# Patient Record
Sex: FEMALE | Race: BLACK OR AFRICAN AMERICAN | Employment: FULL TIME | URBAN - METROPOLITAN AREA
[De-identification: names, ages, dates, MRNs, and addresses within clinical notes are randomized per-mention and may not be internally consistent; named-entity substitution may affect disease eponyms.]

---

## 2017-04-19 ENCOUNTER — OFFICE VISIT (OUTPATIENT)
Dept: INTERNAL MEDICINE CLINIC | Age: 57
End: 2017-04-19

## 2017-04-19 VITALS
SYSTOLIC BLOOD PRESSURE: 133 MMHG | TEMPERATURE: 96.6 F | DIASTOLIC BLOOD PRESSURE: 82 MMHG | WEIGHT: 246.6 LBS | OXYGEN SATURATION: 97 % | HEART RATE: 78 BPM | HEIGHT: 66 IN | RESPIRATION RATE: 18 BRPM | BODY MASS INDEX: 39.63 KG/M2

## 2017-04-19 DIAGNOSIS — E78.5 DYSLIPIDEMIA: ICD-10-CM

## 2017-04-19 DIAGNOSIS — I10 ESSENTIAL HYPERTENSION: ICD-10-CM

## 2017-04-19 DIAGNOSIS — N18.30 CHRONIC RENAL FAILURE SYNDROME, STAGE 3 (MODERATE) (HCC): Primary | ICD-10-CM

## 2017-04-19 DIAGNOSIS — E66.9 OBESITY, UNSPECIFIED OBESITY SEVERITY, UNSPECIFIED OBESITY TYPE: ICD-10-CM

## 2017-04-19 NOTE — PROGRESS NOTES
1. Have you been to the ER, urgent care clinic since your last visit? Hospitalized since your last visit? Yes Where: [facility]    2. Have you seen or consulted any other health care providers outside of the [facility] since your last visit? Include any pap smears or colon screening.  Yes Reason for visit: chest pain

## 2017-04-19 NOTE — MR AVS SNAPSHOT
Visit Information Date & Time Provider Department Dept. Phone Encounter #  
 4/19/2017  1:45 PM Tri Mistry MD [facility] - Abdullahi Pendleton [phone] [id number] Upcoming Health Maintenance Date Due FOBT Q 1 YEAR AGE 50-75 6/15/2016 BREAST CANCER SCRN MAMMOGRAM 6/15/2017 Pneumococcal 19-64 Highest Risk (2 of 3 - PCV13) 4/19/2018 PAP AKA CERVICAL CYTOLOGY 6/15/2018 DTaP/Tdap/Td series (2 - Td) 4/19/2027 Allergies as of 4/19/2017  Review Complete On: 4/19/2017 By: Abhi Joy No Known Allergies Current Immunizations  Never Reviewed No immunizations on file. Not reviewed this visit You Were Diagnosed With   
  
 Codes Comments Chronic renal failure syndrome, stage 3 (moderate)    -  Primary ICD-10-CM: N18.3 ICD-9-CM: 033. 3 Essential hypertension     ICD-10-CM: I10 
ICD-9-CM: 401.9 Obesity, unspecified obesity severity, unspecified obesity type     ICD-10-CM: E66.9 ICD-9-CM: 278.00 Dyslipidemia     ICD-10-CM: E78.5 ICD-9-CM: 272.4 Vitals BP Pulse Temp Resp Height(growth percentile) Weight(growth percentile) 133/82 (BP 1 Location: Right arm, BP Patient Position: Sitting) 78 96.6 °F (35.9 °C) (Oral) 18 5' 6\" (1.676 m) 246 lb 9.6 oz (111.9 kg) SpO2 BMI OB Status Smoking Status 97% 39.8 kg/m2 Postmenopausal Never Smoker BMI and BSA Data Body Mass Index Body Surface Area  
 39.8 kg/m 2 2.28 m 2 Preferred Pharmacy Pharmacy Name Phone [facility] [address] [phone] Your Updated Medication List  
  
   
This list is accurate as of: 4/19/17  4:37 PM.  Always use your most recent med list. amLODIPine 10 mg tablet Commonly known as:  Tamy Million TAKE 1 TABLET BY MOUTH DAILY  
  
 atenolol 50 mg tablet Commonly known as:  TENORMIN Take  by mouth daily. calcitRIOL 0.5 mcg capsule Commonly known as:  ROCALTROL Take 2 Caps by mouth daily. lisinopril 40 mg tablet Commonly known as:  PRINIVIL, ZESTRIL  
TAKE 1 TABLET BY MOUTH DAILY  
  
 metOLazone 2.5 mg tablet Commonly known as:  ZAROXOLYN  
TAKE 1 TABLET BY MOUTH DAILY potassium chloride 20 mEq tablet Commonly known as:  K-DUR, KLOR-CON  
TAKE 1 TABLET BY MOUTH DAILY pravastatin 40 mg tablet Commonly known as:  PRAVACHOL  
TAKE 1 TABLET BY MOUTH EVERY DAY AT BEDTIME  
  
 predniSONE 20 mg tablet Commonly known as:  DELTASONE  
1 tablet T. I.D x 5 days ULORIC 40 mg Tab tablet Generic drug:  febuxostat Take 40 mg by mouth daily. We Performed the Following CYSTATIN C [ODU10974 Custom] METABOLIC PANEL, BASIC [06105 CPT(R)] Introducing [facility]! [facility] introduces [employer] patient portal. Now you can access parts of your medical record, email your doctor's office, and request medication refills online. 1. In your internet browser, go to [email or web address] 2. Click on the First Time User? Click Here link in the Sign In box. You will see the New Member Sign Up page. 3. Enter your Dokk[employer] Access Code exactly as it appears below. You will not need to use this code after youve completed the sign-up process. If you do not sign up before the expiration date, you must request a new code. · Dokk[employer] Access Code: [email or web address] Expires: 7/18/2017  4:37 PM 
 
4. Enter the last four digits of your Social Security Number (xxxx) and Date of Birth (mm/dd/yyyy) as indicated and click Submit. You will be taken to the next sign-up page. 5. Create a [employer] Intelligencet ID. This will be your Dokkankom login ID and cannot be changed, so think of one that is secure and easy to remember. 6. Create a [employer]ellig[employer]et password. You can change your password at any time. 7. Enter your Password Reset Question and Answer. This can be used at a later time if you forget your password. 8. Enter your e-mail address. You will receive e-mail notification when new information is available in [address] 9. Click Sign Up. You can now view and download portions of your medical record. 10. Click the Download Summary menu link to download a portable copy of your medical information. If you have questions, please visit the Frequently Asked Questions section of the Intransa website. Remember, In[employer]a is NOT to be used for urgent needs. For medical emergencies, dial 911. Now available from your iPhone and Android! Please provide this summary of care documentation to your next provider. Your primary care clinician is listed as Stephanie Pendleton. If you have any questions after today's visit, please call [phone].

## 2017-04-20 NOTE — PROGRESS NOTES
[address] [facility]  [place] [address]  [address] [place] [address]  Phone:  [phone]  Fax: [phone]       Chief Complaint   Patient presents with   [facility] Follow Up   . SUBJECTIVE:    Lois Holley is a 59 y.o. female comes in for return visit stating that she has done fairly well. Unfortunately she developed chest pain and went to the emergency room at [facility].  She was admitted. With hydration her creatinine decreased to a reasonable level and she was given Mucomyst.  She was cathed and found to have trivial disease with worst stenotic area being 40% off of the first study, first obtuse margin upon the LAD of 40%. She had no kidney damage from the dye with creatinine that was actually better than the one prior to admission. This is purely representing a reflection of the actual serum creatinine with hydration. Since being home fortunately she did exactly as I would have instructed her to do and that is to resume all the medications she was taking before admission, not making any adjustments are suggested by the hospitalist.  She feels well at this point. She is taking her statin as prescribed. She has not had any gouty episodes either. Current Outpatient Prescriptions   Medication Sig Dispense Refill    lisinopril (PRINIVIL, ZESTRIL) 40 mg tablet TAKE 1 TABLET BY MOUTH DAILY 30 Tab 11    pravastatin (PRAVACHOL) 40 mg tablet TAKE 1 TABLET BY MOUTH EVERY DAY AT BEDTIME 30 Tab 11    amLODIPine (NORVASC) 10 mg tablet TAKE 1 TABLET BY MOUTH DAILY 30 Tab 11    metolazone (ZAROXOLYN) 2.5 mg tablet TAKE 1 TABLET BY MOUTH DAILY 30 Tab 11    potassium chloride (K-DUR, KLOR-CON) 20 mEq tablet TAKE 1 TABLET BY MOUTH DAILY 30 Tab 11    calcitRIOL (ROCALTROL) 0.5 mcg capsule Take 2 Caps by mouth daily. 90 Cap 3    predniSONE (DELTASONE) 20 mg tablet 1 tablet T. I.D x 5 days 15 Tab 0    atenolol (TENORMIN) 50 mg tablet Take  by mouth daily.      febuxostat (ULORIC) 40 mg tab tablet Take 40 mg by mouth daily.        Past Medical History:   Diagnosis Date    Arthritis     L knee    Chronic kidney disease     secondary to hypertensive nephrosclerosis    Gout     Hypertension      Past Surgical History:   Procedure Laterality Date    ABDOMEN SURGERY PROC UNLISTED      HX COLONOSCOPY      HX GYN       No Known Allergies      REVIEW OF SYSTEMS:  General: negative for - chills or fever  ENT: negative for - headaches, nasal congestion or tinnitus  Respiratory: negative for - cough, hemoptysis, shortness of breath or wheezing  Cardiovascular : negative for - chest pain, edema, palpitations or shortness of breath  Gastrointestinal: negative for - abdominal pain, blood in stools, heartburn or nausea/vomiting  Genito-Urinary: no dysuria, trouble voiding, or hematuria  Musculoskeletal: negative for - gait disturbance, joint pain, joint stiffness or joint swelling  Neurological: no TIA or stroke symptoms  Hematologic: no bruises, no bleeding, no swollen glands  Integument: no lumps, mole changes, nail changes or rash  Endocrine: no malaise/lethargy or unexpected weight changes      Social History     Social History    Marital status:      Spouse name: N/A    Number of children: 2    Years of education: 16+     Occupational History    teacher      Social History Main Topics    Smoking status: Never Smoker    Smokeless tobacco: Never Used    Alcohol use No    Drug use: No    Sexual activity: Not Asked     Other Topics Concern    None     Social History Narrative     Family History   Problem Relation Age of Onset    Diabetes Mother     Hypertension Mother        OBJECTIVE:    Visit Vitals    /82 (BP 1 Location: Right arm, BP Patient Position: Sitting)    Pulse 78    Temp 96.6 °F (35.9 °C) (Oral)    Resp 18    Ht 5' 6\" (1.676 m)    Wt 246 lb 9.6 oz (111.9 kg)    SpO2 97%    BMI 39.8 kg/m2     CONSTITUTIONAL: well , well nourished, appears age appropriate  EYES: perrla, eom intact  ENMT:moist mucous membranes, pharynx clear  NECK: supple. Thyroid normal,no JVD  RESPIRATORY: Chest: clear to ascultation and percussion   CARDIOVASCULAR: Heart: regular rate and rhythm  GASTROINTESTINAL: Abdomen: soft, bowel sounds active  HEMATOLOGIC: no pathological lymph nodes palpated  MUSCULOSKELETAL: Extremities: no edema, pulse 1+   INTEGUMENT: No unusual rashes or suspicious skin lesions noted. Nails appear normal.  NEUROLOGIC: non-focal exam   MENTAL STATUS: alert and oriented, appropriate affect      ASSESSMENT:  1. Chronic renal failure syndrome, stage 3 (moderate)    2. Essential hypertension    3. Obesity, unspecified obesity severity, unspecified obesity type    4. Dyslipidemia        PLAN:    1. I will assess her renal function today and make a decision if anything else need be done which I doubt. 2. Blood pressure is excellent. No adjustments are made. Specifically her blood pressure is 130/80. 3. I again encourage her to lose weight by reducing carbohydrate intake. This means reducing consumption of sweets, white bread, and white potatoes. 4. She will continue her statin in view of her cardiovascular risk. .  Orders Placed This Encounter    METABOLIC PANEL, BASIC    CYSTATIN C         Follow-up Disposition:  Return in about 3 months (around 7/19/2017).       Anselmo Velasco MD

## 2017-04-21 LAB
BUN SERPL-MCNC: 38 MG/DL (ref 6–24)
BUN/CREAT SERPL: 14 (ref 9–23)
CALCIUM SERPL-MCNC: 10.4 MG/DL (ref 8.7–10.2)
CHLORIDE SERPL-SCNC: 103 MMOL/L (ref 96–106)
CO2 SERPL-SCNC: 22 MMOL/L (ref 18–29)
CREAT SERPL-MCNC: 2.66 MG/DL (ref 0.57–1)
CYSTATIN C SERPL-MCNC: 2.76 MG/L (ref 0.53–0.95)
GLUCOSE SERPL-MCNC: 88 MG/DL (ref 65–99)
POTASSIUM SERPL-SCNC: 3.7 MMOL/L (ref 3.5–5.2)
SODIUM SERPL-SCNC: 143 MMOL/L (ref 134–144)
SPECIMEN STATUS REPORT, ROLRST: NORMAL

## 2017-04-26 ENCOUNTER — PATIENT OUTREACH (OUTPATIENT)
Dept: INTERNAL MEDICINE CLINIC | Age: 57
End: 2017-04-26

## 2017-04-27 ENCOUNTER — PATIENT OUTREACH (OUTPATIENT)
Dept: INTERNAL MEDICINE CLINIC | Age: 57
End: 2017-04-27

## 2017-04-27 NOTE — LETTER
4/27/2017 11:36 AM 
 
Ms. Matt Ramos [address] [address] Dear Ms. Rizwan Hudson am a Nurse Navigator working with Dr. Vianca Coker  Part of my job is to follow up with patients who have been in the emergency department to see how they are feeling, answer any questions they may have about their visit and also make sure they have a follow-up appointment to see their primary care doctor. I have been unable to reach you by telephone and wanted to make sure that you scheduled a follow-up appointment to come in and talk to Dr. Vianca Coker about your recent visit to the hospital.  Please call our office to schedule your appointment. In the meantime, if you have any questions or concerns, please feel free to call me on my direct line at [phone] Thank you for allowing us to participate in your care! Sincerely, Jeronimo Carbone RN

## 2017-04-27 NOTE — PROGRESS NOTES
NNTOCED  2nd /fu contact      Patient on discharge report dated 4/26/2017. NN unable to leavet message on voicemail-stated box is full. Will attempt to contact again. Need to complete post-discharge assessment. Sent Get In Touch Letter.

## 2017-08-16 ENCOUNTER — OFFICE VISIT (OUTPATIENT)
Dept: INTERNAL MEDICINE CLINIC | Age: 57
End: 2017-08-16

## 2017-08-16 VITALS
SYSTOLIC BLOOD PRESSURE: 129 MMHG | HEART RATE: 99 BPM | HEIGHT: 66 IN | DIASTOLIC BLOOD PRESSURE: 71 MMHG | WEIGHT: 248 LBS | TEMPERATURE: 97.4 F | RESPIRATION RATE: 22 BRPM | OXYGEN SATURATION: 97 % | BODY MASS INDEX: 39.86 KG/M2

## 2017-08-16 DIAGNOSIS — E11.9 CONTROLLED TYPE 2 DIABETES MELLITUS WITHOUT COMPLICATION, WITH LONG-TERM CURRENT USE OF INSULIN (HCC): ICD-10-CM

## 2017-08-16 DIAGNOSIS — M10.9 GOUT, UNSPECIFIED CAUSE, UNSPECIFIED CHRONICITY, UNSPECIFIED SITE: ICD-10-CM

## 2017-08-16 DIAGNOSIS — N18.30 CHRONIC RENAL FAILURE SYNDROME, STAGE 3 (MODERATE) (HCC): Primary | ICD-10-CM

## 2017-08-16 DIAGNOSIS — I10 ESSENTIAL HYPERTENSION: ICD-10-CM

## 2017-08-16 DIAGNOSIS — E66.9 OBESITY, UNSPECIFIED OBESITY SEVERITY, UNSPECIFIED OBESITY TYPE: ICD-10-CM

## 2017-08-16 DIAGNOSIS — E78.5 DYSLIPIDEMIA: ICD-10-CM

## 2017-08-16 DIAGNOSIS — Z79.4 CONTROLLED TYPE 2 DIABETES MELLITUS WITHOUT COMPLICATION, WITH LONG-TERM CURRENT USE OF INSULIN (HCC): ICD-10-CM

## 2017-08-16 RX ORDER — FEBUXOSTAT 40 MG/1
40 TABLET, FILM COATED ORAL DAILY
Qty: 30 TAB | Refills: 11 | Status: SHIPPED | OUTPATIENT
Start: 2017-08-16 | End: 2017-11-13 | Stop reason: CLARIF

## 2017-08-16 NOTE — PROGRESS NOTES
.1. Have you been to the ER, urgent care clinic since your last visit? Hospitalized since your last visit? No    2. Have you seen or consulted any other health care providers outside of the [address] [place] [address] since your last visit? Include any pap smears or colon screening.  No

## 2017-08-16 NOTE — PROGRESS NOTES
[address] [facility]  [place] [address]  [address] [place] [address]  Phone:  [phone]  Fax: [phone]       Chief Complaint   Patient presents with    Hypertension   . SUBJECTIVE:    Eli Strickland is a 59 y.o. female Comes in for return visit stating that she has done fairly well other than having recurrent episodes of gout. She stopped taking her Uloric several years ago. She now takes Prednisone p.r.n. for the gouty attacks often times occurring in her left ankle and MCP joints bilaterally, mostly in the left foot, however. She denies any shortness of breath or chest pain. She actually had a cardiac cath, which was entirely normal.      She has a past history of primary hypertension and dyslipidemia. There has been no meaningful weight loss noted. Current Outpatient Prescriptions   Medication Sig Dispense Refill    febuxostat (ULORIC) 40 mg tab tablet Take 1 Tab by mouth daily. 30 Tab 11    lisinopril (PRINIVIL, ZESTRIL) 40 mg tablet TAKE 1 TABLET BY MOUTH DAILY 30 Tab 11    pravastatin (PRAVACHOL) 40 mg tablet TAKE 1 TABLET BY MOUTH EVERY DAY AT BEDTIME 30 Tab 11    metolazone (ZAROXOLYN) 2.5 mg tablet TAKE 1 TABLET BY MOUTH DAILY 30 Tab 11    potassium chloride (K-DUR, KLOR-CON) 20 mEq tablet TAKE 1 TABLET BY MOUTH DAILY 30 Tab 11    amLODIPine (NORVASC) 10 mg tablet TAKE 1 TABLET BY MOUTH DAILY 30 Tab 11    calcitRIOL (ROCALTROL) 0.5 mcg capsule Take 2 Caps by mouth daily. 90 Cap 3    predniSONE (DELTASONE) 20 mg tablet 1 tablet T. I.D x 5 days 15 Tab 0    atenolol (TENORMIN) 50 mg tablet Take  by mouth daily.        Past Medical History:   Diagnosis Date    Arthritis     L knee    Chronic kidney disease     secondary to hypertensive nephrosclerosis    Gout     Hypertension      Past Surgical History:   Procedure Laterality Date    ABDOMEN SURGERY PROC UNLISTED      HX COLONOSCOPY      HX GYN       No Known Allergies      REVIEW OF SYSTEMS:  General: negative for - chills or fever  ENT: negative for - headaches, nasal congestion or tinnitus  Respiratory: negative for - cough, hemoptysis, shortness of breath or wheezing  Cardiovascular : negative for - chest pain, edema, palpitations or shortness of breath  Gastrointestinal: negative for - abdominal pain, blood in stools, heartburn or nausea/vomiting  Genito-Urinary: no dysuria, trouble voiding, or hematuria  Musculoskeletal: negative for - gait disturbance, joint pain, joint stiffness or joint swelling  Neurological: no TIA or stroke symptoms  Hematologic: no bruises, no bleeding, no swollen glands  Integument: no lumps, mole changes, nail changes or rash  Endocrine: no malaise/lethargy or unexpected weight changes      Social History     Social History    Marital status:      Spouse name: N/A    Number of children: 2    Years of education: 16+     Occupational History    teacher      Social History Main Topics    Smoking status: Never Smoker    Smokeless tobacco: Never Used    Alcohol use No    Drug use: No    Sexual activity: Not Asked     Other Topics Concern    None     Social History Narrative     Family History   Problem Relation Age of Onset    Diabetes Mother     Hypertension Mother        OBJECTIVE:    Visit Vitals    /71 (BP 1 Location: Right arm, BP Patient Position: Sitting)    Pulse 99    Temp 97.4 °F (36.3 °C) (Oral)    Resp 22    Ht 5' 6\" (1.676 m)    Wt 248 lb (112.5 kg)    SpO2 97%    BMI 40.03 kg/m2     CONSTITUTIONAL: well , well nourished, appears age appropriate  EYES: perrla, eom intact  ENMT:moist mucous membranes, pharynx clear  NECK: supple.  Thyroid normal,no JVD  RESPIRATORY: Chest: clear to ascultation and percussion   CARDIOVASCULAR: Heart: regular rate and rhythm  GASTROINTESTINAL: Abdomen: soft, bowel sounds active  HEMATOLOGIC: no pathological lymph nodes palpated  MUSCULOSKELETAL: Extremities: no edema, pulse 1+, pain elicited range of motion left ankle, positive compression test for MTP joints bilaterally  INTEGUMENT: No unusual rashes or suspicious skin lesions noted. Nails appear normal.  NEUROLOGIC: non-focal exam   MENTAL STATUS: alert and oriented, appropriate affect      ASSESSMENT:  1. Chronic renal failure syndrome, stage 3 (moderate)    2. Gout, unspecified cause, unspecified chronicity, unspecified site    3. Essential hypertension    4. Obesity, unspecified obesity severity, unspecified obesity type    5. Dyslipidemia    6. Controlled type 2 diabetes mellitus without complication, with long-term current use of insulin ([address]        PLAN:    1. The patient has chronic renal failure. Hopefully the values are reasonably stable at this point. 2. She also has gout, and has decided to take the Uloric again. To prevent an acute gouty episode, she will take Prednisone 10 mg q d for the next three weeks. Hopefully this will be long enough such that an episode will not occur. 3. Her blood pressure is excellent today. No adjustments are made. 4. I encourage her to lose weight by reducing carbohydrate intake. This means avoiding sweets, white bread, white potatoes. 5. She will continue statin as prescribed in view of her increased cardiovascular risk. 6. Her diabetes has been reasonably quiescent quite surprisingly. Hemoglobin A1c will be checked. .  Orders Placed This Encounter    APOLIPOPROTEIN B    CBC WITH AUTOMATED DIFF    LIPID PANEL    URINALYSIS W/ RFLX MICROSCOPIC    TSH 3RD GENERATION    METABOLIC PANEL, COMPREHENSIVE    HEMOGLOBIN A1C WITH EAG    CYSTATIN C    CREATININE, UR, RANDOM    PROTEIN,TOTAL,URINE    URIC ACID    febuxostat (ULORIC) 40 mg tab tablet         Follow-up Disposition:  Return in about 3 months (around 11/16/2017).       Lonnie Lr MD

## 2017-08-18 LAB
ALBUMIN SERPL-MCNC: 4.3 G/DL (ref 3.5–5.5)
ALBUMIN/GLOB SERPL: 1.4 {RATIO} (ref 1.2–2.2)
ALP SERPL-CCNC: 81 IU/L (ref 39–117)
ALT SERPL-CCNC: 9 IU/L (ref 0–32)
APO B SERPL-MCNC: 84 MG/DL (ref 54–133)
APPEARANCE UR: CLEAR
AST SERPL-CCNC: 15 IU/L (ref 0–40)
BACTERIA #/AREA URNS HPF: NORMAL /[HPF]
BASOPHILS # BLD AUTO: 0 X10E3/UL (ref 0–0.2)
BASOPHILS NFR BLD AUTO: 0 %
BILIRUB SERPL-MCNC: 0.3 MG/DL (ref 0–1.2)
BILIRUB UR QL STRIP: NEGATIVE
BUN SERPL-MCNC: 46 MG/DL (ref 6–24)
BUN/CREAT SERPL: 15 (ref 9–23)
CALCIUM SERPL-MCNC: 10.4 MG/DL (ref 8.7–10.2)
CASTS URNS QL MICRO: NORMAL /LPF
CHLORIDE SERPL-SCNC: 99 MMOL/L (ref 96–106)
CHOLEST SERPL-MCNC: 163 MG/DL (ref 100–199)
CO2 SERPL-SCNC: 22 MMOL/L (ref 18–29)
COLOR UR: YELLOW
CREAT SERPL-MCNC: 2.99 MG/DL (ref 0.57–1)
CREAT UR-MCNC: 86.5 MG/DL
CYSTATIN C SERPL-MCNC: 3.13 MG/L (ref 0.53–0.95)
EOSINOPHIL # BLD AUTO: 0 X10E3/UL (ref 0–0.4)
EOSINOPHIL NFR BLD AUTO: 0 %
EPI CELLS #/AREA URNS HPF: NORMAL /HPF
ERYTHROCYTE [DISTWIDTH] IN BLOOD BY AUTOMATED COUNT: 14.9 % (ref 12.3–15.4)
EST. AVERAGE GLUCOSE BLD GHB EST-MCNC: 123 MG/DL
GLOBULIN SER CALC-MCNC: 3 G/DL (ref 1.5–4.5)
GLUCOSE SERPL-MCNC: 133 MG/DL (ref 65–99)
GLUCOSE UR QL: NEGATIVE
HBA1C MFR BLD: 5.9 % (ref 4.8–5.6)
HCT VFR BLD AUTO: 35.9 % (ref 34–46.6)
HDLC SERPL-MCNC: 47 MG/DL
HGB BLD-MCNC: 11.1 G/DL (ref 11.1–15.9)
HGB UR QL STRIP: NEGATIVE
IMM GRANULOCYTES # BLD: 0 X10E3/UL (ref 0–0.1)
IMM GRANULOCYTES NFR BLD: 0 %
KETONES UR QL STRIP: NEGATIVE
LDLC SERPL CALC-MCNC: 89 MG/DL (ref 0–99)
LEUKOCYTE ESTERASE UR QL STRIP: NEGATIVE
LYMPHOCYTES # BLD AUTO: 1.8 X10E3/UL (ref 0.7–3.1)
LYMPHOCYTES NFR BLD AUTO: 14 %
MCH RBC QN AUTO: 27.1 PG (ref 26.6–33)
MCHC RBC AUTO-ENTMCNC: 30.9 G/DL (ref 31.5–35.7)
MCV RBC AUTO: 88 FL (ref 79–97)
MICRO URNS: ABNORMAL
MONOCYTES # BLD AUTO: 0.6 X10E3/UL (ref 0.1–0.9)
MONOCYTES NFR BLD AUTO: 5 %
MUCOUS THREADS URNS QL MICRO: PRESENT
NEUTROPHILS # BLD AUTO: 10.1 X10E3/UL (ref 1.4–7)
NEUTROPHILS NFR BLD AUTO: 81 %
NITRITE UR QL STRIP: NEGATIVE
PH UR STRIP: 6 [PH] (ref 5–7.5)
PLATELET # BLD AUTO: 258 X10E3/UL (ref 150–379)
POTASSIUM SERPL-SCNC: 3.6 MMOL/L (ref 3.5–5.2)
PROT SERPL-MCNC: 7.3 G/DL (ref 6–8.5)
PROT UR QL STRIP: ABNORMAL
PROT UR-MCNC: 51.6 MG/DL
RBC # BLD AUTO: 4.1 X10E6/UL (ref 3.77–5.28)
RBC #/AREA URNS HPF: NORMAL /HPF
SODIUM SERPL-SCNC: 144 MMOL/L (ref 134–144)
SP GR UR: 1.01 (ref 1–1.03)
TRIGL SERPL-MCNC: 135 MG/DL (ref 0–149)
TSH SERPL DL<=0.005 MIU/L-ACNC: 1.07 UIU/ML (ref 0.45–4.5)
URATE SERPL-MCNC: 9.1 MG/DL (ref 2.5–7.1)
UROBILINOGEN UR STRIP-MCNC: 0.2 MG/DL (ref 0.2–1)
VLDLC SERPL CALC-MCNC: 27 MG/DL (ref 5–40)
WBC # BLD AUTO: 12.6 X10E3/UL (ref 3.4–10.8)
WBC #/AREA URNS HPF: NORMAL /HPF

## 2017-11-13 ENCOUNTER — OFFICE VISIT (OUTPATIENT)
Dept: INTERNAL MEDICINE CLINIC | Age: 57
End: 2017-11-13

## 2017-11-13 VITALS
RESPIRATION RATE: 18 BRPM | SYSTOLIC BLOOD PRESSURE: 143 MMHG | WEIGHT: 251.8 LBS | OXYGEN SATURATION: 99 % | TEMPERATURE: 96.7 F | HEART RATE: 88 BPM | DIASTOLIC BLOOD PRESSURE: 97 MMHG | BODY MASS INDEX: 40.47 KG/M2 | HEIGHT: 66 IN

## 2017-11-13 DIAGNOSIS — I10 ESSENTIAL HYPERTENSION: ICD-10-CM

## 2017-11-13 DIAGNOSIS — E78.5 DYSLIPIDEMIA: ICD-10-CM

## 2017-11-13 DIAGNOSIS — M10.9 GOUT, UNSPECIFIED CAUSE, UNSPECIFIED CHRONICITY, UNSPECIFIED SITE: Primary | ICD-10-CM

## 2017-11-13 DIAGNOSIS — N18.4 CHRONIC RENAL FAILURE SYNDROME, STAGE 4 (SEVERE) (HCC): ICD-10-CM

## 2017-11-13 RX ORDER — PREDNISONE 20 MG/1
20 TABLET ORAL
Qty: 30 TAB | Refills: 3 | Status: SHIPPED | OUTPATIENT
Start: 2017-11-13 | End: 2018-06-10 | Stop reason: CLARIF

## 2017-11-13 RX ORDER — ALLOPURINOL 100 MG/1
100 TABLET ORAL DAILY
Qty: 30 TAB | Refills: 11 | Status: SHIPPED | OUTPATIENT
Start: 2017-11-13 | End: 2018-03-05 | Stop reason: CLARIF

## 2017-11-13 NOTE — MR AVS SNAPSHOT
Visit Information Date & Time Provider Department Dept. Phone Encounter #  
 11/13/2017  4:15 PM Deanna Desouza MD [facility] - Adalid Bajwa [phone] [id number] Upcoming Health Maintenance Date Due FOBT Q 1 YEAR AGE 50-75 6/15/2016 Pneumococcal 19-64 Highest Risk (2 of 3 - PCV13) 4/19/2018 PAP AKA CERVICAL CYTOLOGY 6/15/2018 BREAST CANCER SCRN MAMMOGRAM 8/16/2019 DTaP/Tdap/Td series (2 - Td) 4/19/2027 Allergies as of 11/13/2017  Review Complete On: 11/13/2017 By: Levi Chambers No Known Allergies Current Immunizations  Never Reviewed No immunizations on file. Not reviewed this visit You Were Diagnosed With   
  
 Codes Comments Chronic renal failure, stage 3 (moderate)    -  Primary ICD-10-CM: N18.3 ICD-9-CM: 088. 3 Essential hypertension     ICD-10-CM: I10 
ICD-9-CM: 401.9 Class 3 obesity with serious comorbidity and body mass index (BMI) of 40.0 to 44.9 in adult, unspecified obesity type ([facility] [address]     ICD-10-CM: E66.9, Z68.41 
ICD-9-CM: 278.00, V85.41 Dyslipidemia     ICD-10-CM: E78.5 ICD-9-CM: 272.4 Gout, unspecified cause, unspecified chronicity, unspecified site     ICD-10-CM: M10.9 ICD-9-CM: 274.9 Vitals BP Pulse Temp Resp Height(growth percentile) Weight(growth percentile) (!) 143/97 (BP 1 Location: Left arm, BP Patient Position: Sitting) 88 96.7 °F (35.9 °C) (Oral) 18 5' 6\" (1.676 m) 251 lb 12.8 oz (114.2 kg) SpO2 BMI OB Status Smoking Status 99% 40.64 kg/m2 Postmenopausal Never Smoker BMI and BSA Data Body Mass Index Body Surface Area  
 40.64 kg/m 2 2.31 m 2 Preferred Pharmacy Pharmacy Name Phone [facility] [address] [phone] Your Updated Medication List  
  
   
This list is accurate as of: 11/13/17  5:21 PM.  Always use your most recent med list. amLODIPine 10 mg tablet Commonly known as:  Love Breach TAKE 1 TABLET BY MOUTH DAILY  
  
 atenolol 50 mg tablet Commonly known as:  TENORMIN Take  by mouth daily. calcitRIOL 0.5 mcg capsule Commonly known as:  ROCALTROL Take 2 Caps by mouth daily. febuxostat 40 mg Tab tablet Commonly known as:  Teresa Genta Take 1 Tab by mouth daily. lisinopril 40 mg tablet Commonly known as:  PRINIVIL, ZESTRIL  
TAKE 1 TABLET BY MOUTH DAILY  
  
 metOLazone 2.5 mg tablet Commonly known as:  ZAROXOLYN  
TAKE 1 TABLET BY MOUTH DAILY potassium chloride 20 mEq tablet Commonly known as:  K-DUR, KLOR-CON  
TAKE 1 TABLET BY MOUTH DAILY pravastatin 40 mg tablet Commonly known as:  PRAVACHOL  
TAKE 1 TABLET BY MOUTH EVERY DAY AT BEDTIME  
  
 predniSONE 20 mg tablet Commonly known as:  Colten Dally Take 1 Tab by mouth three (3) times daily (after meals). Prescriptions Sent to Pharmacy Refills  
 predniSONE (DELTASONE) 20 mg tablet 3 Sig: Take 1 Tab by mouth three (3) times daily (after meals). Class: Normal  
 Pharmacy: [facility]  #: [phone] Route: Oral  
  
We Performed the Following CYSTATIN C [[id number] Custom] METABOLIC PANEL, BASIC [36122 CPT(R)] PTH INTACT [15557 CPT(R)] URIC ACID [id number] CPT(R)] Introducing [facility]! [facility] introduces [employer] patient portal. Now you can access parts of your medical record, email your doctor's office, and request medication refills online. 1. In your internet browser, go to [email or web address] 2. Click on the First Time User? Click Here link in the Sign In box. You will see the New Member Sign Up page. 3. Enter your [employer] Access Code exactly as it appears below. You will not need to use this code after youve completed the sign-up process. If you do not sign up before the expiration date, you must request a new code. · Crowdx Access Code: [email or web address] Expires: 11/14/2017  3:25 PM 
 
4. Enter the last four digits of your Social Security Number (xxxx) and Date of Birth (mm/dd/yyyy) as indicated and click Submit. You will be taken to the next sign-up page. 5. Create a Crowdx ID. This will be your [employer]dx login ID and cannot be changed, so think of one that is secure and easy to remember. 6. Create a [employer]dx password. You can change your password at any time. 7. Enter your Password Reset Question and Answer. This can be used at a later time if you forget your password. 8. Enter your e-mail address. You will receive e-mail notification when new information is available in [address] 9. Click Sign Up. You can now view and download portions of your medical record. 10. Click the Download Summary menu link to download a portable copy of your medical information. If you have questions, please visit the Frequently Asked Questions section of the Crowdx website. Remember, [employer] is NOT to be used for urgent needs. For medical emergencies, dial 911. Now available from your iPhone and Android! Please provide this summary of care documentation to your next provider. Your primary care clinician is listed as Stephanie Pendleton. If you have any questions after today's visit, please call [phone].

## 2017-11-13 NOTE — PROGRESS NOTES
1. Have you been to the ER, urgent care clinic since your last visit? Hospitalized since your last visit? No    2. Have you seen or consulted any other health care providers outside of the [address] [place] [address] since your last visit? Include any pap smears or colon screening.  No

## 2017-11-14 LAB — PTH-INTACT SERPL-MCNC: 503 PG/ML (ref 15–65)

## 2017-11-14 NOTE — PROGRESS NOTES
[address] [place] [address] and Primary Care  [place] [address]  [address] [place] [id number]  Phone:  [phone]  Fax: [phone]       Chief Complaint   Patient presents with    Hypertension    Elbow Pain   . SUBJECTIVE:    Divina Katz is a 59 y.o. female Comes in for return visit complaining of a 24 to 36 hour history of severe pain in her right elbow. She also has pain in her right wrist.  She does have a history of gout and did not take the Uloric for cost reasons. She does have chronic renal failure, which has been reasonably stable. There has been no meaningful weight loss. He has a past history of primary hypertension and dyslipidemia. Current Outpatient Prescriptions   Medication Sig Dispense Refill    predniSONE (DELTASONE) 20 mg tablet Take 1 Tab by mouth three (3) times daily (after meals). 30 Tab 3    allopurinol (ZYLOPRIM) 100 mg tablet Take 1 Tab by mouth daily. 30 Tab 11    lisinopril (PRINIVIL, ZESTRIL) 40 mg tablet TAKE 1 TABLET BY MOUTH DAILY 30 Tab 11    pravastatin (PRAVACHOL) 40 mg tablet TAKE 1 TABLET BY MOUTH EVERY DAY AT BEDTIME 30 Tab 11    metolazone (ZAROXOLYN) 2.5 mg tablet TAKE 1 TABLET BY MOUTH DAILY 30 Tab 11    potassium chloride (K-DUR, KLOR-CON) 20 mEq tablet TAKE 1 TABLET BY MOUTH DAILY 30 Tab 11    amLODIPine (NORVASC) 10 mg tablet TAKE 1 TABLET BY MOUTH DAILY 30 Tab 11    calcitRIOL (ROCALTROL) 0.5 mcg capsule Take 2 Caps by mouth daily. 90 Cap 3    atenolol (TENORMIN) 50 mg tablet Take  by mouth daily.        Past Medical History:   Diagnosis Date    Arthritis     L knee    Chronic kidney disease     secondary to hypertensive nephrosclerosis    Gout     Hypertension      Past Surgical History:   Procedure Laterality Date    ABDOMEN SURGERY PROC UNLISTED      HX COLONOSCOPY      HX GYN       No Known Allergies      REVIEW OF SYSTEMS:  General: negative for - chills or fever  ENT: negative for - headaches, nasal congestion or tinnitus  Respiratory: negative for - cough, hemoptysis, shortness of breath or wheezing  Cardiovascular : negative for - chest pain, edema, palpitations or shortness of breath  Gastrointestinal: negative for - abdominal pain, blood in stools, heartburn or nausea/vomiting  Genito-Urinary: no dysuria, trouble voiding, or hematuria  Musculoskeletal: negative for - gait disturbance, joint pain, joint stiffness or joint swelling  Neurological: no TIA or stroke symptoms  Hematologic: no bruises, no bleeding, no swollen glands  Integument: no lumps, mole changes, nail changes or rash  Endocrine: no malaise/lethargy or unexpected weight changes      Social History     Social History    Marital status:      Spouse name: N/A    Number of children: 2    Years of education: 16+     Occupational History    teacher      Social History Main Topics    Smoking status: Never Smoker    Smokeless tobacco: Never Used    Alcohol use No    Drug use: No    Sexual activity: Not Asked     Other Topics Concern    None     Social History Narrative     Family History   Problem Relation Age of Onset    Diabetes Mother     Hypertension Mother        OBJECTIVE:    Visit Vitals    BP (!) 143/97 (BP 1 Location: Left arm, BP Patient Position: Sitting)    Pulse 88    Temp 96.7 °F (35.9 °C) (Oral)    Resp 18    Ht 5' 6\" (1.676 m)    Wt 251 lb 12.8 oz (114.2 kg)    SpO2 99%    BMI 40.64 kg/m2     CONSTITUTIONAL: well , well nourished, appears age appropriate  EYES: perrla, eom intact  ENMT:moist mucous membranes, pharynx clear  NECK: supple.  Thyroid normal  RESPIRATORY: Chest: clear to ascultation and percussion   CARDIOVASCULAR: Heart: regular rate and rhythm  GASTROINTESTINAL: Abdomen: soft, bowel sounds active  HEMATOLOGIC: no pathological lymph nodes palpated  MUSCULOSKELETAL: Extremities: no edema, pulse 1+, pain elicited to hyperextension and flexion right elbow as well as right wrist  INTEGUMENT: No unusual rashes or suspicious skin lesions noted. Nails appear normal.  NEUROLOGIC: non-focal exam   MENTAL STATUS: alert and oriented, appropriate affect      ASSESSMENT:  1. Gout, unspecified cause, unspecified chronicity, unspecified site    2. Chronic renal failure syndrome, stage 4 (severe) (HCC)    3. Essential hypertension    4. Class 3 obesity with serious comorbidity and body mass index (BMI) of 40.0 to 44.9 in adult, unspecified obesity type ([address]    5. Dyslipidemia        PLAN:    1. The patient's clinical picture is consistent with gout. She has involvement of her right elbow and right wrist.  She did not get the Uloric for cost reasons, but never called me back, so hopefully adjustment could be made. She will therefore restart on Allopurinol 100 mg daily. The dosage will be in view of her reduction of GFR. For the acute episode, however, she will be placed on Prednisone 20 mg t.i.d. p.c for the next ten days. 2. She has chronic renal failure, which is slowly progressive. The presumed etiology is hypertensive nephrosclerosis. 3. Blood pressure is excellent today. No adjustments are made. Specifically her blood pressure is 138/80. It is slightly up today because of the pain that she is currently experiencing. 4. She needs to lose weight, which we discuss repeatedly. She is morbidly obese, and this is contributing to several of her existing co-morbidities. 5. She will continue statin as prescribed in view of her increased cardiovascular risk created by her multiple co-morbidities including her reduction in GFR. Orders Placed This Encounter    METABOLIC PANEL, BASIC    CYSTATIN C    PTH INTACT    URIC ACID    predniSONE (DELTASONE) 20 mg tablet    allopurinol (ZYLOPRIM) 100 mg tablet         Follow-up Disposition:  Return in about 4 months (around 3/13/2018).       Kaylee Kessler MD

## 2017-11-15 LAB
BUN SERPL-MCNC: 42 MG/DL (ref 6–24)
BUN/CREAT SERPL: 15 (ref 9–23)
CALCIUM SERPL-MCNC: 10.8 MG/DL (ref 8.7–10.2)
CHLORIDE SERPL-SCNC: 102 MMOL/L (ref 96–106)
CO2 SERPL-SCNC: 21 MMOL/L (ref 18–29)
CREAT SERPL-MCNC: 2.88 MG/DL (ref 0.57–1)
CYSTATIN C SERPL-MCNC: 3.02 MG/L (ref 0.53–0.95)
GFR SERPLBLD CREATININE-BSD FMLA CKD-EPI: 17 ML/MIN/1.73
GFR SERPLBLD CREATININE-BSD FMLA CKD-EPI: 20 ML/MIN/1.73
GLUCOSE SERPL-MCNC: 109 MG/DL (ref 65–99)
POTASSIUM SERPL-SCNC: 3.7 MMOL/L (ref 3.5–5.2)
SODIUM SERPL-SCNC: 147 MMOL/L (ref 134–144)
URATE SERPL-MCNC: 9.2 MG/DL (ref 2.5–7.1)

## 2018-03-05 ENCOUNTER — OFFICE VISIT (OUTPATIENT)
Dept: INTERNAL MEDICINE CLINIC | Age: 58
End: 2018-03-05

## 2018-03-05 VITALS
RESPIRATION RATE: 18 BRPM | DIASTOLIC BLOOD PRESSURE: 81 MMHG | SYSTOLIC BLOOD PRESSURE: 130 MMHG | TEMPERATURE: 97.9 F | OXYGEN SATURATION: 96 % | BODY MASS INDEX: 40.18 KG/M2 | HEART RATE: 88 BPM | HEIGHT: 66 IN | WEIGHT: 250 LBS

## 2018-03-05 DIAGNOSIS — K21.00 REFLUX ESOPHAGITIS: ICD-10-CM

## 2018-03-05 DIAGNOSIS — Z83.3 FAMILY HISTORY OF DIABETES MELLITUS: ICD-10-CM

## 2018-03-05 DIAGNOSIS — N18.4 CHRONIC RENAL FAILURE SYNDROME, STAGE 4 (SEVERE) (HCC): ICD-10-CM

## 2018-03-05 DIAGNOSIS — D64.9 ANEMIA, UNSPECIFIED TYPE: ICD-10-CM

## 2018-03-05 DIAGNOSIS — E78.5 DYSLIPIDEMIA: ICD-10-CM

## 2018-03-05 DIAGNOSIS — I10 ESSENTIAL HYPERTENSION: ICD-10-CM

## 2018-03-05 DIAGNOSIS — M54.16 LUMBAR RADICULAR PAIN: Primary | ICD-10-CM

## 2018-03-05 DIAGNOSIS — M10.9 GOUT, UNSPECIFIED CAUSE, UNSPECIFIED CHRONICITY, UNSPECIFIED SITE: ICD-10-CM

## 2018-03-05 RX ORDER — ALLOPURINOL 100 MG/1
200 TABLET ORAL DAILY
Qty: 60 TAB | Refills: 11 | Status: SHIPPED | OUTPATIENT
Start: 2018-03-05 | End: 2018-06-10 | Stop reason: SDUPTHER

## 2018-03-05 NOTE — PROGRESS NOTES
1. Have you been to the ER, urgent care clinic since your last visit? Hospitalized since your last visit? No    2. Have you seen or consulted any other health care providers outside of the [address] [place] [address] since your last visit? Include any pap smears or colon screening.  No

## 2018-03-07 LAB
APO B SERPL-MCNC: 79 MG/DL (ref 54–133)
BASOPHILS # BLD AUTO: 0 X10E3/UL (ref 0–0.2)
BASOPHILS NFR BLD AUTO: 0 %
BUN SERPL-MCNC: 70 MG/DL (ref 8–27)
BUN/CREAT SERPL: 28 (ref 12–28)
CALCIUM SERPL-MCNC: 10.5 MG/DL (ref 8.7–10.3)
CHLORIDE SERPL-SCNC: 103 MMOL/L (ref 96–106)
CO2 SERPL-SCNC: 22 MMOL/L (ref 18–29)
CREAT SERPL-MCNC: 2.54 MG/DL (ref 0.57–1)
CYSTATIN C SERPL-MCNC: 3.72 MG/L (ref 0.53–0.95)
EOSINOPHIL # BLD AUTO: 0 X10E3/UL (ref 0–0.4)
EOSINOPHIL NFR BLD AUTO: 0 %
ERYTHROCYTE [DISTWIDTH] IN BLOOD BY AUTOMATED COUNT: 15.3 % (ref 12.3–15.4)
EST. AVERAGE GLUCOSE BLD GHB EST-MCNC: 117 MG/DL
GFR SERPLBLD CREATININE-BSD FMLA CKD-EPI: 20 ML/MIN/1.73
GFR SERPLBLD CREATININE-BSD FMLA CKD-EPI: 23 ML/MIN/1.73
GLUCOSE SERPL-MCNC: 115 MG/DL (ref 65–99)
HBA1C MFR BLD: 5.7 % (ref 4.8–5.6)
HCT VFR BLD AUTO: 34.9 % (ref 34–46.6)
HGB BLD-MCNC: 11 G/DL (ref 11.1–15.9)
IMM GRANULOCYTES # BLD: 0.1 X10E3/UL (ref 0–0.1)
IMM GRANULOCYTES NFR BLD: 1 %
LYMPHOCYTES # BLD AUTO: 1.1 X10E3/UL (ref 0.7–3.1)
LYMPHOCYTES NFR BLD AUTO: 7 %
MCH RBC QN AUTO: 27.6 PG (ref 26.6–33)
MCHC RBC AUTO-ENTMCNC: 31.5 G/DL (ref 31.5–35.7)
MCV RBC AUTO: 88 FL (ref 79–97)
MONOCYTES # BLD AUTO: 0.5 X10E3/UL (ref 0.1–0.9)
MONOCYTES NFR BLD AUTO: 3 %
NEUTROPHILS # BLD AUTO: 14.5 X10E3/UL (ref 1.4–7)
NEUTROPHILS NFR BLD AUTO: 89 %
PLATELET # BLD AUTO: 256 X10E3/UL (ref 150–379)
POTASSIUM SERPL-SCNC: 4 MMOL/L (ref 3.5–5.2)
RBC # BLD AUTO: 3.99 X10E6/UL (ref 3.77–5.28)
SODIUM SERPL-SCNC: 144 MMOL/L (ref 134–144)
WBC # BLD AUTO: 16.2 X10E3/UL (ref 3.4–10.8)

## 2018-03-10 PROBLEM — K21.00 REFLUX ESOPHAGITIS: Status: ACTIVE | Noted: 2018-03-10

## 2018-03-10 RX ORDER — PANTOPRAZOLE SODIUM 40 MG/1
40 TABLET, DELAYED RELEASE ORAL DAILY
Qty: 30 TAB | Refills: 11 | Status: SHIPPED | OUTPATIENT
Start: 2018-03-10 | End: 2020-08-12

## 2018-03-11 NOTE — ASSESSMENT & PLAN NOTE
Uncontrolled, based on history, physical exam and review of pertinent labs, studies and medications; meds reconciled; lifestyle modifications recommended.   Key Obesity Meds             metolazone (ZAROXOLYN) 2.5 mg tablet  (Taking) TAKE 1 TABLET BY MOUTH DAILY        Lab Results   Component Value Date/Time    Hemoglobin A1c 5.7 03/05/2018 05:28 PM    Glucose 115 03/05/2018 05:28 PM    Cholesterol, total 163 08/16/2017 04:24 PM    HDL Cholesterol 47 08/16/2017 04:24 PM    LDL, calculated 89 08/16/2017 04:24 PM    Triglyceride 135 08/16/2017 04:24 PM    TSH 1.070 08/16/2017 04:24 PM    Sodium 144 03/05/2018 05:28 PM    Potassium 4.0 03/05/2018 05:28 PM    ALT (SGPT) 9 08/16/2017 04:24 PM    AST (SGOT) 15 08/16/2017 04:24 PM

## 2018-03-11 NOTE — ASSESSMENT & PLAN NOTE
Stable, based on history, physical exam and review of pertinent labs, studies and medications; meds reconciled; continue current treatment plan. Key CKD Meds             predniSONE (DELTASONE) 20 mg tablet  (Taking) Take 1 Tab by mouth three (3) times daily (after meals). lisinopril (PRINIVIL, ZESTRIL) 40 mg tablet  (Taking) TAKE 1 TABLET BY MOUTH DAILY    metolazone (ZAROXOLYN) 2.5 mg tablet  (Taking) TAKE 1 TABLET BY MOUTH DAILY    potassium chloride (K-DUR, KLOR-CON) 20 mEq tablet  (Taking) TAKE 1 TABLET BY MOUTH DAILY    calcitRIOL (ROCALTROL) 0.5 mcg capsule  (Taking) Take 2 Caps by mouth daily. Lab Results   Component Value Date/Time    GFR est non-AA 20 03/05/2018 05:28 PM    GFR est AA 23 03/05/2018 05:28 PM    Creatinine 2.54 03/05/2018 05:28 PM    BUN 70 03/05/2018 05:28 PM    Sodium 144 03/05/2018 05:28 PM    Potassium 4.0 03/05/2018 05:28 PM    Chloride 103 03/05/2018 05:28 PM    CO2 22 03/05/2018 05:28 PM    Calcium 10.5 03/05/2018 05:28 PM    PTH, Intact 503 11/13/2017 05:17 PM     Estimated Creatinine Clearance: 30.1 mL/min (based on Cr of 2.54).

## 2018-03-11 NOTE — PROGRESS NOTES
[address] [place] [address] and Primary Care  [place] [address]  [address] [facility] [address]  Phone:  [phone]  Fax: [phone]       Chief Complaint   Patient presents with    Hypertension   . SUBJECTIVE:    Harshil Martin is a 59 y.o. female She comes in for a return visit complaining of pain in her left hip. She has been walking favoring of what appears to be her left hip when she is walking, which is getting progressively worse. She also has vague epigastric/chest pain episodically. On occasion, it has awakened her at night. Eating actually improves the sensation. She is taking her Allopurinol now. She formerly took Pervis Boni, but the cost was too great prompting the change. There has been no meaningful weight loss. She remains morbidly obese. She has chronic renal failure, which has been reasonably stable over the years, presumably related to hypertensive nephrosclerosis. She remains on a statin for her primary cardiovascular risk prevention. She had a cardiac catheterization during her hospital stay at [facility] with nonocclusive vascular disease. In reality, her vessels were clean with no plaquing. Current Outpatient Prescriptions   Medication Sig Dispense Refill    pantoprazole (PROTONIX) 40 mg tablet Take 1 Tab by mouth daily. 30 Tab 11    allopurinol (ZYLOPRIM) 100 mg tablet Take 2 Tabs by mouth daily. 60 Tab 11    predniSONE (DELTASONE) 20 mg tablet Take 1 Tab by mouth three (3) times daily (after meals).  30 Tab 3    lisinopril (PRINIVIL, ZESTRIL) 40 mg tablet TAKE 1 TABLET BY MOUTH DAILY 30 Tab 11    pravastatin (PRAVACHOL) 40 mg tablet TAKE 1 TABLET BY MOUTH EVERY DAY AT BEDTIME 30 Tab 11    metolazone (ZAROXOLYN) 2.5 mg tablet TAKE 1 TABLET BY MOUTH DAILY 30 Tab 11    potassium chloride (K-DUR, KLOR-CON) 20 mEq tablet TAKE 1 TABLET BY MOUTH DAILY 30 Tab 11    amLODIPine (NORVASC) 10 mg tablet TAKE 1 TABLET BY MOUTH DAILY 30 Tab 11    calcitRIOL (ROCALTROL) 0.5 mcg capsule Take 2 Caps by mouth daily. 90 Cap 3    atenolol (TENORMIN) 50 mg tablet Take  by mouth daily.        Past Medical History:   Diagnosis Date    Arthritis     L knee    Chronic kidney disease     secondary to hypertensive nephrosclerosis    Gout     Hypertension      Past Surgical History:   Procedure Laterality Date    ABDOMEN SURGERY PROC UNLISTED      HX COLONOSCOPY      HX GYN       No Known Allergies      REVIEW OF SYSTEMS:  General: negative for - chills or fever  ENT: negative for - headaches, nasal congestion or tinnitus  Respiratory: negative for - cough, hemoptysis, shortness of breath or wheezing  Cardiovascular : negative for - chest pain, edema, palpitations or shortness of breath  Gastrointestinal: negative for - abdominal pain, blood in stools, heartburn or nausea/vomiting  Genito-Urinary: no dysuria, trouble voiding, or hematuria  Musculoskeletal: negative for - gait disturbance, joint pain, joint stiffness or joint swelling  Neurological: no TIA or stroke symptoms  Hematologic: no bruises, no bleeding, no swollen glands  Integument: no lumps, mole changes, nail changes or rash  Endocrine: no malaise/lethargy or unexpected weight changes      Social History     Social History    Marital status:      Spouse name: N/A    Number of children: 2    Years of education: 16+     Occupational History    teacher      Social History Main Topics    Smoking status: Never Smoker    Smokeless tobacco: Never Used    Alcohol use No    Drug use: No    Sexual activity: Not Asked     Other Topics Concern    None     Social History Narrative     Family History   Problem Relation Age of Onset    Diabetes Mother     Hypertension Mother        OBJECTIVE:    Visit Vitals    /81 (BP 1 Location: Right arm, BP Patient Position: Sitting)    Pulse 88    Temp 97.9 °F (36.6 °C) (Oral)    Resp 18    Ht 5' 6\" (1.676 m)    Wt 250 lb (113.4 kg)    SpO2 96%    BMI 40.35 kg/m2     CONSTITUTIONAL: well , well nourished, appears age appropriate  EYES: perrla, eom intact  ENMT:moist mucous membranes, pharynx clear  NECK: supple. Thyroid normal  RESPIRATORY: Chest: clear to ascultation and percussion   CARDIOVASCULAR: Heart: regular rate and rhythm  GASTROINTESTINAL: Abdomen: soft, bowel sounds active  HEMATOLOGIC: no pathological lymph nodes palpated  MUSCULOSKELETAL: Extremities: no edema, pulse 1+, negative Armen's test bilaterally  INTEGUMENT: No unusual rashes or suspicious skin lesions noted. Nails appear normal.  NEUROLOGIC: non-focal exam, positive straight leg raising left leg  MENTAL STATUS: alert and oriented, appropriate affect      ASSESSMENT:  1. Lumbar radicular pain    2. Chronic renal failure syndrome, stage 4 (severe) ([facility])    3. Essential hypertension    4. Class 3 obesity with serious comorbidity and body mass index (BMI) of 40.0 to 44.9 in adult, unspecified obesity type ([address]    5. Dyslipidemia    6. Gout, unspecified cause, unspecified chronicity, unspecified site    7. Family history of diabetes mellitus    8. Anemia, unspecified type    9. Reflux esophagitis      Diagnoses and all orders for this visit:    1. Lumbar radicular pain  -     MRI LUMB SPINE WO CONT; Future    2. Chronic renal failure syndrome, stage 4 (severe) ([facility])  Assessment & Plan:  Stable, based on history, physical exam and review of pertinent labs, studies and medications; meds reconciled; continue current treatment plan. Key CKD Meds             predniSONE (DELTASONE) 20 mg tablet  (Taking) Take 1 Tab by mouth three (3) times daily (after meals).     lisinopril (PRINIVIL, ZESTRIL) 40 mg tablet  (Taking) TAKE 1 TABLET BY MOUTH DAILY    metolazone (ZAROXOLYN) 2.5 mg tablet  (Taking) TAKE 1 TABLET BY MOUTH DAILY    potassium chloride (K-DUR, KLOR-CON) 20 mEq tablet  (Taking) TAKE 1 TABLET BY MOUTH DAILY    calcitRIOL (ROCALTROL) 0.5 mcg capsule  (Taking) Take 2 Caps by mouth daily. Lab Results   Component Value Date/Time    GFR est non-AA 20 03/05/2018 05:28 PM    GFR est AA 23 03/05/2018 05:28 PM    Creatinine 2.54 03/05/2018 05:28 PM    BUN 70 03/05/2018 05:28 PM    Sodium 144 03/05/2018 05:28 PM    Potassium 4.0 03/05/2018 05:28 PM    Chloride 103 03/05/2018 05:28 PM    CO2 22 03/05/2018 05:28 PM    Calcium 10.5 03/05/2018 05:28 PM    PTH, Intact 503 11/13/2017 05:17 PM     Estimated Creatinine Clearance: 30.1 mL/min (based on Cr of 2.54). Orders:  -     METABOLIC PANEL, BASIC  -     CYSTATIN C    3. Essential hypertension  -     CYSTATIN C    4. Class 3 obesity with serious comorbidity and body mass index (BMI) of 40.0 to 44.9 in adult, unspecified obesity type [facility])  Assessment & Plan:  Uncontrolled, based on history, physical exam and review of pertinent labs, studies and medications; meds reconciled; lifestyle modifications recommended. Key Obesity Meds             metolazone (ZAROXOLYN) 2.5 mg tablet  (Taking) TAKE 1 TABLET BY MOUTH DAILY        Lab Results   Component Value Date/Time    Hemoglobin A1c 5.7 03/05/2018 05:28 PM    Glucose 115 03/05/2018 05:28 PM    Cholesterol, total 163 08/16/2017 04:24 PM    HDL Cholesterol 47 08/16/2017 04:24 PM    LDL, calculated 89 08/16/2017 04:24 PM    Triglyceride 135 08/16/2017 04:24 PM    TSH 1.070 08/16/2017 04:24 PM    Sodium 144 03/05/2018 05:28 PM    Potassium 4.0 03/05/2018 05:28 PM    ALT (SGPT) 9 08/16/2017 04:24 PM    AST (SGOT) 15 08/16/2017 04:24 PM             5. Dyslipidemia  -     APOLIPOPROTEIN B    6. Gout, unspecified cause, unspecified chronicity, unspecified site  -     allopurinol (ZYLOPRIM) 100 mg tablet; Take 2 Tabs by mouth daily. 7. Family history of diabetes mellitus  -     HEMOGLOBIN A1C WITH EAG    8. Anemia, unspecified type  -     CBC WITH AUTOMATED DIFF    9. Reflux esophagitis  -     pantoprazole (PROTONIX) 40 mg tablet; Take 1 Tab by mouth daily. PLAN:  1.  The patient has what appears to be a lumbar radiculopathy. Given the duration and the degree of discomfort currently present, MRI of the lumbar spine will be obtained. I elected not to give her Prednisone because her pain is not that bad at this point. 2. She does have chronic renal failure and I will continue to monitor her levels. 3. Her blood pressure is excellent at 130/70 and no adjustments were made. 4. She really needs to lose weight. We talked about this repetitively. She needs to eat meals, minimize snacking, and avoid the consumption of processed carbohydrates. 5. The sensation that she is having in her epigastric/chest area is most likely reflux. She will be treated with a PPI. 6. She will continue her statin as prescribed. 7. She will also continue Allopurinol 200 mg q. day. 8. I will continue to monitor her anemia. .  Orders Placed This Encounter    MRI LUMB SPINE WO CONT    CBC WITH AUTOMATED DIFF    METABOLIC PANEL, BASIC    CYSTATIN C    APOLIPOPROTEIN B    HEMOGLOBIN A1C WITH EAG    allopurinol (ZYLOPRIM) 100 mg tablet    pantoprazole (PROTONIX) 40 mg tablet         Follow-up Disposition:  Return in about 3 months (around 6/5/2018).       Della Sow MD

## 2018-04-10 ENCOUNTER — HOSPITAL ENCOUNTER (OUTPATIENT)
Dept: MRI IMAGING | Age: 58
Discharge: HOME | End: 2018-04-10
Attending: INTERNAL MEDICINE
Payer: COMMERCIAL

## 2018-04-10 DIAGNOSIS — M54.16 LUMBAR RADICULAR PAIN: ICD-10-CM

## 2018-04-10 PROCEDURE — 72148 MRI LUMBAR SPINE W/O DYE: CPT

## 2018-06-04 ENCOUNTER — OFFICE VISIT (OUTPATIENT)
Dept: INTERNAL MEDICINE CLINIC | Age: 58
End: 2018-06-04

## 2018-06-04 VITALS
RESPIRATION RATE: 16 BRPM | SYSTOLIC BLOOD PRESSURE: 141 MMHG | HEART RATE: 82 BPM | WEIGHT: 247.6 LBS | DIASTOLIC BLOOD PRESSURE: 88 MMHG | OXYGEN SATURATION: 95 % | HEIGHT: 66 IN | BODY MASS INDEX: 39.79 KG/M2 | TEMPERATURE: 96.3 F

## 2018-06-04 DIAGNOSIS — I10 ESSENTIAL HYPERTENSION: ICD-10-CM

## 2018-06-04 DIAGNOSIS — D64.9 ANEMIA, UNSPECIFIED TYPE: ICD-10-CM

## 2018-06-04 DIAGNOSIS — M35.3 POLYMYALGIA RHEUMATICA (HCC): ICD-10-CM

## 2018-06-04 DIAGNOSIS — M10.9 GOUT, UNSPECIFIED CAUSE, UNSPECIFIED CHRONICITY, UNSPECIFIED SITE: ICD-10-CM

## 2018-06-04 DIAGNOSIS — N18.4 CHRONIC RENAL FAILURE SYNDROME, STAGE 4 (SEVERE) (HCC): Primary | ICD-10-CM

## 2018-06-04 NOTE — MR AVS SNAPSHOT
[address] [place] [address] 
[phone] Patient: Jese Mccabe MRN: [id number] VCP:[id number]/84/[id number] Visit Information Date & Time Provider Department Dept. Phone Encounter #  
 6/4/2018  4:00 PM Paul Boles MD [facility] - [facility] [phone] [id number] Upcoming Health Maintenance Date Due FOBT Q 1 YEAR AGE 50-75 6/15/2016 Pneumococcal 19-64 Highest Risk (2 of 3 - PCV13) 4/19/2018 PAP AKA CERVICAL CYTOLOGY 6/15/2018 Influenza Age 5 to Adult 8/1/2018 BREAST CANCER SCRN MAMMOGRAM 3/13/2020 DTaP/Tdap/Td series (2 - Td) 4/19/2027 Allergies as of 6/4/2018  Review Complete On: 6/4/2018 By: Nicola Goins No Known Allergies Current Immunizations  Never Reviewed No immunizations on file. Not reviewed this visit Vitals BP Pulse Temp Resp Height(growth percentile) Weight(growth percentile) 141/88 82 96.3 °F (35.7 °C) (Oral) 16 5' 6\" (1.676 m) 247 lb 9.6 oz (112.3 kg) SpO2 BMI OB Status Smoking Status 95% 39.96 kg/m2 Postmenopausal Never Smoker BMI and BSA Data Body Mass Index Body Surface Area  
 39.96 kg/m 2 2.29 m 2 [facility]erred [phone] Your Updated Medication List  
  
   
This list is accurate as of 6/4/18  5:17 PM.  Always use your most recent med list.  
  
  
  
  
 allopurinol 100 mg tablet Commonly known as:  Bobbarbara Mole Take 2 Tabs by mouth daily. amLODIPine 10 mg tablet Commonly known as:  Qiu Barges TAKE 1 TABLET BY MOUTH DAILY  
  
 atenolol 50 mg tablet Commonly known as:  TENORMIN Take  by mouth daily. calcitRIOL 0.5 mcg capsule Commonly known as:  ROCALTROL Take 2 Caps by mouth daily. lisinopril 40 mg tablet Commonly known as:  PRINIVIL, ZESTRIL  
TAKE 1 TABLET BY MOUTH DAILY metOLazone 2.5 mg tablet Commonly known as:  ZAROXOLYN  
TAKE 1 TABLET BY MOUTH DAILY pantoprazole 40 mg tablet Commonly known as:  PROTONIX Take 1 Tab by mouth daily. potassium chloride 20 mEq tablet Commonly known as:  K-DUR, KLOR-CON  
TAKE 1 TABLET BY MOUTH DAILY pravastatin 40 mg tablet Commonly known as:  PRAVACHOL  
TAKE 1 TABLET BY MOUTH EVERY DAY AT BEDTIME  
  
 predniSONE 20 mg tablet Commonly known as:  Dalbert Moscoso Take 1 Tab by mouth three (3) times daily (after meals). Introducing [facility]! [facility] introduces [employer] patient portal. Now you can access parts of your medical record, email your doctor's office, and request medication refills online. 1. In your internet browser, go to [email or web address] 2. Click on the First Time User? Click Here link in the Sign In box. You will see the New Member Sign Up page. 3. Enter your [employer] Access Code exactly as it appears below. You will not need to use this code after youve completed the sign-up process. If you do not sign up before the expiration date, you must request a new code. · [employer] Access Code: [email or web address] Expires: 9/2/2018  5:17 PM 
 
4. Enter the last four digits of your Social Security Number (xxxx) and Date of Birth (mm/dd/yyyy) as indicated and click Submit. You will be taken to the next sign-up page. 5. Create a [employer] ID. This will be your [employer] login ID and cannot be changed, so think of one that is secure and easy to remember. 6. Create a [employer] password. You can change your password at any time. 7. Enter your Password Reset Question and Answer. This can be used at a later time if you forget your password. 8. Enter your e-mail address. You will receive e-mail notification when new information is available in [address] 9. Click Sign Up. You can now view and download portions of your medical record. 10. Click the Download Summary menu link to download a portable copy of your medical information. If you have questions, please visit the Frequently Asked Questions section of the [employer] website. Remember, [employer] is NOT to be used for urgent needs. For medical emergencies, dial 911. Now available from your iPhone and Android! Please provide this summary of care documentation to your next provider. Your primary care clinician is listed as Stephanie Pendleton. If you have any questions after today's visit, please call [phone].

## 2018-06-04 NOTE — PROGRESS NOTES
1. Have you been to the ER, urgent care clinic since your last visit? Hospitalized since your last visit? No    2. Have you seen or consulted any other health care providers outside of the [address] [place] [address] since your last visit? Include any pap smears or colon screening.  No

## 2018-06-06 LAB
BASOPHILS # BLD AUTO: 0 X10E3/UL (ref 0–0.2)
BASOPHILS NFR BLD AUTO: 0 %
BUN SERPL-MCNC: 48 MG/DL (ref 8–27)
BUN/CREAT SERPL: 18 (ref 12–28)
CALCIUM SERPL-MCNC: 10.9 MG/DL (ref 8.7–10.3)
CHLORIDE SERPL-SCNC: 101 MMOL/L (ref 96–106)
CO2 SERPL-SCNC: 26 MMOL/L (ref 18–29)
CREAT SERPL-MCNC: 2.74 MG/DL (ref 0.57–1)
EOSINOPHIL # BLD AUTO: 0.1 X10E3/UL (ref 0–0.4)
EOSINOPHIL NFR BLD AUTO: 1 %
ERYTHROCYTE [DISTWIDTH] IN BLOOD BY AUTOMATED COUNT: 14.7 % (ref 12.3–15.4)
GFR SERPLBLD CREATININE-BSD FMLA CKD-EPI: 18 ML/MIN/1.73
GFR SERPLBLD CREATININE-BSD FMLA CKD-EPI: 21 ML/MIN/1.73
GLUCOSE SERPL-MCNC: 80 MG/DL (ref 65–99)
HCT VFR BLD AUTO: 35.4 % (ref 34–46.6)
HGB BLD-MCNC: 11.2 G/DL (ref 11.1–15.9)
IMM GRANULOCYTES # BLD: 0 X10E3/UL (ref 0–0.1)
IMM GRANULOCYTES NFR BLD: 0 %
LYMPHOCYTES # BLD AUTO: 2.6 X10E3/UL (ref 0.7–3.1)
LYMPHOCYTES NFR BLD AUTO: 29 %
MCH RBC QN AUTO: 27.3 PG (ref 26.6–33)
MCHC RBC AUTO-ENTMCNC: 31.6 G/DL (ref 31.5–35.7)
MCV RBC AUTO: 86 FL (ref 79–97)
MONOCYTES # BLD AUTO: 0.5 X10E3/UL (ref 0.1–0.9)
MONOCYTES NFR BLD AUTO: 6 %
NEUTROPHILS # BLD AUTO: 5.8 X10E3/UL (ref 1.4–7)
NEUTROPHILS NFR BLD AUTO: 64 %
PHOSPHATE SERPL-MCNC: 3.5 MG/DL (ref 2.5–4.5)
PLATELET # BLD AUTO: 235 X10E3/UL (ref 150–379)
POTASSIUM SERPL-SCNC: 3.9 MMOL/L (ref 3.5–5.2)
PTH-INTACT SERPL-MCNC: 154 PG/ML (ref 15–65)
RBC # BLD AUTO: 4.11 X10E6/UL (ref 3.77–5.28)
SODIUM SERPL-SCNC: 144 MMOL/L (ref 134–144)
URATE SERPL-MCNC: 9 MG/DL (ref 2.5–7.1)
WBC # BLD AUTO: 9 X10E3/UL (ref 3.4–10.8)

## 2018-06-10 RX ORDER — PREDNISONE 5 MG/1
5 TABLET ORAL DAILY
Qty: 30 TAB | Refills: 11 | Status: SHIPPED | OUTPATIENT
Start: 2018-06-10 | End: 2019-06-17 | Stop reason: SDUPTHER

## 2018-06-10 RX ORDER — ALLOPURINOL 100 MG/1
150 TABLET ORAL DAILY
Qty: 60 TAB | Refills: 11 | Status: SHIPPED | OUTPATIENT
Start: 2018-06-10 | End: 2019-03-14 | Stop reason: SDUPTHER

## 2018-06-10 NOTE — PROGRESS NOTES
[facility]  [place] [address]  [address] [facility] [address]  Phone:  [phone]  Fax: [phone]       Chief Complaint   Patient presents with    Hypertension   . SUBJECTIVE:    Madhav Lobo is a 59 y.o. female comes in for return visit stating that she has done fairly well. Unfortunately, she has been taking high-dose prednisone continuously which was a mistake. The prednisone was only short term to alleviate the joint pain from her gout. She is consistently taking allopurinol now. She did however notice that when she was taking the prednisone, the generalized joint pain and aching that she previously had abated completely. There has been no meaningful weight loss since I last saw her. She has a past history of chronic renal failure on the basis of hypertensive nephrosclerosis, hypertension, dyslipidemia and obesity. Current Outpatient Prescriptions   Medication Sig Dispense Refill    predniSONE (DELTASONE) 5 mg tablet Take 1 Tab by mouth daily. 30 Tab 11    allopurinol (ZYLOPRIM) 100 mg tablet Take 1.5 Tabs by mouth daily. 60 Tab 11    pantoprazole (PROTONIX) 40 mg tablet Take 1 Tab by mouth daily. 30 Tab 11    lisinopril (PRINIVIL, ZESTRIL) 40 mg tablet TAKE 1 TABLET BY MOUTH DAILY 30 Tab 11    pravastatin (PRAVACHOL) 40 mg tablet TAKE 1 TABLET BY MOUTH EVERY DAY AT BEDTIME 30 Tab 11    metolazone (ZAROXOLYN) 2.5 mg tablet TAKE 1 TABLET BY MOUTH DAILY 30 Tab 11    potassium chloride (K-DUR, KLOR-CON) 20 mEq tablet TAKE 1 TABLET BY MOUTH DAILY 30 Tab 11    amLODIPine (NORVASC) 10 mg tablet TAKE 1 TABLET BY MOUTH DAILY 30 Tab 11    calcitRIOL (ROCALTROL) 0.5 mcg capsule Take 2 Caps by mouth daily. 90 Cap 3    atenolol (TENORMIN) 50 mg tablet Take  by mouth daily.        Past Medical History:   Diagnosis Date    Arthritis     L knee    Chronic kidney disease     secondary to hypertensive nephrosclerosis    Gout     Hypertension Past Surgical History:   Procedure Laterality Date    ABDOMEN SURGERY PROC UNLISTED      HX COLONOSCOPY      HX GYN       No Known Allergies      REVIEW OF SYSTEMS:  General: negative for - chills or fever  ENT: negative for - headaches, nasal congestion or tinnitus  Respiratory: negative for - cough, hemoptysis, shortness of breath or wheezing  Cardiovascular : negative for - chest pain, edema, palpitations or shortness of breath  Gastrointestinal: negative for - abdominal pain, blood in stools, heartburn or nausea/vomiting  Genito-Urinary: no dysuria, trouble voiding, or hematuria  Musculoskeletal: negative for - gait disturbance, joint pain, joint stiffness or joint swelling  Neurological: no TIA or stroke symptoms  Hematologic: no bruises, no bleeding, no swollen glands  Integument: no lumps, mole changes, nail changes or rash  Endocrine: no malaise/lethargy or unexpected weight changes      Social History     Social History    Marital status:      Spouse name: N/A    Number of children: 2    Years of education: 16+     Occupational History    teacher      Social History Main Topics    Smoking status: Never Smoker    Smokeless tobacco: Never Used    Alcohol use No    Drug use: No    Sexual activity: Not Asked     Other Topics Concern    None     Social History Narrative     Family History   Problem Relation Age of Onset    Diabetes Mother     Hypertension Mother        OBJECTIVE:    Visit Vitals    /88    Pulse 82    Temp 96.3 °F (35.7 °C) (Oral)    Resp 16    Ht 5' 6\" (1.676 m)    Wt 247 lb 9.6 oz (112.3 kg)    SpO2 95%    BMI 39.96 kg/m2     CONSTITUTIONAL: well , well nourished, appears age appropriate  EYES: perrla, eom intact  ENMT:moist mucous membranes, pharynx clear  NECK: supple.  Thyroid normal  RESPIRATORY: Chest: clear to ascultation and percussion   CARDIOVASCULAR: Heart: regular rate and rhythm  GASTROINTESTINAL: Abdomen: soft, bowel sounds active  HEMATOLOGIC: no pathological lymph nodes palpated  MUSCULOSKELETAL: Extremities: no edema, pulse 1+   INTEGUMENT: No unusual rashes or suspicious skin lesions noted. Nails appear normal.  NEUROLOGIC: non-focal exam   MENTAL STATUS: alert and oriented, appropriate affect      ASSESSMENT:  1. Chronic renal failure syndrome, stage 4 (severe) (HCC)    2. Essential hypertension    3. Class 3 obesity with serious comorbidity and body mass index (BMI) of 40.0 to 44.9 in adult, unspecified obesity type ([facility]    4. Gout, unspecified cause, unspecified chronicity, unspecified site    5. Polymyalgia rheumatica ([facility]    6. Anemia, unspecified type        PLAN:    1. Renal failure will be assessed. Hopefully, there has been no progression. 2. Her blood pressure is excellent. No adjustments are made. Specifically, blood pressure is 130/75.    3. Obesity continues to be a major problem. I remind her the importance of eating meals and eliminating snacking as well as avoiding consumption of processed carbohydrates. 4. Her gout is quite stable. I remind her of the importance of continuing her allopurinol. 5. It appears that she has polymyalgia rheumatica. I will continue the prednisone but at 5 mg day rather than the current high dose of 20 mg which is taken inappropriately. 6. She does have a history of anemia which is most likely related to erythropoietin deficiency. I will continue to monitor this as this has not been progressive. 7. She has a family history of diabetes mellitus. I will continue to monitor the glucose status which remains surprisingly stable. .  Orders Placed This Encounter    METABOLIC PANEL, BASIC    URIC ACID    CBC WITH AUTOMATED DIFF    PHOSPHORUS    PTH INTACT    predniSONE (DELTASONE) 5 mg tablet    allopurinol (ZYLOPRIM) 100 mg tablet         Follow-up Disposition:  Return in about 4 weeks (around 7/2/2018).       Massimo Huitron MD

## 2018-07-10 RX ORDER — METOLAZONE 2.5 MG/1
TABLET ORAL
Qty: 30 TAB | Refills: 11 | Status: SHIPPED | OUTPATIENT
Start: 2018-07-10 | End: 2019-09-05 | Stop reason: SDUPTHER

## 2018-07-10 RX ORDER — AMLODIPINE BESYLATE 10 MG/1
TABLET ORAL
Qty: 30 TAB | Refills: 11 | Status: SHIPPED | OUTPATIENT
Start: 2018-07-10 | End: 2019-09-05 | Stop reason: SDUPTHER

## 2018-07-10 RX ORDER — PRAVASTATIN SODIUM 40 MG/1
TABLET ORAL
Qty: 30 TAB | Refills: 11 | Status: SHIPPED | OUTPATIENT
Start: 2018-07-10 | End: 2019-09-05 | Stop reason: SDUPTHER

## 2018-07-10 RX ORDER — POTASSIUM CHLORIDE 20 MEQ/1
TABLET, EXTENDED RELEASE ORAL
Qty: 30 TAB | Refills: 11 | Status: SHIPPED | OUTPATIENT
Start: 2018-07-10 | End: 2019-09-05 | Stop reason: SDUPTHER

## 2018-07-10 RX ORDER — LISINOPRIL 40 MG/1
TABLET ORAL
Qty: 30 TAB | Refills: 11 | Status: SHIPPED | OUTPATIENT
Start: 2018-07-10 | End: 2019-09-05 | Stop reason: SDUPTHER

## 2018-08-01 ENCOUNTER — OFFICE VISIT (OUTPATIENT)
Dept: INTERNAL MEDICINE CLINIC | Age: 58
End: 2018-08-01

## 2018-08-01 VITALS
OXYGEN SATURATION: 95 % | WEIGHT: 242.4 LBS | SYSTOLIC BLOOD PRESSURE: 109 MMHG | RESPIRATION RATE: 18 BRPM | BODY MASS INDEX: 38.96 KG/M2 | HEART RATE: 92 BPM | HEIGHT: 66 IN | TEMPERATURE: 96.5 F | DIASTOLIC BLOOD PRESSURE: 83 MMHG

## 2018-08-01 DIAGNOSIS — E78.5 DYSLIPIDEMIA: ICD-10-CM

## 2018-08-01 DIAGNOSIS — I10 ESSENTIAL HYPERTENSION: ICD-10-CM

## 2018-08-01 DIAGNOSIS — M48.061 SPINAL STENOSIS OF LUMBAR REGION WITHOUT NEUROGENIC CLAUDICATION: Primary | ICD-10-CM

## 2018-08-01 DIAGNOSIS — M10.9 GOUT, UNSPECIFIED CAUSE, UNSPECIFIED CHRONICITY, UNSPECIFIED SITE: ICD-10-CM

## 2018-08-01 DIAGNOSIS — N18.4 CHRONIC RENAL FAILURE SYNDROME, STAGE 4 (SEVERE) (HCC): ICD-10-CM

## 2018-08-01 NOTE — MR AVS SNAPSHOT
[address] 
[phone] Patient: Shell Peterson MRN: [id number]:[id number] Visit Information Date & Time Provider Department Dept. Phone Encounter #  
 8/1/2018  1:45 PM Mickey Smith MD [facility] [place] [phone] [id number] Upcoming Health Maintenance Date Due FOBT Q 1 YEAR AGE 50-75 6/15/2016 PAP AKA CERVICAL CYTOLOGY 6/15/2018 Influenza Age 5 to Adult 8/1/2018 Pneumococcal 19-64 Highest Risk (2 of 3 - PCV13) 2/1/2019* BREAST CANCER SCRN MAMMOGRAM 3/13/2020 DTaP/Tdap/Td series (2 - Td) 4/19/2027 *Topic was postponed. The date shown is not the original due date. Allergies as of 8/1/2018  Review Complete On: 8/1/2018 By: Armando Desai No Known Allergies Current Immunizations  Never Reviewed No immunizations on file. Not reviewed this visit You Were Diagnosed With   
  
 Codes Comments Spinal stenosis of lumbar region without neurogenic claudication    -  Primary ICD-10-CM: M48.061 
ICD-9-CM: 724.02 Class 3 obesity with serious comorbidity and body mass index (BMI) of 40.0 to 44.9 in adult, unspecified obesity type ([facility]     ICD-10-CM: E66.9, Z68.41 
ICD-9-CM: 278.00, V85.41 Chronic renal failure syndrome, stage 4 (severe) (HCC)     ICD-10-CM: N18.4 ICD-9-CM: 585.4 Essential hypertension     ICD-10-CM: I10 
ICD-9-CM: 401.9 Gout, unspecified cause, unspecified chronicity, unspecified site     ICD-10-CM: M10.9 ICD-9-CM: 274.9 Dyslipidemia     ICD-10-CM: E78.5 ICD-9-CM: 272.4 Vitals BP Pulse Temp Resp Height(growth percentile) Weight(growth percentile) 109/83 92 96.5 °F (35.8 °C) (Oral) 18 5' 6\" (1.676 m) 242 lb 6.4 oz (110 kg) SpO2 BMI OB Status Smoking Status 95% 39.12 kg/m2 Postmenopausal Never Smoker Vitals History BMI and BSA Data Body Mass Index Body Surface Area  
 39.12 kg/m 2 2.26 m 2 Preferred Pharmacy Pharmacy Name Phone [facility], [place] [phone] Your Updated Medication List  
  
   
This list is accurate as of 8/1/18  3:06 PM.  Always use your most recent med list.  
  
  
  
  
 allopurinol 100 mg tablet Commonly known as:  Genora Hack Take 1.5 Tabs by mouth daily. amLODIPine 10 mg tablet Commonly known as:  Voncile [place] TAKE 1 TABLET BY MOUTH DAILY  
  
 atenolol 50 mg tablet Commonly known as:  TENORMIN Take  by mouth daily. calcitRIOL 0.5 mcg capsule Commonly known as:  ROCALTROL Take 2 Caps by mouth daily. lisinopril 40 mg tablet Commonly known as:  PRINIVIL, ZESTRIL  
TAKE 1 TABLET BY MOUTH DAILY  
  
 metOLazone 2.5 mg tablet Commonly known as:  ZAROXOLYN  
TAKE 1 TABLET BY MOUTH DAILY pantoprazole 40 mg tablet Commonly known as:  PROTONIX Take 1 Tab by mouth daily. potassium chloride 20 mEq tablet Commonly known as:  K-DUR, KLOR-CON  
TAKE 1 TABLET BY MOUTH DAILY pravastatin 40 mg tablet Commonly known as:  PRAVACHOL  
TAKE 1 TABLET BY MOUTH EVERY DAY AT BEDTIME  
  
 predniSONE 5 mg tablet Commonly known as:  Alinda Mallory Take 1 Tab by mouth daily. We Performed the Following CYSTATIN C [[id number] Custom] METABOLIC PANEL, BASIC [14305 CPT(R)] Introducing [facility] SERVICES! Christine Ayala introduces [facility] patient portal. Now you can access parts of your medical record, email your doctor's office, and request medication refills online. 1. In your internet browser, go to [email or web address] 2. Click on the First Time User? Click Here link in the Sign In box. You will see the New Member Sign Up page. 3. Enter your [facility] Access Code exactly as it appears below. You will not need to use this code after youve completed the sign-up process.  If you do not sign up before the expiration date, you must request a new code. · [facility] Access Code: [email or web address] Expires: 9/2/2018  5:17 PM 
 
4. Enter the last four digits of your Social Security Number (xxxx) and Date of Birth (mm/dd/yyyy) as indicated and click Submit. You will be taken to the next sign-up page. 5. Create a [facility] ID. This will be your [facility] login ID and cannot be changed, so think of one that is secure and easy to remember. 6. Create a [facility] password. You can change your password at any time. 7. Enter your Password Reset Question and Answer. This can be used at a later time if you forget your password. 8. Enter your e-mail address. You will receive e-mail notification when new information is available in [address] 9. Click Sign Up. You can now view and download portions of your medical record. 10. Click the Download Summary menu link to download a portable copy of your medical information. If you have questions, please visit the Frequently Asked Questions section of the [facility] website. Remember, [facility] is NOT to be used for urgent needs. For medical emergencies, dial 911. Now available from your iPhone and Android! Please provide this summary of care documentation to your next provider. Your primary care clinician is listed as Stephanie Pendleton. If you have any questions after today's visit, please call [phone].

## 2018-08-01 NOTE — PROGRESS NOTES
Chief Complaint Patient presents with  Hypertension 1 month follow up 1. Have you been to the ER, urgent care clinic since your last visit? Hospitalized since your last visit? No 
 
2. Have you seen or consulted any other health care providers outside of the [facility] since your last visit? Include any pap smears or colon screening.  No

## 2018-08-02 NOTE — PROGRESS NOTES
[address] [facility] 
[place] [address] 
[address] [facility] [address] Phone:  [phone]  Fax: [phone] Chief Complaint Patient presents with  Hypertension 1 month follow up Mark Mcpherson SUBJECTIVE: 
  Bill Grant is a 59 y.o. female  Dictation on: 08/01/2018 10:39 PM by: Sera Good Current Outpatient Prescriptions Medication Sig Dispense Refill  metOLazone (ZAROXOLYN) 2.5 mg tablet TAKE 1 TABLET BY MOUTH DAILY 30 Tab 11  
 lisinopril (PRINIVIL, ZESTRIL) 40 mg tablet TAKE 1 TABLET BY MOUTH DAILY 30 Tab 11  
 potassium chloride (K-DUR, KLOR-CON) 20 mEq tablet TAKE 1 TABLET BY MOUTH DAILY 30 Tab 11  
 amLODIPine (NORVASC) 10 mg tablet TAKE 1 TABLET BY MOUTH DAILY 30 Tab 11  
 pravastatin (PRAVACHOL) 40 mg tablet TAKE 1 TABLET BY MOUTH EVERY DAY AT BEDTIME 30 Tab 11  
 predniSONE (DELTASONE) 5 mg tablet Take 1 Tab by mouth daily. 30 Tab 11  
 allopurinol (ZYLOPRIM) 100 mg tablet Take 1.5 Tabs by mouth daily. 60 Tab 11  
 pantoprazole (PROTONIX) 40 mg tablet Take 1 Tab by mouth daily. 30 Tab 11  
 calcitRIOL (ROCALTROL) 0.5 mcg capsule Take 2 Caps by mouth daily. 90 Cap 3  
 atenolol (TENORMIN) 50 mg tablet Take  by mouth daily. Past Medical History:  
Diagnosis Date  Arthritis L knee  Chronic kidney disease   
 secondary to hypertensive nephrosclerosis  Gout  Hypertension Past Surgical History:  
Procedure Laterality Date [address] [place] UNLISTED  HX COLONOSCOPY    
 HX GYN No Known Allergies REVIEW OF SYSTEMS: 
General: negative for - chills or fever ENT: negative for - headaches, nasal congestion or tinnitus Respiratory: negative for - cough, hemoptysis, shortness of breath or wheezing Cardiovascular : negative for - chest pain, edema, palpitations or shortness of breath Gastrointestinal: negative for - abdominal pain, blood in stools, heartburn or nausea/vomiting Genito-Urinary: no dysuria, trouble voiding, or hematuria Musculoskeletal: negative for - gait disturbance, joint pain, joint stiffness or joint swelling Neurological: no TIA or stroke symptoms Hematologic: no bruises, no bleeding, no swollen glands Integument: no lumps, mole changes, nail changes or rash Endocrine: no malaise/lethargy or unexpected weight changes Social History Social History  Marital status:  Spouse name: N/A  
 Number of children: 2  
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  
 Drug use: No  
 Sexual activity: Not Currently Other Topics Concern  None Social History Narrative Family History Problem Relation Age of Onset  Diabetes Mother  Hypertension Mother OBJECTIVE: 
 
Visit Vitals  /83  Pulse 92  Temp 96.5 °F (35.8 °C) (Oral)  Resp 18  Ht 5' 6\" (1.676 m)  Wt 242 lb 6.4 oz (110 kg)  SpO2 95%  BMI 39.12 kg/m2 CONSTITUTIONAL: well , well nourished, appears age appropriate EYES: perrla, eom intact ENMT:moist mucous membranes, pharynx clear NECK: supple. Thyroid normal 
RESPIRATORY: Chest: clear to ascultation and percussion CARDIOVASCULAR: Heart: regular rate and rhythm GASTROINTESTINAL: Abdomen: soft, bowel sounds activeHEMATOLOGIC: no pathological lymph nodes palpated MUSCULOSKELETAL: Extremities: no edema, pulse 1+,normal ROM of all joints of lower exts INTEGUMENT: No unusual rashes or suspicious skin lesions noted. Nails appear normal. 
NEUROLOGIC: non-focal exam  
MENTAL STATUS: alert and oriented, appropriate affect ASSESSMENT: 
1. Spinal stenosis of lumbar region without neurogenic claudication 2. Class 3 obesity with serious comorbidity and body mass index (BMI) of 40.0 to 44.9 in adult, unspecified obesity type ([address] 3. Chronic renal failure syndrome, stage 4 (severe) (HCC) 4. Essential hypertension 5. Gout, unspecified cause, unspecified chronicity, unspecified site 6. Dyslipidemia PLAN: 
 Dictation on: 08/01/2018 10:41 PM by: Candace Dunaway [[id number]] Zayda Guillory Orders Placed This Encounter  METABOLIC PANEL, BASIC  
 CYSTATIN C Follow-up Disposition: 
Return in about 3 months (around 11/1/2018).  
 
 
Estrellita Westfall MD

## 2018-08-02 NOTE — PROGRESS NOTES
1.  The patient has lumbar spinal stenosis. There is not much I 
can offer her at this point. Epidural injection might be a 
consideration depending on her progress or lack of. 
2.  Her obesity continues to be a major problem, but she is 
losing. I congratulate her on this and emphasize the importance 
of eating meals, eliminating snacks, and avoiding the consumption 
of processed carbohydrates. 3.  Chronic renal failure, stable. In reality, she has stage III, but it is in stage IV primarily because of diuretic usage. 4.  Her blood pressure is excellent today at 110/70. 
5. She will continue her statin as prescribed. She is in 
primary cardiovascular risk prevention mode. 6.  She remains free of gout. The previous pain she was having 
in her ankles has gone as a direct result of the allopurinol 
usage.

## 2018-08-02 NOTE — PROGRESS NOTES
Comes in for return visit saying that she is doing fairly well. She is taking the allopurinol and did not have a flare. I did 
not give her prednisone, although I was going to. She continues to have a gait disturbance. In reviewing her x- 
rays, it is most likely related to her back. She does have 
lumbar spinal stenosis with significant foraminal stenosis at 
multiple levels. When she stands for prolonged periods of time, 
pain radiates to her right buttock. She actually has to sit 
down. Her chronic renal failure has been reasonably stable. She has past history of primary hypertension and dyslipidemia.

## 2018-08-03 LAB
BUN SERPL-MCNC: 56 MG/DL (ref 8–27)
BUN/CREAT SERPL: 17 (ref 12–28)
CALCIUM SERPL-MCNC: 10.5 MG/DL (ref 8.7–10.3)
CHLORIDE SERPL-SCNC: 103 MMOL/L (ref 96–106)
CO2 SERPL-SCNC: 21 MMOL/L (ref 20–29)
CREAT SERPL-MCNC: 3.35 MG/DL (ref 0.57–1)
CYSTATIN C SERPL-MCNC: 3.52 MG/L (ref 0.53–0.95)
GLUCOSE SERPL-MCNC: 87 MG/DL (ref 65–99)
POTASSIUM SERPL-SCNC: 3.9 MMOL/L (ref 3.5–5.2)
SODIUM SERPL-SCNC: 142 MMOL/L (ref 134–144)

## 2018-11-05 ENCOUNTER — OFFICE VISIT (OUTPATIENT)
Dept: INTERNAL MEDICINE CLINIC | Age: 58
End: 2018-11-05

## 2018-11-05 VITALS
WEIGHT: 237.6 LBS | HEIGHT: 66 IN | TEMPERATURE: 97.2 F | HEART RATE: 94 BPM | DIASTOLIC BLOOD PRESSURE: 87 MMHG | OXYGEN SATURATION: 97 % | RESPIRATION RATE: 20 BRPM | BODY MASS INDEX: 38.18 KG/M2 | SYSTOLIC BLOOD PRESSURE: 133 MMHG

## 2018-11-05 DIAGNOSIS — Z83.3 FAMILY HISTORY OF DIABETES MELLITUS: ICD-10-CM

## 2018-11-05 DIAGNOSIS — E66.01 CLASS 3 SEVERE OBESITY WITH SERIOUS COMORBIDITY AND BODY MASS INDEX (BMI) OF 40.0 TO 44.9 IN ADULT, UNSPECIFIED OBESITY TYPE (HCC): ICD-10-CM

## 2018-11-05 DIAGNOSIS — N18.4 CHRONIC RENAL FAILURE SYNDROME, STAGE 4 (SEVERE) (HCC): Primary | ICD-10-CM

## 2018-11-05 DIAGNOSIS — M10.9 GOUT, UNSPECIFIED CAUSE, UNSPECIFIED CHRONICITY, UNSPECIFIED SITE: ICD-10-CM

## 2018-11-05 DIAGNOSIS — E78.5 DYSLIPIDEMIA: ICD-10-CM

## 2018-11-05 DIAGNOSIS — I10 ESSENTIAL HYPERTENSION: ICD-10-CM

## 2018-11-05 NOTE — PROGRESS NOTES
[address] [facility] 
[place] [address] 
[address] [address] Phone:  [phone]  Fax: [phone] Chief Complaint Patient presents with  Hypertension Magdiel Pal SUBJECTIVE: 
  Rigo De is a 59 y.o. female Comes in for return visit stating that she is doing well. There has been no meaningful weight loss since I last saw her. She has a history of chronic renal failure related to hypertensive nephrosclerosis that has been slowly progressive. She has a past history of primary hypertension, dyslipidemia and gout. Current Outpatient Medications Medication Sig Dispense Refill  lisinopril (PRINIVIL, ZESTRIL) 40 mg tablet TAKE 1 TABLET BY MOUTH DAILY 30 Tab 11  
 potassium chloride (K-DUR, KLOR-CON) 20 mEq tablet TAKE 1 TABLET BY MOUTH DAILY 30 Tab 11  
 amLODIPine (NORVASC) 10 mg tablet TAKE 1 TABLET BY MOUTH DAILY 30 Tab 11  
 pravastatin (PRAVACHOL) 40 mg tablet TAKE 1 TABLET BY MOUTH EVERY DAY AT BEDTIME 30 Tab 11  
 allopurinol (ZYLOPRIM) 100 mg tablet Take 1.5 Tabs by mouth daily. 60 Tab 11  
 metOLazone (ZAROXOLYN) 2.5 mg tablet TAKE 1 TABLET BY MOUTH DAILY 30 Tab 11  
 predniSONE (DELTASONE) 5 mg tablet Take 1 Tab by mouth daily. 30 Tab 11  
 pantoprazole (PROTONIX) 40 mg tablet Take 1 Tab by mouth daily. 30 Tab 11  
 calcitRIOL (ROCALTROL) 0.5 mcg capsule Take 2 Caps by mouth daily. 90 Cap 3  
 atenolol (TENORMIN) 50 mg tablet Take  by mouth daily. Past Medical History:  
Diagnosis Date  Arthritis L knee  Chronic kidney disease   
 secondary to hypertensive nephrosclerosis  Gout  Hypertension Past Surgical History:  
Procedure Laterality Date [address] UNLISTED  HX COLONOSCOPY    
 HX GYN No Known Allergies REVIEW OF SYSTEMS: 
General: negative for - chills or fever ENT: negative for - headaches, nasal congestion or tinnitus Respiratory: negative for - cough, hemoptysis, shortness of breath or wheezing Cardiovascular : negative for - chest pain, edema, palpitations or shortness of breath Gastrointestinal: negative for - abdominal pain, blood in stools, heartburn or nausea/vomiting Genito-Urinary: no dysuria, trouble voiding, or hematuria Musculoskeletal: negative for - gait disturbance, joint pain, joint stiffness or joint swelling Neurological: no TIA or stroke symptoms Hematologic: no bruises, no bleeding, no swollen glands Integument: no lumps, mole changes, nail changes or rash Endocrine: no malaise/lethargy or unexpected weight changes Social History Socioeconomic History  Marital status:  Spouse name: Not on file  Number of children: 2  
 Years of education: 16+  Highest education level: Not on file Social Needs  Financial resource strain: Not on file  Food insecurity - worry: Not on file  Food insecurity - inability: Not on file  Transportation needs - medical: Not on file  Transportation needs - non-medical: Not on file Occupational History  Occupation: teacher Tobacco Use  Smoking status: Never Smoker  Smokeless tobacco: Never Used Substance and Sexual Activity  Alcohol use: No  
 Drug use: No  
 Sexual activity: Not Currently Other Topics Concern  Not on file Social History Narrative  Not on file Family History Problem Relation Age of Onset  Diabetes Mother  Hypertension Mother OBJECTIVE: 
 
Visit Vitals /87 Pulse 94 Temp 97.2 °F (36.2 °C) (Oral) Resp 20 Ht 5' 6\" (1.676 m) Wt 237 lb 9.6 oz (107.8 kg) SpO2 97% BMI 38.35 kg/m² CONSTITUTIONAL: well , well nourished, appears age appropriate EYES: perrla, eom intact ENMT:moist mucous membranes, pharynx clear NECK: supple. Thyroid normal 
RESPIRATORY: Chest: clear to ascultation and percussion CARDIOVASCULAR: Heart: regular rate and rhythm GASTROINTESTINAL: Abdomen: soft, bowel sounds active HEMATOLOGIC: no pathological lymph nodes palpated MUSCULOSKELETAL: Extremities: no edema, pulse 1+ INTEGUMENT: No unusual rashes or suspicious skin lesions noted. Nails appear normal. 
NEUROLOGIC: non-focal exam  
MENTAL STATUS: alert and oriented, appropriate affect ASSESSMENT: 
1. Chronic renal failure syndrome, stage 4 (severe) (HCC) 2. Gout, unspecified cause, unspecified chronicity, unspecified site 3. Class 3 severe obesity with serious comorbidity and body mass index (BMI) of 40.0 to 44.9 in adult, unspecified obesity type ([address] 4. Dyslipidemia 5. Family history of diabetes mellitus 6. Essential hypertension PLAN: 
 
1. Her blood pressure is excellent today, no adjustments are made. 2. As far as her chronic renal failure is concerned, I will await the results of her labs from today. I encouraged her to drink liquids liberally. 3. She has had no gouty attacks and remains on Allopurinol. 4. I encouraged her to lose weight, which can be accomplished by eating meals, eliminating snacks and avoiding the consumption of processed carbohydrates. 5. She will continue statin as prescribed. 6. She does have a family history of diabetes mellitus and I will continue to monitor this. She is getting older and this contributes to the potential development of diabetes above and beyond her genetic predisposition. . 
Orders Placed This Encounter  PTH INTACT  PHOSPHORUS  
 METABOLIC PANEL, BASIC  
 CYSTATIN C Follow-up Disposition: 
Return in about 3 months (around 2/5/2019).  
 
 
Yessy Engel MD

## 2018-11-05 NOTE — PROGRESS NOTES
1. Have you been to the ER, urgent care clinic since your last visit? Hospitalized since your last visit? No 
 
2. Have you seen or consulted any other health care providers outside of the [address] [place] [address] since your last visit? Include any pap smears or colon screening.  No

## 2018-11-05 NOTE — LETTER
11/7/2018 10:25 PM 
 
Ms. Linden Valdovinos [place]  [id number] Dear Linden Allison: Please find your most recent results below. Resulted Orders PTH INTACT Result Value Ref Range PTH, Intact 513 (H) 15 - 65 pg/mL Narrative Performed at:  [address]  [id number] : Daniel Shipman MD, Phone:  [phone] PHOSPHORUS Result Value Ref Range Phosphorus 3.1 2.5 - 4.5 mg/dL Narrative Performed at:  [address]  [id number] : Daniel Shipman MD, Phone:  [phone] METABOLIC PANEL, BASIC Result Value Ref Range Glucose 109 (H) 65 - 99 mg/dL BUN 44 (H) 8 - 27 mg/dL Creatinine 3.24 (H) 0.57 - 1.00 mg/dL GFR est non-AA 15 (L) >59 mL/min/1.73 GFR est AA 17 (L) >59 mL/min/1.73  
 BUN/Creatinine ratio 14 12 - 28 Sodium 145 (H) 134 - 144 mmol/L Potassium 3.5 3.5 - 5.2 mmol/L Chloride 103 96 - 106 mmol/L  
 CO2 25 20 - 29 mmol/L Calcium 10.7 (H) 8.7 - 10.3 mg/dL Narrative Performed at:  [address]  [id number] : Daniel Shipman MD, Phone:  [phone] CYSTATIN C Result Value Ref Range Cystatin C 3.00 (H) 0.53 - 0.95 mg/L Narrative Performed at:  [address]  [id number] : Daniel Shipman MD, Phone:  [phone] CBC WITH AUTOMATED DIFF Result Value Ref Range WBC CANCELED x10E3/uL Comment:  
   No lavender top tube submitted. Result canceled by the ancillary. RBC CANCELED Comment:  
   Test not performed Result canceled by the ancillary. HGB CANCELED Comment:  
   Test not performed Result canceled by the ancillary. HCT CANCELED Comment:  
   Test not performed Result canceled by the ancillary. PLATELET CANCELED Comment: Test not performed Result canceled by the ancillary. NEUTROPHILS CANCELED Comment:  
   Test not performed Result canceled by the ancillary. Lymphocytes CANCELED Comment:  
   Test not performed Result canceled by the ancillary. MONOCYTES CANCELED Comment:  
   Test not performed Result canceled by the ancillary. EOSINOPHILS CANCELED Comment:  
   Test not performed Result canceled by the ancillary. Abs Lymphocytes CANCELED Comment:  
   Test not performed Result canceled by the ancillary. ABS. EOSINOPHILS CANCELED Comment:  
   Test not performed Result canceled by the ancillary. ABS. BASOPHILS CANCELED Comment:  
   Test not performed Result canceled by the ancillary. Narrative Performed at:  [address]  [id number] : Molly Mancera MD, Phone:  [phone] RECOMMENDATIONS: 
Kidney function is unchanged. Please call me if you have any questions: [phone] Sincerely, Carly Guadarrama MD

## 2018-11-07 LAB
BASOPHILS # BLD AUTO: NORMAL 10*3/UL
BUN SERPL-MCNC: 44 MG/DL (ref 8–27)
BUN/CREAT SERPL: 14 (ref 12–28)
CALCIUM SERPL-MCNC: 10.7 MG/DL (ref 8.7–10.3)
CHLORIDE SERPL-SCNC: 103 MMOL/L (ref 96–106)
CO2 SERPL-SCNC: 25 MMOL/L (ref 20–29)
CREAT SERPL-MCNC: 3.24 MG/DL (ref 0.57–1)
CYSTATIN C SERPL-MCNC: 3 MG/L (ref 0.53–0.95)
EOSINOPHIL # BLD AUTO: NORMAL 10*3/UL
EOSINOPHIL NFR BLD AUTO: NORMAL %
GLUCOSE SERPL-MCNC: 109 MG/DL (ref 65–99)
HCT VFR BLD AUTO: NORMAL %
HGB BLD-MCNC: NORMAL G/DL
LYMPHOCYTES # BLD AUTO: NORMAL 10*3/UL
LYMPHOCYTES NFR BLD AUTO: NORMAL %
MONOCYTES NFR BLD AUTO: NORMAL %
NEUTROPHILS NFR BLD AUTO: NORMAL %
PHOSPHATE SERPL-MCNC: 3.1 MG/DL (ref 2.5–4.5)
PLATELET # BLD AUTO: NORMAL 10*3/UL
POTASSIUM SERPL-SCNC: 3.5 MMOL/L (ref 3.5–5.2)
PTH-INTACT SERPL-MCNC: 513 PG/ML (ref 15–65)
RBC # BLD AUTO: NORMAL 10*6/UL
SODIUM SERPL-SCNC: 145 MMOL/L (ref 134–144)
WBC # BLD AUTO: NORMAL X10E3/UL

## 2019-02-04 ENCOUNTER — OFFICE VISIT (OUTPATIENT)
Dept: INTERNAL MEDICINE CLINIC | Age: 59
End: 2019-02-04

## 2019-02-04 VITALS
WEIGHT: 238.2 LBS | SYSTOLIC BLOOD PRESSURE: 129 MMHG | HEIGHT: 66 IN | DIASTOLIC BLOOD PRESSURE: 97 MMHG | TEMPERATURE: 96.6 F | OXYGEN SATURATION: 97 % | BODY MASS INDEX: 38.28 KG/M2 | HEART RATE: 94 BPM | RESPIRATION RATE: 18 BRPM

## 2019-02-04 DIAGNOSIS — N18.4 CHRONIC RENAL FAILURE SYNDROME, STAGE 4 (SEVERE) (HCC): ICD-10-CM

## 2019-02-04 DIAGNOSIS — M48.061 SPINAL STENOSIS OF LUMBAR REGION WITHOUT NEUROGENIC CLAUDICATION: Primary | ICD-10-CM

## 2019-02-04 DIAGNOSIS — I10 ESSENTIAL HYPERTENSION: ICD-10-CM

## 2019-02-04 DIAGNOSIS — E78.5 DYSLIPIDEMIA: ICD-10-CM

## 2019-02-04 DIAGNOSIS — M10.9 GOUT, UNSPECIFIED CAUSE, UNSPECIFIED CHRONICITY, UNSPECIFIED SITE: ICD-10-CM

## 2019-02-04 DIAGNOSIS — E66.01 CLASS 3 SEVERE OBESITY WITH SERIOUS COMORBIDITY AND BODY MASS INDEX (BMI) OF 40.0 TO 44.9 IN ADULT, UNSPECIFIED OBESITY TYPE (HCC): ICD-10-CM

## 2019-02-04 DIAGNOSIS — Z83.3 FAMILY HISTORY OF DIABETES MELLITUS: ICD-10-CM

## 2019-02-05 NOTE — PROGRESS NOTES
[address] [place] [address] and Primary Care 
[place] [address] 
[address] [address] Phone:  [phone]  Fax: [phone] Chief Complaint Patient presents with  Hypertension Marissa Mejiajacob SUBJECTIVE: 
  Padmini Mendoza is a 62 y.o. female Comes in for return visit stating that she has done reasonably well. She continues to have pain in her low back area, which leads to somewhat of an awkward walk. MRI scan of her back revealed a moderate to severe lumbar spinal stenosis at one level and multiple areas of neural foraminal encroachment. She has a history of chronic renal failure secondary to hypertensive nephrosclerosis. Unfortunately the renal disease has been slowly progressive. She has not had any uremic symptoms. She does have a history of gout, but is taking allopurinol as prescribed. She has had no flares since taking allopurinol on a consistent basis. She has a past history of primary hypertension and dyslipidemia. Finally, there has been no meaningful weight loss since I last saw her. Current Outpatient Medications Medication Sig Dispense Refill  metOLazone (ZAROXOLYN) 2.5 mg tablet TAKE 1 TABLET BY MOUTH DAILY 30 Tab 11  
 lisinopril (PRINIVIL, ZESTRIL) 40 mg tablet TAKE 1 TABLET BY MOUTH DAILY 30 Tab 11  
 potassium chloride (K-DUR, KLOR-CON) 20 mEq tablet TAKE 1 TABLET BY MOUTH DAILY 30 Tab 11  
 amLODIPine (NORVASC) 10 mg tablet TAKE 1 TABLET BY MOUTH DAILY 30 Tab 11  
 pravastatin (PRAVACHOL) 40 mg tablet TAKE 1 TABLET BY MOUTH EVERY DAY AT BEDTIME 30 Tab 11  
 predniSONE (DELTASONE) 5 mg tablet Take 1 Tab by mouth daily. 30 Tab 11  
 allopurinol (ZYLOPRIM) 100 mg tablet Take 1.5 Tabs by mouth daily. 60 Tab 11  
 pantoprazole (PROTONIX) 40 mg tablet Take 1 Tab by mouth daily. 30 Tab 11  
 calcitRIOL (ROCALTROL) 0.5 mcg capsule Take 2 Caps by mouth daily. 90 Cap 3  
 atenolol (TENORMIN) 50 mg tablet Take  by mouth daily. Past Medical History:  
Diagnosis Date  Arthritis L knee  Chronic kidney disease   
 secondary to hypertensive nephrosclerosis  Gout  Hypertension Past Surgical History:  
Procedure Laterality Date [address] UNLISTED  HX COLONOSCOPY    
 HX GYN No Known Allergies REVIEW OF SYSTEMS: 
General: negative for - chills or fever ENT: negative for - headaches, nasal congestion or tinnitus Respiratory: negative for - cough, hemoptysis, shortness of breath or wheezing Cardiovascular : negative for - chest pain, edema, palpitations or shortness of breath Gastrointestinal: negative for - abdominal pain, blood in stools, heartburn or nausea/vomiting Genito-Urinary: no dysuria, trouble voiding, or hematuria Musculoskeletal: negative for - gait disturbance, joint pain, joint stiffness or joint swelling Neurological: no TIA or stroke symptoms Hematologic: no bruises, no bleeding, no swollen glands Integument: no lumps, mole changes, nail changes or rash Endocrine: no malaise/lethargy or unexpected weight changes Social History Socioeconomic History  Marital status:  Spouse name: Not on file  Number of children: 2  
 Years of education: 16+  Highest education level: Not on file Occupational History  Occupation: teacher Tobacco Use  Smoking status: Never Smoker  Smokeless tobacco: Never Used Substance and Sexual Activity  Alcohol use: No  
 Drug use: No  
 Sexual activity: Not Currently Family History Problem Relation Age of Onset  Diabetes Mother  Hypertension Mother OBJECTIVE: 
 
Visit Vitals BP (!) 129/97 (BP 1 Location: Right arm, BP Patient Position: Sitting) Pulse 94 Temp 96.6 °F (35.9 °C) (Oral) Resp 18 Ht 5' 6\" (1.676 m) Wt 238 lb 3.2 oz (108 kg) SpO2 97% BMI 38.45 kg/m² CONSTITUTIONAL: well , well nourished, appears age appropriate EYES: perrla, eom intact ENMT:moist mucous membranes, pharynx clear NECK: supple. Thyroid normal 
RESPIRATORY: Chest: clear to ascultation and percussion CARDIOVASCULAR: Heart: regular rate and rhythm GASTROINTESTINAL: Abdomen: soft, bowel sounds active HEMATOLOGIC: no pathological lymph nodes palpated MUSCULOSKELETAL: Extremities: no edema, pulse 1+ INTEGUMENT: No unusual rashes or suspicious skin lesions noted. Nails appear normal. 
NEUROLOGIC: non-focal exam  
MENTAL STATUS: alert and oriented, appropriate affect ASSESSMENT: 
1. Spinal stenosis of lumbar region without neurogenic claudication 2. Chronic renal failure syndrome, stage 4 (severe) (HCC) 3. Essential hypertension 4. Gout, unspecified cause, unspecified chronicity, unspecified site 5. Class 3 severe obesity with serious comorbidity and body mass index (BMI) of 40.0 to 44.9 in adult, unspecified obesity type ([address] 6. Dyslipidemia 7. Family history of diabetes mellitus PLAN: 
 
1. The patient's lumbar spinal stenosis is the most likely etiology of her chronic back pain. There is not much that can be offered to her at this point. 2. Her blood pressure is 140/90. In reality it is actually better than this once she settles down. 3. As far as her renal failure is concerned, labs will be assessed and hopefully there is no further progression. 4. Her gout is quite stable and I remind her of the importance of taking Allopurinol chronically. 5. She needs to lose weight as we have discussed repetitively. This can be accomplished by eating meals, eliminating snacks and avoiding the consumption of processed carbohydrates. 6. She will continue statin as prescribed in view of her increased cardiovascular risk. 7. Finally, she has a family history of diabetes mellitus. Thus far she has been euglycemic. . 
Orders Placed This Encounter  HEMOGLOBIN A1C WITH EAG  
 PROTEIN,TOTAL,URINE  CREATININE, UR, RANDOM  METABOLIC PANEL, BASIC  
  CYSTATIN C  
 APOLIPOPROTEIN B Follow-up Disposition: 
Return in about 3 months (around 5/4/2019).  
 
 
Shoshana Bartholomew MD

## 2019-02-06 LAB
APO B SERPL-MCNC: 87 MG/DL
BUN SERPL-MCNC: 57 MG/DL (ref 8–27)
BUN/CREAT SERPL: 18 (ref 12–28)
CALCIUM SERPL-MCNC: 10.8 MG/DL (ref 8.7–10.3)
CHLORIDE SERPL-SCNC: 104 MMOL/L (ref 96–106)
CO2 SERPL-SCNC: 24 MMOL/L (ref 20–29)
CREAT SERPL-MCNC: 3.23 MG/DL (ref 0.57–1)
CREAT UR-MCNC: 65.5 MG/DL
CYSTATIN C SERPL-MCNC: 3.11 MG/L (ref 0.53–0.95)
EST. AVERAGE GLUCOSE BLD GHB EST-MCNC: 117 MG/DL
GLUCOSE SERPL-MCNC: 92 MG/DL (ref 65–99)
HBA1C MFR BLD: 5.7 % (ref 4.8–5.6)
POTASSIUM SERPL-SCNC: 4 MMOL/L (ref 3.5–5.2)
PROT UR-MCNC: 83.7 MG/DL
SODIUM SERPL-SCNC: 145 MMOL/L (ref 134–144)

## 2019-03-14 DIAGNOSIS — M10.9 GOUT, UNSPECIFIED CAUSE, UNSPECIFIED CHRONICITY, UNSPECIFIED SITE: ICD-10-CM

## 2019-03-14 RX ORDER — ALLOPURINOL 100 MG/1
150 TABLET ORAL DAILY
Qty: 60 TAB | Refills: 11 | Status: SHIPPED | OUTPATIENT
Start: 2019-03-14 | End: 2019-03-20 | Stop reason: SDUPTHER

## 2019-03-20 DIAGNOSIS — M10.9 GOUT, UNSPECIFIED CAUSE, UNSPECIFIED CHRONICITY, UNSPECIFIED SITE: ICD-10-CM

## 2019-03-20 RX ORDER — ALLOPURINOL 100 MG/1
TABLET ORAL
Qty: 60 TAB | Refills: 11 | Status: SHIPPED | OUTPATIENT
Start: 2019-03-20 | End: 2020-04-08 | Stop reason: SDUPTHER

## 2019-06-17 ENCOUNTER — OFFICE VISIT (OUTPATIENT)
Dept: INTERNAL MEDICINE CLINIC | Age: 59
End: 2019-06-17

## 2019-06-17 VITALS
RESPIRATION RATE: 18 BRPM | TEMPERATURE: 97.4 F | BODY MASS INDEX: 39.66 KG/M2 | HEIGHT: 66 IN | OXYGEN SATURATION: 95 % | DIASTOLIC BLOOD PRESSURE: 85 MMHG | HEART RATE: 94 BPM | WEIGHT: 246.8 LBS | SYSTOLIC BLOOD PRESSURE: 120 MMHG

## 2019-06-17 DIAGNOSIS — Z83.3 FAMILY HISTORY OF DIABETES MELLITUS: ICD-10-CM

## 2019-06-17 DIAGNOSIS — M48.061 SPINAL STENOSIS OF LUMBAR REGION WITHOUT NEUROGENIC CLAUDICATION: Primary | ICD-10-CM

## 2019-06-17 DIAGNOSIS — E78.5 DYSLIPIDEMIA: ICD-10-CM

## 2019-06-17 DIAGNOSIS — N18.4 CHRONIC RENAL FAILURE SYNDROME, STAGE 4 (SEVERE) (HCC): ICD-10-CM

## 2019-06-17 DIAGNOSIS — I10 ESSENTIAL HYPERTENSION: ICD-10-CM

## 2019-06-17 DIAGNOSIS — E66.01 SEVERE OBESITY (BMI 35.0-39.9) WITH COMORBIDITY (HCC): ICD-10-CM

## 2019-06-17 DIAGNOSIS — M54.16 LUMBAR RADICULAR PAIN: ICD-10-CM

## 2019-06-17 DIAGNOSIS — G47.33 OBSTRUCTIVE SLEEP APNEA: ICD-10-CM

## 2019-06-17 RX ORDER — PREDNISONE 5 MG/1
5 TABLET ORAL DAILY
Qty: 30 TAB | Refills: 1 | Status: SHIPPED | OUTPATIENT
Start: 2019-06-17 | End: 2019-10-31 | Stop reason: SDUPTHER

## 2019-06-17 NOTE — PROGRESS NOTES
Chief Complaint   Patient presents with    Hypertension     follow up      1. Have you been to the ER, urgent care clinic since your last visit? Hospitalized since your last visit? No    2. Have you seen or consulted any other health care providers outside of the [address] [place] [address] since your last visit? Include any pap smears or colon screening.  No

## 2019-06-18 NOTE — PROGRESS NOTES
[facility] and Primary Care  [facility]  [address] [place] [address]  Phone:  [phone]  Fax: [phone]       Chief Complaint   Patient presents with    Hypertension     follow up    . SUBJECTIVE:    Anastacia Preez is a 62 y.o. female Comes in for return visit complaining of low back pain starting several months ago. There is slight radiation to the left buttock and thigh. This is getting progressively worse. Previous MRI suggested the possibility of severe lumbar spinal stenosis at L4-L5 and an accompanying neuropathy at the neuroforamen at that level. There has been no meaningful weight loss since I last saw her. She has a past history of primary hypertension, dyslipidemia, gout and chronic renal failure. Her chronic renal failure is secondary to hypertensive nephrosclerosis. She has had no further gouty attacks since consistently taking her Allopurinol. Current Outpatient Medications   Medication Sig Dispense Refill    predniSONE (DELTASONE) 5 mg tablet Take 1 Tab by mouth daily. 30 Tab 1    allopurinol (ZYLOPRIM) 100 mg tablet 2 tablets QD 60 Tab 11    metOLazone (ZAROXOLYN) 2.5 mg tablet TAKE 1 TABLET BY MOUTH DAILY 30 Tab 11    lisinopril (PRINIVIL, ZESTRIL) 40 mg tablet TAKE 1 TABLET BY MOUTH DAILY 30 Tab 11    potassium chloride (K-DUR, KLOR-CON) 20 mEq tablet TAKE 1 TABLET BY MOUTH DAILY 30 Tab 11    amLODIPine (NORVASC) 10 mg tablet TAKE 1 TABLET BY MOUTH DAILY 30 Tab 11    pravastatin (PRAVACHOL) 40 mg tablet TAKE 1 TABLET BY MOUTH EVERY DAY AT BEDTIME 30 Tab 11    pantoprazole (PROTONIX) 40 mg tablet Take 1 Tab by mouth daily. 30 Tab 11    calcitRIOL (ROCALTROL) 0.5 mcg capsule Take 2 Caps by mouth daily. 90 Cap 3    atenolol (TENORMIN) 50 mg tablet Take  by mouth daily.        Past Medical History:   Diagnosis Date    Arthritis     L knee    Chronic kidney disease     secondary to hypertensive nephrosclerosis    Gout     Hypertension      Past Surgical History:   Procedure Laterality Date    ABDOMEN SURGERY PROC UNLISTED      HX COLONOSCOPY      HX GYN       Allergies   Allergen Reactions    [place] Swelling         REVIEW OF SYSTEMS:  General: negative for - chills or fever  ENT: negative for - headaches, nasal congestion or tinnitus  Respiratory: negative for - cough, hemoptysis, shortness of breath or wheezing  Cardiovascular : negative for - chest pain, edema, palpitations or shortness of breath  Gastrointestinal: negative for - abdominal pain, blood in stools, heartburn or nausea/vomiting  Genito-Urinary: no dysuria, trouble voiding, or hematuria  Musculoskeletal: negative for - gait disturbance, joint pain, joint stiffness or joint swelling  Neurological: no TIA or stroke symptoms  Hematologic: no bruises, no bleeding, no swollen glands  Integument: no lumps, mole changes, nail changes or rash  Endocrine: no malaise/lethargy or unexpected weight changes      Social History     Socioeconomic History    Marital status:      Spouse name: Not on file    Number of children: 2    Years of education: 16+    Highest education level: Master's degree (e.g., MA, MS, Schuyler, MEd, MSW, RENATA)   Occupational History    Occupation: teacher   Tobacco Use    Smoking status: Never Smoker    Smokeless tobacco: Never Used   Substance and Sexual Activity    Alcohol use: No    Drug use: No    Sexual activity: Not Currently     Family History   Problem Relation Age of Onset    Diabetes Mother     Hypertension Mother        OBJECTIVE:    Visit Vitals  /85   Pulse 94   Temp 97.4 °F (36.3 °C) (Oral)   Resp 18   Ht 5' 6\" (1.676 m)   Wt 246 lb 12.8 oz (111.9 kg)   SpO2 95%   BMI 39.83 kg/m²     CONSTITUTIONAL: well , well nourished, appears age appropriate  EYES: perrla, eom intact  ENMT:moist mucous membranes, pharynx clear  NECK: supple.  Thyroid normal  RESPIRATORY: Chest: clear to ascultation and percussion   CARDIOVASCULAR: Heart: regular rate and rhythm  GASTROINTESTINAL: Abdomen: soft, bowel sounds active  HEMATOLOGIC: no pathological lymph nodes palpated  MUSCULOSKELETAL: Extremities: no edema, pulse 1+   INTEGUMENT: No unusual rashes or suspicious skin lesions noted. Nails appear normal.  NEUROLOGIC: non-focal exam, equivocal positive straight leg raising left leg  MENTAL STATUS: alert and oriented, appropriate affect      ASSESSMENT:  1. Spinal stenosis of lumbar region without neurogenic claudication    2. Lumbar radicular pain    3. Obstructive sleep apnea    4. Chronic renal failure syndrome, stage 4 (severe) ([facility])    5. Essential hypertension    6. Dyslipidemia    7. Severe obesity (BMI 35.0-39. 9) with comorbidity ([address]    8. Family history of diabetes mellitus        PLAN:    1. The patient's back pain is most likely on the basis of lumbar spinal stenosis. She has neuroforaminal stenosis also at the same level, specifically L4-L5 on the left. I suspect this represents a lumbar radiculopathy. I gave her options of an epidural injection, which will probably help the radicular component. She is not interested in that at this point. 2. She also suspects she might well have ARLENE, but she is not interested in pursuing this, primarily because of her lack of interest in use of a CPAP machine. 3. She has chronic renal failure stage 4, which has historically been reasonably stable. This is on the basis of hypotensive nephrosclerosis. The prerenal component is secondary to diuretic usage. 4. BP is excellent. 5. She will continue statin in view of her increased cardiovascular risk. 6. She really needs to lose weight. She is severely obese. This can be accomplished by eating meals, eliminating snacks and avoiding the consumption of processed carbs. Because of her age and weight, as well as her family history of diabetes, she represents an individual who has a significant increased risk of developing this.   To date, hemoglobin A1c's have been reasonable. I will repeat this today. .  Orders Placed This Encounter    HEMOGLOBIN A1C WITH EAG    PROTEIN,TOTAL,URINE    CREATININE, UR, RANDOM    CYSTATIN C    APOLIPOPROTEIN B    CBC WITH AUTOMATED DIFF    LIPID PANEL    METABOLIC PANEL, COMPREHENSIVE    TSH 3RD GENERATION    URINALYSIS W/ RFLX MICROSCOPIC    PHOSPHORUS    PTH INTACT    predniSONE (DELTASONE) 5 mg tablet         Follow-up and Dispositions    · Return in about 3 months (around 9/17/2019).            Ugo Palencia MD

## 2019-06-20 LAB
ALBUMIN SERPL-MCNC: 4.1 G/DL (ref 3.6–4.8)
ALBUMIN/GLOB SERPL: 1.5 {RATIO} (ref 1.2–2.2)
ALP SERPL-CCNC: 80 IU/L (ref 39–117)
ALT SERPL-CCNC: 11 IU/L (ref 0–32)
APO B SERPL-MCNC: 86 MG/DL
APPEARANCE UR: CLEAR
AST SERPL-CCNC: 11 IU/L (ref 0–40)
BACTERIA #/AREA URNS HPF: ABNORMAL /[HPF]
BASOPHILS # BLD AUTO: 0 X10E3/UL (ref 0–0.2)
BASOPHILS NFR BLD AUTO: 0 %
BILIRUB SERPL-MCNC: <0.2 MG/DL (ref 0–1.2)
BILIRUB UR QL STRIP: NEGATIVE
BUN SERPL-MCNC: 51 MG/DL (ref 8–27)
BUN/CREAT SERPL: 16 (ref 12–28)
CALCIUM SERPL-MCNC: 10.8 MG/DL (ref 8.7–10.3)
CASTS URNS QL MICRO: ABNORMAL /LPF
CHLORIDE SERPL-SCNC: 104 MMOL/L (ref 96–106)
CHOLEST SERPL-MCNC: 162 MG/DL (ref 100–199)
CO2 SERPL-SCNC: 25 MMOL/L (ref 20–29)
COLOR UR: YELLOW
CREAT SERPL-MCNC: 3.29 MG/DL (ref 0.57–1)
CREAT UR-MCNC: 60.6 MG/DL
CYSTATIN C SERPL-MCNC: 4.03 MG/L (ref 0.62–1.16)
EOSINOPHIL # BLD AUTO: 0.2 X10E3/UL (ref 0–0.4)
EOSINOPHIL NFR BLD AUTO: 2 %
EPI CELLS #/AREA URNS HPF: ABNORMAL /HPF (ref 0–10)
ERYTHROCYTE [DISTWIDTH] IN BLOOD BY AUTOMATED COUNT: 13.8 % (ref 12.3–15.4)
EST. AVERAGE GLUCOSE BLD GHB EST-MCNC: 117 MG/DL
GLOBULIN SER CALC-MCNC: 2.8 G/DL (ref 1.5–4.5)
GLUCOSE SERPL-MCNC: 102 MG/DL (ref 65–99)
GLUCOSE UR QL: NEGATIVE
HBA1C MFR BLD: 5.7 % (ref 4.8–5.6)
HCT VFR BLD AUTO: 35.8 % (ref 34–46.6)
HDLC SERPL-MCNC: 49 MG/DL
HGB BLD-MCNC: 10.9 G/DL (ref 11.1–15.9)
HGB UR QL STRIP: ABNORMAL
IMM GRANULOCYTES # BLD AUTO: 0.1 X10E3/UL (ref 0–0.1)
IMM GRANULOCYTES NFR BLD AUTO: 1 %
KETONES UR QL STRIP: NEGATIVE
LDLC SERPL CALC-MCNC: 90 MG/DL (ref 0–99)
LEUKOCYTE ESTERASE UR QL STRIP: ABNORMAL
LYMPHOCYTES # BLD AUTO: 2.6 X10E3/UL (ref 0.7–3.1)
LYMPHOCYTES NFR BLD AUTO: 26 %
MCH RBC QN AUTO: 27.5 PG (ref 26.6–33)
MCHC RBC AUTO-ENTMCNC: 30.4 G/DL (ref 31.5–35.7)
MCV RBC AUTO: 90 FL (ref 79–97)
MICRO URNS: ABNORMAL
MONOCYTES # BLD AUTO: 0.5 X10E3/UL (ref 0.1–0.9)
MONOCYTES NFR BLD AUTO: 5 %
NEUTROPHILS # BLD AUTO: 6.5 X10E3/UL (ref 1.4–7)
NEUTROPHILS NFR BLD AUTO: 66 %
NITRITE UR QL STRIP: NEGATIVE
PH UR STRIP: 6 [PH] (ref 5–7.5)
PHOSPHATE SERPL-MCNC: 3.8 MG/DL (ref 2.5–4.5)
PLATELET # BLD AUTO: 253 X10E3/UL (ref 150–450)
POTASSIUM SERPL-SCNC: 3.6 MMOL/L (ref 3.5–5.2)
PROT SERPL-MCNC: 6.9 G/DL (ref 6–8.5)
PROT UR QL STRIP: ABNORMAL
PROT UR-MCNC: 68.5 MG/DL
PTH-INTACT SERPL-MCNC: NORMAL PG/ML
RBC # BLD AUTO: 3.96 X10E6/UL (ref 3.77–5.28)
RBC #/AREA URNS HPF: ABNORMAL /HPF (ref 0–2)
SODIUM SERPL-SCNC: 144 MMOL/L (ref 134–144)
SP GR UR: 1.01 (ref 1–1.03)
TRIGL SERPL-MCNC: 116 MG/DL (ref 0–149)
TSH SERPL DL<=0.005 MIU/L-ACNC: 2.33 UIU/ML (ref 0.45–4.5)
UROBILINOGEN UR STRIP-MCNC: 0.2 MG/DL (ref 0.2–1)
VLDLC SERPL CALC-MCNC: 23 MG/DL (ref 5–40)
WBC # BLD AUTO: 9.7 X10E3/UL (ref 3.4–10.8)
WBC #/AREA URNS HPF: >30 /HPF (ref 0–5)

## 2019-06-30 DIAGNOSIS — N39.0 URINARY TRACT INFECTION WITHOUT HEMATURIA, SITE UNSPECIFIED: Primary | ICD-10-CM

## 2019-06-30 RX ORDER — CIPROFLOXACIN 500 MG/1
500 TABLET ORAL 2 TIMES DAILY
Qty: 14 TAB | Refills: 0 | Status: SHIPPED | OUTPATIENT
Start: 2019-06-30 | End: 2019-07-07

## 2019-09-06 RX ORDER — METOLAZONE 2.5 MG/1
TABLET ORAL
Qty: 30 TAB | Refills: 11 | Status: SHIPPED | OUTPATIENT
Start: 2019-09-06 | End: 2020-09-15 | Stop reason: SDUPTHER

## 2019-09-06 RX ORDER — POTASSIUM CHLORIDE 20 MEQ/1
TABLET, EXTENDED RELEASE ORAL
Qty: 30 TAB | Refills: 11 | Status: SHIPPED | OUTPATIENT
Start: 2019-09-06 | End: 2020-09-15 | Stop reason: SDUPTHER

## 2019-09-06 RX ORDER — LISINOPRIL 40 MG/1
TABLET ORAL
Qty: 30 TAB | Refills: 11 | Status: SHIPPED | OUTPATIENT
Start: 2019-09-06 | End: 2020-09-15 | Stop reason: SDUPTHER

## 2019-09-06 RX ORDER — PRAVASTATIN SODIUM 40 MG/1
TABLET ORAL
Qty: 30 TAB | Refills: 11 | Status: SHIPPED | OUTPATIENT
Start: 2019-09-06 | End: 2020-09-15 | Stop reason: SDUPTHER

## 2019-09-06 RX ORDER — AMLODIPINE BESYLATE 10 MG/1
TABLET ORAL
Qty: 30 TAB | Refills: 11 | Status: SHIPPED | OUTPATIENT
Start: 2019-09-06 | End: 2020-09-15 | Stop reason: SDUPTHER

## 2019-09-23 ENCOUNTER — OFFICE VISIT (OUTPATIENT)
Dept: INTERNAL MEDICINE CLINIC | Age: 59
End: 2019-09-23

## 2019-09-23 VITALS
TEMPERATURE: 96.6 F | OXYGEN SATURATION: 96 % | BODY MASS INDEX: 39.53 KG/M2 | WEIGHT: 246 LBS | RESPIRATION RATE: 18 BRPM | HEIGHT: 66 IN | HEART RATE: 92 BPM | DIASTOLIC BLOOD PRESSURE: 89 MMHG | SYSTOLIC BLOOD PRESSURE: 144 MMHG

## 2019-09-23 DIAGNOSIS — N18.4 CHRONIC RENAL FAILURE SYNDROME, STAGE 4 (SEVERE) (HCC): ICD-10-CM

## 2019-09-23 DIAGNOSIS — D64.9 ANEMIA, UNSPECIFIED TYPE: ICD-10-CM

## 2019-09-23 DIAGNOSIS — M48.061 SPINAL STENOSIS OF LUMBAR REGION WITHOUT NEUROGENIC CLAUDICATION: Primary | ICD-10-CM

## 2019-09-23 DIAGNOSIS — E66.01 CLASS 3 SEVERE OBESITY WITH SERIOUS COMORBIDITY AND BODY MASS INDEX (BMI) OF 40.0 TO 44.9 IN ADULT, UNSPECIFIED OBESITY TYPE (HCC): ICD-10-CM

## 2019-09-23 DIAGNOSIS — I10 ESSENTIAL HYPERTENSION: ICD-10-CM

## 2019-09-23 DIAGNOSIS — E78.5 DYSLIPIDEMIA: ICD-10-CM

## 2019-09-23 NOTE — PROGRESS NOTES
1. Have you been to the ER, urgent care clinic since your last visit? Hospitalized since your last visit? No    2. Have you seen or consulted any other health care providers outside of the [address] [place] [address] since your last visit? Include any pap smears or colon screening.  No

## 2019-09-24 NOTE — PROGRESS NOTES
[address] [place] [address] and Primary Care  [place] [address]  [address] [facility] [address]  Phone:  [phone]  Fax: [phone]       Chief Complaint   Patient presents with    Hypertension   . SUBJECTIVE:    Usama Kuhn is a 62 y.o. female Comes in for return visit continuing to complain of back pain. She has severe lumbar spinal stenosis, which is the most likely etiology of her back pain, which is getting worse. She went to aquatic aerobics this summer and felt quite well and would like to continue this. Ultimately she will probably have to  have surgical intervention for decompression to alleviate the pain. She is not interested in that at this point. There has been no meaningful weight loss since I last saw her. She has a past history of chronic renal failure secondary to hypertensive nephrosclerosis. This has been gradually getting worse, but she is stable enough not to require hemodialysis currently. She has a past history of primary hypertension, dyslipidemia, as well as gout. She continues to work every day as a  at one of the [employer]. Current Outpatient Medications   Medication Sig Dispense Refill    pravastatin (PRAVACHOL) 40 mg tablet TAKE 1 TABLET BY MOUTH EVERY DAY AT BEDTIME 30 Tab 11    metOLazone (ZAROXOLYN) 2.5 mg tablet TAKE 1 TABLET BY MOUTH DAILY 30 Tab 11    lisinopril (PRINIVIL, ZESTRIL) 40 mg tablet TAKE 1 TABLET BY MOUTH DAILY 30 Tab 11    potassium chloride (K-DUR, KLOR-CON) 20 mEq tablet TAKE 1 TABLET BY MOUTH DAILY 30 Tab 11    amLODIPine (NORVASC) 10 mg tablet TAKE 1 TABLET BY MOUTH DAILY 30 Tab 11    predniSONE (DELTASONE) 5 mg tablet Take 1 Tab by mouth daily. 30 Tab 1    allopurinol (ZYLOPRIM) 100 mg tablet 2 tablets QD 60 Tab 11    pantoprazole (PROTONIX) 40 mg tablet Take 1 Tab by mouth daily. 30 Tab 11    calcitRIOL (ROCALTROL) 0.5 mcg capsule Take 2 Caps by mouth daily.  90 Cap 3    atenolol (TENORMIN) 50 mg tablet Take  by mouth daily.        Past Medical History:   Diagnosis Date    Arthritis     L knee    Chronic kidney disease     secondary to hypertensive nephrosclerosis    Gout     Hypertension      Past Surgical History:   Procedure Laterality Date    ABDOMEN SURGERY PROC UNLISTED      HX COLONOSCOPY      HX GYN       Allergies   Allergen Reactions    [place] Swelling         REVIEW OF SYSTEMS:  General: negative for - chills or fever  ENT: negative for - headaches, nasal congestion or tinnitus  Respiratory: negative for - cough, hemoptysis, shortness of breath or wheezing  Cardiovascular : negative for - chest pain, edema, palpitations or shortness of breath  Gastrointestinal: negative for - abdominal pain, blood in stools, heartburn or nausea/vomiting  Genito-Urinary: no dysuria, trouble voiding, or hematuria  Musculoskeletal: negative for - gait disturbance, joint pain, joint stiffness or joint swelling  Neurological: no TIA or stroke symptoms  Hematologic: no bruises, no bleeding, no swollen glands  Integument: no lumps, mole changes, nail changes or rash  Endocrine: no malaise/lethargy or unexpected weight changes      Social History     Socioeconomic History    Marital status:      Spouse name: Not on file    Number of children: 2    Years of education: 16+    Highest education level: Master's degree (e.g., MA, MS, Schuyler, MEd, MSW, RENATA)   Occupational History    Occupation: teacher   Tobacco Use    Smoking status: Never Smoker    Smokeless tobacco: Never Used   Substance and Sexual Activity    Alcohol use: No    Drug use: No    Sexual activity: Not Currently     Family History   Problem Relation Age of Onset    Diabetes Mother     Hypertension Mother        OBJECTIVE:    Visit Vitals  /89 (BP 1 Location: Left arm, BP Patient Position: Sitting)   Pulse 92   Temp 96.6 °F (35.9 °C) (Oral)   Resp 18   Ht 5' 6\" (1.676 m)   Wt 246 lb (111.6 kg)   SpO2 96%   BMI 39.71 kg/m² CONSTITUTIONAL: well , well nourished, appears age appropriate  EYES: perrla, eom intact  ENMT:moist mucous membranes, pharynx clear  NECK: supple. Thyroid normal  RESPIRATORY: Chest: clear to ascultation and percussion   CARDIOVASCULAR: Heart: regular rate and rhythm  GASTROINTESTINAL: Abdomen: soft, bowel sounds active  HEMATOLOGIC: no pathological lymph nodes palpated  MUSCULOSKELETAL: Extremities: no edema, pulse 1+   INTEGUMENT: No unusual rashes or suspicious skin lesions noted. Nails appear normal.  NEUROLOGIC: non-focal exam   MENTAL STATUS: alert and oriented, appropriate affect      ASSESSMENT:  1. Spinal stenosis of lumbar region without neurogenic claudication    2. Essential hypertension    3. Chronic renal failure syndrome, stage 4 (severe) ([facility])    4. Class 3 severe obesity with serious comorbidity and body mass index (BMI) of 40.0 to 44.9 in adult, unspecified obesity type ([address]    5. Dyslipidemia    6. Anemia, unspecified type        PLAN:    1. She has impressive lumbar spinal stenosis. There is not much to offer her other than symptomatic treatment with analgesics. She cannot take NSAIDs because she is intolerant of Tramadol, as well as narcotics. I suggest acetaminophen, which does not really help that much, therefore she will continue the aquatic aerobics. I give a statement stating she should continue this even during her school time. 2. BP is excellent, no adjustments are made. 3. She does have chronic renal failure, stage 4, and I will continue to monitor these levels. 4. She needs to lose weight. This can be accomplished by eating meals, eliminating snacks and avoiding the consumption of processed carbohydrates. 5. She will continue statin as prescribed in view of her increased cardiovascular risk. 6. She does have an anemia and CBC will be checked. I suspect this is on the basis of erythropoietin deficiency.     .  Orders Placed This Encounter    CBC WITH AUTOMATED DIFF    METABOLIC PANEL, BASIC    CYSTATIN C         Follow-up and Dispositions    · Return in about 3 months (around 12/23/2019).            Destiny Brennan MD

## 2019-09-25 LAB
BASOPHILS # BLD AUTO: 0 X10E3/UL (ref 0–0.2)
BASOPHILS NFR BLD AUTO: 1 %
BUN SERPL-MCNC: 47 MG/DL (ref 8–27)
BUN/CREAT SERPL: 15 (ref 12–28)
CALCIUM SERPL-MCNC: 10.8 MG/DL (ref 8.7–10.3)
CHLORIDE SERPL-SCNC: 107 MMOL/L (ref 96–106)
CO2 SERPL-SCNC: 21 MMOL/L (ref 20–29)
CREAT SERPL-MCNC: 3.2 MG/DL (ref 0.57–1)
CYSTATIN C SERPL-MCNC: 4.03 MG/L (ref 0.62–1.16)
EOSINOPHIL # BLD AUTO: 0.2 X10E3/UL (ref 0–0.4)
EOSINOPHIL NFR BLD AUTO: 2 %
ERYTHROCYTE [DISTWIDTH] IN BLOOD BY AUTOMATED COUNT: 14.7 % (ref 12.3–15.4)
GLUCOSE SERPL-MCNC: 79 MG/DL (ref 65–99)
HCT VFR BLD AUTO: 37.1 % (ref 34–46.6)
HGB BLD-MCNC: 11.4 G/DL (ref 11.1–15.9)
IMM GRANULOCYTES # BLD AUTO: 0 X10E3/UL (ref 0–0.1)
IMM GRANULOCYTES NFR BLD AUTO: 0 %
LYMPHOCYTES # BLD AUTO: 2.2 X10E3/UL (ref 0.7–3.1)
LYMPHOCYTES NFR BLD AUTO: 29 %
MCH RBC QN AUTO: 28 PG (ref 26.6–33)
MCHC RBC AUTO-ENTMCNC: 30.7 G/DL (ref 31.5–35.7)
MCV RBC AUTO: 91 FL (ref 79–97)
MONOCYTES # BLD AUTO: 0.4 X10E3/UL (ref 0.1–0.9)
MONOCYTES NFR BLD AUTO: 5 %
NEUTROPHILS # BLD AUTO: 4.9 X10E3/UL (ref 1.4–7)
NEUTROPHILS NFR BLD AUTO: 63 %
PLATELET # BLD AUTO: 224 X10E3/UL (ref 150–450)
POTASSIUM SERPL-SCNC: 3.9 MMOL/L (ref 3.5–5.2)
RBC # BLD AUTO: 4.07 X10E6/UL (ref 3.77–5.28)
SODIUM SERPL-SCNC: 146 MMOL/L (ref 134–144)
WBC # BLD AUTO: 7.7 X10E3/UL (ref 3.4–10.8)

## 2019-12-23 ENCOUNTER — OFFICE VISIT (OUTPATIENT)
Dept: INTERNAL MEDICINE CLINIC | Age: 59
End: 2019-12-23

## 2019-12-23 VITALS
HEIGHT: 66 IN | TEMPERATURE: 97 F | WEIGHT: 245.4 LBS | BODY MASS INDEX: 39.44 KG/M2 | HEART RATE: 93 BPM | SYSTOLIC BLOOD PRESSURE: 140 MMHG | DIASTOLIC BLOOD PRESSURE: 87 MMHG | OXYGEN SATURATION: 92 % | RESPIRATION RATE: 16 BRPM

## 2019-12-23 DIAGNOSIS — Z83.3 FAMILY HISTORY OF DIABETES MELLITUS: ICD-10-CM

## 2019-12-23 DIAGNOSIS — D64.9 ANEMIA, UNSPECIFIED TYPE: ICD-10-CM

## 2019-12-23 DIAGNOSIS — Z12.31 SCREENING MAMMOGRAM, ENCOUNTER FOR: ICD-10-CM

## 2019-12-23 DIAGNOSIS — M54.16 LUMBAR RADICULAR PAIN: ICD-10-CM

## 2019-12-23 DIAGNOSIS — M10.9 GOUT, UNSPECIFIED CAUSE, UNSPECIFIED CHRONICITY, UNSPECIFIED SITE: ICD-10-CM

## 2019-12-23 DIAGNOSIS — N18.4 CHRONIC RENAL FAILURE SYNDROME, STAGE 4 (SEVERE) (HCC): Primary | ICD-10-CM

## 2019-12-23 DIAGNOSIS — E66.01 CLASS 3 SEVERE OBESITY WITH SERIOUS COMORBIDITY AND BODY MASS INDEX (BMI) OF 40.0 TO 44.9 IN ADULT, UNSPECIFIED OBESITY TYPE (HCC): ICD-10-CM

## 2019-12-23 DIAGNOSIS — I10 ESSENTIAL HYPERTENSION: ICD-10-CM

## 2019-12-23 NOTE — PROGRESS NOTES
Chief Complaint   Patient presents with    Back Pain     3 month follow up      1. Have you been to the ER, urgent care clinic since your last visit? Hospitalized since your last visit? No    2. Have you seen or consulted any other health care providers outside of the [address] [place] [address] since your last visit? Include any pap smears or colon screening.  No

## 2019-12-25 LAB
BASOPHILS # BLD AUTO: 0 X10E3/UL (ref 0–0.2)
BASOPHILS NFR BLD AUTO: 0 %
BUN SERPL-MCNC: 56 MG/DL (ref 8–27)
BUN/CREAT SERPL: 19 (ref 12–28)
CALCIUM SERPL-MCNC: 10.9 MG/DL (ref 8.7–10.3)
CHLORIDE SERPL-SCNC: 105 MMOL/L (ref 96–106)
CO2 SERPL-SCNC: 22 MMOL/L (ref 20–29)
CREAT SERPL-MCNC: 2.89 MG/DL (ref 0.57–1)
CYSTATIN C SERPL-MCNC: 3.55 MG/L (ref 0.62–1.16)
EOSINOPHIL # BLD AUTO: 0.1 X10E3/UL (ref 0–0.4)
EOSINOPHIL NFR BLD AUTO: 1 %
ERYTHROCYTE [DISTWIDTH] IN BLOOD BY AUTOMATED COUNT: 12.9 % (ref 12.3–15.4)
EST. AVERAGE GLUCOSE BLD GHB EST-MCNC: 120 MG/DL
GLUCOSE SERPL-MCNC: 112 MG/DL (ref 65–99)
HBA1C MFR BLD: 5.8 % (ref 4.8–5.6)
HCT VFR BLD AUTO: 35.8 % (ref 34–46.6)
HGB BLD-MCNC: 11.3 G/DL (ref 11.1–15.9)
IMM GRANULOCYTES # BLD AUTO: 0.1 X10E3/UL (ref 0–0.1)
IMM GRANULOCYTES NFR BLD AUTO: 1 %
LYMPHOCYTES # BLD AUTO: 1 X10E3/UL (ref 0.7–3.1)
LYMPHOCYTES NFR BLD AUTO: 9 %
MCH RBC QN AUTO: 28.9 PG (ref 26.6–33)
MCHC RBC AUTO-ENTMCNC: 31.6 G/DL (ref 31.5–35.7)
MCV RBC AUTO: 92 FL (ref 79–97)
MONOCYTES # BLD AUTO: 0.3 X10E3/UL (ref 0.1–0.9)
MONOCYTES NFR BLD AUTO: 3 %
NEUTROPHILS # BLD AUTO: 9.4 X10E3/UL (ref 1.4–7)
NEUTROPHILS NFR BLD AUTO: 86 %
PLATELET # BLD AUTO: 221 X10E3/UL (ref 150–450)
POTASSIUM SERPL-SCNC: 4.2 MMOL/L (ref 3.5–5.2)
RBC # BLD AUTO: 3.91 X10E6/UL (ref 3.77–5.28)
SODIUM SERPL-SCNC: 146 MMOL/L (ref 134–144)
WBC # BLD AUTO: 10.9 X10E3/UL (ref 3.4–10.8)

## 2019-12-29 NOTE — PROGRESS NOTES
[address] [place] [address] and Primary Care  [place] [address]  [address] [facility] [address]  Phone:  [phone]  Fax: [phone]       Chief Complaint   Patient presents with    Back Pain     3 month follow up    . SUBJECTIVE:    Sunil Mendoza is a 62 y.o. female Comes in for return visit stating that generally she feels well. Her biggest complaint is that of pain in her back that is aggravated with walking. She has rather impressive lumbar spondylosis. She is not really anxious to have anything done. Unfortunately, there has been no meaningful weight loss since I last saw her either. This is indirectly contributing to the pain she is having in the low back area. She has a history of chronic renal failure secondary to hypertensive nephrosclerosis. Fortunately, this has been reasonably stable. She demonstrates no uremic symptoms. She has a past history of primary hypertension and dyslipidemia, as well as gout. She is not physically active. Current Outpatient Medications   Medication Sig Dispense Refill    predniSONE (DELTASONE) 5 mg tablet Take 1 Tab by mouth daily. 30 Tab 11    pravastatin (PRAVACHOL) 40 mg tablet TAKE 1 TABLET BY MOUTH EVERY DAY AT BEDTIME 30 Tab 11    metOLazone (ZAROXOLYN) 2.5 mg tablet TAKE 1 TABLET BY MOUTH DAILY 30 Tab 11    lisinopril (PRINIVIL, ZESTRIL) 40 mg tablet TAKE 1 TABLET BY MOUTH DAILY 30 Tab 11    potassium chloride (K-DUR, KLOR-CON) 20 mEq tablet TAKE 1 TABLET BY MOUTH DAILY 30 Tab 11    amLODIPine (NORVASC) 10 mg tablet TAKE 1 TABLET BY MOUTH DAILY 30 Tab 11    allopurinol (ZYLOPRIM) 100 mg tablet 2 tablets QD 60 Tab 11    pantoprazole (PROTONIX) 40 mg tablet Take 1 Tab by mouth daily. 30 Tab 11    calcitRIOL (ROCALTROL) 0.5 mcg capsule Take 2 Caps by mouth daily. 90 Cap 3    atenolol (TENORMIN) 50 mg tablet Take  by mouth daily.        Past Medical History:   Diagnosis Date    Arthritis     L knee    Chronic kidney disease secondary to hypertensive nephrosclerosis    Gout     Hypertension      Past Surgical History:   Procedure Laterality Date    ABDOMEN SURGERY PROC UNLISTED      HX COLONOSCOPY      HX GYN       Allergies   Allergen Reactions    [place] Swelling         REVIEW OF SYSTEMS:  General: negative for - chills or fever  ENT: negative for - headaches, nasal congestion or tinnitus  Respiratory: negative for - cough, hemoptysis, shortness of breath or wheezing  Cardiovascular : negative for - chest pain, edema, palpitations or shortness of breath  Gastrointestinal: negative for - abdominal pain, blood in stools, heartburn or nausea/vomiting  Genito-Urinary: no dysuria, trouble voiding, or hematuria  Musculoskeletal: negative for - gait disturbance, joint pain, joint stiffness or joint swelling  Neurological: no TIA or stroke symptoms  Hematologic: no bruises, no bleeding, no swollen glands  Integument: no lumps, mole changes, nail changes or rash  Endocrine: no malaise/lethargy or unexpected weight changes      Social History     Socioeconomic History    Marital status:      Spouse name: Not on file    Number of children: 2    Years of education: 16+    Highest education level: Master's degree (e.g., MA, MS, Schuyler, MEd, MSW, RENATA)   Occupational History    Occupation: teacher   Tobacco Use    Smoking status: Never Smoker    Smokeless tobacco: Never Used   Substance and Sexual Activity    Alcohol use: No    Drug use: No    Sexual activity: Not Currently     Family History   Problem Relation Age of Onset    Diabetes Mother     Hypertension Mother        OBJECTIVE:    Visit Vitals  /87   Pulse 93   Temp 97 °F (36.1 °C) (Oral)   Resp 16   Ht 5' 6\" (1.676 m)   Wt 245 lb 6.4 oz (111.3 kg)   SpO2 92%   BMI 39.61 kg/m²     CONSTITUTIONAL: well , well nourished, appears age appropriate  EYES: perrla, eom intact  ENMT:moist mucous membranes, pharynx clear  NECK: supple.  Thyroid normal  RESPIRATORY: Chest: clear to ascultation and percussion   CARDIOVASCULAR: Heart: regular rate and rhythm  GASTROINTESTINAL: Abdomen: soft, bowel sounds active  HEMATOLOGIC: no pathological lymph nodes palpated  MUSCULOSKELETAL: Extremities: no edema, pulse 1+   INTEGUMENT: No unusual rashes or suspicious skin lesions noted. Nails appear normal.  NEUROLOGIC: non-focal exam   MENTAL STATUS: alert and oriented, appropriate affect      ASSESSMENT:  1. Chronic renal failure syndrome, stage 4 (severe) (HCC)    2. Essential hypertension    3. Class 3 severe obesity with serious comorbidity and body mass index (BMI) of 40.0 to 44.9 in adult, unspecified obesity type ([facility] [address]    4. Lumbar radicular pain    5. Gout, unspecified cause, unspecified chronicity, unspecified site    6. Anemia, unspecified type    7. Family history of diabetes mellitus    8. Screening mammogram, encounter for        PLAN:    1. Hopefully her renal failure is stable, I will await results of lab work. 2. BP is excellent today, no adjustments are made. 3. She needs to lose weight. I have told her this each time she comes in. This can be accomplished by eating meals, eliminating snacks and avoiding the consumption of processed carbs. 4. She has rather impressive lumbar radicular pain. Continue symptomatic treatment for now. She really does not want to have anything done other than symptomatic treatment with analgesics. 5. She does have a history of gout and takes her Allopurinol on a regular basis. By doing this, she has no gouty attacks. 6. She does have a history of anemia, but this is most likely related to reduction in erythropoietin level. This, however, has been stable. 7. She does have a family history of diabetes in that her mother had longstanding diabetes. Given her age and weight, the probability of her getting diabetes is moderate. I will await the results of her hemoglobin A1c.     .  Orders Placed This Encounter    MAMMOGRAM SCREENING - BILATERAL  METABOLIC PANEL, BASIC    CYSTATIN C    HEMOGLOBIN A1C WITH EAG    CBC WITH AUTOMATED DIFF         Follow-up and Dispositions    · Return in about 3 months (around 3/23/2020).            Cain Serrano MD

## 2020-01-27 ENCOUNTER — HOSPITAL ENCOUNTER (OUTPATIENT)
Dept: MAMMOGRAPHY | Age: 60
Discharge: HOME | End: 2020-01-27
Attending: INTERNAL MEDICINE
Payer: COMMERCIAL

## 2020-01-27 DIAGNOSIS — Z12.31 SCREENING MAMMOGRAM, ENCOUNTER FOR: ICD-10-CM

## 2020-01-27 PROCEDURE — 77067 SCR MAMMO BI INCL CAD: CPT

## 2020-02-19 ENCOUNTER — OFFICE VISIT (OUTPATIENT)
Dept: INTERNAL MEDICINE CLINIC | Age: 60
End: 2020-02-19

## 2020-02-19 VITALS
BODY MASS INDEX: 39.86 KG/M2 | WEIGHT: 248 LBS | HEIGHT: 66 IN | DIASTOLIC BLOOD PRESSURE: 80 MMHG | SYSTOLIC BLOOD PRESSURE: 130 MMHG | TEMPERATURE: 97.6 F | HEART RATE: 97 BPM | OXYGEN SATURATION: 95 % | RESPIRATION RATE: 16 BRPM

## 2020-02-19 DIAGNOSIS — M54.16 LUMBAR RADICULOPATHY: ICD-10-CM

## 2020-02-19 DIAGNOSIS — N18.4 CHRONIC RENAL FAILURE SYNDROME, STAGE 4 (SEVERE) (HCC): ICD-10-CM

## 2020-02-19 DIAGNOSIS — I10 ESSENTIAL HYPERTENSION: ICD-10-CM

## 2020-02-19 DIAGNOSIS — E78.5 DYSLIPIDEMIA: ICD-10-CM

## 2020-02-19 DIAGNOSIS — R10.32 INGUINAL PAIN, LEFT: Primary | ICD-10-CM

## 2020-02-19 DIAGNOSIS — M48.061 SPINAL STENOSIS OF LUMBAR REGION WITHOUT NEUROGENIC CLAUDICATION: ICD-10-CM

## 2020-02-19 NOTE — PROGRESS NOTES
Chief Complaint   Patient presents with    Groin Pain     x 1 week      1. Have you been to the ER, urgent care clinic since your last visit? Hospitalized since your last visit? No    2. Have you seen or consulted any other health care providers outside of the [address] [place] [address] since your last visit? Include any pap smears or colon screening.  No

## 2020-02-22 NOTE — PROGRESS NOTES
[address] [facility] and Primary Care  [place] [address]  [address] [place] [address]  Phone:  [phone]  Fax: [phone]       Chief Complaint   Patient presents with    Groin Pain     x 1 week    . SUBJECTIVE:    Bill Grant is a 56 y.o. female Comes in complaining of pain in her left hip and inguinal area. It is severe and flared up within the last one to two weeks. It interferes with her ability to walk. She is rather uncomfortable from this. This is the first time she has had this degree of pain. She does have a history of lumbar spinal stenosis, which has not really caused any major problems to date other than walking irregularity. She has a past history of primary hypertension and chronic renal failure secondary to hypertensive nephrosclerosis. Current Outpatient Medications   Medication Sig Dispense Refill    HYDROcodone-acetaminophen (NORCO) 5-325 mg per tablet Take 1 Tab by mouth every six (6) hours as needed for Pain for up to 30 days. Max Daily Amount: 4 Tabs. 40 Tab 0    predniSONE (DELTASONE) 5 mg tablet Take 1 Tab by mouth daily. 30 Tab 11    pravastatin (PRAVACHOL) 40 mg tablet TAKE 1 TABLET BY MOUTH EVERY DAY AT BEDTIME 30 Tab 11    metOLazone (ZAROXOLYN) 2.5 mg tablet TAKE 1 TABLET BY MOUTH DAILY 30 Tab 11    lisinopril (PRINIVIL, ZESTRIL) 40 mg tablet TAKE 1 TABLET BY MOUTH DAILY 30 Tab 11    potassium chloride (K-DUR, KLOR-CON) 20 mEq tablet TAKE 1 TABLET BY MOUTH DAILY 30 Tab 11    amLODIPine (NORVASC) 10 mg tablet TAKE 1 TABLET BY MOUTH DAILY 30 Tab 11    allopurinol (ZYLOPRIM) 100 mg tablet 2 tablets QD 60 Tab 11    pantoprazole (PROTONIX) 40 mg tablet Take 1 Tab by mouth daily. 30 Tab 11    calcitRIOL (ROCALTROL) 0.5 mcg capsule Take 2 Caps by mouth daily. 90 Cap 3    atenolol (TENORMIN) 50 mg tablet Take  by mouth daily.        Past Medical History:   Diagnosis Date    Arthritis     L knee    Chronic kidney disease     secondary to hypertensive nephrosclerosis    Gout     Hypertension      Past Surgical History:   Procedure Laterality Date    ABDOMEN SURGERY PROC UNLISTED      HX COLONOSCOPY      HX GYN       Allergies   Allergen Reactions    [place] Swelling         REVIEW OF SYSTEMS:  General: negative for - chills or fever  ENT: negative for - headaches, nasal congestion or tinnitus  Respiratory: negative for - cough, hemoptysis, shortness of breath or wheezing  Cardiovascular : negative for - chest pain, edema, palpitations or shortness of breath  Gastrointestinal: negative for - abdominal pain, blood in stools, heartburn or nausea/vomiting  Genito-Urinary: no dysuria, trouble voiding, or hematuria  Musculoskeletal: negative for - gait disturbance, joint pain, joint stiffness or joint swelling  Neurological: no TIA or stroke symptoms  Hematologic: no bruises, no bleeding, no swollen glands  Integument: no lumps, mole changes, nail changes or rash  Endocrine: no malaise/lethargy or unexpected weight changes      Social History     Socioeconomic History    Marital status:      Spouse name: Not on file    Number of children: 2    Years of education: 16+    Highest education level: Master's degree (e.g., MA, MS, Schuyler, MEd, MSW, RENATA)   Occupational History    Occupation: teacher   Tobacco Use    Smoking status: Never Smoker    Smokeless tobacco: Never Used   Substance and Sexual Activity    Alcohol use: No    Drug use: No    Sexual activity: Not Currently     Family History   Problem Relation Age of Onset    Diabetes Mother     Hypertension Mother     Breast Cancer Paternal Grandmother        OBJECTIVE:    Visit Vitals  /80   Pulse 97   Temp 97.6 °F (36.4 °C) (Oral)   Resp 16   Ht 5' 6\" (1.676 m)   Wt 248 lb (112.5 kg)   SpO2 95%   BMI 40.03 kg/m²     CONSTITUTIONAL: well , well nourished, appears age appropriate  EYES: perrla, eom intact  ENMT:moist mucous membranes, pharynx clear  NECK: supple.  Thyroid normal  RESPIRATORY: Chest: clear to ascultation and percussion   CARDIOVASCULAR: Heart: regular rate and rhythm  GASTROINTESTINAL: Abdomen: soft, bowel sounds active  HEMATOLOGIC: no pathological lymph nodes palpated  MUSCULOSKELETAL: Extremities: no edema, pulse 1+   INTEGUMENT: No unusual rashes or suspicious skin lesions noted. Nails appear normal.  NEUROLOGIC: non-focal exam   MENTAL STATUS: alert and oriented, appropriate affect      ASSESSMENT:  1. Inguinal pain, left    2. Spinal stenosis of lumbar region without neurogenic claudication    3. Lumbar radiculopathy    4. Chronic renal failure syndrome, stage 4 (severe) (HCC)    5. Dyslipidemia    6. Essential hypertension        PLAN:  (poor audio quality - skipping)    1. The patient has left inguinal pain of undetermined etiology. A Armen's test was negative, indicating that it is probably not related to the hip. This would therefore suggest the possibility of a radicular component. There is no major pathology in the L5 region on the MRI scan, although ______________ is higher up. The L5 is going through the inguinal area. I am not entirely sure of the origin of the discomfort. She will be referred to a neurosurgeon and subsequently orthopedic physician. 2. She does have what appears to be lumbar spinal stenosis, but I do not think this is causing the major problem. 3. She has _______________ and this has been stable. 4. She will continue statin as prescribed. 5. Blood pressure is excellent today, no adjustments are made. Follow-up and Dispositions    · Return in about 4 weeks (around 3/18/2020).            Rebecca Gonzalez MD

## 2020-02-23 RX ORDER — HYDROCODONE BITARTRATE AND ACETAMINOPHEN 5; 325 MG/1; MG/1
1 TABLET ORAL
Qty: 40 TAB | Refills: 0 | Status: SHIPPED | OUTPATIENT
Start: 2020-02-23 | End: 2020-03-24

## 2020-04-08 DIAGNOSIS — M10.9 GOUT, UNSPECIFIED CAUSE, UNSPECIFIED CHRONICITY, UNSPECIFIED SITE: ICD-10-CM

## 2020-04-10 RX ORDER — ALLOPURINOL 100 MG/1
TABLET ORAL
Qty: 60 TAB | Refills: 11 | Status: SHIPPED | OUTPATIENT
Start: 2020-04-10 | End: 2021-06-01 | Stop reason: SDUPTHER

## 2020-05-04 ENCOUNTER — VIRTUAL VISIT (OUTPATIENT)
Dept: INTERNAL MEDICINE CLINIC | Age: 60
End: 2020-05-04

## 2020-05-04 DIAGNOSIS — M54.16 LUMBAR RADICULOPATHY: ICD-10-CM

## 2020-05-04 DIAGNOSIS — M10.9 GOUT, UNSPECIFIED CAUSE, UNSPECIFIED CHRONICITY, UNSPECIFIED SITE: ICD-10-CM

## 2020-05-04 DIAGNOSIS — E78.5 DYSLIPIDEMIA: ICD-10-CM

## 2020-05-04 DIAGNOSIS — M48.062 SPINAL STENOSIS OF LUMBAR REGION WITH NEUROGENIC CLAUDICATION: Primary | ICD-10-CM

## 2020-05-04 DIAGNOSIS — I10 ESSENTIAL HYPERTENSION: ICD-10-CM

## 2020-05-04 DIAGNOSIS — E66.01 CLASS 3 SEVERE OBESITY WITH SERIOUS COMORBIDITY AND BODY MASS INDEX (BMI) OF 40.0 TO 44.9 IN ADULT, UNSPECIFIED OBESITY TYPE (HCC): ICD-10-CM

## 2020-05-04 DIAGNOSIS — N18.4 CHRONIC RENAL FAILURE SYNDROME, STAGE 4 (SEVERE) (HCC): ICD-10-CM

## 2020-05-04 NOTE — PROGRESS NOTES
Lovely Blanco is a 56 y.o. female who was seen by synchronous (real-time) audio-video technology on 5/4/2020. Consent: Lovely Blanco, who was seen by synchronous (real-time) audio-video technology, and/or her healthcare decision maker, is aware that this patient-initiated, Telehealth encounter on 5/4/2020 is a billable service, with coverage as determined by her insurance carrier. She is aware that she may receive a bill and has provided verbal consent to proceed: Yes. Assessment & Plan:   Diagnoses and all orders for this visit:    1. Spinal stenosis of lumbar region with neurogenic claudication    2. Lumbar radiculopathy    3. Chronic renal failure syndrome, stage 4 (severe) (HCC)    4. Essential hypertension    5. Dyslipidemia    6. Gout, unspecified cause, unspecified chronicity, unspecified site    7. Class 3 severe obesity with serious comorbidity and body mass index (BMI) of 40.0 to 44.9 in adult, unspecified obesity type ([facility] [address]      1. The patient has symptomatic lumbar spinal stenosis at L4-L5. This will need to be decompressed. She additionally has a lumbar radiculopathy, which is rather severe, on the left at L5-S1. This will also have to be decompressed. She saw the neurosurgeon,  [facility], who agreed. She did go to physical therapy with no improvement. She now wants to have surgery. The primary reason for surgery is because she is having increasing pain in her left leg. 2. She has chronic renal failure secondary to hypertensive nephrosclerosis. This has been reasonably stable. 3. Blood pressure historically has been excellent. 4. She does have a significant increased cardiovascular risk and remains on a statin. 5. She does have a history of gout and as long as she takes her suppressive therapy her episodes have abated. She is consistent taking her Allopurinol. 6. She needs to lose weight as we discussed.   This means avoiding all sweets, white breads, white potatoes. Subjective:   Donne Claude is a 56 y.o. female who was seen for Hypertension (3 month follow up )  Comes in stating that she is now ready to have surgery. She has a lumbar radiculopathy complicated by lumbar spinal stenosis. She did indeed see her neurosurgeon, Dr. Mari Tucker, who told her to contact him when she is ready to have surgery. She did have physical therapy with predictable no meaningful improvement. There has been no meaningful weight loss since I last saw her. She does have a history of chronic renal failure secondary to hypertensive nephrosclerosis. This has been gradually progressive over the last decade. She has no uremic symptoms. She has a past history of primary hypertension, dyslipidemia and obesity. Prior to Admission medications    Medication Sig Start Date End Date Taking? Authorizing Provider   allopurinoL (ZYLOPRIM) 100 mg tablet 2 tablets QD 4/10/20  Yes Yeni Scott MD   predniSONE (DELTASONE) 5 mg tablet Take 1 Tab by mouth daily. 10/31/19  Yes Yeni Scott MD   pravastatin (PRAVACHOL) 40 mg tablet TAKE 1 TABLET BY MOUTH EVERY DAY AT BEDTIME 9/6/19  Yes Yeni Scott MD   metOLazone (ZAROXOLYN) 2.5 mg tablet TAKE 1 TABLET BY MOUTH DAILY 9/6/19  Yes Yeni Scott MD   lisinopril (PRINIVIL, ZESTRIL) 40 mg tablet TAKE 1 TABLET BY MOUTH DAILY 9/6/19  Yes Yeni Scott MD   potassium chloride (K-DUR, KLOR-CON) 20 mEq tablet TAKE 1 TABLET BY MOUTH DAILY 9/6/19  Yes Yeni Scott MD   amLODIPine (NORVASC) 10 mg tablet TAKE 1 TABLET BY MOUTH DAILY 9/6/19  Yes Yeni Scott MD   pantoprazole (PROTONIX) 40 mg tablet Take 1 Tab by mouth daily. 3/10/18  Yes Yeni Scott MD   calcitRIOL (ROCALTROL) 0.5 mcg capsule Take 2 Caps by mouth daily. 10/19/15  Yes Yeni Scott MD   atenolol (TENORMIN) 50 mg tablet Take  by mouth daily.    Yes Provider, Historical     Allergies   Allergen Reactions    [place] Swelling       Current Outpatient Medications   Medication Sig Dispense Refill    allopurinoL (ZYLOPRIM) 100 mg tablet 2 tablets QD 60 Tab 11    predniSONE (DELTASONE) 5 mg tablet Take 1 Tab by mouth daily. 30 Tab 11    pravastatin (PRAVACHOL) 40 mg tablet TAKE 1 TABLET BY MOUTH EVERY DAY AT BEDTIME 30 Tab 11    metOLazone (ZAROXOLYN) 2.5 mg tablet TAKE 1 TABLET BY MOUTH DAILY 30 Tab 11    lisinopril (PRINIVIL, ZESTRIL) 40 mg tablet TAKE 1 TABLET BY MOUTH DAILY 30 Tab 11    potassium chloride (K-DUR, KLOR-CON) 20 mEq tablet TAKE 1 TABLET BY MOUTH DAILY 30 Tab 11    amLODIPine (NORVASC) 10 mg tablet TAKE 1 TABLET BY MOUTH DAILY 30 Tab 11    pantoprazole (PROTONIX) 40 mg tablet Take 1 Tab by mouth daily. 30 Tab 11    calcitRIOL (ROCALTROL) 0.5 mcg capsule Take 2 Caps by mouth daily. 90 Cap 3    atenolol (TENORMIN) 50 mg tablet Take  by mouth daily. Allergies   Allergen Reactions    [place] Swelling     Past Medical History:   Diagnosis Date    Arthritis     L knee    Chronic kidney disease     secondary to hypertensive nephrosclerosis    Gout     Hypertension      Past Surgical History:   Procedure Laterality Date    ABDOMEN SURGERY PROC UNLISTED      HX COLONOSCOPY      HX GYN       Family History   Problem Relation Age of Onset    Diabetes Mother     Hypertension Mother     Breast Cancer Paternal Grandmother        ROS;14 point ROS is neg    Objective:   Vital Signs: (As obtained by patient/caregiver at home)  There were no vitals taken for this visit.      [INSTRUCTIONS:  \"[x]\" Indicates a positive item  \"[]\" Indicates a negative item  -- DELETE ALL ITEMS NOT EXAMINED]    Constitutional: [x] Appears well-developed and well-nourished [x] No apparent distress      [] Abnormal -     Mental status: [x] Alert and awake  [x] Oriented to person/place/time [x] Able to follow commands    [] Abnormal -     Eyes:   EOM    [x]  Normal    [] Abnormal -   Sclera  [x]  Normal    [] Abnormal -          Discharge [x]  None visible   [] Abnormal -     HENT: [x] Normocephalic, atraumatic  [] Abnormal -   [x] Mouth/Throat: Mucous membranes are moist    External Ears [x] Normal  [] Abnormal -    Neck: [x] No visualized mass [] Abnormal -     Pulmonary/Chest: [x] Respiratory effort normal   [x] No visualized signs of difficulty breathing or respiratory distress        [] Abnormal -      Musculoskeletal:   [] Normal gait with no signs of ataxia         [x] Normal range of motion of neck        [] Abnormal -     Neurological:        [x] No Facial Asymmetry (Cranial nerve 7 motor function) (limited exam due to video visit)          [x] No gaze palsy        [] Abnormal -          Skin:        [x] No significant exanthematous lesions or discoloration noted on facial skin         [] Abnormal -            Psychiatric:       [x] Normal Affect [] Abnormal -        [x] No Hallucinations    Other pertinent observable physical exam findings:-        We discussed the expected course, resolution and complications of the diagnosis(es) in detail. Medication risks, benefits, costs, interactions, and alternatives were discussed as indicated. I advised her to contact the office if her condition worsens, changes or fails to improve as anticipated. She expressed understanding with the diagnosis(es) and plan. Leon Vivas is a 56 y.o. female who was evaluated by a video visit encounter for concerns as above. Patient identification was verified prior to start of the visit. A caregiver was present when appropriate. Due to this being a TeleHealth encounter (During [facility]-06 public health emergency), evaluation of the following organ systems was limited: Vitals/Constitutional/EENT/Resp/CV/GI//MS/Neuro/Skin/Heme-Lymph-Imm.   Pursuant to the emergency declaration under the [facility] waiver authority and the [employer] and [employer] Act, this Virtual  Visit was conducted, with patient's (and/or legal guardian's) consent, to reduce the patient's risk of exposure to COVID-19 and provide necessary medical care. Services were provided through a video synchronous discussion virtually to substitute for in-person clinic visit. Patient and provider were located at their individual homes. Please note that this dictation was completed with [employer], the [employer] voice recognition software. Quite often unanticipated grammatical, syntax, homophones, and other interpretive errors are inadvertently transcribed by the computer software. Please disregard these errors. Please excuse any errors that have escaped final proofreading. Thank you.     Jeancarlos Giron MD

## 2020-05-07 LAB
BASOPHILS # BLD AUTO: 0 X10E3/UL (ref 0–0.2)
BASOPHILS NFR BLD AUTO: 0 %
BUN SERPL-MCNC: 48 MG/DL (ref 8–27)
BUN/CREAT SERPL: 14 (ref 12–28)
CALCIUM SERPL-MCNC: 11.7 MG/DL (ref 8.7–10.3)
CHLORIDE SERPL-SCNC: 110 MMOL/L (ref 96–106)
CO2 SERPL-SCNC: 20 MMOL/L (ref 20–29)
CREAT SERPL-MCNC: 3.41 MG/DL (ref 0.57–1)
CYSTATIN C SERPL-MCNC: 4.19 MG/L (ref 0.62–1.16)
EOSINOPHIL # BLD AUTO: 0.2 X10E3/UL (ref 0–0.4)
EOSINOPHIL NFR BLD AUTO: 2 %
ERYTHROCYTE [DISTWIDTH] IN BLOOD BY AUTOMATED COUNT: 13.6 % (ref 11.7–15.4)
GLUCOSE SERPL-MCNC: 79 MG/DL (ref 65–99)
HCT VFR BLD AUTO: 35.6 % (ref 34–46.6)
HGB BLD-MCNC: 11.2 G/DL (ref 11.1–15.9)
IMM GRANULOCYTES # BLD AUTO: 0 X10E3/UL (ref 0–0.1)
IMM GRANULOCYTES NFR BLD AUTO: 0 %
LYMPHOCYTES # BLD AUTO: 2 X10E3/UL (ref 0.7–3.1)
LYMPHOCYTES NFR BLD AUTO: 22 %
MCH RBC QN AUTO: 28.7 PG (ref 26.6–33)
MCHC RBC AUTO-ENTMCNC: 31.5 G/DL (ref 31.5–35.7)
MCV RBC AUTO: 91 FL (ref 79–97)
MONOCYTES # BLD AUTO: 0.6 X10E3/UL (ref 0.1–0.9)
MONOCYTES NFR BLD AUTO: 7 %
NEUTROPHILS # BLD AUTO: 6.2 X10E3/UL (ref 1.4–7)
NEUTROPHILS NFR BLD AUTO: 69 %
PLATELET # BLD AUTO: 233 X10E3/UL (ref 150–450)
POTASSIUM SERPL-SCNC: 4 MMOL/L (ref 3.5–5.2)
RBC # BLD AUTO: 3.9 X10E6/UL (ref 3.77–5.28)
SODIUM SERPL-SCNC: 149 MMOL/L (ref 134–144)
WBC # BLD AUTO: 9 X10E3/UL (ref 3.4–10.8)

## 2020-05-08 NOTE — PROGRESS NOTES
Tell patient to return to the office calcium, phosphorus, PTH------- tell phlebotomist to minimize tourniquet usage

## 2020-05-13 ENCOUNTER — CLINICAL SUPPORT (OUTPATIENT)
Dept: INTERNAL MEDICINE CLINIC | Age: 60
End: 2020-05-13

## 2020-05-13 DIAGNOSIS — E83.52 HYPERCALCEMIA: Primary | ICD-10-CM

## 2020-05-14 LAB
CALCIUM SERPL-MCNC: 11.8 MG/DL (ref 8.7–10.3)
PHOSPHATE SERPL-MCNC: 3.6 MG/DL (ref 3–4.3)
PTH-INTACT SERPL-MCNC: 906 PG/ML (ref 15–65)

## 2020-06-09 ENCOUNTER — HOSPITAL ENCOUNTER (OUTPATIENT)
Dept: MRI IMAGING | Age: 60
Discharge: HOME | End: 2020-06-09
Attending: NEUROLOGICAL SURGERY
Payer: COMMERCIAL

## 2020-06-09 DIAGNOSIS — M48.061 LUMBAR STENOSIS: ICD-10-CM

## 2020-06-09 PROCEDURE — 72148 MRI LUMBAR SPINE W/O DYE: CPT

## 2020-06-10 ENCOUNTER — LAB ONLY (OUTPATIENT)
Dept: INTERNAL MEDICINE CLINIC | Age: 60
End: 2020-06-10

## 2020-06-10 DIAGNOSIS — E83.52 HYPERCALCEMIA: Primary | ICD-10-CM

## 2020-06-11 LAB
CALCIUM SERPL-MCNC: 11.2 MG/DL (ref 8.7–10.3)
PHOSPHATE SERPL-MCNC: 3.8 MG/DL (ref 3–4.3)

## 2020-06-14 DIAGNOSIS — E21.3 HYPERPARATHYROIDISM (HCC): Primary | ICD-10-CM

## 2020-06-16 NOTE — PROGRESS NOTES
Patient contacted via phone, advised of new order for Parathyroid scan. Pt verbalized understanding.

## 2020-06-22 DIAGNOSIS — F41.9 ANXIETY: Primary | ICD-10-CM

## 2020-06-22 RX ORDER — LORAZEPAM 0.5 MG/1
TABLET ORAL
Qty: 2 TAB | Refills: 0 | Status: CANCELLED | OUTPATIENT
Start: 2020-06-22

## 2020-06-22 RX ORDER — DIAZEPAM 5 MG/1
TABLET ORAL
Qty: 2 TAB | Refills: 0 | Status: ON HOLD | OUTPATIENT
Start: 2020-06-22 | End: 2020-08-20

## 2020-06-25 ENCOUNTER — HOSPITAL ENCOUNTER (OUTPATIENT)
Dept: NUCLEAR MEDICINE | Age: 60
Discharge: HOME | End: 2020-06-25
Attending: INTERNAL MEDICINE
Payer: COMMERCIAL

## 2020-06-25 ENCOUNTER — HOSPITAL ENCOUNTER (OUTPATIENT)
Dept: ULTRASOUND IMAGING | Age: 60
Discharge: HOME | End: 2020-06-25
Attending: INTERNAL MEDICINE
Payer: COMMERCIAL

## 2020-06-25 DIAGNOSIS — E21.3 HYPERPARATHYROIDISM (HCC): ICD-10-CM

## 2020-06-25 PROCEDURE — 76536 US EXAM OF HEAD AND NECK: CPT

## 2020-06-25 PROCEDURE — 78070 PARATHYROID PLANAR IMAGING: CPT

## 2020-07-08 ENCOUNTER — OFFICE VISIT (OUTPATIENT)
Dept: INTERNAL MEDICINE CLINIC | Age: 60
End: 2020-07-08

## 2020-07-08 VITALS
HEIGHT: 66 IN | OXYGEN SATURATION: 94 % | SYSTOLIC BLOOD PRESSURE: 129 MMHG | WEIGHT: 242.2 LBS | RESPIRATION RATE: 16 BRPM | DIASTOLIC BLOOD PRESSURE: 83 MMHG | HEART RATE: 90 BPM | TEMPERATURE: 97.9 F | BODY MASS INDEX: 38.92 KG/M2

## 2020-07-08 DIAGNOSIS — E21.3 HYPERPARATHYROIDISM (HCC): Primary | ICD-10-CM

## 2020-07-08 DIAGNOSIS — E66.01 CLASS 3 SEVERE OBESITY WITH SERIOUS COMORBIDITY AND BODY MASS INDEX (BMI) OF 40.0 TO 44.9 IN ADULT, UNSPECIFIED OBESITY TYPE (HCC): ICD-10-CM

## 2020-07-08 DIAGNOSIS — N18.4 CHRONIC RENAL FAILURE SYNDROME, STAGE 4 (SEVERE) (HCC): ICD-10-CM

## 2020-07-08 DIAGNOSIS — M48.062 SPINAL STENOSIS OF LUMBAR REGION WITH NEUROGENIC CLAUDICATION: ICD-10-CM

## 2020-07-08 NOTE — PROGRESS NOTES
Chief Complaint   Patient presents with    Results     discuss results of thyroid scan per Dr. Melanie Richards     1. Have you been to the ER, urgent care clinic since your last visit? Hospitalized since your last visit? No    2. Have you seen or consulted any other health care providers outside of the [address] [place] [address] since your last visit? Include any pap smears or colon screening.  No

## 2020-07-12 PROBLEM — M48.062 SPINAL STENOSIS OF LUMBAR REGION WITH NEUROGENIC CLAUDICATION: Status: ACTIVE | Noted: 2020-07-12

## 2020-07-12 NOTE — PROGRESS NOTES
[address] [place] [address] and Primary Care  [place] [address]  [address] [facility] [address]  Phone:  [phone]  Fax: [phone]       Chief Complaint   Patient presents with    Results     discuss results of thyroid scan per Dr. Ceci Finnegan   . SUBJECTIVE:    Nikki Ch is a 56 y.o. female comes in and I talked to her at length about what appears to be hyperparathyroidism. She has 2 parathyroid adenomas. The adenomas are hyperplastic areas that need to be surgically removed. Chemically, she fits hyperparathyroidism. This is a bit complicated since this is superimposed on chronic renal failure but it meets all of the criteria. The question is whether or not this can be postponed until her back surgery is done, which is actually prioritized purely for symptom reasons versus postponing it until after the hypercalcemia resolves. I will speak to Dr. Ramona Woodward, as well as Dr. Yanni Rios, the general surgeon, that would indeed perform the partial parathyroidectomy of them adenomas. She continues to have pain in her back which is quite disabling. She has a past history of primary hypertension, dyslipidemia, hypertensive nephrosclerosis leading to chronic renal failure which has been slowly progressive over the last 1-1/2 decades, and gout. Current Outpatient Medications   Medication Sig Dispense Refill    diazePAM (Valium) 5 mg tablet Take 2 tabs 1 hour before procedure. 2 Tab 0    allopurinoL (ZYLOPRIM) 100 mg tablet 2 tablets QD 60 Tab 11    predniSONE (DELTASONE) 5 mg tablet Take 1 Tab by mouth daily.  30 Tab 11    pravastatin (PRAVACHOL) 40 mg tablet TAKE 1 TABLET BY MOUTH EVERY DAY AT BEDTIME 30 Tab 11    metOLazone (ZAROXOLYN) 2.5 mg tablet TAKE 1 TABLET BY MOUTH DAILY 30 Tab 11    lisinopril (PRINIVIL, ZESTRIL) 40 mg tablet TAKE 1 TABLET BY MOUTH DAILY 30 Tab 11    potassium chloride (K-DUR, KLOR-CON) 20 mEq tablet TAKE 1 TABLET BY MOUTH DAILY 30 Tab 11    amLODIPine (NORVASC) 10 mg tablet TAKE 1 TABLET BY MOUTH DAILY 30 Tab 11    pantoprazole (PROTONIX) 40 mg tablet Take 1 Tab by mouth daily. 30 Tab 11    calcitRIOL (ROCALTROL) 0.5 mcg capsule Take 2 Caps by mouth daily. 90 Cap 3    atenolol (TENORMIN) 50 mg tablet Take  by mouth daily.        Past Medical History:   Diagnosis Date    Arthritis     L knee    Chronic kidney disease     secondary to hypertensive nephrosclerosis    Gout     Hypertension      Past Surgical History:   Procedure Laterality Date    ABDOMEN SURGERY PROC UNLISTED      HX COLONOSCOPY      HX GYN       Allergies   Allergen Reactions    [place] Swelling         REVIEW OF SYSTEMS:  General: negative for - chills or fever  ENT: negative for - headaches, nasal congestion or tinnitus  Respiratory: negative for - cough, hemoptysis, shortness of breath or wheezing  Cardiovascular : negative for - chest pain, edema, palpitations or shortness of breath  Gastrointestinal: negative for - abdominal pain, blood in stools, heartburn or nausea/vomiting  Genito-Urinary: no dysuria, trouble voiding, or hematuria  Musculoskeletal: negative for - gait disturbance, joint pain, joint stiffness or joint swelling  Neurological: no TIA or stroke symptoms  Hematologic: no bruises, no bleeding, no swollen glands  Integument: no lumps, mole changes, nail changes or rash  Endocrine: no malaise/lethargy or unexpected weight changes      Social History     Socioeconomic History    Marital status:      Spouse name: Not on file    Number of children: 2    Years of education: 16+    Highest education level: Master's degree (e.g., MA, MS, [place], MEd, MSW, RENATA)   Occupational History    Occupation: teacher   Tobacco Use    Smoking status: Never Smoker    Smokeless tobacco: Never Used   Substance and Sexual Activity    Alcohol use: No    Drug use: No    Sexual activity: Not Currently     Family History   Problem Relation Age of Onset    Diabetes Mother     Hypertension Mother     Breast Cancer Paternal Grandmother        OBJECTIVE:    Visit Vitals  /83   Pulse 90   Temp 97.9 °F (36.6 °C) (Oral)   Resp 16   Ht 5' 6\" (1.676 m)   Wt 242 lb 3.2 oz (109.9 kg)   SpO2 94%   BMI 39.09 kg/m²     CONSTITUTIONAL: well , well nourished, appears age appropriate  EYES: perrla, eom intact  ENMT:moist mucous membranes, pharynx clear  NECK: supple. Thyroid normal  RESPIRATORY: Chest: clear to ascultation and percussion   CARDIOVASCULAR: Heart: regular rate and rhythm  GASTROINTESTINAL: Abdomen: soft, bowel sounds active  HEMATOLOGIC: no pathological lymph nodes palpated  MUSCULOSKELETAL: Extremities: no edema, pulse 1+   INTEGUMENT: No unusual rashes or suspicious skin lesions noted. Nails appear normal.  NEUROLOGIC: non-focal exam   MENTAL STATUS: alert and oriented, appropriate affect      ASSESSMENT:  1. Hyperparathyroidism ([address]    2. Chronic renal failure syndrome, stage 4 (severe) ([facility])    3. Spinal stenosis of lumbar region with neurogenic claudication    4. Class 3 severe obesity with serious comorbidity and body mass index (BMI) of 40.0 to 44.9 in adult, unspecified obesity type ([address]        PLAN:    1. I will speak with both physicians regarding her hyperparathyroidism. Specifically, Dr. Chelita Ramirez, General Surgery, and Dr. Quentin Arteaga, Neurosurgery. 2. Her chronic renal failure has been quite stable. She has no sequelae from that at this point. Specifically, there is nothing suggestive of uremia. 3. Her lumbar spinal stenosis remains quite symptomatic. This will have to be decompressed by Dr. Quentin Arteaga. 4. She obviously needs to lose weight as we have discussed repetitively. This can be accomplished by eating meals, eliminating snacks, and avoiding the consumption of processed carbohydrates. Follow-up and Dispositions    · Return in about 4 weeks (around 8/5/2020).            Horacio Hardin MD

## 2020-07-27 ENCOUNTER — OFFICE VISIT (OUTPATIENT)
Dept: SURGERY | Age: 60
End: 2020-07-27

## 2020-07-27 VITALS
RESPIRATION RATE: 18 BRPM | SYSTOLIC BLOOD PRESSURE: 157 MMHG | HEIGHT: 66 IN | HEART RATE: 98 BPM | BODY MASS INDEX: 38.86 KG/M2 | TEMPERATURE: 97.3 F | WEIGHT: 241.8 LBS | OXYGEN SATURATION: 97 % | DIASTOLIC BLOOD PRESSURE: 91 MMHG

## 2020-07-27 DIAGNOSIS — E66.01 SEVERE OBESITY (HCC): ICD-10-CM

## 2020-07-27 DIAGNOSIS — E21.3 HYPERPARATHYROIDISM (HCC): Primary | ICD-10-CM

## 2020-07-27 NOTE — PATIENT INSTRUCTIONS
Parathyroidectomy: What to Expect at [facility]     Parathyroidectomy is the removal of one or more of the four parathyroid glands in the neck. These small glands, located on the thyroid gland, help control the amount of calcium in the body. When they are too active, these glands cause high levels of calcium. This is called hyperparathyroidism (say \"hy-per-pair-os-FAH-orns-iz-um\"). You may leave the hospital with stitches in the cut the doctor made (incision). Your doctor will tell you if you need to come back to have these removed. You may still have a tube called a drain in your neck. Your doctor will take this out a few days after your surgery. You may have some trouble chewing and swallowing after you go home. Your voice probably will be hoarse, and you may have trouble talking. For most people, these problems get better within a few weeks, but it can take longer. In some cases, this surgery causes permanent problems with chewing, speaking, or swallowing. This care sheet gives you a general idea about how long it will take for you to recover. But each person recovers at a different pace. Follow the steps below to get better as quickly as possible. How can you care for yourself at home? Activity  · Rest when you feel tired. Getting enough sleep will help you recover. When you lie down, put two or three pillows under your head to keep it raised. · Try to walk each day. Start by walking a little more than you did the day before. Bit by bit, increase the amount you walk. Walking boosts blood flow and helps prevent pneumonia and constipation. · Avoid strenuous physical activity and lifting heavy objects for 3 weeks after surgery or until your doctor says it is okay. · Do not over-extend your neck backwards for 2 weeks after surgery. · Ask your doctor when you can drive again. · You may take a shower, unless you still have a drain. Pat the incision dry.  If you have a drain coming out of your incision, follow your doctor's instructions to care for it. Diet  · If swallowing is painful, start out with cold drinks, flavored ice pops, and ice cream. Next, try soft foods like pudding, yogurt, canned or cooked fruit, scrambled eggs, and mashed potatoes. Avoid eating hard or scratchy foods like chips or raw vegetables. Avoid orange or tomato juice and other acidic foods that can sting the throat. · If you cough right after drinking, try drinking thicker liquids, such as a smoothie. · You may notice that your bowel movements are not regular right after your surgery. This is common. Try to avoid constipation and straining with bowel movements. You may want to take a fiber supplement every day. If you have not had a bowel movement after a couple of days, ask your doctor about taking a mild laxative. Medicines  · Your doctor will tell you if and when you can restart your medicines. He or she will also give you instructions about taking any new medicines. · If you take aspirin or some other blood thinner, ask your doctor if and when to start taking it again. Make sure that you understand exactly what your doctor wants you to do. · Be safe with medicines. Take pain medicines exactly as directed. ? If the doctor gave you a prescription medicine for pain, take it as prescribed. ? If you are not taking a prescription pain medicine, ask your doctor if you can take an over-the-counter medicine. · If you think your pain medicine is making you sick to your stomach:  ? Take your medicine after meals (unless your doctor has told you not to). ? Ask your doctor for a different pain medicine. · Your doctor may prescribe calcium to prevent problems after surgery from low calcium. Not having enough calcium can cause symptoms such as tingling around your mouth or in your hands and feet. · Your doctor may have prescribed antibiotics. Take them as directed. Do not stop taking them just because you feel better.  You need to take the full course of antibiotics. Incision care  · If your doctor told you how to care for your incision, follow your doctor's instructions. If you did not get instructions, follow this general advice:  ? After the first 24 to 48 hours, wash around the incision with clean water 2 times a day. Don't use hydrogen peroxide or alcohol, which can slow healing. · You may have a drain near your incision. Your doctor will tell you how to take care of it. Follow-up care is a key part of your treatment and safety. Be sure to make and go to all appointments, and call your doctor if you are having problems. It's also a good idea to know your test results and keep a list of the medicines you take. When should you call for help? [id number] anytime you think you may need emergency care. For example, call if:  · You passed out (lost consciousness). · You have sudden chest pain and shortness of breath, or you cough up blood. · You have severe trouble breathing. Call your doctor now or seek immediate medical care if:  · You have loose stitches, or your incision comes open. · You have blood leaking from your incision. · You have signs of infection, such as:  ? Increased pain, swelling, warmth, or redness. ? Red streaks leading from the incision. ? Pus draining from the incision. ? A fever. · You have a tingling feeling around your mouth. · You have cramping or tingling in your hands and feet. Watch closely for changes in your health, and be sure to contact your doctor if:  · You have trouble talking. · You are sick to your stomach or cannot keep fluids down. · You do not have a bowel movement after taking a laxative. Where can you learn more? Go to http://adis-benjy.info/  Enter M010 in the search box to learn more about \"Parathyroidectomy: What to Expect at Home. \"  Current as of: July 29, 2019               Content Version: 12.5  © 7120-2480 [employer]wise, [employer].    Care instructions adapted under license by [employer] (which disclaims liability or warranty for this information). If you have questions about a medical condition or this instruction, always ask your healthcare professional. [address] any warranty or liability for your use of this information.

## 2020-07-27 NOTE — PROGRESS NOTES
Surgery Instruction Sheet     You have been scheduled for surgery on Thursday, 8/13/2020 at 7:30am at [address] Please report to the Surgery Center at 5:30am, this is approximately 2 hours prior to your surgery time. The Surgery Center is located on the [address] [place] [address] Street side of the [facility], just next to the Emergency Room. Reserved parking is available and  parking if lot is full. You will need to have a Pre-op Visit prior to your surgery. Report to the Surgery Center on Wednesday, 8/5/2020 at 11:00am. Bring a list of medications and your insurance cards with you. You may eat/drink prior to this visit. Call your physician immediately if you notice a change in your health between the time you saw your physician and the day of surgery. If you take a blood thinner, please let us know. Call your ordering Doctor to make sure you can stop taking it prior to your surgery. STOP YOUR ASPIRIN 5 DAYS PRIOR TO SURGERY. DO NOT TAKE IBUPROFEN, ADVIL, MOTRIN, ALEVE, EXCEDRIN, BC POWDER, GOODIES, FISH OIL OR ANY MEDICATION CONTAINING ASPIRIN 5 DAYS PRIOR TO YOUR SURGERY. MAY TAKE TYLENOL. Eat a light dinner the evening before your surgery. DO NOT EAT OR DRINK ANYTHING AFTER MIDNIGHT THE NIGHT BEFORE YOUR SURGERY. This includes water, chewing gum, lifesavers, etc. The Pre op nurse will check with you about any medication that you may need to take the morning of surgery. Shower with a new bar of anti-bacterial soap (Dial, Safeguard) or solution given to you by Pre-op, the night before surgery. Do not use lotion, powder, deodorant on the skin after showering. Wear loose, comfortable clothing the day of surgery and bring a container to store your contacts, eyeglasses, dentures, hearing aid, etc. Do not bring money, valuables, jewelry, etc. to the hospital.   If you are having outpatient surgery, someone must come with you the morning of surgery to drive you home.  You can not drive for 24 hours after any anesthesia. Sometimes it is necessary to stay overnight and leave the next morning. This is still considered outpatient for most insurance deductibles. Someone will still need to drive you home. If you have questions or concerns, please feel free to call Dr Anthony Clark at [phone]. If you need to cancel your surgery, please call as soon as possible.

## 2020-07-27 NOTE — PROGRESS NOTES
HISTORY OF PRESENT ILLNESS  Ernie Vences is a 56 y.o. female who comes in for consultation by Ki Robertson MD for hyperparathyroidism  HPI  She was noted in the spring 2020 to have hypercalcemia. She has chronic kidney disease stage 4 but relatively stable  GFR < 20.  Calcium was 11.7 and normal phosphorus 3.8. PTH was over 900. She had a neck US and parathyroid scan suggesting two right lower neck nodules. She denies kidney stones, bone pain, abdominal pain. She has spinal stenosis with nerve claudication and is seeing Dr Jose Enrique Escamilla. Past Medical History:   Diagnosis Date    Arthritis     L knee    Chronic kidney disease     secondary to hypertensive nephrosclerosis    Gout     Hypertension      Past Surgical History:   Procedure Laterality Date    ABDOMEN SURGERY PROC UNLISTED      HX COLONOSCOPY      HX GYN       Family History   Problem Relation Age of Onset    Diabetes Mother     Hypertension Mother     Breast Cancer Paternal Grandmother      Social History     Tobacco Use    Smoking status: Never Smoker    Smokeless tobacco: Never Used   Substance Use Topics    Alcohol use: No    Drug use: No     Current Outpatient Medications   Medication Sig    allopurinoL (ZYLOPRIM) 100 mg tablet 2 tablets QD    predniSONE (DELTASONE) 5 mg tablet Take 1 Tab by mouth daily.  pravastatin (PRAVACHOL) 40 mg tablet TAKE 1 TABLET BY MOUTH EVERY DAY AT BEDTIME    metOLazone (ZAROXOLYN) 2.5 mg tablet TAKE 1 TABLET BY MOUTH DAILY    lisinopril (PRINIVIL, ZESTRIL) 40 mg tablet TAKE 1 TABLET BY MOUTH DAILY    potassium chloride (K-DUR, KLOR-CON) 20 mEq tablet TAKE 1 TABLET BY MOUTH DAILY    amLODIPine (NORVASC) 10 mg tablet TAKE 1 TABLET BY MOUTH DAILY    diazePAM (Valium) 5 mg tablet Take 2 tabs 1 hour before procedure.  pantoprazole (PROTONIX) 40 mg tablet Take 1 Tab by mouth daily.  calcitRIOL (ROCALTROL) 0.5 mcg capsule Take 2 Caps by mouth daily.     atenolol (TENORMIN) 50 mg tablet Take  by mouth daily. No current facility-administered medications for this visit. Allergies   Allergen Reactions    [place] Swelling       Review of Systems   Constitutional: Negative for chills, diaphoresis, fever and weight loss. HENT: Negative for sore throat. Eyes: Negative for blurred vision and discharge. Respiratory: Negative for cough, shortness of breath and wheezing. Cardiovascular: Negative for chest pain, palpitations, orthopnea, claudication and leg swelling. Gastrointestinal: Positive for constipation. Negative for abdominal pain, diarrhea, heartburn, melena, nausea and vomiting. Genitourinary: Positive for frequency. Negative for dysuria, flank pain and hematuria. Musculoskeletal: Positive for back pain and joint pain. Negative for myalgias and neck pain. Skin: Negative for rash. Neurological: Negative for dizziness, speech change, focal weakness, seizures, loss of consciousness, weakness and headaches. Endo/Heme/Allergies: Does not bruise/bleed easily. Psychiatric/Behavioral: Negative for depression and memory loss. Visit Vitals  BP (!) 157/91   Pulse 98   Temp 97.3 °F (36.3 °C) (Oral)   Resp 18   Ht 5' 6\" (1.676 m)   Wt 109.7 kg (241 lb 12.8 oz)   SpO2 97%   BMI 39.03 kg/m²       Physical Exam  Constitutional:       General: She is not in acute distress. Appearance: She is well-developed. She is not diaphoretic. HENT:      Head: Normocephalic and atraumatic. Mouth/Throat:      Pharynx: No oropharyngeal exudate. Eyes:      General: No scleral icterus. Conjunctiva/sclera: Conjunctivae normal.      Pupils: Pupils are equal, round, and reactive to light. Neck:      Musculoskeletal: Normal range of motion and neck supple. Thyroid: No thyroid mass, thyromegaly or thyroid tenderness. Vascular: No JVD. Trachea: Trachea and phonation normal. No tracheal deviation.    Cardiovascular:      Rate and Rhythm: Normal rate and regular rhythm. Heart sounds: No murmur. No friction rub. No gallop. Pulmonary:      Effort: Pulmonary effort is normal. No respiratory distress. Breath sounds: Normal breath sounds. No wheezing or rales. Abdominal:      General: Bowel sounds are normal. There is no distension. Palpations: Abdomen is soft. There is no mass. Tenderness: There is no abdominal tenderness. There is no guarding or rebound. Musculoskeletal: Normal range of motion. Lymphadenopathy:      Cervical: No cervical adenopathy. Skin:     General: Skin is warm and dry. Coloration: Skin is not pale. Findings: No erythema or rash. Neurological:      Mental Status: She is alert and oriented to person, place, and time. Cranial Nerves: No cranial nerve deficit. Psychiatric:         Behavior: Behavior normal.         Thought Content: Thought content normal.         Judgment: Judgment normal.         ASSESSMENT and PLAN  1. Hyperparathyroidism. Unusual situation. It is still not entirely clear if this is primary from multiple adenomas/hyperplasia or secondary due to her renal dysfunction. She was on calcitriol without benefit. I explained to her about the anatomy and pathophysiology of the process and options for observation and neck exploration. Risks of exploration with parathyroidectomy include, but are not limited to, bleeding, infection, recurrent laryngeal nerve injury, need for partial/total thyroidectomy, need for four gland removal with reimplantation of a portion of a gland into muscle as well as usual cardiac/CNS/pulmonary/DVT/PE risks. 2.   Spinal stenosis with claudication/sciatica. She is seeing Dr Rebeca Oakes and considering surgery. I would be nervous that depending upon the operation she may not heal the bone without removal of the parathyroid glands    3. Chronic kidney disease stage 4.    4.  Hx gout  5. Essential hypertension. Stable on rx  6. Severe obesity.  Body mass index is 39.03 kg/m². recommended diet modification and increased activity      She desires a neck exploration with parathyroidectomy with possible reimplantation under general anesthesia as an outpatient with an overnight stay    The patient was counseled at length about the risks of jason Covid-19 during their perioperative period and any recovery window from their procedure. The patient was made aware that jason Covid-19  may worsen their prognosis for recovering from their procedure and lend to a higher morbidity and/or mortality risk. All material risks, benefits, and reasonable alternatives including postponing the procedure were discussed. The patient does  wish to proceed with the procedure at this time.           Jarett Herrera MD FACS

## 2020-07-28 ENCOUNTER — TELEPHONE (OUTPATIENT)
Dept: SURGERY | Age: 60
End: 2020-07-28

## 2020-07-28 NOTE — TELEPHONE ENCOUNTER
Called pt verified name and . Informed her that her surgery has been cancelled per Dr Abril Correa.  Will need to follow up with a nephrologist

## 2020-07-28 NOTE — TELEPHONE ENCOUNTER
Spoke with patient    I discussed my concerns with Dr Kenya Cardoso yesterday and this morning that this may be all secondary hyperparathyroidism. I have recommended and Dr Kenya Cardoso agrees to get a nephrology consultation prior surgical intervention    She may also proceed with work and surgery by Dr Iven Goldmann as he is not planning a fusion, only decompression at this time.     Will cancel her parathyroidectomy surgery at this time and have her come back after her nephrology work up    Bishop Marcial MD FACS

## 2020-07-30 ENCOUNTER — OFFICE VISIT (OUTPATIENT)
Dept: INTERNAL MEDICINE CLINIC | Age: 60
End: 2020-07-30

## 2020-07-30 VITALS
HEART RATE: 99 BPM | DIASTOLIC BLOOD PRESSURE: 80 MMHG | HEIGHT: 66 IN | RESPIRATION RATE: 16 BRPM | BODY MASS INDEX: 38.57 KG/M2 | WEIGHT: 240 LBS | OXYGEN SATURATION: 93 % | SYSTOLIC BLOOD PRESSURE: 120 MMHG | TEMPERATURE: 98 F

## 2020-07-30 DIAGNOSIS — E66.01 SEVERE OBESITY (HCC): ICD-10-CM

## 2020-07-30 DIAGNOSIS — E78.5 DYSLIPIDEMIA: ICD-10-CM

## 2020-07-30 DIAGNOSIS — N18.4 CHRONIC RENAL FAILURE SYNDROME, STAGE 4 (SEVERE) (HCC): ICD-10-CM

## 2020-07-30 DIAGNOSIS — M48.062 SPINAL STENOSIS OF LUMBAR REGION WITH NEUROGENIC CLAUDICATION: Primary | ICD-10-CM

## 2020-07-30 DIAGNOSIS — E66.01 CLASS 3 SEVERE OBESITY WITH SERIOUS COMORBIDITY AND BODY MASS INDEX (BMI) OF 40.0 TO 44.9 IN ADULT, UNSPECIFIED OBESITY TYPE (HCC): ICD-10-CM

## 2020-07-30 DIAGNOSIS — E21.3 HYPERPARATHYROIDISM (HCC): ICD-10-CM

## 2020-07-30 NOTE — PROGRESS NOTES
[facility]  [place] [address]  [address] [place] [id number]  Phone:  [phone]  Fax: [phone]       Chief Complaint   Patient presents with    Other     discuss cancelled surgery   . SUBJECTIVE:    Joao Guzman is a 56 y.o. female Comes in for return visit and we talked at length about the anticipated surgery. I sent her to Dr. Heidi Garnica, general surgeon, regarding the hyperparathyroidism. He thinks it is a bit more complicated than primary and may be a secondary issue. After discussion, I will work this out by having her see a nephrologist as it relates exclusively to the potential for having secondary hyperparathyroidism. The most interesting thing regarding her bone and mineral metabolism, is the fact that her phosphorous had been essentially normal the entire time and I have not had to use phosphate binders. I have since taken her off of her Rocaltrol because of the hypercalcemia. She spoke with Dr. Jenny Gallardo, who agrees that he can proceed with surgery. She most recently had repeat MRI of her back. She has a past history of primary hypertension, dyslipidemia, diabetes mellitus and obesity. Current Outpatient Medications   Medication Sig Dispense Refill    diazePAM (Valium) 5 mg tablet Take 2 tabs 1 hour before procedure. 2 Tab 0    allopurinoL (ZYLOPRIM) 100 mg tablet 2 tablets QD 60 Tab 11    predniSONE (DELTASONE) 5 mg tablet Take 1 Tab by mouth daily.  30 Tab 11    pravastatin (PRAVACHOL) 40 mg tablet TAKE 1 TABLET BY MOUTH EVERY DAY AT BEDTIME 30 Tab 11    metOLazone (ZAROXOLYN) 2.5 mg tablet TAKE 1 TABLET BY MOUTH DAILY 30 Tab 11    lisinopril (PRINIVIL, ZESTRIL) 40 mg tablet TAKE 1 TABLET BY MOUTH DAILY 30 Tab 11    potassium chloride (K-DUR, KLOR-CON) 20 mEq tablet TAKE 1 TABLET BY MOUTH DAILY 30 Tab 11    amLODIPine (NORVASC) 10 mg tablet TAKE 1 TABLET BY MOUTH DAILY 30 Tab 11    pantoprazole (PROTONIX) 40 mg tablet Take 1 Tab by mouth daily. 30 Tab 11    calcitRIOL (ROCALTROL) 0.5 mcg capsule Take 2 Caps by mouth daily. 90 Cap 3    atenolol (TENORMIN) 50 mg tablet Take  by mouth daily.        Past Medical History:   Diagnosis Date    Arthritis     L knee    Chronic kidney disease     secondary to hypertensive nephrosclerosis    Gout     Hypertension      Past Surgical History:   Procedure Laterality Date    ABDOMEN SURGERY PROC UNLISTED      HX COLONOSCOPY      HX GYN       Allergies   Allergen Reactions    Rutherford Swelling         REVIEW OF SYSTEMS:  General: negative for - chills or fever  ENT: negative for - headaches, nasal congestion or tinnitus  Respiratory: negative for - cough, hemoptysis, shortness of breath or wheezing  Cardiovascular : negative for - chest pain, edema, palpitations or shortness of breath  Gastrointestinal: negative for - abdominal pain, blood in stools, heartburn or nausea/vomiting  Genito-Urinary: no dysuria, trouble voiding, or hematuria  Musculoskeletal: negative for - gait disturbance, joint pain, joint stiffness or joint swelling  Neurological: no TIA or stroke symptoms  Hematologic: no bruises, no bleeding, no swollen glands  Integument: no lumps, mole changes, nail changes or rash  Endocrine: no malaise/lethargy or unexpected weight changes      Social History     Socioeconomic History    Marital status:      Spouse name: Not on file    Number of children: 2    Years of education: 16+    Highest education level: Master's degree (e.g., MA, MS, Schuyler, MEd, MSW, RENATA)   Occupational History    Occupation: teacher   Tobacco Use    Smoking status: Never Smoker    Smokeless tobacco: Never Used   Substance and Sexual Activity    Alcohol use: No    Drug use: No    Sexual activity: Not Currently     Family History   Problem Relation Age of Onset    Diabetes Mother     Hypertension Mother     Breast Cancer Paternal Grandmother        OBJECTIVE:    Visit Vitals  /80   Pulse 99 Temp 98 °F (36.7 °C) (Oral)   Resp 16   Ht 5' 6\" (1.676 m)   Wt 240 lb (108.9 kg)   SpO2 93%   BMI 38.74 kg/m²     CONSTITUTIONAL: well , well nourished, appears age appropriate  EYES: perrla, eom intact  ENMT:moist mucous membranes, pharynx clear  NECK: supple. Thyroid normal  RESPIRATORY: Chest: clear to ascultation and percussion   CARDIOVASCULAR: Heart: regular rate and rhythm  GASTROINTESTINAL: Abdomen: soft, bowel sounds active  HEMATOLOGIC: no pathological lymph nodes palpated  MUSCULOSKELETAL: Extremities: no edema, pulse 1+   INTEGUMENT: No unusual rashes or suspicious skin lesions noted. Nails appear normal.  NEUROLOGIC: non-focal exam   MENTAL STATUS: alert and oriented, appropriate affect      ASSESSMENT:  1. Spinal stenosis of lumbar region with neurogenic claudication    2. Hyperparathyroidism ([address]    3. Chronic renal failure syndrome, stage 4 (severe) ([facility])    4. Class 3 severe obesity with serious comorbidity and body mass index (BMI) of 40.0 to 44.9 in adult, unspecified obesity type ([address]    5. Dyslipidemia    6. Severe obesity ([address]        PLAN:    1. The patient will proceed with surgical decompression by Dr. Trae Mora. 2. After this has resolved, I will then have her see a nephrologist regarding the potential for secondary hyperparathyroidism. 3. Blood pressure is excellent today, no adjustments are made. 4. Her overall renal function is quite stable. As I stated on many occasions earlier, the most likely etiology of her kidney failure is nephrosclerosis. 5. There has been no meaningful weight loss since I last saw her. Fortunately, however, she has not gained. 6. She remains on a statin in view of her increased cardiovascular risk. Follow-up and Dispositions    · Return in about 2 months (around 9/30/2020).            Shanta Nielsen MD

## 2020-07-30 NOTE — PROGRESS NOTES
Chief Complaint   Patient presents with    Other     discuss cancelled surgery     1. Have you been to the ER, urgent care clinic since your last visit? Hospitalized since your last visit? No    2. Have you seen or consulted any other health care providers outside of the [address] [place] [address] since your last visit? Include any pap smears or colon screening.  No

## 2020-08-12 ENCOUNTER — HOSPITAL ENCOUNTER (OUTPATIENT)
Dept: PREADMISSION TESTING | Age: 60
Discharge: HOME | End: 2020-08-12
Payer: COMMERCIAL

## 2020-08-12 VITALS
DIASTOLIC BLOOD PRESSURE: 91 MMHG | WEIGHT: 236.55 LBS | BODY MASS INDEX: 38.02 KG/M2 | TEMPERATURE: 98.2 F | HEART RATE: 105 BPM | HEIGHT: 66 IN | SYSTOLIC BLOOD PRESSURE: 129 MMHG

## 2020-08-12 LAB
ANION GAP SERPL CALC-SCNC: 8 MMOL/L (ref 5–15)
ATRIAL RATE: 102 BPM
BASOPHILS # BLD: 0 K/UL (ref 0–0.1)
BASOPHILS NFR BLD: 0 % (ref 0–1)
BUN SERPL-MCNC: 55 MG/DL (ref 6–20)
BUN/CREAT SERPL: 15 (ref 12–20)
CALCIUM SERPL-MCNC: 11.6 MG/DL (ref 8.5–10.1)
CALCULATED P AXIS, ECG09: 62 DEGREES
CALCULATED R AXIS, ECG10: -15 DEGREES
CALCULATED T AXIS, ECG11: 80 DEGREES
CHLORIDE SERPL-SCNC: 110 MMOL/L (ref 97–108)
CO2 SERPL-SCNC: 25 MMOL/L (ref 21–32)
CREAT SERPL-MCNC: 3.64 MG/DL (ref 0.55–1.02)
DIAGNOSIS, 93000: NORMAL
DIFFERENTIAL METHOD BLD: ABNORMAL
EOSINOPHIL # BLD: 0.2 K/UL (ref 0–0.4)
EOSINOPHIL NFR BLD: 2 % (ref 0–7)
ERYTHROCYTE [DISTWIDTH] IN BLOOD BY AUTOMATED COUNT: 13.3 % (ref 11.5–14.5)
GLUCOSE SERPL-MCNC: 100 MG/DL (ref 65–100)
HCT VFR BLD AUTO: 37.9 % (ref 35–47)
HGB BLD-MCNC: 10.9 G/DL (ref 11.5–16)
IMM GRANULOCYTES # BLD AUTO: 0 K/UL (ref 0–0.04)
IMM GRANULOCYTES NFR BLD AUTO: 0 % (ref 0–0.5)
LYMPHOCYTES # BLD: 1.5 K/UL (ref 0.8–3.5)
LYMPHOCYTES NFR BLD: 18 % (ref 12–49)
MCH RBC QN AUTO: 28.6 PG (ref 26–34)
MCHC RBC AUTO-ENTMCNC: 28.8 G/DL (ref 30–36.5)
MCV RBC AUTO: 99.5 FL (ref 80–99)
MONOCYTES # BLD: 0.5 K/UL (ref 0–1)
MONOCYTES NFR BLD: 6 % (ref 5–13)
NEUTS SEG # BLD: 6.3 K/UL (ref 1.8–8)
NEUTS SEG NFR BLD: 74 % (ref 32–75)
NRBC # BLD: 0 K/UL (ref 0–0.01)
NRBC BLD-RTO: 0 PER 100 WBC
P-R INTERVAL, ECG05: 168 MS
PLATELET # BLD AUTO: 205 K/UL (ref 150–400)
PLATELET COMMENTS,PCOM: ABNORMAL
PMV BLD AUTO: 11 FL (ref 8.9–12.9)
POTASSIUM SERPL-SCNC: 3.3 MMOL/L (ref 3.5–5.1)
Q-T INTERVAL, ECG07: 330 MS
QRS DURATION, ECG06: 68 MS
QTC CALCULATION (BEZET), ECG08: 430 MS
RBC # BLD AUTO: 3.81 M/UL (ref 3.8–5.2)
RBC MORPH BLD: ABNORMAL
RBC MORPH BLD: ABNORMAL
SODIUM SERPL-SCNC: 143 MMOL/L (ref 136–145)
VENTRICULAR RATE, ECG03: 102 BPM
WBC # BLD AUTO: 8.5 K/UL (ref 3.6–11)

## 2020-08-12 PROCEDURE — 80048 BASIC METABOLIC PNL TOTAL CA: CPT

## 2020-08-12 PROCEDURE — 36415 COLL VENOUS BLD VENIPUNCTURE: CPT

## 2020-08-12 PROCEDURE — 85025 COMPLETE CBC W/AUTO DIFF WBC: CPT

## 2020-08-12 PROCEDURE — 93005 ELECTROCARDIOGRAM TRACING: CPT

## 2020-08-12 RX ORDER — ACETAMINOPHEN 500 MG
500 TABLET ORAL
COMMUNITY

## 2020-08-13 LAB
BACTERIA SPEC CULT: NORMAL
BACTERIA SPEC CULT: NORMAL
SERVICE CMNT-IMP: NORMAL

## 2020-08-13 NOTE — PERIOP NOTES
Faxed preadmission testing reports (and fax confirmation received) to Dr. Iven Goldmann. Called on 8-13-20 at 1215pm ( left message on Nov's voicemail) RE: abnormal BMP.
PATIENT INSTRUCTED TO TAKE THIS MEDICATION PRIOR TO ARRIVAL DAY OF SURGERY WITH SIP OF WATER PER ANESTHESIA GUIDELINES. GAVE PATIENT GIVEN 2 BOTTLES OF CHG CLEANSER AND INSTRUCTIONS. PATIENT VERBALIZED UNDERSTANDING. PATIENT CALLED AND MADE AWARE OF COVID-19 TESTING NEEDED TO BE DONE WITHIN 96 HOURS OF SURGERY. COVID-19 TESTING APPOINTMENT MADE FOR PATIENT. PATIENT INSTRUCTED ON SELF QUARANTINE BETWEEN TESTING AND ARRIVAL TIME DAY OF SURGERY.
No

## 2020-08-16 ENCOUNTER — HOSPITAL ENCOUNTER (OUTPATIENT)
Dept: PREADMISSION TESTING | Age: 60
Discharge: HOME | End: 2020-08-16
Payer: COMMERCIAL

## 2020-08-16 DIAGNOSIS — Z01.812 PRE-PROCEDURE LAB EXAM: ICD-10-CM

## 2020-08-16 PROCEDURE — 87635 SARS-COV-2 COVID-19 AMP PRB: CPT

## 2020-08-17 LAB — SARS-COV-2, COV2NT: NOT DETECTED

## 2020-08-19 ENCOUNTER — ANESTHESIA EVENT (OUTPATIENT)
Dept: SURGERY | Age: 60
End: 2020-08-19
Payer: COMMERCIAL

## 2020-08-20 ENCOUNTER — APPOINTMENT (OUTPATIENT)
Dept: GENERAL RADIOLOGY | Age: 60
End: 2020-08-20
Attending: NEUROLOGICAL SURGERY
Payer: COMMERCIAL

## 2020-08-20 ENCOUNTER — HOSPITAL ENCOUNTER (OUTPATIENT)
Age: 60
Setting detail: OBSERVATION
Discharge: HOME | End: 2020-08-22
Attending: NEUROLOGICAL SURGERY | Admitting: NEUROLOGICAL SURGERY
Payer: COMMERCIAL

## 2020-08-20 ENCOUNTER — ANESTHESIA (OUTPATIENT)
Dept: SURGERY | Age: 60
End: 2020-08-20
Payer: COMMERCIAL

## 2020-08-20 DIAGNOSIS — Z98.890 S/P LAMINECTOMY: Primary | ICD-10-CM

## 2020-08-20 PROBLEM — M48.062 LUMBAR STENOSIS WITH NEUROGENIC CLAUDICATION: Status: ACTIVE | Noted: 2020-08-20

## 2020-08-20 PROCEDURE — 76210000000 HC OR PH I REC 2 TO 2.5 HR: Performed by: NEUROLOGICAL SURGERY

## 2020-08-20 PROCEDURE — 74011000250 HC RX REV CODE- 250: Performed by: NEUROLOGICAL SURGERY

## 2020-08-20 PROCEDURE — 74011000250 HC RX REV CODE- 250: Performed by: NURSE ANESTHETIST, CERTIFIED REGISTERED

## 2020-08-20 PROCEDURE — 77030002933 HC SUT MCRYL J&J -A: Performed by: NEUROLOGICAL SURGERY

## 2020-08-20 PROCEDURE — 77030040830 HC CATH URETH FOL MDII -A: Performed by: NEUROLOGICAL SURGERY

## 2020-08-20 PROCEDURE — 74011250636 HC RX REV CODE- 250/636: Performed by: NURSE ANESTHETIST, CERTIFIED REGISTERED

## 2020-08-20 PROCEDURE — 99218 HC RM OBSERVATION: CPT

## 2020-08-20 PROCEDURE — 77030013079 HC BLNKT BAIR HGGR 3M -A: Performed by: ANESTHESIOLOGY

## 2020-08-20 PROCEDURE — 74011250637 HC RX REV CODE- 250/637: Performed by: ANESTHESIOLOGY

## 2020-08-20 PROCEDURE — 76060000036 HC ANESTHESIA 2.5 TO 3 HR: Performed by: NEUROLOGICAL SURGERY

## 2020-08-20 PROCEDURE — 77030012890

## 2020-08-20 PROCEDURE — 77030008684 HC TU ET CUF COVD -B: Performed by: ANESTHESIOLOGY

## 2020-08-20 PROCEDURE — 77030040361 HC SLV COMPR DVT MDII -B: Performed by: NEUROLOGICAL SURGERY

## 2020-08-20 PROCEDURE — 77030003029 HC SUT VCRL J&J -B: Performed by: NEUROLOGICAL SURGERY

## 2020-08-20 PROCEDURE — 77030014650 HC SEAL MTRX FLOSEL BAXT -C: Performed by: NEUROLOGICAL SURGERY

## 2020-08-20 PROCEDURE — 74011250636 HC RX REV CODE- 250/636: Performed by: NEUROLOGICAL SURGERY

## 2020-08-20 PROCEDURE — 74011250636 HC RX REV CODE- 250/636: Performed by: ANESTHESIOLOGY

## 2020-08-20 PROCEDURE — P9045 ALBUMIN (HUMAN), 5%, 250 ML: HCPCS | Performed by: NURSE ANESTHETIST, CERTIFIED REGISTERED

## 2020-08-20 PROCEDURE — 77030026438 HC STYL ET INTUB CARD -A: Performed by: ANESTHESIOLOGY

## 2020-08-20 PROCEDURE — 77030003028 HC SUT VCRL J&J -A: Performed by: NEUROLOGICAL SURGERY

## 2020-08-20 PROCEDURE — 77030029099 HC BN WAX SSPC -A: Performed by: NEUROLOGICAL SURGERY

## 2020-08-20 PROCEDURE — 77030040922 HC BLNKT HYPOTHRM STRY -A

## 2020-08-20 PROCEDURE — 74011000250 HC RX REV CODE- 250: Performed by: ANESTHESIOLOGY

## 2020-08-20 PROCEDURE — 76010000172 HC OR TIME 2.5 TO 3 HR INTENSV-TIER 1: Performed by: NEUROLOGICAL SURGERY

## 2020-08-20 PROCEDURE — 74011250637 HC RX REV CODE- 250/637: Performed by: NEUROLOGICAL SURGERY

## 2020-08-20 PROCEDURE — 77030004391 HC BUR FLUT MEDT -C: Performed by: NEUROLOGICAL SURGERY

## 2020-08-20 PROCEDURE — 74011000258 HC RX REV CODE- 258: Performed by: NURSE ANESTHETIST, CERTIFIED REGISTERED

## 2020-08-20 PROCEDURE — 77030040356 HC CORD BPLR FRCP COVD -A: Performed by: NEUROLOGICAL SURGERY

## 2020-08-20 RX ORDER — SODIUM CHLORIDE 0.9 % (FLUSH) 0.9 %
5-40 SYRINGE (ML) INJECTION AS NEEDED
Status: DISCONTINUED | OUTPATIENT
Start: 2020-08-20 | End: 2020-08-20 | Stop reason: HOSPADM

## 2020-08-20 RX ORDER — CEFAZOLIN SODIUM/WATER 2 G/20 ML
2 SYRINGE (ML) INTRAVENOUS
Status: COMPLETED | OUTPATIENT
Start: 2020-08-20 | End: 2020-08-20

## 2020-08-20 RX ORDER — NEOSTIGMINE METHYLSULFATE 1 MG/ML
INJECTION INTRAVENOUS AS NEEDED
Status: DISCONTINUED | OUTPATIENT
Start: 2020-08-20 | End: 2020-08-20 | Stop reason: HOSPADM

## 2020-08-20 RX ORDER — AMLODIPINE BESYLATE 5 MG/1
10 TABLET ORAL DAILY
Status: DISCONTINUED | OUTPATIENT
Start: 2020-08-21 | End: 2020-08-22 | Stop reason: HOSPADM

## 2020-08-20 RX ORDER — MORPHINE SULFATE 2 MG/ML
2 INJECTION, SOLUTION INTRAMUSCULAR; INTRAVENOUS
Status: DISCONTINUED | OUTPATIENT
Start: 2020-08-20 | End: 2020-08-21

## 2020-08-20 RX ORDER — SODIUM CHLORIDE 0.9 % (FLUSH) 0.9 %
5-40 SYRINGE (ML) INJECTION EVERY 8 HOURS
Status: DISCONTINUED | OUTPATIENT
Start: 2020-08-20 | End: 2020-08-22 | Stop reason: HOSPADM

## 2020-08-20 RX ORDER — POLYETHYLENE GLYCOL 3350 17 G/17G
17 POWDER, FOR SOLUTION ORAL DAILY
Status: DISCONTINUED | OUTPATIENT
Start: 2020-08-21 | End: 2020-08-22 | Stop reason: HOSPADM

## 2020-08-20 RX ORDER — DEXAMETHASONE SODIUM PHOSPHATE 4 MG/ML
INJECTION, SOLUTION INTRA-ARTICULAR; INTRALESIONAL; INTRAMUSCULAR; INTRAVENOUS; SOFT TISSUE AS NEEDED
Status: DISCONTINUED | OUTPATIENT
Start: 2020-08-20 | End: 2020-08-20 | Stop reason: HOSPADM

## 2020-08-20 RX ORDER — MIDAZOLAM HYDROCHLORIDE 1 MG/ML
1 INJECTION, SOLUTION INTRAMUSCULAR; INTRAVENOUS AS NEEDED
Status: DISCONTINUED | OUTPATIENT
Start: 2020-08-20 | End: 2020-08-20 | Stop reason: HOSPADM

## 2020-08-20 RX ORDER — OXYCODONE HYDROCHLORIDE 5 MG/1
5 TABLET ORAL
Status: DISCONTINUED | OUTPATIENT
Start: 2020-08-20 | End: 2020-08-22 | Stop reason: HOSPADM

## 2020-08-20 RX ORDER — ROCURONIUM BROMIDE 10 MG/ML
INJECTION, SOLUTION INTRAVENOUS AS NEEDED
Status: DISCONTINUED | OUTPATIENT
Start: 2020-08-20 | End: 2020-08-20 | Stop reason: HOSPADM

## 2020-08-20 RX ORDER — SODIUM CHLORIDE, SODIUM LACTATE, POTASSIUM CHLORIDE, CALCIUM CHLORIDE 600; 310; 30; 20 MG/100ML; MG/100ML; MG/100ML; MG/100ML
125 INJECTION, SOLUTION INTRAVENOUS CONTINUOUS
Status: DISCONTINUED | OUTPATIENT
Start: 2020-08-20 | End: 2020-08-20 | Stop reason: HOSPADM

## 2020-08-20 RX ORDER — SUCCINYLCHOLINE CHLORIDE 20 MG/ML
INJECTION INTRAMUSCULAR; INTRAVENOUS AS NEEDED
Status: DISCONTINUED | OUTPATIENT
Start: 2020-08-20 | End: 2020-08-20 | Stop reason: HOSPADM

## 2020-08-20 RX ORDER — FENTANYL CITRATE 50 UG/ML
25 INJECTION, SOLUTION INTRAMUSCULAR; INTRAVENOUS
Status: DISCONTINUED | OUTPATIENT
Start: 2020-08-20 | End: 2020-08-20 | Stop reason: HOSPADM

## 2020-08-20 RX ORDER — ACETAMINOPHEN 325 MG/1
650 TABLET ORAL EVERY 6 HOURS
Status: DISCONTINUED | OUTPATIENT
Start: 2020-08-20 | End: 2020-08-22 | Stop reason: HOSPADM

## 2020-08-20 RX ORDER — MIDAZOLAM HYDROCHLORIDE 1 MG/ML
INJECTION, SOLUTION INTRAMUSCULAR; INTRAVENOUS AS NEEDED
Status: DISCONTINUED | OUTPATIENT
Start: 2020-08-20 | End: 2020-08-20 | Stop reason: HOSPADM

## 2020-08-20 RX ORDER — SODIUM CHLORIDE 9 MG/ML
25 INJECTION, SOLUTION INTRAVENOUS CONTINUOUS
Status: DISCONTINUED | OUTPATIENT
Start: 2020-08-20 | End: 2020-08-20 | Stop reason: HOSPADM

## 2020-08-20 RX ORDER — DIPHENHYDRAMINE HYDROCHLORIDE 50 MG/ML
12.5 INJECTION, SOLUTION INTRAMUSCULAR; INTRAVENOUS AS NEEDED
Status: DISCONTINUED | OUTPATIENT
Start: 2020-08-20 | End: 2020-08-20 | Stop reason: HOSPADM

## 2020-08-20 RX ORDER — SODIUM CHLORIDE 9 MG/ML
75 INJECTION, SOLUTION INTRAVENOUS CONTINUOUS
Status: DISCONTINUED | OUTPATIENT
Start: 2020-08-20 | End: 2020-08-21

## 2020-08-20 RX ORDER — LISINOPRIL 20 MG/1
40 TABLET ORAL DAILY
Status: DISCONTINUED | OUTPATIENT
Start: 2020-08-21 | End: 2020-08-22 | Stop reason: HOSPADM

## 2020-08-20 RX ORDER — HYDROMORPHONE HYDROCHLORIDE 2 MG/ML
INJECTION, SOLUTION INTRAMUSCULAR; INTRAVENOUS; SUBCUTANEOUS AS NEEDED
Status: DISCONTINUED | OUTPATIENT
Start: 2020-08-20 | End: 2020-08-20 | Stop reason: HOSPADM

## 2020-08-20 RX ORDER — FENTANYL CITRATE 50 UG/ML
50 INJECTION, SOLUTION INTRAMUSCULAR; INTRAVENOUS AS NEEDED
Status: DISCONTINUED | OUTPATIENT
Start: 2020-08-20 | End: 2020-08-20 | Stop reason: HOSPADM

## 2020-08-20 RX ORDER — POTASSIUM CHLORIDE 750 MG/1
20 TABLET, FILM COATED, EXTENDED RELEASE ORAL DAILY
Status: DISCONTINUED | OUTPATIENT
Start: 2020-08-21 | End: 2020-08-22 | Stop reason: HOSPADM

## 2020-08-20 RX ORDER — OXYCODONE AND ACETAMINOPHEN 5; 325 MG/1; MG/1
1 TABLET ORAL AS NEEDED
Status: DISCONTINUED | OUTPATIENT
Start: 2020-08-20 | End: 2020-08-20 | Stop reason: HOSPADM

## 2020-08-20 RX ORDER — SODIUM CHLORIDE 0.9 % (FLUSH) 0.9 %
5-40 SYRINGE (ML) INJECTION AS NEEDED
Status: DISCONTINUED | OUTPATIENT
Start: 2020-08-20 | End: 2020-08-22 | Stop reason: HOSPADM

## 2020-08-20 RX ORDER — SODIUM CHLORIDE 0.9 % (FLUSH) 0.9 %
5-40 SYRINGE (ML) INJECTION EVERY 8 HOURS
Status: DISCONTINUED | OUTPATIENT
Start: 2020-08-20 | End: 2020-08-20 | Stop reason: HOSPADM

## 2020-08-20 RX ORDER — PROPOFOL 10 MG/ML
0-50 VIAL (ML) INTRAVENOUS
Status: DISCONTINUED | OUTPATIENT
Start: 2020-08-20 | End: 2020-08-20 | Stop reason: HOSPADM

## 2020-08-20 RX ORDER — ALLOPURINOL 100 MG/1
100 TABLET ORAL DAILY
Status: DISCONTINUED | OUTPATIENT
Start: 2020-08-21 | End: 2020-08-22 | Stop reason: HOSPADM

## 2020-08-20 RX ORDER — LIDOCAINE HYDROCHLORIDE 20 MG/ML
INJECTION, SOLUTION EPIDURAL; INFILTRATION; INTRACAUDAL; PERINEURAL AS NEEDED
Status: DISCONTINUED | OUTPATIENT
Start: 2020-08-20 | End: 2020-08-20 | Stop reason: HOSPADM

## 2020-08-20 RX ORDER — ACETAMINOPHEN 325 MG/1
650 TABLET ORAL ONCE
Status: COMPLETED | OUTPATIENT
Start: 2020-08-20 | End: 2020-08-20

## 2020-08-20 RX ORDER — FACIAL-BODY WIPES
10 EACH TOPICAL DAILY PRN
Status: DISCONTINUED | OUTPATIENT
Start: 2020-08-20 | End: 2020-08-22 | Stop reason: HOSPADM

## 2020-08-20 RX ORDER — HYDROMORPHONE HYDROCHLORIDE 1 MG/ML
0.2 INJECTION, SOLUTION INTRAMUSCULAR; INTRAVENOUS; SUBCUTANEOUS
Status: DISCONTINUED | OUTPATIENT
Start: 2020-08-20 | End: 2020-08-20 | Stop reason: HOSPADM

## 2020-08-20 RX ORDER — LABETALOL HYDROCHLORIDE 5 MG/ML
5 INJECTION, SOLUTION INTRAVENOUS ONCE
Status: COMPLETED | OUTPATIENT
Start: 2020-08-20 | End: 2020-08-20

## 2020-08-20 RX ORDER — PRAVASTATIN SODIUM 40 MG/1
40 TABLET ORAL
Status: DISCONTINUED | OUTPATIENT
Start: 2020-08-20 | End: 2020-08-22 | Stop reason: HOSPADM

## 2020-08-20 RX ORDER — MIDAZOLAM HYDROCHLORIDE 1 MG/ML
0.5 INJECTION, SOLUTION INTRAMUSCULAR; INTRAVENOUS
Status: COMPLETED | OUTPATIENT
Start: 2020-08-20 | End: 2020-08-20

## 2020-08-20 RX ORDER — PROPOFOL 10 MG/ML
INJECTION, EMULSION INTRAVENOUS AS NEEDED
Status: DISCONTINUED | OUTPATIENT
Start: 2020-08-20 | End: 2020-08-20 | Stop reason: HOSPADM

## 2020-08-20 RX ORDER — FENTANYL CITRATE 50 UG/ML
INJECTION, SOLUTION INTRAMUSCULAR; INTRAVENOUS AS NEEDED
Status: DISCONTINUED | OUTPATIENT
Start: 2020-08-20 | End: 2020-08-20 | Stop reason: HOSPADM

## 2020-08-20 RX ORDER — METOLAZONE 2.5 MG/1
2.5 TABLET ORAL DAILY
Status: DISCONTINUED | OUTPATIENT
Start: 2020-08-21 | End: 2020-08-22 | Stop reason: HOSPADM

## 2020-08-20 RX ORDER — LIDOCAINE HYDROCHLORIDE 10 MG/ML
0.1 INJECTION, SOLUTION EPIDURAL; INFILTRATION; INTRACAUDAL; PERINEURAL AS NEEDED
Status: DISCONTINUED | OUTPATIENT
Start: 2020-08-20 | End: 2020-08-20 | Stop reason: HOSPADM

## 2020-08-20 RX ORDER — NALOXONE HYDROCHLORIDE 0.4 MG/ML
0.4 INJECTION, SOLUTION INTRAMUSCULAR; INTRAVENOUS; SUBCUTANEOUS AS NEEDED
Status: DISCONTINUED | OUTPATIENT
Start: 2020-08-20 | End: 2020-08-22 | Stop reason: HOSPADM

## 2020-08-20 RX ORDER — ALBUMIN HUMAN 50 G/1000ML
SOLUTION INTRAVENOUS AS NEEDED
Status: DISCONTINUED | OUTPATIENT
Start: 2020-08-20 | End: 2020-08-20 | Stop reason: HOSPADM

## 2020-08-20 RX ORDER — GLYCOPYRROLATE 0.2 MG/ML
INJECTION INTRAMUSCULAR; INTRAVENOUS AS NEEDED
Status: DISCONTINUED | OUTPATIENT
Start: 2020-08-20 | End: 2020-08-20 | Stop reason: HOSPADM

## 2020-08-20 RX ORDER — EPHEDRINE SULFATE/0.9% NACL/PF 50 MG/5 ML
SYRINGE (ML) INTRAVENOUS AS NEEDED
Status: DISCONTINUED | OUTPATIENT
Start: 2020-08-20 | End: 2020-08-20 | Stop reason: HOSPADM

## 2020-08-20 RX ORDER — AMOXICILLIN 250 MG
1 CAPSULE ORAL DAILY
Status: DISCONTINUED | OUTPATIENT
Start: 2020-08-21 | End: 2020-08-22 | Stop reason: HOSPADM

## 2020-08-20 RX ORDER — MORPHINE SULFATE 10 MG/ML
2 INJECTION, SOLUTION INTRAMUSCULAR; INTRAVENOUS
Status: DISCONTINUED | OUTPATIENT
Start: 2020-08-20 | End: 2020-08-20 | Stop reason: HOSPADM

## 2020-08-20 RX ORDER — PHENYLEPHRINE HCL IN 0.9% NACL 0.4MG/10ML
SYRINGE (ML) INTRAVENOUS AS NEEDED
Status: DISCONTINUED | OUTPATIENT
Start: 2020-08-20 | End: 2020-08-20 | Stop reason: HOSPADM

## 2020-08-20 RX ORDER — OXYCODONE HYDROCHLORIDE 5 MG/1
10 TABLET ORAL
Status: DISCONTINUED | OUTPATIENT
Start: 2020-08-20 | End: 2020-08-22 | Stop reason: HOSPADM

## 2020-08-20 RX ORDER — ONDANSETRON 2 MG/ML
4 INJECTION INTRAMUSCULAR; INTRAVENOUS AS NEEDED
Status: DISCONTINUED | OUTPATIENT
Start: 2020-08-20 | End: 2020-08-20 | Stop reason: HOSPADM

## 2020-08-20 RX ORDER — ONDANSETRON 4 MG/1
4 TABLET, ORALLY DISINTEGRATING ORAL
Status: DISCONTINUED | OUTPATIENT
Start: 2020-08-20 | End: 2020-08-22 | Stop reason: HOSPADM

## 2020-08-20 RX ORDER — BUPIVACAINE HYDROCHLORIDE AND EPINEPHRINE 5; 5 MG/ML; UG/ML
INJECTION, SOLUTION EPIDURAL; INTRACAUDAL; PERINEURAL AS NEEDED
Status: DISCONTINUED | OUTPATIENT
Start: 2020-08-20 | End: 2020-08-20 | Stop reason: HOSPADM

## 2020-08-20 RX ADMIN — PROPOFOL 150 MG: 10 INJECTION, EMULSION INTRAVENOUS at 13:25

## 2020-08-20 RX ADMIN — Medication 80 MCG: at 13:37

## 2020-08-20 RX ADMIN — ACETAMINOPHEN 650 MG: 325 TABLET ORAL at 12:12

## 2020-08-20 RX ADMIN — PROPOFOL INJECTABLE EMULSION 50 MCG/KG/MIN: 10 INJECTION, EMULSION INTRAVENOUS at 16:10

## 2020-08-20 RX ADMIN — ROCURONIUM BROMIDE 5 MG: 10 SOLUTION INTRAVENOUS at 13:25

## 2020-08-20 RX ADMIN — HYDROMORPHONE HYDROCHLORIDE 0.25 MG: 2 INJECTION, SOLUTION INTRAMUSCULAR; INTRAVENOUS; SUBCUTANEOUS at 15:06

## 2020-08-20 RX ADMIN — SODIUM CHLORIDE 125 ML/HR: 9 INJECTION, SOLUTION INTRAVENOUS at 16:45

## 2020-08-20 RX ADMIN — FENTANYL CITRATE 50 MCG: 50 INJECTION, SOLUTION INTRAMUSCULAR; INTRAVENOUS at 14:05

## 2020-08-20 RX ADMIN — Medication 80 MCG: at 13:38

## 2020-08-20 RX ADMIN — LIDOCAINE HYDROCHLORIDE 60 MG: 20 INJECTION, SOLUTION EPIDURAL; INFILTRATION; INTRACAUDAL; PERINEURAL at 13:25

## 2020-08-20 RX ADMIN — Medication 80 MCG: at 13:35

## 2020-08-20 RX ADMIN — PHENYLEPHRINE HYDROCHLORIDE 40 MCG/MIN: 10 INJECTION INTRAVENOUS at 13:37

## 2020-08-20 RX ADMIN — ROCURONIUM BROMIDE 45 MG: 10 SOLUTION INTRAVENOUS at 13:29

## 2020-08-20 RX ADMIN — Medication 80 MCG: at 13:34

## 2020-08-20 RX ADMIN — PROPOFOL 20 MG: 10 INJECTION, EMULSION INTRAVENOUS at 16:13

## 2020-08-20 RX ADMIN — Medication 2 G: at 13:52

## 2020-08-20 RX ADMIN — Medication 10 MG: at 14:43

## 2020-08-20 RX ADMIN — MIDAZOLAM 0.5 MG: 1 INJECTION INTRAMUSCULAR; INTRAVENOUS at 16:35

## 2020-08-20 RX ADMIN — MIDAZOLAM 0.5 MG: 1 INJECTION INTRAMUSCULAR; INTRAVENOUS at 16:30

## 2020-08-20 RX ADMIN — MIDAZOLAM 0.5 MG: 1 INJECTION INTRAMUSCULAR; INTRAVENOUS at 16:25

## 2020-08-20 RX ADMIN — MORPHINE SULFATE 2 MG: 2 INJECTION, SOLUTION INTRAMUSCULAR; INTRAVENOUS at 20:59

## 2020-08-20 RX ADMIN — ACETAMINOPHEN 650 MG: 325 TABLET ORAL at 21:03

## 2020-08-20 RX ADMIN — GLYCOPYRROLATE 0.4 MG: 0.2 INJECTION, SOLUTION INTRAMUSCULAR; INTRAVENOUS at 15:41

## 2020-08-20 RX ADMIN — SUCCINYLCHOLINE CHLORIDE 200 MG: 20 INJECTION, SOLUTION INTRAMUSCULAR; INTRAVENOUS at 13:25

## 2020-08-20 RX ADMIN — ROCURONIUM BROMIDE 20 MG: 10 SOLUTION INTRAVENOUS at 13:54

## 2020-08-20 RX ADMIN — PROPOFOL 20 MG: 10 INJECTION, EMULSION INTRAVENOUS at 16:04

## 2020-08-20 RX ADMIN — DEXAMETHASONE SODIUM PHOSPHATE 4 MG: 4 INJECTION, SOLUTION INTRAMUSCULAR; INTRAVENOUS at 14:03

## 2020-08-20 RX ADMIN — MIDAZOLAM 0.5 MG: 1 INJECTION INTRAMUSCULAR; INTRAVENOUS at 16:20

## 2020-08-20 RX ADMIN — SODIUM CHLORIDE, SODIUM LACTATE, POTASSIUM CHLORIDE, AND CALCIUM CHLORIDE: 600; 310; 30; 20 INJECTION, SOLUTION INTRAVENOUS at 15:20

## 2020-08-20 RX ADMIN — SODIUM CHLORIDE, SODIUM LACTATE, POTASSIUM CHLORIDE, AND CALCIUM CHLORIDE 125 ML/HR: 600; 310; 30; 20 INJECTION, SOLUTION INTRAVENOUS at 12:08

## 2020-08-20 RX ADMIN — FENTANYL CITRATE 50 MCG: 50 INJECTION, SOLUTION INTRAMUSCULAR; INTRAVENOUS at 13:25

## 2020-08-20 RX ADMIN — MIDAZOLAM 2 MG: 1 INJECTION INTRAMUSCULAR; INTRAVENOUS at 13:19

## 2020-08-20 RX ADMIN — PROPOFOL 20 MG: 10 INJECTION, EMULSION INTRAVENOUS at 16:15

## 2020-08-20 RX ADMIN — NEOSTIGMINE METHYLSULFATE 3 MG: 1 INJECTION, SOLUTION INTRAVENOUS at 15:41

## 2020-08-20 RX ADMIN — Medication 10 ML: at 21:03

## 2020-08-20 RX ADMIN — HYDROMORPHONE HYDROCHLORIDE 0.25 MG: 2 INJECTION, SOLUTION INTRAMUSCULAR; INTRAVENOUS; SUBCUTANEOUS at 15:55

## 2020-08-20 RX ADMIN — ALBUMIN (HUMAN) 250 ML: 12.5 INJECTION, SOLUTION INTRAVENOUS at 14:40

## 2020-08-20 RX ADMIN — HYDROMORPHONE HYDROCHLORIDE 0.25 MG: 2 INJECTION, SOLUTION INTRAMUSCULAR; INTRAVENOUS; SUBCUTANEOUS at 15:50

## 2020-08-20 RX ADMIN — HYDROMORPHONE HYDROCHLORIDE 0.25 MG: 2 INJECTION, SOLUTION INTRAMUSCULAR; INTRAVENOUS; SUBCUTANEOUS at 16:02

## 2020-08-20 RX ADMIN — HYDROMORPHONE HYDROCHLORIDE 0.5 MG: 2 INJECTION, SOLUTION INTRAMUSCULAR; INTRAVENOUS; SUBCUTANEOUS at 14:23

## 2020-08-20 RX ADMIN — Medication 80 MCG: at 13:36

## 2020-08-20 RX ADMIN — LABETALOL HYDROCHLORIDE 5 MG: 5 INJECTION INTRAVENOUS at 18:02

## 2020-08-20 NOTE — PERIOP NOTES
1700 Propofol stopped. 1705 VS stable. Patient drowsy but appears comfortable on the vent. . Spoke to Dr. Vincent Dumont and ok to extubate given. 1710 Patient extubated and placed on NC 4 LPM.    1800 Spoke with Dr. Vincent Dumont regarding elevated HR. Order for labetalol received. 1815 HR improved with labetalol. VS stable. Awake and alert. Resting comfortably. Patient denies nausea and pain. No signs of excessive bleeding. Tolerating ice chips without difficulty. Ready to transfer from PACU.

## 2020-08-20 NOTE — ANESTHESIA PREPROCEDURE EVALUATION
Relevant Problems   No relevant active problems       Anesthetic History   No history of anesthetic complications            Review of Systems / Medical History  Patient summary reviewed, nursing notes reviewed and pertinent labs reviewed    Pulmonary  Within defined limits                 Neuro/Psych   Within defined limits           Cardiovascular  Within defined limits  Hypertension              Exercise tolerance: >4 METS     GI/Hepatic/Renal  Within defined limits   GERD           Endo/Other  Within defined limits      Morbid obesity and arthritis     Other Findings              Physical Exam    Airway  Mallampati: II  TM Distance: > 6 cm  Neck ROM: normal range of motion   Mouth opening: Normal     Cardiovascular  Regular rate and rhythm,  S1 and S2 normal,  no murmur, click, rub, or gallop             Dental  No notable dental hx       Pulmonary  Breath sounds clear to auscultation               Abdominal  GI exam deferred       Other Findings            Anesthetic Plan    ASA: 2  Anesthesia type: general          Induction: Intravenous  Anesthetic plan and risks discussed with: Patient

## 2020-08-20 NOTE — ANESTHESIA POSTPROCEDURE EVALUATION
Post-Anesthesia Evaluation and Assessment    Patient: Bronwyn Choi MRN: [id number]  SSN: [id number]    YOB: 1960  Age: 56 y.o. Sex: female      I have evaluated the patient and they are stable and ready for discharge from the PACU. Cardiovascular Function/Vital Signs  Visit Vitals  /74   Pulse (!) 115   Temp 36.3 °C (97.3 °F)   Resp 24   Wt 107 kg (235 lb 14.3 oz)   SpO2 94%   BMI 38.07 kg/m²       Patient is status post General anesthesia for Procedure(s):  LEFT L3, 4, 5 LAMINECTOMY AND FORAMINTOMIES. Nausea/Vomiting: None    Postoperative hydration reviewed and adequate. Pain:  Pain Scale 1: Adult Nonverbal Pain Scale (08/20/20 1630)  Pain Intensity 1: 0 (08/20/20 1630)   Managed    Neurological Status:   Neuro (WDL): Exceptions to WDL (08/20/20 1630)  Neuro  Neurologic State: Drowsy; Eyes open spontaneously; Pharmacologically induced (comment)(on propofol) (08/20/20 1630)   At baseline    Mental Status, Level of Consciousness: Alert and  oriented to person, place, and time    Pulmonary Status:   O2 Device: Ventilator;Endotracheal tube (08/20/20 1630)   Adequate oxygenation and airway patent    Complications related to anesthesia: None    Post-anesthesia assessment completed. No concerns    Signed By: Richelle Lindsey MD     August 20, 2020              Procedure(s):  LEFT L3, 4, 5 LAMINECTOMY AND FORAMINTOMIES. general    <BSHSIANPOST>    INITIAL Post-op Vital signs:   Vitals Value Taken Time   /74 8/20/2020  5:10 PM   Temp 36.3 °C (97.3 °F) 8/20/2020  4:30 PM   Pulse 108 8/20/2020  5:14 PM   Resp 20 8/20/2020  5:14 PM   SpO2 96 % 8/20/2020  5:14 PM   Vitals shown include unvalidated device data.

## 2020-08-20 NOTE — PERIOP NOTES
Supplies used during the procedure:  Surgifoam Absorbable Gelatin Sponge Size 100 Ref [id number], Lot # [id number], Exp. Date 4/1/2024  Floseal Hemostatic Matrix, 5ml Ref #[id number], Lot # [id number], Exp.  Date 12/3/2020

## 2020-08-20 NOTE — PERIOP NOTES
TRANSFER - OUT REPORT:    Verbal report given to Ambreen(name) on Kaylen Fernando  being transferred to 52(unit) for routine post - op       Report consisted of patients Situation, Background, Assessment and   Recommendations(SBAR). Time Pre op antibiotic given:1352  Anesthesia Stop time: 0857    Information from the following report(s) SBAR, OR Summary, Intake/Output, MAR and Cardiac Rhythm NSR. was reviewed with the receiving nurse. Opportunity for questions and clarification was provided. Is the patient on 02? YES       L/Min 2    Is the patient on a monitor? NO    Is the nurse transporting with the patient? NO    Surgical Waiting Area notified of patient's transfer from PACU? YES      The following personal items collected during your admission accompanied patient upon transfer:   Dental Appliance:    Vision: Visual Aid: None  Hearing Aid:    Jewelry:    Clothing: Clothing: With patient  Other Valuables:  Other Valuables: Eyeglasses, With patient  Valuables sent to safe:

## 2020-08-20 NOTE — OP NOTES
[address] [place]   OPERATIVE REPORT    Name:  Ursula Ramirez  MR#:  [id number]  :  1960  ACCOUNT #:  [de-identified]  DATE OF SERVICE:  2020    PREOPERATIVE DIAGNOSIS:  Lumbar canal stenosis, L3-4, L4-5, and L5-S1. POSTOPERATIVE DIAGNOSIS:  Lumbar canal stenosis, L3-4, L4-5, and L5-S1. PROCEDURE PERFORMED:  Three-level lumbar decompressive laminectomy and foraminotomies on the left at L3, L4 and L5.    SURGEON:  Malcolm Heart MD    ASSISTANT:  [facility].    ANESTHESIA:  General.    COMPLICATIONS:  None. SPECIMENS REMOVED:  None. IMPLANTS:  None. ESTIMATED BLOOD LOSS:  Minimal.    OPERATIVE PROCEDURE:  Review of imaging studies revealed lateral recess and lumbar stenosis at the L3-4, L4-5, and L5-S1 interspace levels in this patient with mild to moderate scoliosis and lumbar radiculopathy. After discussing risks, benefits and alternatives of surgery with her, she was taken to the operating room where she was induced under general endotracheal anesthesia, placed on the operating room table in the prone position on chest rolls. The lumbar region was scrubbed, prepped and draped in the usual sterile fashion. A time-out was performed to identify the correct patient and procedure. Appropriate antibiotics had been administered prior to making the incision and sequential stockings applied for DVT prophylaxis. A midline vertical incision was made in the lumbosacral region. The muscle was taken down in subperiosteal fashion on the left side since that was the side of the patient's radicular symptoms. A towel clamp applied to spinous process, it was noted to be at L3, so attention was directed to that spinous process and lamina and the 2 immediately below it.   Working first at the L5 lamina, a hemilaminectomy was performed using combination of Kerrison and Leksell rongeurs, but the medial aspect of the facet was so hypertrophied that had to be drilled down with a high-speed drill in order to improve the exposure. The ligamentum flavum was quite thickened, it was removed and a foraminotomy over the S1 root was performed and the dorsal and lateral aspect of the canal was decompressed via the laminectomy and removal of the ligamentum flavum. Attention was then directed to L4 and then again at L3 where in similar fashions laminectomies were performed, the thickened ligamentum flavum was removed, foraminotomies over the L5 and then the L4 root respectively were performed until the nerve roots and thecal sac were seen to occupy more normal anatomical shape and position. The facet joint at L4-5 was significantly hypertrophied and it had to be first shaved down with a Leksell rongeur and then a high-speed drill in order to improve the exposure and gain access to the epidural space. The nerve roots and thecal sac at all levels were seen to occupy more normal anatomical shape and position at the end of the procedure. A TouchOfModern dental instrument was used to probe the nerve root exit zones at each level to ensure that the nerve roots were indeed free. The area was irrigated copiously with antibiotic irrigation. The fascia was closed with 0 Vicryl, subcutaneous tissue with 3-0 interrupted inverted Vicryl sutures and the subcuticular layer closed with a running 4-0 Monocryl. Dermabond was applied to the skin edges, and the needle, sponge, instrument count were correct at the end of the procedure.       Bao Diana MD      [id number]  D:  08/20/2020 15:56  T:  08/20/2020 16:53  JOB #:  [id number]  CC:  Tosin Lowe MD

## 2020-08-20 NOTE — PERIOP NOTES
PATIENT INTERVIEWED IN PREOP. NAME BAND VISIBLE AND CORRECT PER PATIENT. PATIENT HAS UNDERSTANDING OF PROCEDURE AND SURGICAL SITE. EDUCATIONAL NEEDS MET. PATIENT STATES NO PAIN AT THIS TIME. PAIN AT 8-9 WITH AMBULATION. MORE PAIN ON LEFT SIDE.

## 2020-08-20 NOTE — PERIOP NOTES
Patient: Lalo Arredondo MRN: [id number]  SSN: [id number]   YOB: 1960  Age: 56 y.o. Sex: female     Patient is status post Procedure(s):  LEFT L3, 4, 5 LAMINECTOMY AND FORAMINTOMIES. Surgeon(s) and Role:     * Raina Chairez MD - Primary    Local/Dose/Irrigation:  10 ml 0.5% Bupivacaine with epi injected to back prior to incision. Peripheral IV 08/20/20 Anterior;Left;Proximal Forearm (Active)       Peripheral IV 08/20/20 Right Hand (Active)            Airway - Endotracheal Tube 08/20/20 Oral (Active)               Dressing/Packing:  Wound Back-Dressing Type: 4 x 4;Adhesive wound closure strips (Steri-Strips); Adhesive wound dressing (Mastisol); Special tape (comment)(Hypafix tape) (08/20/20 1500)

## 2020-08-21 ENCOUNTER — HOME HEALTH ADMISSION (OUTPATIENT)
Dept: HOME HEALTH SERVICES | Facility: HOME HEALTH | Age: 60
End: 2020-08-21
Payer: COMMERCIAL

## 2020-08-21 LAB
ANION GAP SERPL CALC-SCNC: 7 MMOL/L (ref 5–15)
BASOPHILS # BLD: 0 K/UL (ref 0–0.1)
BASOPHILS NFR BLD: 0 % (ref 0–1)
BUN SERPL-MCNC: 45 MG/DL (ref 6–20)
BUN/CREAT SERPL: 12 (ref 12–20)
CALCIUM SERPL-MCNC: 11.8 MG/DL (ref 8.5–10.1)
CHLORIDE SERPL-SCNC: 106 MMOL/L (ref 97–108)
CO2 SERPL-SCNC: 26 MMOL/L (ref 21–32)
CREAT SERPL-MCNC: 3.77 MG/DL (ref 0.55–1.02)
DIFFERENTIAL METHOD BLD: ABNORMAL
EOSINOPHIL # BLD: 0.1 K/UL (ref 0–0.4)
EOSINOPHIL NFR BLD: 0 % (ref 0–7)
ERYTHROCYTE [DISTWIDTH] IN BLOOD BY AUTOMATED COUNT: 13.2 % (ref 11.5–14.5)
GLUCOSE SERPL-MCNC: 84 MG/DL (ref 65–100)
HCT VFR BLD AUTO: 34.7 % (ref 35–47)
HGB BLD-MCNC: 10.2 G/DL (ref 11.5–16)
IMM GRANULOCYTES # BLD AUTO: 0.1 K/UL (ref 0–0.04)
IMM GRANULOCYTES NFR BLD AUTO: 1 % (ref 0–0.5)
LYMPHOCYTES # BLD: 1.2 K/UL (ref 0.8–3.5)
LYMPHOCYTES NFR BLD: 9 % (ref 12–49)
MCH RBC QN AUTO: 28.5 PG (ref 26–34)
MCHC RBC AUTO-ENTMCNC: 29.4 G/DL (ref 30–36.5)
MCV RBC AUTO: 96.9 FL (ref 80–99)
MONOCYTES # BLD: 1 K/UL (ref 0–1)
MONOCYTES NFR BLD: 8 % (ref 5–13)
NEUTS SEG # BLD: 10.4 K/UL (ref 1.8–8)
NEUTS SEG NFR BLD: 82 % (ref 32–75)
NRBC # BLD: 0 K/UL (ref 0–0.01)
NRBC BLD-RTO: 0 PER 100 WBC
PLATELET # BLD AUTO: 220 K/UL (ref 150–400)
PMV BLD AUTO: 9.9 FL (ref 8.9–12.9)
POTASSIUM SERPL-SCNC: 4 MMOL/L (ref 3.5–5.1)
RBC # BLD AUTO: 3.58 M/UL (ref 3.8–5.2)
SODIUM SERPL-SCNC: 139 MMOL/L (ref 136–145)
WBC # BLD AUTO: 12.8 K/UL (ref 3.6–11)

## 2020-08-21 PROCEDURE — 85025 COMPLETE CBC W/AUTO DIFF WBC: CPT

## 2020-08-21 PROCEDURE — 99218 HC RM OBSERVATION: CPT

## 2020-08-21 PROCEDURE — 74011250636 HC RX REV CODE- 250/636: Performed by: NEUROLOGICAL SURGERY

## 2020-08-21 PROCEDURE — 94760 N-INVAS EAR/PLS OXIMETRY 1: CPT

## 2020-08-21 PROCEDURE — 74011250637 HC RX REV CODE- 250/637: Performed by: NEUROLOGICAL SURGERY

## 2020-08-21 PROCEDURE — 97530 THERAPEUTIC ACTIVITIES: CPT

## 2020-08-21 PROCEDURE — 97165 OT EVAL LOW COMPLEX 30 MIN: CPT

## 2020-08-21 PROCEDURE — 80048 BASIC METABOLIC PNL TOTAL CA: CPT

## 2020-08-21 PROCEDURE — 97535 SELF CARE MNGMENT TRAINING: CPT

## 2020-08-21 PROCEDURE — 97161 PT EVAL LOW COMPLEX 20 MIN: CPT

## 2020-08-21 PROCEDURE — 97116 GAIT TRAINING THERAPY: CPT

## 2020-08-21 PROCEDURE — 74011000258 HC RX REV CODE- 258: Performed by: NEUROLOGICAL SURGERY

## 2020-08-21 PROCEDURE — 36415 COLL VENOUS BLD VENIPUNCTURE: CPT

## 2020-08-21 RX ORDER — CYCLOBENZAPRINE HCL 10 MG
5 TABLET ORAL
Qty: 10 TAB | Refills: 0 | Status: SHIPPED | OUTPATIENT
Start: 2020-08-21 | End: 2020-11-02

## 2020-08-21 RX ORDER — AMOXICILLIN 250 MG
1 CAPSULE ORAL DAILY
Qty: 7 TAB | Refills: 0 | Status: SHIPPED | OUTPATIENT
Start: 2020-08-22 | End: 2020-11-02

## 2020-08-21 RX ORDER — OXYCODONE HYDROCHLORIDE 5 MG/1
5 TABLET ORAL
Qty: 12 TAB | Refills: 0 | Status: SHIPPED | OUTPATIENT
Start: 2020-08-21 | End: 2020-08-24

## 2020-08-21 RX ORDER — CYCLOBENZAPRINE HCL 10 MG
5 TABLET ORAL
Status: DISCONTINUED | OUTPATIENT
Start: 2020-08-21 | End: 2020-08-22 | Stop reason: HOSPADM

## 2020-08-21 RX ADMIN — ACETAMINOPHEN 650 MG: 325 TABLET ORAL at 14:19

## 2020-08-21 RX ADMIN — DOCUSATE SODIUM 50MG AND SENNOSIDES 8.6MG 1 TABLET: 8.6; 5 TABLET, FILM COATED ORAL at 09:22

## 2020-08-21 RX ADMIN — Medication 10 ML: at 21:07

## 2020-08-21 RX ADMIN — CEFAZOLIN SODIUM 1 G: 1 INJECTION, POWDER, FOR SOLUTION INTRAMUSCULAR; INTRAVENOUS at 13:14

## 2020-08-21 RX ADMIN — ACETAMINOPHEN 650 MG: 325 TABLET ORAL at 21:06

## 2020-08-21 RX ADMIN — MORPHINE SULFATE 2 MG: 2 INJECTION, SOLUTION INTRAMUSCULAR; INTRAVENOUS at 02:30

## 2020-08-21 RX ADMIN — ACETAMINOPHEN 650 MG: 325 TABLET ORAL at 09:18

## 2020-08-21 RX ADMIN — ALLOPURINOL 100 MG: 100 TABLET ORAL at 09:18

## 2020-08-21 RX ADMIN — POLYETHYLENE GLYCOL 3350 17 G: 17 POWDER, FOR SOLUTION ORAL at 09:23

## 2020-08-21 RX ADMIN — LISINOPRIL 40 MG: 20 TABLET ORAL at 09:22

## 2020-08-21 RX ADMIN — POTASSIUM CHLORIDE 20 MEQ: 750 TABLET, FILM COATED, EXTENDED RELEASE ORAL at 09:22

## 2020-08-21 RX ADMIN — AMLODIPINE BESYLATE 10 MG: 5 TABLET ORAL at 09:18

## 2020-08-21 RX ADMIN — CEFAZOLIN SODIUM 1 G: 1 INJECTION, POWDER, FOR SOLUTION INTRAMUSCULAR; INTRAVENOUS at 02:13

## 2020-08-21 RX ADMIN — ACETAMINOPHEN 650 MG: 325 TABLET ORAL at 02:15

## 2020-08-21 RX ADMIN — METOLAZONE 2.5 MG: 2.5 TABLET ORAL at 09:22

## 2020-08-21 RX ADMIN — PRAVASTATIN SODIUM 40 MG: 40 TABLET ORAL at 21:06

## 2020-08-21 NOTE — PROGRESS NOTES
Transition of Care Plan:                    -RUR NA  -OBS  -PT/OT recommend [facility]  -Referral sent to [facility], Pending  -Spouse will transport     Observation notice provided in writing to patient and/or caregiver as well as verbal explanation of the policy. Patients who are in outpatient status also receive the Observation notice. Reason for Admission:   Back surgery. From home. Lives alone. Independent and on room air. Drives when stable. Daughter will transport at discharge. PCP is [facility]w [facility] and uses [facility] [address]. CM offered freedom of choice for home health options. Referral sent to [facility], pending                    RUR Score:       OBS/NA            Plan for utilizing home health:      PT/OT recommend , need orders    PCP: First and Last name:  Dr. Stephanie Machado: Not on file. Full Code. Provided education, patient is not interested at this time. Care Management Interventions  PCP Verified by CM: Yes  Mode of Transport at Discharge:  Other (see comment)(Spouse )  Transition of Care Consult (CM Consult): Home Health  Discharge Durable Medical Equipment: No  Physical Therapy Consult: Yes  Occupational Therapy Consult: Yes  Speech Therapy Consult: No  Current Support Network: Lives with Spouse  Confirm Follow Up Transport: Family  The Patient and/or Patient Representative was Provided with a Choice of Provider and Agrees with the Discharge Plan?: Yes  Freedom of Choice List was Provided with Basic Dialogue that Supports the Patient's Individualized Plan of Care/Goals, Treatment Preferences and Shares the Quality Data Associated with the Providers?: Yes  Discharge Location  Discharge Placement: Home with home health    CM will follow     DANIELA Chavira/JIMY

## 2020-08-21 NOTE — PROGRESS NOTES
Problem: Mobility Impaired (Adult and Pediatric)  Goal: *Acute Goals and Plan of Care (Insert Text)  Description: FUNCTIONAL STATUS PRIOR TO ADMISSION: Patient was independent and active without use of DME.    HOME SUPPORT PRIOR TO ADMISSION: The patient lived with  and childern but did not require assist.    Physical Therapy Goals  Initiated 8/21/2020    1. Patient will move from supine to sit and sit to supine , scoot up and down, and roll side to side in bed with modified independence within 4 days. 2. Patient will perform sit to stand with modified independence within 4 days. 3. Patient will ambulate with modified independence for 300 feet with the least restrictive device within 4 days. 4. Patient will ascend/descend 13 stairs with single handrail(s) with modified independence within 4 days. 5. Patient will verbalize and demonstrate understanding of spinal precautions (No bending, lifting greater than 5 lbs, or twisting; log-roll technique; frequent repositioning as instructed) within 4 days. 8/21/2020 1442 by Fernanda Canela  Outcome: Progressing Towards Goal  8/21/2020 1105 by Fernanda Canela  Outcome: Progressing Towards Goal     PHYSICAL THERAPY TREATMENT  Patient: Clau Elizondo (37 y.o. female)  Date: 8/21/2020  Diagnosis: Lumbar stenosis with neurogenic claudication [M48.062]   <principal problem not specified>  Procedure(s) (LRB):  LEFT L3, 4, 5 LAMINECTOMY AND FORAMINTOMIES (Left) 1 Day Post-Op  Precautions:   No bending, no lifting greater than 5 lbs, no twisting, log-roll technique, repositioning every 20-30 min except when sleeping, brace when OOB (if ordered)  Chart, physical therapy assessment, plan of care and goals were reviewed. ASSESSMENT  Patient continues with skilled PT services and is progressing towards goals. Pt presented supine in bed and agreeable to therapy.  Pt educated about log roll and was able to demonstrate a log roll while maintaining back precautions CGA with verbal cues. Once EOB pt rested as estrada was removed. Pt gait trained 300 feet, during which she required cues to stand up fully erect and engage gluteals. Pt successfully ambulated 12 steps CGA x 1 for ascending stairs and CGA x 2 for descending due to patient nervousness. Once back in her room pt was transferred to her chair which she did CGA and was left sitting upright to eat lunch. Current Level of Function Impacting Discharge (mobility/balance): good endurance, stair ambulation, well controlled pain     Other factors to consider for discharge: supportive family, lots of stairs in the home          PLAN :  Patient continues to benefit from skilled intervention to address the above impairments. Continue treatment per established plan of care. to address goals. Recommendation for discharge: (in order for the patient to meet his/her long term goals)  Physical therapy at least 2 days/week in the home     This discharge recommendation:  Has been made in collaboration with the attending provider and/or case management    IF patient discharges home will need the following DME: patient owns DME required for discharge       SUBJECTIVE:   Patient stated my left leg use to hurt so bad and now it doesn't.     OBJECTIVE DATA SUMMARY:   Critical Behavior:  Neurologic State: Appropriate for age  Orientation Level: Oriented X4  Cognition: Appropriate for age attention/concentration       Spinal diagnosis intervention:  The patient required verbal cues to maintain back precautions during functional activity.      Functional Mobility Training:    Bed Mobility:  Log    Supine to Sit: Minimum assistance              Transfers:  Sit to Stand: Contact guard assistance  Stand to Sit: Contact guard assistance        Bed to Chair: Minimum assistance                    Balance:  Sitting: Intact  Standing: Impaired  Standing - Static: Good;Constant support  Standing - Dynamic : Good;Constant support  Ambulation/Gait Training:  Distance (ft): 300 Feet (ft)  Assistive Device: Walker, rolling;Gait belt  Ambulation - Level of Assistance: Contact guard assistance        Gait Abnormalities: Decreased step clearance        Base of Support: Widened     Speed/Adriana: Slow  Step Length: Right shortened;Left shortened                 Stairs:  Number of Stairs Trained: 12  Stairs - Level of Assistance: Contact guard assistance;Assist X2(x 2 for descending stairs, pt nervous)   Rail Use: Left     Pain Ratin/10    Activity Tolerance:   Good  Please refer to the flowsheet for vital signs taken during this treatment. After treatment patient left in no apparent distress:   Sitting in chair, Call bell within reach, and Caregiver / family present    COMMUNICATION/COLLABORATION:   The patients plan of care was discussed with: Case management. Kaci Soria   Time Calculation: 25 mins    Regarding student involvement in patient care:  A student participated in this treatment session. Per CMS Medicare statements and APTA guidelines I certify that the following was true:  1. I was present and directly observed the entire session. 2. I made all skilled judgments and clinical decisions regarding care. 3. I am the practitioner responsible for assessment, treatment, and documentation.       Sam Burns, DPT, PT

## 2020-08-21 NOTE — PROGRESS NOTES
Problem: Self Care Deficits Care Plan (Adult)  Goal: *Acute Goals and Plan of Care (Insert Text)  Description: FUNCTIONAL STATUS PRIOR TO ADMISSION: Patient was independent and active without use of DME.    HOME SUPPORT: The patient lived with family but did not require assist.    Occupational Therapy Goals  Initiated 8/21/2020    1. Patient will perform lower body dressing with modified independence using AE PRN within 4 days. 2.  Patient will perform toileting with modified independence using most appropriate DME within 4 days. 3.  Patient will bathing at modified independence within 4 days. 4.  Patient will tolerate standing up to 10 minutes for ADLS     Outcome: Not Met     OCCUPATIONAL THERAPY EVALUATION  Patient: Max Kaufman (8 y.o. female)  Date: 8/21/2020  Primary Diagnosis: Lumbar stenosis with neurogenic claudication [M48.062]  Procedure(s) (LRB):  LEFT L3, 4, 5 LAMINECTOMY AND FORAMINTOMIES (Left) 1 Day Post-Op   Precautions: fall       ASSESSMENT  Based on the objective data described below, the patient presents with pain and decreased ADL and mobility. Does not have traditional back precautions or brace, but is aware of BLT as a way to minimize pain. Educated on [address] [place] [address] and assisted to chair. Later walked in hallway with PT using RW. Has support of spouse and sons at home. May practice with LB AE to decrease pain/effort during ADL if desired. Anticipae home with HH. Current Level of Function Impacting Discharge (ADLs/self-care): Mod A    Functional Outcome Measure: The patient scored 45/100 on the Barthel Index outcome measure which is indicative of moderate deficits. Other factors to consider for discharge: none     Patient will benefit from skilled therapy intervention to address the above noted impairments.        PLAN :  Recommendations and Planned Interventions: self care training, functional mobility training, therapeutic exercise, balance training, therapeutic activities, endurance activities, patient education, home safety training, and family training/education    Frequency/Duration: Patient will be followed by occupational therapy 5 times a week to address goals. Recommendation for discharge: (in order for the patient to meet his/her long term goals)  Occupational therapy at least 2 days/week in the home     This discharge recommendation:  A follow-up discussion with the attending provider and/or case management is planned    IF patient discharges home will need the following DME: AE: long handled bathing and AE: long handled dressing       SUBJECTIVE:   Patient stated i'm going home tomorrow.     OBJECTIVE DATA SUMMARY:   HISTORY:   Past Medical History:   Diagnosis Date    Arthritis     L knee    Chronic kidney disease     secondary to hypertensive nephrosclerosis    Gout     History of claustrophobia     Hypertension      Past Surgical History:   Procedure Laterality Date    HX  SECTION      HX COLONOSCOPY      HX GYN      ECTOPIC PREGNANCY SURGERY     HX LAP CHOLECYSTECTOMY         Expanded or extensive additional review of patient history:     Home Situation  Home Environment: Private residence  # Steps to Enter: 15  Rails to Enter: Yes  Hand Rails : Left  One/Two Story Residence: [facility] level  # of Interior Steps: 6  Interior Rails: Left  Lift Chair Available: No  Living Alone: No  Support Systems: Family member(s), Spouse/Significant Other/Partner  Patient Expects to be Discharged to[de-identified] Private residence  Current DME Used/Available at Home: Commode, bedside  Tub or Shower Type: Tub/Shower combination    Hand dominance: Right    EXAMINATION OF PERFORMANCE DEFICITS:  Cognitive/Behavioral Status:  Neurologic State: Appropriate for age  Orientation Level: Oriented X4  Cognition: Appropriate for age attention/concentration             Skin: surgical incision    Edema: none    Hearing:   Auditory  Auditory Impairment: None    Vision/Perceptual: Range of Motion:    AROM: Generally decreased, functional  PROM: Generally decreased, functional                      Strength:    Strength: Generally decreased, functional                Coordination:  Coordination: Generally decreased, functional            Tone & Sensation:  Normal and intact                            Balance:  Sitting: Intact  Standing: Impaired  Standing - Static: Fair;Constant support  Standing - Dynamic : Fair;Constant support    Functional Mobility and Transfers for ADLs:  Bed Mobility:  Supine to Sit: Minimum assistance(log roll)    Transfers:  Sit to Stand: Contact guard assistance  Stand to Sit: Contact guard assistance  Bed to Chair: Minimum assistance    ADL Assessment:  Feeding: Independent    Oral Facial Hygiene/Grooming: Independent    Bathing: Moderate assistance    Upper Body Dressing: Setup    Lower Body Dressing: Moderate assistance    Toileting: Moderate assistance                ADL Intervention and task modifications:  Patient aware of BLT precautions, but does not have to follow them strictly post procedure. Lower Body Dressing Assistance  Socks: Total assistance (dependent)(unable at this time, struggles with L baseline)    Patient instructed and indicated understanding the benefits of maintaining activity tolerance, functional mobility, and independence with self care tasks during acute stay  to ensure safe return home and to baseline. Encouraged patient to increase frequency and duration OOB, not sitting longer than 30 mins without marching/walking with staff, be out of bed for all meals, perform daily ADLs (as approved by RN/MD regarding bathing etc), and performing functional mobility to/from bathroom.  Patient instruction and indicated understanding on body mechanics, ergonomics and gravitational force on the spine during different body positions to plan activities in prep for return home to complete basic ADLs, instrumental ADLs and back to work safely. Patient made aware of LB AE to assist with decreased pain and effort during LB ADL. Toileting: Patient instructed on the benefits of using flushable wet wipes and toilet tongs if decreased reach or pain for asaf care. Also, the benefits of a reacher to aid in clothing management. Patient instruction and indicated understanding to not strain i.e. holding breath to bear down during a bowel movement, lifting/activity, and sexual activity. Home safety: Patient instructed and indicated understanding on home modifications and safety [raise height of ADL objects (i.e. clothing, sink items, fridge items, items to mouth when grooming), appropriate height of chair surfaces, recliner safety, change of floor surfaces, clear pathways] to increase independence and fall prevention. Standing: Patient instructed and indicated understanding to walk up to sink/counter top/surfaces, step into walker, square off while using objects, slide objects along surfaces, to increase adherence to back precautions and fall prevention. Patient instructed to increase amount of time standing in order to increase independence and tolerance with ADLs. During prolonged standing, can open cabinet door or place foot on stool to decrease spinal pressure/increase pain. Tub transfer: Patient instructed and indicated understanding regarding when it is safe to begin transfer into tub (complete stairs with PT, advance exercises with PT high enough to clear tub height, and while clothes donned practice with another person present). Functional Measure:  Barthel Index:    Bathin  Bladder: 0  Bowels: 10  Groomin  Dressin  Feeding: 10  Mobility: 5  Stairs: 0  Toilet Use: 5  Transfer (Bed to Chair and Back): 10  Total: 45/100        The Barthel ADL Index: Guidelines  1. The index should be used as a record of what a patient does, not as a record of what a patient could do.   2. The main aim is to establish degree of independence from any help, physical or verbal, however minor and for whatever reason. 3. The need for supervision renders the patient not independent. 4. A patient's performance should be established using the best available evidence. Asking the patient, friends/relatives and nurses are the usual sources, but direct observation and common sense are also important. However direct testing is not needed. 5. Usually the patient's performance over the preceding 24-48 hours is important, but occasionally longer periods will be relevant. 6. Middle categories imply that the patient supplies over 50 per cent of the effort. 7. Use of aids to be independent is allowed. Carlos Warren., Barthel, D.W. ([id number]). Functional evaluation: the Barthel Index. [address] [facility] (14)2. Scottie armani LEVON Gomez, Elige Heimlich., Estee Lehman., [place], [address] [place] [address] (). Measuring the change indisability after inpatient rehabilitation; comparison of the responsiveness of the Barthel Index and Functional [place] Measure. Journal of Neurology, Neurosurgery, and Psychiatry, 66(4), 458-997. Dalila Mao, N.J.A, ISAURA Howell, & Antonia Marvin, MValeriA. (2004.) Assessment of post-stroke quality of life in cost-effectiveness studies: The usefulness of the Barthel Index and the EuroQoL-5D.  Quality of Life Research, 15, 660-06        Occupational Therapy Evaluation Charge Determination   History Examination Decision-Making   LOW Complexity : Brief history review  LOW Complexity : 1-3 performance deficits relating to physical, cognitive , or psychosocial skils that result in activity limitations and / or participation restrictions  LOW Complexity : No comorbidities that affect functional and no verbal or physical assistance needed to complete eval tasks       Based on the above components, the patient evaluation is determined to be of the following complexity level: LOW   Pain Ratin/10    Activity Tolerance:   Good  Please refer to the flowsheet for vital signs taken during this treatment. After treatment patient left in no apparent distress:    Sitting in chair and Call bell within reach    COMMUNICATION/EDUCATION:   The patients plan of care was discussed with: Physical therapist and Registered nurse. Home safety education was provided and the patient/caregiver indicated understanding., Patient/family have participated as able in goal setting and plan of care. , and Patient/family agree to work toward stated goals and plan of care. This patients plan of care is appropriate for delegation to [facility].     Thank you for this referral.  Leonard Seo  Time Calculation: 22 mins

## 2020-08-21 NOTE — PROGRESS NOTES
Problem: Mobility Impaired (Adult and Pediatric)  Goal: *Acute Goals and Plan of Care (Insert Text)  Description: FUNCTIONAL STATUS PRIOR TO ADMISSION: Patient was independent and active without use of DME.    HOME SUPPORT PRIOR TO ADMISSION: The patient lived with  and childern but did not require assist.    Physical Therapy Goals  Initiated 8/21/2020    1. Patient will move from supine to sit and sit to supine , scoot up and down, and roll side to side in bed with modified independence within 4 days. 2. Patient will perform sit to stand with modified independence within 4 days. 3. Patient will ambulate with modified independence for 300 feet with the least restrictive device within 4 days. 4. Patient will ascend/descend 13 stairs with single handrail(s) with modified independence within 4 days. 5. Patient will verbalize and demonstrate understanding of spinal precautions (No bending, lifting greater than 5 lbs, or twisting; log-roll technique; frequent repositioning as instructed) within 4 days. Outcome: Progressing Towards Goal     PHYSICAL THERAPY EVALUATION  Patient: Clau Elizondo (89 y.o. female)  Date: 8/21/2020  Primary Diagnosis: Lumbar stenosis with neurogenic claudication [M48.062]  Procedure(s) (LRB):  LEFT L3, 4, 5 LAMINECTOMY AND FORAMINTOMIES (Left) 1 Day Post-Op   Precautions: spinal         ASSESSMENT  Based on the objective data described below, the patient presents POD 1 from left L3-L5 Laminectomy and foraminotomies. Pt presented to therapy sitting up in her chair. Home set up questions were taken and LE strength was assessed and was [facility]. Pt transferred sit to stand CGA to . Pt gait trained 150 CGA feet demonstrating an antalgic gait pattern. Pt tolerated gait well and rated her pain as 4/10. Once back in her room patient transferred back to her chair GCA.  Once seated therapist educated pt on sitting limits, spinal precautions, sitting hygiene, and how to manage her pet once home. Tolerated session well. Current Level of Function Impacting Discharge (mobility/balance): gait tolerance, increasing transfer independence     Functional Outcome Measure: The patient scored Total: 45/100 on the Barthel Index which is indicative of moderate impaired ability to care for basic self needs/dependency on others. Other factors to consider for discharge: good pain control      Patient will benefit from skilled therapy intervention to address the above noted impairments. PLAN :  Recommendations and Planned Interventions: bed mobility training, transfer training, gait training, therapeutic exercises, and neuromuscular re-education      Frequency/Duration: Patient will be followed by physical therapy:  twice daily to address goals. Recommendation for discharge: (in order for the patient to meet his/her long term goals)  Physical therapy at least 2 days/week in the home     This discharge recommendation:  Has been made in collaboration with the attending provider and/or case management    IF patient discharges home will need the following DME: patient owns DME required for discharge         SUBJECTIVE:   Patient stated I am going home tomorrow.     OBJECTIVE DATA SUMMARY:   HISTORY:    Past Medical History:   Diagnosis Date    Arthritis     L knee    Chronic kidney disease     secondary to hypertensive nephrosclerosis    Gout     History of claustrophobia     Hypertension      Past Surgical History:   Procedure Laterality Date    HX  SECTION      HX COLONOSCOPY      HX GYN      ECTOPIC PREGNANCY SURGERY     HX LAP CHOLECYSTECTOMY         Personal factors and/or comorbidities impacting plan of care:     Home Situation  Home Environment: Private residence  # Steps to Enter: 15  Rails to Enter: Yes  Hand Rails : Left  One/Two Story Residence: [facility] level  # of Interior Steps: 6  Interior Rails: Left  Lift Chair Available: No  Living Alone: No  Support Systems: Family member(s), Spouse/Significant Other/Partner  Patient Expects to be Discharged to[de-identified] Private residence  Current DME Used/Available at Home: Commode, bedside  Tub or Shower Type: Tub/Shower combination    EXAMINATION/PRESENTATION/DECISION MAKING:   Critical Behavior:  Neurologic State: Appropriate for age  Orientation Level: Oriented X4  Cognition: Appropriate for age attention/concentration     Hearing: Auditory  Auditory Impairment: None  Skin:  intact except incision  Edema: none  Range Of Motion:  AROM: Generally decreased, functional           PROM: Generally decreased, functional           Strength:    Strength: Generally decreased, functional                    Tone & Sensation:                                  Coordination:  Coordination: Generally decreased, functional  Vision:      Functional Mobility:  Bed Mobility:              Transfers:  Sit to Stand: Contact guard assistance  Stand to Sit: Contact guard assistance                       Balance:   Sitting: Intact  Standing: Impaired  Standing - Static: Fair;Constant support  Standing - Dynamic : Fair;Constant support  Ambulation/Gait Training:  Distance (ft): 150 Feet (ft)  Assistive Device: Walker, rolling;Gait belt  Ambulation - Level of Assistance: Contact guard assistance        Gait Abnormalities: Decreased step clearance;Shuffling gait        Base of Support: Widened     Speed/Adriana: Slow  Step Length: Right shortened;Left shortened              Therapeutic Exercises: Ankle pumps  Seated marches  Kick outs    Functional Measure:  Barthel Index:    Bathin  Bladder: 0  Bowels: 10  Groomin  Dressin  Feeding: 10  Mobility: 5  Stairs: 0  Toilet Use: 5  Transfer (Bed to Chair and Back): 10  Total: 45/100       The Barthel ADL Index: Guidelines  1. The index should be used as a record of what a patient does, not as a record of what a patient could do.   2. The main aim is to establish degree of independence from any help, physical or verbal, however minor and for whatever reason. 3. The need for supervision renders the patient not independent. 4. A patient's performance should be established using the best available evidence. Asking the patient, friends/relatives and nurses are the usual sources, but direct observation and common sense are also important. However direct testing is not needed. 5. Usually the patient's performance over the preceding 24-48 hours is important, but occasionally longer periods will be relevant. 6. Middle categories imply that the patient supplies over 50 per cent of the effort. 7. Use of aids to be independent is allowed. Manuel Carr., Barthel, D.W. ([id number]). Functional evaluation: the Barthel Index. [address] [facility] (14)2. Laurent Hodgkins armani LEVON Gomez, Bruna Bravo., Anat Aden., [place], [address] [place] [address] Ave (). Measuring the change indisability after inpatient rehabilitation; comparison of the responsiveness of the Barthel Index and Functional [place] Measure. Journal of Neurology, Neurosurgery, and Psychiatry, 66(4), 995-378. Bea Russo, NValeriJ.SALLY, ISAURA Howell, & Alireza Lewis MValeriA. (2004.) Assessment of post-stroke quality of life in cost-effectiveness studies: The usefulness of the Barthel Index and the EuroQoL-5D.  Quality of Life Research, 15, 103-70        Physical Therapy Evaluation Charge Determination   History Examination Presentation Decision-Making   HIGH Complexity :3+ comorbidities / personal factors will impact the outcome/ POC  MEDIUM Complexity : 3 Standardized tests and measures addressing body structure, function, activity limitation and / or participation in recreation  LOW Complexity : Stable, uncomplicated  Other outcome measures Barthel  MEDIUM      Based on the above components, the patient evaluation is determined to be of the following complexity level: LOW     Pain Ratin/10    Activity Tolerance:   Fair and requires rest breaks  Please refer to the flowsheet for vital signs taken during this treatment. After treatment patient left in no apparent distress:   Sitting in chair and Call bell within reach    COMMUNICATION/EDUCATION:   The patients plan of care was discussed with: Registered nurse and Case management. Fall prevention education was provided and the patient/caregiver indicated understanding., Patient/family have participated as able in goal setting and plan of care. , and Patient/family agree to work toward stated goals and plan of care. Thank you for this referral.  King Cheadle   Time Calculation: 29 mins     Regarding student involvement in patient care:  A student participated in this treatment session. Per CMS Medicare statements and APTA guidelines I certify that the following was true:  1. I was present and directly observed the entire session. 2. I made all skilled judgments and clinical decisions regarding care. 3. I am the practitioner responsible for assessment, treatment, and documentation.      Nilda Lehman, THIERRYT, PT

## 2020-08-21 NOTE — PROGRESS NOTES
TRANSFER - IN REPORT:    Verbal report received from Ara Rajan (name) on Belkys Scanlon  being received from PACU (unit) for routine post - op      Report consisted of patients Situation, Background, Assessment and   Recommendations(SBAR). Information from the following report(s) SBAR, Kardex, OR Summary, MAR and Recent Results was reviewed with the receiving nurse. Opportunity for questions and clarification was provided. Assessment completed upon patients arrival to unit and care assumed.

## 2020-08-21 NOTE — PROGRESS NOTES
Primary Nurse Karie Armenta RN and Daria Mancera RN performed a dual skin assessment on this patient No impairment noted  Jose score is 20

## 2020-08-21 NOTE — PROGRESS NOTES
Neurosurgery Progress Note  [facility]audhary[facility]  [phone]        Admit Date: 2020   LOS: 0 days        Daily Progress Note: 2020    POD:1 Day Post-Op    S/P: Procedure(s):  LEFT L3, 4, 5 LAMINECTOMY AND FORAMINTOMIES    Subjective: The patient was having lower back pain that radiated down her left leg. She was not able to walk long distances. She failed conservative treatment. Her lumbar spine MRI revealed lumbar stenosis. After discussing risks, benefits, and alternatives of surgery, the patient decided to proceed. She underwent left L3-5 laminectomies with foraminotomies on 2020 with Dr. Vasyl Ling. This morning, she states she has some pain from her incision but her leg pain from pre-op has resolved. She states she is also able to walk much better today than pre-op. Denies chest pain, leg pain, nausea, vomiting, difficulty swallowing, headache, and dyspnea. Objective:     Vital signs  Temp (24hrs), Av.7 °F (36.5 °C), Min:97.3 °F (36.3 °C), Max:98.1 °F (36.7 °C)    07 -  1900  In: -   Out: 1700 [Urine:1700]  1901 -  0700  In: 1700 [I.V.:1700]  Out: 1750 [Urine:1675]    Visit Vitals  BP (!) 154/92   Pulse 100   Temp 97.8 °F (36.6 °C)   Resp 16   Wt 107 kg (235 lb 14.3 oz)   SpO2 96%   BMI 38.07 kg/m²    O2 Flow Rate (L/min): 1 l/min O2 Device: Room air     Pain control  Pain Assessment  Pain Scale 1: Numeric (0 - 10)  Pain Intensity 1: 2  Pain Onset 1: post op  Pain Location 1: Back  Pain Orientation 1: Lower  Pain Description 1: Constant, Dull  Pain Intervention(s) 1: Declines    PT/OT  Gait     Gait  Base of Support: Widened  Speed/Adriana: Slow  Step Length: Right shortened, Left shortened  Gait Abnormalities: Decreased step clearance, Shuffling gait  Ambulation - Level of Assistance: Contact guard assistance  Distance (ft): 150 Feet (ft)  Assistive Device: Walker, rolling, Gait belt           Physical Exam:  Gen:NAD. Neuro: A&Ox3.  Follows commands. Speech clear. Affect normal.  LEVY spontaneously. Strength 5/5 in UE and LE BL. Sensation intact to light touch. Gait based and steady with rolling walker. Skin: Lumbar dressing C/D/I  Heart RRR  Lungs CTA BL  Abd soft, NT  Ext no edema    24 hour results:    No results found for this or any previous visit (from the past 24 hour(s)). Assessment:     Active Problems:    Lumbar stenosis with neurogenic claudication (8/20/2020)        Plan:   1. Lumbar stenosis with neurogenic claudication   - s/p left L3-5 laminectomies and foraminotomies 08/20   - Pain control - roxicodone PRN, scheduled tylenol, flexeril PRN   - Bowel regimen - Pericolace, miralax, bisacodyl PRN   - normalize and mobilize   - PT/OT   - check post-op labs  2. Chronic essential hypertension   - cont norvasc, lisinopril, metolazone from home   - pain control   - stop IVF  3. Dyslipidemia   - cont pravastatin from home  4. Gout   - cont allopurinol from home  5. Hypokalemia   - cont oral KCl supplement   - check labs today  6. Uvulitis   - complication from intubation   - pt denies difficulty swallowing; just complaining of feeling something weird in her throat. - anesthesiologist evaluated patient and recommended cold fluids and ice chips    Activity: up with assist  DVT ppx: SCDs  Dispo: home likely tomorrow    Will discuss plan with Dr. Jimi Huff. Will send prescriptions to patient's pharmacy for anticipated discharge tomorrow.       Esperanza Waddell NP

## 2020-08-21 NOTE — PROGRESS NOTES
Asked to eval pt for \"something in her throat. \"      Pt is 1 D s/p GA for back surg. Overall, recovering well. I have reviewed med/anesth record, interviewed and examined pt. Pt has feeling of something in her throat. On exam, pt's uvula is mod edematous and erythematous. Little or no pain on soft palate. She is able to swallow w/o difficulty. All other tissue is WNL. Likely oral endotracheal tube pushing on soft palate/uvula during procedure. Reassured pt that this is temporary and with cold fluids and ice chips, swelling will resolve. All questions answered.

## 2020-08-21 NOTE — DISCHARGE INSTRUCTIONS
Caroline Stevens M.D. What can I do?  The only exercise you should do is walking. Start by walking twice a day for 5 minutes, then increase by  2-3 minutes every day until you reach 25 minutes twice a day. DO NOT sit for long periods of time (20 minutes at a time for the first couple of weeks, then gradually increase.  No heavy lifting (5 lbs max); no straining, twisting, or bending.  No driving until your physician tells you it is ok. It is, however, ok to ride short distances in a car.  If you are required to wear a back brace, you may remove it when you are sleeping unless your doctor has advised against it.  If you are required to wear a cervical collar, you must sleep in it. You can remove it only for showers. What can I eat?  You may resume your regular home diet as tolerated. If your throat is sore, you may want to eat soft food for the first few days. When can I take a shower?  You may shower 48 hours after surgery, leaving on your dressing. Change the dressing to a large band-aid after showering. Change the bandage daily until your follow-up appt. Leave steri-strips on. These will fall off on their own.  Do not take baths or soak in pools.  You may remove your brace for showering, if you have been advised to wear a brace. Medications:   Check with your physician before taking any anti-inflammatory medicines (Advil, Aleve, ibuprofen, aspirin).  Take prescription medicine as directed. DO NOT take more than prescribed. Call your physician if the prescribed dose is not sufficiently controlling your pain.  It is important to have regular bowel movements. Pain medications may cause constipation. Drink plenty of fluids, get enough fiber in your diet, and use stool softeners and drink prune juice to help prevent constipation. Do not take laxatives if at all possible except in severe situations.   It can result in a vicious cycle of constipation and diarrhea.  Do not put any ointments or creams on your incision unless directed to do so by your physician.  Tobacco products should be avoided during the postoperative phase. When do I see the physician again?  Please call your physicians office as soon as you get home to schedule your 1st post operative appointment. You should be seen approximately two weeks after your surgery.  If you had a fusion, you will need to get an order for xrays to be taken prior to the six week appointment. These should be done on the day of the appointment or 1-2 days before. NOTIFY YOUR PHYSICIAN OF ANY OF THE FOLLOWING:     Fever above 101º for 24 hrs.  Nausea or vomiting.  Inability to urinate   Loss of bowel or bladder function (sudden onset of incontinence)   Changes in sensation (numbness, tingling, color change) in your extremities   Pain not relieved by your medicine.    Redness, swelling or drainage from your incision   Persistent pain in the calf of either leg   Development of severe pain      FOR APPOINTMENTS OR QUESTIONS CALL:    [facility] [address] [place] [address]  [phone]

## 2020-08-21 NOTE — PROGRESS NOTES
Called anesthesia to come and see PT's  throat as she has been complaining since she vomited last night that she feels like she has something in her throat and and it feels like she needs to cough it up.

## 2020-08-21 NOTE — PROGRESS NOTES
Bedside and Verbal shift change report given to Melania Cuevas (oncoming nurse) by Stefano Botello (offgoing nurse). Report included the following information SBAR, Kardex, MAR and Recent Results.

## 2020-08-21 NOTE — PROGRESS NOTES
Pt vomited large amounts last night at least 1000 cc. Anesthesia has seen her and has determined that her uvula is swollen. Pt has been eating pop larisa and ice all night. Anesthesia talked to her and reassured her.

## 2020-08-21 NOTE — DISCHARGE SUMMARY
Discharge Summary     Patient ID:  Kaylen Fernando  [id number]   42 y.o.  1960    Admit date: 8/20/2020    Discharge Date: 08/22/2020    Admitting Physician: Glenard Bloch, MD     Discharge Physician: Geoffrey Barnhart NP    Admission Diagnoses: Lumbar stenosis with neurogenic claudication [M48.062]    Last Procedure: Procedure(s):  LEFT L3, 4, 5 LAMINECTOMY AND FORAMINTOMIES    Discharge Diagnoses: Active Problems:    Lumbar stenosis with neurogenic claudication (8/20/2020)         Consults: None    Significant Diagnostic Studies: None    Patient condition upon discharge: Stable    Hospital Course: The patient was having lower back pain that radiated down her left leg. She was not able to walk long distances. She failed conservative treatment. Her lumbar spine MRI revealed lumbar stenosis. After discussing risks, benefits, and alternatives of surgery, the patient decided to proceed. She underwent left L3-5 laminectomies with foraminotomies on 08/20/2020 with Dr. Wallace Myers. Intraoperative findings can be found in his operative report. Post-operatively, after a short stay in the recovery room the patient transferred to the spine floor. She was afebrile. Her BP was a little elevated but improved with pain control. All other vital signs were stable. She did note she felt like something was stuck  in her throat. Anesthesia was called to evaluate the patient and it was noted she had an enlarged uvula. She had no trouble swallowing. Anesthesia recommend ice chips and cold popsicles and reassured her it was a temporary problem that would resolve. She was taking PO well, voiding on her own, and ambulating with the assistance of a rolling walker. She worked with PT/OT and home health was recommended at discharge. Her pain was well-controlled. Prescriptions were sent in to her pharmacy on file. She will follow-up with Dr. Wallace Myers in 2 weeks.     Disposition: Home    Patient Instructions:   Current Discharge Medication List      START taking these medications    Details   cyclobenzaprine (FLEXERIL) 10 mg tablet Take 0.5 Tabs by mouth three (3) times daily as needed for Muscle Spasm(s). Qty: 10 Tab, Refills: 0      oxyCODONE IR (ROXICODONE) 5 mg immediate release tablet Take 1 Tab by mouth every six (6) hours as needed for Pain for up to 3 days. Max Daily Amount: 20 mg.  Qty: 12 Tab, Refills: 0    Associated Diagnoses: S/P laminectomy      senna-docusate (PERICOLACE) 8.6-50 mg per tablet Take 1 Tab by mouth daily. Qty: 7 Tab, Refills: 0         CONTINUE these medications which have NOT CHANGED    Details   acetaminophen (Tylenol Extra Strength) 500 mg tablet Take 500 mg by mouth every six (6) hours as needed for Pain. allopurinoL (ZYLOPRIM) 100 mg tablet 2 tablets QD  Qty: 60 Tab, Refills: 11    Associated Diagnoses: Gout, unspecified cause, unspecified chronicity, unspecified site      pravastatin (PRAVACHOL) 40 mg tablet TAKE 1 TABLET BY MOUTH EVERY DAY AT BEDTIME  Qty: 30 Tab, Refills: 11      lisinopril (PRINIVIL, ZESTRIL) 40 mg tablet TAKE 1 TABLET BY MOUTH DAILY  Qty: 30 Tab, Refills: 11      potassium chloride (K-DUR, KLOR-CON) 20 mEq tablet TAKE 1 TABLET BY MOUTH DAILY  Qty: 30 Tab, Refills: 11      amLODIPine (NORVASC) 10 mg tablet TAKE 1 TABLET BY MOUTH DAILY  Qty: 30 Tab, Refills: 11      predniSONE (DELTASONE) 5 mg tablet Take 1 Tab by mouth daily. Qty: 30 Tab, Refills: 11      metOLazone (ZAROXOLYN) 2.5 mg tablet TAKE 1 TABLET BY MOUTH DAILY  Qty: 30 Tab, Refills: 11             Diet: Reference my discharge instructions. Activity: Reference my discharge instructions. EXAM:   Gen:NAD. Neuro: A&Ox3. Follows commands. Speech clear. Affect normal.  LEVY spontaneously. Strength 5/5 in UE and LE BL. Sensation intact to light touch. Gait based and steady with rolling walker.   Skin: Lumbar dressing C/D/I  Heart RRR  Lungs CTA BL  Abd soft, NT  Ext no edema    Total time discharging patient took greater than 30 minutes.     Signed:  Tresa James NP  August 24, 2020  [id number]

## 2020-08-21 NOTE — PROGRESS NOTES
Problem: Falls - Risk of  Goal: *Absence of Falls  Description: Document Vito Elaina Fall Risk and appropriate interventions in the flowsheet.   Outcome: Progressing Towards Goal  Note: Fall Risk Interventions:            Medication Interventions: Evaluate medications/consider consulting pharmacy    Elimination Interventions: Call light in reach

## 2020-08-21 NOTE — PROGRESS NOTES
Orders received, chart reviewed and patient evaluated by occupational therapy. Pending progression with skilled acute occupational therapy, recommend:  Occupational therapy at least 2 days/week in the home      Recommend with nursing patient to complete as able in order to maintain strength, endurance and independence: OOB to chair 3x/day with Min A and functional mobility to the bathroom once progressed by PT with mobility. Thank you for your assistance. Full evaluation to follow.

## 2020-08-22 VITALS
SYSTOLIC BLOOD PRESSURE: 102 MMHG | HEART RATE: 104 BPM | WEIGHT: 235.89 LBS | TEMPERATURE: 98.3 F | BODY MASS INDEX: 38.07 KG/M2 | DIASTOLIC BLOOD PRESSURE: 69 MMHG | RESPIRATION RATE: 16 BRPM | OXYGEN SATURATION: 94 %

## 2020-08-22 PROCEDURE — 74011250637 HC RX REV CODE- 250/637: Performed by: NEUROLOGICAL SURGERY

## 2020-08-22 PROCEDURE — 97535 SELF CARE MNGMENT TRAINING: CPT

## 2020-08-22 PROCEDURE — 99218 HC RM OBSERVATION: CPT

## 2020-08-22 RX ADMIN — METOLAZONE 2.5 MG: 2.5 TABLET ORAL at 08:55

## 2020-08-22 RX ADMIN — POLYETHYLENE GLYCOL 3350 17 G: 17 POWDER, FOR SOLUTION ORAL at 08:54

## 2020-08-22 RX ADMIN — DOCUSATE SODIUM 50MG AND SENNOSIDES 8.6MG 1 TABLET: 8.6; 5 TABLET, FILM COATED ORAL at 08:56

## 2020-08-22 RX ADMIN — ALLOPURINOL 100 MG: 100 TABLET ORAL at 08:55

## 2020-08-22 RX ADMIN — ACETAMINOPHEN 650 MG: 325 TABLET ORAL at 08:55

## 2020-08-22 RX ADMIN — ACETAMINOPHEN 650 MG: 325 TABLET ORAL at 02:37

## 2020-08-22 RX ADMIN — Medication 10 ML: at 07:22

## 2020-08-22 RX ADMIN — POTASSIUM CHLORIDE 20 MEQ: 750 TABLET, FILM COATED, EXTENDED RELEASE ORAL at 08:54

## 2020-08-22 NOTE — PROGRESS NOTES
Excellent post op recovery  Leg pain she had pre op is resolved  Ambulatory and voiding on own  Incision healing well  Ok to discharge home  F/u in office next week

## 2020-08-22 NOTE — PROGRESS NOTES
OLEG    RUR: n/a  -PT/OT recommended HH  -Referral sent to [facility] , pending  -Spouse to transport    CM placed call to on call, [facility]  and left a message asking for a return phone call. 10:40 AM  CM placed contact call to [facility]  [phone] and left a voice message with on call requesting a return phone call. Spoke with Yola Whitley who confirmed patient has been accepted with [facility]  for PT/OT services.        Jolly Hou, CRM

## 2020-08-22 NOTE — PROGRESS NOTES
Physical Therapy  8/22/2020    Chart reviewed. Followed up w/ pt this afternoon. Pt currently seated up in chair,eating lunch. Reports she did steps yesterday and had no problem w/ negotiating and feels she will be able to do steps once home. She has a RW for home use. Has no further questions/concerns regarding PT. Pt ready for d/c home w/ HHPT. RN aware. Will follow if there are any changes in discharge plan.      Jana Guzman, PTA

## 2020-08-22 NOTE — DISCHARGE SUMMARY
[facility] [address] SUMMARY    Name:  Benton Barrios  MR#:  [id number]  :  1960  ACCOUNT #:  [de-identified]  ADMIT DATE:  2020  DISCHARGE DATE:      HISTORY:  A 40-year-old woman who presented with signs and symptoms of lumbar canal stenosis. She was taken to the operating room where she underwent a 3-level lumbar laminectomy, foraminotomies on the left at L3-4, L4-5, L5-S1 interspace levels. She had an excellent postoperative recovery. Her leg pain resolved immediately. She was ambulating at the time of discharge and voiding on her own. The incision is healing well. Her incisional pain is well controlled with oral pain relievers. She is discharged with instructions to follow up with me in my office and given a prescription for incisional pain.         Juan Soler MD      [id number]  D:  2020 10:28  T:  2020 13:07  JOB #:  [id number]  CC:  MD Ale De Los Santos MD

## 2020-08-22 NOTE — PROGRESS NOTES
Problem: Self Care Deficits Care Plan (Adult)  Goal: *Acute Goals and Plan of Care (Insert Text)  Description: FUNCTIONAL STATUS PRIOR TO ADMISSION: Patient was independent and active without use of DME.    HOME SUPPORT: The patient lived with family but did not require assist.    Occupational Therapy Goals  Initiated 8/21/2020    1. Patient will perform lower body dressing with modified independence using AE PRN within 4 days. 2.  Patient will perform toileting with modified independence using most appropriate DME within 4 days. 3.  Patient will bathing at modified independence within 4 days. 4.  Patient will tolerate standing up to 10 minutes for ADLS     Outcome: Progressing Towards Goal       OCCUPATIONAL THERAPY TREATMENT  Patient: Dee Daniel (41 y.o. female)  Date: 8/22/2020  Diagnosis: Lumbar stenosis with neurogenic claudication [M48.062]   <principal problem not specified>  Procedure(s) (LRB):  LEFT L3, 4, 5 LAMINECTOMY AND FORAMINTOMIES (Left) 2 Days Post-Op  Precautions:    Chart, occupational therapy assessment, plan of care, and goals were reviewed. ASSESSMENT  Patient continues with skilled OT services and is progressing towards goals. Pt educated on home safety, LB dressing, use of AE (reacher) for dropped items and LB ADLs as well as LB dressing techniques (donrita L LE first). Supervision for supine to sit with HOB lowered and heavy use of bed rail, min A for management of B LEs for return to supine. Overall SBA with RW with verbal cues needed for RW safety and management as pt discarded RW prior to entering bathroom. Continue to recommend HHOT and PT at discharge. 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   Continue treatment per established plan of care. to address goals. Recommend with staff: OOB to chair for meals, mobilize to bathroom with RW    Recommend next OT session: use of reacher for LB ADLs    Recommendation for discharge: (in order for the patient to meet his/her long term goals)  Occupational therapy at least 2 days/week in the home AND ensure assist and/or supervision for safety with bed mobility    This discharge recommendation:  Has been made in collaboration with the attending provider and/or case management    IF patient discharges home will need the following DME: none- has BSC and RW. Reviewed proper way to use BSC over commode in bathroom       SUBJECTIVE:   Patient stated I just have a harder time getting back into bed.     OBJECTIVE DATA SUMMARY:   Cognitive/Behavioral Status:           Perception: Appears intact  Perseveration: No perseveration noted  Safety/Judgement: Awareness of environment    Functional Mobility and Transfers for ADLs:  Bed Mobility:  Supine to Sit: Supervision  Sit to Supine: Minimum assistance(management of LEs)    Transfers:  Sit to Stand: Stand-by assistance  Functional Transfers  Toilet Transfer : Modified independent  Bed to Chair: Stand-by assistance; Additional time; Adaptive equipment    Balance:  Sitting: Intact  Standing: Impaired; Without support  Standing - Static: Good  Standing - Dynamic : Good    ADL Intervention:       Grooming  Washing Hands: Modified independent- verbal cues and reminders to square RW off to sink          Patient recalled don/doff Left LE 1st/last without cues. Patient instructed and indicated understanding technique of don all clothing sitting prior to standing, doff all clothing to knees standing, then sit to doff off knees.               Toileting  Bladder Hygiene: Modified independent  Clothing Management: Modified independent  Adaptive Equipment: Reacher;Walker    Cognitive Retraining  Safety/Judgement: Awareness of environment        Pain:  minimal    Activity Tolerance:   Good  Please refer to the flowsheet for vital signs taken during this treatment. After treatment patient left in no apparent distress:   Supine in bed    COMMUNICATION/COLLABORATION:   The patients plan of care was discussed with: Registered nurse.      Donis Angelucci, OT  Time Calculation: 17 mins

## 2020-08-23 ENCOUNTER — HOME CARE VISIT (OUTPATIENT)
Dept: SCHEDULING | Facility: HOME HEALTH | Age: 60
End: 2020-08-23
Payer: COMMERCIAL

## 2020-08-23 ENCOUNTER — HOME CARE VISIT (OUTPATIENT)
Dept: HOME HEALTH SERVICES | Facility: HOME HEALTH | Age: 60
End: 2020-08-23
Payer: COMMERCIAL

## 2020-08-23 VITALS
OXYGEN SATURATION: 97 % | RESPIRATION RATE: 16 BRPM | SYSTOLIC BLOOD PRESSURE: 100 MMHG | DIASTOLIC BLOOD PRESSURE: 70 MMHG | TEMPERATURE: 97.1 F | HEART RATE: 105 BPM

## 2020-08-23 PROCEDURE — 400013 HH SOC

## 2020-08-23 PROCEDURE — G0151 HHCP-SERV OF PT,EA 15 MIN: HCPCS

## 2020-08-24 ENCOUNTER — HOME CARE VISIT (OUTPATIENT)
Dept: HOME HEALTH SERVICES | Facility: HOME HEALTH | Age: 60
End: 2020-08-24
Payer: COMMERCIAL

## 2020-08-24 ENCOUNTER — PATIENT OUTREACH (OUTPATIENT)
Dept: CASE MANAGEMENT | Age: 60
End: 2020-08-24

## 2020-08-24 NOTE — PROGRESS NOTES
- Care transitions     Brett Mayberry RN, [facility], [facility]  Care transitions nurse [phone]  [facility] Discharge Follow-Up    Date/Time:  2020 9:47 AM    Patient was admitted to [facility] on  and discharged on  for elective surgery - lumbar stenosis/ neurogenic claudication. . The physician discharge summary was available at the time of outreach. Patient was contacted within 1 business days of discharge. Call to and reached pt who is doing well - has some resolution of s/s immediately post op - she was seen by PT on  - they are checking vital signs and temperature. Top Challenges reviewed with the provider   Lumbar stenosis - post Left laminectomy and foramintomoeis - L3, 4, 5  Chronic renal failure stage 4  Obesity     Method of communication with provider :staff message    Inpatient RRAT score: 3  Was this a readmission? no   Patient stated reason for the readmission: back surgery    Care Transitions Nurse (CTN) contacted the patient by telephone to perform post hospital discharge assessment. Verified name and  with patient as identifiers. Provided introduction to self, and explanation of the CTN role. Reviewed discharge instructions and red flags with patient who verbalized understanding. Patient given an opportunity to ask questions and does not have any further questions or concerns at this time. The patient agrees to contact the PCP office for questions related to their healthcare. CTN provided contact information for future reference. Disease Specific:   Orthopedic surgery     Summary of patient's top problems:  1. Lumbar stenosis - and neurogenic claudication - elective laminectomy  2. CKD - chronic - stage 4  3. Obesity    Patient contacted regarding recent discharge and COVID-19 risk. Discussed COVID-19 related testing which was available at this time. Test results were negative.  Patient informed of results, if available? yes      8/17/2020 12:41 PM - Bo, Lab In [facility]     Component  Value  Flag  Ref Range  Units  Status    SARS-CoV-2  Not Detected   Not Detected     Final      Patient has following risk factors of: chronic kidney disease. CTN/ACM/LPN reviewed discharge instructions, medical action plan and red flags related to discharge diagnosis. Reviewed and educated them on any new and changed medications related to discharge diagnosis. Advised obtaining a 90-day supply of all daily and as-needed medications. Education provided regarding infection prevention, and signs and symptoms of COVID-19 and when to seek medical attention with patient who verbalized understanding. Discussed exposure protocols and quarantine from [address] you at higher risk for severe illness 2019 and given an opportunity for questions and concerns. The patient agrees to contact the COVID-19 hotline [phone] or PCP office for questions related to their healthcare. CTN/ACM/LPN provided contact information for future reference. From CDC: Are you at higher risk for severe illness?  Wash your hands often.  Avoid close contact (6 feet, which is about two arm lengths) with people who are sick.  Put distance between yourself and other people if COVID-19 is spreading in your community.  Clean and disinfect frequently touched surfaces.  Avoid all cruise travel and non-essential air travel.  Call your healthcare professional if you have concerns about COVID-19 and your underlying condition or if you are sick. For more information on steps you can take to protect yourself, see CDC's How to Protect Yourself      Patient/family/caregiver given information for Wally Martin and agrees to enroll yes  Patient's preferred e-mail:  [email or web address]  Patient's preferred phone number: [phone]   Based on Loop alert triggers, patient will be contacted by nurse care manager for worsening symptoms.     Plan for follow-up call in 7-14 days based on severity of symptoms and risk factors. Home Health orders at discharge: PT, [address]: [facility]   Date of initial visit: /20    Durable Medical Equipment ordered/company: none  Durable Medical Equipment received: none    Barriers to care? None/pt is compliant and education re: condition and tx plan    Advance Care Planning:   Does patient have an Advance Directive:  not on file     Medication(s):   New Medications at Discharge: Oxycodone, Flexaril, PeriColace - pt not taking Oxycodone - using Tylenol -   Changed Medications at Discharge: none  Discontinued Medications at Discharge: no NSAIDS    Medication reconciliation was performed with patient, who verbalizes understanding of administration of home medications. There were no barriers to obtaining medications identified at this time.     Referral to Pharm D needed: no     [facility] follow up appointment(s):   Future Appointments   Date Time Provider Margarito Arora   2020  1:30 PM [facility] [address] [facility]   2020  3:00 PM [facility] [address] [facility]   2020 10:00 AM Malcolm Ceron MD [facility] BS AMB   2020 To Be Determined [facility]   9/3/2020 To Be Determined [facility]   9/10/2020 12:30 PM Norm Arevalo MD [facility] MAIN BS AMB      Non-[facility] follow up appointment(s): Follow up Dr. Ava Seay - 2 weeks - pt to call to schedule   Pt will cancel follow up with Dr. Domenico Barrios - (previously scheduled) - pt to call today   [facility]:  information provided as a resource   ______________________________________________    Name:  Dalia Olguin  MR#:  [id number]  :  1960  DATE OF SERVICE:  2020        POSTOPERATIVE DIAGNOSIS:  Lumbar canal stenosis, L3-4, L4-5, and L5-S1.     PROCEDURE PERFORMED:  Three-level lumbar decompressive laminectomy and foraminotomies on the left at L3, L4 and L5.   SURGEON:  Brooke Uribe MD      ANESTHESIA:  General.    Brennon Alexander

## 2020-08-25 ENCOUNTER — HOME CARE VISIT (OUTPATIENT)
Dept: SCHEDULING | Facility: HOME HEALTH | Age: 60
End: 2020-08-25
Payer: COMMERCIAL

## 2020-08-25 ENCOUNTER — HOME CARE VISIT (OUTPATIENT)
Dept: HOME HEALTH SERVICES | Facility: HOME HEALTH | Age: 60
End: 2020-08-25
Payer: COMMERCIAL

## 2020-08-25 VITALS
TEMPERATURE: 98.4 F | SYSTOLIC BLOOD PRESSURE: 129 MMHG | OXYGEN SATURATION: 95 % | DIASTOLIC BLOOD PRESSURE: 76 MMHG | HEART RATE: 105 BPM

## 2020-08-25 PROCEDURE — G0157 HHC PT ASSISTANT EA 15: HCPCS

## 2020-08-25 PROCEDURE — G0152 HHCP-SERV OF OT,EA 15 MIN: HCPCS

## 2020-08-26 VITALS
RESPIRATION RATE: 16 BRPM | OXYGEN SATURATION: 95 % | DIASTOLIC BLOOD PRESSURE: 80 MMHG | TEMPERATURE: 98.5 F | SYSTOLIC BLOOD PRESSURE: 130 MMHG | HEART RATE: 105 BPM

## 2020-08-27 ENCOUNTER — HOME CARE VISIT (OUTPATIENT)
Dept: SCHEDULING | Facility: HOME HEALTH | Age: 60
End: 2020-08-27
Payer: COMMERCIAL

## 2020-08-27 ENCOUNTER — HOME CARE VISIT (OUTPATIENT)
Dept: HOME HEALTH SERVICES | Facility: HOME HEALTH | Age: 60
End: 2020-08-27
Payer: COMMERCIAL

## 2020-08-27 PROCEDURE — G0152 HHCP-SERV OF OT,EA 15 MIN: HCPCS

## 2020-08-28 ENCOUNTER — HOME CARE VISIT (OUTPATIENT)
Dept: SCHEDULING | Facility: HOME HEALTH | Age: 60
End: 2020-08-28
Payer: COMMERCIAL

## 2020-08-28 VITALS — DIASTOLIC BLOOD PRESSURE: 90 MMHG | SYSTOLIC BLOOD PRESSURE: 132 MMHG | TEMPERATURE: 97.7 F | HEART RATE: 96 BPM

## 2020-08-28 VITALS
TEMPERATURE: 98.4 F | HEART RATE: 103 BPM | RESPIRATION RATE: 16 BRPM | OXYGEN SATURATION: 96 % | DIASTOLIC BLOOD PRESSURE: 80 MMHG | SYSTOLIC BLOOD PRESSURE: 128 MMHG

## 2020-08-28 PROCEDURE — G0157 HHC PT ASSISTANT EA 15: HCPCS

## 2020-08-31 ENCOUNTER — HOME CARE VISIT (OUTPATIENT)
Dept: HOME HEALTH SERVICES | Facility: HOME HEALTH | Age: 60
End: 2020-08-31
Payer: COMMERCIAL

## 2020-09-01 ENCOUNTER — HOME CARE VISIT (OUTPATIENT)
Dept: SCHEDULING | Facility: HOME HEALTH | Age: 60
End: 2020-09-01
Payer: COMMERCIAL

## 2020-09-01 ENCOUNTER — HOME CARE VISIT (OUTPATIENT)
Dept: HOME HEALTH SERVICES | Facility: HOME HEALTH | Age: 60
End: 2020-09-01
Payer: COMMERCIAL

## 2020-09-01 VITALS
RESPIRATION RATE: 16 BRPM | HEART RATE: 102 BPM | SYSTOLIC BLOOD PRESSURE: 138 MMHG | OXYGEN SATURATION: 94 % | DIASTOLIC BLOOD PRESSURE: 88 MMHG | TEMPERATURE: 98 F

## 2020-09-01 PROCEDURE — G0152 HHCP-SERV OF OT,EA 15 MIN: HCPCS

## 2020-09-01 PROCEDURE — G0157 HHC PT ASSISTANT EA 15: HCPCS

## 2020-09-02 VITALS
SYSTOLIC BLOOD PRESSURE: 152 MMHG | HEART RATE: 105 BPM | OXYGEN SATURATION: 98 % | TEMPERATURE: 98.2 F | DIASTOLIC BLOOD PRESSURE: 106 MMHG

## 2020-09-03 ENCOUNTER — HOME CARE VISIT (OUTPATIENT)
Dept: SCHEDULING | Facility: HOME HEALTH | Age: 60
End: 2020-09-03
Payer: COMMERCIAL

## 2020-09-03 ENCOUNTER — HOME CARE VISIT (OUTPATIENT)
Dept: HOME HEALTH SERVICES | Facility: HOME HEALTH | Age: 60
End: 2020-09-03
Payer: COMMERCIAL

## 2020-09-03 VITALS
OXYGEN SATURATION: 96 % | SYSTOLIC BLOOD PRESSURE: 138 MMHG | TEMPERATURE: 98.1 F | DIASTOLIC BLOOD PRESSURE: 90 MMHG | HEART RATE: 99 BPM

## 2020-09-03 PROCEDURE — G0152 HHCP-SERV OF OT,EA 15 MIN: HCPCS

## 2020-09-04 ENCOUNTER — HOME CARE VISIT (OUTPATIENT)
Dept: SCHEDULING | Facility: HOME HEALTH | Age: 60
End: 2020-09-04
Payer: COMMERCIAL

## 2020-09-04 ENCOUNTER — HOME CARE VISIT (OUTPATIENT)
Dept: HOME HEALTH SERVICES | Facility: HOME HEALTH | Age: 60
End: 2020-09-04
Payer: COMMERCIAL

## 2020-09-04 PROCEDURE — G0151 HHCP-SERV OF PT,EA 15 MIN: HCPCS

## 2020-09-06 VITALS
RESPIRATION RATE: 12 BRPM | DIASTOLIC BLOOD PRESSURE: 90 MMHG | OXYGEN SATURATION: 97 % | TEMPERATURE: 98.2 F | SYSTOLIC BLOOD PRESSURE: 128 MMHG | HEART RATE: 102 BPM

## 2020-09-08 ENCOUNTER — HOME CARE VISIT (OUTPATIENT)
Dept: SCHEDULING | Facility: HOME HEALTH | Age: 60
End: 2020-09-08
Payer: COMMERCIAL

## 2020-09-08 VITALS
SYSTOLIC BLOOD PRESSURE: 125 MMHG | HEART RATE: 99 BPM | OXYGEN SATURATION: 96 % | DIASTOLIC BLOOD PRESSURE: 80 MMHG | TEMPERATURE: 97.9 F

## 2020-09-08 PROCEDURE — G0152 HHCP-SERV OF OT,EA 15 MIN: HCPCS

## 2020-09-15 RX ORDER — AMLODIPINE BESYLATE 10 MG/1
TABLET ORAL
Qty: 90 TAB | Refills: 3 | Status: SHIPPED | OUTPATIENT
Start: 2020-09-15 | End: 2021-10-13 | Stop reason: SDUPTHER

## 2020-09-15 RX ORDER — LISINOPRIL 40 MG/1
TABLET ORAL
Qty: 90 TAB | Refills: 3 | Status: SHIPPED | OUTPATIENT
Start: 2020-09-15 | End: 2022-02-13

## 2020-09-15 RX ORDER — PRAVASTATIN SODIUM 40 MG/1
TABLET ORAL
Qty: 90 TAB | Refills: 3 | Status: SHIPPED | OUTPATIENT
Start: 2020-09-15 | End: 2021-10-13 | Stop reason: SDUPTHER

## 2020-09-15 RX ORDER — POTASSIUM CHLORIDE 20 MEQ/1
TABLET, EXTENDED RELEASE ORAL
Qty: 90 TAB | Refills: 3 | Status: SHIPPED | OUTPATIENT
Start: 2020-09-15 | End: 2021-10-13 | Stop reason: SDUPTHER

## 2020-09-15 RX ORDER — METOLAZONE 2.5 MG/1
TABLET ORAL
Qty: 90 TAB | Refills: 3 | Status: SHIPPED | OUTPATIENT
Start: 2020-09-15 | End: 2022-07-19

## 2020-09-21 ENCOUNTER — OFFICE VISIT (OUTPATIENT)
Dept: INTERNAL MEDICINE CLINIC | Age: 60
End: 2020-09-21
Payer: COMMERCIAL

## 2020-09-21 VITALS
HEART RATE: 100 BPM | DIASTOLIC BLOOD PRESSURE: 83 MMHG | WEIGHT: 244 LBS | HEIGHT: 66 IN | BODY MASS INDEX: 39.21 KG/M2 | SYSTOLIC BLOOD PRESSURE: 125 MMHG | RESPIRATION RATE: 18 BRPM | OXYGEN SATURATION: 98 % | TEMPERATURE: 97.8 F

## 2020-09-21 DIAGNOSIS — M16.12 PRIMARY OSTEOARTHRITIS OF LEFT HIP: ICD-10-CM

## 2020-09-21 DIAGNOSIS — N39.0 URINARY TRACT INFECTION WITHOUT HEMATURIA, SITE UNSPECIFIED: ICD-10-CM

## 2020-09-21 DIAGNOSIS — N18.4 CHRONIC RENAL FAILURE SYNDROME, STAGE 4 (SEVERE) (HCC): Primary | ICD-10-CM

## 2020-09-21 DIAGNOSIS — E66.01 SEVERE OBESITY (HCC): ICD-10-CM

## 2020-09-21 DIAGNOSIS — N31.9 NEUROGENIC BLADDER: ICD-10-CM

## 2020-09-21 DIAGNOSIS — E83.52 HYPERCALCEMIA: ICD-10-CM

## 2020-09-21 DIAGNOSIS — G47.00 INSOMNIA, UNSPECIFIED TYPE: ICD-10-CM

## 2020-09-21 PROCEDURE — 99215 OFFICE O/P EST HI 40 MIN: CPT | Performed by: INTERNAL MEDICINE

## 2020-09-21 NOTE — PROGRESS NOTES
[address] [place] [address] and Primary Care  [facility]  [address] [place] [address]  Phone:  [phone]  Fax: [phone]       Chief Complaint   Patient presents with    Hypertension   . SUBJECTIVE:    Ladtere Barnett is a 56 y.o. female Comes in for return visit, stating that she has not done well. She had a surgical procedure done, which was decompression of her lumbar spine. For the first two weeks she noticed significant improvement, but has now noted increasing pain in her left buttock, radiating to her left inguinal area. This is aggravated with walking. It impedes her walking. She has had no meaningful weight loss since I last saw her. She also has a history of chronic renal failure secondary to hypertension and nephrosclerosis. Complicating this could be secondary hyperparathyroidism leading to the hypercalcemia. She has a past history of primary hypertension, dyslipidemia, as well as gout. Current Outpatient Medications   Medication Sig Dispense Refill    mirabegron ER (Myrbetriq) 50 mg ER tablet Take 1 Tab by mouth daily. 30 Tab 11    pravastatin (PRAVACHOL) 40 mg tablet TAKE 1 TABLET BY MOUTH EVERY DAY AT BEDTIME 90 Tab 3    potassium chloride (K-DUR, KLOR-CON) 20 mEq tablet TAKE 1 TABLET BY MOUTH DAILY 90 Tab 3    lisinopriL (PRINIVIL, ZESTRIL) 40 mg tablet TAKE 1 TABLET BY MOUTH DAILY 90 Tab 3    metOLazone (ZAROXOLYN) 2.5 mg tablet TAKE 1 TABLET BY MOUTH DAILY 90 Tab 3    amLODIPine (NORVASC) 10 mg tablet TAKE 1 TABLET BY MOUTH DAILY 90 Tab 3    cyclobenzaprine (FLEXERIL) 10 mg tablet Take 0.5 Tabs by mouth three (3) times daily as needed for Muscle Spasm(s). 10 Tab 0    senna-docusate (PERICOLACE) 8.6-50 mg per tablet Take 1 Tab by mouth daily. 7 Tab 0    acetaminophen (Tylenol Extra Strength) 500 mg tablet Take 500 mg by mouth every six (6) hours as needed for Pain.       allopurinoL (ZYLOPRIM) 100 mg tablet 2 tablets QD (Patient taking differently: Take  by mouth. 2 tablets QD) 60 Tab 11    predniSONE (DELTASONE) 5 mg tablet Take 1 Tab by mouth daily. 27 Tab 11     Past Medical History:   Diagnosis Date    Arthritis     L knee    Chronic kidney disease     secondary to hypertensive nephrosclerosis    Gout     History of claustrophobia     Hypertension      Past Surgical History:   Procedure Laterality Date    HX  SECTION      HX COLONOSCOPY      HX GYN      ECTOPIC PREGNANCY SURGERY     HX LAP CHOLECYSTECTOMY       Allergies   Allergen Reactions    Morphine Nausea and Vomiting and Cough     Blood pressure and 158-96 heart rate up to 156.     [place] Swelling         REVIEW OF SYSTEMS:  General: negative for - chills or fever  ENT: negative for - headaches, nasal congestion or tinnitus  Respiratory: negative for - cough, hemoptysis, shortness of breath or wheezing  Cardiovascular : negative for - chest pain, edema, palpitations or shortness of breath  Gastrointestinal: negative for - abdominal pain, blood in stools, heartburn or nausea/vomiting  Genito-Urinary: no dysuria, trouble voiding, or hematuria  Musculoskeletal: negative for - gait disturbance, joint pain, joint stiffness or joint swelling  Neurological: no TIA or stroke symptoms  Hematologic: no bruises, no bleeding, no swollen glands  Integument: no lumps, mole changes, nail changes or rash  Endocrine: no malaise/lethargy or unexpected weight changes      Social History     Socioeconomic History    Marital status:      Spouse name: Not on file    Number of children: 2    Years of education: 16+    Highest education level: Master's degree (e.g., MA, MS, Schuyler, MEd, MSW, RENATA)   Occupational History    Occupation: teacher   Tobacco Use    Smoking status: Former Smoker     Packs/day: 0.25     Years: 25.00     Pack years: 6.25     Last attempt to quit: 2000     Years since quittin.2    Smokeless tobacco: Never Used   Substance and Sexual Activity    Alcohol use: No    Drug use: No    Sexual activity: Not Currently     Family History   Problem Relation Age of Onset    Hypertension Mother     Liver Disease Father     Diabetes Sister     Breast Cancer Paternal Grandmother     Anesth Problems Neg Hx        OBJECTIVE:    Visit Vitals  /83   Pulse 100   Temp 97.8 °F (36.6 °C) (Oral)   Resp 18   Ht 5' 6\" (1.676 m)   Wt 244 lb (110.7 kg)   SpO2 98%   BMI 39.38 kg/m²     CONSTITUTIONAL: well , well nourished, appears age appropriate  EYES: perrla, eom intact  ENMT:moist mucous membranes, pharynx clear  NECK: supple. Thyroid normal  RESPIRATORY: Chest: clear to ascultation and percussion   CARDIOVASCULAR: Heart: regular rate and rhythm  GASTROINTESTINAL: Abdomen: soft, bowel sounds active  HEMATOLOGIC: no pathological lymph nodes palpated  MUSCULOSKELETAL: Extremities: no edema, pulse 1, markedly positive Armen's test to external and internal rotation left hip  INTEGUMENT: No unusual rashes or suspicious skin lesions noted. Nails appear normal.  NEUROLOGIC: non-focal exam, negative straight leg raising  MENTAL STATUS: alert and oriented, appropriate affect      ASSESSMENT:  1. Chronic renal failure syndrome, stage 4 (severe) (HCC)    2. Hypercalcemia    3. Urinary tract infection without hematuria, site unspecified    4. Primary osteoarthritis of left hip    5. Insomnia, unspecified type    6. Neurogenic bladder    7. Severe obesity ([address]        PLAN:    1. The patient's pain in her left leg is most likely emanating from the hip. I referred her to an orthopedic physician for an opinion. If this is an acute inflammation, she needs to have an injection. 2. Her decompressive procedure in her lumbar spine appears to be doing quite well. 3. She has increased frequency of urination, which is not accompanied by dysuria. I will check a UA and empirically treat her for neurogenic bladder with Myrbetriq. 4. She needs to lose weight.   We have discussed this repetitively. This can be accomplished by eating meals, eliminating snacks and avoiding the consumption of processed carbohydrates. 5. She has chronic renal failure and the possibility of secondary hyperparathyroidism in view of her persistent hypercalcemia. Appropriate labs will be obtained today pertinent to this. .  Orders Placed This Encounter    CULTURE, URINE    METABOLIC PANEL, BASIC    CYSTATIN C    PHOSPHORUS    PTH INTACT    URINALYSIS W/ RFLX MICROSCOPIC    REFERRAL TO ORTHOPEDIC SURGERY    mirabegron ER (Myrbetriq) 50 mg ER tablet         Follow-up and Dispositions    · Return in about 4 weeks (around 10/19/2020).            Elle Marcano MD

## 2020-09-21 NOTE — PROGRESS NOTES
Chief Complaint   Patient presents with    Hypertension     1. Have you been to the ER, urgent care clinic since your last visit? Hospitalized since your last visit? No    2. Have you seen or consulted any other health care providers outside of the [address] [place] [address] since your last visit? Include any pap smears or colon screening.  No

## 2020-09-23 LAB
APPEARANCE UR: CLEAR
BACTERIA #/AREA URNS HPF: NORMAL /[HPF]
BACTERIA UR CULT: NORMAL
BILIRUB UR QL STRIP: NEGATIVE
BUN SERPL-MCNC: 52 MG/DL (ref 8–27)
BUN/CREAT SERPL: 15 (ref 12–28)
CALCIUM SERPL-MCNC: 12.1 MG/DL (ref 8.7–10.3)
CASTS URNS QL MICRO: NORMAL /LPF
CHLORIDE SERPL-SCNC: 109 MMOL/L (ref 96–106)
CO2 SERPL-SCNC: 20 MMOL/L (ref 20–29)
COLOR UR: YELLOW
CREAT SERPL-MCNC: 3.53 MG/DL (ref 0.57–1)
CYSTATIN C SERPL-MCNC: 4.06 MG/L (ref 0.62–1.16)
EPI CELLS #/AREA URNS HPF: NORMAL /HPF (ref 0–10)
GLUCOSE SERPL-MCNC: 102 MG/DL (ref 65–99)
GLUCOSE UR QL: NEGATIVE
HGB UR QL STRIP: ABNORMAL
KETONES UR QL STRIP: NEGATIVE
LEUKOCYTE ESTERASE UR QL STRIP: NEGATIVE
MICRO URNS: ABNORMAL
MUCOUS THREADS URNS QL MICRO: PRESENT
NITRITE UR QL STRIP: NEGATIVE
PH UR STRIP: 5.5 [PH] (ref 5–7.5)
PHOSPHATE SERPL-MCNC: 3.7 MG/DL (ref 3–4.3)
POTASSIUM SERPL-SCNC: 3.8 MMOL/L (ref 3.5–5.2)
PROT UR QL STRIP: ABNORMAL
PTH-INTACT SERPL-MCNC: NORMAL PG/ML
RBC #/AREA URNS HPF: NORMAL /HPF (ref 0–2)
SODIUM SERPL-SCNC: 146 MMOL/L (ref 134–144)
SP GR UR: 1.02 (ref 1–1.03)
UROBILINOGEN UR STRIP-MCNC: 0.2 MG/DL (ref 0.2–1)
WBC #/AREA URNS HPF: NORMAL /HPF (ref 0–5)

## 2020-09-30 RX ORDER — TOLTERODINE 4 MG/1
4 CAPSULE, EXTENDED RELEASE ORAL DAILY
Qty: 30 CAP | Refills: 11 | Status: SHIPPED | OUTPATIENT
Start: 2020-09-30 | End: 2021-10-13 | Stop reason: SDUPTHER

## 2020-10-04 PROBLEM — M87.052 AVASCULAR NECROSIS OF LEFT FEMUR (HCC): Status: ACTIVE | Noted: 2020-10-04

## 2020-10-21 ENCOUNTER — OFFICE VISIT (OUTPATIENT)
Dept: INTERNAL MEDICINE CLINIC | Age: 60
End: 2020-10-21
Payer: COMMERCIAL

## 2020-10-21 VITALS
WEIGHT: 235.3 LBS | TEMPERATURE: 98 F | DIASTOLIC BLOOD PRESSURE: 91 MMHG | SYSTOLIC BLOOD PRESSURE: 148 MMHG | HEART RATE: 94 BPM | HEIGHT: 66 IN | BODY MASS INDEX: 37.82 KG/M2 | RESPIRATION RATE: 14 BRPM | OXYGEN SATURATION: 97 %

## 2020-10-21 DIAGNOSIS — N18.4 CHRONIC RENAL FAILURE SYNDROME, STAGE 4 (SEVERE) (HCC): ICD-10-CM

## 2020-10-21 DIAGNOSIS — E66.01 SEVERE OBESITY (HCC): ICD-10-CM

## 2020-10-21 DIAGNOSIS — N25.81 SECONDARY HYPERPARATHYROIDISM (HCC): ICD-10-CM

## 2020-10-21 DIAGNOSIS — I10 ESSENTIAL HYPERTENSION: ICD-10-CM

## 2020-10-21 DIAGNOSIS — F32.A DEPRESSION, UNSPECIFIED DEPRESSION TYPE: Primary | ICD-10-CM

## 2020-10-21 DIAGNOSIS — M87.052 AVASCULAR NECROSIS OF LEFT FEMUR (HCC): ICD-10-CM

## 2020-10-21 DIAGNOSIS — E83.52 HYPERCALCEMIA: ICD-10-CM

## 2020-10-21 DIAGNOSIS — E78.5 DYSLIPIDEMIA: ICD-10-CM

## 2020-10-21 PROCEDURE — 99215 OFFICE O/P EST HI 40 MIN: CPT | Performed by: INTERNAL MEDICINE

## 2020-10-21 RX ORDER — ESCITALOPRAM OXALATE 10 MG/1
10 TABLET ORAL
Qty: 30 TAB | Refills: 2 | Status: SHIPPED | OUTPATIENT
Start: 2020-10-21 | End: 2022-02-13

## 2020-10-21 NOTE — PROGRESS NOTES
1. Have you been to the ER, urgent care clinic since your last visit? Hospitalized since your last visit? No    2. Have you seen or consulted any other health care providers outside of the [address] [place] [address] since your last visit? Include any pap smears or colon screening.  No     Sleep troubles, going on for about a month

## 2020-10-22 LAB
BUN SERPL-MCNC: 44 MG/DL (ref 8–27)
BUN/CREAT SERPL: 14 (ref 12–28)
CALCIUM SERPL-MCNC: 12.2 MG/DL (ref 8.7–10.3)
CHLORIDE SERPL-SCNC: 104 MMOL/L (ref 96–106)
CO2 SERPL-SCNC: 22 MMOL/L (ref 20–29)
CREAT SERPL-MCNC: 3.21 MG/DL (ref 0.57–1)
GLUCOSE SERPL-MCNC: 78 MG/DL (ref 65–99)
POTASSIUM SERPL-SCNC: 3.8 MMOL/L (ref 3.5–5.2)
SODIUM SERPL-SCNC: 143 MMOL/L (ref 134–144)

## 2020-10-25 PROBLEM — E83.52 HYPERCALCEMIA: Status: ACTIVE | Noted: 2020-10-25

## 2020-10-25 PROBLEM — N25.81 SECONDARY HYPERPARATHYROIDISM (HCC): Status: ACTIVE | Noted: 2020-06-14

## 2020-10-25 NOTE — PROGRESS NOTES
[address] [place] [address] and Primary Care  [place] [address]  [address] [place] [address]  Phone:  [phone]  Fax: [phone]       Chief Complaint   Patient presents with    Sleep Problem   . SUBJECTIVE:    Day Nunez is a 56 y.o. female Comes in for return visit, quite upset. She thinks that her emotional state has declined to the point where she is probably depressed. Her depression is emanating from the fact that she has now potential surgery of her left hip, for which she has AVN and this is causing a considerable amount of pain. She most recently had a decompressive procedure done of her back. Emotionally it is getting the best of her. She appears to have occasional panic attacks and is having difficulty sleeping and concentrating. She did see her orthopedic physician, who did an MRI of her hip, revealing severe osteoarthritis. Apparently, as I stated earlier, it is related to AVN. She definitely needs a replacement. She additionally complains of dyspnea on exertion. She also has hypercalcemia, presumably secondary to hyperparathyroidism. She needs to see a nephrologist regarding this. Current Outpatient Medications   Medication Sig Dispense Refill    escitalopram oxalate (LEXAPRO) 10 mg tablet Take 1 Tab by mouth nightly. 30 Tab 2    tolterodine ER (DETROL LA) 4 mg ER capsule Take 1 Cap by mouth daily. 30 Cap 11    mirabegron ER (Myrbetriq) 50 mg ER tablet Take 1 Tab by mouth daily.  30 Tab 11    pravastatin (PRAVACHOL) 40 mg tablet TAKE 1 TABLET BY MOUTH EVERY DAY AT BEDTIME 90 Tab 3    potassium chloride (K-DUR, KLOR-CON) 20 mEq tablet TAKE 1 TABLET BY MOUTH DAILY 90 Tab 3    lisinopriL (PRINIVIL, ZESTRIL) 40 mg tablet TAKE 1 TABLET BY MOUTH DAILY 90 Tab 3    metOLazone (ZAROXOLYN) 2.5 mg tablet TAKE 1 TABLET BY MOUTH DAILY 90 Tab 3    amLODIPine (NORVASC) 10 mg tablet TAKE 1 TABLET BY MOUTH DAILY 90 Tab 3    cyclobenzaprine (FLEXERIL) 10 mg tablet Take 0.5 Tabs by mouth three (3) times daily as needed for Muscle Spasm(s). 10 Tab 0    senna-docusate (PERICOLACE) 8.6-50 mg per tablet Take 1 Tab by mouth daily. 7 Tab 0    acetaminophen (Tylenol Extra Strength) 500 mg tablet Take 500 mg by mouth every six (6) hours as needed for Pain.  allopurinoL (ZYLOPRIM) 100 mg tablet 2 tablets QD (Patient taking differently: Take  by mouth. 2 tablets QD) 60 Tab 11    predniSONE (DELTASONE) 5 mg tablet Take 1 Tab by mouth daily. 30 Tab 11    esomeprazole (NexIUM) 20 mg capsule Take 1 Cap by mouth daily for 20 days. 20 Cap 0     Past Medical History:   Diagnosis Date    Arthritis     L knee    Chronic kidney disease     secondary to hypertensive nephrosclerosis    Gout     History of claustrophobia     Hypertension      Past Surgical History:   Procedure Laterality Date    HX  SECTION      HX COLONOSCOPY      HX GYN      ECTOPIC PREGNANCY SURGERY     HX LAP CHOLECYSTECTOMY      HX LUMBAR LAMINECTOMY  2020     Allergies   Allergen Reactions    Morphine Nausea and Vomiting and Cough     Blood pressure and 158-96 heart rate up to 156.     [place] Swelling         REVIEW OF SYSTEMS:  General: negative for - chills or fever  ENT: negative for - headaches, nasal congestion or tinnitus  Respiratory: negative for - cough, hemoptysis, shortness of breath or wheezing  Cardiovascular : negative for - chest pain, edema, palpitations or shortness of breath  Gastrointestinal: negative for - abdominal pain, blood in stools, heartburn or nausea/vomiting  Genito-Urinary: no dysuria, trouble voiding, or hematuria  Musculoskeletal: negative for - gait disturbance, joint pain, joint stiffness or joint swelling  Neurological: no TIA or stroke symptoms  Hematologic: no bruises, no bleeding, no swollen glands  Integument: no lumps, mole changes, nail changes or rash  Endocrine: no malaise/lethargy or unexpected weight changes      Social History     Socioeconomic History    Marital status:      Spouse name: Not on file    Number of children: 2    Years of education: 16+    Highest education level: Master's degree (e.g., MA, MS, Schuyler, MEd, MSW, RENATA)   Occupational History    Occupation: teacher   Tobacco Use    Smoking status: Former Smoker     Packs/day: 0.25     Years: 25.00     Pack years: 6.25     Last attempt to quit: 2000     Years since quittin.3    Smokeless tobacco: Never Used   Substance and Sexual Activity    Alcohol use: No    Drug use: No    Sexual activity: Not Currently     Family History   Problem Relation Age of Onset    Hypertension Mother     Liver Disease Father     Diabetes Sister     Breast Cancer Paternal Grandmother     Anesth Problems Neg Hx        OBJECTIVE:    Visit Vitals  BP (!) 148/91   Pulse 94   Temp 98 °F (36.7 °C) (Oral)   Resp 14   Ht 5' 6\" (1.676 m)   Wt 235 lb 4.8 oz (106.7 kg)   SpO2 97%   BMI 37.98 kg/m²     CONSTITUTIONAL: well , well nourished, appears age appropriate  EYES: perrla, eom intact  ENMT:moist mucous membranes, pharynx clear  NECK: supple. Thyroid normal  RESPIRATORY: Chest: clear to ascultation and percussion   CARDIOVASCULAR: Heart: regular rate and rhythm  GASTROINTESTINAL: Abdomen: soft, bowel sounds active  HEMATOLOGIC: no pathological lymph nodes palpated  MUSCULOSKELETAL: Extremities: no edema, pulse 1+   INTEGUMENT: No unusual rashes or suspicious skin lesions noted. Nails appear normal.  NEUROLOGIC: non-focal exam   MENTAL STATUS: alert and oriented, appropriate affect      ASSESSMENT:  1. Depression, unspecified depression type    2. Avascular necrosis of left femur ([address]    3. Chronic renal failure syndrome, stage 4 (severe) (HCC)    4. Secondary hyperparathyroidism ([address]    5. Hypercalcemia    6. Severe obesity ([address]    7. Essential hypertension    8. Dyslipidemia        PLAN:  1. The patient is depressed.   She is having multiple medical problems that are flaring up most recently. The principal thing now is the need to have a left total hip replacement because of AVN. She most recently had a decompressive procedure on her LS spine approximately 3 months ago. Currently she needs an antidepressant and she agrees. 2.  Her chronic renal failure is stable. The only issue as relates to this is the possibility of secondary hyperparathyroidism. Unfortunately it is not much that I can do and will therefore need assistance from a nephrologist.  3.  Unfortunately she does need to lose weight as we have discussed. Would be difficult now because of the psychological dysfunction that currently exist.  4.  Her blood pressure is quite reasonable and no adjustments are made. 5.  She will continue her statin in view of her increased cardiovascular risk. .  Orders Placed This Encounter    METABOLIC PANEL, BASIC    escitalopram oxalate (LEXAPRO) 10 mg tablet         Follow-up and Dispositions    · Return in about 2 months (around 12/21/2020).            Dc Warner MD

## 2020-10-31 ENCOUNTER — APPOINTMENT (OUTPATIENT)
Dept: CT IMAGING | Age: 60
End: 2020-10-31
Attending: EMERGENCY MEDICINE
Payer: COMMERCIAL

## 2020-10-31 ENCOUNTER — HOSPITAL ENCOUNTER (EMERGENCY)
Age: 60
Discharge: HOME | End: 2020-10-31
Attending: EMERGENCY MEDICINE
Payer: COMMERCIAL

## 2020-10-31 VITALS
HEART RATE: 89 BPM | RESPIRATION RATE: 16 BRPM | SYSTOLIC BLOOD PRESSURE: 142 MMHG | OXYGEN SATURATION: 98 % | DIASTOLIC BLOOD PRESSURE: 88 MMHG | TEMPERATURE: 98.2 F

## 2020-10-31 DIAGNOSIS — R10.13 ABDOMINAL PAIN, EPIGASTRIC: Primary | ICD-10-CM

## 2020-10-31 DIAGNOSIS — K29.00 ACUTE GASTRITIS WITHOUT HEMORRHAGE, UNSPECIFIED GASTRITIS TYPE: ICD-10-CM

## 2020-10-31 DIAGNOSIS — N18.9 CHRONIC KIDNEY DISEASE, UNSPECIFIED CKD STAGE: ICD-10-CM

## 2020-10-31 DIAGNOSIS — N28.1 RENAL CYST: ICD-10-CM

## 2020-10-31 DIAGNOSIS — I25.10 CORONARY ARTERY DISEASE INVOLVING NATIVE HEART WITHOUT ANGINA PECTORIS, UNSPECIFIED VESSEL OR LESION TYPE: ICD-10-CM

## 2020-10-31 LAB
ALBUMIN SERPL-MCNC: 3.6 G/DL (ref 3.5–5)
ALBUMIN/GLOB SERPL: 0.8 {RATIO} (ref 1.1–2.2)
ALP SERPL-CCNC: 94 U/L (ref 45–117)
ALT SERPL-CCNC: 12 U/L (ref 12–78)
ANION GAP SERPL CALC-SCNC: 7 MMOL/L (ref 5–15)
APPEARANCE UR: CLEAR
AST SERPL-CCNC: 20 U/L (ref 15–37)
BACTERIA URNS QL MICRO: NEGATIVE /HPF
BASOPHILS # BLD: 0 K/UL (ref 0–0.1)
BASOPHILS NFR BLD: 0 % (ref 0–1)
BILIRUB SERPL-MCNC: 0.4 MG/DL (ref 0.2–1)
BILIRUB UR QL: NEGATIVE
BUN SERPL-MCNC: 56 MG/DL (ref 6–20)
BUN/CREAT SERPL: 15 (ref 12–20)
CALCIUM SERPL-MCNC: 11.9 MG/DL (ref 8.5–10.1)
CHLORIDE SERPL-SCNC: 107 MMOL/L (ref 97–108)
CO2 SERPL-SCNC: 23 MMOL/L (ref 21–32)
COLOR UR: ABNORMAL
COMMENT, HOLDF: NORMAL
CREAT SERPL-MCNC: 3.76 MG/DL (ref 0.55–1.02)
DIFFERENTIAL METHOD BLD: ABNORMAL
EOSINOPHIL # BLD: 0.2 K/UL (ref 0–0.4)
EOSINOPHIL NFR BLD: 2 % (ref 0–7)
EPITH CASTS URNS QL MICRO: ABNORMAL /LPF
ERYTHROCYTE [DISTWIDTH] IN BLOOD BY AUTOMATED COUNT: 12.9 % (ref 11.5–14.5)
GLOBULIN SER CALC-MCNC: 4.3 G/DL (ref 2–4)
GLUCOSE SERPL-MCNC: 89 MG/DL (ref 65–100)
GLUCOSE UR STRIP.AUTO-MCNC: NEGATIVE MG/DL
HCT VFR BLD AUTO: 37.1 % (ref 35–47)
HGB BLD-MCNC: 11 G/DL (ref 11.5–16)
HGB UR QL STRIP: ABNORMAL
HYALINE CASTS URNS QL MICRO: ABNORMAL /LPF (ref 0–5)
IMM GRANULOCYTES # BLD AUTO: 0 K/UL (ref 0–0.04)
IMM GRANULOCYTES NFR BLD AUTO: 0 % (ref 0–0.5)
KETONES UR QL STRIP.AUTO: NEGATIVE MG/DL
LEUKOCYTE ESTERASE UR QL STRIP.AUTO: NEGATIVE
LIPASE SERPL-CCNC: 170 U/L (ref 73–393)
LYMPHOCYTES # BLD: 1.7 K/UL (ref 0.8–3.5)
LYMPHOCYTES NFR BLD: 17 % (ref 12–49)
MAGNESIUM SERPL-MCNC: 1.5 MG/DL (ref 1.6–2.4)
MCH RBC QN AUTO: 28.6 PG (ref 26–34)
MCHC RBC AUTO-ENTMCNC: 29.6 G/DL (ref 30–36.5)
MCV RBC AUTO: 96.6 FL (ref 80–99)
MONOCYTES # BLD: 0.6 K/UL (ref 0–1)
MONOCYTES NFR BLD: 6 % (ref 5–13)
NEUTS SEG # BLD: 7.7 K/UL (ref 1.8–8)
NEUTS SEG NFR BLD: 75 % (ref 32–75)
NITRITE UR QL STRIP.AUTO: NEGATIVE
NRBC # BLD: 0 K/UL (ref 0–0.01)
NRBC BLD-RTO: 0 PER 100 WBC
PH UR STRIP: 5 [PH] (ref 5–8)
PLATELET # BLD AUTO: 246 K/UL (ref 150–400)
PMV BLD AUTO: 10.9 FL (ref 8.9–12.9)
POTASSIUM SERPL-SCNC: 4.2 MMOL/L (ref 3.5–5.1)
PROT SERPL-MCNC: 7.9 G/DL (ref 6.4–8.2)
PROT UR STRIP-MCNC: 100 MG/DL
RBC # BLD AUTO: 3.84 M/UL (ref 3.8–5.2)
RBC #/AREA URNS HPF: ABNORMAL /HPF (ref 0–5)
SAMPLES BEING HELD,HOLD: NORMAL
SODIUM SERPL-SCNC: 137 MMOL/L (ref 136–145)
SP GR UR REFRACTOMETRY: 1.01 (ref 1–1.03)
TROPONIN I SERPL-MCNC: <0.05 NG/ML
UR CULT HOLD, URHOLD: NORMAL
UROBILINOGEN UR QL STRIP.AUTO: 0.2 EU/DL (ref 0.2–1)
WBC # BLD AUTO: 10.3 K/UL (ref 3.6–11)
WBC URNS QL MICRO: ABNORMAL /HPF (ref 0–4)

## 2020-10-31 PROCEDURE — 83690 ASSAY OF LIPASE: CPT

## 2020-10-31 PROCEDURE — 36415 COLL VENOUS BLD VENIPUNCTURE: CPT

## 2020-10-31 PROCEDURE — 85025 COMPLETE CBC W/AUTO DIFF WBC: CPT

## 2020-10-31 PROCEDURE — 80053 COMPREHEN METABOLIC PANEL: CPT

## 2020-10-31 PROCEDURE — 74176 CT ABD & PELVIS W/O CONTRAST: CPT

## 2020-10-31 PROCEDURE — 99284 EMERGENCY DEPT VISIT MOD MDM: CPT

## 2020-10-31 PROCEDURE — 93005 ELECTROCARDIOGRAM TRACING: CPT

## 2020-10-31 PROCEDURE — 81001 URINALYSIS AUTO W/SCOPE: CPT

## 2020-10-31 PROCEDURE — 84484 ASSAY OF TROPONIN QUANT: CPT

## 2020-10-31 PROCEDURE — 83735 ASSAY OF MAGNESIUM: CPT

## 2020-10-31 PROCEDURE — 74011250637 HC RX REV CODE- 250/637: Performed by: EMERGENCY MEDICINE

## 2020-10-31 RX ORDER — FAMOTIDINE 20 MG/1
20 TABLET, FILM COATED ORAL
Status: COMPLETED | OUTPATIENT
Start: 2020-10-31 | End: 2020-10-31

## 2020-10-31 RX ORDER — HYDROGEN PEROXIDE 3 %
20 SOLUTION, NON-ORAL MISCELLANEOUS DAILY
Qty: 20 CAP | Refills: 0 | Status: SHIPPED | OUTPATIENT
Start: 2020-10-31 | End: 2020-11-20

## 2020-10-31 RX ADMIN — FAMOTIDINE 20 MG: 20 TABLET ORAL at 05:34

## 2020-10-31 NOTE — ED NOTES
MD  reviewed discharge instructions and options with patient. Patient verbalized understanding. RN reviewed discharge instructions using teach back method. Patient ambulatory to exit without difficulty and no acute signs of distress. No complaints or needs expressed at this time. Patient counseled on medications prescribed at discharge. Vital signs stable. Patient to follow up with GI in the morning for appointment.

## 2020-10-31 NOTE — ED TRIAGE NOTES
Left lower abdominal pain with radiation into her back since late Wednesday. She says the pain has gotten worse tonight. She has nausea without vomiting.

## 2020-10-31 NOTE — ED PROVIDER NOTES
HPI   57 yo F presents with left flank pain onset Wednesday. Pain worse with movement and deep breathing. +nausea, no vomiting. C/o constipation, last BM last night, hard, no bloody or black stools. Denies fever, chills, dysuria, hematuria, cp, sob. No hx of similar pain. Pain 8/10, nonradiating. Scheduled for left hip replacement next week. Took tylenol for pain with some relief.   Past Medical History:   Diagnosis Date    Arthritis     L knee    Chronic kidney disease     secondary to hypertensive nephrosclerosis    Gout     History of claustrophobia     Hypertension        Past Surgical History:   Procedure Laterality Date    HX  SECTION      HX COLONOSCOPY      HX GYN      ECTOPIC PREGNANCY SURGERY     HX LAP CHOLECYSTECTOMY      HX LUMBAR LAMINECTOMY  2020         Family History:   Problem Relation Age of Onset    Hypertension Mother     Liver Disease Father     Diabetes Sister     Breast Cancer Paternal Grandmother     Anesth Problems Neg Hx        Social History     Socioeconomic History    Marital status:      Spouse name: Not on file    Number of children: 2    Years of education: 16+    Highest education level: Master's degree (e.g., MA, MS, Schuyler, MEd, MSW, RENATA)   Occupational History    Occupation: teacher   Social Needs    Financial resource strain: Not on file    Food insecurity     Worry: Not on file     Inability: Not on file   [employer] needs     Medical: Not on file     Non-medical: Not on file   Tobacco Use    Smoking status: Former Smoker     Packs/day: 0.25     Years: 25.00     Pack years: 6.25     Last attempt to quit: 2000     Years since quittin.3    Smokeless tobacco: Never Used   Substance and Sexual Activity    Alcohol use: No    Drug use: No    Sexual activity: Not Currently   Lifestyle    Physical activity     Days per week: Not on file     Minutes per session: Not on file    Stress: Not on file   Relationships    Social connections     Talks on phone: Not on file     Gets together: Not on file     Attends Church service: Not on file     Active member of club or organization: Not on file     Attends meetings of clubs or organizations: Not on file     Relationship status: Not on file    Intimate partner violence     Fear of current or ex partner: Not on file     Emotionally abused: Not on file     Physically abused: Not on file     Forced sexual activity: Not on file   Other Topics Concern    Not on file   Social History Narrative    Not on file         ALLERGIES: Morphine and [address]    Review of Systems   Constitutional: Negative for chills and fever. Respiratory: Negative for cough and shortness of breath. Cardiovascular: Negative for chest pain and leg swelling. Gastrointestinal: Positive for constipation and nausea. Negative for abdominal pain, diarrhea and vomiting. Genitourinary: Positive for flank pain. Negative for dysuria and hematuria. Skin: Negative for rash. All other systems reviewed and are negative.       Vitals:    10/31/20 0159   BP: (!) 154/91   Pulse: (!) 101   Resp: 16   Temp: 97.8 °F (36.6 °C)   SpO2: 96%            Physical Exam   Physical Examination: General appearance - alert, well appearing, and in no distress, oriented to person, place, and time and normal appearing weight  Eyes - pupils equal and reactive, extraocular eye movements intact  Neck - supple, no significant adenopathy  Chest - clear to auscultation, no wheezes, rales or rhonchi, symmetric air entry  Heart - normal rate, regular rhythm, normal S1, S2, no murmurs, rubs, clicks or gallops  Abdomen - soft, mild mid abdominal tenderness, no rebound/guarding/peritoneal signs, nondistended, no masses or organomegaly  Back exam - full range of motion, no tenderness, palpable spasm or pain on motion  Neurological - alert, oriented, normal speech, no focal findings or movement disorder noted  Musculoskeletal - no joint tenderness, deformity or swelling  Extremities - peripheral pulses normal, no pedal edema, no clubbing or cyanosis  Skin - normal coloration and turgor, no rashes, no suspicious skin lesions noted  MDM  Number of Diagnoses or Management Options     Amount and/or Complexity of Data Reviewed  Clinical lab tests: ordered and reviewed  Tests in the radiology section of CPT®: ordered and reviewed  Decide to obtain previous medical records or to obtain history from someone other than the patient: yes  Review and summarize past medical records: yes  Independent visualization of images, tracings, or specimens: yes    Patient Progress  Patient progress: improved         Procedures  ED EKG interpretation:  Rhythm: normal sinus rhythm; and regular . Rate (approx.): 84; Axis: left axis deviation; P wave: normal; QRS interval: normal ; ST/T wave: non-specific changes;   EKG documented by Zahra Millan MD    Patient is feeling much better without intervention. Patient offered and refused pain medication in the emergency room. Discussed CT findings with patient including incidental coronary artery disease and multiple cysts on bilateral kidneys. Patient agrees to follow-up with PCP and GI for further evaluation and treatment. Will start PPI for possible gastritis. Patient agrees to return to ED for worsening symptoms.

## 2020-10-31 NOTE — DISCHARGE INSTRUCTIONS
Patient Education        Abdominal Pain: Care Instructions  Your Care Instructions     Abdominal pain has many possible causes. Some aren't serious and get better on their own in a few days. Others need more testing and treatment. If your pain continues or gets worse, you need to be rechecked and may need more tests to find out what is wrong. You may need surgery to correct the problem. Don't ignore new symptoms, such as fever, nausea and vomiting, urination problems, pain that gets worse, and dizziness. These may be signs of a more serious problem. Your doctor may have recommended a follow-up visit in the next 8 to 12 hours. If you are not getting better, you may need more tests or treatment. The doctor has checked you carefully, but problems can develop later. If you notice any problems or new symptoms, get medical treatment right away. Follow-up care is a key part of your treatment and safety. Be sure to make and go to all appointments, and call your doctor if you are having problems. It's also a good idea to know your test results and keep a list of the medicines you take. How can you care for yourself at home? · Rest until you feel better. · To prevent dehydration, drink plenty of fluids, enough so that your urine is light yellow or clear like water. Choose water and other caffeine-free clear liquids until you feel better. If you have kidney, heart, or liver disease and have to limit fluids, talk with your doctor before you increase the amount of fluids you drink. · If your stomach is upset, eat mild foods, such as rice, dry toast or crackers, bananas, and applesauce. Try eating several small meals instead of two or three large ones. · Wait until 48 hours after all symptoms have gone away before you have spicy foods, alcohol, and drinks that contain caffeine. · Do not eat foods that are high in fat. · Avoid anti-inflammatory medicines such as aspirin, ibuprofen (Advil, Motrin), and naproxen (Aleve). These can cause stomach upset. Talk to your doctor if you take daily aspirin for another health problem. When should you call for help? Call 911 anytime you think you may need emergency care. For example, call if:    · You passed out (lost consciousness).     · You pass maroon or very bloody stools.     · You vomit blood or what looks like coffee grounds.     · You have new, severe belly pain. Call your doctor now or seek immediate medical care if:    · Your pain gets worse, especially if it becomes focused in one area of your belly.     · You have a new or higher fever.     · Your stools are black and look like tar, or they have streaks of blood.     · You have unexpected vaginal bleeding.     · You have symptoms of a urinary tract infection. These may include:  ? Pain when you urinate. ? Urinating more often than usual.  ? Blood in your urine.     · You are dizzy or lightheaded, or you feel like you may faint. Watch closely for changes in your health, and be sure to contact your doctor if:    · You are not getting better after 1 day (24 hours). Where can you learn more? Go to http://www.gray.com/  Enter I199 in the search box to learn more about \"Abdominal Pain: Care Instructions. \"  Current as of: June 26, 2019               Content Version: 12.6  © 6967-2869 [employer]. Care instructions adapted under license by [employer] (which disclaims liability or warranty for this information). If you have questions about a medical condition or this instruction, always ask your healthcare professional. [place] [address] any warranty or liability for your use of this information. Patient Education        Gastritis: Care Instructions  Your Care Instructions     Gastritis is a sore and upset stomach. It happens when something irritates the stomach lining. Many things can cause it.  These include an infection such as the flu or something you ate or drank. Medicines or a sore on the lining of the stomach (ulcer) also can cause it. Your belly may bloat and ache. You may belch, vomit, and feel sick to your stomach. You should be able to relieve the problem by taking medicine. And it may help to change your diet. If gastritis lasts, your doctor may prescribe medicine. Follow-up care is a key part of your treatment and safety. Be sure to make and go to all appointments, and call your doctor if you are having problems. It's also a good idea to know your test results and keep a list of the medicines you take. How can you care for yourself at home? · If your doctor prescribed antibiotics, take them as directed. Do not stop taking them just because you feel better. You need to take the full course of antibiotics. · Be safe with medicines. If your doctor prescribed medicine to decrease stomach acid, take it as directed. Call your doctor if you think you are having a problem with your medicine. · Do not take any other medicine, including over-the-counter pain relievers, without talking to your doctor first.  · If your doctor recommends over-the-counter medicine to reduce stomach acid, such as Pepcid AC (famotidine), Prilosec (omeprazole), or Tagamet HB (cimetidine) follow the directions on the label. · Drink plenty of fluids (enough so that your urine is light yellow or clear like water) to prevent dehydration. Choose water and other caffeine-free clear liquids. If you have kidney, heart, or liver disease and have to limit fluids, talk with your doctor before you increase the amount of fluids you drink. · Limit how much alcohol you drink. · Avoid coffee, tea, cola drinks, chocolate, and other foods with caffeine. They increase stomach acid. When should you call for help? Call 911 anytime you think you may need emergency care. For example, call if:    · You vomit blood or what looks like coffee grounds.     · You pass maroon or very bloody stools. Call your doctor now or seek immediate medical care if:    · You start breathing fast and have not produced urine in the last 8 hours.     · You cannot keep fluids down. Watch closely for changes in your health, and be sure to contact your doctor if:    · You do not get better as expected. Where can you learn more? Go to http://www.piña.com/  Enter Z536 in the search box to learn more about \"Gastritis: Care Instructions. \"  Current as of: April 15, 2020               Content Version: 12.6  © 5879-0000 [employer]. Care instructions adapted under license by [employer] (which disclaims liability or warranty for this information). If you have questions about a medical condition or this instruction, always ask your healthcare professional. [address] any warranty or liability for your use of this information. We hope that we have addressed all of your medical concerns. The examination and treatment you received in the Emergency Department were for an emergent problem and were not intended as complete care. It is important that you follow up with your healthcare provider(s) for ongoing care. If your symptoms worsen or do not improve as expected, and you are unable to reach your usual health care provider(s), you should return to the Emergency Department. Today's healthcare is undergoing tremendous change, and patient satisfaction surveys are one of the many tools to assess the quality of medical care. You may receive a survey from the [employer] regarding your experience in the Emergency Department. I hope that your experience has been completely positive, particularly the medical care that I provided. As such, please participate in the survey; anything less than excellent does not meet my expectations or intentions.         [address] [facility] and [address] [place] [address] participate in nationally recognized quality of care measures. If your blood pressure is greater than 120/80, as reported below, we urge that you seek medical care to address the potential of high blood pressure, commonly known as hypertension. Hypertension can be hereditary or can be caused by certain medical conditions, pain, stress, or \"white coat syndrome. \"       Please make an appointment with your health care provider(s) for follow up of your Emergency Department visit. VITALS:   Patient Vitals for the past 8 hrs:   Temp Pulse Resp BP SpO2   10/31/20 0159 97.8 °F (36.6 °C) (!) 101 16 (!) 154/91 96 %          Thank you for allowing us to provide you with medical care today. We realize that you have many choices for your emergency care needs. Please choose us in the future for any continued health care needs. Lucas Varela, [address] [place] [address].   Office: [phone]            Recent Results (from the past 24 hour(s))   CBC WITH AUTOMATED DIFF    Collection Time: 10/31/20  2:38 AM   Result Value Ref Range    WBC 10.3 3.6 - 11.0 K/uL    RBC 3.84 3.80 - 5.20 M/uL    HGB 11.0 (L) 11.5 - 16.0 g/dL    HCT 37.1 35.0 - 47.0 %    MCV 96.6 80.0 - 99.0 FL    MCH 28.6 26.0 - 34.0 PG    MCHC 29.6 (L) 30.0 - 36.5 g/dL    RDW 12.9 11.5 - 14.5 %    PLATELET 669 414 - 980 K/uL    MPV 10.9 8.9 - 12.9 FL    NRBC 0.0 0  WBC    ABSOLUTE NRBC 0.00 0.00 - 0.01 K/uL    NEUTROPHILS 75 32 - 75 %    LYMPHOCYTES 17 12 - 49 %    MONOCYTES 6 5 - 13 %    EOSINOPHILS 2 0 - 7 %    BASOPHILS 0 0 - 1 %    IMMATURE GRANULOCYTES 0 0.0 - 0.5 %    ABS. NEUTROPHILS 7.7 1.8 - 8.0 K/UL    ABS. LYMPHOCYTES 1.7 0.8 - 3.5 K/UL    ABS. MONOCYTES 0.6 0.0 - 1.0 K/UL    ABS. EOSINOPHILS 0.2 0.0 - 0.4 K/UL    ABS. BASOPHILS 0.0 0.0 - 0.1 K/UL    ABS. IMM.  GRANS. 0.0 0.00 - 0.04 K/UL    DF AUTOMATED     METABOLIC PANEL, COMPREHENSIVE    Collection Time: 10/31/20  2:38 AM   Result Value Ref Range    Sodium 137 136 - 145 mmol/L    Potassium 4.2 3.5 - 5.1 mmol/L    Chloride 107 97 - 108 mmol/L    CO2 23 21 - 32 mmol/L    Anion gap 7 5 - 15 mmol/L    Glucose 89 65 - 100 mg/dL    BUN 56 (H) 6 - 20 MG/DL    Creatinine 3.76 (H) 0.55 - 1.02 MG/DL    BUN/Creatinine ratio 15 12 - 20      GFR est AA 15 (L) >60 ml/min/1.73m2    GFR est non-AA 12 (L) >60 ml/min/1.73m2    Calcium 11.9 (H) 8.5 - 10.1 MG/DL    Bilirubin, total 0.4 0.2 - 1.0 MG/DL    ALT (SGPT) 12 12 - 78 U/L    AST (SGOT) 20 15 - 37 U/L    Alk. phosphatase 94 45 - 117 U/L    Protein, total 7.9 6.4 - 8.2 g/dL    Albumin 3.6 3.5 - 5.0 g/dL    Globulin 4.3 (H) 2.0 - 4.0 g/dL    A-G Ratio 0.8 (L) 1.1 - 2.2     SAMPLES BEING HELD    Collection Time: 10/31/20  2:38 AM   Result Value Ref Range    SAMPLES BEING HELD  1 red,1 blue,1 uc     COMMENT        Add-on orders for these samples will be processed based on acceptable specimen integrity and analyte stability, which may vary by analyte. URINALYSIS W/MICROSCOPIC    Collection Time: 10/31/20  2:38 AM   Result Value Ref Range    Color YELLOW/STRAW      Appearance CLEAR CLEAR      Specific gravity 1.010 1.003 - 1.030      pH (UA) 5.0 5.0 - 8.0      Protein 100 (A) NEG mg/dL    Glucose Negative NEG mg/dL    Ketone Negative NEG mg/dL    Bilirubin Negative NEG      Blood TRACE (A) NEG      Urobilinogen 0.2 0.2 - 1.0 EU/dL    Nitrites Negative NEG      Leukocyte Esterase Negative NEG      WBC 0-4 0 - 4 /hpf    RBC 0-5 0 - 5 /hpf    Epithelial cells FEW FEW /lpf    Bacteria Negative NEG /hpf    Hyaline cast 0-2 0 - 5 /lpf   URINE CULTURE HOLD SAMPLE    Collection Time: 10/31/20  2:38 AM    Specimen: Serum; Urine   Result Value Ref Range    Urine culture hold        Urine on hold in Microbiology dept for 2 days. If unpreserved urine is submitted, it cannot be used for addtional testing after 24 hours, recollection will be required.    LIPASE    Collection Time: 10/31/20  2:38 AM   Result Value Ref Range    Lipase 170 73 - 393 U/L MAGNESIUM    Collection Time: 10/31/20  2:38 AM   Result Value Ref Range    Magnesium 1.5 (L) 1.6 - 2.4 mg/dL   TROPONIN I    Collection Time: 10/31/20  2:38 AM   Result Value Ref Range    Troponin-I, Qt. <0.05 <0.05 ng/mL   EKG, 12 LEAD, INITIAL    Collection Time: 10/31/20  2:43 AM   Result Value Ref Range    Ventricular Rate 84 BPM    Atrial Rate 84 BPM    P-R Interval 168 ms    QRS Duration 80 ms    Q-T Interval 350 ms    QTC Calculation (Bezet) 413 ms    Calculated P Axis 42 degrees    Calculated R Axis -17 degrees    Calculated T Axis 56 degrees    Diagnosis       Normal sinus rhythm  Voltage criteria for left ventricular hypertrophy  When compared with ECG of 12-AUG-2020 11:40,  No significant change was found         Ct Abd Pelv Wo Cont    Result Date: 10/31/2020  CLINICAL HISTORY: left flank pain INDICATION: left flank pain COMPARISON: None CONTRAST:  None. TECHNIQUE: Thin axial images were obtained through the abdomen and pelvis. Coronal and sagittal reformats were generated. Oral contrast was not administered. CT dose reduction was achieved through use of a standardized protocol tailored for this examination and automatic exposure control for dose modulation. The absence of intravenous contrast material reduces the sensitivity for evaluation of visceral organs and vasculature including presence of small mass lesions, hemodynamically significant stenoses, dissections, mucosal abnormalities etc. FINDINGS: LOWER THORAX: Minimal basilar atelectasis. Coronary artery disease. LIVER/GALLBLADDER: Hepatic steatosis cholecystectomy CBD is not dilated. SPLEEN/PANCREAS:  within normal limits. ADRENALS/KIDNEYS: Bilateral renal cysts. Renal cortical thinning bilaterally as well. No calculus or hydronephrosis. STOMACH: There is inflammatory stranding and wall thickening adjacent to the distal body and antrum of the stomach. SMALL BOWEL/COLON: No dilatation or wall thickening. Colonic diverticula. No diverticulitis. APPENDIX: Normal PERITONEUM: No ascites or pneumoperitoneum. RETROPERITONEUM: No lymphadenopathy or aortic aneurysm. REPRODUCTIVE ORGANS: Unremarkable URINARY BLADDER: No mass or calculus. BONES: Anterolisthesis of L4 on L5. Extensive multilevel degenerative change. Severe canal stenoses at L4-5 and L2-3. Severe multilevel foraminal stenosis. ADDITIONAL COMMENTS: N/A     IMPRESSION: Inflammatory stranding and gastric wall thickening at the level of the gastric antrum/distal body of the stomach most likely related to a moderate to severe gastritis. Consider infectious, inflammatory and ulcerative etiologies. Severe degenerative changes of the lumbar spine with severe canal and foraminal stenoses. Status post laminectomy. Additional incidental/nonemergent findings as described above.

## 2020-11-01 LAB
ATRIAL RATE: 84 BPM
CALCULATED P AXIS, ECG09: 42 DEGREES
CALCULATED R AXIS, ECG10: -17 DEGREES
CALCULATED T AXIS, ECG11: 56 DEGREES
DIAGNOSIS, 93000: NORMAL
P-R INTERVAL, ECG05: 168 MS
Q-T INTERVAL, ECG07: 350 MS
QRS DURATION, ECG06: 80 MS
QTC CALCULATION (BEZET), ECG08: 413 MS
VENTRICULAR RATE, ECG03: 84 BPM

## 2020-11-02 ENCOUNTER — HOSPITAL ENCOUNTER (OUTPATIENT)
Dept: PREADMISSION TESTING | Age: 60
Discharge: HOME | End: 2020-11-02
Payer: COMMERCIAL

## 2020-11-02 VITALS
SYSTOLIC BLOOD PRESSURE: 150 MMHG | WEIGHT: 225.53 LBS | BODY MASS INDEX: 36.25 KG/M2 | TEMPERATURE: 98.4 F | HEIGHT: 66 IN | DIASTOLIC BLOOD PRESSURE: 81 MMHG | HEART RATE: 84 BPM

## 2020-11-02 LAB
ABO + RH BLD: NORMAL
ANION GAP SERPL CALC-SCNC: 7 MMOL/L (ref 5–15)
APPEARANCE UR: ABNORMAL
BACTERIA URNS QL MICRO: ABNORMAL /HPF
BILIRUB UR QL: NEGATIVE
BLOOD GROUP ANTIBODIES SERPL: NORMAL
BUN SERPL-MCNC: 58 MG/DL (ref 6–20)
BUN/CREAT SERPL: 14 (ref 12–20)
CALCIUM SERPL-MCNC: 11.7 MG/DL (ref 8.5–10.1)
CHLORIDE SERPL-SCNC: 110 MMOL/L (ref 97–108)
CO2 SERPL-SCNC: 23 MMOL/L (ref 21–32)
COLOR UR: ABNORMAL
CREAT SERPL-MCNC: 4.23 MG/DL (ref 0.55–1.02)
EPITH CASTS URNS QL MICRO: ABNORMAL /LPF
ERYTHROCYTE [DISTWIDTH] IN BLOOD BY AUTOMATED COUNT: 13.2 % (ref 11.5–14.5)
EST. AVERAGE GLUCOSE BLD GHB EST-MCNC: 85 MG/DL
GLUCOSE SERPL-MCNC: 95 MG/DL (ref 65–100)
GLUCOSE UR STRIP.AUTO-MCNC: NEGATIVE MG/DL
HBA1C MFR BLD: 4.6 % (ref 4–5.6)
HCT VFR BLD AUTO: 34.3 % (ref 35–47)
HGB BLD-MCNC: 10 G/DL (ref 11.5–16)
HGB UR QL STRIP: ABNORMAL
INR PPP: 1 (ref 0.9–1.1)
KETONES UR QL STRIP.AUTO: NEGATIVE MG/DL
LEUKOCYTE ESTERASE UR QL STRIP.AUTO: NEGATIVE
MCH RBC QN AUTO: 28.6 PG (ref 26–34)
MCHC RBC AUTO-ENTMCNC: 29.2 G/DL (ref 30–36.5)
MCV RBC AUTO: 98 FL (ref 80–99)
NITRITE UR QL STRIP.AUTO: NEGATIVE
NRBC # BLD: 0 K/UL (ref 0–0.01)
NRBC BLD-RTO: 0 PER 100 WBC
PH UR STRIP: 5 [PH] (ref 5–8)
PLATELET # BLD AUTO: 240 K/UL (ref 150–400)
PMV BLD AUTO: 11.2 FL (ref 8.9–12.9)
POTASSIUM SERPL-SCNC: 3.5 MMOL/L (ref 3.5–5.1)
PROT UR STRIP-MCNC: 100 MG/DL
PROTHROMBIN TIME: 10.7 SEC (ref 9–11.1)
RBC # BLD AUTO: 3.5 M/UL (ref 3.8–5.2)
RBC #/AREA URNS HPF: ABNORMAL /HPF (ref 0–5)
SODIUM SERPL-SCNC: 140 MMOL/L (ref 136–145)
SP GR UR REFRACTOMETRY: 1.02 (ref 1–1.03)
SPECIMEN EXP DATE BLD: NORMAL
UA: UC IF INDICATED,UAUC: ABNORMAL
UROBILINOGEN UR QL STRIP.AUTO: 0.2 EU/DL (ref 0.2–1)
WBC # BLD AUTO: 7.5 K/UL (ref 3.6–11)
WBC URNS QL MICRO: ABNORMAL /HPF (ref 0–4)

## 2020-11-02 PROCEDURE — 36415 COLL VENOUS BLD VENIPUNCTURE: CPT

## 2020-11-02 PROCEDURE — 81001 URINALYSIS AUTO W/SCOPE: CPT

## 2020-11-02 PROCEDURE — 83036 HEMOGLOBIN GLYCOSYLATED A1C: CPT

## 2020-11-02 PROCEDURE — 80048 BASIC METABOLIC PNL TOTAL CA: CPT

## 2020-11-02 PROCEDURE — 85027 COMPLETE CBC AUTOMATED: CPT

## 2020-11-02 PROCEDURE — 86900 BLOOD TYPING SEROLOGIC ABO: CPT

## 2020-11-02 PROCEDURE — 85610 PROTHROMBIN TIME: CPT

## 2020-11-02 NOTE — PERIOP NOTES
PAT Nurse Practitioner   Pre-Operative Chart Review/Assessment:-ORTHOPEDIC                Patient Name:  Lee Gould                                                           Age:   56 y.o.    :  1960     Today's Date:  2020     Date of PAT:   2020      Date of Surgery:    2020      Procedure(s):  Left Total Hip Arthroplasty     Surgeon:   Dr. Candice Rudd                       PLAN:      1)  Medical Clearance:  Dr. Efrain Chen      2)  Cardiac Clearance:  Not requested. 3)  Diabetic Treatment Consult:  Not indicated. A1c-4.6      4)  Sleep Apnea evaluation:   ARLENE score of 5. Pt reports loud snoring that can be heard through a closed door and a diagnosis of HTN. Pt denies daytime fatigue and witnessed pauses in breathing. Referral sent to PCP for F/U and recommendations. 5) Treatment for MRSA/Staph Aureus:  +MRSA. Tx w/ mupirocin. 6) Additional Concerns:  Former smoker, HTN, CKD (Cr-4.23 in PAT), Claustrophobic                 Vital Signs:         Vitals:    20 1541   BP: (!) 150/81   Pulse: 84   Temp: 98.4 °F (36.9 °C)   Weight: 102.3 kg (225 lb 8.5 oz)   Height: 5' 6\" (1.676 m)            ____________________________________________  PAST MEDICAL HISTORY  Past Medical History:   Diagnosis Date    Arthritis     L knee    Chronic kidney disease     secondary to hypertensive nephrosclerosis    Gastritis     HISTORY OF 10/2020     Gout     History of claustrophobia     Hypertension       ____________________________________________  PAST SURGICAL HISTORY  Past Surgical History:   Procedure Laterality Date    HX  SECTION      HX COLONOSCOPY      HX GYN      ECTOPIC PREGNANCY SURGERY     HX LAP CHOLECYSTECTOMY      HX LUMBAR LAMINECTOMY  2020    [id number]      ____________________________________________  HOME MEDICATIONS  Current Outpatient Medications   Medication Sig    esomeprazole (NexIUM) 20 mg capsule Take 1 Cap by mouth daily for 20 days. (Patient taking differently: Take 20 mg by mouth nightly.)    escitalopram oxalate (LEXAPRO) 10 mg tablet Take 1 Tab by mouth nightly.  pravastatin (PRAVACHOL) 40 mg tablet TAKE 1 TABLET BY MOUTH EVERY DAY AT BEDTIME    potassium chloride (K-DUR, KLOR-CON) 20 mEq tablet TAKE 1 TABLET BY MOUTH DAILY    lisinopriL (PRINIVIL, ZESTRIL) 40 mg tablet TAKE 1 TABLET BY MOUTH DAILY    metOLazone (ZAROXOLYN) 2.5 mg tablet TAKE 1 TABLET BY MOUTH DAILY    amLODIPine (NORVASC) 10 mg tablet TAKE 1 TABLET BY MOUTH DAILY    acetaminophen (Tylenol Extra Strength) 500 mg tablet Take 500 mg by mouth every six (6) hours as needed for Pain.  allopurinoL (ZYLOPRIM) 100 mg tablet 2 tablets QD (Patient taking differently: Take  by mouth. 2 tablets QD)    tolterodine ER (DETROL LA) 4 mg ER capsule Take 1 Cap by mouth daily. No current facility-administered medications for this encounter.       ____________________________________________  ALLERGIES  Allergies   Allergen Reactions    Morphine Nausea and Vomiting and Cough     Blood pressure and 158-96 heart rate up to 156.     [place] Swelling      ____________________________________________  SOCIAL HISTORY  Social History     Tobacco Use    Smoking status: Former Smoker     Packs/day: 0.25     Years: 25.00     Pack years: 6.25     Last attempt to quit: 2000     Years since quittin.3    Smokeless tobacco: Never Used   Substance Use Topics    Alcohol use: No      ____________________________________________        Labs:     Hospital Outpatient Visit on 2020   Component Date Value Ref Range Status    Sodium 2020 140  136 - 145 mmol/L Final    Potassium 2020 3.5  3.5 - 5.1 mmol/L Final    Chloride 2020 110* 97 - 108 mmol/L Final    CO2 2020 23  21 - 32 mmol/L Final    Anion gap 2020 7  5 - 15 mmol/L Final    Glucose 2020 95  65 - 100 mg/dL Final    BUN 2020 58* 6 - 20 MG/DL Final    Creatinine 2020 4.23* 0.55 - 1.02 MG/DL Final    BUN/Creatinine ratio 11/02/2020 14  12 - 20   Final    GFR est AA 11/02/2020 13* >60 ml/min/1.73m2 Final    GFR est non-AA 11/02/2020 11* >60 ml/min/1.73m2 Final    Calcium 11/02/2020 11.7* 8.5 - 10.1 MG/DL Final    WBC 11/02/2020 7.5  3.6 - 11.0 K/uL Final    RBC 11/02/2020 3.50* 3.80 - 5.20 M/uL Final    HGB 11/02/2020 10.0* 11.5 - 16.0 g/dL Final    HCT 11/02/2020 34.3* 35.0 - 47.0 % Final    MCV 11/02/2020 98.0  80.0 - 99.0 FL Final    MCH 11/02/2020 28.6  26.0 - 34.0 PG Final    MCHC 11/02/2020 29.2* 30.0 - 36.5 g/dL Final    RDW 11/02/2020 13.2  11.5 - 14.5 % Final    PLATELET 75/93/9629 474  150 - 400 K/uL Final    MPV 11/02/2020 11.2  8.9 - 12.9 FL Final    NRBC 11/02/2020 0.0  0  WBC Final    ABSOLUTE NRBC 11/02/2020 0.00  0.00 - 0.01 K/uL Final    Crossmatch Expiration 11/02/2020 11/12/2020,2359   Final    ABO/Rh(D) 11/02/2020 A POSITIVE   Final    Antibody screen 11/02/2020 NEG   Final    INR 11/02/2020 1.0  0.9 - 1.1   Final    A single therapeutic range for Vit K antagonists may not be optimal for all indications - see June, 2008 issue of Chest, American College of Chest [employer]icians Evidence-Based Clinical Practice Guidelines, 8th Edition.     Prothrombin time 11/02/2020 10.7  9.0 - 11.1 sec Final    Color 11/02/2020 YELLOW/STRAW    Final    Color Reference Range: Straw, Yellow or Dark Yellow    Appearance 11/02/2020 CLOUDY* CLEAR   Final    Specific gravity 11/02/2020 1.016  1.003 - 1.030   Final    pH (UA) 11/02/2020 5.0  5.0 - 8.0   Final    Protein 11/02/2020 100* NEG mg/dL Final    Glucose 11/02/2020 Negative  NEG mg/dL Final    Ketone 11/02/2020 Negative  NEG mg/dL Final    Bilirubin 11/02/2020 Negative  NEG   Final    Blood 11/02/2020 TRACE* NEG   Final    Urobilinogen 11/02/2020 0.2  0.2 - 1.0 EU/dL Final    Nitrites 11/02/2020 Negative  NEG   Final    Leukocyte Esterase 11/02/2020 Negative  NEG   Final    WBC 11/02/2020 5-10  0 - 4 /hpf Final    RBC 11/02/2020 0-5  0 - 5 /hpf Final    Epithelial cells 11/02/2020 MANY* FEW /lpf Final    Epithelial cell category consists of squamous cells and /or transitional urothelial cells. Renal tubular cells, if present, are separately identified as such.  Bacteria 11/02/2020 3+* NEG /hpf Final    UA:UC IF INDICATED 11/02/2020 CULTURE NOT INDICATED BY UA RESULT  CNI   Final    Hemoglobin A1c 11/02/2020 4.6  4.0 - 5.6 % Final    Comment: NEW METHOD  PLEASE NOTE NEW REFERENCE RANGE  (NOTE)  HbA1C Interpretive Ranges  <5.7              Normal  5.7 - 6.4         Consider Prediabetes  >6.5              Consider Diabetes      Est. average glucose 11/02/2020 85  mg/dL Final    Special Requests: 11/02/2020 NO SPECIAL REQUESTS    Final    Culture result: 11/02/2020 MRSA PRESENT*   Final    Culture result: 11/02/2020     Screening of patient nares for MRSA is for surveillance purposes and, if positive, to facilitate isolation considerations in high risk settings. It is not intended for automatic decolonization interventions per se as regimens are not sufficiently effective to warrant routine use.     Final   Admission on 10/31/2020, Discharged on 10/31/2020   Component Date Value Ref Range Status    WBC 10/31/2020 10.3  3.6 - 11.0 K/uL Final    RBC 10/31/2020 3.84  3.80 - 5.20 M/uL Final    HGB 10/31/2020 11.0* 11.5 - 16.0 g/dL Final    HCT 10/31/2020 37.1  35.0 - 47.0 % Final    MCV 10/31/2020 96.6  80.0 - 99.0 FL Final    MCH 10/31/2020 28.6  26.0 - 34.0 PG Final    MCHC 10/31/2020 29.6* 30.0 - 36.5 g/dL Final    RDW 10/31/2020 12.9  11.5 - 14.5 % Final    PLATELET 36/36/6834 120  150 - 400 K/uL Final    MPV 10/31/2020 10.9  8.9 - 12.9 FL Final    NRBC 10/31/2020 0.0  0  WBC Final    ABSOLUTE NRBC 10/31/2020 0.00  0.00 - 0.01 K/uL Final    NEUTROPHILS 10/31/2020 75  32 - 75 % Final    LYMPHOCYTES 10/31/2020 17  12 - 49 % Final    MONOCYTES 10/31/2020 6  5 - 13 % Final    EOSINOPHILS 10/31/2020 2  0 - 7 % Final    BASOPHILS 10/31/2020 0  0 - 1 % Final    IMMATURE GRANULOCYTES 10/31/2020 0  0.0 - 0.5 % Final    ABS. NEUTROPHILS 10/31/2020 7.7  1.8 - 8.0 K/UL Final    ABS. LYMPHOCYTES 10/31/2020 1.7  0.8 - 3.5 K/UL Final    ABS. MONOCYTES 10/31/2020 0.6  0.0 - 1.0 K/UL Final    ABS. EOSINOPHILS 10/31/2020 0.2  0.0 - 0.4 K/UL Final    ABS. BASOPHILS 10/31/2020 0.0  0.0 - 0.1 K/UL Final    ABS. IMM. GRANS. 10/31/2020 0.0  0.00 - 0.04 K/UL Final    DF 10/31/2020 AUTOMATED    Final    Sodium 10/31/2020 137  136 - 145 mmol/L Final    Potassium 10/31/2020 4.2  3.5 - 5.1 mmol/L Final    SPECIMEN HEMOLYZED, RESULTS MAY BE AFFECTED    Chloride 10/31/2020 107  97 - 108 mmol/L Final    CO2 10/31/2020 23  21 - 32 mmol/L Final    Anion gap 10/31/2020 7  5 - 15 mmol/L Final    Glucose 10/31/2020 89  65 - 100 mg/dL Final    BUN 10/31/2020 56* 6 - 20 MG/DL Final    Creatinine 10/31/2020 3.76* 0.55 - 1.02 MG/DL Final    BUN/Creatinine ratio 10/31/2020 15  12 - 20   Final    GFR est AA 10/31/2020 15* >60 ml/min/1.73m2 Final    GFR est non-AA 10/31/2020 12* >60 ml/min/1.73m2 Final    Estimated GFR is calculated using the IDMS-traceable Modification of Diet in Renal Disease (MDRD) Study equation, reported for both  Americans (GFRAA) and non- Americans (GFRNA), and normalized to 1.73m2 body surface area. The physician must decide which value applies to the patient.  Calcium 10/31/2020 11.9* 8.5 - 10.1 MG/DL Final    Bilirubin, total 10/31/2020 0.4  0.2 - 1.0 MG/DL Final    ALT (SGPT) 10/31/2020 12  12 - 78 U/L Final    AST (SGOT) 10/31/2020 20  15 - 37 U/L Final    SPECIMEN HEMOLYZED, RESULTS MAY BE AFFECTED    Alk.  phosphatase 10/31/2020 94  45 - 117 U/L Final    Protein, total 10/31/2020 7.9  6.4 - 8.2 g/dL Final    Albumin 10/31/2020 3.6  3.5 - 5.0 g/dL Final    Globulin 10/31/2020 4.3* 2.0 - 4.0 g/dL Final    A-G Ratio 10/31/2020 0.8* 1.1 - 2.2   Final    SAMPLES BEING HELD 10/31/2020  1 red,1 blue,1 uc   Final    COMMENT 10/31/2020 Add-on orders for these samples will be processed based on acceptable specimen integrity and analyte stability, which may vary by analyte. Final    Color 10/31/2020 YELLOW/STRAW    Final    Color Reference Range: Straw, Yellow or Dark Yellow    Appearance 10/31/2020 CLEAR  CLEAR   Final    Specific gravity 10/31/2020 1.010  1.003 - 1.030   Final    pH (UA) 10/31/2020 5.0  5.0 - 8.0   Final    Protein 10/31/2020 100* NEG mg/dL Final    Glucose 10/31/2020 Negative  NEG mg/dL Final    Ketone 10/31/2020 Negative  NEG mg/dL Final    Bilirubin 10/31/2020 Negative  NEG   Final    Blood 10/31/2020 TRACE* NEG   Final    Urobilinogen 10/31/2020 0.2  0.2 - 1.0 EU/dL Final    Nitrites 10/31/2020 Negative  NEG   Final    Leukocyte Esterase 10/31/2020 Negative  NEG   Final    WBC 10/31/2020 0-4  0 - 4 /hpf Final    RBC 10/31/2020 0-5  0 - 5 /hpf Final    Epithelial cells 10/31/2020 FEW  FEW /lpf Final    Epithelial cell category consists of squamous cells and /or transitional urothelial cells. Renal tubular cells, if present, are separately identified as such.  Bacteria 10/31/2020 Negative  NEG /hpf Final    Hyaline cast 10/31/2020 0-2  0 - 5 /lpf Final    Urine culture hold 10/31/2020 Urine on hold in Microbiology dept for 2 days. If unpreserved urine is submitted, it cannot be used for addtional testing after 24 hours, recollection will be required.     Final    Ventricular Rate 10/31/2020 84  BPM Final    Atrial Rate 10/31/2020 84  BPM Final    P-R Interval 10/31/2020 168  ms Final    QRS Duration 10/31/2020 80  ms Final    Q-T Interval 10/31/2020 350  ms Final    QTC Calculation (Bezet) 10/31/2020 413  ms Final    Calculated P Axis 10/31/2020 42  degrees Final    Calculated R Axis 10/31/2020 -17  degrees Final    Calculated T Axis 10/31/2020 56  degrees Final    Diagnosis 10/31/2020    Final Value:Normal sinus rhythm  Voltage criteria for left ventricular hypertrophy  When compared with ECG of 12-AUG-2020 11:40,  No significant change was found  Confirmed by Mitul Saldaña M.D., Steve Matthews ([id number]) on 11/1/2020 2:48:52 PM      Lipase 10/31/2020 170  73 - 393 U/L Final    Magnesium 10/31/2020 1.5* 1.6 - 2.4 mg/dL Final    SPECIMEN HEMOLYZED, RESULTS MAY BE AFFECTED    Troponin-I, Qt. 10/31/2020 <0.05  <0.05 ng/mL Final    Comment: The presence of detectable troponin above the reference range indicates myocardial injury which may be due to ischemia, myocarditis, trauma, etc.  Clinical correlation is necessary to establish the significance of this finding. Sequential testing is recommended to determine if the typical rise and fall of cTnI is demonstrated. Note:  Cardiac troponin I has a relatively long half life and may be present well after the CK MB has returned to baseline. The reference range is based on the 99th percentile of the referent population.      Office Visit on 10/21/2020   Component Date Value Ref Range Status    Glucose 10/21/2020 78  65 - 99 mg/dL Final    BUN 10/21/2020 44* 8 - 27 mg/dL Final    Creatinine 10/21/2020 3.21* 0.57 - 1.00 mg/dL Final    GFR est non-AA 10/21/2020 15* >59 mL/min/1.73 Final    GFR est AA 10/21/2020 17* >59 mL/min/1.73 Final    BUN/Creatinine ratio 10/21/2020 14  12 - 28 Final    Sodium 10/21/2020 143  134 - 144 mmol/L Final    Potassium 10/21/2020 3.8  3.5 - 5.2 mmol/L Final    Chloride 10/21/2020 104  96 - 106 mmol/L Final    CO2 10/21/2020 22  20 - 29 mmol/L Final    Calcium 10/21/2020 12.2* 8.7 - 10.3 mg/dL Final   Office Visit on 09/21/2020   Component Date Value Ref Range Status    Glucose 09/21/2020 102* 65 - 99 mg/dL Final    BUN 09/21/2020 52* 8 - 27 mg/dL Final    Creatinine 09/21/2020 3.53* 0.57 - 1.00 mg/dL Final    GFR est non-AA 09/21/2020 13* >59 mL/min/1.73 Final    GFR est AA 09/21/2020 15* >59 mL/min/1.73 Final    BUN/Creatinine ratio 09/21/2020 15  12 - 28 Final    Sodium 09/21/2020 146* 134 - 144 mmol/L Final    Potassium 09/21/2020 3.8  3.5 - 5.2 mmol/L Final    Chloride 09/21/2020 109* 96 - 106 mmol/L Final    CO2 09/21/2020 20  20 - 29 mmol/L Final    Calcium 09/21/2020 12.1* 8.7 - 10.3 mg/dL Final    Cystatin C 09/21/2020 4.06* 0.62 - 1.16 mg/L Final    Phosphorus 09/21/2020 3.7  3.0 - 4.3 mg/dL Final    PTH, Intact 09/21/2020 CANCELED  pg/mL Final-Edited    Comment: No plasma specimen received. Result canceled by the ancillary.  Specific Gravity 09/21/2020 1.017  1.005 - 1.030 Final    pH (UA) 09/21/2020 5.5  5.0 - 7.5 Final    Color 09/21/2020 Yellow  Yellow Final    Appearance 09/21/2020 Clear  Clear Final    Leukocyte Esterase 09/21/2020 Negative  Negative Final    Protein 09/21/2020 2+* Negative/Trace Final    Glucose 09/21/2020 Negative  Negative Final    Ketone 09/21/2020 Negative  Negative Final    Blood 09/21/2020 Trace* Negative Final    Bilirubin 09/21/2020 Negative  Negative Final    Urobilinogen 09/21/2020 0.2  0.2 - 1.0 mg/dL Final    Nitrites 09/21/2020 Negative  Negative Final    Microscopic Examination 09/21/2020 See additional order   Final    Microscopic was indicated and was performed.  Urine Culture, Routine 09/21/2020    Final                    Value:Mixed urogenital iglesia  10,000-25,000 colony forming units per mL      WBC 09/21/2020 0-5  0 - 5 /hpf Final    RBC 09/21/2020 0-2  0 - 2 /hpf Final    Epithelial cells 09/21/2020 0-10  0 - 10 /hpf Final    Casts 09/21/2020 None seen  None seen /lpf Final    Mucus 09/21/2020 Present  Not Estab.  Final    Bacteria 09/21/2020 Few  None seen/Few Final   Admission on 08/20/2020, Discharged on 08/22/2020   Component Date Value Ref Range Status    WBC 08/21/2020 12.8* 3.6 - 11.0 K/uL Final    RBC 08/21/2020 3.58* 3.80 - 5.20 M/uL Final    HGB 08/21/2020 10.2* 11.5 - 16.0 g/dL Final    HCT 08/21/2020 34.7* 35.0 - 47.0 % Final    MCV 08/21/2020 96.9  80.0 - 99.0 FL Final    MCH 08/21/2020 28.5  26.0 - 34.0 PG Final    MCHC 08/21/2020 29.4* 30.0 - 36.5 g/dL Final    RDW 08/21/2020 13.2  11.5 - 14.5 % Final    PLATELET 94/07/9068 369  150 - 400 K/uL Final    MPV 08/21/2020 9.9  8.9 - 12.9 FL Final    NRBC 08/21/2020 0.0  0  WBC Final    ABSOLUTE NRBC 08/21/2020 0.00  0.00 - 0.01 K/uL Final    NEUTROPHILS 08/21/2020 82* 32 - 75 % Final    LYMPHOCYTES 08/21/2020 9* 12 - 49 % Final    MONOCYTES 08/21/2020 8  5 - 13 % Final    EOSINOPHILS 08/21/2020 0  0 - 7 % Final    BASOPHILS 08/21/2020 0  0 - 1 % Final    IMMATURE GRANULOCYTES 08/21/2020 1* 0.0 - 0.5 % Final    ABS. NEUTROPHILS 08/21/2020 10.4* 1.8 - 8.0 K/UL Final    ABS. LYMPHOCYTES 08/21/2020 1.2  0.8 - 3.5 K/UL Final    ABS. MONOCYTES 08/21/2020 1.0  0.0 - 1.0 K/UL Final    ABS. EOSINOPHILS 08/21/2020 0.1  0.0 - 0.4 K/UL Final    ABS. BASOPHILS 08/21/2020 0.0  0.0 - 0.1 K/UL Final    ABS. IMM. GRANS. 08/21/2020 0.1* 0.00 - 0.04 K/UL Final    DF 08/21/2020 AUTOMATED    Final    Sodium 08/21/2020 139  136 - 145 mmol/L Final    Potassium 08/21/2020 4.0  3.5 - 5.1 mmol/L Final    Chloride 08/21/2020 106  97 - 108 mmol/L Final    CO2 08/21/2020 26  21 - 32 mmol/L Final    Anion gap 08/21/2020 7  5 - 15 mmol/L Final    Glucose 08/21/2020 84  65 - 100 mg/dL Final    BUN 08/21/2020 45* 6 - 20 MG/DL Final    Creatinine 08/21/2020 3.77* 0.55 - 1.02 MG/DL Final    BUN/Creatinine ratio 08/21/2020 12  12 - 20   Final    GFR est AA 08/21/2020 15* >60 ml/min/1.73m2 Final    GFR est non-AA 08/21/2020 12* >60 ml/min/1.73m2 Final    Comment: Estimated GFR is calculated using the IDMS-traceable Modification of Diet in Renal Disease (MDRD) Study equation, reported for both  Americans (GFRAA) and non- Americans (GFRNA), and normalized to 1.73m2 body surface area. The physician must decide which value applies to the patient.   The MDRD study equation should only be used in individuals age 25 or older. It has not been validated for the following: pregnant women, patients with serious comorbid conditions, or on certain medications, or persons with extremes of body size, muscle mass, or nutritional status.  Calcium 08/21/2020 11.8* 8.5 - 10.1 MG/DL Final   Hospital Outpatient Visit on 08/16/2020   Component Date Value Ref Range Status    SARS-CoV-2 08/16/2020 Not Detected  Not Detected   Final    Comment: (NOTE)  This test was developed and its performance characteristics  determined by [facility]. This test has not been FDA  cleared or approved. This test has been authorized by FDA under an  Emergency Use Authorization (EUA). This test is only authorized for  the duration of time the declaration that circumstances exist  justifying the authorization of the emergency use of in vitro  diagnostic tests for detection of SARS-CoV-2 virus and/or diagnosis  of COVID-19 infection under section 564(b)(1) of the Act, 21 U. S.C.  138ILT-3(L)(7), unless the authorization is terminated or revoked  sooner. When diagnostic testing is negative, the possibility of a  false negative result should be considered in the context of a  patient's recent exposures and the presence of clinical signs and  symptoms consistent with COVID-19. An individual without symptoms of  COVID-19 and who is not shedding SARS-CoV-2 virus would expect to  have a negative (not detected) result in this assay.   Performed                            At: [address] [id number]   [place] [address] [id number]     Hospital Outpatient Visit on 08/12/2020   Component Date Value Ref Range Status    Sodium 08/12/2020 143  136 - 145 mmol/L Final    Potassium 08/12/2020 3.3* 3.5 - 5.1 mmol/L Final    Chloride 08/12/2020 110* 97 - 108 mmol/L Final    CO2 08/12/2020 25  21 - 32 mmol/L Final    Anion gap 08/12/2020 8  5 - 15 mmol/L Final    Glucose 08/12/2020 100  65 - 100 mg/dL Final    BUN 08/12/2020 55* 6 - 20 MG/DL Final    Creatinine 08/12/2020 3.64* 0.55 - 1.02 MG/DL Final    BUN/Creatinine ratio 08/12/2020 15  12 - 20   Final    GFR est AA 08/12/2020 15* >60 ml/min/1.73m2 Final    GFR est non-AA 08/12/2020 13* >60 ml/min/1.73m2 Final    Comment: Estimated GFR is calculated using the IDMS-traceable Modification of Diet in Renal Disease (MDRD) Study equation, reported for both  Americans (GFRAA) and non- Americans (GFRNA), and normalized to 1.73m2 body surface area. The physician must decide which value applies to the patient. The MDRD study equation should only be used in individuals age 25 or older. It has not been validated for the following: pregnant women, patients with serious comorbid conditions, or on certain medications, or persons with extremes of body size, muscle mass, or nutritional status.  Calcium 08/12/2020 11.6* 8.5 - 10.1 MG/DL Final    WBC 08/12/2020 8.5  3.6 - 11.0 K/uL Final    RBC 08/12/2020 3.81  3.80 - 5.20 M/uL Final    HGB 08/12/2020 10.9* 11.5 - 16.0 g/dL Final    HCT 08/12/2020 37.9  35.0 - 47.0 % Final    MCV 08/12/2020 99.5* 80.0 - 99.0 FL Final    MCH 08/12/2020 28.6  26.0 - 34.0 PG Final    MCHC 08/12/2020 28.8* 30.0 - 36.5 g/dL Final    RDW 08/12/2020 13.3  11.5 - 14.5 % Final    PLATELET 08/72/6186 064  150 - 400 K/uL Final    MPV 08/12/2020 11.0  8.9 - 12.9 FL Final    NRBC 08/12/2020 0.0  0  WBC Final    ABSOLUTE NRBC 08/12/2020 0.00  0.00 - 0.01 K/uL Final    NEUTROPHILS 08/12/2020 74  32 - 75 % Final    LYMPHOCYTES 08/12/2020 18  12 - 49 % Final    MONOCYTES 08/12/2020 6  5 - 13 % Final    EOSINOPHILS 08/12/2020 2  0 - 7 % Final    BASOPHILS 08/12/2020 0  0 - 1 % Final    IMMATURE GRANULOCYTES 08/12/2020 0  0.0 - 0.5 % Final    ABS. NEUTROPHILS 08/12/2020 6.3  1.8 - 8.0 K/UL Final    ABS. LYMPHOCYTES 08/12/2020 1.5  0.8 - 3.5 K/UL Final    ABS.  MONOCYTES 08/12/2020 0.5  0.0 - 1.0 K/UL Final  ABS. EOSINOPHILS 08/12/2020 0.2  0.0 - 0.4 K/UL Final    ABS. BASOPHILS 08/12/2020 0.0  0.0 - 0.1 K/UL Final    ABS. IMM. GRANS. 08/12/2020 0.0  0.00 - 0.04 K/UL Final    DF 08/12/2020 SMEAR SCANNED    Final    PLATELET COMMENTS 79/77/2984 Large Platelets    Final    PRESENT    RBC COMMENTS 08/12/2020     Final                    Value:HYPOCHROMIA  1+      RBC COMMENTS 08/12/2020     Final                    Value:ANISOCYTOSIS  1+      Ventricular Rate 08/12/2020 102  BPM Final    Atrial Rate 08/12/2020 102  BPM Final    P-R Interval 08/12/2020 168  ms Final    QRS Duration 08/12/2020 68  ms Final    Q-T Interval 08/12/2020 330  ms Final    QTC Calculation (Bezet) 08/12/2020 430  ms Final    Calculated P Axis 08/12/2020 62  degrees Final    Calculated R Axis 08/12/2020 -15  degrees Final    Calculated T Axis 08/12/2020 80  degrees Final    Diagnosis 08/12/2020    Final                    Value:Sinus tachycardia  Minimal voltage criteria for LVH, may be normal variant  Borderline ECG  When compared with ECG of 28-FEB-2016 19:45,  No significant change was found  Confirmed by Marissa Sumner MD, [facility] ([id number]) on 8/12/2020 3:24:58 PM      Special Requests: 08/12/2020 NO SPECIAL REQUESTS    Final    Culture result: 08/12/2020 MRSA NOT PRESENT    Final       Skin:     Denies open wounds, cuts, sores, rashes or other areas of concern in PAT assessment.           Mary Marlow NP

## 2020-11-02 NOTE — PERIOP NOTES
PATIENT GIVEN SURGICAL SITE INFECTION FAQ HANDOUT AND HAND WASHING TIP SHEET. PREOP INSTRUCTIONS REVIEWED AND PATIENT VERBALIZES UNDERSTANDING OF INSTRUCTIONS. PATIENT HAS BEEN GIVEN THE OPPORTUNITY TO ASK ADDITIONAL QUESTIONS. GAVE PATIENT 2 BOTTLES OF CHG CLEANSER AND INSTRUCTIONS. PATIENT VERBALIZED UNDERSTANDING. PATIENT CALLED AND MADE AWARE OF COVID-19 TESTING NEEDED TO BE DONE WITHIN 96 HOURS OF SURGERY. COVID-19 TESTING APPOINTMENT MADE FOR PATIENT. PATIENT INSTRUCTED ON SELF QUARANTINE BETWEEN TESTING AND ARRIVAL TIME DAY OF SURGERY. PATIENT RECEIVED HIP REPLACEMENT BOOK AND INTERNET CONNECTION TO WATCH THE HIP CLASS ONLINE.

## 2020-11-03 NOTE — PERIOP NOTES
ALL ABNORMAL LABS CALLED TO DR. ODELL OFFICE. [facility] B[facility]==58, CREATININE=4.23, H&H=10 & 34.3 ALL CALLED.

## 2020-11-04 LAB
BACTERIA SPEC CULT: ABNORMAL
BACTERIA SPEC CULT: ABNORMAL
SERVICE CMNT-IMP: ABNORMAL

## 2020-11-04 RX ORDER — VANCOMYCIN/0.9 % SOD CHLORIDE 1.5G/250ML
1500 PLASTIC BAG, INJECTION (ML) INTRAVENOUS ONCE
Status: CANCELLED | OUTPATIENT
Start: 2020-11-04 | End: 2020-11-04

## 2020-11-04 RX ORDER — MUPIROCIN 20 MG/G
OINTMENT TOPICAL 2 TIMES DAILY
Qty: 22 G | Refills: 0 | Status: SHIPPED | OUTPATIENT
Start: 2020-11-04 | End: 2020-11-11

## 2020-11-04 RX ORDER — MUPIROCIN 20 MG/G
OINTMENT TOPICAL 2 TIMES DAILY
COMMUNITY
Start: 2020-11-04 | End: 2020-11-11

## 2020-11-04 NOTE — PERIOP NOTES
PC to pt regarding positive nasal cx (MRSA) and need to start Mupirocin ointment BID x 7 days to B nostrils starting today and bathe with CHG soap for 7 days prior to surgery. Pt verbalized understanding of instructions and will start today as recommended. Allergies and pharmacy of choice reviewed. Rx escribed to pt's pharmacy of choice. PTA medlist updated. Surgeon and PCP notified of positive culture and treatment. Vanc 1500mg IV x 1 ordered for pre-op DOS.  (Dose verified w/ pharmacist d/t CKD and Cr-4.23)    Reddy Messer NP

## 2020-11-04 NOTE — H&P
Progress Notes         []Gigi copied text    []Damiánver for details  PCP: Richelle Quinteros MD  There is no problem list on file for this patient.     Subjective:   Chief Complaint: Pain of the Left Hip     HPI: Kaylee Morejon is a 56 y.o. female who presents today for evaluation and treatment of her left hip. She was referred to me by Dr. Ronnie Cavazso. She notes she has surgery in 08/2020 for lumbar stenosis that helped the pain but has not helped with mobility. She states she went to her PCP after this who believes her instability is to her hip and was referred to an orthopedist. She notes Dr. Ronnie Cavazos told her she would need a hip replacement. She states she has pain in her left hip when she walks. She denies pain when sitting. She is ambulating with a wheeled walker for assistance today.     Of note, she reports she has hypertension with secondary kidney disease. She states she is a .     Objective:      There were no vitals filed for this visit. Height: 5' 6\"   Weight: 245 lb   Body mass index is 39.54 kg/m².     Constitutional:  No acute distress. Well nourished. Well developed. Eyes:  Sclera are nonicteric. Respiratory:  No labored breathing. Cardiovascular:  No marked edema. Skin:  No marked skin ulcers. Neurological:  No marked sensory loss noted. Psychiatric: Alert and oriented x3.     Musculoskeletal : Bilateral Hips:  She has normal range of motion of the right hip with no significant pain on motion. She has normal range of motion of the left hip with mild pain on motion. She has normal range of motion of the bilateral knees with no significant pain on motion. Negative SLR. Negative hip impingement sign. Ligaments intact. Leg lengths appear equal. Skin healthy and intact. Neurovascularly intact. No apparent atrophy. Strong pedal pulses.     Radiographs:             No imaging obtained    Assessment:      1. Avascular necrosis of bone of left hip    2.  Obesity, Class II, BMI 35-39.9    Plan:      I independently reviewed the x-ray of the left hip ordered on 09/28/20 with the patient. The x-ray shows an AVN of the left hip with a normal right hip. I discussed the diagnosis of an AVN of the left hip with the patient. I also discussed treatment options and recommend a left total hip replacement. We discussed total hip replacement surgery at length including expected outcomes and post-op recovery as well as risks and complications, specifically DVT, PE, infection, dislocation, leg length discrepancy, and nerve injury. After a lengthy discussion, she desires to proceed with surgery. We will schedule surgery at her convenience. Follow-up for scheduled surgery.          Orders Placed This Encounter    BMI >=25 PATIENT INSTRUCTIONS & EDUCATION      Return for Surgery.      Medical documentation was entered by me, Lalo White, Medical Scribe for Dr. Renetta Medellin. 10/28/2020  3:59 PM  I, Brad Parsons MD, personally, performed the services described in this documentation, as scribed in my presence, and is accurate and complete.       Electronically signed by Brad Parsons MD at 10/28/2020  5:09 PM         Office Visit on 10/28/2020        Revision History        Note viewed by patient

## 2020-11-05 ENCOUNTER — TRANSCRIBE ORDER (OUTPATIENT)
Dept: REGISTRATION | Age: 60
End: 2020-11-05

## 2020-11-05 ENCOUNTER — HOSPITAL ENCOUNTER (OUTPATIENT)
Dept: PREADMISSION TESTING | Age: 60
Discharge: HOME | End: 2020-11-05
Payer: COMMERCIAL

## 2020-11-05 DIAGNOSIS — Z01.812 PRE-PROCEDURE LAB EXAM: Primary | ICD-10-CM

## 2020-11-05 DIAGNOSIS — Z01.812 PRE-PROCEDURE LAB EXAM: ICD-10-CM

## 2020-11-05 PROCEDURE — 87635 SARS-COV-2 COVID-19 AMP PRB: CPT

## 2020-11-05 NOTE — PERIOP NOTES
FAXED PRE-ADMISSION TESTING REPORTS (AND FAX CONFIRMATION RECEIVED TO DR. MAHMOOD'S NURSE . CALLED ON 11/5/2020  AT 1212 AND LEFT MESSAGE ON DR. MAHMOOD'S NURSE  VOICEMAIL, RE: ABNORMAL NARES CULTURE FOR MRSA, SEE Carissa Maxwell NP NOTE IN CC.

## 2020-11-06 LAB — SARS-COV-2, COV2NT: NOT DETECTED

## 2020-11-08 ENCOUNTER — ANESTHESIA EVENT (OUTPATIENT)
Dept: SURGERY | Age: 60
End: 2020-11-08
Payer: COMMERCIAL

## 2020-11-09 ENCOUNTER — APPOINTMENT (OUTPATIENT)
Dept: GENERAL RADIOLOGY | Age: 60
End: 2020-11-09
Attending: ORTHOPAEDIC SURGERY
Payer: COMMERCIAL

## 2020-11-09 ENCOUNTER — HOSPITAL ENCOUNTER (OUTPATIENT)
Age: 60
Setting detail: OBSERVATION
Discharge: HOME | End: 2020-11-11
Attending: ORTHOPAEDIC SURGERY | Admitting: ORTHOPAEDIC SURGERY
Payer: COMMERCIAL

## 2020-11-09 ENCOUNTER — ANESTHESIA (OUTPATIENT)
Dept: SURGERY | Age: 60
End: 2020-11-09
Payer: COMMERCIAL

## 2020-11-09 DIAGNOSIS — Z96.642 STATUS POST TOTAL REPLACEMENT OF LEFT HIP: ICD-10-CM

## 2020-11-09 DIAGNOSIS — M16.12 ARTHRITIS OF LEFT HIP: Primary | ICD-10-CM

## 2020-11-09 LAB
GLUCOSE BLD STRIP.AUTO-MCNC: 90 MG/DL (ref 65–100)
SERVICE CMNT-IMP: NORMAL

## 2020-11-09 PROCEDURE — 97161 PT EVAL LOW COMPLEX 20 MIN: CPT

## 2020-11-09 PROCEDURE — 97116 GAIT TRAINING THERAPY: CPT

## 2020-11-09 PROCEDURE — 74011000250 HC RX REV CODE- 250: Performed by: NURSE ANESTHETIST, CERTIFIED REGISTERED

## 2020-11-09 PROCEDURE — 77030036660

## 2020-11-09 PROCEDURE — 74011000258 HC RX REV CODE- 258: Performed by: NURSE ANESTHETIST, CERTIFIED REGISTERED

## 2020-11-09 PROCEDURE — 99218 HC RM OBSERVATION: CPT

## 2020-11-09 PROCEDURE — 73501 X-RAY EXAM HIP UNI 1 VIEW: CPT

## 2020-11-09 PROCEDURE — 82962 GLUCOSE BLOOD TEST: CPT

## 2020-11-09 PROCEDURE — 74011250637 HC RX REV CODE- 250/637: Performed by: PHYSICIAN ASSISTANT

## 2020-11-09 PROCEDURE — 74011250637 HC RX REV CODE- 250/637: Performed by: ORTHOPAEDIC SURGERY

## 2020-11-09 PROCEDURE — 51798 US URINE CAPACITY MEASURE: CPT

## 2020-11-09 PROCEDURE — 74011000258 HC RX REV CODE- 258: Performed by: PHYSICIAN ASSISTANT

## 2020-11-09 PROCEDURE — 77030031139 HC SUT VCRL2 J&J -A: Performed by: ORTHOPAEDIC SURGERY

## 2020-11-09 PROCEDURE — 77030020788: Performed by: ORTHOPAEDIC SURGERY

## 2020-11-09 PROCEDURE — 74011250636 HC RX REV CODE- 250/636: Performed by: NURSE PRACTITIONER

## 2020-11-09 PROCEDURE — 77030012890

## 2020-11-09 PROCEDURE — 77030008477 HC STYL SATN SLP COVD -A: Performed by: ANESTHESIOLOGY

## 2020-11-09 PROCEDURE — C1776 JOINT DEVICE (IMPLANTABLE): HCPCS | Performed by: ORTHOPAEDIC SURGERY

## 2020-11-09 PROCEDURE — 74011250636 HC RX REV CODE- 250/636: Performed by: NURSE ANESTHETIST, CERTIFIED REGISTERED

## 2020-11-09 PROCEDURE — 77030008684 HC TU ET CUF COVD -B: Performed by: ANESTHESIOLOGY

## 2020-11-09 PROCEDURE — 77030035236 HC SUT PDS STRATFX BARB J&J -B: Performed by: ORTHOPAEDIC SURGERY

## 2020-11-09 PROCEDURE — 77030003666 HC NDL SPINAL BD -A: Performed by: ORTHOPAEDIC SURGERY

## 2020-11-09 PROCEDURE — 2709999900 HC NON-CHARGEABLE SUPPLY

## 2020-11-09 PROCEDURE — 77030041397 HC DRSG PRIMASEAL AG MDII -B: Performed by: ORTHOPAEDIC SURGERY

## 2020-11-09 PROCEDURE — 74011250636 HC RX REV CODE- 250/636: Performed by: PHYSICIAN ASSISTANT

## 2020-11-09 PROCEDURE — 77030018836 HC SOL IRR NACL ICUM -A: Performed by: ORTHOPAEDIC SURGERY

## 2020-11-09 PROCEDURE — 77030040922 HC BLNKT HYPOTHRM STRY -A

## 2020-11-09 PROCEDURE — 77030006835 HC BLD SAW SAG STRY -B: Performed by: ORTHOPAEDIC SURGERY

## 2020-11-09 PROCEDURE — 77030011264 HC ELECTRD BLD EXT COVD -A: Performed by: ORTHOPAEDIC SURGERY

## 2020-11-09 PROCEDURE — 74011000250 HC RX REV CODE- 250: Performed by: ORTHOPAEDIC SURGERY

## 2020-11-09 PROCEDURE — 2709999900 HC NON-CHARGEABLE SUPPLY: Performed by: ORTHOPAEDIC SURGERY

## 2020-11-09 PROCEDURE — 77030027138 HC INCENT SPIROMETER -A

## 2020-11-09 PROCEDURE — 77030040361 HC SLV COMPR DVT MDII -B: Performed by: ORTHOPAEDIC SURGERY

## 2020-11-09 PROCEDURE — 76060000035 HC ANESTHESIA 2 TO 2.5 HR: Performed by: ORTHOPAEDIC SURGERY

## 2020-11-09 PROCEDURE — 74011250636 HC RX REV CODE- 250/636: Performed by: ANESTHESIOLOGY

## 2020-11-09 PROCEDURE — 77030010507 HC ADH SKN DERMBND J&J -B: Performed by: ORTHOPAEDIC SURGERY

## 2020-11-09 PROCEDURE — 77030002933 HC SUT MCRYL J&J -A: Performed by: ORTHOPAEDIC SURGERY

## 2020-11-09 PROCEDURE — 76210000017 HC OR PH I REC 1.5 TO 2 HR: Performed by: ORTHOPAEDIC SURGERY

## 2020-11-09 PROCEDURE — 76010000162 HC OR TIME 1.5 TO 2 HR INTENSV-TIER 1: Performed by: ORTHOPAEDIC SURGERY

## 2020-11-09 DEVICE — PINNACLE CANCELLOUS BONE SCREW 6.5MM X 25MM
Type: IMPLANTABLE DEVICE | Site: HIP | Status: FUNCTIONAL
Brand: PINNACLE

## 2020-11-09 DEVICE — BIOLOX DELTA CERAMIC FEMORAL HEAD +1.5 36MM DIA 12/14 TAPER
Type: IMPLANTABLE DEVICE | Site: HIP | Status: FUNCTIONAL
Brand: BIOLOX DELTA

## 2020-11-09 DEVICE — HIP H1 TOT STD COCR HD IMPL CAPPED SYNTHES: Type: IMPLANTABLE DEVICE | Status: FUNCTIONAL

## 2020-11-09 DEVICE — SUMMIT FEMORAL STEM 12/14 TAPER TAPER ED W/POROCOAT SIZE 5 HI 145MM
Type: IMPLANTABLE DEVICE | Site: HIP | Status: FUNCTIONAL
Brand: SUMMIT POROCOAT

## 2020-11-09 DEVICE — PINNACLE GRIPTION ACETABULAR SHELL SECTOR 52MM OD
Type: IMPLANTABLE DEVICE | Site: HIP | Status: FUNCTIONAL
Brand: PINNACLE GRIPTION

## 2020-11-09 DEVICE — PINNACLE HIP SOLUTIONS ALTRX POLYETHYLENE ACETABULAR LINER NEUTRAL 36MM ID 52MM OD
Type: IMPLANTABLE DEVICE | Site: HIP | Status: FUNCTIONAL
Brand: PINNACLE ALTRX

## 2020-11-09 RX ORDER — KETAMINE HYDROCHLORIDE 10 MG/ML
INJECTION, SOLUTION INTRAMUSCULAR; INTRAVENOUS AS NEEDED
Status: DISCONTINUED | OUTPATIENT
Start: 2020-11-09 | End: 2020-11-09 | Stop reason: HOSPADM

## 2020-11-09 RX ORDER — ACETAMINOPHEN 325 MG/1
650 TABLET ORAL EVERY 6 HOURS
Status: DISCONTINUED | OUTPATIENT
Start: 2020-11-09 | End: 2020-11-11 | Stop reason: HOSPADM

## 2020-11-09 RX ORDER — SUCCINYLCHOLINE CHLORIDE 20 MG/ML
INJECTION INTRAMUSCULAR; INTRAVENOUS AS NEEDED
Status: DISCONTINUED | OUTPATIENT
Start: 2020-11-09 | End: 2020-11-09 | Stop reason: HOSPADM

## 2020-11-09 RX ORDER — OXYBUTYNIN CHLORIDE 5 MG/1
5 TABLET, EXTENDED RELEASE ORAL DAILY
Status: DISCONTINUED | OUTPATIENT
Start: 2020-11-10 | End: 2020-11-11 | Stop reason: HOSPADM

## 2020-11-09 RX ORDER — ROCURONIUM BROMIDE 10 MG/ML
INJECTION, SOLUTION INTRAVENOUS AS NEEDED
Status: DISCONTINUED | OUTPATIENT
Start: 2020-11-09 | End: 2020-11-09 | Stop reason: HOSPADM

## 2020-11-09 RX ORDER — HYDROMORPHONE HYDROCHLORIDE 1 MG/ML
0.5 INJECTION, SOLUTION INTRAMUSCULAR; INTRAVENOUS; SUBCUTANEOUS
Status: ACTIVE | OUTPATIENT
Start: 2020-11-09 | End: 2020-11-10

## 2020-11-09 RX ORDER — HYDROMORPHONE HYDROCHLORIDE 1 MG/ML
INJECTION, SOLUTION INTRAMUSCULAR; INTRAVENOUS; SUBCUTANEOUS
Status: DISPENSED
Start: 2020-11-09 | End: 2020-11-09

## 2020-11-09 RX ORDER — PHENYLEPHRINE HCL IN 0.9% NACL 0.4MG/10ML
SYRINGE (ML) INTRAVENOUS AS NEEDED
Status: DISCONTINUED | OUTPATIENT
Start: 2020-11-09 | End: 2020-11-09 | Stop reason: HOSPADM

## 2020-11-09 RX ORDER — POLYETHYLENE GLYCOL 3350 17 G/17G
17 POWDER, FOR SOLUTION ORAL DAILY
Status: DISCONTINUED | OUTPATIENT
Start: 2020-11-10 | End: 2020-11-11 | Stop reason: HOSPADM

## 2020-11-09 RX ORDER — SODIUM CHLORIDE, SODIUM LACTATE, POTASSIUM CHLORIDE, CALCIUM CHLORIDE 600; 310; 30; 20 MG/100ML; MG/100ML; MG/100ML; MG/100ML
50 INJECTION, SOLUTION INTRAVENOUS CONTINUOUS
Status: DISCONTINUED | OUTPATIENT
Start: 2020-11-09 | End: 2020-11-09 | Stop reason: HOSPADM

## 2020-11-09 RX ORDER — FACIAL-BODY WIPES
10 EACH TOPICAL DAILY PRN
Status: DISCONTINUED | OUTPATIENT
Start: 2020-11-11 | End: 2020-11-11 | Stop reason: HOSPADM

## 2020-11-09 RX ORDER — AMOXICILLIN 250 MG
1 CAPSULE ORAL 2 TIMES DAILY
Status: DISCONTINUED | OUTPATIENT
Start: 2020-11-09 | End: 2020-11-11 | Stop reason: HOSPADM

## 2020-11-09 RX ORDER — SODIUM CHLORIDE, SODIUM LACTATE, POTASSIUM CHLORIDE, CALCIUM CHLORIDE 600; 310; 30; 20 MG/100ML; MG/100ML; MG/100ML; MG/100ML
INJECTION, SOLUTION INTRAVENOUS
Status: DISCONTINUED | OUTPATIENT
Start: 2020-11-09 | End: 2020-11-09 | Stop reason: HOSPADM

## 2020-11-09 RX ORDER — SODIUM CHLORIDE 0.9 % (FLUSH) 0.9 %
5-40 SYRINGE (ML) INJECTION AS NEEDED
Status: DISCONTINUED | OUTPATIENT
Start: 2020-11-09 | End: 2020-11-09 | Stop reason: HOSPADM

## 2020-11-09 RX ORDER — ACETAMINOPHEN 325 MG/1
650 TABLET ORAL ONCE
Status: DISCONTINUED | OUTPATIENT
Start: 2020-11-09 | End: 2020-11-09 | Stop reason: HOSPADM

## 2020-11-09 RX ORDER — EPHEDRINE SULFATE/0.9% NACL/PF 50 MG/5 ML
SYRINGE (ML) INTRAVENOUS AS NEEDED
Status: DISCONTINUED | OUTPATIENT
Start: 2020-11-09 | End: 2020-11-09 | Stop reason: HOSPADM

## 2020-11-09 RX ORDER — MIDAZOLAM HYDROCHLORIDE 1 MG/ML
1 INJECTION, SOLUTION INTRAMUSCULAR; INTRAVENOUS AS NEEDED
Status: DISCONTINUED | OUTPATIENT
Start: 2020-11-09 | End: 2020-11-09 | Stop reason: HOSPADM

## 2020-11-09 RX ORDER — NEOSTIGMINE METHYLSULFATE 1 MG/ML
INJECTION, SOLUTION INTRAVENOUS AS NEEDED
Status: DISCONTINUED | OUTPATIENT
Start: 2020-11-09 | End: 2020-11-09 | Stop reason: HOSPADM

## 2020-11-09 RX ORDER — HYDROXYZINE HYDROCHLORIDE 10 MG/1
10 TABLET, FILM COATED ORAL
Status: DISCONTINUED | OUTPATIENT
Start: 2020-11-09 | End: 2020-11-11 | Stop reason: HOSPADM

## 2020-11-09 RX ORDER — ONDANSETRON 2 MG/ML
4 INJECTION INTRAMUSCULAR; INTRAVENOUS
Status: ACTIVE | OUTPATIENT
Start: 2020-11-09 | End: 2020-11-10

## 2020-11-09 RX ORDER — HYDROMORPHONE HYDROCHLORIDE 2 MG/ML
INJECTION, SOLUTION INTRAMUSCULAR; INTRAVENOUS; SUBCUTANEOUS AS NEEDED
Status: DISCONTINUED | OUTPATIENT
Start: 2020-11-09 | End: 2020-11-09 | Stop reason: HOSPADM

## 2020-11-09 RX ORDER — METOLAZONE 2.5 MG/1
2.5 TABLET ORAL DAILY
Status: DISCONTINUED | OUTPATIENT
Start: 2020-11-10 | End: 2020-11-11 | Stop reason: HOSPADM

## 2020-11-09 RX ORDER — WARFARIN SODIUM 5 MG/1
5 TABLET ORAL ONCE
Status: COMPLETED | OUTPATIENT
Start: 2020-11-09 | End: 2020-11-09

## 2020-11-09 RX ORDER — SODIUM CHLORIDE 0.9 % (FLUSH) 0.9 %
5-40 SYRINGE (ML) INJECTION EVERY 8 HOURS
Status: DISCONTINUED | OUTPATIENT
Start: 2020-11-09 | End: 2020-11-09 | Stop reason: HOSPADM

## 2020-11-09 RX ORDER — ONDANSETRON 2 MG/ML
INJECTION INTRAMUSCULAR; INTRAVENOUS AS NEEDED
Status: DISCONTINUED | OUTPATIENT
Start: 2020-11-09 | End: 2020-11-09 | Stop reason: HOSPADM

## 2020-11-09 RX ORDER — SODIUM CHLORIDE 0.9 % (FLUSH) 0.9 %
5-40 SYRINGE (ML) INJECTION AS NEEDED
Status: DISCONTINUED | OUTPATIENT
Start: 2020-11-09 | End: 2020-11-11 | Stop reason: HOSPADM

## 2020-11-09 RX ORDER — VANCOMYCIN/0.9 % SOD CHLORIDE 1.5G/250ML
1500 PLASTIC BAG, INJECTION (ML) INTRAVENOUS ONCE
Status: COMPLETED | OUTPATIENT
Start: 2020-11-09 | End: 2020-11-09

## 2020-11-09 RX ORDER — ACETAMINOPHEN 500 MG
1000 TABLET ORAL ONCE
Status: COMPLETED | OUTPATIENT
Start: 2020-11-09 | End: 2020-11-09

## 2020-11-09 RX ORDER — ESCITALOPRAM OXALATE 10 MG/1
10 TABLET ORAL
Status: DISCONTINUED | OUTPATIENT
Start: 2020-11-09 | End: 2020-11-11 | Stop reason: HOSPADM

## 2020-11-09 RX ORDER — PREGABALIN 75 MG/1
75 CAPSULE ORAL ONCE
Status: COMPLETED | OUTPATIENT
Start: 2020-11-09 | End: 2020-11-09

## 2020-11-09 RX ORDER — OXYCODONE HYDROCHLORIDE 5 MG/1
10 TABLET ORAL
Status: DISCONTINUED | OUTPATIENT
Start: 2020-11-09 | End: 2020-11-11 | Stop reason: HOSPADM

## 2020-11-09 RX ORDER — CEFAZOLIN SODIUM/WATER 2 G/20 ML
2 SYRINGE (ML) INTRAVENOUS EVERY 8 HOURS
Status: DISCONTINUED | OUTPATIENT
Start: 2020-11-09 | End: 2020-11-09

## 2020-11-09 RX ORDER — NALOXONE HYDROCHLORIDE 0.4 MG/ML
0.4 INJECTION, SOLUTION INTRAMUSCULAR; INTRAVENOUS; SUBCUTANEOUS AS NEEDED
Status: DISCONTINUED | OUTPATIENT
Start: 2020-11-09 | End: 2020-11-11 | Stop reason: HOSPADM

## 2020-11-09 RX ORDER — OXYCODONE HYDROCHLORIDE 5 MG/1
5 TABLET ORAL
Status: DISCONTINUED | OUTPATIENT
Start: 2020-11-09 | End: 2020-11-11 | Stop reason: HOSPADM

## 2020-11-09 RX ORDER — DEXAMETHASONE SODIUM PHOSPHATE 4 MG/ML
INJECTION, SOLUTION INTRA-ARTICULAR; INTRALESIONAL; INTRAMUSCULAR; INTRAVENOUS; SOFT TISSUE AS NEEDED
Status: DISCONTINUED | OUTPATIENT
Start: 2020-11-09 | End: 2020-11-09 | Stop reason: HOSPADM

## 2020-11-09 RX ORDER — ONDANSETRON 2 MG/ML
4 INJECTION INTRAMUSCULAR; INTRAVENOUS AS NEEDED
Status: DISCONTINUED | OUTPATIENT
Start: 2020-11-09 | End: 2020-11-09 | Stop reason: HOSPADM

## 2020-11-09 RX ORDER — MIDAZOLAM HYDROCHLORIDE 1 MG/ML
INJECTION, SOLUTION INTRAMUSCULAR; INTRAVENOUS AS NEEDED
Status: DISCONTINUED | OUTPATIENT
Start: 2020-11-09 | End: 2020-11-09 | Stop reason: HOSPADM

## 2020-11-09 RX ORDER — SODIUM CHLORIDE, SODIUM LACTATE, POTASSIUM CHLORIDE, CALCIUM CHLORIDE 600; 310; 30; 20 MG/100ML; MG/100ML; MG/100ML; MG/100ML
125 INJECTION, SOLUTION INTRAVENOUS CONTINUOUS
Status: DISCONTINUED | OUTPATIENT
Start: 2020-11-09 | End: 2020-11-10

## 2020-11-09 RX ORDER — BUPIVACAINE HYDROCHLORIDE AND EPINEPHRINE 5; 5 MG/ML; UG/ML
30 INJECTION, SOLUTION EPIDURAL; INTRACAUDAL; PERINEURAL ONCE
Status: COMPLETED | OUTPATIENT
Start: 2020-11-09 | End: 2020-11-09

## 2020-11-09 RX ORDER — LIDOCAINE HYDROCHLORIDE 10 MG/ML
0.1 INJECTION, SOLUTION EPIDURAL; INFILTRATION; INTRACAUDAL; PERINEURAL AS NEEDED
Status: DISCONTINUED | OUTPATIENT
Start: 2020-11-09 | End: 2020-11-09 | Stop reason: HOSPADM

## 2020-11-09 RX ORDER — FENTANYL CITRATE 50 UG/ML
25 INJECTION, SOLUTION INTRAMUSCULAR; INTRAVENOUS
Status: DISCONTINUED | OUTPATIENT
Start: 2020-11-09 | End: 2020-11-09 | Stop reason: HOSPADM

## 2020-11-09 RX ORDER — FENTANYL CITRATE 50 UG/ML
INJECTION, SOLUTION INTRAMUSCULAR; INTRAVENOUS AS NEEDED
Status: DISCONTINUED | OUTPATIENT
Start: 2020-11-09 | End: 2020-11-09 | Stop reason: HOSPADM

## 2020-11-09 RX ORDER — GLYCOPYRROLATE 0.2 MG/ML
INJECTION INTRAMUSCULAR; INTRAVENOUS AS NEEDED
Status: DISCONTINUED | OUTPATIENT
Start: 2020-11-09 | End: 2020-11-09 | Stop reason: HOSPADM

## 2020-11-09 RX ORDER — PROPOFOL 10 MG/ML
INJECTION, EMULSION INTRAVENOUS AS NEEDED
Status: DISCONTINUED | OUTPATIENT
Start: 2020-11-09 | End: 2020-11-09 | Stop reason: HOSPADM

## 2020-11-09 RX ORDER — AMLODIPINE BESYLATE 5 MG/1
10 TABLET ORAL DAILY
Status: DISCONTINUED | OUTPATIENT
Start: 2020-11-10 | End: 2020-11-11 | Stop reason: HOSPADM

## 2020-11-09 RX ORDER — HYDROMORPHONE HYDROCHLORIDE 1 MG/ML
0.2 INJECTION, SOLUTION INTRAMUSCULAR; INTRAVENOUS; SUBCUTANEOUS
Status: DISCONTINUED | OUTPATIENT
Start: 2020-11-09 | End: 2020-11-09 | Stop reason: HOSPADM

## 2020-11-09 RX ORDER — PRAVASTATIN SODIUM 40 MG/1
40 TABLET ORAL
Status: DISCONTINUED | OUTPATIENT
Start: 2020-11-09 | End: 2020-11-11 | Stop reason: HOSPADM

## 2020-11-09 RX ORDER — FENTANYL CITRATE 50 UG/ML
50 INJECTION, SOLUTION INTRAMUSCULAR; INTRAVENOUS AS NEEDED
Status: DISCONTINUED | OUTPATIENT
Start: 2020-11-09 | End: 2020-11-09 | Stop reason: HOSPADM

## 2020-11-09 RX ORDER — POTASSIUM CHLORIDE 750 MG/1
20 TABLET, FILM COATED, EXTENDED RELEASE ORAL DAILY
Status: DISCONTINUED | OUTPATIENT
Start: 2020-11-10 | End: 2020-11-11 | Stop reason: HOSPADM

## 2020-11-09 RX ORDER — SODIUM CHLORIDE 9 MG/ML
125 INJECTION, SOLUTION INTRAVENOUS CONTINUOUS
Status: DISCONTINUED | OUTPATIENT
Start: 2020-11-09 | End: 2020-11-09

## 2020-11-09 RX ORDER — LIDOCAINE HYDROCHLORIDE 20 MG/ML
INJECTION, SOLUTION EPIDURAL; INFILTRATION; INTRACAUDAL; PERINEURAL AS NEEDED
Status: DISCONTINUED | OUTPATIENT
Start: 2020-11-09 | End: 2020-11-09 | Stop reason: HOSPADM

## 2020-11-09 RX ORDER — SODIUM CHLORIDE 0.9 % (FLUSH) 0.9 %
5-40 SYRINGE (ML) INJECTION EVERY 8 HOURS
Status: DISCONTINUED | OUTPATIENT
Start: 2020-11-09 | End: 2020-11-11 | Stop reason: HOSPADM

## 2020-11-09 RX ADMIN — PREGABALIN 75 MG: 75 CAPSULE ORAL at 08:11

## 2020-11-09 RX ADMIN — DOCUSATE SODIUM 50 MG AND SENNOSIDES 8.6 MG 1 TABLET: 8.6; 5 TABLET, FILM COATED ORAL at 17:01

## 2020-11-09 RX ADMIN — ROCURONIUM BROMIDE 5 MG: 10 SOLUTION INTRAVENOUS at 09:12

## 2020-11-09 RX ADMIN — Medication 5 MG: at 09:31

## 2020-11-09 RX ADMIN — PHENYLEPHRINE HYDROCHLORIDE 20 MCG/MIN: 10 INJECTION INTRAVENOUS at 09:59

## 2020-11-09 RX ADMIN — FENTANYL CITRATE 25 MCG: 50 INJECTION, SOLUTION INTRAMUSCULAR; INTRAVENOUS at 12:20

## 2020-11-09 RX ADMIN — FENTANYL CITRATE 50 MCG: 50 INJECTION, SOLUTION INTRAMUSCULAR; INTRAVENOUS at 09:24

## 2020-11-09 RX ADMIN — SUCCINYLCHOLINE CHLORIDE 140 MG: 20 INJECTION, SOLUTION INTRAMUSCULAR; INTRAVENOUS at 09:13

## 2020-11-09 RX ADMIN — ROCURONIUM BROMIDE 25 MG: 10 SOLUTION INTRAVENOUS at 09:24

## 2020-11-09 RX ADMIN — KETAMINE HYDROCHLORIDE 20 MG: 10 INJECTION, SOLUTION INTRAMUSCULAR; INTRAVENOUS at 09:55

## 2020-11-09 RX ADMIN — Medication 120 MCG: at 09:17

## 2020-11-09 RX ADMIN — Medication 5 MG: at 09:17

## 2020-11-09 RX ADMIN — SODIUM CHLORIDE, SODIUM LACTATE, POTASSIUM CHLORIDE, AND CALCIUM CHLORIDE 125 ML/HR: 600; 310; 30; 20 INJECTION, SOLUTION INTRAVENOUS at 15:45

## 2020-11-09 RX ADMIN — ESCITALOPRAM OXALATE 10 MG: 10 TABLET ORAL at 20:16

## 2020-11-09 RX ADMIN — Medication 5 MG: at 10:26

## 2020-11-09 RX ADMIN — ACETAMINOPHEN 500 MG: 500 TABLET ORAL at 09:05

## 2020-11-09 RX ADMIN — PRAVASTATIN SODIUM 40 MG: 40 TABLET ORAL at 20:22

## 2020-11-09 RX ADMIN — Medication 120 MCG: at 09:32

## 2020-11-09 RX ADMIN — ONDANSETRON HYDROCHLORIDE 4 MG: 2 INJECTION, SOLUTION INTRAMUSCULAR; INTRAVENOUS at 10:41

## 2020-11-09 RX ADMIN — MIDAZOLAM 1 MG: 1 INJECTION INTRAMUSCULAR; INTRAVENOUS at 09:10

## 2020-11-09 RX ADMIN — PROPOFOL 150 MG: 10 INJECTION, EMULSION INTRAVENOUS at 09:12

## 2020-11-09 RX ADMIN — ACETAMINOPHEN 650 MG: 325 TABLET ORAL at 20:16

## 2020-11-09 RX ADMIN — LIDOCAINE HYDROCHLORIDE 100 MG: 20 INJECTION, SOLUTION EPIDURAL; INFILTRATION; INTRACAUDAL; PERINEURAL at 09:12

## 2020-11-09 RX ADMIN — HYDROMORPHONE HYDROCHLORIDE 0.5 MG: 2 INJECTION, SOLUTION INTRAMUSCULAR; INTRAVENOUS; SUBCUTANEOUS at 11:24

## 2020-11-09 RX ADMIN — HYDROMORPHONE HYDROCHLORIDE 0.5 MG: 2 INJECTION, SOLUTION INTRAMUSCULAR; INTRAVENOUS; SUBCUTANEOUS at 11:20

## 2020-11-09 RX ADMIN — Medication 2 G: at 09:36

## 2020-11-09 RX ADMIN — KETAMINE HYDROCHLORIDE 20 MG: 10 INJECTION, SOLUTION INTRAMUSCULAR; INTRAVENOUS at 10:18

## 2020-11-09 RX ADMIN — Medication 120 MCG: at 09:15

## 2020-11-09 RX ADMIN — Medication 4 MG: at 11:00

## 2020-11-09 RX ADMIN — OXYCODONE 5 MG: 5 TABLET ORAL at 19:16

## 2020-11-09 RX ADMIN — WARFARIN SODIUM 5 MG: 5 TABLET ORAL at 17:01

## 2020-11-09 RX ADMIN — VANCOMYCIN HYDROCHLORIDE 1500 MG: 10 INJECTION, POWDER, LYOPHILIZED, FOR SOLUTION INTRAVENOUS at 08:30

## 2020-11-09 RX ADMIN — SODIUM CHLORIDE, POTASSIUM CHLORIDE, SODIUM LACTATE AND CALCIUM CHLORIDE: 600; 310; 30; 20 INJECTION, SOLUTION INTRAVENOUS at 10:32

## 2020-11-09 RX ADMIN — ROCURONIUM BROMIDE 10 MG: 10 SOLUTION INTRAVENOUS at 09:55

## 2020-11-09 RX ADMIN — TRANEXAMIC ACID 1 G: 100 INJECTION, SOLUTION INTRAVENOUS at 09:43

## 2020-11-09 RX ADMIN — Medication 100 MCG: at 09:30

## 2020-11-09 RX ADMIN — HYDROMORPHONE HYDROCHLORIDE 0.5 MG: 2 INJECTION, SOLUTION INTRAMUSCULAR; INTRAVENOUS; SUBCUTANEOUS at 11:12

## 2020-11-09 RX ADMIN — MIDAZOLAM 1 MG: 1 INJECTION INTRAMUSCULAR; INTRAVENOUS at 09:08

## 2020-11-09 RX ADMIN — GLYCOPYRROLATE 0.4 MG: 0.2 INJECTION, SOLUTION INTRAMUSCULAR; INTRAVENOUS at 11:00

## 2020-11-09 RX ADMIN — FENTANYL CITRATE 50 MCG: 50 INJECTION, SOLUTION INTRAMUSCULAR; INTRAVENOUS at 09:12

## 2020-11-09 RX ADMIN — SODIUM CHLORIDE, POTASSIUM CHLORIDE, SODIUM LACTATE AND CALCIUM CHLORIDE: 600; 310; 30; 20 INJECTION, SOLUTION INTRAVENOUS at 09:09

## 2020-11-09 RX ADMIN — CEFAZOLIN SODIUM 1 G: 1 INJECTION, POWDER, FOR SOLUTION INTRAMUSCULAR; INTRAVENOUS at 20:16

## 2020-11-09 RX ADMIN — DEXAMETHASONE SODIUM PHOSPHATE 4 MG: 4 INJECTION, SOLUTION INTRAMUSCULAR; INTRAVENOUS at 09:37

## 2020-11-09 RX ADMIN — ACETAMINOPHEN 650 MG: 325 TABLET ORAL at 15:45

## 2020-11-09 NOTE — OP NOTES
OPERATIVE NOTE    11/9/2020  DJD LEFT HIP  DJD LEFT HIP    PREOPERATIVE DIAGNOSIS: Osteoarthritis, left hip. POSTOPERATIVE DIAGNOSIS: Osteoarthritis, left hip. OPERATIVE PROCEDURE: left total hip replacement. SURGEON: Lance Meyer MD    ASSISTANT SURGEON: Nika Martin, [facility]    ANESTHESIA: general anesthesia    ESTIMATED BLOOD LOSS: 150cc. Pre op Antibiotics: vancomycin    INDICATIONS: A 56 y.o.  female  with end stage osteoarthritis to the left hip, not responsive to conservative management. COMPLICATIONS: None    PROCEDURE: Patient was taken to the operating room and underwent general anesthesia. The patient was placed on the [facility] table with both legs in boots, and intermittent compression hose placed on bilateral calves. The left hip and leg were prepped and draped in the usual fashion. Standard anterior approach was made to the hip, down through the skin and subcutaneous tissue. Hemostasis was obtained using Bovie apparatus. Fascia overlying the tensor muscle was incised longitudinally and the muscles were dissected off the fascia, and retracted posterolaterally. Bovie was used to incise the fascia. Circumflex vessels identified, clamped and cauterized. Retractors were placed medially and laterally around the femoral neck. .The capsule was incised longitudinally in a T-shape fashion. The capsule was dissected subperiosteally. Sutures were place at 2 ends of the capsule medially and laterally and used for attraction. Retractors were placed intracapsular. .Femoral neck was cut at the appropriate level. The femoral head was removed using corkscrew. Acetabular retractors were placed. The acetabulum was reamed up to a size 51 reamer. A size 52 acetabular component was impacted into the acetabulum in the appropriate amount of anteversion and horizontal inclination. A single screw was placed superiorly in the ilium with good fixation.  Fluoroscopy was used to confirm placement of the cup and screw. This was irrigated, A 36 mm inner diameter polyethylene liner was impacted into the acetabular component. We then turned our attention to the femur. Capsular releases were performed. The leg was brought into hyperextension, and retractors placed, exposing the proximal femur. Canal finder was used to establish the canal. The femoral canal was reamed w/ the tapered reamers up to a size of 5. Canal was broached up to a size 5 broach. Trial reduction was performed, noting excellent range of motion and stability. Fluoroscopy was used to confirm size of the implant and leg lengths. Trials were removed. Canal was cleansed using pulsatile lavage system with antibiotic solution. A size 5 high offset stem was impacted down the intramedullary canal with good fixation. Again, trial reduction was performed, and the 1.5 mm head was chosen. Leg lengths were measured. The trial head was removed, and a 1.5 mm ceramic head was impacted onto the stem. The hip reduced. The hip was stable at 90 degrees of external rotation. Fluoroscopy was used to confirm placement of all hardware, and to measure leg length using a horizontal line parallel to bilateral ischium, looking at the level of the lesser trochanters. Joint was extensively irrigated with antibiotic solution. Capsular repair was performed using #2 Vicryl interrupted figure-of-eight fashion. Capsule was infiltrated with 30 mL of 0.5% Marcaine with epinephrine. Fascia over the tensor muscle was repaired using #2 Stratafix quilled suture in a running fashion. The subcutaneous tissue was approximated using 2-0 Vicryl in interrupted fashion. The skin edges were approximated using 3-0 Monocryl in running subcuticular fashion. Dermabond was applied. A bulky, sterile dressing was applied. The patient was transferred to the recovery room in stable condition. There were no complications.   The Physician assistant was involved in retraction and wound closure.     SPECIMEN:* No specimens in log *    Fany Quigley MD  11/9/2020  10:46 AM

## 2020-11-09 NOTE — PROGRESS NOTES
Pharmacist Note - Warfarin Dosing  Consult provided for this 56 y.o. female to manage warfarin for Orthopedic Surgery (VTE prophylaxis)    INR Goal: 1.7- 2.2    Therapy Day: One    Drugs that may increase INR:None  Drugs that may decrease INR: None  Other current anticoagulants/ drugs that may increase bleeding risk: None  Risk factors: Renal insufficiency  Daily INR ordered: YES    No results for input(s): HGB, INR, HGBEXT, INREXT in the last 72 hours. Date               INR                 Dose  11/2  1.0  5 mg             Assessment/ Plan: Will order warfarin 5 mg PO x 1 dose. Pharmacy will continue to monitor daily and adjust therapy as indicated. Please contact the pharmacist at  or  for discharge recommendations if needed.

## 2020-11-09 NOTE — PROGRESS NOTES
TRANSFER - IN REPORT:    Verbal report received from [place] (name) on Willa Escobar  being received from PACU (unit) for routine post - op      Report consisted of patients Situation, Background, Assessment and   Recommendations(SBAR). Information from the following report(s) SBAR, Kardex, OR Summary, Intake/Output, MAR and Recent Results was reviewed with the receiving nurse. Opportunity for questions and clarification was provided. Assessment completed upon patients arrival to unit and care assumed.

## 2020-11-09 NOTE — BRIEF OP NOTE
Brief Postoperative Note    Patient: Juany Mathew  YOB: 1960  MRN: [id number]    Date of Procedure: 11/9/2020     Pre-Op Diagnosis: DJD LEFT HIP    Post-Op Diagnosis: Same as preoperative diagnosis. Procedure(s):  LEFT TOTAL HIP REPLACEMENT ANTERIOR APPROACH    Surgeon(s):  Tessie Brittle, MD    Surgical Assistant: Physician Assistant: ENID Pelayo  Surg Asst-1: Wilder Meade    Anesthesia: General     Estimated Blood Loss (mL): 948     Complications: None    Specimens: * No specimens in log *     Implants:   Implant Name Type Inv. Item Serial No.  Lot No. LRB No. Used Action   SCREW BONE FEM CANC 6. [id number] - SNA  SCREW BONE FEM CANC 6. [id number] NA [facility] [employer] [facility] [id number] Left 1 Implanted   CUP ACET [id number] HIP GRIPTION MIREYA CEMENTLESS FIX SECT SER - SNA  CUP ACET [id number] HIP GRIPTION MIREYA CEMENTLESS FIX SECT SER NA [facility] [employer] [facility] [id number] Left 1 Implanted   LINER ACET OD52MM ID36MM HIP ALTRX PINN - SNA  LINER ACET OD52MM ID36MM HIP ALTRX PINN NA [facility] [employer] [facility] [id number] Left 1 Implanted   STEM FEM SZ 5 L145MM NK L39MM 130DEG HI OFFSET CALCAR HIP - SNA  STEM FEM SZ 5 L145MM NK L39MM 130DEG HI OFFSET CALCAR HIP NA [facility] [employer] [facility] [id number] Left 1 Implanted   HEAD FEM [id number] +1.5MM OFFSET 12/14 TAPR HIP CERAMIC - SNA  HEAD FEM [id number] +1.5MM OFFSET 12/14 TAPR HIP CERAMIC NA [facility] [employer] [facility] [id number] Left 1 Implanted       Drains: * No LDAs found *    Findings: DJD    Electronically Signed by Evon Correia MD on 11/9/2020 at 10:45 AM

## 2020-11-09 NOTE — PROGRESS NOTES
Problem: Falls - Risk of  Goal: *Absence of Falls  Description: Document Ivin Hidalgo Fall Risk and appropriate interventions in the flowsheet. Outcome: Progressing Towards Goal  Note: Fall Risk Interventions:  Mobility Interventions: Patient to call before getting OOB, PT Consult for mobility concerns, PT Consult for assist device competence, OT consult for ADLs    Medication Interventions: Teach patient to arise slowly, Patient to call before getting OOB, Evaluate medications/consider consulting pharmacy    Elimination Interventions: Call light in reach, Patient to call for help with toileting needs     Problem: Hip Replacement: Day of Surgery/Unit  Goal: Activity/Safety  Outcome: Progressing Towards Goal   Patient ambulated with physical therapy. Problem: Hypertension  Goal: *Blood pressure within specified parameters  Outcome: Progressing Towards Goal   BP within defined limits. Problem: Risk for Spread of Infection  Goal: Prevent transmission of infectious organism to others  Description: Prevent the transmission of infectious organisms to other patients, staff members, and visitors. Outcome: Progressing Towards Goal   Patient on modified isolation precautions.

## 2020-11-09 NOTE — ANESTHESIA PREPROCEDURE EVALUATION
Relevant Problems   No relevant active problems       Anesthetic History   No history of anesthetic complications            Review of Systems / Medical History  Patient summary reviewed, nursing notes reviewed and pertinent labs reviewed    Pulmonary  Within defined limits                 Neuro/Psych   Within defined limits           Cardiovascular  Within defined limits  Hypertension              Exercise tolerance: >4 METS     GI/Hepatic/Renal     GERD    Renal disease: CRI       Endo/Other        Obesity and arthritis     Other Findings              Physical Exam    Airway  Mallampati: II  TM Distance: 4 - 6 cm  Neck ROM: normal range of motion   Mouth opening: Normal     Cardiovascular  Regular rate and rhythm,  S1 and S2 normal,  no murmur, click, rub, or gallop             Dental  No notable dental hx       Pulmonary  Breath sounds clear to auscultation               Abdominal  GI exam deferred       Other Findings            Anesthetic Plan    ASA: 3  Anesthesia type: general          Induction: Intravenous  Anesthetic plan and risks discussed with: Patient

## 2020-11-09 NOTE — PROGRESS NOTES
Ortho Post-Op Note    11/9/2020  3:44 PM    POD:  Day of Surgery  S/P:  Procedure(s):  LEFT TOTAL HIP REPLACEMENT ANTERIOR APPROACH    Afebrile/VSS, NAD, somnolent  Doing well without complaints of nausea  Pain well controlled  Calves soft/NTTP Bilaterally  Thigh soft. Dressing clean and dry  Moving lower extremities well. Neurocirculatory exam intact and within normal range. H/O stage 4 CKD  Lab Results   Component Value Date/Time    HGB 10.0 (L) 11/02/2020 03:20 PM    INR 1.0 11/02/2020 03:20 PM     Recent Labs     11/02/20  1520 10/31/20  0238 10/21/20  1437 09/21/20  1421 08/21/20  1713 08/12/20  1237 05/05/20  1654   CREA 4.23* 3.76* 3.21* 3.53* 3.77* 3.64* 3.41*   BUN 58* 56* 44* 52* 45* 55* 48*     Estimated Creatinine Clearance: 16.7 mL/min (A) (by C-G formula based on SCr of 4.23 mg/dL (H)).     PLAN: LR IV fluids, renal diet, no NSAID's  AM labs  DVT prophylaxis: Warfarin  WBAT with PT-mobilization  Pain Control: Tylenol, oxycodone  Plan to D/C home in 1- 2 days      [facility]

## 2020-11-09 NOTE — ANESTHESIA POSTPROCEDURE EVALUATION
Post-Anesthesia Evaluation and Assessment    Patient: Mario Sims MRN: [id number]  SSN: [id number]    YOB: 1960  Age: 56 y.o. Sex: female      I have evaluated the patient and they are stable and ready for discharge from the PACU. Cardiovascular Function/Vital Signs  Visit Vitals  /65   Pulse 100   Temp 36.5 °C (97.7 °F)   Resp 20   Ht 5' 6\" (1.676 m)   Wt 102.3 kg (225 lb 9 oz)   SpO2 96%   BMI 36.41 kg/m²       Patient is status post General anesthesia for Procedure(s):  LEFT TOTAL HIP REPLACEMENT ANTERIOR APPROACH. Nausea/Vomiting: None    Postoperative hydration reviewed and adequate. Pain:  Pain Scale 1: Numeric (0 - 10) (11/09/20 1215)  Pain Intensity 1: 5 (11/09/20 1215)   Managed    Neurological Status:   Neuro (WDL): Exceptions to WDL (11/09/20 1130)  Neuro  Neurologic State: Drowsy (11/09/20 1130)  LUE Motor Response: Purposeful (11/09/20 1130)  LLE Motor Response: Purposeful (11/09/20 1130)  RUE Motor Response: Purposeful (11/09/20 1130)  RLE Motor Response: Purposeful (11/09/20 1130)   At baseline    Mental Status, Level of Consciousness: Alert and  oriented to person, place, and time    Pulmonary Status:   O2 Device: Nasal cannula (11/09/20 1130)   Adequate oxygenation and airway patent    Complications related to anesthesia: None    Post-anesthesia assessment completed. No concerns    Signed By: Lg Kwok MD     November 9, 2020              Procedure(s):  LEFT TOTAL HIP REPLACEMENT ANTERIOR APPROACH. general    <BSHSIANPOST>    INITIAL Post-op Vital signs:   Vitals Value Taken Time   /64 11/9/2020 12:45 PM   Temp 36.5 °C (97.7 °F) 11/9/2020 12:00 PM   Pulse 101 11/9/2020 12:55 PM   Resp 13 11/9/2020 12:55 PM   SpO2 95 % 11/9/2020 12:55 PM   Vitals shown include unvalidated device data.

## 2020-11-09 NOTE — PROGRESS NOTES
Renal Dosing/Monitoring  Medication: Ancef 2 gm IV q8h x 3 doses    No results for input(s): CREA, BUN in the last 72 hours.   Estimated CrCl:  17 ml/min - per most recent labs  Plan: Change to 1 gm IV q12h x 2 doses

## 2020-11-09 NOTE — PROGRESS NOTES
Problem: Mobility Impaired (Adult and Pediatric)  Goal: *Acute Goals and Plan of Care (Insert Text)  Description: FUNCTIONAL STATUS PRIOR TO ADMISSION: Patient was independent and active without use of DME.    HOME SUPPORT PRIOR TO ADMISSION: The patient lived with family and did not need assistance. Physical Therapy Goals  Initiated 11/9/2020    1. Patient will move from supine to sit and sit to supine  in bed with modified independence within 4 days. 2. Patient will perform sit to stand with modified independence within 4 days. 3. Patient will ambulate with modified independence for 150 feet with the least restrictive device within 4 days. 4. Patient will ascend/descend 4 stairs with 1 handrail(s) with modified independence within 4 days. 5. Patient will perform MEL home exercise program per protocol with modified independence within 4 days. Outcome: Progressing Towards Goal   PHYSICAL THERAPY EVALUATION  Patient: Kendy Augustin (41 y.o. female)  Date: 11/9/2020  Primary Diagnosis: Arthritis of left hip [M16.12]  Procedure(s) (LRB):  LEFT TOTAL HIP REPLACEMENT ANTERIOR APPROACH (Left) Day of Surgery   Precautions:   Fall, WBAT      ASSESSMENT  Based on the objective data described below, the patient presents with decreased mobility compared to baseline after L MEL, POD0. She was very groggy this afternoon, but was able to wake up enough to get OOB to the bedside commode. She was able to take steps with the walker to the [facility] but deferred walking further than that as she was so drowsy. She has had lumbar lami in August and was able to discharge home after that. Expect that she will progress well as she is more alert and that she will be able to return home with HHPT per pathway. Current Level of Function Impacting Discharge (mobility/balance): mod assist for transfers with RW    Functional Outcome Measure:   The patient scored Total: 55/100 on the Barthel Index which is indicative of moderately impaired ability to care for basic self needs/dependency on others. Other factors to consider for discharge: none     Patient will benefit from skilled therapy intervention to address the above noted impairments. PLAN :  Recommendations and Planned Interventions: bed mobility training, transfer training, gait training, therapeutic exercises, patient and family training/education, and therapeutic activities      Frequency/Duration: Patient will be followed by physical therapy:  twice daily to address goals. Recommendation for discharge: (in order for the patient to meet his/her long term goals)  Physical therapy at least 2 days/week in the home     This discharge recommendation:  Has been made in collaboration with the attending provider and/or case management    IF patient discharges home will need the following DME: patient owns DME required for discharge         SUBJECTIVE:   Patient stated I am so sleepy.     OBJECTIVE DATA SUMMARY:   HISTORY:    Past Medical History:   Diagnosis Date    Arthritis     L knee    Chronic kidney disease     secondary to hypertensive nephrosclerosis    Gastritis     HISTORY OF 10/2020     Gout     History of claustrophobia     Hypertension      Past Surgical History:   Procedure Laterality Date    HX  SECTION      HX COLONOSCOPY      HX GYN      ECTOPIC PREGNANCY SURGERY     HX LAP CHOLECYSTECTOMY      HX LUMBAR LAMINECTOMY  2020    L3L4L5       Personal factors and/or comorbidities impacting plan of care: as noted above, obesity    Home Situation  Home Environment: Private residence  # Steps to Enter: 6  Rails to Enter: Yes  Hand Rails : Left  Wheelchair Ramp: No  One/Two Story Residence: Two story  # of Interior Steps: 6  Interior Rails: Left  Lift Chair Available: No  Living Alone: No  Support Systems: Child(aravind), Spouse/Significant Other/Partner  Patient Expects to be Discharged to[de-identified] Private residence  Current DME Used/Available at Home: [place], straight, Commode, bedside, Walker, rolling  Tub or Shower Type: Tub/Shower combination    EXAMINATION/PRESENTATION/DECISION MAKING:   Critical Behavior:  Neurologic State: Alert, Appropriate for age  Orientation Level: Oriented X4, Appropriate for age  Cognition: Appropriate decision making, Appropriate for age attention/concentration, Appropriate safety awareness, Follows commands     Hearing: Auditory  Auditory Impairment: None  Skin:  post op bandage in place L anterior hip  Edema: none noted  Range Of Motion:  AROM: Generally decreased, functional(limited by pain post op L hip)                       Strength:    Strength: Within functional limits(L hip limited by pain post op 2/5)                    Tone & Sensation:   Tone: Normal              Sensation: Intact               Coordination:  Coordination: Within functional limits  Vision:      Functional Mobility:  Bed Mobility:     Supine to Sit: Moderate assistance; Additional time  Sit to Supine: Moderate assistance; Additional time  Scooting: Moderate assistance; Additional time  Transfers:  Sit to Stand: Minimum assistance; Adaptive equipment; Additional time  Stand to Sit: Minimum assistance; Adaptive equipment; Additional time        Bed to Chair: Minimum assistance; Adaptive equipment; Additional time              Balance:   Sitting: Intact; Without support  Standing: Impaired; With support(with hands on walker)  Standing - Static: Fair;Occasional  Standing - Dynamic : Constant support; Fair  Ambulation/Gait Training:  Distance (ft): 4 Feet (ft)(only able to tolerate bed to [facility] and back)  Assistive Device: Gait belt;Walker, rolling  Ambulation - Level of Assistance: Minimal assistance; Adaptive equipment; Additional time        Gait Abnormalities: Antalgic;Decreased step clearance; Step to gait; Shuffling gait  Right Side Weight Bearing: Full  Left Side Weight Bearing: As tolerated  Base of Support: Widened;Shift to right  Stance: Left decreased  Speed/Adriana: Slow  Step Length: Right shortened;Left shortened  Swing Pattern: Left asymmetrical     Interventions: Safety awareness training; Tactile cues; Verbal cues; Visual/Demos            Stairs: Therapeutic Exercises: Ankle pumps    Functional Measure:  Barthel Index:    Bathin  Bladder: 10  Bowels: 10  Groomin  Dressin  Feeding: 10  Mobility: 0  Stairs: 0  Toilet Use: 5  Transfer (Bed to Chair and Back): 10  Total: 55/100       The Barthel ADL Index: Guidelines  1. The index should be used as a record of what a patient does, not as a record of what a patient could do. 2. The main aim is to establish degree of independence from any help, physical or verbal, however minor and for whatever reason. 3. The need for supervision renders the patient not independent. 4. A patient's performance should be established using the best available evidence. Asking the patient, friends/relatives and nurses are the usual sources, but direct observation and common sense are also important. However direct testing is not needed. 5. Usually the patient's performance over the preceding 24-48 hours is important, but occasionally longer periods will be relevant. 6. Middle categories imply that the patient supplies over 50 per cent of the effort. 7. Use of aids to be independent is allowed. [facility] Barthel, D.W. ([id number]). Functional evaluation: the Barthel Index. [address] [facility] (14)2. LEVON Amato, Verónica Cisse., Haider Adkins., [place], [address] [place] [address] (). Measuring the change indisability after inpatient rehabilitation; comparison of the responsiveness of the Barthel Index and Functional [place] Measure. Journal of Neurology, Neurosurgery, and Psychiatry, 66(4), 746-412. Moira Sethi, [place] LUIS.A, ISAURA Howell, & Kris Bruce M.A. (2004.) Assessment of post-stroke quality of life in cost-effectiveness studies: The usefulness of the Barthel Index and the EuroQoL-5D.  Quality of Life Research, 13, 407-25        Physical Therapy Evaluation Charge Determination   History Examination Presentation Decision-Making   HIGH Complexity :3+ comorbidities / personal factors will impact the outcome/ POC  MEDIUM Complexity : 3 Standardized tests and measures addressing body structure, function, activity limitation and / or participation in recreation  MEDIUM Complexity : Evolving with changing characteristics  LOW Complexity : FOTO score of       Based on the above components, the patient evaluation is determined to be of the following complexity level: LOW     Pain Rating:  Minimal complaints of pain    Activity Tolerance:   Fair and requires rest breaks    After treatment patient left in no apparent distress:   Supine in bed, Call bell within reach, Side rails x 3, and ice in place L hip, pillow under L thigh    COMMUNICATION/EDUCATION:   The patients plan of care was discussed with: Registered nurse and Physician. Fall prevention education was provided and the patient/caregiver indicated understanding., Patient/family have participated as able in goal setting and plan of care. , and Patient/family agree to work toward stated goals and plan of care.     Thank you for this referral.  Day Etienne, PT   Time Calculation: 24 mins

## 2020-11-09 NOTE — PROGRESS NOTES
Primary Nurse Tomy Smith and Layne Vega., RN performed a dual skin assessment on this patient No impairment noted  Jose score is 20

## 2020-11-09 NOTE — PERIOP NOTES
TRANSFER - OUT REPORT:    Verbal report given to Thalia Augustine RN(name) [facility]  [facility] to (unit) for routine post - op       Report consisted of patients Situation, Background, Assessment and   Recommendations(SBAR). Time Pre op antibiotic [id number]  Anesthesia Stop time: 1110    Information from the following report(s) SBAR, OR Summary, Intake/Output and MAR was reviewed with the receiving nurse. Opportunity for questions and clarification was provided. Is the patient on 02? NO       L/Min        Other     Is the patient on a monitor? NO    Is the nurse transporting with the patient? NO    Surgical Waiting Area notified of patient's transfer from PACU? YES      The following personal items collected during your admission accompanied patient upon transfer:   Dental Appliance: Dental Appliances: (clothes, glasses and walker returned in PACU)  Vision: Visual Aid: Glasses  Hearing Aid:    Jewelry: Jewelry: None  Clothing: Clothing: (to pacu)  Other Valuables:  Other Valuables: Eyeglasses, Walker(to pacu)  Valuables sent to safe:

## 2020-11-10 LAB
ANION GAP SERPL CALC-SCNC: 7 MMOL/L (ref 5–15)
BUN SERPL-MCNC: 49 MG/DL (ref 6–20)
BUN/CREAT SERPL: 14 (ref 12–20)
CALCIUM SERPL-MCNC: 10.4 MG/DL (ref 8.5–10.1)
CHLORIDE SERPL-SCNC: 112 MMOL/L (ref 97–108)
CO2 SERPL-SCNC: 20 MMOL/L (ref 21–32)
CREAT SERPL-MCNC: 3.49 MG/DL (ref 0.55–1.02)
GLUCOSE SERPL-MCNC: 117 MG/DL (ref 65–100)
HGB BLD-MCNC: 8.5 G/DL (ref 11.5–16)
INR PPP: 1 (ref 0.9–1.1)
POTASSIUM SERPL-SCNC: 4.3 MMOL/L (ref 3.5–5.1)
PROTHROMBIN TIME: 10.9 SEC (ref 9–11.1)
SODIUM SERPL-SCNC: 139 MMOL/L (ref 136–145)

## 2020-11-10 PROCEDURE — 74011000258 HC RX REV CODE- 258: Performed by: PHYSICIAN ASSISTANT

## 2020-11-10 PROCEDURE — 97535 SELF CARE MNGMENT TRAINING: CPT

## 2020-11-10 PROCEDURE — 36415 COLL VENOUS BLD VENIPUNCTURE: CPT

## 2020-11-10 PROCEDURE — 97116 GAIT TRAINING THERAPY: CPT

## 2020-11-10 PROCEDURE — 80048 BASIC METABOLIC PNL TOTAL CA: CPT

## 2020-11-10 PROCEDURE — 85610 PROTHROMBIN TIME: CPT

## 2020-11-10 PROCEDURE — 97165 OT EVAL LOW COMPLEX 30 MIN: CPT

## 2020-11-10 PROCEDURE — 74011250636 HC RX REV CODE- 250/636: Performed by: PHYSICIAN ASSISTANT

## 2020-11-10 PROCEDURE — 99218 HC RM OBSERVATION: CPT

## 2020-11-10 PROCEDURE — 85018 HEMOGLOBIN: CPT

## 2020-11-10 PROCEDURE — 74011250637 HC RX REV CODE- 250/637: Performed by: PHYSICIAN ASSISTANT

## 2020-11-10 PROCEDURE — 97530 THERAPEUTIC ACTIVITIES: CPT

## 2020-11-10 PROCEDURE — 77030038269 HC DRN EXT URIN PURWCK BARD -A

## 2020-11-10 PROCEDURE — 51798 US URINE CAPACITY MEASURE: CPT

## 2020-11-10 RX ORDER — WARFARIN SODIUM 5 MG/1
5 TABLET ORAL ONCE
Status: COMPLETED | OUTPATIENT
Start: 2020-11-10 | End: 2020-11-10

## 2020-11-10 RX ORDER — SODIUM CHLORIDE, SODIUM LACTATE, POTASSIUM CHLORIDE, CALCIUM CHLORIDE 600; 310; 30; 20 MG/100ML; MG/100ML; MG/100ML; MG/100ML
125 INJECTION, SOLUTION INTRAVENOUS CONTINUOUS
Status: DISCONTINUED | OUTPATIENT
Start: 2020-11-10 | End: 2020-11-11 | Stop reason: HOSPADM

## 2020-11-10 RX ADMIN — AMLODIPINE BESYLATE 10 MG: 5 TABLET ORAL at 10:48

## 2020-11-10 RX ADMIN — Medication 10 ML: at 22:08

## 2020-11-10 RX ADMIN — ACETAMINOPHEN 650 MG: 325 TABLET ORAL at 10:49

## 2020-11-10 RX ADMIN — PRAVASTATIN SODIUM 40 MG: 40 TABLET ORAL at 22:04

## 2020-11-10 RX ADMIN — OXYCODONE 5 MG: 5 TABLET ORAL at 02:57

## 2020-11-10 RX ADMIN — CEFAZOLIN SODIUM 1 G: 1 INJECTION, POWDER, FOR SOLUTION INTRAMUSCULAR; INTRAVENOUS at 08:07

## 2020-11-10 RX ADMIN — METOLAZONE 2.5 MG: 2.5 TABLET ORAL at 10:48

## 2020-11-10 RX ADMIN — OXYCODONE 5 MG: 5 TABLET ORAL at 08:06

## 2020-11-10 RX ADMIN — DOCUSATE SODIUM 50 MG AND SENNOSIDES 8.6 MG 1 TABLET: 8.6; 5 TABLET, FILM COATED ORAL at 10:50

## 2020-11-10 RX ADMIN — ACETAMINOPHEN 650 MG: 325 TABLET ORAL at 22:04

## 2020-11-10 RX ADMIN — POTASSIUM CHLORIDE 20 MEQ: 750 TABLET, FILM COATED, EXTENDED RELEASE ORAL at 10:48

## 2020-11-10 RX ADMIN — WARFARIN SODIUM 5 MG: 5 TABLET ORAL at 14:00

## 2020-11-10 RX ADMIN — ACETAMINOPHEN 650 MG: 325 TABLET ORAL at 14:00

## 2020-11-10 RX ADMIN — ESCITALOPRAM OXALATE 10 MG: 10 TABLET ORAL at 22:04

## 2020-11-10 RX ADMIN — ACETAMINOPHEN 650 MG: 325 TABLET ORAL at 02:57

## 2020-11-10 RX ADMIN — OXYCODONE 5 MG: 5 TABLET ORAL at 19:30

## 2020-11-10 NOTE — PROGRESS NOTES
Ortho Daily Progress Note    11/10/2020  10:19 AM    POD:  1 Day Post-Op  S/P:  Procedure(s):  LEFT TOTAL HIP REPLACEMENT ANTERIOR APPROACH    Afebrile/VSS, NAD, A&O x 3  Doing well without complaints of nausea  Pain well controlled  Calves soft/NTTP Bilaterally  Thigh soft. Dressing clean and dry  Moving lower extremities well. Neurocirculatory exam intact and within normal range. Cr down  Lab Results   Component Value Date/Time    HGB 8.5 (L) 11/10/2020 03:05 AM    INR 1.0 11/10/2020 03:05 AM     Recent Labs     11/10/20  0305 11/02/20  1520 10/31/20  0238 10/21/20  1437 09/21/20  1421 08/21/20  1713 08/12/20  1237 05/05/20  1654   CREA 3.49* 4.23* 3.76* 3.21* 3.53* 3.77* 3.64* 3.41*   BUN 49* 58* 56* 44* 52* 45* 55* 48*     Estimated Creatinine Clearance: 20.2 mL/min (A) (based on SCr of 3.49 mg/dL (H)).     PLAN:  DVT prophylaxis: Warfarin  WBAT with PT-mobilization  Pain Control: tylenol, oxycodone  Plan to D/C home possibly later today      ENID Potter

## 2020-11-10 NOTE — PROGRESS NOTES
Pharmacist Note - Warfarin Dosing  Consult provided for this 56 y.o. female to manage warfarin for Orthopedic Surgery (VTE prophylaxis)    INR Goal: 1.7- 2.2    Therapy Day: 2    Drugs that may increase INR:None  Drugs that may decrease INR: None  Other current anticoagulants/ drugs that may increase bleeding risk: None  Risk factors: Renal insufficiency  Daily INR ordered: YES    Recent Labs     11/10/20  0305   HGB 8.5*   INR 1.0     Date               INR                 Dose  11/2  1.0   11/9                                        5 mg  11/10               1.0                  5 mg             Assessment/ Plan: Will order warfarin 5 mg PO x 1 dose. Pharmacy will continue to monitor daily and adjust therapy as indicated. Please contact the pharmacist at  or  for discharge recommendations if needed.

## 2020-11-10 NOTE — PROGRESS NOTES
Bedside and Verbal shift change report given to Mann Avila (oncoming nurse) by Marybel Desai (offgoing nurse). Report included the following information SBAR, Kardex, OR Summary, Intake/Output and MAR.

## 2020-11-10 NOTE — PROGRESS NOTES
Problem: Self Care Deficits Care Plan (Adult)  Goal: *Acute Goals and Plan of Care (Insert Text)  Description: Occupational Therapy Goals  Initiated: 11/10/2020   1. Patient will perform grooming with supervision/set-up standing at sink within 3 day(s). 2.  Patient will perform bathing with supervision/set-up from chair within 3 day(s). 3.  Patient will perform upper body dressing and lower body dressing with supervision/set-up within 3 day(s). 4.  Patient will perform toilet transfers with supervision/set-up within 3 day(s). 5.  Patient will perform all aspects of toileting with supervision/set-up within 3 day(s). 6.  Patient will be independent with hip precautions within 3 days. FUNCTIONAL STATUS PRIOR TO ADMISSION: She was independent with ADL activities. She has recently had spine fusion in Aug. And reports she is out of restrictions and precautions. HOME SUPPORT: She lives with her  and adult children. Outcome: Progressing Towards Goal    OCCUPATIONAL THERAPY EVALUATION  Patient: Gustavo Barrett (5 y.o. female)  Date: 11/10/2020  Primary Diagnosis: Arthritis of left hip [M16.12]  Procedure(s) (LRB):  LEFT TOTAL HIP REPLACEMENT ANTERIOR APPROACH (Left) 1 Day Post-Op   Precautions:   Fall, WBAT(avoid sudden and extreme movements of the hip)    ASSESSMENT  Based on the objective data described below, the patient presents with decreased independence with self care and functional mobility following admission for L THR. Pt was able to progress with dressing training from EOB but declined to complete full dressing activities. She did well with use of AE to complete activities. She will have support from her  and sons at home as needed. Recommend to continue to advance AE training to maximize independence but if she is cleared by PT, she can return home. Should she remain in acute setting, recommend follow up training.       Current Level of Function Impacting Discharge (ADLs/self-care): min A for LB dressing and min A for bed mobility    Functional Outcome Measure: The patient scored 55 on the Barthel Index outcome measure which is indicative of 45% impairment with ADl activities. Other factors to consider for discharge: none     Patient will benefit from skilled therapy intervention to address the above noted impairments. PLAN :  Recommendations and Planned Interventions: self care training, functional mobility training, therapeutic exercise, balance training, therapeutic activities, endurance activities, patient education, home safety training, and family training/education    Frequency/Duration: Patient will be followed by occupational therapy 5 times a week to address goals. Recommendation for discharge: (in order for the patient to meet his/her long term goals)  No skilled occupational therapy/ follow up rehabilitation needs identified at this time. This discharge recommendation:  Has been made in collaboration with the attending provider and/or case management    IF patient discharges home will need the following DME: encouraged to purchase a hip kit       SUBJECTIVE:   Patient stated I do not want to get dressed to then have to get undressed later.     OBJECTIVE DATA SUMMARY:   HISTORY:   Past Medical History:   Diagnosis Date    Arthritis     L knee    Chronic kidney disease     secondary to hypertensive nephrosclerosis    Gastritis     HISTORY OF 10/2020     Gout     History of claustrophobia     Hypertension      Past Surgical History:   Procedure Laterality Date    HX  SECTION      HX COLONOSCOPY      HX GYN      ECTOPIC PREGNANCY SURGERY     HX LAP CHOLECYSTECTOMY      HX LUMBAR LAMINECTOMY  2020    L3L4L5       Expanded or extensive additional review of patient history:     Home Situation  Home Environment: Private residence  # Steps to Enter: 6  Rails to Enter: Yes  Hand Rails : Left  Wheelchair Ramp: No  One/Two Story Residence: Two story  # of Interior Steps: 6  Interior Rails: Left  Lift Chair Available: No  Living Alone: No  Support Systems: Child(aravind), Spouse/Significant Other/Partner  Patient Expects to be Discharged to[de-identified] Private residence  Current DME Used/Available at Home: Lynnann Courser, straight, Walker, rolling  Tub or Shower Type: Tub/Shower combination    Hand dominance: Right    EXAMINATION OF PERFORMANCE DEFICITS:  Cognitive/Behavioral Status:  Neurologic State: Alert  Orientation Level: Oriented X4  Cognition: Appropriate for age attention/concentration  Perception: Appears intact  Perseveration: No perseveration noted  Safety/Judgement: Good awareness of safety precautions    Skin: see nursing notes    Edema: none noted    Hearing: Auditory  Auditory Impairment: None    Vision/Perceptual:                           Acuity: Within Defined Limits         Range of Motion:    AROM: Within functional limits  PROM: Within functional limits                      Strength:    Strength: Within functional limits                Coordination:  Coordination: Within functional limits  Fine Motor Skills-Upper: Right Intact; Left Intact    Gross Motor Skills-Upper: Right Intact; Left Intact    Tone & Sensation:  Tone: Normal  Sensation: Intact                      Balance:  Sitting: Intact  Standing: Impaired; With support  Standing - Static: Fair  Standing - Dynamic : Fair;Constant support    Functional Mobility and Transfers for ADLs:  Bed Mobility:  Supine to Sit: (recieved on BSC)  Sit to Supine: Additional time;Minimum assistance  Scooting: Supervision    Transfers:  Sit to Stand: Contact guard assistance  Stand to Sit: Contact guard assistance  Bed to Chair: Contact guard assistance  Bathroom Mobility: Contact guard assistance  Toilet Transfer : Contact guard assistance    ADL Assessment:  Feeding: Supervision    Oral Facial Hygiene/Grooming: Supervision    Bathing:  Moderate assistance    Upper Body Dressing: Supervision    Lower Body Dressing: Minimum assistance    Toileting: Contact guard assistance                ADL Intervention and task modifications:     Lower Body Access:  Pt with anterior hip replacement and instructed to avoid extreme movements and allow pain to be the guide to complete lower body access. Recommend use of hip kit to complete lower body dressing to maximize independence and assist with pain control. Pt provided with education for proper  to access lower body with trunk flexion. Pt instructed with trunk flexion, both elbows and hands should surrounding knees with hips neutral.  Pt reports independence with training and education. Pt completed lower body dressing with min A for underpants, socks with min A , and did not yet don shoes. She worked with limited LB dressing activities due to her declining to actually dress herself. Toileting completed with CG and cues for safety. Cognitive Retraining  Safety/Judgement: Good awareness of safety precautions    Functional Measure:  Barthel Index:    Bathin  Bladder: 10  Bowels: 10  Groomin  Dressin  Feeding: 10  Mobility: 0  Stairs: 0  Toilet Use: 5  Transfer (Bed to Chair and Back): 10  Total: 55/100        The Barthel ADL Index: Guidelines  1. The index should be used as a record of what a patient does, not as a record of what a patient could do. 2. The main aim is to establish degree of independence from any help, physical or verbal, however minor and for whatever reason. 3. The need for supervision renders the patient not independent. 4. A patient's performance should be established using the best available evidence. Asking the patient, friends/relatives and nurses are the usual sources, but direct observation and common sense are also important. However direct testing is not needed.   5. Usually the patient's performance over the preceding 24-48 hours is important, but occasionally longer periods will be relevant. 6. Middle categories imply that the patient supplies over 50 per cent of the effort. 7. Use of aids to be independent is allowed. Vianca Slater., Barthel, D.W. ([id number]). Functional evaluation: the Barthel Index. [address] [facility] (14)2. LEVON Siddiqui, Kimberli Lino., [facility] [place], [address] [facility] (1999). Measuring the change indisability after inpatient rehabilitation; comparison of the responsiveness of the Barthel Index and Functional [place] Measure. Journal of Neurology, Neurosurgery, and Psychiatry, 66(4), 151-676. Ema Zabala, [place].A, ISAURA Howell, & Esha Wilhelm M.A. (2004.) Assessment of post-stroke quality of life in cost-effectiveness studies: The usefulness of the Barthel Index and the EuroQoL-5D. Quality of Life Research, 15, 203-15         Occupational Therapy Evaluation Charge Determination   History Examination Decision-Making   LOW Complexity : Brief history review  MEDIUM Complexity : 3-5 performance deficits relating to physical, cognitive , or psychosocial skils that result in activity limitations and / or participation restrictions MEDIUM Complexity : Patient may present with comorbidities that affect occupational performnce. Miniml to moderate modification of tasks or assistance (eg, physical or verbal ) with assesment(s) is necessary to enable patient to complete evaluation       Based on the above components, the patient evaluation is determined to be of the following complexity level: LOW   Pain Rating:  No pain at rest    Activity Tolerance:   Fair    After treatment patient left in no apparent distress:    Supine in bed and Call bell within reach    COMMUNICATION/EDUCATION:   The patients plan of care was discussed with: Physical therapist and Registered nurse. Home safety education was provided and the patient/caregiver indicated understanding. and Patient/family have participated as able in goal setting and plan of care.     This patients plan of care is appropriate for delegation to SILAS.     Thank you for this referral.  Ford Laird, OT  Time Calculation: 29 mins

## 2020-11-10 NOTE — PROGRESS NOTES
Problem: Mobility Impaired (Adult and Pediatric)  Goal: *Acute Goals and Plan of Care (Insert Text)  Description: FUNCTIONAL STATUS PRIOR TO ADMISSION: Patient was independent and active without use of DME.    HOME SUPPORT PRIOR TO ADMISSION: The patient lived with family and did not need assistance. Physical Therapy Goals  Initiated 11/9/2020    1. Patient will move from supine to sit and sit to supine  in bed with modified independence within 4 days. 2. Patient will perform sit to stand with modified independence within 4 days. 3. Patient will ambulate with modified independence for 150 feet with the least restrictive device within 4 days. 4. Patient will ascend/descend 4 stairs with 1 handrail(s) with modified independence within 4 days. 5. Patient will perform MEL home exercise program per protocol with modified independence within 4 days. 11/10/2020 1500 by Ksenia Corbin  Outcome: Progressing Towards Goal   PHYSICAL THERAPY TREATMENT  Patient: Brea Ramirez (85 y.o. female)  Date: 11/10/2020  Diagnosis: Arthritis of left hip [M16.12]   Arthritis of left hip  Procedure(s) (LRB):  LEFT TOTAL HIP REPLACEMENT ANTERIOR APPROACH (Left) 1 Day Post-Op  Precautions: Fall, WBAT(avoid sudden and extreme movements of the hip)  Chart, physical therapy assessment, plan of care and goals were reviewed. ASSESSMENT  Patient continues with skilled PT services and is progressing towards goals. Patient still complaining of pain this afternoon. Performed bed mobility and required minimal assistance and multiple attempts for sit-stand this afternoon. Ambulated to steps with RW and gait belt, steady gait, then negotiated (very slowly) up 4 steps using left rail and cane, down 4 steps (slowly and wobbly, not confident) using right rail and cane. Required minimal assistance, constant technique cues and encouragement to negotiate steps. Not cleared for discharge yet.     Anticipate 1-2 more visits prior to discharge. Fatigued quickly this afternoon and mobility seemed to require more effort. More assistance was required for all mobility. Current Level of Function Impacting Discharge (mobility/balance): see flow sheets    Other factors to consider for discharge: Stairs to enter the home, 6 steps to get to bedroom and bathroom. Motivated/A & O x 4/Supportive Family/Independent PLOF          PLAN :  Patient continues to benefit from skilled intervention to address the above impairments. Continue treatment per established plan of care. to address goals. Recommendation for discharge: (in order for the patient to meet his/her long term goals)  Physical therapy at least 2 days/week in the home     This discharge recommendation:  Has been made in collaboration with the attending provider and/or case management    IF patient discharges home will need the following DME: patient owns DME required for discharge       SUBJECTIVE:   Patient stated I am not ready to go home yet.     OBJECTIVE DATA SUMMARY:   Critical Behavior:  Neurologic State: Alert  Orientation Level: Oriented X4  Cognition: Appropriate for age attention/concentration  Safety/Judgement: Good awareness of safety precautions  Functional Mobility Training:  Bed Mobility:     Supine to Sit: Minimum assistance  Sit to Supine: (left up in chair at bedside)  Scooting: Supervision        Transfers:  Sit to Stand: Minimum assistance(did not do as well today)  Stand to Sit: Contact guard assistance        Bed to Chair: Contact guard assistance                    Balance:  Sitting: Intact  Standing: Impaired; With support  Standing - Static: Good;Constant support  Standing - Dynamic : Good;Constant support  Ambulation/Gait Training:  Distance (ft): 65 Feet (ft)  Assistive Device: Walker, rolling;Gait belt  Ambulation - Level of Assistance: Contact guard assistance        Gait Abnormalities: Antalgic;Decreased step clearance     Left Side Weight Bearing: As tolerated  Base of Support: Widened;Shift to right  Stance: Left decreased  Speed/Adriana: Slow  Step Length: Right shortened  Swing Pattern: Left symmetrical                 Stairs:  Number of Stairs Trained: 4  Stairs - Level of Assistance: Minimum assistance(very fearful coming down)   Rail Use: Left (one rail and cane)    Pain Ratin/10    Activity Tolerance:   Fair    After treatment patient left in no apparent distress:   Sitting in chair, Call bell within reach, and nurse notified. COMMUNICATION/COLLABORATION:   The patients plan of care was discussed with: Occupational therapist and Registered nurse.      Alpha Ser   Time Calculation: 30 mins

## 2020-11-10 NOTE — PROGRESS NOTES
Bedside and Verbal shift change report given to HUGO Gonzales (oncoming nurse) by Barbie Guadalupe RN (offgoing nurse). Report included the following information SBAR, Kardex, OR Summary, Procedure Summary, Intake/Output, MAR, Accordion and Recent Results.

## 2020-11-10 NOTE — PROGRESS NOTES
Problem: Mobility Impaired (Adult and Pediatric)  Goal: *Acute Goals and Plan of Care (Insert Text)  Description: FUNCTIONAL STATUS PRIOR TO ADMISSION: Patient was independent and active without use of DME.    HOME SUPPORT PRIOR TO ADMISSION: The patient lived with family and did not need assistance. Physical Therapy Goals  Initiated 11/9/2020    1. Patient will move from supine to sit and sit to supine  in bed with modified independence within 4 days. 2. Patient will perform sit to stand with modified independence within 4 days. 3. Patient will ambulate with modified independence for 150 feet with the least restrictive device within 4 days. 4. Patient will ascend/descend 4 stairs with 1 handrail(s) with modified independence within 4 days. 5. Patient will perform MEL home exercise program per protocol with modified independence within 4 days. Outcome: Progressing Towards Goal   PHYSICAL THERAPY TREATMENT  Patient: Marty Hansen (81 y.o. female)  Date: 11/10/2020  Diagnosis: Arthritis of left hip [M16.12]   Arthritis of left hip  Procedure(s) (LRB):  LEFT TOTAL HIP REPLACEMENT ANTERIOR APPROACH (Left) 1 Day Post-Op  Precautions: Fall, WBAT  Chart, physical therapy assessment, plan of care and goals were reviewed. ASSESSMENT  Patient continues with skilled PT services and is progressing towards goals. Patient was ringing call bell as she needed to use the toilet. Able to ambulate 65 with RW and gait belt, antalgic and step-to, but steady. Demonstrates bilateral genu valgus, L > R. Will see again this pm and negotiate stairs. Anticipate 1-2 more PT sessions prior to discharge. Current Level of Function Impacting Discharge (mobility/balance): Minimal assistance for bed mobility  in order to manage LLE. Will provide with leg strap next visit. Contact guard assistance for transfers and gait. Began education regarding PT at home after discharge.     Other factors to consider for discharge: Steps to enter-exit the home/Steps to get to bedroom and bathroom. Motivated/A & O x 4/Supportive Family/Independent PLOF          PLAN :  Patient continues to benefit from skilled intervention to address the above impairments. Continue treatment per established plan of care. to address goals. Recommendation for discharge: (in order for the patient to meet his/her long term goals)  Physical therapy at least 2 days/week in the home     This discharge recommendation:  Has been made in collaboration with the attending provider and/or case management    IF patient discharges home will need the following DME: patient owns DME required for discharge       SUBJECTIVE:   Patient stated Faye Goldsmith am ready to get up.     OBJECTIVE DATA SUMMARY:   Critical Behavior:  Neurologic State: Alert  Orientation Level: Oriented X4  Cognition: Appropriate decision making, Appropriate for age attention/concentration, Appropriate safety awareness, Follows commands     Functional Mobility Training:  Bed Mobility:     Supine to Sit: Minimum assistance;Bed Modified; Adaptive equipment(bed rail, HOB elevated, assist to manage LLE)  Sit to Supine: Minimum assistance; Adaptive equipment  Scooting: Minimum assistance; Adaptive equipment        Transfers:  Sit to Stand: Contact guard assistance  Stand to Sit: Contact guard assistance                             Balance:  Sitting: Intact  Standing: Impaired; Without support  Standing - Static: Good;Constant support  Standing - Dynamic : Good;Constant support  Ambulation/Gait Training:  Distance (ft): 65 Feet (ft)  Assistive Device: Walker, rolling;Gait belt  Ambulation - Level of Assistance: Contact guard assistance        Gait Abnormalities: Antalgic;Decreased step clearance; Step to gait     Left Side Weight Bearing: As tolerated  Base of Support: Widened;Shift to right  Stance: Left decreased  Speed/Adriana: Slow  Step Length: Right shortened  Swing Pattern: Left asymmetrical           Therapeutic Exercises: Ankle Pumps  Quad sets (5 second hold)  Glute sets (5 second hold)  X 10 reps every hour   Heel slides x 10      Pain Ratin10    Activity Tolerance:   Good  Vitals:    20 1653 20 2042 11/10/20 0311 11/10/20 0916   BP: 119/75 (!) 153/80 (!) 152/80 (!) 156/89   BP 1 Location:   Right arm Right arm   BP Patient Position: At rest At rest At rest Sitting;During activity   Pulse: 85 86 85 95   Resp: 16 16 16 16   Temp: 98 °F (36.7 °C) 97.4 °F (36.3 °C) 97.8 °F (36.6 °C) 97.7 °F (36.5 °C)   SpO2: 97% 94% 95% 94%   Weight:       Height:            After treatment patient left in no apparent distress:   Supine in bed, Call bell within reach, Side rails x 3, and nurse notified. COMMUNICATION/COLLABORATION:   The patients plan of care was discussed with: Occupational therapist and Registered nurse.      Leo Toribio   Time Calculation: 35 mins

## 2020-11-10 NOTE — PROGRESS NOTES
OLEG:  RUR: n/a, rrat 10%  TRANSITIONS OF CARE PLAN:   1. DESTINATION: Home with home health  2. TRANSPORT: spouse, Corina Bullion to provide transport at d/c  3. ADDITIONAL SUPPORT: 2 sons who reside with pt  4. DME: walker, cane & bsc  5. HOME HEALTH: [facility]; provider information placed in AVS  6. CODE STATUS/AMD STATUS: Full code; no amd on file  7. FOLLOW UP APPOINTMENTS: tbd  8. STILL NEEDS: surgical clearance; therapy clearance & recommendations,  provider acceptance  Reason for Admission:   Left THR                   RUR Score:        N/a, rrat 10%             Plan for utilizing home health:      TBD; referrals pending    PCP: First and Last name:  Nancy Vick   Name of Practice:    Are you a current patient: Yes/No: YES   Approximate date of last visit: 2 weeks ago   Can you participate in a virtual visit with your PCP: YES                    Current Advanced Directive/Advance Care Plan: no amd on file                         Transition of Care Plan:                    1200- Cm reviewed pt chart & met with pt at bedside. Pt resides with spouse & 2 sons(ages 28 & 32)  in a 3 story home with 6 steps to entrance. Home has 6 steps to 2nd floor Bedroom & Bathroom. Pt reported she is independent with adls & has dme of: walker, cane & bsc. Pt drives & uses [facility] for meds with no copay concerns. Pt advised she has prior [place] with [facility] in August of 2022 but has no preference for [place] at d/c.    Pt's spouse to provide transport at d/c.    CM to follow for transitions of care. 1330-  received phone call from [address] [place] [address] & was advised they can accept pt for start of care Thursday, 11/10/20. CM informed nurse of this. Paula Rojas RN BSN [facility]  CRM  Observation notice provided in writing to patient and/or caregiver as well as verbal explanation of the policy. Patients who are in outpatient status also receive the Observation notice.   Transition of Care Plan:     The Plan for Transition of Care is related to the following treatment goals: home health    The Patient was provided with a choice of provider and agrees  with the discharge plan. Yes [x] No []    A Freedom of choice list was provided with basic dialogue that supports the patient's individualized plan of care/goals and shares the quality data associated with the providers. Yes [x] No []  Care Management Interventions  PCP Verified by CM: Yes(PCP is Brenton Vargas)  Palliative Care Criteria Met (RRAT>21 & CHF Dx)?: No  Mode of Transport at Discharge:  Other (see comment)(spouse, Hernan to provide transport at d/c )  Transition of Care Consult (CM Consult): [facility] Yes  MyChart Signup: Yes  Discharge Durable Medical Equipment: No  Health Maintenance Reviewed: Yes(CM reviewed pt chart , pt is AO x 4.)  Physical Therapy Consult: Yes  Occupational Therapy Consult: Yes  Speech Therapy Consult: No  Current Support Network: Lives with Spouse, Own Home  Confirm Follow Up Transport: Family(spouse, Hernan to provide transport at d/ c)  The Plan for Transition of Care is Related to the Following Treatment Goals : surgical clearance, therapy recommendations  The Patient and/or Patient Representative was Provided with a Choice of Provider and Agrees with the Discharge Plan?: Yes  Name of the Patient Representative Who was Provided with a Choice of Provider and Agrees with the Discharge Plan: patient  Freedom of Choice List was Provided with Basic Dialogue that Supports the Patient's Individualized Plan of Care/Goals, Treatment Preferences and Shares the Quality Data Associated with the Providers?: Yes  Discharge Location  Discharge Placement: Home with home health  Juana Sarabia RN BSN [facility]  CRM

## 2020-11-11 VITALS
HEART RATE: 94 BPM | BODY MASS INDEX: 36.25 KG/M2 | HEIGHT: 66 IN | RESPIRATION RATE: 16 BRPM | WEIGHT: 225.56 LBS | DIASTOLIC BLOOD PRESSURE: 75 MMHG | SYSTOLIC BLOOD PRESSURE: 119 MMHG | TEMPERATURE: 98.2 F | OXYGEN SATURATION: 93 %

## 2020-11-11 LAB
INR PPP: 1.3 (ref 0.9–1.1)
PROTHROMBIN TIME: 13 SEC (ref 9–11.1)

## 2020-11-11 PROCEDURE — 36415 COLL VENOUS BLD VENIPUNCTURE: CPT

## 2020-11-11 PROCEDURE — 97535 SELF CARE MNGMENT TRAINING: CPT

## 2020-11-11 PROCEDURE — 74011250637 HC RX REV CODE- 250/637: Performed by: ORTHOPAEDIC SURGERY

## 2020-11-11 PROCEDURE — 74011250636 HC RX REV CODE- 250/636: Performed by: PHYSICIAN ASSISTANT

## 2020-11-11 PROCEDURE — 99218 HC RM OBSERVATION: CPT

## 2020-11-11 PROCEDURE — 97530 THERAPEUTIC ACTIVITIES: CPT

## 2020-11-11 PROCEDURE — 85610 PROTHROMBIN TIME: CPT

## 2020-11-11 PROCEDURE — 97116 GAIT TRAINING THERAPY: CPT

## 2020-11-11 PROCEDURE — 74011250637 HC RX REV CODE- 250/637: Performed by: PHYSICIAN ASSISTANT

## 2020-11-11 RX ORDER — OXYCODONE HYDROCHLORIDE 5 MG/1
5 TABLET ORAL
Qty: 20 TAB | Refills: 0 | Status: SHIPPED | OUTPATIENT
Start: 2020-11-11 | End: 2020-11-16

## 2020-11-11 RX ORDER — WARFARIN 2.5 MG/1
TABLET ORAL
Qty: 50 TAB | Refills: 0 | Status: SHIPPED | OUTPATIENT
Start: 2020-11-11 | End: 2022-02-13

## 2020-11-11 RX ORDER — WARFARIN SODIUM 5 MG/1
5 TABLET ORAL ONCE
Status: COMPLETED | OUTPATIENT
Start: 2020-11-11 | End: 2020-11-11

## 2020-11-11 RX ORDER — WARFARIN SODIUM 5 MG/1
5 TABLET ORAL ONCE
Qty: 1 TAB | Refills: 0 | Status: SHIPPED | OUTPATIENT
Start: 2020-11-11 | End: 2020-11-11

## 2020-11-11 RX ORDER — AMOXICILLIN 250 MG
1 CAPSULE ORAL 2 TIMES DAILY
Qty: 30 TAB | Refills: 0 | Status: SHIPPED | OUTPATIENT
Start: 2020-11-11 | End: 2022-08-10

## 2020-11-11 RX ADMIN — ACETAMINOPHEN 650 MG: 325 TABLET ORAL at 08:59

## 2020-11-11 RX ADMIN — WARFARIN SODIUM 5 MG: 5 TABLET ORAL at 10:48

## 2020-11-11 RX ADMIN — POLYETHYLENE GLYCOL 3350 17 G: 17 POWDER, FOR SOLUTION ORAL at 08:53

## 2020-11-11 RX ADMIN — OXYBUTYNIN CHLORIDE 5 MG: 5 TABLET, EXTENDED RELEASE ORAL at 08:54

## 2020-11-11 RX ADMIN — OXYCODONE 5 MG: 5 TABLET ORAL at 10:48

## 2020-11-11 RX ADMIN — DOCUSATE SODIUM 50 MG AND SENNOSIDES 8.6 MG 1 TABLET: 8.6; 5 TABLET, FILM COATED ORAL at 08:55

## 2020-11-11 RX ADMIN — ACETAMINOPHEN 650 MG: 325 TABLET ORAL at 05:19

## 2020-11-11 RX ADMIN — POTASSIUM CHLORIDE 20 MEQ: 750 TABLET, FILM COATED, EXTENDED RELEASE ORAL at 08:53

## 2020-11-11 RX ADMIN — SODIUM CHLORIDE, SODIUM LACTATE, POTASSIUM CHLORIDE, AND CALCIUM CHLORIDE 125 ML/HR: 600; 310; 30; 20 INJECTION, SOLUTION INTRAVENOUS at 03:49

## 2020-11-11 NOTE — PROGRESS NOTES
Problem: Mobility Impaired (Adult and Pediatric)  Goal: *Acute Goals and Plan of Care (Insert Text)  Description: FUNCTIONAL STATUS PRIOR TO ADMISSION: Patient was independent and active without use of DME.    HOME SUPPORT PRIOR TO ADMISSION: The patient lived with family and did not need assistance. Physical Therapy Goals  Initiated 11/9/2020    1. Patient will move from supine to sit and sit to supine  in bed with modified independence within 4 days. 2. Patient will perform sit to stand with modified independence within 4 days. 3. Patient will ambulate with modified independence for 150 feet with the least restrictive device within 4 days. 4. Patient will ascend/descend 4 stairs with 1 handrail(s) with modified independence within 4 days. 5. Patient will perform MEL home exercise program per protocol with modified independence within 4 days. Outcome: Resolved/Met   PHYSICAL THERAPY TREATMENT/DISCHARGE  Patient: Lisa Recio (68 y.o. female)  Date: 11/11/2020  Diagnosis: Arthritis of left hip [M16.12]   Arthritis of left hip  Procedure(s) (LRB):  LEFT TOTAL HIP REPLACEMENT ANTERIOR APPROACH (Left) 2 Days Post-Op  Precautions: Fall, WBAT(avoid sudden and extreme movements of the hip)  Chart, physical therapy assessment, plan of care and goals were reviewed. ASSESSMENT  Patient continues with skilled PT services and has met acute care PT goals. Patient is cleared for discharge from PT standpoint. Patient  is independent with post-op MEL exercise protocol and has same in written, illlustrated form. Educated patient on Discharge Instructions relating to PT program progression post-discharge. She demonstrated/verbalized understanding. Barthel Functional Score has improved to 80/100, indicating minimal impaired ability to care for basic self-needs/dependency on others. Other factors to consider for discharge:  Motivated/A & O x 4/Supportive Family/Independent PLOF          PLAN :  Patient will be discharged from acute skilled physical therapy at this time. Rationale for discharge:  Goals achieved    Recommendation for discharge: (in order for the patient to meet his/her long term goals)  Physical therapy at least 2 days/week in the home     This discharge recommendation:  Has been made in collaboration with the attending provider and/or case management    IF patient discharges home will need the following DME: patient owns DME required for discharge       SUBJECTIVE:   Patient stated I am really stiff this morning.     OBJECTIVE DATA SUMMARY:   Critical Behavior:  Neurologic State: Alert, Appropriate for age  Orientation Level: Oriented X4  Cognition: Appropriate for age attention/concentration, Follows commands  Safety/Judgement: Good awareness of safety precautions  Functional Mobility Training:  Bed Mobility:     Supine to Sit: Stand-by assistance(use of strap)  Sit to Supine: Stand-by assistance(use of strap)  Scooting: Modified independent        Transfers:  Sit to Stand: Stand-by assistance  Stand to Sit: Supervision        Bed to Chair: Supervision                    Balance:  Sitting: Intact  Standing: Intact; With support  Standing - Static: Good;Constant support  Standing - Dynamic : Good;Constant support  Ambulation/Gait Training:  Distance (ft): 150 Feet (ft)  Assistive Device: Walker, rolling;Gait belt  Ambulation - Level of Assistance: Stand-by assistance        Gait Abnormalities: Antalgic;Decreased step clearance     Left Side Weight Bearing: As tolerated  Base of Support: Widened;Shift to right  Stance: Left decreased  Speed/Adriana: Slow  Step Length: Right shortened  Swing Pattern: Left asymmetrical       Stairs:  Number of Stairs Trained: 4  Stairs - Level of Assistance: Contact guard assistance   Rail Use: Left (UP: L rail & cane; DOWN: B hands R rail, lateral stepping)    Functional Measures:  Barthel Index:    Bathin  Bladder: 10  Bowels: 10  Grooming: 5  Dressin  Feeding: 10  Mobility: 10  Stairs: 5  Toilet Use: 10  Transfer (Bed to Chair and Back): 15  Total: 80/100       The Barthel ADL Index: Guidelines  1. The index should be used as a record of what a patient does, not as a record of what a patient could do. 2. The main aim is to establish degree of independence from any help, physical or verbal, however minor and for whatever reason. 3. The need for supervision renders the patient not independent. 4. A patient's performance should be established using the best available evidence. Asking the patient, friends/relatives and nurses are the usual sources, but direct observation and common sense are also important. However direct testing is not needed. 5. Usually the patient's performance over the preceding 24-48 hours is important, but occasionally longer periods will be relevant. 6. Middle categories imply that the patient supplies over 50 per cent of the effort. 7. Use of aids to be independent is allowed. Stacey Mackenzie., Barthel, D.W. (1039). Functional evaluation: the Barthel Index. [address] [facility] (14)2. Alexa Cao armani LEVON Gomez, Helene Cote., Balwinder Ryan., [place], [facility] (). Measuring the change indisability after inpatient rehabilitation; comparison of the responsiveness of the Barthel Index and Functional [place] Measure. Journal of Neurology, Neurosurgery, and Psychiatry, 66(4), 408-459. Mary Emery, N.J.A, ISAURA Howell, & Gopal Augustin, MValeriA. (2004.) Assessment of post-stroke quality of life in cost-effectiveness studies: The usefulness of the Barthel Index and the EuroQoL-5D. Quality of Life Research, 13, 443-13        Therapeutic Exercises:   Patient  is independent with post-op MEL exercise protocol and has same in written, illlustrated form.       Pain Ratin/10 \"stiff\"    Activity Tolerance:   Good    After treatment patient left in no apparent distress:   Sitting in chair, Call bell within reach, and nurse and Occupational Therapist notified. COMMUNICATION/COLLABORATION:   The patients plan of care was discussed with: Occupational therapist, Registered nurse, Physician, and Case management.      Carlos Manuel Lima   Time Calculation: 40 mins

## 2020-11-11 NOTE — DISCHARGE INSTRUCTIONS
Discharge Instructions Hip Replacement  Dr. Ifrah Davis    Patient Name  Gustavo Barrett  Date of procedure  11/9/2020    Procedure  Procedure(s):  LEFT TOTAL HIP REPLACEMENT ANTERIOR APPROACH  Surgeon  Surgeon(s) and Role:     Mark Becker MD - Primary  Date of discharge: 11/11/2020  PCP: Nirmala Benito MD    Follow up care   Follow up visit with Dr. Ifrah Davis in 2 weeks. Call [phone] to make an appointment    Activity at home   AVOID sudden and extreme movement of your hip (surgical leg)   Take a short walk every hour; except at night when sleeping   Do your Home Exercise Program 3 times every day    After exercising lie down and elevate your leg on pillows for 15-30 minutes to decrease swelling   Refer to your patient notebook for more information  Bathing and caring for your incision   You may take a shower with your Aquacel dressing in place.  The Aquacel (brown, waterproof) dressing is to stay on your hip for 7 days.  On the 7th day have someone gently peel the dressing off by lifting the edge and stretching it to break the seal.   You may then leave your incision open to air unless you see drainage from your hip. Preventing blood clots   Take Coumadin  as prescribed for 3 weeks   Call Dr. Ifrah Davis if you have side effects of blood thinning medication: bleeding, bruising, upset stomach, or diarrhea.  Call Dr. Ifrah Davis for signs of a blood clot in your leg: calf pain, tenderness, redness, swelling of lower leg   Preventing lung congestion   Use your incentive spirometer 4 times a day; do 10 repetitions each time   Remember to keep the small blue ball between the two arrows when taking a slow, deep breath   Pain Management   Get up and walk a short distance to relieve pain and stiffness.  Place ice wrap on your hip except when you are walking.  The gel ice packs should be changed about every 4 hours   Elevate your leg on pillows for 15-30 minutes    Take Tylenol 1000mg (take two 500mg tablets) every 6 hours for the next 2 weeks   Take anti-inflammatory medication (NSAID) as prescribed each day.  If needed, take a narcotic pain pill every 4-6 hours as prescribed. Take with a small amount of food.  As your pain decreases, take the narcotics less often or take ½ of a pill   Call Dr. Tc Huerta if you have side effects from your narcotic pain medication: itching, drowsiness, dizziness, upset stomach, dry mouth, constipation or if you medication is not relieving your pain. Diet after surgery   You may resume your normal diet. Include vegetables, fruit, whole grains, lean meats, and low-fat dairy products. Eat food high in fiber    Drink plenty of fluids, including 8 cups of water daily   Take over-the-counter stool softeners and laxatives to prevent constipation    Avoid after surgery   Do not take any over-the-counter medication for pain except Tylenol     Do not take more than 4000 (4 Grams) of Tylenol in 24 hours     Do not drink alcoholic beverages   Do not smoke   Do not drive until seen for follow up appointment   Do not place frozen gel pack directly on your skin. It can cause frostbite.  Do not take a tub bath, swim or get in a hot tub for 6 weeks  Prevention of falls and safety at home   Set up an area where you can rest comfortably leaving space around furniture to allow you to walk with your walker   Keep stairs, hallways and bathrooms well lit; especially at night   Arrange for care for your pets   Keep your home free of clutter   Call Dr. Tc Huerta at [phone] for:   Pain that is not relieved by pain medication, ice, activity   Side effects of medications   Increased/spread of bruising   Warning signs of infection:  ? persistent fever greater than 100 degrees  ?  shaking or chills  ? increased redness, tenderness, swelling or drainage from incision  ? increased pain during activity or rest   Warning signs of a blood clot in your leg:  ? increased pain in your calf  ? tenderness or redness  ? increased swelling or knee, calf, ankle or foot    If you call after 5pm or on a weekend, the on call physician will return your phone call  Call your Primary Care Doctor for:    Concerns about your medical conditions such as diabetes, high blood pressure, asthma, congestive heart failure   Blood sugars greater than 180   Persistent headache or dizziness   Coughing or congestion   Constipation or diarrhea   Burning when you go to the bathroom   Abnormal heart rate (fast or slow)        Call 911 and go to the nearest hospital for:    Sudden increased shortness of breath   Sudden onset of chest pain   Difficulty breathing   Localized chest pain with coughing or taking a deep breath

## 2020-11-11 NOTE — PROGRESS NOTES
Ortho Daily Progress Note    11/11/2020  8:53 AM    POD:  2 Days Post-Op  S/P:  Procedure(s):  LEFT TOTAL HIP REPLACEMENT ANTERIOR APPROACH    Afebrile/VSS, NAD, A&O x 3  Doing well without complaints of nausea  Pain well controlled  Calves soft/NTTP Bilaterally  Incision OK, no drainage or dehiscence. Thigh soft. Dressing clean and dry  Moving lower extremities well. Neurocirculatory exam intact and within normal range. Lab Results   Component Value Date/Time    HGB 8.5 (L) 11/10/2020 03:05 AM    INR 1.3 (H) 11/11/2020 03:59 AM     Recent Labs     11/10/20  0305 11/02/20  1520 10/31/20  0238 10/21/20  1437 09/21/20  1421 08/21/20  1713 08/12/20  1237 05/05/20  1654   CREA 3.49* 4.23* 3.76* 3.21* 3.53* 3.77* 3.64* 3.41*   BUN 49* 58* 56* 44* 52* 45* 55* 48*     Estimated Creatinine Clearance: 20.2 mL/min (A) (based on SCr of 3.49 mg/dL (H)).     PLAN:  DVT prophylaxis: Warfarin  WBAT with PT-mobilization  Pain Control: tylenol, oxycodone  Plan to D/C home today      Piero Navas

## 2020-11-11 NOTE — PROGRESS NOTES
Patient stable, cleared physical therapy, pain is controlled and patient ready for discharge. Patient left via wheelchair with volunteer assistance and driven home by .

## 2020-11-11 NOTE — PROGRESS NOTES
Problem: Hip Replacement: Post Op Day 1  Goal: Off Pathway (Use only if patient is Off Pathway)  Outcome: Progressing Towards Goal

## 2020-11-11 NOTE — PROGRESS NOTES
Problem: Self Care Deficits Care Plan (Adult)  Goal: *Acute Goals and Plan of Care (Insert Text)  Description: Occupational Therapy Goals  Initiated: 11/10/2020   1. Patient will perform grooming with supervision/set-up standing at sink within 3 day(s). 2.  Patient will perform bathing with supervision/set-up from chair within 3 day(s). 3.  Patient will perform upper body dressing and lower body dressing with supervision/set-up within 3 day(s). 4.  Patient will perform toilet transfers with supervision/set-up within 3 day(s). 5.  Patient will perform all aspects of toileting with supervision/set-up within 3 day(s). 6.  Patient will be independent with hip precautions within 3 days. FUNCTIONAL STATUS PRIOR TO ADMISSION: She was independent with ADL activities. She has recently had spine fusion in Aug. And reports she is out of restrictions and precautions. HOME SUPPORT: She lives with her  and adult children. Outcome: Progressing Towards Goal   OCCUPATIONAL THERAPY TREATMENT  Patient: Steve Zamorano (89 y.o. female)  Date: 11/11/2020  Diagnosis: Arthritis of left hip [M16.12]   Arthritis of left hip  Procedure(s) (LRB):  LEFT TOTAL HIP REPLACEMENT ANTERIOR APPROACH (Left) 2 Days Post-Op  Precautions: Fall, WBAT(avoid sudden and extreme movements of the hip)  Chart, occupational therapy assessment, plan of care, and goals were reviewed. ASSESSMENT  Patient continues with skilled OT services and is progressing towards goals. Patient received up in chair and motivated for ADL training for dc home. She plans to have support from family at home and denies need for additional education on dressing AD. Provided education on RW use, home safety, hip precautions to avoid extreme/sudden ROM of hip and ADL compensatory techniques with good understanding. Patient denies any concerns with dc home with support from family.          Current Level of Function Impacting Discharge (ADLs): Shirt set up, bra min A, shoes max A, socks total A, toileting transfer SBA    Other factors to consider for discharge: Patient with good understanding of ADL compensatory techniques, safety and has support at home. PLAN :  Patient continues to benefit from skilled intervention to address the above impairments. Continue treatment per established plan of care. to address goals. Recommend with staff: SBA for toileting at RW level, up in chair for ADL tasks    Recommend next OT session: standing grooming    Recommendation for discharge: (in order for the patient to meet his/her long term goals)  No skilled occupational therapy/ follow up rehabilitation needs identified at this time. --assist from family as needed    This discharge recommendation:  Has been made in collaboration with the attending provider and/or case management    IF patient discharges home will need the following DME: AE: long handled dressing, transfer bench and raised toilet seat PRN-  Discussed where to purchase       SUBJECTIVE:   Patient stated I will be able to walk across [employer].     OBJECTIVE DATA SUMMARY:   Cognitive/Behavioral Status:  Neurologic State: Alert; Appropriate for age  Orientation Level: Appropriate for age;Oriented X4  Cognition: Appropriate decision making; Appropriate for age attention/concentration; Appropriate safety awareness; Follows commands             Functional Mobility and Transfers for ADLs:  Bed Mobility:  Supine to Sit: Stand-by assistance(use of strap)  Sit to Supine: Stand-by assistance(use of strap)  Scooting: Modified independent    Transfers:  Sit to Stand: Stand-by assistance  Functional Transfers  Toilet Transfer : Stand-by assistance  Bed to Chair: Supervision    Balance:  Sitting: Intact  Standing: Intact; With support  Standing - Static: Good;Constant support  Standing - Dynamic : Good;Constant support    ADL Intervention:       Grooming  Position Performed: Seated in chair  Washing Face: Set-up    Upper Body Bathing  Bathing Assistance: Set-up  Position Performed: Seated in chair    Lower Body Bathing  Perineal  : Stand-by assistance  Position Performed: Standing  Lower Body : Set-up(ant thighs)  Position Performed: Seated in chair    Upper Body Dressing Assistance  Bra: Minimum assistance  Hospital Gown: Supervision;Set-up  Pullover Shirt: Set-up    Lower Body Dressing Assistance  Underpants: Minimum assistance  Protective Undergarmet: Set-up(patient donned pad in underwear)  Socks: Total assistance (dependent)(family to assist- denies need for cont AD training)  Slip on Shoes with Back: Maximum assistance(family to assist)    Toileting  Bladder Hygiene: Supervision        Patient recalled and demonstrated avoiding extreme planes of movement with Left LE during ADLs and functional mobility with min cues. Bathing: Patient instructed when bathing to not submerge wound in water, stand to shower or sponge bathe, cover wound with plastic and tape to ensure no water reaches bandage/wound without cues. Patient indicated understanding. Dressing joint: Patient instructed and demonstrated to don/doff Left LE first/last min cues. Patient instructed and demonstrated to don all clothing while sitting prior to standing, doff all clothing to knees while standing, then sit to doff clothing off from knees to feet in order to facilitate fall prevention, pain management, and energy conservation with Supervision. Home safety: Patient instructed on home modifications and safety (raise height of ADL objects, appropriate height of chair surfaces, recliner safety, change of floor surfaces, clear pathways) to increase independence and fall prevention. Patient indicated understanding. Standing: Patient instructed and demonstrated to walk up to sink/counter top/surfaces, step into walker to increase safety of joint and fall prevention with Supervision.  Patient educated about hip anatomy verbally and with pictures and educated to avoid internal rotation of Left LE. Instructed to apply concept to ADLs within the home (no reaching across body to Right side, square off while using objects, slide objects along surfaces). Patient instructed to increase amount of time standing, observe standing position during ADLs in order to increase even weight bearing through bilateral LEs in order to increase independence with ADLs. Goal to be reached 30 days post - op, per orthopedic surgeon or per PT. Patient indicated understanding. Tub transfer: Patient instructed regarding when it is safe to begin transfer into tub (complete stairs with PT, advance exercises with PT high enough to clear tub height). Patient instructed to use the same technique as used with stairs when entering and exiting tub (\"up with the non-surgical, down with the surgical leg\"). Patient indicated understanding. Ed on tub transfer bench and where to purchase. Pain:  Reports stiffness in L hip with initial sit <> Stand    Activity Tolerance:   Fair      After treatment patient left in no apparent distress:   Sitting in chair and Call bell within reach    COMMUNICATION/COLLABORATION:   The patients plan of care was discussed with: Physical therapist and Registered nurse.      Arnoldo Jeffries OT  Time Calculation: 30 mins

## 2020-11-11 NOTE — PROGRESS NOTES
Problem: Falls - Risk of  Goal: *Absence of Falls  Description: Document Lemon Manohar Fall Risk and appropriate interventions in the flowsheet.   Outcome: Progressing Towards Goal  Note: Fall Risk Interventions:  Mobility Interventions: Patient to call before getting OOB         Medication Interventions: Evaluate medications/consider consulting pharmacy, Patient to call before getting OOB    Elimination Interventions: Call light in reach

## 2020-11-11 NOTE — PROGRESS NOTES
Pharmacist Note - Warfarin Dosing  Consult provided for this 56 y.o. female to manage warfarin for Orthopedic Surgery (VTE prophylaxis)    INR Goal: 1.7- 2.2    Therapy day: 3  (per patient, not taken pre op)    Drugs that may increase INR:None  Drugs that may decrease INR: None  Other current anticoagulants/ drugs that may increase bleeding risk: None  Risk factors: Renal insufficiency  Daily INR ordered: YES    Recent Labs     11/11/20  0359 11/10/20  0305   HGB  --  8.5*   INR 1.3* 1.0     Date               INR                 Dose  11/2  1.0                  11/9                 -                      5 mg  11/10               1.0                  5 mg  11/11               1.3                  5 mg             Assessment/ Plan: Will order warfarin 5 mg PO x 1 dose. Pharmacy will continue to monitor daily and adjust therapy as indicated. Please contact the pharmacist at  or  for discharge recommendations if needed.

## 2020-11-11 NOTE — PROGRESS NOTES
OLEG:  RUR: n/a   -D/c home with [address] to provide transport at d/c   Ignacio Clay RN BSN CCM  CRM

## 2020-11-12 ENCOUNTER — PATIENT OUTREACH (OUTPATIENT)
Dept: CASE MANAGEMENT | Age: 60
End: 2020-11-12

## 2020-11-12 NOTE — ACP (ADVANCE CARE PLANNING)
Ms. Ivette Vick does not have AMD.  She states that she would like spouse, Shay Mcneil and older son Dennise Cervantes to be equal decision makers. CTN explained benefit of having document in place. She agreed to think about and CTN will follow up at next phone call, advised I could assist with completing.

## 2020-11-12 NOTE — PROGRESS NOTES
Patient was admitted to [facility] on 20 and discharged on 20 for Left THR-anterior approach. Outreach made within 2 business days of discharge: Yes    Top Discharge Challenges to be reviewed by the provider   Additional needs identified to be addressed with provider yes  Updated PCP on following:   Recent Left THR -anterior approach. Warfarin post surgery- HH will check INR bi-weekly and report to ortho  She has HTN- does not monitor BP values at home. -refer to goals. She does not have AMD in place- encouraged her to complete-CTN offered assistance. Discussed COVID-19 related testing which was available at this time. Test results were negative. Patient informed of results, if available? NA   Method of communication with provider : chart routing       Advance Care Planning:   Does patient have an Advance Directive:  health care decision makers updated and currently not on file; education provided    Ms. Yogesh Fang does not have AMD.  She states that she would like spouse, [place] and older son Anabella Read to be equal decision makers. CTN explained benefit of having document in place. She agreed to think about and CTN will follow up at next phone call, advised I could assist with completing. Inpatient Readmission Risk score: NA  Was this a readmission? no   Patient stated reason for the admission: NA  Patients top risk factors for readmission: level of motivation and medical condition  Interventions to address risk factors: education and support    Care Transition Nurse (CTN) contacted the patient by telephone to perform post hospital discharge assessment. Verified name and  with patient as identifiers. Provided introduction to self, and explanation of the CTN role. CTN reviewed discharge instructions, medical action plan and red flags with patient who verbalized understanding.  Patient given an opportunity to ask questions and does not have any further questions or concerns at this time. The patient agrees to contact the PCP office for questions related to their healthcare. Medication reconciliation was performed with patient, who verbalizes understanding of administration of home medications. Advised obtaining a 90-day supply of all daily and as-needed medications. Referral to Pharm D needed: no     Home Health/Outpatient orders at discharge: PT, OT and [address]: [facility]  Date of initial visit: PT coming today-11/12    Durable Medical Equipment ordered at discharge: None has walker and BSC at home. Covid Risk Education    Patient has following risk factors of: chronic kidney disease and HTN. Education provided regarding infection prevention, and signs and symptoms of COVID-19 and when to seek medical attention with patient who verbalized understanding. Discussed exposure protocols and quarantine From CDC: Are you at higher risk for severe illness?  and given an opportunity for questions and concerns. The patient agrees to contact the COVID-19 hotline [phone] or PCP office for questions related to COVID-19. For more information on steps you can take to protect yourself, see CDC's How to Protect Yourself     Discussed follow-up appointments. If no appointment was previously scheduled, appointment scheduling offered: not needed  [facility] follow up appointment(s):   Future Appointments   Date Time Provider Margarito Arora   12/18/2020  1:30 PM Chloé Barrett MD [facility]     Non-[facility] follow up appointment(s):   Orthopaedic surgeon- Dr. Khoa Del Rosario- 11/17  Plan for follow-up call in 10-14 days based on severity of symptoms and risk factors. CTN provided contact information for future needs. Goals addressed:   Goals        General     Reduce risk for hospitalization      11/12/20- spoke with Ms Moses Herrera- she had good night-    using scheduled acetaminophen to help with pain management- did take one Zandra at bedtime.  CTN encouraged her to stay ahead of pain-use ice continuous if needed. States that her hip/leg is swollen.  She did attend pre-surgery classes and has manual for reference. Understands dressing, bathing, restrictions.  Encouraged her to do exercises-reiterated that doing more is not helpful. PT is scheduled to come today around 12 noon.  CTN confirmed with [facility] office- they have orders for PT/INR monitoring twice a week-call results to ortho. Relayed to Ms. [id number] would be coming tomorrow-they will check with POC testing method. Office states range is 1.7-2.2.  She does not check BP at home- CTN encouraged her to do daily prior to taking BP medications. Reinforced that BP values often increase due to pain, surgery, etc. Reach out to PCP if BP values are elevated- [facility] staff should check as well. PCP updated on above.                        [employer]

## 2020-12-21 ENCOUNTER — OFFICE VISIT (OUTPATIENT)
Dept: INTERNAL MEDICINE CLINIC | Age: 60
End: 2020-12-21
Payer: COMMERCIAL

## 2020-12-21 VITALS
BODY MASS INDEX: 36.71 KG/M2 | DIASTOLIC BLOOD PRESSURE: 85 MMHG | HEIGHT: 66 IN | SYSTOLIC BLOOD PRESSURE: 124 MMHG | HEART RATE: 94 BPM | OXYGEN SATURATION: 94 % | RESPIRATION RATE: 16 BRPM | WEIGHT: 228.4 LBS | TEMPERATURE: 98.1 F

## 2020-12-21 DIAGNOSIS — E66.01 CLASS 3 SEVERE OBESITY WITH SERIOUS COMORBIDITY AND BODY MASS INDEX (BMI) OF 40.0 TO 44.9 IN ADULT, UNSPECIFIED OBESITY TYPE (HCC): ICD-10-CM

## 2020-12-21 DIAGNOSIS — N25.81 SECONDARY HYPERPARATHYROIDISM (HCC): ICD-10-CM

## 2020-12-21 DIAGNOSIS — I10 ESSENTIAL HYPERTENSION: ICD-10-CM

## 2020-12-21 DIAGNOSIS — E78.5 DYSLIPIDEMIA: ICD-10-CM

## 2020-12-21 DIAGNOSIS — M87.052 AVASCULAR NECROSIS OF LEFT FEMUR (HCC): ICD-10-CM

## 2020-12-21 DIAGNOSIS — N18.4 CHRONIC RENAL FAILURE SYNDROME, STAGE 4 (SEVERE) (HCC): Primary | ICD-10-CM

## 2020-12-21 PROCEDURE — 99215 OFFICE O/P EST HI 40 MIN: CPT | Performed by: INTERNAL MEDICINE

## 2020-12-21 NOTE — PROGRESS NOTES
Chief Complaint   Patient presents with    Depression     2 month follow up          1. Have you been to the ER, urgent care clinic since your last visit? Hospitalized since your last visit? Yes When: 11/9/2020 Where: [facility]  Reason for visit: hip replacement    2. Have you seen or consulted any other health care providers outside of the [address] [place] [address] since your last visit? Include any pap smears or colon screening.  No

## 2020-12-21 NOTE — PROGRESS NOTES
[address] [place] [address] and Primary Care  [place] [address]  [address] [place] [address]  Phone:  [phone]  Fax: [phone]       Chief Complaint   Patient presents with    Depression     2 month follow up    . SUBJECTIVE:    Kimberley Alan is a 56 y.o. female Comes in for return visit, stating that he feels much better. She is no longer depressed and actually stopped taking her Lexapro. She stopped this two weeks ago. She had a right total hip replacement most recently and is convalescing nicely. She no longer has pain in the hip or for that matter with walking. There is no meaningful weight loss since I last saw her. She continues to have secondary hyperparathyroidism and will have to eventually see a nephrologist.    She has a past history of primary hypertension and dyslipidemia. Current Outpatient Medications   Medication Sig Dispense Refill    senna-docusate (PERICOLACE) 8.6-50 mg per tablet Take 1 Tab by mouth two (2) times a day. Indications: constipation 30 Tab 0    warfarin (Coumadin) 2.5 mg tablet Take daily or as directed per twice weekly PT/INR test  Indications: deep vein thrombosis prevention 50 Tab 0    escitalopram oxalate (LEXAPRO) 10 mg tablet Take 1 Tab by mouth nightly. 30 Tab 2    tolterodine ER (DETROL LA) 4 mg ER capsule Take 1 Cap by mouth daily. 30 Cap 11    pravastatin (PRAVACHOL) 40 mg tablet TAKE 1 TABLET BY MOUTH EVERY DAY AT BEDTIME 90 Tab 3    potassium chloride (K-DUR, KLOR-CON) 20 mEq tablet TAKE 1 TABLET BY MOUTH DAILY 90 Tab 3    lisinopriL (PRINIVIL, ZESTRIL) 40 mg tablet TAKE 1 TABLET BY MOUTH DAILY 90 Tab 3    metOLazone (ZAROXOLYN) 2.5 mg tablet TAKE 1 TABLET BY MOUTH DAILY 90 Tab 3    amLODIPine (NORVASC) 10 mg tablet TAKE 1 TABLET BY MOUTH DAILY 90 Tab 3    acetaminophen (Tylenol Extra Strength) 500 mg tablet Take 500 mg by mouth every six (6) hours as needed for Pain.       allopurinoL (ZYLOPRIM) 100 mg tablet 2 tablets QD (Patient taking differently: Take  by mouth. 2 tablets QD) 60 Tab 11     Past Medical History:   Diagnosis Date    Arthritis     L knee    Chronic kidney disease     secondary to hypertensive nephrosclerosis    Gastritis     HISTORY OF 10/2020     Gout     History of claustrophobia     Hypertension      Past Surgical History:   Procedure Laterality Date    HX  SECTION      HX COLONOSCOPY      HX GYN      ECTOPIC PREGNANCY SURGERY     HX LAP CHOLECYSTECTOMY      HX LUMBAR LAMINECTOMY  2020    L3L4L5     Allergies   Allergen Reactions    Morphine Nausea and Vomiting and Cough     Blood pressure and 158-96 heart rate up to 156.     [place] Swelling         REVIEW OF SYSTEMS:  General: negative for - chills or fever  ENT: negative for - headaches, nasal congestion or tinnitus  Respiratory: negative for - cough, hemoptysis, shortness of breath or wheezing  Cardiovascular : negative for - chest pain, edema, palpitations or shortness of breath  Gastrointestinal: negative for - abdominal pain, blood in stools, heartburn or nausea/vomiting  Genito-Urinary: no dysuria, trouble voiding, or hematuria  Musculoskeletal: negative for - gait disturbance, joint pain, joint stiffness or joint swelling  Neurological: no TIA or stroke symptoms  Hematologic: no bruises, no bleeding, no swollen glands  Integument: no lumps, mole changes, nail changes or rash  Endocrine: no malaise/lethargy or unexpected weight changes      Social History     Socioeconomic History    Marital status:      Spouse name: Not on file    Number of children: 2    Years of education: 16+    Highest education level: Master's degree (e.g., MA, MS, [place], MEd, MSW, RENATA)   Occupational History    Occupation: teacher   Tobacco Use    Smoking status: Former Smoker     Packs/day: 0.25     Years: 25.00     Pack years: 6.25     Quit date: 2000     Years since quittin.4    Smokeless tobacco: Never Used   Substance and Sexual Activity    Alcohol use: No    Drug use: No    Sexual activity: Not Currently     Family History   Problem Relation Age of Onset    Hypertension Mother     Liver Disease Father     Diabetes Sister     Breast Cancer Paternal Grandmother     Anesth Problems Neg Hx        OBJECTIVE:    Visit Vitals  /85   Pulse 94   Temp 98.1 °F (36.7 °C) (Oral)   Resp 16   Ht 5' 6\" (1.676 m)   Wt 228 lb 6.4 oz (103.6 kg)   SpO2 94%   BMI 36.86 kg/m²     CONSTITUTIONAL: well , well nourished, appears age appropriate  EYES: perrla, eom intact  ENMT:moist mucous membranes, pharynx clear  NECK: supple. Thyroid normal  RESPIRATORY: Chest: clear to ascultation and percussion   CARDIOVASCULAR: Heart: regular rate and rhythm  GASTROINTESTINAL: Abdomen: soft, bowel sounds active  HEMATOLOGIC: no pathological lymph nodes palpated  MUSCULOSKELETAL: Extremities: no edema, pulse 1+   INTEGUMENT: No unusual rashes or suspicious skin lesions noted. Nails appear normal.  NEUROLOGIC: non-focal exam   MENTAL STATUS: alert and oriented, appropriate affect      ASSESSMENT:  1. Chronic renal failure syndrome, stage 4 (severe) (HCC)    2. Secondary hyperparathyroidism (HCC)    3. Class 3 severe obesity with serious comorbidity and body mass index (BMI) of 40.0 to 44.9 in adult, unspecified obesity type ([address]    4. Dyslipidemia    5. Avascular necrosis of left femur ([address]    6. Essential hypertension        PLAN:    1. The patient does have chronic renal failure. I will wait to see what her labs are. Depending on results will dictate the referral to nephrologist.    2. It appears she has secondary hyperparathyroidism, which is the reason she has significant hypercalcemia, although her phosphorus is entirely normal at this point. 3. She needs to lose weight as we have discussed repetitively. This can be accomplished by eating meals, eliminating snacks and avoiding the consumption of processed carbohydrates.   4. She has a significant increased cardiovascular risk and remains on a statin. 5. She is status post left total hip replacement for AVN of her hip. She is making excellent recovery from this and remains in physical therapy for rehabilitation. 6. Blood pressure is excellent today, no adjustments are made. Orders Placed This Encounter    METABOLIC PANEL, BASIC    PTH INTACT    PHOSPHORUS         Follow-up and Dispositions    · Return in about 3 months (around 3/21/2021).            Sharon Dodd MD

## 2020-12-22 LAB
BUN SERPL-MCNC: 50 MG/DL (ref 8–27)
BUN/CREAT SERPL: 14 (ref 12–28)
CALCIUM SERPL-MCNC: 11.3 MG/DL (ref 8.7–10.3)
CHLORIDE SERPL-SCNC: 108 MMOL/L (ref 96–106)
CO2 SERPL-SCNC: 19 MMOL/L (ref 20–29)
CREAT SERPL-MCNC: 3.57 MG/DL (ref 0.57–1)
GLUCOSE SERPL-MCNC: 85 MG/DL (ref 65–99)
PHOSPHATE SERPL-MCNC: 3.6 MG/DL (ref 3–4.3)
POTASSIUM SERPL-SCNC: 3.7 MMOL/L (ref 3.5–5.2)
PTH-INTACT SERPL-MCNC: NORMAL PG/ML
SODIUM SERPL-SCNC: 144 MMOL/L (ref 134–144)

## 2021-04-06 ENCOUNTER — OFFICE VISIT (OUTPATIENT)
Dept: INTERNAL MEDICINE CLINIC | Age: 61
End: 2021-04-06
Payer: COMMERCIAL

## 2021-04-06 VITALS
OXYGEN SATURATION: 94 % | HEIGHT: 66 IN | WEIGHT: 233.2 LBS | HEART RATE: 101 BPM | BODY MASS INDEX: 37.48 KG/M2 | DIASTOLIC BLOOD PRESSURE: 81 MMHG | TEMPERATURE: 98.1 F | RESPIRATION RATE: 16 BRPM | SYSTOLIC BLOOD PRESSURE: 123 MMHG

## 2021-04-06 DIAGNOSIS — I10 ESSENTIAL HYPERTENSION: ICD-10-CM

## 2021-04-06 DIAGNOSIS — G89.29 CHRONIC MIDLINE LOW BACK PAIN WITHOUT SCIATICA: ICD-10-CM

## 2021-04-06 DIAGNOSIS — M54.50 CHRONIC MIDLINE LOW BACK PAIN WITHOUT SCIATICA: ICD-10-CM

## 2021-04-06 DIAGNOSIS — N18.4 CHRONIC RENAL FAILURE SYNDROME, STAGE 4 (SEVERE) (HCC): Primary | ICD-10-CM

## 2021-04-06 DIAGNOSIS — E66.01 SEVERE OBESITY (BMI 35.0-39.9) WITH COMORBIDITY (HCC): ICD-10-CM

## 2021-04-06 DIAGNOSIS — M54.16 CHRONIC LEFT-SIDED LUMBAR RADICULOPATHY: ICD-10-CM

## 2021-04-06 DIAGNOSIS — N25.81 SECONDARY HYPERPARATHYROIDISM (HCC): ICD-10-CM

## 2021-04-06 PROCEDURE — 99215 OFFICE O/P EST HI 40 MIN: CPT | Performed by: INTERNAL MEDICINE

## 2021-04-06 NOTE — PROGRESS NOTES
Chief Complaint   Patient presents with    End Stage Renal Disease     3 month follow up        1. Have you been to the ER, urgent care clinic since your last visit? Hospitalized since your last visit? No    2. Have you seen or consulted any other health care providers outside of the [address] [place] [address] since your last visit? Include any pap smears or colon screening.  No

## 2021-04-07 NOTE — PROGRESS NOTES
[address] [place] [address] and Primary Care  [place] [address]  [address] [place] [address]  Phone:  [phone]  Fax: [phone]       Chief Complaint   Patient presents with    End Stage Renal Disease     3 month follow up    . SUBJECTIVE:    Katlin Qureshi is a 59 y.o. female comes in for return visit stating that she has done reasonably well. She continues to complain of lower back pain although not to the extent noted prior to her decompressive procedure on the lower back area as well as a left total hip replacement. Complicating this is persistent paresthesias of the left thigh. She does not have overt pain, however. Currently, her walking is good although she cannot walk extended distances. There has been no meaningful weight loss since I last saw her. She also has chronic renal failure, which has been reasonably stable over the last decade. Unfortunately, she has developed secondary hyperparathyroidism. She will eventually need to a see a nephrologist for this particular reason. She has a past history of primary hypertension and dyslipidemia as well as gout. Current Outpatient Medications   Medication Sig Dispense Refill    senna-docusate (PERICOLACE) 8.6-50 mg per tablet Take 1 Tab by mouth two (2) times a day. Indications: constipation 30 Tab 0    warfarin (Coumadin) 2.5 mg tablet Take daily or as directed per twice weekly PT/INR test  Indications: deep vein thrombosis prevention 50 Tab 0    escitalopram oxalate (LEXAPRO) 10 mg tablet Take 1 Tab by mouth nightly. 30 Tab 2    tolterodine ER (DETROL LA) 4 mg ER capsule Take 1 Cap by mouth daily.  30 Cap 11    pravastatin (PRAVACHOL) 40 mg tablet TAKE 1 TABLET BY MOUTH EVERY DAY AT BEDTIME 90 Tab 3    potassium chloride (K-DUR, KLOR-CON) 20 mEq tablet TAKE 1 TABLET BY MOUTH DAILY 90 Tab 3    lisinopriL (PRINIVIL, ZESTRIL) 40 mg tablet TAKE 1 TABLET BY MOUTH DAILY 90 Tab 3    metOLazone (ZAROXOLYN) 2.5 mg tablet TAKE 1 TABLET BY MOUTH DAILY 90 Tab 3    amLODIPine (NORVASC) 10 mg tablet TAKE 1 TABLET BY MOUTH DAILY 90 Tab 3    acetaminophen (Tylenol Extra Strength) 500 mg tablet Take 500 mg by mouth every six (6) hours as needed for Pain.  allopurinoL (ZYLOPRIM) 100 mg tablet 2 tablets QD (Patient taking differently: Take  by mouth. 2 tablets QD) 60 Tab 11     Past Medical History:   Diagnosis Date    Arthritis     L knee    Chronic kidney disease     secondary to hypertensive nephrosclerosis    Gastritis     HISTORY OF 10/2020     Gout     History of claustrophobia     Hypertension      Past Surgical History:   Procedure Laterality Date    HX  SECTION      HX COLONOSCOPY      HX GYN      ECTOPIC PREGNANCY SURGERY     HX LAP CHOLECYSTECTOMY      HX LUMBAR LAMINECTOMY  2020    L3L4L5     Allergies   Allergen Reactions    Morphine Nausea and Vomiting and Cough     Blood pressure and 158-96 heart rate up to 156.     [place] Swelling         REVIEW OF SYSTEMS:  General: negative for - chills or fever  ENT: negative for - headaches, nasal congestion or tinnitus  Respiratory: negative for - cough, hemoptysis, shortness of breath or wheezing  Cardiovascular : negative for - chest pain, edema, palpitations or shortness of breath  Gastrointestinal: negative for - abdominal pain, blood in stools, heartburn or nausea/vomiting  Genito-Urinary: no dysuria, trouble voiding, or hematuria  Musculoskeletal: negative for - gait disturbance, joint pain, joint stiffness or joint swelling  Neurological: no TIA or stroke symptoms  Hematologic: no bruises, no bleeding, no swollen glands  Integument: no lumps, mole changes, nail changes or rash  Endocrine: no malaise/lethargy or unexpected weight changes      Social History     Socioeconomic History    Marital status:      Spouse name: Not on file    Number of children: 2    Years of education: 16+    Highest education level: Master's degree (e.g., MA, MS, [place], MEd, MSW, RENATA)   Occupational History    Occupation: teacher   Tobacco Use    Smoking status: Former Smoker     Packs/day: 0.25     Years: 25.00     Pack years: 6.25     Quit date: 2000     Years since quittin.7    Smokeless tobacco: Never Used   Substance and Sexual Activity    Alcohol use: No    Drug use: No    Sexual activity: Not Currently     Family History   Problem Relation Age of Onset    Hypertension Mother     Liver Disease Father     Diabetes Sister     Breast Cancer Paternal Grandmother     Anesth Problems Neg Hx        OBJECTIVE:    Visit Vitals  /81   Pulse (!) 101   Temp 98.1 °F (36.7 °C) (Oral)   Resp 16   Ht 5' 6\" (1.676 m)   Wt 233 lb 3.2 oz (105.8 kg)   SpO2 94%   BMI 37.64 kg/m²     CONSTITUTIONAL: well , well nourished, appears age appropriate  EYES: perrla, eom intact  ENMT:moist mucous membranes, pharynx clear  NECK: supple. Thyroid normal  RESPIRATORY: Chest: clear to ascultation and percussion   CARDIOVASCULAR: Heart: regular rate and rhythm  GASTROINTESTINAL: Abdomen: soft, bowel sounds active  HEMATOLOGIC: no pathological lymph nodes palpated  MUSCULOSKELETAL: Extremities: no edema, pulse 1+   INTEGUMENT: No unusual rashes or suspicious skin lesions noted. Nails appear normal.  NEUROLOGIC: non-focal exam   MENTAL STATUS: alert and oriented, appropriate affect      ASSESSMENT:  1. Chronic renal failure syndrome, stage 4 (severe) ([facility])    2. Essential hypertension    3. Secondary hyperparathyroidism ([address]    4. Chronic midline low back pain without sciatica    5. Chronic left-sided lumbar radiculopathy    6. Severe obesity (BMI 35.0-39. 9) with comorbidity ([address]        PLAN:  1. The patient has chronic renal failure with probably secondary hyperparathyroidism. I will await the results of today's labs. 2. Blood pressures are excellent. No adjustments are made.   Presumably the etiology of her chronic renal failure is on the basis of hypertensive nephrosclerosis. 3. She has chronic low back pain, but this is not severe and does not require analgesic usage. I think this is on the basis of her lumbar spinal stenosis. This is partially decompressed after her surgical procedure last year and was quite effective in eliminating her severe pain. 4. She has a chronic left-sided lumbar radiculopathy manifesting itself as paresthesias only. She has no pain. She can live with this. She does not really want anything for it, although gabapentin would be of benefit. 5. She needs to lose weight. We have discussed this repetitively. This can be accomplished by eating meals, eliminating snacks, and avoiding the consumption of processed carbohydrates. This is leading to her current dysmetabolic status. .  Orders Placed This Encounter    METABOLIC PANEL, BASIC    PTH INTACT    PHOSPHORUS    CYSTATIN C         Follow-up and Dispositions    · Return in about 3 months (around 7/6/2021).            Mirian Kemp MD

## 2021-04-08 LAB
BUN SERPL-MCNC: 54 MG/DL (ref 8–27)
BUN/CREAT SERPL: 14 (ref 12–28)
CALCIUM SERPL-MCNC: 12.3 MG/DL (ref 8.7–10.3)
CHLORIDE SERPL-SCNC: 109 MMOL/L (ref 96–106)
CO2 SERPL-SCNC: 21 MMOL/L (ref 20–29)
CREAT SERPL-MCNC: 3.76 MG/DL (ref 0.57–1)
CYSTATIN C SERPL-MCNC: 4.14 MG/L (ref 0.62–1.16)
GLUCOSE SERPL-MCNC: 100 MG/DL (ref 65–99)
PHOSPHATE SERPL-MCNC: 3.8 MG/DL (ref 3–4.3)
POTASSIUM SERPL-SCNC: 4 MMOL/L (ref 3.5–5.2)
PTH-INTACT SERPL-MCNC: NORMAL PG/ML
SODIUM SERPL-SCNC: 144 MMOL/L (ref 134–144)
SPECIMEN STATUS REPORT, ROLRST: NORMAL

## 2021-06-01 DIAGNOSIS — M10.9 GOUT, UNSPECIFIED CAUSE, UNSPECIFIED CHRONICITY, UNSPECIFIED SITE: ICD-10-CM

## 2021-06-01 RX ORDER — ALLOPURINOL 100 MG/1
TABLET ORAL
Qty: 60 TABLET | Refills: 11 | Status: SHIPPED | OUTPATIENT
Start: 2021-06-01 | End: 2022-07-02

## 2021-07-06 ENCOUNTER — OFFICE VISIT (OUTPATIENT)
Dept: INTERNAL MEDICINE CLINIC | Age: 61
End: 2021-07-06
Payer: COMMERCIAL

## 2021-07-06 VITALS
BODY MASS INDEX: 37.52 KG/M2 | DIASTOLIC BLOOD PRESSURE: 84 MMHG | HEART RATE: 103 BPM | WEIGHT: 233.5 LBS | TEMPERATURE: 98.1 F | RESPIRATION RATE: 16 BRPM | SYSTOLIC BLOOD PRESSURE: 128 MMHG | HEIGHT: 66 IN | OXYGEN SATURATION: 97 %

## 2021-07-06 DIAGNOSIS — E78.5 DYSLIPIDEMIA: ICD-10-CM

## 2021-07-06 DIAGNOSIS — I10 ESSENTIAL HYPERTENSION: Primary | ICD-10-CM

## 2021-07-06 DIAGNOSIS — N18.4 CHRONIC RENAL FAILURE SYNDROME, STAGE 4 (SEVERE) (HCC): ICD-10-CM

## 2021-07-06 DIAGNOSIS — E83.52 HYPERCALCEMIA: ICD-10-CM

## 2021-07-06 DIAGNOSIS — M10.9 GOUT, UNSPECIFIED CAUSE, UNSPECIFIED CHRONICITY, UNSPECIFIED SITE: ICD-10-CM

## 2021-07-06 DIAGNOSIS — E66.01 CLASS 3 SEVERE OBESITY WITH SERIOUS COMORBIDITY AND BODY MASS INDEX (BMI) OF 40.0 TO 44.9 IN ADULT, UNSPECIFIED OBESITY TYPE (HCC): ICD-10-CM

## 2021-07-06 DIAGNOSIS — Z83.3 FAMILY HISTORY OF DIABETES MELLITUS: ICD-10-CM

## 2021-07-06 PROCEDURE — 99215 OFFICE O/P EST HI 40 MIN: CPT | Performed by: INTERNAL MEDICINE

## 2021-07-06 RX ORDER — BUMETANIDE 1 MG/1
1 TABLET ORAL DAILY
COMMUNITY
Start: 2021-06-25

## 2021-07-06 RX ORDER — CALCITRIOL 0.25 UG/1
1 CAPSULE ORAL DAILY
COMMUNITY
Start: 2021-06-25 | End: 2022-07-19

## 2021-07-06 RX ORDER — HYDRALAZINE HYDROCHLORIDE 10 MG/1
10 TABLET, FILM COATED ORAL 3 TIMES DAILY
COMMUNITY
Start: 2021-06-19

## 2021-07-06 RX ORDER — SODIUM BICARBONATE 325 MG/1
325 TABLET ORAL 2 TIMES DAILY
COMMUNITY
Start: 2021-06-14 | End: 2022-02-13

## 2021-07-06 NOTE — PROGRESS NOTES
Chief Complaint   Patient presents with    Hypertension     3 month follow up          1. Have you been to the ER, urgent care clinic since your last visit? Hospitalized since your last visit? No    2. Have you seen or consulted any other health care providers outside of the [address] [place] [address] since your last visit? Include any pap smears or colon screening.  No

## 2021-07-08 LAB
ALBUMIN SERPL-MCNC: 4 G/DL (ref 3.8–4.8)
ALBUMIN/GLOB SERPL: 1.5 {RATIO} (ref 1.2–2.2)
ALP SERPL-CCNC: 114 IU/L (ref 48–121)
ALT SERPL-CCNC: 8 IU/L (ref 0–32)
APO B SERPL-MCNC: 82 MG/DL
APPEARANCE UR: CLEAR
AST SERPL-CCNC: 9 IU/L (ref 0–40)
BACTERIA #/AREA URNS HPF: ABNORMAL /[HPF]
BASOPHILS # BLD AUTO: 0 X10E3/UL (ref 0–0.2)
BASOPHILS NFR BLD AUTO: 0 %
BILIRUB SERPL-MCNC: 0.2 MG/DL (ref 0–1.2)
BILIRUB UR QL STRIP: NEGATIVE
BUN SERPL-MCNC: 55 MG/DL (ref 8–27)
BUN/CREAT SERPL: 15 (ref 12–28)
CALCIUM SERPL-MCNC: 11.3 MG/DL (ref 8.7–10.3)
CASTS URNS QL MICRO: ABNORMAL /LPF
CHLORIDE SERPL-SCNC: 107 MMOL/L (ref 96–106)
CHOLEST SERPL-MCNC: 167 MG/DL (ref 100–199)
CO2 SERPL-SCNC: 23 MMOL/L (ref 20–29)
COLOR UR: YELLOW
CREAT SERPL-MCNC: 3.71 MG/DL (ref 0.57–1)
CYSTATIN C SERPL-MCNC: 3.9 MG/L (ref 0.62–1.16)
EOSINOPHIL # BLD AUTO: 0.3 X10E3/UL (ref 0–0.4)
EOSINOPHIL NFR BLD AUTO: 4 %
EPI CELLS #/AREA URNS HPF: ABNORMAL /HPF (ref 0–10)
ERYTHROCYTE [DISTWIDTH] IN BLOOD BY AUTOMATED COUNT: 13.3 % (ref 11.7–15.4)
EST. AVERAGE GLUCOSE BLD GHB EST-MCNC: 114 MG/DL
GLOBULIN SER CALC-MCNC: 2.7 G/DL (ref 1.5–4.5)
GLUCOSE SERPL-MCNC: 95 MG/DL (ref 65–99)
GLUCOSE UR QL: NEGATIVE
HBA1C MFR BLD: 5.6 % (ref 4.8–5.6)
HCT VFR BLD AUTO: 30.7 % (ref 34–46.6)
HDLC SERPL-MCNC: 42 MG/DL
HGB BLD-MCNC: 9.4 G/DL (ref 11.1–15.9)
HGB UR QL STRIP: ABNORMAL
IMM GRANULOCYTES # BLD AUTO: 0.1 X10E3/UL (ref 0–0.1)
IMM GRANULOCYTES NFR BLD AUTO: 1 %
KETONES UR QL STRIP: NEGATIVE
LDLC SERPL CALC-MCNC: 103 MG/DL (ref 0–99)
LEUKOCYTE ESTERASE UR QL STRIP: ABNORMAL
LYMPHOCYTES # BLD AUTO: 1.6 X10E3/UL (ref 0.7–3.1)
LYMPHOCYTES NFR BLD AUTO: 18 %
MCH RBC QN AUTO: 27.7 PG (ref 26.6–33)
MCHC RBC AUTO-ENTMCNC: 30.6 G/DL (ref 31.5–35.7)
MCV RBC AUTO: 91 FL (ref 79–97)
MICRO URNS: ABNORMAL
MONOCYTES # BLD AUTO: 0.5 X10E3/UL (ref 0.1–0.9)
MONOCYTES NFR BLD AUTO: 6 %
NEUTROPHILS # BLD AUTO: 6.4 X10E3/UL (ref 1.4–7)
NEUTROPHILS NFR BLD AUTO: 71 %
NITRITE UR QL STRIP: POSITIVE
PH UR STRIP: 5.5 [PH] (ref 5–7.5)
PLATELET # BLD AUTO: 238 X10E3/UL (ref 150–450)
POTASSIUM SERPL-SCNC: 3.4 MMOL/L (ref 3.5–5.2)
PROT SERPL-MCNC: 6.7 G/DL (ref 6–8.5)
PROT UR QL STRIP: ABNORMAL
RBC # BLD AUTO: 3.39 X10E6/UL (ref 3.77–5.28)
RBC #/AREA URNS HPF: ABNORMAL /HPF (ref 0–2)
SODIUM SERPL-SCNC: 146 MMOL/L (ref 134–144)
SP GR UR: 1.01 (ref 1–1.03)
TRIGL SERPL-MCNC: 122 MG/DL (ref 0–149)
TSH SERPL DL<=0.005 MIU/L-ACNC: 2.75 UIU/ML (ref 0.45–4.5)
UROBILINOGEN UR STRIP-MCNC: 0.2 MG/DL (ref 0.2–1)
VLDLC SERPL CALC-MCNC: 22 MG/DL (ref 5–40)
WBC # BLD AUTO: 9 X10E3/UL (ref 3.4–10.8)
WBC #/AREA URNS HPF: >30 /HPF (ref 0–5)

## 2021-07-25 NOTE — PROGRESS NOTES
[facility] and Primary Care  [place] [address]  [address] [address] [facility] [address] [address]  Phone:  [phone]  Fax: [phone]       Chief Complaint   Patient presents with    Hypertension     3 month follow up    . SUBJECTIVE:    Channing Ying is a 59 y.o. female comes in after I sent her to a nephrologist.  Medications were changed. They really did not need to be changed just at the discretion of the nephrologist.  The comment is made because, as an example, blood pressure was entirely normal, but she felt the need to discontinue the ACE inhibitor, which really was not causing any hyperkalemia and switched to the hydralazine. She also suggested to her that she may need to be on dialyzation, is not uremic and she has no volume problems and never did. The patient is somewhat upset because she really does not want to change what is going on at this point. The biggest reason I sent her to Nephrology was not because of her renal function, but because of her hyperparathyroidism, which is felt to be secondary to her renal status. This was the primary reason I sent her to the nephrologist.    She states that she has otherwise been doing reasonably well. Interestingly, in the context of the secondary hyperparathyroidism, she was restarted on a vitamin D preparation, which I took her off of earlier thinking that it might be exacerbating the current problem. Current Outpatient Medications   Medication Sig Dispense Refill    bumetanide (BUMEX) 1 mg tablet Take 1 mg by mouth daily.  hydrALAZINE (APRESOLINE) 10 mg tablet Take 10 mg by mouth three (3) times daily.  sodium bicarbonate 325 mg tablet Take 325 mg by mouth two (2) times a day.  calcitRIOL (ROCALTROL) 0.25 mcg capsule Take 1 Capsule by mouth daily.       allopurinoL (ZYLOPRIM) 100 mg tablet 2 tablets QD 60 Tablet 11    pravastatin (PRAVACHOL) 40 mg tablet TAKE 1 TABLET BY MOUTH EVERY DAY AT BEDTIME 90 Tab 3    potassium chloride (K-DUR, KLOR-CON) 20 mEq tablet TAKE 1 TABLET BY MOUTH DAILY 90 Tab 3    amLODIPine (NORVASC) 10 mg tablet TAKE 1 TABLET BY MOUTH DAILY 90 Tab 3    acetaminophen (Tylenol Extra Strength) 500 mg tablet Take 500 mg by mouth every six (6) hours as needed for Pain.  senna-docusate (PERICOLACE) 8.6-50 mg per tablet Take 1 Tab by mouth two (2) times a day. Indications: constipation (Patient not taking: Reported on 2021) 30 Tab 0    warfarin (Coumadin) 2.5 mg tablet Take daily or as directed per twice weekly PT/INR test  Indications: deep vein thrombosis prevention (Patient not taking: Reported on 2021) 50 Tab 0    escitalopram oxalate (LEXAPRO) 10 mg tablet Take 1 Tab by mouth nightly. (Patient not taking: Reported on 2021) 30 Tab 2    tolterodine ER (DETROL LA) 4 mg ER capsule Take 1 Cap by mouth daily. (Patient not taking: Reported on 2021) 30 Cap 11    lisinopriL (PRINIVIL, ZESTRIL) 40 mg tablet TAKE 1 TABLET BY MOUTH DAILY (Patient not taking: Reported on 2021) 90 Tab 3    metOLazone (ZAROXOLYN) 2.5 mg tablet TAKE 1 TABLET BY MOUTH DAILY (Patient not taking: Reported on 2021) 90 Tab 3     Past Medical History:   Diagnosis Date    Arthritis     L knee    Chronic kidney disease     secondary to hypertensive nephrosclerosis    Gastritis     HISTORY OF 10/2020     Gout     History of claustrophobia     Hypertension      Past Surgical History:   Procedure Laterality Date    HX  SECTION      HX COLONOSCOPY      HX GYN      ECTOPIC PREGNANCY SURGERY     HX LAP CHOLECYSTECTOMY      HX LUMBAR LAMINECTOMY  2020    L3L4L5     Allergies   Allergen Reactions    Morphine Nausea and Vomiting and Cough     Blood pressure and 158-96 heart rate up to 156.     [place] Swelling         REVIEW OF SYSTEMS:  General: negative for - chills or fever  ENT: negative for - headaches, nasal congestion or tinnitus  Respiratory: negative for - cough, hemoptysis, shortness of breath or wheezing  Cardiovascular : negative for - chest pain, edema, palpitations or shortness of breath  Gastrointestinal: negative for - abdominal pain, blood in stools, heartburn or nausea/vomiting  Genito-Urinary: no dysuria, trouble voiding, or hematuria  Musculoskeletal: negative for - gait disturbance, joint pain, joint stiffness or joint swelling  Neurological: no TIA or stroke symptoms  Hematologic: no bruises, no bleeding, no swollen glands  Integument: no lumps, mole changes, nail changes or rash  Endocrine: no malaise/lethargy or unexpected weight changes      Social History     Socioeconomic History    Marital status:      Spouse name: Not on file    Number of children: 2    Years of education: 16+    Highest education level: Master's degree (e.g., MA, MS, Schu[place], MEd, MSW, RENATA)   Occupational History    Occupation: teacher   Tobacco Use    Smoking status: Former Smoker     Packs/day: 0.25     Years: 25.00     Pack years: 6.25     Quit date: 2000     Years since quittin.0    Smokeless tobacco: Never Used   Vaping Use    Vaping Use: Never used   Substance and Sexual Activity    Alcohol use: No    Drug use: No    Sexual activity: Not Currently     Social Determinants of Health     Financial Resource Strain:     Difficulty of Paying Living Expenses:    Food Insecurity:     Worried About Running Out of Food in the Last Year:     Ran Out of Food in the Last Year:    Transportation Needs:     Lack of Transportation (Medical):      Lack of Transportation (Non-Medical):    Physical Activity:     Days of Exercise per Week:     Minutes of Exercise per Session:    Stress:     Feeling of Stress :    Social Connections:     Frequency of Communication with Friends and Family:     Frequency of Social Gatherings with Friends and Family:     Attends Church Services:     Active Member of Clubs or Organizations:     Attends Club or Organization Meetings:     Marital Status: Family History   Problem Relation Age of Onset    Hypertension Mother     Liver Disease Father     Diabetes Sister     Breast Cancer Paternal Grandmother     Anesth Problems Neg Hx        OBJECTIVE:    Visit Vitals  /84   Pulse (!) 103   Temp 98.1 °F (36.7 °C) (Oral)   Resp 16   Ht 5' 6\" (1.676 m)   Wt 233 lb 8 oz (105.9 kg)   SpO2 97%   BMI 37.69 kg/m²     CONSTITUTIONAL: well , well nourished, appears age appropriate  EYES: perrla, eom intact  ENMT:moist mucous membranes, pharynx clear  NECK: supple. Thyroid normal, no JVD  RESPIRATORY: Chest: clear to ascultation and percussion   CARDIOVASCULAR: Heart: regular rate and rhythm  GASTROINTESTINAL: Abdomen: soft, bowel sounds active  HEMATOLOGIC: no pathological lymph nodes palpated  MUSCULOSKELETAL: Extremities: no edema, pulse 1+   INTEGUMENT: No unusual rashes or suspicious skin lesions noted. Nails appear normal.  NEUROLOGIC: non-focal exam   MENTAL STATUS: alert and oriented, appropriate affect      ASSESSMENT:  1. Essential hypertension    2. Hypercalcemia    3. Dyslipidemia    4. Class 3 severe obesity with serious comorbidity and body mass index (BMI) of 40.0 to 44.9 in adult, unspecified obesity type ([address]    5. Gout, unspecified cause, unspecified chronicity, unspecified site    6. Chronic renal failure syndrome, stage 4 (severe) (HCC)    7. Family history of diabetes mellitus        PLAN:  1. Her blood pressure is reasonable today. 2. She continues to have problems with secondary hyperparathyroidism presumably. I am still awaiting a meaningful input as relates to this. 3. She remains on a statin in view of her increased cardiovascular risk. 4. There has been no meaningful change in her weight. 5. She does have gout and remains on allopurinol. This has been quite effective at suppressing any flareups. 6. She does have a family history of diabetes in that her mother had this disease.   I will await the results of her hemoglobin A1c.  7. As far as her kidney failure is concerned, there is not much I can do at this point as far as turning the clock back. She is now relegated to the nephrologist that she currently has, which is Dr. Aguirre Guardian. To be clear, records were indeed reviewed from the nephrologist.       .  Orders Placed This Encounter    HEMOGLOBIN A1C WITH EAG    APOLIPOPROTEIN B    CBC WITH AUTOMATED DIFF    LIPID PANEL    METABOLIC PANEL, COMPREHENSIVE    TSH 3RD GENERATION    URINALYSIS W/ RFLX MICROSCOPIC    CYSTATIN C    CBC WITH AUTOMATED DIFF    METABOLIC PANEL, COMPREHENSIVE    URINALYSIS W/ RFLX MICROSCOPIC    MICROSCOPIC EXAMINATION    LIPID PANEL    HEMOGLOBIN A1C WITH EAG    TSH 3RD GENERATION    CYSTATIN C    APOLIPOPROTEIN B    bumetanide (BUMEX) 1 mg tablet    hydrALAZINE (APRESOLINE) 10 mg tablet    sodium bicarbonate 325 mg tablet    calcitRIOL (ROCALTROL) 0.25 mcg capsule         Follow-up and Dispositions    · Return in about 4 months (around 11/6/2021).            Josh Valdivia MD

## 2021-07-26 ENCOUNTER — TELEPHONE (OUTPATIENT)
Dept: INTERNAL MEDICINE CLINIC | Age: 61
End: 2021-07-26

## 2021-07-27 ENCOUNTER — CLINICAL SUPPORT (OUTPATIENT)
Dept: INTERNAL MEDICINE CLINIC | Age: 61
End: 2021-07-27

## 2021-07-27 DIAGNOSIS — M16.12 PRIMARY OSTEOARTHRITIS OF LEFT HIP: Primary | ICD-10-CM

## 2021-07-30 LAB — BACTERIA UR CULT: NORMAL

## 2021-08-09 ENCOUNTER — HOSPITAL ENCOUNTER (EMERGENCY)
Age: 61
Discharge: HOME | End: 2021-08-09
Attending: EMERGENCY MEDICINE
Payer: COMMERCIAL

## 2021-08-09 VITALS
OXYGEN SATURATION: 98 % | SYSTOLIC BLOOD PRESSURE: 130 MMHG | HEART RATE: 114 BPM | RESPIRATION RATE: 16 BRPM | TEMPERATURE: 96.8 F | DIASTOLIC BLOOD PRESSURE: 98 MMHG

## 2021-08-09 DIAGNOSIS — N18.9 CHRONIC KIDNEY DISEASE, UNSPECIFIED CKD STAGE: ICD-10-CM

## 2021-08-09 DIAGNOSIS — E83.52 HYPERCALCEMIA: Primary | ICD-10-CM

## 2021-08-09 LAB
ALBUMIN SERPL-MCNC: 3.6 G/DL (ref 3.5–5)
ALBUMIN/GLOB SERPL: 0.9 {RATIO} (ref 1.1–2.2)
ALP SERPL-CCNC: 107 U/L (ref 45–117)
ALT SERPL-CCNC: 15 U/L (ref 12–78)
ANION GAP SERPL CALC-SCNC: 7 MMOL/L (ref 5–15)
AST SERPL-CCNC: 7 U/L (ref 15–37)
ATRIAL RATE: 106 BPM
BASOPHILS # BLD: 0 K/UL (ref 0–0.1)
BASOPHILS NFR BLD: 0 % (ref 0–1)
BILIRUB SERPL-MCNC: 0.3 MG/DL (ref 0.2–1)
BUN SERPL-MCNC: 51 MG/DL (ref 6–20)
BUN/CREAT SERPL: 13 (ref 12–20)
CALCIUM SERPL-MCNC: 12.3 MG/DL (ref 8.5–10.1)
CALCULATED P AXIS, ECG09: 60 DEGREES
CALCULATED R AXIS, ECG10: -16 DEGREES
CALCULATED T AXIS, ECG11: 91 DEGREES
CHLORIDE SERPL-SCNC: 111 MMOL/L (ref 97–108)
CO2 SERPL-SCNC: 25 MMOL/L (ref 21–32)
COMMENT, HOLDF: NORMAL
CREAT SERPL-MCNC: 3.97 MG/DL (ref 0.55–1.02)
DIAGNOSIS, 93000: NORMAL
DIFFERENTIAL METHOD BLD: ABNORMAL
EOSINOPHIL # BLD: 1.1 K/UL (ref 0–0.4)
EOSINOPHIL NFR BLD: 11 % (ref 0–7)
ERYTHROCYTE [DISTWIDTH] IN BLOOD BY AUTOMATED COUNT: 14 % (ref 11.5–14.5)
GLOBULIN SER CALC-MCNC: 3.9 G/DL (ref 2–4)
GLUCOSE SERPL-MCNC: 102 MG/DL (ref 65–100)
HCT VFR BLD AUTO: 33.5 % (ref 35–47)
HGB BLD-MCNC: 9.9 G/DL (ref 11.5–16)
IMM GRANULOCYTES # BLD AUTO: 0.1 K/UL (ref 0–0.04)
IMM GRANULOCYTES NFR BLD AUTO: 1 % (ref 0–0.5)
LYMPHOCYTES # BLD: 2.1 K/UL (ref 0.8–3.5)
LYMPHOCYTES NFR BLD: 21 % (ref 12–49)
MAGNESIUM SERPL-MCNC: 1.2 MG/DL (ref 1.6–2.4)
MCH RBC QN AUTO: 28.4 PG (ref 26–34)
MCHC RBC AUTO-ENTMCNC: 29.6 G/DL (ref 30–36.5)
MCV RBC AUTO: 96 FL (ref 80–99)
MONOCYTES # BLD: 0.4 K/UL (ref 0–1)
MONOCYTES NFR BLD: 4 % (ref 5–13)
NEUTS SEG # BLD: 6.5 K/UL (ref 1.8–8)
NEUTS SEG NFR BLD: 63 % (ref 32–75)
NRBC # BLD: 0 K/UL (ref 0–0.01)
NRBC BLD-RTO: 0 PER 100 WBC
P-R INTERVAL, ECG05: 158 MS
PHOSPHATE SERPL-MCNC: 2.8 MG/DL (ref 2.6–4.7)
PLATELET # BLD AUTO: 248 K/UL (ref 150–400)
PMV BLD AUTO: 10.7 FL (ref 8.9–12.9)
POTASSIUM SERPL-SCNC: 3.4 MMOL/L (ref 3.5–5.1)
PROT SERPL-MCNC: 7.5 G/DL (ref 6.4–8.2)
Q-T INTERVAL, ECG07: 312 MS
QRS DURATION, ECG06: 78 MS
QTC CALCULATION (BEZET), ECG08: 414 MS
RBC # BLD AUTO: 3.49 M/UL (ref 3.8–5.2)
RBC MORPH BLD: ABNORMAL
SAMPLES BEING HELD,HOLD: NORMAL
SODIUM SERPL-SCNC: 143 MMOL/L (ref 136–145)
VENTRICULAR RATE, ECG03: 106 BPM
WBC # BLD AUTO: 10.2 K/UL (ref 3.6–11)

## 2021-08-09 PROCEDURE — 84100 ASSAY OF PHOSPHORUS: CPT

## 2021-08-09 PROCEDURE — 36415 COLL VENOUS BLD VENIPUNCTURE: CPT

## 2021-08-09 PROCEDURE — 83735 ASSAY OF MAGNESIUM: CPT

## 2021-08-09 PROCEDURE — 93005 ELECTROCARDIOGRAM TRACING: CPT

## 2021-08-09 PROCEDURE — 99285 EMERGENCY DEPT VISIT HI MDM: CPT

## 2021-08-09 PROCEDURE — 85025 COMPLETE CBC W/AUTO DIFF WBC: CPT

## 2021-08-09 PROCEDURE — 96360 HYDRATION IV INFUSION INIT: CPT

## 2021-08-09 PROCEDURE — 74011250636 HC RX REV CODE- 250/636: Performed by: EMERGENCY MEDICINE

## 2021-08-09 PROCEDURE — 80053 COMPREHEN METABOLIC PANEL: CPT

## 2021-08-09 RX ADMIN — SODIUM CHLORIDE 1000 ML: 9 INJECTION, SOLUTION INTRAVENOUS at 14:44

## 2021-08-09 NOTE — ED TRIAGE NOTES
Pt stated her kidney DR sent her in for elevated calcium, unsure of lever, pt asymptomatic , labs done last week

## 2021-08-09 NOTE — ED PROVIDER NOTES
56-year-old female presents she was sent in kidney specialist due to high calcium levels. Patient states that she has a history of chronic kidney disease and has had elevated calcium levels for some time. She not sure what the value was. She states that blood work was drawn sometime last week in preparation for an appointment with her nephrologist which is scheduled in 2 days time. She denies any symptoms. No palpitations, numbness, tingling. No other complaints at this time.     Nephrology:  Dr. Aixa Downey           Past Medical History:   Diagnosis Date    Arthritis     L knee    Chronic kidney disease     secondary to hypertensive nephrosclerosis    Gastritis     HISTORY OF 10/2020     Gout     History of claustrophobia     Hypertension        Past Surgical History:   Procedure Laterality Date    HX  SECTION      HX COLONOSCOPY      HX GYN      ECTOPIC PREGNANCY SURGERY     HX LAP CHOLECYSTECTOMY      HX LUMBAR LAMINECTOMY  2020    [id number]         Family History:   Problem Relation Age of Onset    Hypertension Mother     Liver Disease Father     Diabetes Sister     Breast Cancer Paternal Grandmother     Anesth Problems Neg Hx        Social History     Socioeconomic History    Marital status:      Spouse name: Not on file    Number of children: 2    Years of education: 16+    Highest education level: Master's degree (e.g., MA, MS, Schuyler, MEd, MSW, RENATA)   Occupational History    Occupation: teacher   Tobacco Use    Smoking status: Former Smoker     Packs/day: 0.25     Years: 25.00     Pack years: 6.25     Quit date: 2000     Years since quittin.0    Smokeless tobacco: Never Used   Vaping Use    Vaping Use: Never used   Substance and Sexual Activity    Alcohol use: No    Drug use: No    Sexual activity: Not Currently   Other Topics Concern    Not on file   Social History Narrative    Not on file     Social Determinants of Health     Financial Resource Strain:  Difficulty of Paying Living Expenses:    Food Insecurity:     Worried About Running Out of Food in the Last Year:     Ran Out of Food in the Last Year:    Transportation Needs:     Lack of Transportation (Medical):  Lack of Transportation (Non-Medical):    Physical Activity:     Days of Exercise per Week:     Minutes of Exercise per Session:    Stress:     Feeling of Stress :    Social Connections:     Frequency of Communication with Friends and Family:     Frequency of Social Gatherings with Friends and Family:     Attends Congregation Services:     Active Member of Clubs or Organizations:     Attends Club or Organization Meetings:     Marital Status:    Intimate Partner Violence:     Fear of Current or Ex-Partner:     Emotionally Abused:     Physically Abused:     Sexually Abused: ALLERGIES: Morphine and [place]    Review of Systems   Constitutional: Negative for fever. HENT: Negative for facial swelling. Eyes: Negative for visual disturbance. Respiratory: Negative for chest tightness. Cardiovascular: Negative for chest pain. Gastrointestinal: Negative for abdominal pain. Genitourinary: Negative for difficulty urinating and dysuria. Musculoskeletal: Negative for arthralgias. Skin: Negative for rash. Neurological: Negative for headaches. Hematological: Negative for adenopathy. Psychiatric/Behavioral: Negative for suicidal ideas. Vitals:    08/09/21 1241   BP: (!) 171/113   Pulse: (!) 114   Resp: 16   Temp: 96.8 °F (36 °C)   SpO2: 97%            Physical Exam  Vitals and nursing note reviewed. Constitutional:       General: She is not in acute distress. Appearance: She is well-developed. HENT:      Head: Normocephalic and atraumatic. Eyes:      General: No scleral icterus. Conjunctiva/sclera: Conjunctivae normal.      Pupils: Pupils are equal, round, and reactive to light. Cardiovascular:      Rate and Rhythm: Normal rate.       Heart sounds: No murmur heard. Pulmonary:      Effort: Pulmonary effort is normal. No respiratory distress. Abdominal:      General: There is no distension. Musculoskeletal:         General: Normal range of motion. Cervical back: Normal range of motion and neck supple. Skin:     General: Skin is warm and dry. Findings: No rash. Neurological:      Mental Status: She is alert and oriented to person, place, and time. MDM  Number of Diagnoses or Management Options  Chronic kidney disease, unspecified CKD stage  Hypercalcemia  Diagnosis management comments: Assessment: Calcium level today was 12.3 which is only slightly elevated from the patient's historic baseline which usually runs in the 11th. She was given IV fluids in the emergency department. Initial heart rate was slightly elevated but improved without any intervention. I think she stable to follow-up with her nephrologist in 2 days as previously scheduled. She can return to the ED with any new or worsening symptoms. Amount and/or Complexity of Data Reviewed  Clinical lab tests: reviewed  Tests in the medicine section of CPT®: reviewed         3:06 PM  Discussed with Dr. Austen Stanley (nephrology) who agreed with plan to discharge home to f/u with Dr. Mustapha Calderon as scheduled in 2 days.   Procedures

## 2021-09-24 ENCOUNTER — TRANSCRIBE ORDER (OUTPATIENT)
Dept: SCHEDULING | Age: 61
End: 2021-09-24

## 2021-09-24 DIAGNOSIS — E21.1 SECONDARY HYPERPARATHYROIDISM, NON-RENAL (HCC): Primary | ICD-10-CM

## 2021-10-06 ENCOUNTER — HOSPITAL ENCOUNTER (OUTPATIENT)
Dept: MAMMOGRAPHY | Age: 61
Discharge: HOME | End: 2021-10-06
Attending: INTERNAL MEDICINE
Payer: COMMERCIAL

## 2021-10-06 DIAGNOSIS — E21.1 SECONDARY HYPERPARATHYROIDISM, NON-RENAL (HCC): ICD-10-CM

## 2021-10-06 PROCEDURE — 77080 DXA BONE DENSITY AXIAL: CPT

## 2021-10-17 RX ORDER — AMLODIPINE BESYLATE 10 MG/1
TABLET ORAL
Qty: 90 TABLET | Refills: 3 | Status: SHIPPED | OUTPATIENT
Start: 2021-10-17

## 2021-10-17 RX ORDER — TOLTERODINE 4 MG/1
4 CAPSULE, EXTENDED RELEASE ORAL DAILY
Qty: 30 CAPSULE | Refills: 11 | Status: SHIPPED | OUTPATIENT
Start: 2021-10-17

## 2021-10-17 RX ORDER — POTASSIUM CHLORIDE 20 MEQ/1
TABLET, EXTENDED RELEASE ORAL
Qty: 90 TABLET | Refills: 3 | Status: SHIPPED | OUTPATIENT
Start: 2021-10-17

## 2021-10-17 RX ORDER — PRAVASTATIN SODIUM 40 MG/1
TABLET ORAL
Qty: 90 TABLET | Refills: 3 | Status: SHIPPED | OUTPATIENT
Start: 2021-10-17

## 2021-10-19 ENCOUNTER — HOSPITAL ENCOUNTER (OUTPATIENT)
Dept: INFUSION THERAPY | Age: 61
Discharge: HOME | End: 2021-10-19

## 2021-11-09 ENCOUNTER — OFFICE VISIT (OUTPATIENT)
Dept: INTERNAL MEDICINE CLINIC | Age: 61
End: 2021-11-09
Payer: COMMERCIAL

## 2021-11-09 VITALS
RESPIRATION RATE: 16 BRPM | DIASTOLIC BLOOD PRESSURE: 106 MMHG | WEIGHT: 230.1 LBS | HEIGHT: 66 IN | OXYGEN SATURATION: 96 % | SYSTOLIC BLOOD PRESSURE: 148 MMHG | TEMPERATURE: 98.2 F | HEART RATE: 100 BPM | BODY MASS INDEX: 36.98 KG/M2

## 2021-11-09 DIAGNOSIS — E66.01 CLASS 3 SEVERE OBESITY WITH SERIOUS COMORBIDITY AND BODY MASS INDEX (BMI) OF 40.0 TO 44.9 IN ADULT, UNSPECIFIED OBESITY TYPE (HCC): ICD-10-CM

## 2021-11-09 DIAGNOSIS — I10 PRIMARY HYPERTENSION: Primary | ICD-10-CM

## 2021-11-09 DIAGNOSIS — M10.9 GOUT, UNSPECIFIED CAUSE, UNSPECIFIED CHRONICITY, UNSPECIFIED SITE: ICD-10-CM

## 2021-11-09 DIAGNOSIS — N25.81 SECONDARY HYPERPARATHYROIDISM (HCC): ICD-10-CM

## 2021-11-09 DIAGNOSIS — N18.4 CHRONIC RENAL FAILURE SYNDROME, STAGE 4 (SEVERE) (HCC): ICD-10-CM

## 2021-11-09 PROCEDURE — 99214 OFFICE O/P EST MOD 30 MIN: CPT | Performed by: INTERNAL MEDICINE

## 2021-11-09 RX ORDER — CINACALCET 60 MG/1
90 TABLET, FILM COATED ORAL EVERY EVENING
COMMUNITY
Start: 2021-09-29 | End: 2022-07-19

## 2021-11-09 NOTE — PROGRESS NOTES
Chief Complaint   Patient presents with    Hypertension     Patient is here for a follow up      1. Have you been to the ER, urgent care clinic since your last visit? Hospitalized since your last visit? No    2. Have you seen or consulted any other health care providers outside of the [address] [place] [address] since your last visit? Include any pap smears or colon screening.  No

## 2021-11-13 NOTE — PROGRESS NOTES
[address] [place] [address] and Primary Care  [place] [address]  [address] [facility] [address]  Phone:  [phone]  Fax: [phone]       Chief Complaint   Patient presents with    Hypertension     Patient is here for a follow up    . SUBJECTIVE:    Jovani Crook is a 59 y.o. female comes in for return visit stating that she has done reasonably well. She is following up with Nephrology and her blood pressure has been consistently elevated because of adjustments in medication that were done for no logical reason. I make that comment only because she has been normotensive for years. She was placed on hydralazine to take three times a day. She has never taken anything three times a day as her compliance rate is extremely low above and beyond discontinuing her Zaroxolyn in favor of a loop diuretic. She was never volume overloaded at any point in time. In any event, her pressures remained persistently elevated. I should also mention the reason as I sent her there was not because of the progressive renal failure. It is because of the issue of carbohydrate metabolism in that it appears that she has secondary hyperparathyroidism and I really sent her to get treated from that perspective predominantly. She has a history of dyslipidemia and gout above and beyond her obesity. Her musculoskeletal aspect has improved significantly. She had lumbar spinal stenosis which was decompressed. Current Outpatient Medications   Medication Sig Dispense Refill    pravastatin (PRAVACHOL) 40 mg tablet TAKE 1 TABLET BY MOUTH EVERY DAY AT BEDTIME 90 Tablet 3    amLODIPine (NORVASC) 10 mg tablet TAKE 1 TABLET BY MOUTH DAILY 90 Tablet 3    potassium chloride (K-DUR, KLOR-CON) 20 mEq tablet TAKE 1 TABLET BY MOUTH DAILY 90 Tablet 3    tolterodine ER (DETROL LA) 4 mg ER capsule Take 1 Capsule by mouth daily. 30 Capsule 11    bumetanide (BUMEX) 1 mg tablet Take 1 mg by mouth daily.       hydrALAZINE (APRESOLINE) 10 mg tablet Take 10 mg by mouth three (3) times daily.  sodium bicarbonate 325 mg tablet Take 325 mg by mouth two (2) times a day.  calcitRIOL (ROCALTROL) 0.25 mcg capsule Take 1 Capsule by mouth daily.  allopurinoL (ZYLOPRIM) 100 mg tablet 2 tablets QD 60 Tablet 11    senna-docusate (PERICOLACE) 8.6-50 mg per tablet Take 1 Tab by mouth two (2) times a day. Indications: constipation 30 Tab 0    warfarin (Coumadin) 2.5 mg tablet Take daily or as directed per twice weekly PT/INR test  Indications: deep vein thrombosis prevention 50 Tab 0    escitalopram oxalate (LEXAPRO) 10 mg tablet Take 1 Tab by mouth nightly. 30 Tab 2    lisinopriL (PRINIVIL, ZESTRIL) 40 mg tablet TAKE 1 TABLET BY MOUTH DAILY 90 Tab 3    metOLazone (ZAROXOLYN) 2.5 mg tablet TAKE 1 TABLET BY MOUTH DAILY 90 Tab 3    cinacalcet 60 mg tab Take 60 mg by mouth every evening.  acetaminophen (Tylenol Extra Strength) 500 mg tablet Take 500 mg by mouth every six (6) hours as needed for Pain. Facility-Administered Medications Ordered in Other Visits   Medication Dose Route Frequency Provider Last Rate Last Admin    [START ON 2021] epoetin sara-epbx (RETACRIT) injection 40,000 Units  40,000 Units SubCUTAneous ONCE Edgar Bird MD         Past Medical History:   Diagnosis Date    Arthritis     L knee    Chronic kidney disease     secondary to hypertensive nephrosclerosis    Gastritis     HISTORY OF 10/2020     Gout     History of claustrophobia     Hyperparathyroidism ([address]     Hypertension      Past Surgical History:   Procedure Laterality Date    HX  SECTION      HX COLONOSCOPY      HX GYN      ECTOPIC PREGNANCY SURGERY     HX LAP CHOLECYSTECTOMY      HX LUMBAR LAMINECTOMY  2020    L3L4L5     Allergies   Allergen Reactions    Morphine Nausea and Vomiting and Cough     Blood pressure and 158-96 heart rate up to 156.     [place] Swelling         REVIEW OF SYSTEMS:  General: negative for - chills or fever  ENT: negative for - headaches, nasal congestion or tinnitus  Respiratory: negative for - cough, hemoptysis, shortness of breath or wheezing  Cardiovascular : negative for - chest pain, edema, palpitations or shortness of breath  Gastrointestinal: negative for - abdominal pain, blood in stools, heartburn or nausea/vomiting  Genito-Urinary: no dysuria, trouble voiding, or hematuria  Musculoskeletal: negative for - gait disturbance, joint pain, joint stiffness or joint swelling  Neurological: no TIA or stroke symptoms  Hematologic: no bruises, no bleeding, no swollen glands  Integument: no lumps, mole changes, nail changes or rash  Endocrine: no malaise/lethargy or unexpected weight changes      Social History     Socioeconomic History    Marital status:     Number of children: 2    Years of education: 16+    Highest education level: Master's degree (e.g., MA, MS, Schuyler, MEd, MSW, RENATA)   Occupational History    Occupation: teacher   Tobacco Use    Smoking status: Former Smoker     Packs/day: 0.25     Years: 25.00     Pack years: 6.25     Quit date: 2000     Years since quittin.3    Smokeless tobacco: Never Used   Vaping Use    Vaping Use: Never used   Substance and Sexual Activity    Alcohol use: No    Drug use: No    Sexual activity: Not Currently     Family History   Problem Relation Age of Onset    Hypertension Mother     Liver Disease Father     Diabetes Sister     Breast Cancer Paternal Grandmother     Anesth Problems Neg Hx        OBJECTIVE:    Visit Vitals  BP (!) 148/106   Pulse 100   Temp 98.2 °F (36.8 °C)   Resp 16   Ht 5' 6\" (1.676 m)   Wt 230 lb 1.6 oz (104.4 kg)   SpO2 96%   BMI 37.14 kg/m²     CONSTITUTIONAL: well , well nourished, appears age appropriate  EYES: perrla, eom intact  ENMT:moist mucous membranes, pharynx clear  NECK: supple.  Thyroid normal  RESPIRATORY: Chest: clear to ascultation and percussion   CARDIOVASCULAR: Heart: regular rate and rhythm  GASTROINTESTINAL: Abdomen: soft, bowel sounds active  HEMATOLOGIC: no pathological lymph nodes palpated  MUSCULOSKELETAL: Extremities: no edema, pulse 1+   INTEGUMENT: No unusual rashes or suspicious skin lesions noted. Nails appear normal.  NEUROLOGIC: non-focal exam   MENTAL STATUS: alert and oriented, appropriate affect      ASSESSMENT:  1. Primary hypertension    2. Secondary hyperparathyroidism ([address]    3. Chronic renal failure syndrome, stage 4 (severe) ([facility])    4. Class 3 severe obesity with serious comorbidity and body mass index (BMI) of 40.0 to 44.9 in adult, unspecified obesity type ([address]    5. Gout, unspecified cause, unspecified chronicity, unspecified site        PLAN:  1. The patient's blood pressure is slightly elevated. I will discuss this with Nephrology. 2. She continues with her secondary hyperparathyroidism and this is the reason I originally sent her. 3. She has chronic renal failure. Adjusting the medication for her chronic renal failure serves no meaningful purpose because it is not going to slow her renal dysfunction anymore than what she was getting before. Again I re-emphasized the secondary hyperparathyroidism that is the principal reason of referral.  4. She needs to lose weight. This can be accomplished by eating meals, eliminating snacks, and avoiding the consumption of processed carbohydrates. 5. The patient's gout is stable as long as she takes the allopurinol. She had no flare ups since she has been on this medication. .  Orders Placed This Encounter    cinacalcet 60 mg tab         Follow-up and Dispositions    · Return in about 3 months (around 2/9/2022).            Arcadio Logan MD

## 2021-11-16 ENCOUNTER — HOSPITAL ENCOUNTER (OUTPATIENT)
Dept: INFUSION THERAPY | Age: 61
Discharge: HOME | End: 2021-11-16

## 2021-12-14 ENCOUNTER — APPOINTMENT (OUTPATIENT)
Dept: INFUSION THERAPY | Age: 61
End: 2021-12-14

## 2022-02-09 ENCOUNTER — OFFICE VISIT (OUTPATIENT)
Dept: INTERNAL MEDICINE CLINIC | Age: 62
End: 2022-02-09
Payer: COMMERCIAL

## 2022-02-09 VITALS
BODY MASS INDEX: 37.07 KG/M2 | HEART RATE: 102 BPM | OXYGEN SATURATION: 92 % | DIASTOLIC BLOOD PRESSURE: 87 MMHG | WEIGHT: 230.7 LBS | TEMPERATURE: 97.9 F | RESPIRATION RATE: 18 BRPM | SYSTOLIC BLOOD PRESSURE: 138 MMHG | HEIGHT: 66 IN

## 2022-02-09 DIAGNOSIS — M16.11 PRIMARY OSTEOARTHRITIS OF RIGHT HIP: ICD-10-CM

## 2022-02-09 DIAGNOSIS — N18.4 CHRONIC RENAL FAILURE SYNDROME, STAGE 4 (SEVERE) (HCC): ICD-10-CM

## 2022-02-09 DIAGNOSIS — E78.5 DYSLIPIDEMIA: ICD-10-CM

## 2022-02-09 DIAGNOSIS — I10 PRIMARY HYPERTENSION: Primary | ICD-10-CM

## 2022-02-09 DIAGNOSIS — N25.81 SECONDARY HYPERPARATHYROIDISM (HCC): ICD-10-CM

## 2022-02-09 DIAGNOSIS — E66.01 SEVERE OBESITY (BMI 35.0-39.9) WITH COMORBIDITY (HCC): ICD-10-CM

## 2022-02-09 DIAGNOSIS — M10.9 GOUT, UNSPECIFIED CAUSE, UNSPECIFIED CHRONICITY, UNSPECIFIED SITE: ICD-10-CM

## 2022-02-09 PROCEDURE — 99214 OFFICE O/P EST MOD 30 MIN: CPT | Performed by: INTERNAL MEDICINE

## 2022-02-09 NOTE — PROGRESS NOTES
Chief Complaint   Patient presents with    Hypertension     3 month follow up          1. Have you been to the ER, urgent care clinic since your last visit? Hospitalized since your last visit? No    2. Have you seen or consulted any other health care providers outside of the [address] [place] [address] since your last visit? Include any pap smears or colon screening.  No

## 2022-02-13 NOTE — PROGRESS NOTES
[address] [facility] and Primary Care  [place] [address]  [address] [facility] [address]  Phone:  [phone]  Fax: [phone]       Chief Complaint   Patient presents with    Hypertension     3 month follow up    . SUBJECTIVE:    Nancy Moore is a 57 y.o. female comes in for return visit stating that she continues to follow with Nephrology. The issue about her hypercalcemia has not been resolved. Changes were made with her antihypertensive medication and her diuretic which I don't really feel makes any difference one way or the other as compared to when she was on previously. Those previous agents did not, in any way, contribute to hypercalcemia. She denies any suggestion of volume overload at this point. She has a past history of primary hypertension, dyslipidemia, obesity, as well as gout. She is complaining now of increasing pain in her right hip. She had a left total hip replacement above and beyond decompressive procedure on her lumbar spine. The discomfort in her right hip is getting progressively worse and she was told by her orthopedic physician that it was just a matter of time before surgical correction would be needed. 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 Current Outpatient Medications   Medication Sig Dispense Refill    cinacalcet 60 mg tab Take 60 mg by mouth every evening.  pravastatin (PRAVACHOL) 40 mg tablet TAKE 1 TABLET BY MOUTH EVERY DAY AT BEDTIME 90 Tablet 3    amLODIPine (NORVASC) 10 mg tablet TAKE 1 TABLET BY MOUTH DAILY 90 Tablet 3    potassium chloride (K-DUR, KLOR-CON) 20 mEq tablet TAKE 1 TABLET BY MOUTH DAILY 90 Tablet 3    tolterodine ER (DETROL LA) 4 mg ER capsule Take 1 Capsule by mouth daily. 30 Capsule 11    bumetanide (BUMEX) 1 mg tablet Take 1 mg by mouth daily.  hydrALAZINE (APRESOLINE) 10 mg tablet Take 10 mg by mouth three (3) times daily.       allopurinoL (ZYLOPRIM) 100 mg tablet 2 tablets QD 60 Tablet 11    senna-docusate (PERICOLACE) 8.6-50 mg per tablet Take 1 Tab by mouth two (2) times a day. Indications: constipation 30 Tab 0    metOLazone (ZAROXOLYN) 2.5 mg tablet TAKE 1 TABLET BY MOUTH DAILY 90 Tab 3    acetaminophen (Tylenol Extra Strength) 500 mg tablet Take 500 mg by mouth every six (6) hours as needed for Pain.  calcitRIOL (ROCALTROL) 0.25 mcg capsule Take 1 Capsule by mouth daily. Past Medical History:   Diagnosis Date    Arthritis     L knee    Chronic kidney disease     secondary to hypertensive nephrosclerosis    Gastritis     HISTORY OF 10/2020     Gout     History of claustrophobia     [facility]     Hypertension      Past Surgical History:   Procedure Laterality Date    HX  SECTION      HX COLONOSCOPY      HX GYN      ECTOPIC PREGNANCY SURGERY     HX LAP CHOLECYSTECTOMY      HX LUMBAR LAMINECTOMY  2020    L3L4L5     Allergies   Allergen Reactions    Morphine Nausea and Vomiting and Cough     Blood pressure and 158-96 heart rate up to 156.     [place] Swelling         REVIEW OF SYSTEMS:  General: negative for - chills or fever  ENT: negative for - headaches, nasal congestion or tinnitus  Respiratory: negative for - cough, hemoptysis, shortness of breath or wheezing  Cardiovascular : negative for - chest pain, edema, palpitations or shortness of breath  Gastrointestinal: negative for - abdominal pain, blood in stools, heartburn or nausea/vomiting  Genito-Urinary: no dysuria, trouble voiding, or hematuria  Musculoskeletal: negative for - gait disturbance, joint pain, joint stiffness or joint swelling  Neurological: no TIA or stroke symptoms  Hematologic: no bruises, no bleeding, no swollen glands  Integument: no lumps, mole changes, nail changes or rash  Endocrine: no malaise/lethargy or unexpected weight changes      Social History     Socioeconomic History    Marital status:     Number of children: 2    Years of education: 16+  Highest education level: Master's degree (e.g., MA, MS, Schuyler, MEd, MSW, RENATA)   Occupational History    Occupation: teacher   Tobacco Use    Smoking status: Former Smoker     Packs/day: 0.25     Years: 25.00     Pack years: 6.25     Quit date: 2000     Years since quittin.6    Smokeless tobacco: Never Used   Vaping Use    Vaping Use: Never used   Substance and Sexual Activity    Alcohol use: No    Drug use: No    Sexual activity: Not Currently     Family History   Problem Relation Age of Onset    Hypertension Mother     Liver Disease Father     Diabetes Sister     Breast Cancer Paternal Grandmother     Anesth Problems Neg Hx        OBJECTIVE:    Visit Vitals  /87   Pulse (!) 102   Temp 97.9 °F (36.6 °C) (Oral)   Resp 18   Ht 5' 6\" (1.676 m)   Wt 230 lb 11.2 oz (104.6 kg)   SpO2 92%   BMI 37.24 kg/m²     CONSTITUTIONAL: well , well nourished, appears age appropriate  EYES: perrla, eom intact  ENMT:moist mucous membranes, pharynx clear  NECK: supple. Thyroid normal, no JVD  RESPIRATORY: Chest: clear to ascultation and percussion   CARDIOVASCULAR: Heart: regular rate and rhythm  GASTROINTESTINAL: Abdomen: soft, bowel sounds active  HEMATOLOGIC: no pathological lymph nodes palpated  MUSCULOSKELETAL: Extremities: no edema, pulse 1+, limited range of motion right hip to both external and internal rotation  INTEGUMENT: No unusual rashes or suspicious skin lesions noted. Nails appear normal.  NEUROLOGIC: non-focal exam   MENTAL STATUS: alert and oriented, appropriate affect      ASSESSMENT:  1. Primary hypertension    2. Severe obesity (BMI 35.0-39. 9) with comorbidity ([address]    3. Secondary hyperparathyroidism ([address]    4. Chronic renal failure syndrome, stage 4 (severe) ([facility])    5. Gout, unspecified cause, unspecified chronicity, unspecified site    6. Dyslipidemia    7. Primary osteoarthritis of right hip        PLAN:  1. The patient's blood pressure is excellent today.   No adjustments are made.  2. She really needs to lose weight. This can be accomplished by eating meals, eliminating snacks, and avoiding the consumption of processed carbohydrates. Unfortunately, her interest in this is quite low, which is the reason there has been no meaningful weight loss over the last several decades. 3. She has secondary hyperparathyroidism. I sent her to Nephrology primarily for this reason and not for her kidney dysfunction. What they basically did was resumed her vitamin D preparation. During this time, her phosphorus was low, but in spite of that she is on a phosphate-lowering agent also. Both of these might indeed be contributing to the possible improvement that is occurring, but again I have no data. 4. Her chronic renal failure is reasonably stable. 5. She has not had any gouty attacks because she remains on her allopurinol. 6. She also has increased cardiovascular risk and remains on a statin. 7. As far as her right hip is concerned, she probably needs to have this done sooner rather than later. I say this because she plans to retire in December and given the degree of discomfort that she is currently experiencing there is no reason to prolong this. Follow-up and Dispositions    · Return in about 3 months (around 5/9/2022).            Cara Clark MD

## 2022-03-18 PROBLEM — M16.12 ARTHRITIS OF LEFT HIP: Status: ACTIVE | Noted: 2020-11-09

## 2022-03-18 PROBLEM — E83.52 HYPERCALCEMIA: Status: ACTIVE | Noted: 2020-10-25

## 2022-03-19 PROBLEM — N18.4 CHRONIC RENAL FAILURE SYNDROME, STAGE 4 (SEVERE) (HCC): Status: ACTIVE | Noted: 2017-11-13

## 2022-03-19 PROBLEM — G89.29 CHRONIC MIDLINE LOW BACK PAIN WITHOUT SCIATICA: Status: ACTIVE | Noted: 2021-04-06

## 2022-03-19 PROBLEM — K21.00 REFLUX ESOPHAGITIS: Status: ACTIVE | Noted: 2018-03-10

## 2022-03-19 PROBLEM — M54.50 CHRONIC MIDLINE LOW BACK PAIN WITHOUT SCIATICA: Status: ACTIVE | Noted: 2021-04-06

## 2022-03-19 PROBLEM — N25.81 SECONDARY HYPERPARATHYROIDISM (HCC): Status: ACTIVE | Noted: 2020-06-14

## 2022-03-19 PROBLEM — E66.01 SEVERE OBESITY (BMI 35.0-39.9) WITH COMORBIDITY (HCC): Status: ACTIVE | Noted: 2020-07-27

## 2022-03-19 PROBLEM — M87.052 AVASCULAR NECROSIS OF LEFT FEMUR (HCC): Status: ACTIVE | Noted: 2020-10-04

## 2022-04-05 LAB — COLONOSCOPY, EXTERNAL: NORMAL

## 2022-04-08 ENCOUNTER — OFFICE VISIT (OUTPATIENT)
Dept: INTERNAL MEDICINE CLINIC | Age: 62
End: 2022-04-08
Payer: COMMERCIAL

## 2022-04-08 VITALS
SYSTOLIC BLOOD PRESSURE: 144 MMHG | HEART RATE: 101 BPM | HEIGHT: 66 IN | DIASTOLIC BLOOD PRESSURE: 87 MMHG | BODY MASS INDEX: 36.47 KG/M2 | TEMPERATURE: 97.5 F | WEIGHT: 226.9 LBS | OXYGEN SATURATION: 96 % | RESPIRATION RATE: 16 BRPM

## 2022-04-08 DIAGNOSIS — E66.01 CLASS 3 SEVERE OBESITY WITH SERIOUS COMORBIDITY AND BODY MASS INDEX (BMI) OF 40.0 TO 44.9 IN ADULT, UNSPECIFIED OBESITY TYPE (HCC): ICD-10-CM

## 2022-04-08 DIAGNOSIS — I10 PRIMARY HYPERTENSION: Primary | ICD-10-CM

## 2022-04-08 DIAGNOSIS — N18.4 CHRONIC RENAL FAILURE SYNDROME, STAGE 4 (SEVERE) (HCC): ICD-10-CM

## 2022-04-08 DIAGNOSIS — E78.5 DYSLIPIDEMIA: ICD-10-CM

## 2022-04-08 DIAGNOSIS — N25.81 SECONDARY HYPERPARATHYROIDISM (HCC): ICD-10-CM

## 2022-04-08 PROCEDURE — 99214 OFFICE O/P EST MOD 30 MIN: CPT | Performed by: INTERNAL MEDICINE

## 2022-04-08 NOTE — PROGRESS NOTES
Chief Complaint   Patient presents with    Elevated Blood Pressure     Patient states that was at another appointment 4/7/22 and was advised that her blood pressure was \"extremely\" high. 1. Have you been to the ER, urgent care clinic since your last visit? Hospitalized since your last visit? No    2. Have you seen or consulted any other health care providers outside of the [address] [place] [address] since your last visit? Include any pap smears or colon screening.  No

## 2022-04-10 RX ORDER — CARVEDILOL 6.25 MG/1
12.5 TABLET ORAL 2 TIMES DAILY WITH MEALS
Qty: 120 TABLET | Refills: 11 | Status: SHIPPED | OUTPATIENT
Start: 2022-04-10

## 2022-04-10 NOTE — PROGRESS NOTES
[facility]  [place] [address]  [address] [facility] [address]  Phone:  [phone]  Fax: [phone]       Chief Complaint   Patient presents with    Elevated Blood Pressure     Patient states that was at another appointment 4/7/22 and was advised that her blood pressure was \"extremely\" high. .      SUBJECTIVE:    Chapito Azul is a 57 y.o. female comes in for return visit stating that she has done reasonably well. She saw the orthopedic physician who said she had a bursitis and he injected the area. It was quite successful because she is not longer having any pain in the left hip for now. She is currently in a workup for a preemptive transplant. So far, everything has gone quite well for her. She follows up with Dr. Sedrick Buckley, her nephrologist.    Her antihypertensive medication was changed for unknown reasons to hydralazine for which she is taking 20 mg t.i.d. In spite of this, blood pressure is not as well controlled as it should be. The fact that she is taking two vasodilators also increases her resting heart rate significantly. She was formerly on a thiazide-type diuretic, which was quite effective, but she was changed to bumetanide. When she was on the original diuretic, she had no third-space fluid as is the case currently. The primary reason that I actually referred her at this point was because of her elevated calcium. It was suggested she might have tertiary hyperparathyroidism. The only thing done by the nephrologist to date includes the resumption of her Rocaltrol, which I had her on previously. There has been no meaningful weight loss. She states that she plans to retire in the very near future. Current Outpatient Medications   Medication Sig Dispense Refill    carvediloL (COREG) 6.25 mg tablet Take 2 Tablets by mouth two (2) times daily (with meals). 120 Tablet 11    cinacalcet 60 mg tab Take 60 mg by mouth every evening.       pravastatin (PRAVACHOL) 40 mg tablet TAKE 1 TABLET BY MOUTH EVERY DAY AT BEDTIME 90 Tablet 3    amLODIPine (NORVASC) 10 mg tablet TAKE 1 TABLET BY MOUTH DAILY 90 Tablet 3    potassium chloride (K-DUR, KLOR-CON) 20 mEq tablet TAKE 1 TABLET BY MOUTH DAILY 90 Tablet 3    tolterodine ER (DETROL LA) 4 mg ER capsule Take 1 Capsule by mouth daily. 30 Capsule 11    bumetanide (BUMEX) 1 mg tablet Take 1 mg by mouth daily.  hydrALAZINE (APRESOLINE) 10 mg tablet Take 10 mg by mouth three (3) times daily.  calcitRIOL (ROCALTROL) 0.25 mcg capsule Take 1 Capsule by mouth daily.  allopurinoL (ZYLOPRIM) 100 mg tablet 2 tablets QD 60 Tablet 11    senna-docusate (PERICOLACE) 8.6-50 mg per tablet Take 1 Tab by mouth two (2) times a day. Indications: constipation 30 Tab 0    metOLazone (ZAROXOLYN) 2.5 mg tablet TAKE 1 TABLET BY MOUTH DAILY 90 Tab 3    acetaminophen (Tylenol Extra Strength) 500 mg tablet Take 500 mg by mouth every six (6) hours as needed for Pain. Past Medical History:   Diagnosis Date    Arthritis     L knee    Chronic kidney disease     secondary to hypertensive nephrosclerosis    Gastritis     HISTORY OF 10/2020     Gout     History of claustrophobia     Hyperparathyroidism ([facility] [address]     Hypertension      Past Surgical History:   Procedure Laterality Date    HX  SECTION      HX COLONOSCOPY      HX GYN      ECTOPIC PREGNANCY SURGERY     HX LAP CHOLECYSTECTOMY      HX LUMBAR LAMINECTOMY  2020    L3L4L5     Allergies   Allergen Reactions    Morphine Nausea and Vomiting and Cough     Blood pressure and 158-96 heart rate up to 156.     [place] Swelling         REVIEW OF SYSTEMS:  General: negative for - chills or fever  ENT: negative for - headaches, nasal congestion or tinnitus  Respiratory: negative for - cough, hemoptysis, shortness of breath or wheezing  Cardiovascular : negative for - chest pain, edema, palpitations or shortness of breath  Gastrointestinal: negative for - abdominal pain, blood in stools, heartburn or nausea/vomiting  Genito-Urinary: no dysuria, trouble voiding, or hematuria  Musculoskeletal: negative for - gait disturbance, joint pain, joint stiffness or joint swelling  Neurological: no TIA or stroke symptoms  Hematologic: no bruises, no bleeding, no swollen glands  Integument: no lumps, mole changes, nail changes or rash  Endocrine: no malaise/lethargy or unexpected weight changes      Social History     Socioeconomic History    Marital status:     Number of children: 2    Years of education: 16+    Highest education level: Master's degree (e.g., MA, MS, Schuyler, MEd, MSW, RENATA)   Occupational History    Occupation: teacher   Tobacco Use    Smoking status: Former Smoker     Packs/day: 0.25     Years: 25.00     Pack years: 6.25     Quit date: 2000     Years since quittin.7    Smokeless tobacco: Never Used   Vaping Use    Vaping Use: Never used   Substance and Sexual Activity    Alcohol use: No    Drug use: No    Sexual activity: Not Currently     Family History   Problem Relation Age of Onset    Hypertension Mother     Liver Disease Father     Diabetes Sister     Breast Cancer Paternal Grandmother     Anesth Problems Neg Hx        OBJECTIVE:    Visit Vitals  BP (!) 144/87   Pulse (!) 101   Temp 97.5 °F (36.4 °C) (Oral)   Resp 16   Ht 5' 6\" (1.676 m)   Wt 226 lb 14.4 oz (102.9 kg)   SpO2 96%   BMI 36.62 kg/m²     CONSTITUTIONAL: well , well nourished, appears age appropriate  EYES: perrla, eom intact  ENMT:moist mucous membranes, pharynx clear  NECK: supple. Thyroid normal  RESPIRATORY: Chest: clear to ascultation and percussion   CARDIOVASCULAR: Heart: regular rate and rhythm  GASTROINTESTINAL: Abdomen: soft, bowel sounds active  HEMATOLOGIC: no pathological lymph nodes palpated  MUSCULOSKELETAL: Extremities: no edema, pulse 1+   INTEGUMENT: No unusual rashes or suspicious skin lesions noted.  Nails appear normal.  NEUROLOGIC: non-focal exam   MENTAL STATUS: alert and oriented, appropriate affect      ASSESSMENT:  1. Primary hypertension    2. Secondary hyperparathyroidism ([address]    3. Chronic renal failure syndrome, stage 4 (severe) (HCC)    4. Class 3 severe obesity with serious comorbidity and body mass index (BMI) of 40.0 to 44.9 in adult, unspecified obesity type ([address]    5. Dyslipidemia        PLAN:  1. The patient's blood pressure is not optimally controlled. I will place her on a beta blocker, carvedilol, to see if this would be an optimal drug theoretically. I will start her at 6.25 mg twice a day for the first week and then 12.5 mg b.i.d. thereafter. This maybe more than adequate to control the pressure with her existing antihypertensive regimen. 2. As far as her hyperparathyroidism is concerned, I have not checked any of the lab work pertinent to that. She will follow up with Dr. Tuyet Regan as relates to this. They are actually leaning more towards primary hyperparathyroidism. 3. Chronic renal failure is unchanged. As I stated, she is in a preemptive program for a renal transplant. 4. She needs to lose weight, which we have discussed repetitively for decades. This can be accomplished by eating meals, eliminating snacks, and avoiding the consumption of processed carbohydrates. 5. She has an increased cardiovascular risk and remains on a statin. Follow-up and Dispositions    · Return in about 4 weeks (around 5/6/2022).            Seema Carranza MD

## 2022-04-13 LAB — MAMMOGRAPHY, EXTERNAL: NORMAL

## 2022-04-19 ENCOUNTER — HOSPITAL ENCOUNTER (EMERGENCY)
Age: 62
Discharge: HOME | End: 2022-04-19
Attending: EMERGENCY MEDICINE
Payer: COMMERCIAL

## 2022-04-19 ENCOUNTER — APPOINTMENT (OUTPATIENT)
Dept: CT IMAGING | Age: 62
End: 2022-04-19
Attending: EMERGENCY MEDICINE
Payer: COMMERCIAL

## 2022-04-19 VITALS
WEIGHT: 225 LBS | HEART RATE: 91 BPM | HEIGHT: 66 IN | TEMPERATURE: 97.1 F | BODY MASS INDEX: 36.16 KG/M2 | SYSTOLIC BLOOD PRESSURE: 152 MMHG | DIASTOLIC BLOOD PRESSURE: 93 MMHG | RESPIRATION RATE: 16 BRPM | OXYGEN SATURATION: 97 %

## 2022-04-19 DIAGNOSIS — R42 DIZZY SPELLS: Primary | ICD-10-CM

## 2022-04-19 DIAGNOSIS — N18.9 CHRONIC RENAL IMPAIRMENT, UNSPECIFIED CKD STAGE: ICD-10-CM

## 2022-04-19 LAB
ALBUMIN SERPL-MCNC: 3.3 G/DL (ref 3.5–5)
ALBUMIN/GLOB SERPL: 0.7 {RATIO} (ref 1.1–2.2)
ALP SERPL-CCNC: 118 U/L (ref 45–117)
ALT SERPL-CCNC: 14 U/L (ref 12–78)
ANION GAP SERPL CALC-SCNC: 7 MMOL/L (ref 5–15)
APPEARANCE UR: CLEAR
AST SERPL-CCNC: 7 U/L (ref 15–37)
ATRIAL RATE: 87 BPM
BACTERIA URNS QL MICRO: NEGATIVE /HPF
BASOPHILS # BLD: 0 K/UL (ref 0–0.1)
BASOPHILS NFR BLD: 0 % (ref 0–1)
BILIRUB SERPL-MCNC: 0.3 MG/DL (ref 0.2–1)
BILIRUB UR QL: NEGATIVE
BUN SERPL-MCNC: 44 MG/DL (ref 6–20)
BUN/CREAT SERPL: 9 (ref 12–20)
CALCIUM SERPL-MCNC: 9.8 MG/DL (ref 8.5–10.1)
CALCULATED P AXIS, ECG09: 56 DEGREES
CALCULATED R AXIS, ECG10: -3 DEGREES
CALCULATED T AXIS, ECG11: 76 DEGREES
CHLORIDE SERPL-SCNC: 109 MMOL/L (ref 97–108)
CO2 SERPL-SCNC: 24 MMOL/L (ref 21–32)
COLOR UR: ABNORMAL
COMMENT, HOLDF: NORMAL
CREAT SERPL-MCNC: 4.67 MG/DL (ref 0.55–1.02)
DIAGNOSIS, 93000: NORMAL
DIFFERENTIAL METHOD BLD: ABNORMAL
EOSINOPHIL # BLD: 0.2 K/UL (ref 0–0.4)
EOSINOPHIL NFR BLD: 2 % (ref 0–7)
EPITH CASTS URNS QL MICRO: ABNORMAL /LPF
ERYTHROCYTE [DISTWIDTH] IN BLOOD BY AUTOMATED COUNT: 14.5 % (ref 11.5–14.5)
GLOBULIN SER CALC-MCNC: 4.5 G/DL (ref 2–4)
GLUCOSE SERPL-MCNC: 92 MG/DL (ref 65–100)
GLUCOSE UR STRIP.AUTO-MCNC: NEGATIVE MG/DL
HCT VFR BLD AUTO: 32.4 % (ref 35–47)
HGB BLD-MCNC: 9.4 G/DL (ref 11.5–16)
HGB UR QL STRIP: NEGATIVE
IMM GRANULOCYTES # BLD AUTO: 0 K/UL (ref 0–0.04)
IMM GRANULOCYTES NFR BLD AUTO: 1 % (ref 0–0.5)
KETONES UR QL STRIP.AUTO: NEGATIVE MG/DL
LEUKOCYTE ESTERASE UR QL STRIP.AUTO: NEGATIVE
LYMPHOCYTES # BLD: 1.5 K/UL (ref 0.8–3.5)
LYMPHOCYTES NFR BLD: 18 % (ref 12–49)
MCH RBC QN AUTO: 27.3 PG (ref 26–34)
MCHC RBC AUTO-ENTMCNC: 29 G/DL (ref 30–36.5)
MCV RBC AUTO: 94.2 FL (ref 80–99)
MONOCYTES # BLD: 0.5 K/UL (ref 0–1)
MONOCYTES NFR BLD: 6 % (ref 5–13)
NEUTS SEG # BLD: 5.8 K/UL (ref 1.8–8)
NEUTS SEG NFR BLD: 73 % (ref 32–75)
NITRITE UR QL STRIP.AUTO: NEGATIVE
NRBC # BLD: 0 K/UL (ref 0–0.01)
NRBC BLD-RTO: 0 PER 100 WBC
P-R INTERVAL, ECG05: 154 MS
PH UR STRIP: 5.5 [PH] (ref 5–8)
PLATELET # BLD AUTO: 351 K/UL (ref 150–400)
PMV BLD AUTO: 10 FL (ref 8.9–12.9)
POTASSIUM SERPL-SCNC: 3.6 MMOL/L (ref 3.5–5.1)
PROT SERPL-MCNC: 7.8 G/DL (ref 6.4–8.2)
PROT UR STRIP-MCNC: 300 MG/DL
Q-T INTERVAL, ECG07: 354 MS
QRS DURATION, ECG06: 72 MS
QTC CALCULATION (BEZET), ECG08: 425 MS
RBC # BLD AUTO: 3.44 M/UL (ref 3.8–5.2)
RBC #/AREA URNS HPF: ABNORMAL /HPF (ref 0–5)
SAMPLES BEING HELD,HOLD: NORMAL
SODIUM SERPL-SCNC: 140 MMOL/L (ref 136–145)
SP GR UR REFRACTOMETRY: 1.01 (ref 1–1.03)
TROPONIN-HIGH SENSITIVITY: 27 NG/L (ref 0–51)
UROBILINOGEN UR QL STRIP.AUTO: 0.2 EU/DL (ref 0.2–1)
VENTRICULAR RATE, ECG03: 87 BPM
WBC # BLD AUTO: 7.9 K/UL (ref 3.6–11)
WBC URNS QL MICRO: ABNORMAL /HPF (ref 0–4)

## 2022-04-19 PROCEDURE — 74011250637 HC RX REV CODE- 250/637: Performed by: EMERGENCY MEDICINE

## 2022-04-19 PROCEDURE — 93005 ELECTROCARDIOGRAM TRACING: CPT

## 2022-04-19 PROCEDURE — 99284 EMERGENCY DEPT VISIT MOD MDM: CPT

## 2022-04-19 PROCEDURE — 84484 ASSAY OF TROPONIN QUANT: CPT

## 2022-04-19 PROCEDURE — 74011250636 HC RX REV CODE- 250/636: Performed by: EMERGENCY MEDICINE

## 2022-04-19 PROCEDURE — 36415 COLL VENOUS BLD VENIPUNCTURE: CPT

## 2022-04-19 PROCEDURE — 80053 COMPREHEN METABOLIC PANEL: CPT

## 2022-04-19 PROCEDURE — 85025 COMPLETE CBC W/AUTO DIFF WBC: CPT

## 2022-04-19 PROCEDURE — 70450 CT HEAD/BRAIN W/O DYE: CPT

## 2022-04-19 PROCEDURE — 81001 URINALYSIS AUTO W/SCOPE: CPT

## 2022-04-19 RX ORDER — MECLIZINE HYDROCHLORIDE 25 MG/1
TABLET ORAL
Qty: 60 TABLET | Refills: 0 | Status: SHIPPED | OUTPATIENT
Start: 2022-04-19 | End: 2022-07-19

## 2022-04-19 RX ORDER — MECLIZINE HCL 25MG 25 MG/1
50 TABLET, CHEWABLE ORAL
Status: COMPLETED | OUTPATIENT
Start: 2022-04-19 | End: 2022-04-19

## 2022-04-19 RX ADMIN — SODIUM CHLORIDE 500 ML: 9 INJECTION, SOLUTION INTRAVENOUS at 13:27

## 2022-04-19 RX ADMIN — MECLIZINE HCL 50 MG: 25 TABLET, CHEWABLE ORAL at 11:43

## 2022-04-19 NOTE — ED PROVIDER NOTES
Date of Service:  2022    Patient:  Fabio Chairez    Chief Complaint:  Dizziness       HPI:  Fabio Chairez is a 57 y.o.  female who presents for evaluation of dizziness. Patient describes a feeling of the room spinning the last 3 days. Worse with specific movements of the head. No chest pain no nausea vomiting diarrhea headaches fevers chills. She states that when she starts feeling very dizzy her vision does feel somewhat blurry. She denies to me any balance issues. No dysuria or change in urinary frequency. No symptoms of this in the past.  Otherwise denying any other acute complaints or modifying factors. She also denies any type of recent illness. Past Medical History:   Diagnosis Date    Arthritis     L knee    Chronic kidney disease     secondary to hypertensive nephrosclerosis    Gastritis     HISTORY OF 10/2020     Gout     History of claustrophobia     Hyper[facility]roidism ([facility] [address]     Hypertension        Past Surgical History:   Procedure Laterality Date    HX  SECTION      HX COLONOSCOPY      HX GYN      ECTOPIC PREGNANCY SURGERY     HX LAP CHOLECYSTECTOMY      HX LUMBAR LAMINECTOMY  2020    L3L4L5         Family History:   Problem Relation Age of Onset    Hypertension Mother     Liver Disease Father     Diabetes Sister     Breast Cancer Paternal Grandmother     Anesth Problems Neg Hx        Social History     Socioeconomic History    Marital status:      Spouse name: Not on file    Number of children: 2    Years of education: 16+    Highest education level: Master's degree (e.g., MA, MS, Schuyler, MEd, MSW, RENATA)   Occupational History    Occupation: teacher   Tobacco Use    Smoking status: Former Smoker     Packs/day: 0.25     Years: 25.00     Pack years: 6.25     Quit date: 2000     Years since quittin.7    Smokeless tobacco: Never Used   Vaping Use    Vaping Use: Never used   Substance and Sexual Activity    Alcohol use:  No  Drug use: No    Sexual activity: Not Currently   Other Topics Concern    Not on file   Social History Narrative    Not on file     Social Determinants of Health     Financial Resource Strain:     Difficulty of Paying Living Expenses: Not on file   Food Insecurity:     Worried About Running Out of Food in the Last Year: Not on file    Sonia of Food in the Last Year: Not on file   Transportation Needs:     Lack of Transportation (Medical): Not on file    Lack of Transportation (Non-Medical): Not on file   Physical Activity:     Days of Exercise per Week: Not on file    Minutes of Exercise per Session: Not on file   Stress:     Feeling of Stress : Not on file   Social Connections:     Frequency of Communication with Friends and Family: Not on file    Frequency of Social Gatherings with Friends and Family: Not on file    Attends Yazdanism Services: Not on file    Active Member of [address] [place] [address] [employer] or Organizations: Not on file    Attends Club or Organization Meetings: Not on file    Marital Status: Not on file   Intimate Partner Violence:     Fear of Current or Ex-Partner: Not on file    Emotionally Abused: Not on file    Physically Abused: Not on file    Sexually Abused: Not on file   Housing Stability:     Unable to Pay for Housing in the Last Year: Not on file    Number of [place] in the Last Year: Not on file    Unstable Housing in the Last Year: Not on file         ALLERGIES: Morphine and [address]    Review of Systems   Eyes: Positive for visual disturbance. Neurological: Positive for dizziness. All other systems reviewed and are negative. Vitals:    04/19/22 1044 04/19/22 1131 04/19/22 1406 04/19/22 1426   BP: (!) 167/103  (!) 161/98 (!) 152/93   Pulse: 89  91    Resp: 16  16    Temp: 97.1 °F (36.2 °C)      SpO2: 97%  97%    Weight:  102.1 kg (225 lb)     Height:  5' 6\" (1.676 m)              Physical Exam  Vitals and nursing note reviewed.    Constitutional:       General: She is not in acute distress. Appearance: Normal appearance. She is not ill-appearing or toxic-appearing. HENT:      Head: Normocephalic and atraumatic. Nose: Nose normal.      Mouth/Throat:      Mouth: Mucous membranes are moist.   Eyes:      General: No scleral icterus. Right eye: No discharge. Left eye: No discharge. Extraocular Movements: Extraocular movements intact. Conjunctiva/sclera: Conjunctivae normal.      Pupils: Pupils are equal, round, and reactive to light. Cardiovascular:      Rate and Rhythm: Normal rate and regular rhythm. Heart sounds: Normal heart sounds. Pulmonary:      Effort: Pulmonary effort is normal. No respiratory distress. Breath sounds: Normal breath sounds. No wheezing. Abdominal:      General: Abdomen is flat. Tenderness: There is no abdominal tenderness. Musculoskeletal:         General: No deformity. Skin:     General: Skin is warm. Capillary Refill: Capillary refill takes less than 2 seconds. Neurological:      Mental Status: She is alert and oriented to person, place, and time. Cranial Nerves: No cranial nerve deficit. Sensory: No sensory deficit. Motor: No weakness. Coordination: Coordination normal.   Psychiatric:         Mood and Affect: Mood normal.          [facility]  ED Course as of 04/19/22 1458   e Apr 19, 2022   1200 EKG 1048  Normal sinus rhythm at 87 bpm with a normal axis and normal intervals.   No STEMI [GG]      ED Course User Index  [GG] Aishwarya Jay DO     VITAL SIGNS:  Patient Vitals for the past 4 hrs:   Pulse Resp BP SpO2   04/19/22 1426   (!) 152/93    04/19/22 1406 91 16 (!) 161/98 97 %         LABS:  Recent Results (from the past 6 hour(s))   EKG, 12 LEAD, INITIAL    Collection Time: 04/19/22 10:48 AM   Result Value Ref Range    Ventricular Rate 87 BPM    Atrial Rate 87 BPM    P-R Interval 154 ms    QRS Duration 72 ms    Q-T Interval 354 ms    QTC Calculation (Bezet) 425 ms Calculated P Axis 56 degrees    Calculated R Axis -3 degrees    Calculated T Axis 76 degrees    Diagnosis       Normal sinus rhythm  Minimal voltage criteria for LVH, may be normal variant ( R in aVL )  Nonspecific T wave abnormality  Abnormal ECG  When compared with ECG of 09-AUG-2021 13:16,  No significant change was found     CBC WITH AUTOMATED DIFF    Collection Time: 04/19/22 11:38 AM   Result Value Ref Range    WBC 7.9 3.6 - 11.0 K/uL    RBC 3.44 (L) 3.80 - 5.20 M/uL    HGB 9.4 (L) 11.5 - 16.0 g/dL    HCT 32.4 (L) 35.0 - 47.0 %    MCV 94.2 80.0 - 99.0 FL    MCH 27.3 26.0 - 34.0 PG    MCHC 29.0 (L) 30.0 - 36.5 g/dL    RDW 14.5 11.5 - 14.5 %    PLATELET 514 734 - 165 K/uL    MPV 10.0 8.9 - 12.9 FL    NRBC 0.0 0  WBC    ABSOLUTE NRBC 0.00 0.00 - 0.01 K/uL    NEUTROPHILS 73 32 - 75 %    LYMPHOCYTES 18 12 - 49 %    MONOCYTES 6 5 - 13 %    EOSINOPHILS 2 0 - 7 %    BASOPHILS 0 0 - 1 %    IMMATURE GRANULOCYTES 1 (H) 0.0 - 0.5 %    ABS. NEUTROPHILS 5.8 1.8 - 8.0 K/UL    ABS. LYMPHOCYTES 1.5 0.8 - 3.5 K/UL    ABS. MONOCYTES 0.5 0.0 - 1.0 K/UL    ABS. EOSINOPHILS 0.2 0.0 - 0.4 K/UL    ABS. BASOPHILS 0.0 0.0 - 0.1 K/UL    ABS. IMM. GRANS. 0.0 0.00 - 0.04 K/UL    DF AUTOMATED     METABOLIC PANEL, COMPREHENSIVE    Collection Time: 04/19/22 11:38 AM   Result Value Ref Range    Sodium 140 136 - 145 mmol/L    Potassium 3.6 3.5 - 5.1 mmol/L    Chloride 109 (H) 97 - 108 mmol/L    CO2 24 21 - 32 mmol/L    Anion gap 7 5 - 15 mmol/L    Glucose 92 65 - 100 mg/dL    BUN 44 (H) 6 - 20 MG/DL    Creatinine 4.67 (H) 0.55 - 1.02 MG/DL    BUN/Creatinine ratio 9 (L) 12 - 20      GFR est AA 11 (L) >60 ml/min/1.73m2    GFR est non-AA 9 (L) >60 ml/min/1.73m2    Calcium 9.8 8.5 - 10.1 MG/DL    Bilirubin, total 0.3 0.2 - 1.0 MG/DL    ALT (SGPT) 14 12 - 78 U/L    AST (SGOT) 7 (L) 15 - 37 U/L    Alk.  phosphatase 118 (H) 45 - 117 U/L    Protein, total 7.8 6.4 - 8.2 g/dL    Albumin 3.3 (L) 3.5 - 5.0 g/dL    Globulin 4.5 (H) 2.0 - 4.0 g/dL A-G Ratio 0.7 (L) 1.1 - 2.2     TROPONIN-HIGH SENSITIVITY    Collection Time: 04/19/22 11:38 AM   Result Value Ref Range    Troponin-High Sensitivity 27 0 - 51 ng/L   SAMPLES BEING HELD    Collection Time: 04/19/22 11:38 AM   Result Value Ref Range    SAMPLES BEING HELD 1RED 1BLU 1UC     COMMENT        Add-on orders for these samples will be processed based on acceptable specimen integrity and analyte stability, which may vary by analyte. URINALYSIS W/ RFLX MICROSCOPIC    Collection Time: 04/19/22 11:38 AM   Result Value Ref Range    Color YELLOW/STRAW      Appearance CLEAR CLEAR      Specific gravity 1.010 1.003 - 1.030      pH (UA) 5.5 5.0 - 8.0      Protein 300 (A) NEG mg/dL    Glucose Negative NEG mg/dL    Ketone Negative NEG mg/dL    Bilirubin Negative NEG      Blood Negative NEG      Urobilinogen 0.2 0.2 - 1.0 EU/dL    Nitrites Negative NEG      Leukocyte Esterase Negative NEG      WBC 10-20 0 - 4 /hpf    RBC 0-5 0 - 5 /hpf    Epithelial cells FEW FEW /lpf    Bacteria Negative NEG /hpf        IMAGING:  CT HEAD WO CONT   Final Result   No acute abnormality. Medications During Visit:  Medications   meclizine (ANTIVERT) chewable tablet 50 mg (50 mg Oral Given 4/19/22 1143)   sodium chloride 0.9 % bolus infusion 500 mL (0 mL IntraVENous IV Completed 4/19/22 1449)         DECISION MAKING:  Dilip Beck is a 57 y.o. female who comes in as above. Here, patient is feeling somewhat better with the meclizine. She is able to ambulate without neurological dysfunction. She is limited based on her right hip which has some chronic issues. At this time as patient is appearing better, blood work reviewed, kidney function slightly worse than usual but she is following with [facility]. Patient will be discharged home, she is asked to follow-up with her PCP and specialist.  She agrees to this plan. She ambulates well at disposition without signs of distress      IMPRESSION:  1. Dizzy spells    2.  Chronic renal impairment, unspecified CKD stage        DISPOSITION:  Discharged      Discharge Medication List as of 4/19/2022  2:22 PM      START taking these medications    Details   meclizine (ANTIVERT) 25 mg tablet 25-50mg three times daily, not to exceed 125mg a day., Normal, Disp-60 Tablet, R-0         CONTINUE these medications which have NOT CHANGED    Details   carvediloL (COREG) 6.25 mg tablet Take 2 Tablets by mouth two (2) times daily (with meals). , Normal, Disp-120 Tablet, R-11      cinacalcet 60 mg tab Take 60 mg by mouth every evening., Historical Med      pravastatin (PRAVACHOL) 40 mg tablet TAKE 1 TABLET BY MOUTH EVERY DAY AT BEDTIME, Normal, Disp-90 Tablet, R-3      amLODIPine (NORVASC) 10 mg tablet TAKE 1 TABLET BY MOUTH DAILY, Normal, Disp-90 Tablet, R-3      potassium chloride (K-DUR, KLOR-CON) 20 mEq tablet TAKE 1 TABLET BY MOUTH DAILY, Normal, Disp-90 Tablet, R-3      tolterodine ER (DETROL LA) 4 mg ER capsule Take 1 Capsule by mouth daily. , Normal, Disp-30 Capsule, R-11      bumetanide (BUMEX) 1 mg tablet Take 1 mg by mouth daily. , Historical Med      hydrALAZINE (APRESOLINE) 10 mg tablet Take 10 mg by mouth three (3) times daily. , Historical Med      calcitRIOL (ROCALTROL) 0.25 mcg capsule Take 1 Capsule by mouth daily. , Historical Med      allopurinoL (ZYLOPRIM) 100 mg tablet 2 tablets QD, Normal, Disp-60 Tablet, R-11      senna-docusate (PERICOLACE) 8.6-50 mg per tablet Take 1 Tab by mouth two (2) times a day.  Indications: constipation, Normal, Disp-30 Tab,R-0      metOLazone (ZAROXOLYN) 2.5 mg tablet TAKE 1 TABLET BY MOUTH DAILY, Normal, Disp-90 Tab,R-3      acetaminophen (Tylenol Extra Strength) 500 mg tablet Take 500 mg by mouth every six (6) hours as needed for Pain., Historical Med              Follow-up Information     Follow up With Specialties Details Why Contact Info    Kenya Real MD Internal Medicine Schedule an appointment as soon as possible for a visit   [address]  [address] [facility]  Schedule an appointment as soon as possible for a visit   [address]            The patient is asked to follow-up with their primary care provider in the next several days. They are to call tomorrow for an appointment. The patient is asked to return promptly for any increased concerns or worsening of symptoms. They can return to this emergency department or any other emergency department.       Procedures

## 2022-04-19 NOTE — ED TRIAGE NOTES
Pt c/o room spinning dizziness x 3 days, leaning to the left, +sob, denies fever, +headache, denies numbness/tingling to face, arms or legs, +blurred vision, some nausea, denies cp, denies speech difficulty, denies head trauma, no blood thinners

## 2022-05-09 ENCOUNTER — OFFICE VISIT (OUTPATIENT)
Dept: INTERNAL MEDICINE CLINIC | Age: 62
End: 2022-05-09
Payer: COMMERCIAL

## 2022-05-09 VITALS
BODY MASS INDEX: 36 KG/M2 | TEMPERATURE: 98.3 F | HEIGHT: 66 IN | DIASTOLIC BLOOD PRESSURE: 88 MMHG | WEIGHT: 224 LBS | OXYGEN SATURATION: 95 % | RESPIRATION RATE: 16 BRPM | SYSTOLIC BLOOD PRESSURE: 153 MMHG | HEART RATE: 103 BPM

## 2022-05-09 DIAGNOSIS — I10 PRIMARY HYPERTENSION: Primary | ICD-10-CM

## 2022-05-09 DIAGNOSIS — N25.81 SECONDARY HYPERPARATHYROIDISM (HCC): ICD-10-CM

## 2022-05-09 DIAGNOSIS — E66.01 SEVERE OBESITY (BMI 35.0-39.9) WITH COMORBIDITY (HCC): ICD-10-CM

## 2022-05-09 DIAGNOSIS — E78.5 DYSLIPIDEMIA: ICD-10-CM

## 2022-05-09 DIAGNOSIS — N18.4 CHRONIC RENAL FAILURE SYNDROME, STAGE 4 (SEVERE) (HCC): ICD-10-CM

## 2022-05-09 DIAGNOSIS — M10.9 GOUT, UNSPECIFIED CAUSE, UNSPECIFIED CHRONICITY, UNSPECIFIED SITE: ICD-10-CM

## 2022-05-09 DIAGNOSIS — M16.11 PRIMARY OSTEOARTHRITIS OF RIGHT HIP: ICD-10-CM

## 2022-05-09 PROCEDURE — 99214 OFFICE O/P EST MOD 30 MIN: CPT | Performed by: INTERNAL MEDICINE

## 2022-05-09 RX ORDER — SODIUM BICARBONATE 325 MG/1
325 TABLET ORAL 2 TIMES DAILY
COMMUNITY
Start: 2022-05-06 | End: 2022-08-22

## 2022-05-09 NOTE — PROGRESS NOTES
Chief Complaint   Patient presents with    Hypertension     1 month follow up          1. Have you been to the ER, urgent care clinic since your last visit? Hospitalized since your last visit? Yes When: 2 to 3 weeks ago  Where: [facility] ED  Reason for visit: dizziness    2. Have you seen or consulted any other health care providers outside of the [address] [place] [address] since your last visit? Include any pap smears or colon screening.  No

## 2022-05-10 NOTE — PROGRESS NOTES
[facility]  [place] [address]  [address]  [address] [address]  Phone:  [phone]  Fax: [phone]       Chief Complaint   Patient presents with    Hypertension     1 month follow up    . SUBJECTIVE:    Adis Springer is a 57 y.o. female comes in for return visit stating that she has done reasonably well. She follows up with Dr. Celeste Blanchard on a regular basis. Most recently Sensipar was increased to 90 mg daily and sodium bicarbonate was added at one tablet b.i.d. At her last visit I gave her carvedilol, but she got mixed up on the dosing directions. She is in a preemptive transplant program currently. The patient's hyperparathyroidism remains somewhat elusive as far as a formal diagnosis is concerned. She has a past history of primary hypertension, dyslipidemia, obesity, as well as osteoarthritis with predominant involvement of her back. Current Outpatient Medications   Medication Sig Dispense Refill    sodium bicarbonate 325 mg tablet Take 325 mg by mouth two (2) times a day.  cinacalcet 60 mg tab Take 90 mg by mouth every evening.  pravastatin (PRAVACHOL) 40 mg tablet TAKE 1 TABLET BY MOUTH EVERY DAY AT BEDTIME 90 Tablet 3    amLODIPine (NORVASC) 10 mg tablet TAKE 1 TABLET BY MOUTH DAILY 90 Tablet 3    potassium chloride (K-DUR, KLOR-CON) 20 mEq tablet TAKE 1 TABLET BY MOUTH DAILY 90 Tablet 3    tolterodine ER (DETROL LA) 4 mg ER capsule Take 1 Capsule by mouth daily. 30 Capsule 11    bumetanide (BUMEX) 1 mg tablet Take 1 mg by mouth daily.  hydrALAZINE (APRESOLINE) 10 mg tablet Take 10 mg by mouth three (3) times daily.  calcitRIOL (ROCALTROL) 0.25 mcg capsule Take 1 Capsule by mouth daily.  allopurinoL (ZYLOPRIM) 100 mg tablet 2 tablets QD 60 Tablet 11    senna-docusate (PERICOLACE) 8.6-50 mg per tablet Take 1 Tab by mouth two (2) times a day.  Indications: constipation 30 Tab 0    metOLazone (ZAROXOLYN) 2.5 mg tablet TAKE 1 TABLET BY MOUTH DAILY 90 Tab 3    acetaminophen (Tylenol Extra Strength) 500 mg tablet Take 500 mg by mouth every six (6) hours as needed for Pain.  meclizine (ANTIVERT) 25 mg tablet 25-50mg three times daily, not to exceed 125mg a day. (Patient not taking: Reported on 2022) 60 Tablet 0    carvediloL (COREG) 6.25 mg tablet Take 2 Tablets by mouth two (2) times daily (with meals). (Patient not taking: Reported on 2022) 120 Tablet 11     Facility-Administered Medications Ordered in Other Visits   Medication Dose Route Frequency Provider Last Rate Last Admin    [START ON 2022] epoetin sara-epbx (RETACRIT) injection 40,000 Units  40,000 Units SubCUTAneous ONCE Liz Valdez MD         Past Medical History:   Diagnosis Date    Arthritis     L knee    Chronic kidney disease     secondary to hypertensive nephrosclerosis    Gastritis     HISTORY OF 10/2020     Gout     History of claustrophobia     [facility]par[facility] ([facility]     Hypertension      Past Surgical History:   Procedure Laterality Date    HX  SECTION      HX COLONOSCOPY      HX GYN      ECTOPIC PREGNANCY SURGERY     HX LAP CHOLECYSTECTOMY      HX LUMBAR LAMINECTOMY  2020    [id number]     Allergies   Allergen Reactions    Morphine Nausea and Vomiting and Cough     Blood pressure and 158-96 heart rate up to 156.     [place] Swelling         REVIEW OF SYSTEMS:  General: negative for - chills or fever  ENT: negative for - headaches, nasal congestion or tinnitus  Respiratory: negative for - cough, hemoptysis, shortness of breath or wheezing  Cardiovascular : negative for - chest pain, edema, palpitations or shortness of breath  Gastrointestinal: negative for - abdominal pain, blood in stools, heartburn or nausea/vomiting  Genito-Urinary: no dysuria, trouble voiding, or hematuria  Musculoskeletal: negative for - gait disturbance, joint pain, joint stiffness or joint swelling  Neurological: no TIA or stroke symptoms  Hematologic: no bruises, no bleeding, no swollen glands  Integument: no lumps, mole changes, nail changes or rash  Endocrine: no malaise/lethargy or unexpected weight changes      Social History     Socioeconomic History    Marital status:     Number of children: 2    Years of education: 16+    Highest education level: Master's degree (e.g., MA, MS, [place]uyler, MEd, MSW, RENATA)   Occupational History    Occupation: teacher   Tobacco Use    Smoking status: Former Smoker     Packs/day: 0.25     Years: 25.00     Pack years: 6.25     Quit date: 2000     Years since quittin.8    Smokeless tobacco: Never Used   Vaping Use    Vaping Use: Never used   Substance and Sexual Activity    Alcohol use: No    Drug use: No    Sexual activity: Not Currently     Family History   Problem Relation Age of Onset    Hypertension Mother     Liver Disease Father     Diabetes Sister     Breast Cancer Paternal Grandmother     Anesth Problems Neg Hx        OBJECTIVE:    Visit Vitals  BP (!) 153/88   Pulse (!) 103   Temp 98.3 °F (36.8 °C) (Oral)   Resp 16   Ht 5' 6\" (1.676 m)   Wt 224 lb (101.6 kg)   SpO2 95%   BMI 36.15 kg/m²     CONSTITUTIONAL: well , well nourished, appears age appropriate  EYES: perrla, eom intact  ENMT:moist mucous membranes, pharynx clear  NECK: supple. Thyroid normal  RESPIRATORY: Chest: clear to ascultation and percussion   CARDIOVASCULAR: Heart: regular rate and rhythm  GASTROINTESTINAL: Abdomen: soft, bowel sounds active  HEMATOLOGIC: no pathological lymph nodes palpated  MUSCULOSKELETAL: Extremities: no edema, pulse 1+   INTEGUMENT: No unusual rashes or suspicious skin lesions noted. Nails appear normal.  NEUROLOGIC: non-focal exam   MENTAL STATUS: alert and oriented, appropriate affect      ASSESSMENT:  1. Primary hypertension    2. Secondary hyperparathyroidism ([address]    3. Primary osteoarthritis of right hip    4. Chronic renal failure syndrome, stage 4 (severe) (HCC)    5. Dyslipidemia    6. Gout, unspecified cause, unspecified chronicity, unspecified site    7. Severe obesity (BMI 35.0-39. 9) with comorbidity ([address]        PLAN:  1. The patient's blood pressure is slightly elevated. I will resume the carvedilol which she will take 6.25 mg daily for one week and then 6.25 mg b.i.d. thereafter. I will increase her medication after she gradually transitions to the higher dose. This should help with the compensatory tachycardia created by the vasodilator. 2. She is in a secondary parathyroid situation. 3. Her osteoarthritic right hip is doing reasonably well. 4. She has chronic renal failure, stage IV and is currently in a preemptive transplant program at [facility]. 5. She will continue her statin as prescribed. 6. Her gout is quite stable with allopurinol at 150 mg daily. 7. She needs to lose weight as we have discussed repetitively. This can be accomplished by eating meals, eliminating snacks, and avoiding the consumption of processed carbohydrates. .  Orders Placed This Encounter    sodium bicarbonate 325 mg tablet         Follow-up and Dispositions    · Return in about 3 months (around 8/9/2022).            Laura Lara MD

## 2022-05-13 ENCOUNTER — HOSPITAL ENCOUNTER (OUTPATIENT)
Dept: INFUSION THERAPY | Age: 62
Discharge: HOME | End: 2022-05-13
Payer: COMMERCIAL

## 2022-05-13 LAB
ALBUMIN SERPL-MCNC: 3 G/DL (ref 3.5–5)
ANION GAP SERPL CALC-SCNC: 6 MMOL/L (ref 5–15)
BASOPHILS # BLD: 0 K/UL (ref 0–0.1)
BASOPHILS NFR BLD: 0 % (ref 0–1)
BUN SERPL-MCNC: 60 MG/DL (ref 6–20)
BUN/CREAT SERPL: 12 (ref 12–20)
CALCIUM SERPL-MCNC: 8.4 MG/DL (ref 8.5–10.1)
CHLORIDE SERPL-SCNC: 112 MMOL/L (ref 97–108)
CO2 SERPL-SCNC: 26 MMOL/L (ref 21–32)
CREAT SERPL-MCNC: 5.16 MG/DL (ref 0.55–1.02)
DIFFERENTIAL METHOD BLD: ABNORMAL
EOSINOPHIL # BLD: 0.3 K/UL (ref 0–0.4)
EOSINOPHIL NFR BLD: 3 % (ref 0–7)
ERYTHROCYTE [DISTWIDTH] IN BLOOD BY AUTOMATED COUNT: 14.6 % (ref 11.5–14.5)
GLUCOSE SERPL-MCNC: 83 MG/DL (ref 65–100)
HCT VFR BLD AUTO: 29.7 % (ref 35–47)
HGB BLD-MCNC: 8.7 G/DL (ref 11.5–16)
IMM GRANULOCYTES # BLD AUTO: 0 K/UL (ref 0–0.04)
IMM GRANULOCYTES NFR BLD AUTO: 0 % (ref 0–0.5)
IRON SATN MFR SERPL: 20 % (ref 20–50)
IRON SERPL-MCNC: 45 UG/DL (ref 35–150)
LYMPHOCYTES # BLD: 1.6 K/UL (ref 0.8–3.5)
LYMPHOCYTES NFR BLD: 18 % (ref 12–49)
MCH RBC QN AUTO: 28.2 PG (ref 26–34)
MCHC RBC AUTO-ENTMCNC: 29.3 G/DL (ref 30–36.5)
MCV RBC AUTO: 96.1 FL (ref 80–99)
MONOCYTES # BLD: 0.6 K/UL (ref 0–1)
MONOCYTES NFR BLD: 6 % (ref 5–13)
NEUTS SEG # BLD: 6.6 K/UL (ref 1.8–8)
NEUTS SEG NFR BLD: 73 % (ref 32–75)
NRBC # BLD: 0 K/UL (ref 0–0.01)
NRBC BLD-RTO: 0 PER 100 WBC
PHOSPHATE SERPL-MCNC: 3.6 MG/DL (ref 2.6–4.7)
PLATELET # BLD AUTO: 275 K/UL (ref 150–400)
PMV BLD AUTO: 10.5 FL (ref 8.9–12.9)
POTASSIUM SERPL-SCNC: 3.5 MMOL/L (ref 3.5–5.1)
RBC # BLD AUTO: 3.09 M/UL (ref 3.8–5.2)
SODIUM SERPL-SCNC: 144 MMOL/L (ref 136–145)
TIBC SERPL-MCNC: 228 UG/DL (ref 250–450)
WBC # BLD AUTO: 9.2 K/UL (ref 3.6–11)

## 2022-05-13 PROCEDURE — 36415 COLL VENOUS BLD VENIPUNCTURE: CPT

## 2022-05-13 PROCEDURE — 83540 ASSAY OF IRON: CPT

## 2022-05-13 PROCEDURE — 85025 COMPLETE CBC W/AUTO DIFF WBC: CPT

## 2022-05-13 PROCEDURE — 74011250636 HC RX REV CODE- 250/636: Performed by: INTERNAL MEDICINE

## 2022-05-13 PROCEDURE — 96372 THER/PROPH/DIAG INJ SC/IM: CPT

## 2022-05-13 PROCEDURE — 80069 RENAL FUNCTION PANEL: CPT

## 2022-05-13 RX ADMIN — EPOETIN ALFA-EPBX 40000 UNITS: 40000 INJECTION, SOLUTION INTRAVENOUS; SUBCUTANEOUS at 17:27

## 2022-05-13 NOTE — PROGRESS NOTES
Outpatient Infusion Center Progress Note    0095 Pt admit to [address] [place] [address] for Retacrit injection and lab draw ambulatory in stable condition. Assessment completed. No new concerns voiced. Labs drawn from right ac and sent for processing. Patient denies Covid contact    Visit Vitals  BP (!) (P) 153/90 (BP 1 Location: Right arm)   Pulse (!) (P) 102   Temp (P) 97 °F (36.1 °C)   Resp (P) 18   Breastfeeding No       Medications:  Medications Administered     epoetin sara-epbx (RETACRIT) injection 40,000 Units     Admin Date  05/13/2022 Action  Given Dose  40,000 Units Route  SubCUTAneous Administered By  Denis Perez RN            Injection given sc in left arm    2140 Pt tolerated treatment well. D/c home ambulatory in no distress. Pt aware of next appointment scheduled for 6/10/22.     Recent Results (from the past 12 hour(s))   RENAL FUNCTION PANEL    Collection Time: 05/13/22  4:24 PM   Result Value Ref Range    Sodium 144 136 - 145 mmol/L    Potassium 3.5 3.5 - 5.1 mmol/L    Chloride 112 (H) 97 - 108 mmol/L    CO2 26 21 - 32 mmol/L    Anion gap 6 5 - 15 mmol/L    Glucose 83 65 - 100 mg/dL    BUN 60 (H) 6 - 20 MG/DL    Creatinine 5.16 (H) 0.55 - 1.02 MG/DL    BUN/Creatinine ratio 12 12 - 20      GFR est AA 10 (L) >60 ml/min/1.73m2    GFR est non-AA 8 (L) >60 ml/min/1.73m2    Calcium 8.4 (L) 8.5 - 10.1 MG/DL    Phosphorus 3.6 2.6 - 4.7 MG/DL    Albumin 3.0 (L) 3.5 - 5.0 g/dL   CBC WITH AUTOMATED DIFF    Collection Time: 05/13/22  4:24 PM   Result Value Ref Range    WBC 9.2 3.6 - 11.0 K/uL    RBC 3.09 (L) 3.80 - 5.20 M/uL    HGB 8.7 (L) 11.5 - 16.0 g/dL    HCT 29.7 (L) 35.0 - 47.0 %    MCV 96.1 80.0 - 99.0 FL    MCH 28.2 26.0 - 34.0 PG    MCHC 29.3 (L) 30.0 - 36.5 g/dL    RDW 14.6 (H) 11.5 - 14.5 %    PLATELET 843 170 - 207 K/uL    MPV 10.5 8.9 - 12.9 FL    NRBC 0.0 0  WBC    ABSOLUTE NRBC 0.00 0.00 - 0.01 K/uL    NEUTROPHILS 73 32 - 75 %    LYMPHOCYTES 18 12 - 49 %    MONOCYTES 6 5 - 13 %    EOSINOPHILS 3 0 - 7 % BASOPHILS 0 0 - 1 %    IMMATURE GRANULOCYTES 0 0.0 - 0.5 %    ABS. NEUTROPHILS 6.6 1.8 - 8.0 K/UL    ABS. LYMPHOCYTES 1.6 0.8 - 3.5 K/UL    ABS. MONOCYTES 0.6 0.0 - 1.0 K/UL    ABS. EOSINOPHILS 0.3 0.0 - 0.4 K/UL    ABS. BASOPHILS 0.0 0.0 - 0.1 K/UL    ABS. IMM.  GRANS. 0.0 0.00 - 0.04 K/UL    DF AUTOMATED     IRON PROFILE    Collection Time: 05/13/22  4:24 PM   Result Value Ref Range    Iron 45 35 - 150 ug/dL    TIBC 228 (L) 250 - 450 ug/dL    Iron % saturation 20 20 - 50 %

## 2022-06-10 ENCOUNTER — HOSPITAL ENCOUNTER (OUTPATIENT)
Dept: INFUSION THERAPY | Age: 62
Discharge: HOME | End: 2022-06-10
Payer: COMMERCIAL

## 2022-06-10 VITALS
DIASTOLIC BLOOD PRESSURE: 88 MMHG | SYSTOLIC BLOOD PRESSURE: 174 MMHG | RESPIRATION RATE: 18 BRPM | HEART RATE: 103 BPM | TEMPERATURE: 97.1 F

## 2022-06-10 LAB
ALBUMIN SERPL-MCNC: 3.1 G/DL (ref 3.5–5)
ANION GAP SERPL CALC-SCNC: 8 MMOL/L (ref 5–15)
BASOPHILS # BLD: 0 K/UL (ref 0–0.1)
BASOPHILS NFR BLD: 0 % (ref 0–1)
BUN SERPL-MCNC: 57 MG/DL (ref 6–20)
BUN/CREAT SERPL: 10 (ref 12–20)
CALCIUM SERPL-MCNC: 9.4 MG/DL (ref 8.5–10.1)
CHLORIDE SERPL-SCNC: 110 MMOL/L (ref 97–108)
CO2 SERPL-SCNC: 26 MMOL/L (ref 21–32)
CREAT SERPL-MCNC: 5.71 MG/DL (ref 0.55–1.02)
DIFFERENTIAL METHOD BLD: ABNORMAL
EOSINOPHIL # BLD: 0.2 K/UL (ref 0–0.4)
EOSINOPHIL NFR BLD: 2 % (ref 0–7)
ERYTHROCYTE [DISTWIDTH] IN BLOOD BY AUTOMATED COUNT: 15 % (ref 11.5–14.5)
GLUCOSE SERPL-MCNC: 88 MG/DL (ref 65–100)
HCT VFR BLD AUTO: 31.8 % (ref 35–47)
HGB BLD-MCNC: 9.4 G/DL (ref 11.5–16)
IMM GRANULOCYTES # BLD AUTO: 0 K/UL (ref 0–0.04)
IMM GRANULOCYTES NFR BLD AUTO: 0 % (ref 0–0.5)
LYMPHOCYTES # BLD: 1.4 K/UL (ref 0.8–3.5)
LYMPHOCYTES NFR BLD: 16 % (ref 12–49)
MCH RBC QN AUTO: 27.7 PG (ref 26–34)
MCHC RBC AUTO-ENTMCNC: 29.6 G/DL (ref 30–36.5)
MCV RBC AUTO: 93.8 FL (ref 80–99)
MONOCYTES # BLD: 0.5 K/UL (ref 0–1)
MONOCYTES NFR BLD: 6 % (ref 5–13)
NEUTS SEG # BLD: 6.7 K/UL (ref 1.8–8)
NEUTS SEG NFR BLD: 76 % (ref 32–75)
NRBC # BLD: 0 K/UL (ref 0–0.01)
NRBC BLD-RTO: 0 PER 100 WBC
PHOSPHATE SERPL-MCNC: 5 MG/DL (ref 2.6–4.7)
PLATELET # BLD AUTO: 259 K/UL (ref 150–400)
PMV BLD AUTO: 10.8 FL (ref 8.9–12.9)
POTASSIUM SERPL-SCNC: 3.7 MMOL/L (ref 3.5–5.1)
RBC # BLD AUTO: 3.39 M/UL (ref 3.8–5.2)
SODIUM SERPL-SCNC: 144 MMOL/L (ref 136–145)
WBC # BLD AUTO: 8.9 K/UL (ref 3.6–11)

## 2022-06-10 PROCEDURE — 36415 COLL VENOUS BLD VENIPUNCTURE: CPT

## 2022-06-10 PROCEDURE — 80069 RENAL FUNCTION PANEL: CPT

## 2022-06-10 PROCEDURE — 85025 COMPLETE CBC W/AUTO DIFF WBC: CPT

## 2022-06-10 NOTE — PROGRESS NOTES
Outpatient Infusion Center Short Visit Progress Note    8410 Pt admit to [facility] for pre- retacrit labs  ambulatory in stable condition. Assessment completed. No new concerns voiced. Labs drawn peripherally and sent  For processing    Visit Vitals  BP (!) 174/88 (BP 1 Location: Right arm, BP Patient Position: At rest)   Pulse (!) 103   Temp 97.1 °F (36.2 °C)   Resp 18     Medications:  none    1700 Pt tolerated treatment well. D/c home ambulatory in no distress.  Pt aware of next appointment scheduled for 6/14/22    Labs should be resulted in [address] [place] [address] shortly

## 2022-06-14 ENCOUNTER — HOSPITAL ENCOUNTER (OUTPATIENT)
Dept: INFUSION THERAPY | Age: 62
Discharge: HOME | End: 2022-06-14
Payer: COMMERCIAL

## 2022-06-14 VITALS
SYSTOLIC BLOOD PRESSURE: 148 MMHG | DIASTOLIC BLOOD PRESSURE: 99 MMHG | TEMPERATURE: 97.7 F | RESPIRATION RATE: 18 BRPM | HEART RATE: 103 BPM

## 2022-06-14 PROCEDURE — 96372 THER/PROPH/DIAG INJ SC/IM: CPT

## 2022-06-14 PROCEDURE — 74011250636 HC RX REV CODE- 250/636: Performed by: INTERNAL MEDICINE

## 2022-06-14 RX ADMIN — EPOETIN ALFA-EPBX 40000 UNITS: 40000 INJECTION, SOLUTION INTRAVENOUS; SUBCUTANEOUS at 09:53

## 2022-06-14 NOTE — PROGRESS NOTES
Outpatient Infusion Center Short Visit Progress Note    0945 Pt admit to [address] [place] [address] for retacrit ambulatory in stable condition. Assessment completed. No new concerns voiced. Labs drawn Friday and used for today's treatment    Visit Vitals  BP (!) 148/99 (BP 1 Location: Left upper arm, BP Patient Position: At rest)   Pulse (!) 103   Temp 97.7 °F (36.5 °C)   Resp 18   Breastfeeding No     Medications:  Medications Administered     epoetin sara-epbx (RETACRIT) injection 40,000 Units     Admin Date  06/14/2022 Action  Given Dose  40,000 Units Route  SubCUTAneous Administered By  Osie Opitz, RN                1000 Pt tolerated treatment well. D/c home ambulatory in no distress.  Pt aware of next appointment scheduled for 7/8/22

## 2022-07-01 DIAGNOSIS — M10.9 GOUT, UNSPECIFIED CAUSE, UNSPECIFIED CHRONICITY, UNSPECIFIED SITE: ICD-10-CM

## 2022-07-02 RX ORDER — ALLOPURINOL 100 MG/1
TABLET ORAL
Qty: 60 TABLET | Refills: 11 | Status: SHIPPED | OUTPATIENT
Start: 2022-07-02

## 2022-07-08 ENCOUNTER — HOSPITAL ENCOUNTER (OUTPATIENT)
Dept: INFUSION THERAPY | Age: 62
End: 2022-07-08
Payer: COMMERCIAL

## 2022-07-13 ENCOUNTER — HOSPITAL ENCOUNTER (OUTPATIENT)
Dept: INFUSION THERAPY | Age: 62
Discharge: HOME | End: 2022-07-13
Payer: COMMERCIAL

## 2022-07-13 VITALS
RESPIRATION RATE: 18 BRPM | HEART RATE: 107 BPM | DIASTOLIC BLOOD PRESSURE: 87 MMHG | SYSTOLIC BLOOD PRESSURE: 137 MMHG | TEMPERATURE: 97.3 F

## 2022-07-13 LAB
ALBUMIN SERPL-MCNC: 3 G/DL (ref 3.5–5)
ANION GAP SERPL CALC-SCNC: 8 MMOL/L (ref 5–15)
BASOPHILS # BLD: 0 K/UL (ref 0–0.1)
BASOPHILS NFR BLD: 1 % (ref 0–1)
BUN SERPL-MCNC: 62 MG/DL (ref 6–20)
BUN/CREAT SERPL: 10 (ref 12–20)
CALCIUM SERPL-MCNC: 11.6 MG/DL (ref 8.5–10.1)
CHLORIDE SERPL-SCNC: 113 MMOL/L (ref 97–108)
CO2 SERPL-SCNC: 19 MMOL/L (ref 21–32)
CREAT SERPL-MCNC: 6.22 MG/DL (ref 0.55–1.02)
DIFFERENTIAL METHOD BLD: ABNORMAL
EOSINOPHIL # BLD: 0.3 K/UL (ref 0–0.4)
EOSINOPHIL NFR BLD: 3 % (ref 0–7)
ERYTHROCYTE [DISTWIDTH] IN BLOOD BY AUTOMATED COUNT: 14.7 % (ref 11.5–14.5)
GLUCOSE SERPL-MCNC: 114 MG/DL (ref 65–100)
HCT VFR BLD AUTO: 30.4 % (ref 35–47)
HGB BLD-MCNC: 9.3 G/DL (ref 11.5–16)
IMM GRANULOCYTES # BLD AUTO: 0 K/UL (ref 0–0.04)
IMM GRANULOCYTES NFR BLD AUTO: 1 % (ref 0–0.5)
LYMPHOCYTES # BLD: 1.7 K/UL (ref 0.8–3.5)
LYMPHOCYTES NFR BLD: 20 % (ref 12–49)
MCH RBC QN AUTO: 28.2 PG (ref 26–34)
MCHC RBC AUTO-ENTMCNC: 30.6 G/DL (ref 30–36.5)
MCV RBC AUTO: 92.1 FL (ref 80–99)
MONOCYTES # BLD: 0.5 K/UL (ref 0–1)
MONOCYTES NFR BLD: 5 % (ref 5–13)
NEUTS SEG # BLD: 6.2 K/UL (ref 1.8–8)
NEUTS SEG NFR BLD: 70 % (ref 32–75)
NRBC # BLD: 0 K/UL (ref 0–0.01)
NRBC BLD-RTO: 0 PER 100 WBC
PHOSPHATE SERPL-MCNC: 4.9 MG/DL (ref 2.6–4.7)
PLATELET # BLD AUTO: 326 K/UL (ref 150–400)
PMV BLD AUTO: 10.3 FL (ref 8.9–12.9)
POTASSIUM SERPL-SCNC: 3.9 MMOL/L (ref 3.5–5.1)
RBC # BLD AUTO: 3.3 M/UL (ref 3.8–5.2)
SODIUM SERPL-SCNC: 140 MMOL/L (ref 136–145)
WBC # BLD AUTO: 8.7 K/UL (ref 3.6–11)

## 2022-07-13 PROCEDURE — 85025 COMPLETE CBC W/AUTO DIFF WBC: CPT

## 2022-07-13 PROCEDURE — 80069 RENAL FUNCTION PANEL: CPT

## 2022-07-13 PROCEDURE — 74011250636 HC RX REV CODE- 250/636: Performed by: INTERNAL MEDICINE

## 2022-07-13 PROCEDURE — 36415 COLL VENOUS BLD VENIPUNCTURE: CPT

## 2022-07-13 PROCEDURE — 96372 THER/PROPH/DIAG INJ SC/IM: CPT

## 2022-07-13 RX ADMIN — EPOETIN ALFA-EPBX 40000 UNITS: 40000 INJECTION, SOLUTION INTRAVENOUS; SUBCUTANEOUS at 15:29

## 2022-07-13 NOTE — PROGRESS NOTES
[facility] Progress Note    Date: July 13, 2022        1430: Pt arrived ambulatory to [facility] for Retacrit in stable condition. Assessment completed. Labs drawn peripherally from left [facility] and sent for processing. 1525: Labs reviewed. Criteria for treatment was met. Visit Vitals  /87   Pulse (!) 107   Temp 97.3 °F (36.3 °C)   Resp 18   Breastfeeding No        Recent Results (from the past 12 hour(s))   CBC WITH AUTOMATED DIFF    Collection Time: 07/13/22  2:41 PM   Result Value Ref Range    WBC 8.7 3.6 - 11.0 K/uL    RBC 3.30 (L) 3.80 - 5.20 M/uL    HGB 9.3 (L) 11.5 - 16.0 g/dL    HCT 30.4 (L) 35.0 - 47.0 %    MCV 92.1 80.0 - 99.0 FL    MCH 28.2 26.0 - 34.0 PG    MCHC 30.6 30.0 - 36.5 g/dL    RDW 14.7 (H) 11.5 - 14.5 %    PLATELET 140 135 - 497 K/uL    MPV 10.3 8.9 - 12.9 FL    NRBC 0.0 0  WBC    ABSOLUTE NRBC 0.00 0.00 - 0.01 K/uL    NEUTROPHILS 70 32 - 75 %    LYMPHOCYTES 20 12 - 49 %    MONOCYTES 5 5 - 13 %    EOSINOPHILS 3 0 - 7 %    BASOPHILS 1 0 - 1 %    IMMATURE GRANULOCYTES 1 (H) 0.0 - 0.5 %    ABS. NEUTROPHILS 6.2 1.8 - 8.0 K/UL    ABS. LYMPHOCYTES 1.7 0.8 - 3.5 K/UL    ABS. MONOCYTES 0.5 0.0 - 1.0 K/UL    ABS. EOSINOPHILS 0.3 0.0 - 0.4 K/UL    ABS. BASOPHILS 0.0 0.0 - 0.1 K/UL    ABS. IMM.  GRANS. 0.0 0.00 - 0.04 K/UL    DF AUTOMATED     RENAL FUNCTION PANEL    Collection Time: 07/13/22  2:41 PM   Result Value Ref Range    Sodium 140 136 - 145 mmol/L    Potassium 3.9 3.5 - 5.1 mmol/L    Chloride 113 (H) 97 - 108 mmol/L    CO2 19 (L) 21 - 32 mmol/L    Anion gap 8 5 - 15 mmol/L    Glucose 114 (H) 65 - 100 mg/dL    BUN 62 (H) 6 - 20 MG/DL    Creatinine 6.22 (H) 0.55 - 1.02 MG/DL    BUN/Creatinine ratio 10 (L) 12 - 20      GFR est AA 8 (L) >60 ml/min/1.73m2    GFR est non-AA 7 (L) >60 ml/min/1.73m2    Calcium 11.6 (H) 8.5 - 10.1 MG/DL    Phosphorus 4.9 (H) 2.6 - 4.7 MG/DL    Albumin 3.0 (L) 3.5 - 5.0 g/dL       Medications Administered     epoetin sara-epbx (RETACRIT) injection 40,000 Units     Admin Date  07/13/2022 Action  Given Dose  40,000 Units Route  SubCUTAneous Administered By  Michelle Lynch              Given SQ to left upper arm    Ms. Ramirez tolerated the treatment well, and had no complaints. Ms. Marisela Obregon was discharged from [place] [address] in stable condition. Patient is aware of next scheduled [facility] appointment.     Future Appointments   Date Time Provider Margarito Arora   7/19/2022 10:00 AM Laura Pena MD [facility] BS AMB   8/5/2022  4:00 PM H1 YAZ TRUONG   8/22/2022  4:15 PM Berhane Juarez MD [facility] MAIN BS AMB   9/2/2022  4:00 PM H1 YAZ Jiménez RN  July 13, 2022

## 2022-07-19 ENCOUNTER — OFFICE VISIT (OUTPATIENT)
Dept: SLEEP MEDICINE | Age: 62
End: 2022-07-19
Payer: COMMERCIAL

## 2022-07-19 VITALS
HEART RATE: 112 BPM | BODY MASS INDEX: 38.09 KG/M2 | HEIGHT: 64 IN | RESPIRATION RATE: 95 BRPM | DIASTOLIC BLOOD PRESSURE: 87 MMHG | SYSTOLIC BLOOD PRESSURE: 147 MMHG | WEIGHT: 223.1 LBS

## 2022-07-19 DIAGNOSIS — G47.33 OSA (OBSTRUCTIVE SLEEP APNEA): Primary | ICD-10-CM

## 2022-07-19 PROCEDURE — 99204 OFFICE O/P NEW MOD 45 MIN: CPT | Performed by: SPECIALIST

## 2022-07-19 NOTE — PROGRESS NOTES
[facility] [place]. [place], [address]  Tel.  [phone]  Fax. [address] [facility] 60  [place], [address] [facility]  Tel.  [phone]  Fax. [phone] [address]   Tel.  [phone]  Fax. [phone]       Chief Complaint       Chief Complaint   Patient presents with    Sleep Problem     NP, refd by Sandro Munson for snoring       HPI      Myah Seen is 57 y.o. female seen for evaluation of a sleep disorder. Notes history of sleep difficulties. Has history of severe renal failure; on renal transplant list at [facility]. Notes she was contacted on 1 July regarding potential donor. Subsequently canceled by [facility]. She started having problems with sleep initiation. Has been taking 1 or 2 Benadryl which has improved sleep initiation. Has been told of snoring ,described as potentially loud. Describes self as \"groggy \"on awakening. Denies vivid dreaming nightmares, sleep talking or sleepwalking, bruxism or nocturnal incontinence, abnormal arm  Movements, notes leg twitching, denies  hypnagogue hallucinations, sleep paralysis or cataplexy. The patient has not undergone diagnostic testing for the current problems. [place] Sleepiness Score: (P) 8       Allergies   Allergen Reactions    Morphine Nausea and Vomiting and Cough     Blood pressure and 158-96 heart rate up to 156.  [place] Swelling       Current Outpatient Medications   Medication Sig Dispense Refill    allopurinoL (ZYLOPRIM) 100 mg tablet TAKE TWO TABLETS BY MOUTH DAILY 60 Tablet 11    predniSONE (DELTASONE) 20 mg tablet Take 1 Tablet by mouth two (2) times a day. 21 Tablet 0    sodium bicarbonate 325 mg tablet Take 325 mg by mouth two (2) times a day.       pravastatin (PRAVACHOL) 40 mg tablet TAKE 1 TABLET BY MOUTH EVERY DAY AT BEDTIME 90 Tablet 3    amLODIPine (NORVASC) 10 mg tablet TAKE 1 TABLET BY MOUTH DAILY 90 Tablet 3    potassium chloride (K-DUR, KLOR-CON) 20 mEq tablet TAKE 1 TABLET BY MOUTH DAILY 90 Tablet 3    tolterodine ER (DETROL LA) 4 mg ER capsule Take 1 Capsule by mouth daily. 30 Capsule 11    bumetanide (BUMEX) 1 mg tablet Take 1 mg by mouth daily.  hydrALAZINE (APRESOLINE) 10 mg tablet Take 10 mg by mouth three (3) times daily.  senna-docusate (PERICOLACE) 8.6-50 mg per tablet Take 1 Tab by mouth two (2) times a day. Indications: constipation 30 Tab 0    acetaminophen (Tylenol Extra Strength) 500 mg tablet Take 500 mg by mouth every six (6) hours as needed for Pain.  carvediloL (COREG) 6.25 mg tablet Take 2 Tablets by mouth two (2) times daily (with meals). (Patient not taking: Reported on 2022) 120 Tablet 11        She  has a past medical history of Arthritis, Chronic kidney disease, Gastritis, Gout, History of claustrophobia, Hyperparathyroidism ([address] and Hypertension. She  has a past surgical history that includes hx colonoscopy; hx gyn; hx  section; hx lap cholecystectomy; and hx lumbar laminectomy (2020). She family history includes Breast Cancer in her paternal grandmother; Diabetes in her sister; Hypertension in her mother; Liver Disease in her father. She  reports that she quit smoking about 22 years ago. She has a 6.25 pack-year smoking history. She has never used smokeless tobacco. She reports that she does not drink alcohol and does not use drugs. Review of Systems:  Review of Systems   Constitutional: Negative for chills and fever. HENT: Negative for hearing loss and tinnitus. Eyes: Positive for blurred vision. Negative for double vision. Respiratory: Positive for cough, shortness of breath and wheezing. Cardiovascular: Negative for chest pain and palpitations. Gastrointestinal: Negative for abdominal pain and heartburn. Genitourinary: Positive for frequency. Negative for urgency. Musculoskeletal: Positive for joint pain. Negative for back pain and neck pain. Neurological: Positive for tingling. Negative for headaches. Psychiatric/Behavioral: Negative for depression. The patient has insomnia. Objective:     Visit Vitals  BP (!) 147/87   Pulse (!) 112   Resp (!) 95   Ht 5' 4\" (1.626 m)   Wt 223 lb 1.6 oz (101.2 kg)   BMI 38.30 kg/m²     Body mass index is 38.3 kg/m². General:   Conversant, cooperative   Eyes:  Pupils equal and reactive, no nystagmus   Oropharynx:   Mallampati score IV,  tongue scalloped, no posterior oral airway, large tongue       Neck:   No carotid bruits; Neck circ. in \"inches\": 18   Chest/Lungs:  Clear on auscultation    CVS:  Normal rate, regular rhythm   Skin:  Warm to touch; no obvious rashes   Neuro:  Speech fluent, face symmetrical, tongue movement normal   Psych:  Normal affect,  normal countenance        Assessment:       ICD-10-CM ICD-9-CM    1. ARLENE (obstructive sleep apnea)  G47.33 327.23 SLEEP STUDY UNATTENDED, 4 CHANNEL       History consistent with sleep disordered breathing. Patient has a narrow posterior oral airway, scalloped tongue. Potentially, sleep disordered breathing more prominent when supine, and/or in rem sleep. Will be evaluated with a home sleep test.      Plan:     Orders Placed This Encounter    SLEEP STUDY UNATTENDED, 4 CHANNEL     Order Specific Question:   Reason for Exam     Answer:   snoring       * Patient has a history and examination consistent with the diagnosis of sleep apnea. *Home sleep testing was ordered for initial evaluation. * She was provided information on sleep apnea including corresponding risk factors and the importance of proper treatment. * Treatment options if indicated were reviewed today. Instructions:  o Do not engage in activities requiring a normal degree of alertness if fatigue is present.   o The patient understands that untreated or undertreated sleep apnea could impair judgement and the ability to function normally during the day.  o Call or return if symptoms worsen or persist.          Kasey Mckeon MD, FAASM  Electronically signed 07/19/22       This note was created using voice recognition software. Despite editing, there may be syntax errors. This note will not be viewable in [address]

## 2022-08-05 ENCOUNTER — HOSPITAL ENCOUNTER (OUTPATIENT)
Dept: INFUSION THERAPY | Age: 62
Discharge: HOME | End: 2022-08-05
Payer: COMMERCIAL

## 2022-08-05 VITALS — SYSTOLIC BLOOD PRESSURE: 151 MMHG | HEART RATE: 99 BPM | RESPIRATION RATE: 18 BRPM | DIASTOLIC BLOOD PRESSURE: 92 MMHG

## 2022-08-05 LAB
ALBUMIN SERPL-MCNC: 3.3 G/DL (ref 3.5–5)
ANION GAP SERPL CALC-SCNC: 12 MMOL/L (ref 5–15)
BASOPHILS # BLD: 0 K/UL (ref 0–0.1)
BASOPHILS NFR BLD: 0 % (ref 0–1)
BUN SERPL-MCNC: 70 MG/DL (ref 6–20)
BUN/CREAT SERPL: 12 (ref 12–20)
CALCIUM SERPL-MCNC: 11.3 MG/DL (ref 8.5–10.1)
CHLORIDE SERPL-SCNC: 112 MMOL/L (ref 97–108)
CO2 SERPL-SCNC: 20 MMOL/L (ref 21–32)
CREAT SERPL-MCNC: 6.04 MG/DL (ref 0.55–1.02)
DIFFERENTIAL METHOD BLD: ABNORMAL
EOSINOPHIL # BLD: 0.2 K/UL (ref 0–0.4)
EOSINOPHIL NFR BLD: 2 % (ref 0–7)
ERYTHROCYTE [DISTWIDTH] IN BLOOD BY AUTOMATED COUNT: 16.4 % (ref 11.5–14.5)
GLUCOSE SERPL-MCNC: 96 MG/DL (ref 65–100)
HCT VFR BLD AUTO: 31.2 % (ref 35–47)
HGB BLD-MCNC: 9.3 G/DL (ref 11.5–16)
IMM GRANULOCYTES # BLD AUTO: 0 K/UL (ref 0–0.04)
IMM GRANULOCYTES NFR BLD AUTO: 0 % (ref 0–0.5)
IRON SATN MFR SERPL: 35 % (ref 20–50)
IRON SERPL-MCNC: 83 UG/DL (ref 35–150)
LYMPHOCYTES # BLD: 2 K/UL (ref 0.8–3.5)
LYMPHOCYTES NFR BLD: 23 % (ref 12–49)
MCH RBC QN AUTO: 27.6 PG (ref 26–34)
MCHC RBC AUTO-ENTMCNC: 29.8 G/DL (ref 30–36.5)
MCV RBC AUTO: 92.6 FL (ref 80–99)
MONOCYTES # BLD: 0.6 K/UL (ref 0–1)
MONOCYTES NFR BLD: 7 % (ref 5–13)
NEUTS SEG # BLD: 5.7 K/UL (ref 1.8–8)
NEUTS SEG NFR BLD: 68 % (ref 32–75)
NRBC # BLD: 0 K/UL (ref 0–0.01)
NRBC BLD-RTO: 0 PER 100 WBC
PHOSPHATE SERPL-MCNC: 4.9 MG/DL (ref 2.6–4.7)
PLATELET # BLD AUTO: 317 K/UL (ref 150–400)
PMV BLD AUTO: 10 FL (ref 8.9–12.9)
POTASSIUM SERPL-SCNC: 4.1 MMOL/L (ref 3.5–5.1)
RBC # BLD AUTO: 3.37 M/UL (ref 3.8–5.2)
SODIUM SERPL-SCNC: 144 MMOL/L (ref 136–145)
TIBC SERPL-MCNC: 238 UG/DL (ref 250–450)
WBC # BLD AUTO: 8.4 K/UL (ref 3.6–11)

## 2022-08-05 PROCEDURE — 36415 COLL VENOUS BLD VENIPUNCTURE: CPT

## 2022-08-05 PROCEDURE — 80069 RENAL FUNCTION PANEL: CPT

## 2022-08-05 PROCEDURE — 83540 ASSAY OF IRON: CPT

## 2022-08-05 PROCEDURE — 85025 COMPLETE CBC W/AUTO DIFF WBC: CPT

## 2022-08-05 PROCEDURE — 74011250636 HC RX REV CODE- 250/636: Performed by: INTERNAL MEDICINE

## 2022-08-05 PROCEDURE — 96372 THER/PROPH/DIAG INJ SC/IM: CPT

## 2022-08-05 RX ADMIN — EPOETIN ALFA-EPBX 40000 UNITS: 40000 INJECTION, SOLUTION INTRAVENOUS; SUBCUTANEOUS at 17:00

## 2022-08-05 NOTE — PROGRESS NOTES
[facility] Progress Note    Date: August 5, 2022        1600: Pt arrived ambulatory to [facility] for Retacrit in stable condition. Assessment completed. Labs drawn peripherally from the right antecubital and sent for processing. Labs reviewed. Criteria for treatment was met. Visit Vitals  BP (!) 151/92   Pulse 99   Resp 18        Medications Administered       epoetin sara-epbx (RETACRIT) injection 40,000 Units       Admin Date  08/05/2022 Action  Given Dose  40,000 Units Route  SubCUTAneous Administered By  Tessa Cerrato RN                    Injection given in the left arm SQ    Ms. Josy Deleon tolerated the treatment well, and had no complaints. Ms. Josy Deleon was discharged from [place] [address] in stable condition. Patient is aware of next scheduled [facility] appointment.     Future Appointments   Date Time Provider Margarito Arora   8/22/2022  4:15 PM Jose Robertson MD [facility] MAIN BS AMB   9/2/2022  4:00 [facility]    9/6/2022  9:00 AM [facility] BS AMB   9/13/2022  8:40 AM Shanti Velasquez MD [facility] BS AMB           Ata Morales RN  August 5, 2022

## 2022-08-08 NOTE — PERIOP NOTES
[address] INSTRUCTIONS  ORTHOPAEDIC    Surgery Date:   8/15/22    Your surgeon's office or [facility] staff will call you between 4 PM- 8 PM the day before surgery with your arrival time. If your surgery is on a Monday, you will receive a call the preceding Friday. Please report to [facility] Patient Access/Admitting on the 1st floor. Bring your insurance card, photo identification, and any copayment (if applicable). If you are going home the same day of your surgery, you must have a responsible adult to drive you home. You need to have a responsible adult to stay with you the first 24 hours after surgery and you should not drive a car for 24 hours following your surgery. Do NOT eat any solid foods after midnight the night before surgery including candy, mints or gum. You may drink clear liquids from midnight until 1 hour prior to arrival time. You may drink up to 12 ounces at one time every 4 hours. Do NOT drink alcohol or smoke 24 hours before surgery. STOP smoking for 14 days prior as it helps with breathing and healing after surgery. If your arrival time is 3pm or later, you may eat a light breakfast before 8am (toast, bagel-no butter, black coffee, plain tea, fruit juice-no pulp) Please note special instructions, if applicable, below for medications. If you are being admitted to the hospital,please leave personal belongings/luggage in your car until you have an assigned hospital room number. Please wear comfortable clothes. Wear your glasses instead of contacts. We ask that all money, jewelry and valuables be left at home. Wear no make up, particularly mascara, the day of surgery. All body piercings, rings, and jewelry need to be removed and left at home. Please remove any nail polish or artificial nails from your fingernails. Please wear your hair loose or down. Please no pony-tails, buns, or any metal hair accessories.  If you shower the morning of surgery, please do not apply any lotions or powders afterwards. You may wear deodorant. Do not shave any body area within 24 hours of your surgery. Please follow all instructions to avoid any potential surgical cancellation. Should your physical condition change, (i.e. fever, cold, flu, etc.) please notify your surgeon as soon as possible. It is important to be on time. If a situation occurs where you may be delayed, please call:  [phone] / [phone] on the day of surgery. The Preadmission Testing staff can be reached at [phone]. Special instructions: ANY INSTRUCTIONS PER DR. MAHMOOD'S OFFICE    No current facility-administered medications for this encounter. Current Outpatient Medications   Medication Sig    diphenhydrAMINE (BENADRYL) 50 mg tablet Take 50 mg by mouth nightly as needed for Sleep.    allopurinoL (ZYLOPRIM) 100 mg tablet TAKE TWO TABLETS BY MOUTH DAILY    predniSONE (DELTASONE) 20 mg tablet Take 1 Tablet by mouth two (2) times a day. (Patient taking differently: Take 20 mg by mouth as needed. FOR  Indications: acute inflammation of the joints due to gout attack)    sodium bicarbonate 325 mg tablet Take 325 mg by mouth two (2) times a day. carvediloL (COREG) 6.25 mg tablet Take 2 Tablets by mouth two (2) times daily (with meals). pravastatin (PRAVACHOL) 40 mg tablet TAKE 1 TABLET BY MOUTH EVERY DAY AT BEDTIME (Patient taking differently: Take 40 mg by mouth. TAKE 1 TABLET BY MOUTH EVERY DAY AT BEDTIME)    amLODIPine (NORVASC) 10 mg tablet TAKE 1 TABLET BY MOUTH DAILY    potassium chloride (K-DUR, KLOR-CON) 20 mEq tablet TAKE 1 TABLET BY MOUTH DAILY    tolterodine ER (DETROL LA) 4 mg ER capsule Take 1 Capsule by mouth daily. bumetanide (BUMEX) 1 mg tablet Take 1 mg by mouth daily. hydrALAZINE (APRESOLINE) 10 mg tablet Take 10 mg by mouth three (3) times daily. acetaminophen (TYLENOL) 500 mg tablet Take 500 mg by mouth every six (6) hours as needed for Pain.     senna-docusate (PERICOLACE) 8.6-50 mg per tablet Take 1 Tab by mouth two (2) times a day. Indications: constipation (Patient not taking: Reported on 8/8/2022)       YOU MUST ONLY TAKE THESE MEDICATIONS THE MORNING OF SURGERY WITH A SIP OF WATER: CARVIDELOL (COREG) ,ALLOPURINOL, SODIUM BICARBONATE, AMLODIPINE,  HYDRALAZINE,   MEDICATIONS TO TAKE THE MORNING OF SURGERY ONLY IF NEEDED: TYLENOL  HOLD these prescription medications BEFORE Surgery: DETROL LA, SKIP THIS MEDICATION THE MORNING OF SURGERY ONLY. Ask your surgeon/prescribing physician about when/if to STOP taking these medications: ANY YOU HAVE QUESTIONS ON. Stop any non-steroidal anti-inflammatory drugs (i.e. Ibuprofen, Naproxen, Advil, Aleve) 3 days before surgery. You may take Tylenol. STOP all vitamins and herbal supplements 1 week prior to  surgery. If you are currently taking Plavix, Coumadin, or any other blood-thinning/anticoagulant medication contact your prescribing physician for instructions. Preventing Infections Before and After - Your Surgery    IMPORTANT INSTRUCTIONS    You play an important role in your health and preparation for surgery. To reduce the germs on your skin you will need to shower with CHG soap (Chorhexidine gluconate 4%) two times before surgery. CHG soap (Hibiclens, Hex-A-Clens or store brand)  CHG soap will be provided at your Preadmission Testing (PAT) appointment. If you do not have a PAT appointment before surgery, you may arrange to  CHG soap from our office or purchase CHG soap at a pharmacy, grocery or department store. You need to purchase TWO 4 ounce bottles to use for your 2 showers. Steps to follow:  Ricki Murders your hair with your normal shampoo and your body with regular soap and rinse well to remove shampoo and soap from your skin. Wet a clean washcloth and turn off the shower. Put CHG soap on washcloth and apply to your entire body from the neck down. Do not use on your head, face or private parts(genitals).  Do not use CHG soap on open sores, wounds or areas of skin irritation. Wash you body gently for 5 minutes. Do not wash your skin too hard. This soap does not create lather. Pay special attention to your underarms and from your belly button to your feet. Turn the shower back on and rinse well to get CHG soap off your body. Pat your skin dry with a clean, dry towel. Do not apply lotions or moisturizer. Put on clean clothes and sleep on fresh bed sheets and do not allow pets to sleep with you. Shower with CHG soap 2 times before your surgery  The evening before your surgery  The morning of your surgery      Tips to help prevent infections after your surgery:  Protect your surgical wound from germs:  Hand washing is the most important thing you and your caregivers can do to prevent infections. Keep your bandage clean and dry! Do not touch your surgical wound. Use clean, freshly washed towels and washcloths every time you shower; do not share bath linens with others. Until your surgical wound is healed, wear clothing and sleep on bed linens each day that are clean and freshly washed. Do not allow pets to sleep in your bed with you or touch your surgical wound. Do not smoke - smoking delays wound healing. This may be a good time to stop smoking. If you have diabetes, it is important for you to manage your blood sugar levels properly before your surgery as well as after your surgery. Poorly managed blood sugar levels slow down wound healing and prevent you from healing completely. Prevention of Infection  Testing for Staphylococcus aureus on your skin before surgery    Staphylococcus aureus (staph) is a common bacteria that is found on the body. It normally does not cause infection on healthy skin. Before surgery, you will be tested to see if you have staph by swabbing the inside of your nose. When you have an incision with surgery, the goal is to protect that incision from infection.  Removal of the staph bacteria before surgery can decrease the risk of a surgical site infection. If your nose swab is positive for staph you will be called. Your treatment will include 2 steps:  Prescription for Mupirocin ointment to be used in each nostril twice a day for 5 days. Showering with Chlorhexidine (CHG) liquid soap for 5 days prior to surgery. How to use Mupirocin ointment in your nose   the prescription from your pharmacy. You will receive a large tube of ointment which will be big enough for all of your treatments. You will apply this ointment to each nostril 2 times a day for 5 days. Wash your hands with  gel or soap and water for 20 seconds before using ointment. Place a pea-sized amount of ointment on a cotton Q-tip. Apply ointment just inside of each nostril with the Q-tip. Do not push Q-tip or ointment deep inside you nose. Press your nostrils together and massage for a few seconds. Wash your hands with  gel or soap and water after you are finished. Do not get ointment near your eyes. If it gets into your eyes, rinse them with cool water. If you need to use nasal spray, clean the tip of the bottle with alcohol before use and do not use both at the same time. If you are scheduled for COVID testing during the 5 days, do NOT apply morning dose until after the COVID test has been performed. How to use Chlorhexidine (CHG) 4% liquid soap  Purchase an 8 ounce bottle of CHG liquid soap (Chlorhexidine 4%, Hibiclens, Hex-A-Clens or store brand) at a pharmacy or grocery store. Wash your hair with your normal shampoo and your body with regular soap and rinse well to remove shampoo and soap from your skin. Wet a clean washcloth and turn off the shower. Put CHG soap on washcloth and apply to your entire body from the neck down. Do not use on your head, face or private parts(genitals). Do not use CHG soap on open sores, wounds or areas of skin irritation.   Wash your body gently for 5 minutes. Do not wash your skin too hard. This soap does not create lather. Pay special attention to your underarms and from your belly button to your feet. Turn the shower back on and rinse well to get CHG soap off your body. Pat your skin dry with a clean, dry towel. Do not apply lotions or moisturizer. Put on clean clothes and sleep on fresh bed sheets the night before surgery. Do not allow pets to sleep with you. Eating and Drinking Before Surgery    You may eat a regular dinner at the usual time on the day before your surgery. Do NOT eat any solid foods after midnight unless your arrival time at the hospital is 3pm or later. You may drink clear liquids only from 12 midnight until 1 hours prior to your arrival time at the hospital on the day of your surgery. Do NOT drink alcohol. Clear liquids include:  Water  Fruit juices without pulp( i.e. apple juice)  Carbonated beverages  Black coffee (no cream/milk)  Tea (no cream/milk)  Gatorade  You may drink up to 12-16 ounces at one time every 4 hours between the hours of midnight and 1 hour before your arrival time at the hospital. Example- if your arrival time at the hospital is 6am, you may drink 12-16 ounces of clear liquids no later than 5am.  If your arrival time at the hospital is 3pm or later, you may eat a light breakfast before 8am.  A light breakfast includes: Toast or bagel (no butter)  Black coffee (no cream/milk)  Tea (no cream/milk)  Fruit juices without pulp ( i.e. apple juice)  Do NOT eat meat, eggs, vegetables or fruit  If you have any questions, please contact your surgeon's office. Patient Information Regarding COVID Restrictions    Day of Procedure    Please park in the parking deck or any designated visitor parking lot.   Enter the facility through the Main Entrance of the hospital.  On the day of surgery, please provide the cell phone number of the person who will be waiting for you to the Patient Access representative at the time of registration. Please wear a mask on the day of your procedure. We are now allowing two designated visitors per stay. Pediatric patients may have 2 designated visitors. These two people may come in with you on the day of your procedure. The designated visitor must also wear a mask. Once your procedure and the immediate recovery period is completed, a nurse in the recovery area will contact your designated visitor to inform them of your room number or to otherwise review other pertinent information regarding your care. Social distancing practices are to be adhered to in waiting areas and the cafeteria. The patient was contacted via phone. She verbalized understanding of all instructions does not  need reinforcement.

## 2022-08-08 NOTE — PERIOP NOTES
TINO MURPHY COMPLETED. TRIED EMAILING PATIENT INSTRUCTIONS. HOWEVER, IT IS SAYING UNDELIVERABLE. CALLED PATIENT BACK, EMAILED PREOP INSTRUCTIONS TO [email or web address]. COM SUCCESSFULLY.

## 2022-08-09 NOTE — H&P
Subjective:   Chief Complaint: Follow-up of the Right Hip     HPI: Huyen Patel is a 57 y.o. female with a left total hip replacement presents today for evaluation and treatment of right lateral hip pain that radiates into the groin for the last month. Ambulating with no assistance. She notes being on a kidney transplant list but is not currently on dialysis. Her kidney disease is secondary to prior uncontrolled hypertension but is now being treated with blood pressure medication. She is anemic but gets erythropoietin injections monthly. Currently sees nephrologist Dr. Whit Hemphill. Objective:      Vitals:      Height: 5' 6\"   Weight: 224 lb   Body mass index is 36.15 kg/m². Constitutional:  No acute distress. Well nourished. Well developed. Eyes:  Sclera are nonicteric. Respiratory:  No labored breathing. Cardiovascular:  No marked edema. Skin:  No marked skin ulcers. Neurological:  No marked sensory loss noted. Psychiatric: Alert and oriented x3. Musculoskeletal :  Right Hip:  Trendelenburg gait. Decreased ROM of hip with pain on motion. Normal ROM of right knee with no pain on motion. Leg lengths appear equal. Strong pedal pulses. No pedal edema. No apparent muscle atrophy. Skin healthy and intact. Neurovascularly intact. Radiographs:     Order: XR HIP RIGHT 2-3 VIEWS W/ PELVIS WHEN PERFORMED - Indication:  Avascular necrosis of hip, right        X-ray hip right 2-3 views w/pelvis when performed ([id number])     Result Date: 8/4/2022  AP Pelvis, Frog Lateral.     Impression: Patchy radiolucencies of the femoral head with crescent sign    Assessment:      1. Avascular necrosis of hip, right       Plan:   XR of the right hip taken today shows patchy radiolucencies of the femoral head with crescent sign. These XR findings are indicative of avascular necrosis of the femoral head with collapse and I discussed this with the patient.  We discussed treatment options including continued conservative management vs total hip replacement surgery as well as the pros and cons of each. She reports she has failed conservative management and would like to discuss surgery further. We discussed total hip replacement surgery at length including expected outcomes and post-op recovery as well as risks and complications, specifically DVT, PE, infection, dislocation, leg length discrepancy, and nerve injury. After a lengthy discussion, the patient desires to proceed with surgery. We will schedule surgery at the patient's convenience. Follow-up for scheduled right total hip replacement if she is cleared by her nephrologist for surgery. Orders Placed This Encounter    X-ray hip right 2-3 views w/pelvis when performed ([id number])    BMI Patient Education      No follow-ups on file. 2022  5:05 PM  Shayy Faust MD verifies documentation is both accurate and complete and he performed history, physical, and +/- injections mentioned above. Cosigned by: Shayy Faust MD at 2022  5:37 PM     DATE OF SURGERY UPDATE:  Patient has received clearance for surgery by nephrology. Past Medical History:   Diagnosis Date    Arthritis     L knee    Chronic kidney disease     secondary to hypertensive nephrosclerosis, CKD STG 5, ON ACTIVE KIDNEY TRANSPLANT LIST AT [facility]- DR. SANCHEZ    Gastritis     HISTORY OF 10/2020     Gout     History of claustrophobia     Hyperparathyroidism ([facility] [address]     Hypertension      Past Surgical History:   Procedure Laterality Date    HX  SECTION      HX COLONOSCOPY      HX GYN      ECTOPIC PREGNANCY SURGERY     HX HIP REPLACEMENT Left     HX LAP CHOLECYSTECTOMY      HX LUMBAR LAMINECTOMY  2020    [id number]    HX WISDOM TEETH EXTRACTION       Family History   Problem Relation Age of Onset    Hypertension Mother     Liver Disease Father     Diabetes Sister     Breast Cancer Paternal Grandmother     Anesth Problems Neg Hx      Social History     Tobacco Use Smoking status: Former     Packs/day: 0.25     Years: 25.00     Pack years: 6.25     Types: Cigarettes     Quit date: 2000     Years since quittin.1    Smokeless tobacco: Never   Substance Use Topics    Alcohol use: No      Prior to Admission Medications   Prescriptions Last Dose Informant Patient Reported? Taking?   acetaminophen (TYLENOL) 500 mg tablet 2022  Yes Yes   Sig: Take 500 mg by mouth every six (6) hours as needed for Pain. allopurinoL (ZYLOPRIM) 100 mg tablet 8/15/2022  No Yes   Sig: TAKE TWO TABLETS BY MOUTH DAILY   amLODIPine (NORVASC) 10 mg tablet 8/15/2022  No Yes   Sig: TAKE 1 TABLET BY MOUTH DAILY   bumetanide (BUMEX) 1 mg tablet 2022  Yes Yes   Sig: Take 1 mg by mouth daily. carvediloL (COREG) 6.25 mg tablet 8/15/2022 at 0615  No Yes   Sig: Take 2 Tablets by mouth two (2) times daily (with meals). diphenhydrAMINE (BENADRYL) 50 mg tablet 2022  Yes Yes   Sig: Take 50 mg by mouth nightly as needed for Sleep.   hydrALAZINE (APRESOLINE) 10 mg tablet 8/15/2022  Yes Yes   Sig: Take 10 mg by mouth three (3) times daily. mupirocin (BACTROBAN) 2 % ointment Not Taking  No No   Sig: by Both Nostrils route two (2) times a day for 5 days. Apply 0.25 g (small pea-sized amount) to both nostrils twice a day for five days. Patient not taking: Reported on 8/15/2022   potassium chloride (K-DUR, KLOR-CON) 20 mEq tablet 2022  No Yes   Sig: TAKE 1 TABLET BY MOUTH DAILY   pravastatin (PRAVACHOL) 40 mg tablet 2022  No Yes   Sig: TAKE 1 TABLET BY MOUTH EVERY DAY AT BEDTIME   Patient taking differently: Take 40 mg by mouth. TAKE 1 TABLET BY MOUTH EVERY DAY AT BEDTIME   predniSONE (DELTASONE) 20 mg tablet 2022  No Yes   Sig: Take 1 Tablet by mouth two (2) times a day. sodium bicarbonate 325 mg tablet 8/15/2022  Yes Yes   Sig: Take 325 mg by mouth two (2) times a day. tolterodine ER (DETROL LA) 4 mg ER capsule 2022  No Yes   Sig: Take 1 Capsule by mouth daily. Facility-Administered Medications: None     Allergy to:Morphine and Grayland Mcburney was seen and examined. Physical Examination: General appearance - alert, well appearing, and in no distress  Chest - no tachypnea, retractions or cyanosis  Heart - normal rate and regular rhythm  Skin - normal coloration and turgor, no rashes, no suspicious skin lesions noted  History and physical has been reviewed. The patient has been examined.  There have been no significant clinical changes since the completion of the originally dated History and Physical.    Signed By: Marvin SparkskeTATIANA lawrence     August 15, 2022 7:53 AM

## 2022-08-10 ENCOUNTER — HOSPITAL ENCOUNTER (OUTPATIENT)
Dept: PREADMISSION TESTING | Age: 62
Discharge: HOME | End: 2022-08-10
Payer: COMMERCIAL

## 2022-08-10 VITALS
WEIGHT: 222.22 LBS | BODY MASS INDEX: 35.71 KG/M2 | SYSTOLIC BLOOD PRESSURE: 144 MMHG | HEIGHT: 66 IN | TEMPERATURE: 98.2 F | HEART RATE: 87 BPM | RESPIRATION RATE: 18 BRPM | DIASTOLIC BLOOD PRESSURE: 77 MMHG

## 2022-08-10 LAB
ANION GAP SERPL CALC-SCNC: 11 MMOL/L (ref 5–15)
APPEARANCE UR: ABNORMAL
BACTERIA URNS QL MICRO: ABNORMAL /HPF
BILIRUB UR QL: NEGATIVE
BUN SERPL-MCNC: 75 MG/DL (ref 6–20)
BUN/CREAT SERPL: 11 (ref 12–20)
CALCIUM SERPL-MCNC: 10.9 MG/DL (ref 8.5–10.1)
CHLORIDE SERPL-SCNC: 115 MMOL/L (ref 97–108)
CO2 SERPL-SCNC: 19 MMOL/L (ref 21–32)
COLOR UR: ABNORMAL
CREAT SERPL-MCNC: 6.55 MG/DL (ref 0.55–1.02)
EPITH CASTS URNS QL MICRO: ABNORMAL /LPF
ERYTHROCYTE [DISTWIDTH] IN BLOOD BY AUTOMATED COUNT: 17.1 % (ref 11.5–14.5)
EST. AVERAGE GLUCOSE BLD GHB EST-MCNC: 103 MG/DL
GLUCOSE SERPL-MCNC: 89 MG/DL (ref 65–100)
GLUCOSE UR STRIP.AUTO-MCNC: 100 MG/DL
HBA1C MFR BLD: 5.2 % (ref 4–5.6)
HCT VFR BLD AUTO: 30.5 % (ref 35–47)
HGB BLD-MCNC: 9 G/DL (ref 11.5–16)
HGB UR QL STRIP: NEGATIVE
INR PPP: 1 (ref 0.9–1.1)
KETONES UR QL STRIP.AUTO: NEGATIVE MG/DL
LEUKOCYTE ESTERASE UR QL STRIP.AUTO: ABNORMAL
MCH RBC QN AUTO: 28 PG (ref 26–34)
MCHC RBC AUTO-ENTMCNC: 29.5 G/DL (ref 30–36.5)
MCV RBC AUTO: 95 FL (ref 80–99)
NITRITE UR QL STRIP.AUTO: NEGATIVE
NRBC # BLD: 0.12 K/UL (ref 0–0.01)
NRBC BLD-RTO: 1 PER 100 WBC
PH UR STRIP: 5.5 [PH] (ref 5–8)
PLATELET # BLD AUTO: 358 K/UL (ref 150–400)
PMV BLD AUTO: 10.2 FL (ref 8.9–12.9)
POTASSIUM SERPL-SCNC: 4.4 MMOL/L (ref 3.5–5.1)
PROT UR STRIP-MCNC: >300 MG/DL
PROTHROMBIN TIME: 10.2 SEC (ref 9–11.1)
RBC # BLD AUTO: 3.21 M/UL (ref 3.8–5.2)
RBC #/AREA URNS HPF: ABNORMAL /HPF (ref 0–5)
SODIUM SERPL-SCNC: 145 MMOL/L (ref 136–145)
SP GR UR REFRACTOMETRY: 1.02 (ref 1–1.03)
UA: UC IF INDICATED,UAUC: ABNORMAL
UROBILINOGEN UR QL STRIP.AUTO: 0.2 EU/DL (ref 0.2–1)
WBC # BLD AUTO: 11.4 K/UL (ref 3.6–11)
WBC URNS QL MICRO: ABNORMAL /HPF (ref 0–4)

## 2022-08-10 PROCEDURE — 85027 COMPLETE CBC AUTOMATED: CPT

## 2022-08-10 PROCEDURE — 81001 URINALYSIS AUTO W/SCOPE: CPT

## 2022-08-10 PROCEDURE — 83036 HEMOGLOBIN GLYCOSYLATED A1C: CPT

## 2022-08-10 PROCEDURE — 36415 COLL VENOUS BLD VENIPUNCTURE: CPT

## 2022-08-10 PROCEDURE — 80048 BASIC METABOLIC PNL TOTAL CA: CPT

## 2022-08-10 PROCEDURE — 86900 BLOOD TYPING SEROLOGIC ABO: CPT

## 2022-08-10 PROCEDURE — 85610 PROTHROMBIN TIME: CPT

## 2022-08-10 RX ORDER — MUPIROCIN 20 MG/G
OINTMENT TOPICAL 2 TIMES DAILY
Qty: 22 G | Refills: 0 | Status: ON HOLD | OUTPATIENT
Start: 2022-08-10 | End: 2022-08-15 | Stop reason: ALTCHOICE

## 2022-08-10 NOTE — PERIOP NOTES
PT LEFT VM TO CALL HER BACK AS SHE HAD QUESTIONS ABOUT MRSA. RETURNED CALL, PT ASKING IF SHE NEEDS TO PUT THE OINTMENT IN HER NOSE AGAIN LIKE SHE DID BEFORE HER LAST JOINT SURGERY. SPOKE WITH Emile Lynch NP, SHE WILL SEND PRESCRIPTION FOR MUPIROCIN OINTMENT TO PT PHARMACY. PT STATES WILL  CHG SOAP HERE AT PAT. ADDRESS GIVEN. Birtha Pall WITH CHG SOAP AND INSTRUCTIONS LEFT AT  FOR PT TO .

## 2022-08-10 NOTE — PERIOP NOTES
REVIEWED INSTRUCTIONS WITH PATIENT, SOAPS GIVEN AND REVIEWED, PT GIVEN TIME TO ASK QUESTIONS     ADAMA DAILEY NP IN ROOM TALKING WITH PATIENT ABOUT TREATING HER MRSA HX, SOAPS GIVEN IN ACCORDANCE TO PROTOCOL

## 2022-08-10 NOTE — PERIOP NOTES
[address] [facility] INSTRUCTIONS  ORTHOPAEDIC    Surgery Date:   08/15/2022    Your surgeon's office or [facility] staff will call you between 4 PM- 8 PM the day before surgery with your arrival time. If your surgery is on a Monday, you will receive a call the preceding Friday. Please report to [facility] Patient Access/Admitting on the 1st floor. Bring your insurance card, photo identification, and any copayment (if applicable). If you are going home the same day of your surgery, you must have a responsible adult to drive you home. You need to have a responsible adult to stay with you the first 24 hours after surgery and you should not drive a car for 24 hours following your surgery. Do NOT eat any solid foods after midnight the night before surgery including candy, mints or gum. You may drink clear liquids from midnight until 1 hour prior to arrival time. You may drink up to 12 ounces at one time every 4 hours. Do NOT drink alcohol or smoke 24 hours before surgery. STOP smoking for 14 days prior as it helps with breathing and healing after surgery. If your arrival time is 3pm or later, you may eat a light breakfast before 8am (toast, bagel-no butter, black coffee, plain tea, fruit juice-no pulp) Please note special instructions, if applicable, below for medications. If you are being admitted to the hospital,please leave personal belongings/luggage in your car until you have an assigned hospital room number. Please wear comfortable clothes. Wear your glasses instead of contacts. We ask that all money, jewelry and valuables be left at home. Wear no make up, particularly mascara, the day of surgery. All body piercings, rings, and jewelry need to be removed and left at home. Please remove any nail polish or artificial nails from your fingernails. Please wear your hair loose or down. Please no pony-tails, buns, or any metal hair accessories.  If you shower the morning of surgery, please do not apply any lotions or powders afterwards. You may wear deodorant. Do not shave any body area within 24 hours of your surgery. Please follow all instructions to avoid any potential surgical cancellation. Should your physical condition change, (i.e. fever, cold, flu, etc.) please notify your surgeon as soon as possible. It is important to be on time. If a situation occurs where you may be delayed, please call:  [phone] / [phone] on the day of surgery. The Preadmission Testing staff can be reached at [phone]. Special instructions: NONE    Current Outpatient Medications   Medication Sig    mupirocin (BACTROBAN) 2 % ointment by Both Nostrils route two (2) times a day for 5 days. Apply 0.25 g (small pea-sized amount) to both nostrils twice a day for five days. diphenhydrAMINE (BENADRYL) 50 mg tablet Take 50 mg by mouth nightly as needed for Sleep.    allopurinoL (ZYLOPRIM) 100 mg tablet TAKE TWO TABLETS BY MOUTH DAILY    predniSONE (DELTASONE) 20 mg tablet Take 1 Tablet by mouth two (2) times a day. (Patient taking differently: Take 20 mg by mouth as needed. FOR  Indications: acute inflammation of the joints due to gout attack)    sodium bicarbonate 325 mg tablet Take 325 mg by mouth two (2) times a day. pravastatin (PRAVACHOL) 40 mg tablet TAKE 1 TABLET BY MOUTH EVERY DAY AT BEDTIME (Patient taking differently: Take 40 mg by mouth. TAKE 1 TABLET BY MOUTH EVERY DAY AT BEDTIME)    amLODIPine (NORVASC) 10 mg tablet TAKE 1 TABLET BY MOUTH DAILY    potassium chloride (K-DUR, KLOR-CON) 20 mEq tablet TAKE 1 TABLET BY MOUTH DAILY    tolterodine ER (DETROL LA) 4 mg ER capsule Take 1 Capsule by mouth daily. bumetanide (BUMEX) 1 mg tablet Take 1 mg by mouth daily. hydrALAZINE (APRESOLINE) 10 mg tablet Take 10 mg by mouth three (3) times daily. acetaminophen (TYLENOL) 500 mg tablet Take 500 mg by mouth every six (6) hours as needed for Pain.     carvediloL (COREG) 6.25 mg tablet Take 2 Tablets by mouth two (2) times daily (with meals). No current facility-administered medications for this encounter. YOU MUST ONLY TAKE THESE MEDICATIONS THE MORNING OF SURGERY WITH A SIP OF WATER: CARVEDILOL, SODIUM BICARBONATE, HYDRALAZINE, AMLODIPINE, ALLOPURINOL, MUPIROCIN   MEDICATIONS TO TAKE THE MORNING OF SURGERY ONLY IF NEEDED: TYLENOL  HOLD these prescription medications BEFORE Surgery: DO NOT TAKE MORNING OF SURGERY: TOLTERODINE, POTASSIUM CHLORIDE, BUMETANIDE, PREDNISONE,   Ask your surgeon/prescribing physician about when/if to STOP taking these medications: NONE  Stop any non-steroidal anti-inflammatory drugs (i.e. Ibuprofen, Naproxen, Advil, Aleve) 3 days before surgery. You may take Tylenol. STOP all vitamins and herbal supplements 1 week prior to  surgery. If you are currently taking Plavix, Coumadin, or any other blood-thinning/anticoagulant medication contact your prescribing physician for instructions. Preventing Infections Before and After - Your Surgery    IMPORTANT INSTRUCTIONS    You play an important role in your health and preparation for surgery. To reduce the germs on your skin you will need to shower with CHG soap (Chorhexidine gluconate 4%) two times before surgery. CHG soap (Hibiclens, Hex-A-Clens or store brand)  CHG soap will be provided at your Preadmission Testing (PAT) appointment. If you do not have a PAT appointment before surgery, you may arrange to  CHG soap from our office or purchase CHG soap at a pharmacy, grocery or department store. You need to purchase TWO 4 ounce bottles to use for your 2 showers. Steps to follow:  Godfrey Trish your hair with your normal shampoo and your body with regular soap and rinse well to remove shampoo and soap from your skin. Wet a clean washcloth and turn off the shower. Put CHG soap on washcloth and apply to your entire body from the neck down. Do not use on your head, face or private parts(genitals).  Do not use CHG soap on open sores, wounds or areas of skin irritation. Wash you body gently for 5 minutes. Do not wash your skin too hard. This soap does not create lather. Pay special attention to your underarms and from your belly button to your feet. Turn the shower back on and rinse well to get CHG soap off your body. Pat your skin dry with a clean, dry towel. Do not apply lotions or moisturizer. Put on clean clothes and sleep on fresh bed sheets and do not allow pets to sleep with you. Shower with CHG soap 2 times before your surgery  The evening before your surgery  The morning of your surgery      Tips to help prevent infections after your surgery:  Protect your surgical wound from germs:  Hand washing is the most important thing you and your caregivers can do to prevent infections. Keep your bandage clean and dry! Do not touch your surgical wound. Use clean, freshly washed towels and washcloths every time you shower; do not share bath linens with others. Until your surgical wound is healed, wear clothing and sleep on bed linens each day that are clean and freshly washed. Do not allow pets to sleep in your bed with you or touch your surgical wound. Do not smoke - smoking delays wound healing. This may be a good time to stop smoking. If you have diabetes, it is important for you to manage your blood sugar levels properly before your surgery as well as after your surgery. Poorly managed blood sugar levels slow down wound healing and prevent you from healing completely. Prevention of Infection  Testing for Staphylococcus aureus on your skin before surgery    Staphylococcus aureus (staph) is a common bacteria that is found on the body. It normally does not cause infection on healthy skin. Before surgery, you will be tested to see if you have staph by swabbing the inside of your nose. When you have an incision with surgery, the goal is to protect that incision from infection.  Removal of the staph bacteria before surgery can decrease the risk of a surgical site infection. If your nose swab is positive for staph you will be called. Your treatment will include 2 steps:  Prescription for Mupirocin ointment to be used in each nostril twice a day for 5 days. Showering with Chlorhexidine (CHG) liquid soap for 5 days prior to surgery. How to use Mupirocin ointment in your nose   the prescription from your pharmacy. You will receive a large tube of ointment which will be big enough for all of your treatments. You will apply this ointment to each nostril 2 times a day for 5 days. Wash your hands with  gel or soap and water for 20 seconds before using ointment. Place a pea-sized amount of ointment on a cotton Q-tip. Apply ointment just inside of each nostril with the Q-tip. Do not push Q-tip or ointment deep inside you nose. Press your nostrils together and massage for a few seconds. Wash your hands with  gel or soap and water after you are finished. Do not get ointment near your eyes. If it gets into your eyes, rinse them with cool water. If you need to use nasal spray, clean the tip of the bottle with alcohol before use and do not use both at the same time. If you are scheduled for COVID testing during the 5 days, do NOT apply morning dose until after the COVID test has been performed. How to use Chlorhexidine (CHG) 4% liquid soap  Purchase an 8 ounce bottle of CHG liquid soap (Chlorhexidine 4%, Hibiclens, Hex-A-Clens or store brand) at a pharmacy or grocery store. Wash your hair with your normal shampoo and your body with regular soap and rinse well to remove shampoo and soap from your skin. Wet a clean washcloth and turn off the shower. Put CHG soap on washcloth and apply to your entire body from the neck down. Do not use on your head, face or private parts(genitals). Do not use CHG soap on open sores, wounds or areas of skin irritation. Wash your body gently for 5 minutes.  Do not wash your skin too hard. This soap does not create lather. Pay special attention to your underarms and from your belly button to your feet. Turn the shower back on and rinse well to get CHG soap off your body. Pat your skin dry with a clean, dry towel. Do not apply lotions or moisturizer. Put on clean clothes and sleep on fresh bed sheets the night before surgery. Do not allow pets to sleep with you. Eating and Drinking Before Surgery    You may eat a regular dinner at the usual time on the day before your surgery. Do NOT eat any solid foods after midnight unless your arrival time at the hospital is 3pm or later. You may drink clear liquids only from 12 midnight until 1 hours prior to your arrival time at the hospital on the day of your surgery. Do NOT drink alcohol. Clear liquids include:  Water  Fruit juices without pulp( i.e. apple juice)  Carbonated beverages  Black coffee (no cream/milk)  Tea (no cream/milk)  Gatorade  You may drink up to 12-16 ounces at one time every 4 hours between the hours of midnight and 1 hour before your arrival time at the hospital. Example- if your arrival time at the hospital is 6am, you may drink 12-16 ounces of clear liquids no later than 5am.  If your arrival time at the hospital is 3pm or later, you may eat a light breakfast before 8am.  A light breakfast includes: Toast or bagel (no butter)  Black coffee (no cream/milk)  Tea (no cream/milk)  Fruit juices without pulp ( i.e. apple juice)  Do NOT eat meat, eggs, vegetables or fruit  If you have any questions, please contact your surgeon's office. Patient Information Regarding COVID Restrictions    Day of Procedure    Please park in the parking deck or any designated visitor parking lot.   Enter the facility through the Main Entrance of the hospital.  On the day of surgery, please provide the cell phone number of the person who will be waiting for you to the Patient Access representative at the time of registration. Please wear a mask on the day of your procedure. We are now allowing two designated visitors per stay. Pediatric patients may have 2 designated visitors. These two people may come in with you on the day of your procedure. The designated visitor must also wear a mask. Once your procedure and the immediate recovery period is completed, a nurse in the recovery area will contact your designated visitor to inform them of your room number or to otherwise review other pertinent information regarding your care. Social distancing practices are to be adhered to in waiting areas and the cafeteria. The patient was contacted in person. She verbalized understanding of all instructions does not  need reinforcement.

## 2022-08-11 LAB
BACTERIA SPEC CULT: NORMAL
BACTERIA SPEC CULT: NORMAL
SERVICE CMNT-IMP: NORMAL

## 2022-08-11 NOTE — PERIOP NOTES
TINO Nurse Practitioner   Pre-Operative Chart Review/Assessment:-ORTHOPEDIC                Patient Name:  Nadine Cardoso                                                           Age:   57 y.o.    :  1960     Today's Date:  2022     Date of PAT:   8/10/2022      Date of Surgery:    8/15/2022      Procedure(s):  Right Total Hip Arthroplasty     Surgeon:   Dr. Arturo Don                       PLAN:      1)  Medical Clearance:  Dr. Orlando Leon      2)  Cardiac Clearance:  EKG and METs reviewed. No further cardiac evaluation requested. EKG from 22 showed NSR w/ NSTW abn.       3)  Diabetic Treatment Consult:  Not indicated. A1c-5.2      4)  Sleep Apnea evaluation:   ARLENE eval; SS scheduled for 22. 5) Treatment for MRSA/Staph Aureus:  Pt w/ hx of MRSA. Mupirocin ordered to treat hx since patient only seen 4 days PTS. PAT swab Neg. ID notified to remove flag on chart. Pt notified, not necessary to complete mupirocin tx. 6) Additional Concerns:  Former smoker, HTN, CKD stg 5(followed by Dr. Tuan Oh; on [facility] transplant list), claustrophobic    Clearance from Dr. Tuan Oh noted on 8/10/22. Note on chart. Vital Signs:         Vitals:    08/10/22 1525   BP: (!) 144/77   Pulse: 87   Resp: 18   Temp: 98.2 °F (36.8 °C)   Weight: 100.8 kg (222 lb 3.6 oz)   Height: 5' 6\" (1.676 m)          Body mass index is 35.87 kg/m².         ____________________________________________  PAST MEDICAL HISTORY  Past Medical History:   Diagnosis Date    Arthritis     L knee    Chronic kidney disease     secondary to hypertensive nephrosclerosis, CKD STG 5, ON ACTIVE KIDNEY TRANSPLANT LIST AT [facility]- DR. SANCHEZ    Gastritis     HISTORY OF 10/2020     Gout     History of claustrophobia     Hyperparathyroidism ([address]     Hypertension       ____________________________________________  PAST SURGICAL HISTORY  Past Surgical History:   Procedure Laterality Date    HX  SECTION      HX COLONOSCOPY      HX GYN ECTOPIC PREGNANCY SURGERY     HX HIP REPLACEMENT Left 2020    HX LAP CHOLECYSTECTOMY      HX LUMBAR LAMINECTOMY  08/2020    [id number]    HX WISDOM TEETH EXTRACTION        ____________________________________________  HOME MEDICATIONS  Current Outpatient Medications   Medication Sig    mupirocin (BACTROBAN) 2 % ointment by Both Nostrils route two (2) times a day for 5 days. Apply 0.25 g (small pea-sized amount) to both nostrils twice a day for five days. diphenhydrAMINE (BENADRYL) 50 mg tablet Take 50 mg by mouth nightly as needed for Sleep.    allopurinoL (ZYLOPRIM) 100 mg tablet TAKE TWO TABLETS BY MOUTH DAILY    predniSONE (DELTASONE) 20 mg tablet Take 1 Tablet by mouth two (2) times a day. (Patient taking differently: Take 20 mg by mouth as needed. FOR  Indications: acute inflammation of the joints due to gout attack)    sodium bicarbonate 325 mg tablet Take 325 mg by mouth two (2) times a day. pravastatin (PRAVACHOL) 40 mg tablet TAKE 1 TABLET BY MOUTH EVERY DAY AT BEDTIME (Patient taking differently: Take 40 mg by mouth. TAKE 1 TABLET BY MOUTH EVERY DAY AT BEDTIME)    amLODIPine (NORVASC) 10 mg tablet TAKE 1 TABLET BY MOUTH DAILY    potassium chloride (K-DUR, KLOR-CON) 20 mEq tablet TAKE 1 TABLET BY MOUTH DAILY    tolterodine ER (DETROL LA) 4 mg ER capsule Take 1 Capsule by mouth daily. bumetanide (BUMEX) 1 mg tablet Take 1 mg by mouth daily. hydrALAZINE (APRESOLINE) 10 mg tablet Take 10 mg by mouth three (3) times daily. acetaminophen (TYLENOL) 500 mg tablet Take 500 mg by mouth every six (6) hours as needed for Pain. carvediloL (COREG) 6.25 mg tablet Take 2 Tablets by mouth two (2) times daily (with meals). No current facility-administered medications for this encounter.      ____________________________________________  ALLERGIES  Allergies   Allergen Reactions    Morphine Nausea and Vomiting and Cough     Blood pressure and 158-96 heart rate up to 156.     Protivin Swelling     PATIENT DENIES THIS ALLERGY       ____________________________________________  SOCIAL HISTORY  Social History     Tobacco Use    Smoking status: Former     Packs/day: 0.25     Years: 25.00     Pack years: 6.25     Types: Cigarettes     Quit date: 2000     Years since quittin.0    Smokeless tobacco: Never   Substance Use Topics    Alcohol use: No      ____________________________________________   Internal Administration   First Dose      Second Dose         Last COVID Lab SARS-CoV-2 ( )   Date Value   2020 Not Detected                    Labs:     Hospital Outpatient Visit on 08/10/2022   Component Date Value Ref Range Status    Sodium 08/10/2022 145  136 - 145 mmol/L Final    Potassium 08/10/2022 4.4  3.5 - 5.1 mmol/L Final    Chloride 08/10/2022 115 (A) 97 - 108 mmol/L Final    CO2 08/10/2022 19 (A) 21 - 32 mmol/L Final    Anion gap 08/10/2022 11  5 - 15 mmol/L Final    Glucose 08/10/2022 89  65 - 100 mg/dL Final    BUN 08/10/2022 75 (A) 6 - 20 MG/DL Final    Creatinine 08/10/2022 6.55 (A) 0.55 - 1.02 MG/DL Final    BUN/Creatinine ratio 08/10/2022 11 (A) 12 - 20   Final    GFR est AA 08/10/2022 8 (A) >60 ml/min/1.73m2 Final    GFR est non-AA 08/10/2022 6 (A) >60 ml/min/1.73m2 Final    Estimated GFR is calculated using the IDMS-traceable Modification of Diet in Renal Disease (MDRD) Study equation, reported for both  Americans (GFRAA) and non- Americans (GFRNA), and normalized to 1.73m2 body surface area. The physician must decide which value applies to the patient.     Calcium 08/10/2022 10.9 (A) 8.5 - 10.1 MG/DL Final    WBC 08/10/2022 11.4 (A) 3.6 - 11.0 K/uL Final    RBC 08/10/2022 3.21 (A) 3.80 - 5.20 M/uL Final    HGB 08/10/2022 9.0 (A) 11.5 - 16.0 g/dL Final    HCT 08/10/2022 30.5 (A) 35.0 - 47.0 % Final    MCV 08/10/2022 95.0  80.0 - 99.0 FL Final    MCH 08/10/2022 28.0  26.0 - 34.0 PG Final    MCHC 08/10/2022 29.5 (A) 30.0 - 36.5 g/dL Final    RDW 08/10/2022 17.1 (A) 11.5 - 14.5 % Final    PLATELET 61/95/2259 460  150 - 400 K/uL Final    MPV 08/10/2022 10.2  8.9 - 12.9 FL Final    NRBC 08/10/2022 1.0 (A) 0  WBC Final    ABSOLUTE NRBC 08/10/2022 0.12 (A) 0.00 - 0.01 K/uL Final    Crossmatch Expiration 08/10/2022 08/18/2022,2359   Final    ABO/Rh(D) 08/10/2022 A POSITIVE   Final    Antibody screen 08/10/2022 NEG   Final    INR 08/10/2022 1.0  0.9 - 1.1   Final    A single therapeutic range for Vit K antagonists may not be optimal for all indications - see June, 2008 issue of Chest, American College of Chest Physicians Evidence-Based Clinical Practice Guidelines, 8th Edition. Prothrombin time 08/10/2022 10.2  9.0 - 11.1 sec Final    Color 08/10/2022 YELLOW/STRAW    Final    Color Reference Range: Straw, Yellow or Dark Yellow    Appearance 08/10/2022 CLOUDY (A) CLEAR   Final    Specific gravity 08/10/2022 1.016  1.003 - 1.030   Final    pH (UA) 08/10/2022 5.5  5.0 - 8.0   Final    Protein 08/10/2022 >300 (A) NEG mg/dL Final    Glucose 08/10/2022 100 (A) NEG mg/dL Final    Ketone 08/10/2022 Negative  NEG mg/dL Final    Bilirubin 08/10/2022 Negative  NEG   Final    Blood 08/10/2022 Negative  NEG   Final    Urobilinogen 08/10/2022 0.2  0.2 - 1.0 EU/dL Final    Nitrites 08/10/2022 Negative  NEG   Final    Leukocyte Esterase 08/10/2022 TRACE (A) NEG   Final    WBC 08/10/2022 0-4  0 - 4 /hpf Final    RBC 08/10/2022 0-5  0 - 5 /hpf Final    Epithelial cells 08/10/2022 MODERATE (A) FEW /lpf Final    Epithelial cell category consists of squamous cells and /or transitional urothelial cells. Renal tubular cells, if present, are separately identified as such.     Bacteria 08/10/2022 1+ (A) NEG /hpf Final    UA:UC IF INDICATED 08/10/2022 CULTURE NOT INDICATED BY UA RESULT  CNI   Final    Hemoglobin A1c 08/10/2022 5.2  4.0 - 5.6 % Final    Comment: NEW METHOD  PLEASE NOTE NEW REFERENCE RANGE  (NOTE)  HbA1C Interpretive Ranges  <5.7              Normal  5.7 - 6.4         Consider Prediabetes  >6.5 Consider Diabetes      Est. average glucose 08/10/2022 103  mg/dL Final    Special Requests: 08/10/2022 NO SPECIAL REQUESTS    Final    Culture result: 08/10/2022 MRSA NOT PRESENT    Final    Culture result: 08/10/2022     Final                    Value:Screening of patient nares for MRSA is for surveillance purposes and, if positive, to facilitate isolation considerations in high risk settings. It is not intended for automatic decolonization interventions per se as regimens are not sufficiently effective to warrant routine use. Hospital Outpatient Visit on 08/05/2022   Component Date Value Ref Range Status    WBC 08/05/2022 8.4  3.6 - 11.0 K/uL Final    RBC 08/05/2022 3.37 (A) 3.80 - 5.20 M/uL Final    HGB 08/05/2022 9.3 (A) 11.5 - 16.0 g/dL Final    HCT 08/05/2022 31.2 (A) 35.0 - 47.0 % Final    MCV 08/05/2022 92.6  80.0 - 99.0 FL Final    MCH 08/05/2022 27.6  26.0 - 34.0 PG Final    MCHC 08/05/2022 29.8 (A) 30.0 - 36.5 g/dL Final    RDW 08/05/2022 16.4 (A) 11.5 - 14.5 % Final    PLATELET 15/29/3124 690  150 - 400 K/uL Final    MPV 08/05/2022 10.0  8.9 - 12.9 FL Final    NRBC 08/05/2022 0.0  0  WBC Final    ABSOLUTE NRBC 08/05/2022 0.00  0.00 - 0.01 K/uL Final    NEUTROPHILS 08/05/2022 68  32 - 75 % Final    LYMPHOCYTES 08/05/2022 23  12 - 49 % Final    MONOCYTES 08/05/2022 7  5 - 13 % Final    EOSINOPHILS 08/05/2022 2  0 - 7 % Final    BASOPHILS 08/05/2022 0  0 - 1 % Final    IMMATURE GRANULOCYTES 08/05/2022 0  0.0 - 0.5 % Final    ABS. NEUTROPHILS 08/05/2022 5.7  1.8 - 8.0 K/UL Final    ABS. LYMPHOCYTES 08/05/2022 2.0  0.8 - 3.5 K/UL Final    ABS. MONOCYTES 08/05/2022 0.6  0.0 - 1.0 K/UL Final    ABS. EOSINOPHILS 08/05/2022 0.2  0.0 - 0.4 K/UL Final    ABS. BASOPHILS 08/05/2022 0.0  0.0 - 0.1 K/UL Final    ABS. IMM.  GRANS. 08/05/2022 0.0  0.00 - 0.04 K/UL Final    DF 08/05/2022 AUTOMATED    Final    Sodium 08/05/2022 144  136 - 145 mmol/L Final    Potassium 08/05/2022 4.1  3.5 - 5.1 mmol/L Final    Chloride 08/05/2022 112 (A) 97 - 108 mmol/L Final    CO2 08/05/2022 20 (A) 21 - 32 mmol/L Final    Anion gap 08/05/2022 12  5 - 15 mmol/L Final    Glucose 08/05/2022 96  65 - 100 mg/dL Final    BUN 08/05/2022 70 (A) 6 - 20 MG/DL Final    Creatinine 08/05/2022 6.04 (A) 0.55 - 1.02 MG/DL Final    BUN/Creatinine ratio 08/05/2022 12  12 - 20   Final    GFR est AA 08/05/2022 8 (A) >60 ml/min/1.73m2 Final    GFR est non-AA 08/05/2022 7 (A) >60 ml/min/1.73m2 Final    Estimated GFR is calculated using the IDMS-traceable Modification of Diet in Renal Disease (MDRD) Study equation, reported for both  Americans (GFRAA) and non- Americans (GFRNA), and normalized to 1.73m2 body surface area. The physician must decide which value applies to the patient.     Calcium 08/05/2022 11.3 (A) 8.5 - 10.1 MG/DL Final    Phosphorus 08/05/2022 4.9 (A) 2.6 - 4.7 MG/DL Final    Albumin 08/05/2022 3.3 (A) 3.5 - 5.0 g/dL Final    Iron 08/05/2022 83  35 - 150 ug/dL Final    TIBC 08/05/2022 238 (A) 250 - 450 ug/dL Final    Iron % saturation 08/05/2022 35  20 - 50 % Final   Hospital Outpatient Visit on 07/13/2022   Component Date Value Ref Range Status    WBC 07/13/2022 8.7  3.6 - 11.0 K/uL Final    RBC 07/13/2022 3.30 (A) 3.80 - 5.20 M/uL Final    HGB 07/13/2022 9.3 (A) 11.5 - 16.0 g/dL Final    HCT 07/13/2022 30.4 (A) 35.0 - 47.0 % Final    MCV 07/13/2022 92.1  80.0 - 99.0 FL Final    MCH 07/13/2022 28.2  26.0 - 34.0 PG Final    MCHC 07/13/2022 30.6  30.0 - 36.5 g/dL Final    RDW 07/13/2022 14.7 (A) 11.5 - 14.5 % Final    PLATELET 11/57/9077 222  150 - 400 K/uL Final    MPV 07/13/2022 10.3  8.9 - 12.9 FL Final    NRBC 07/13/2022 0.0  0  WBC Final    ABSOLUTE NRBC 07/13/2022 0.00  0.00 - 0.01 K/uL Final    NEUTROPHILS 07/13/2022 70  32 - 75 % Final    LYMPHOCYTES 07/13/2022 20  12 - 49 % Final    MONOCYTES 07/13/2022 5  5 - 13 % Final    EOSINOPHILS 07/13/2022 3  0 - 7 % Final    BASOPHILS 07/13/2022 1  0 - 1 % Final IMMATURE GRANULOCYTES 07/13/2022 1 (A) 0.0 - 0.5 % Final    ABS. NEUTROPHILS 07/13/2022 6.2  1.8 - 8.0 K/UL Final    ABS. LYMPHOCYTES 07/13/2022 1.7  0.8 - 3.5 K/UL Final    ABS. MONOCYTES 07/13/2022 0.5  0.0 - 1.0 K/UL Final    ABS. EOSINOPHILS 07/13/2022 0.3  0.0 - 0.4 K/UL Final    ABS. BASOPHILS 07/13/2022 0.0  0.0 - 0.1 K/UL Final    ABS. IMM. GRANS. 07/13/2022 0.0  0.00 - 0.04 K/UL Final    DF 07/13/2022 AUTOMATED    Final    Sodium 07/13/2022 140  136 - 145 mmol/L Final    Potassium 07/13/2022 3.9  3.5 - 5.1 mmol/L Final    Chloride 07/13/2022 113 (A) 97 - 108 mmol/L Final    CO2 07/13/2022 19 (A) 21 - 32 mmol/L Final    Anion gap 07/13/2022 8  5 - 15 mmol/L Final    Glucose 07/13/2022 114 (A) 65 - 100 mg/dL Final    BUN 07/13/2022 62 (A) 6 - 20 MG/DL Final    Creatinine 07/13/2022 6.22 (A) 0.55 - 1.02 MG/DL Final    BUN/Creatinine ratio 07/13/2022 10 (A) 12 - 20   Final    GFR est AA 07/13/2022 8 (A) >60 ml/min/1.73m2 Final    GFR est non-AA 07/13/2022 7 (A) >60 ml/min/1.73m2 Final    Estimated GFR is calculated using the IDMS-traceable Modification of Diet in Renal Disease (MDRD) Study equation, reported for both  Americans (GFRAA) and non- Americans (GFRNA), and normalized to 1.73m2 body surface area. The physician must decide which value applies to the patient. Calcium 07/13/2022 11.6 (A) 8.5 - 10.1 MG/DL Final    Phosphorus 07/13/2022 4.9 (A) 2.6 - 4.7 MG/DL Final    Albumin 07/13/2022 3.0 (A) 3.5 - 5.0 g/dL Final       Skin:     Denies open wounds, cuts, sores, rashes or other areas of concern in PAT assessment.           Pia Vasques, NP

## 2022-08-12 ENCOUNTER — ANESTHESIA EVENT (OUTPATIENT)
Dept: SURGERY | Age: 62
End: 2022-08-12
Payer: COMMERCIAL

## 2022-08-12 NOTE — PERIOP NOTES
CALLED INFECTIOUS CONTROL TO NOTIFY OF MRSA TESTING BEING NEGATIVE AND CHART NEES TO BE UNFLAGGED. SPOKE WITH NIGEL.

## 2022-08-15 ENCOUNTER — HOSPITAL ENCOUNTER (OUTPATIENT)
Age: 62
Discharge: HOME HEALTH | End: 2022-08-22
Attending: ORTHOPAEDIC SURGERY | Admitting: ORTHOPAEDIC SURGERY
Payer: COMMERCIAL

## 2022-08-15 ENCOUNTER — APPOINTMENT (OUTPATIENT)
Dept: GENERAL RADIOLOGY | Age: 62
End: 2022-08-15
Attending: ORTHOPAEDIC SURGERY
Payer: COMMERCIAL

## 2022-08-15 ENCOUNTER — ANESTHESIA (OUTPATIENT)
Dept: SURGERY | Age: 62
End: 2022-08-15
Payer: COMMERCIAL

## 2022-08-15 DIAGNOSIS — Z96.641 AFTERCARE FOLLOWING RIGHT HIP JOINT REPLACEMENT SURGERY: Primary | ICD-10-CM

## 2022-08-15 DIAGNOSIS — Z47.1 AFTERCARE FOLLOWING RIGHT HIP JOINT REPLACEMENT SURGERY: Primary | ICD-10-CM

## 2022-08-15 PROBLEM — M87.051 AVASCULAR NECROSIS OF BONE OF RIGHT HIP (HCC): Status: ACTIVE | Noted: 2022-08-15

## 2022-08-15 LAB
GLUCOSE BLD STRIP.AUTO-MCNC: 78 MG/DL (ref 65–117)
SERVICE CMNT-IMP: NORMAL

## 2022-08-15 PROCEDURE — 77030040922 HC BLNKT HYPOTHRM STRY -A

## 2022-08-15 PROCEDURE — 74011000250 HC RX REV CODE- 250: Performed by: PHYSICIAN ASSISTANT

## 2022-08-15 PROCEDURE — 2709999900 HC NON-CHARGEABLE SUPPLY

## 2022-08-15 PROCEDURE — 74011250637 HC RX REV CODE- 250/637: Performed by: PHYSICIAN ASSISTANT

## 2022-08-15 PROCEDURE — 77030035236 HC SUT PDS STRATFX BARB J&J -B: Performed by: ORTHOPAEDIC SURGERY

## 2022-08-15 PROCEDURE — 76010000171 HC OR TIME 2 TO 2.5 HR INTENSV-TIER 1: Performed by: ORTHOPAEDIC SURGERY

## 2022-08-15 PROCEDURE — 77030008684 HC TU ET CUF COVD -B: Performed by: ANESTHESIOLOGY

## 2022-08-15 PROCEDURE — 97161 PT EVAL LOW COMPLEX 20 MIN: CPT

## 2022-08-15 PROCEDURE — 51798 US URINE CAPACITY MEASURE: CPT

## 2022-08-15 PROCEDURE — C1776 JOINT DEVICE (IMPLANTABLE): HCPCS | Performed by: ORTHOPAEDIC SURGERY

## 2022-08-15 PROCEDURE — 74011000250 HC RX REV CODE- 250: Performed by: ORTHOPAEDIC SURGERY

## 2022-08-15 PROCEDURE — 74011250636 HC RX REV CODE- 250/636: Performed by: PHYSICIAN ASSISTANT

## 2022-08-15 PROCEDURE — 74011250636 HC RX REV CODE- 250/636: Performed by: ORTHOPAEDIC SURGERY

## 2022-08-15 PROCEDURE — 97530 THERAPEUTIC ACTIVITIES: CPT

## 2022-08-15 PROCEDURE — 74011250636 HC RX REV CODE- 250/636: Performed by: ANESTHESIOLOGY

## 2022-08-15 PROCEDURE — 77030026438 HC STYL ET INTUB CARD -A: Performed by: ANESTHESIOLOGY

## 2022-08-15 PROCEDURE — 77030003666 HC NDL SPINAL BD -A: Performed by: ORTHOPAEDIC SURGERY

## 2022-08-15 PROCEDURE — 77030006835 HC BLD SAW SAG STRY -B: Performed by: ORTHOPAEDIC SURGERY

## 2022-08-15 PROCEDURE — 73501 X-RAY EXAM HIP UNI 1 VIEW: CPT

## 2022-08-15 PROCEDURE — 2709999900 HC NON-CHARGEABLE SUPPLY: Performed by: ORTHOPAEDIC SURGERY

## 2022-08-15 PROCEDURE — 97116 GAIT TRAINING THERAPY: CPT

## 2022-08-15 PROCEDURE — 74011000250 HC RX REV CODE- 250: Performed by: NURSE ANESTHETIST, CERTIFIED REGISTERED

## 2022-08-15 PROCEDURE — 77030027138 HC INCENT SPIROMETER -A

## 2022-08-15 PROCEDURE — 77030033138 HC SUT PGA STRATFX J&J -B: Performed by: ORTHOPAEDIC SURGERY

## 2022-08-15 PROCEDURE — 76060000036 HC ANESTHESIA 2.5 TO 3 HR: Performed by: ORTHOPAEDIC SURGERY

## 2022-08-15 PROCEDURE — 77030019905 HC CATH URETH INTMIT MDII -A

## 2022-08-15 PROCEDURE — 77030010507 HC ADH SKN DERMBND J&J -B: Performed by: ORTHOPAEDIC SURGERY

## 2022-08-15 PROCEDURE — 74011250636 HC RX REV CODE- 250/636: Performed by: NURSE ANESTHETIST, CERTIFIED REGISTERED

## 2022-08-15 PROCEDURE — 77030011264 HC ELECTRD BLD EXT COVD -A: Performed by: ORTHOPAEDIC SURGERY

## 2022-08-15 PROCEDURE — 77030020788: Performed by: ORTHOPAEDIC SURGERY

## 2022-08-15 PROCEDURE — 77030031139 HC SUT VCRL2 J&J -A: Performed by: ORTHOPAEDIC SURGERY

## 2022-08-15 PROCEDURE — 77030036660

## 2022-08-15 PROCEDURE — 76210000016 HC OR PH I REC 1 TO 1.5 HR: Performed by: ORTHOPAEDIC SURGERY

## 2022-08-15 PROCEDURE — 77030019905 HC CATH URETH INTMIT MDII -A: Performed by: ORTHOPAEDIC SURGERY

## 2022-08-15 PROCEDURE — 77030040361 HC SLV COMPR DVT MDII -B: Performed by: ORTHOPAEDIC SURGERY

## 2022-08-15 PROCEDURE — 82962 GLUCOSE BLOOD TEST: CPT

## 2022-08-15 PROCEDURE — 74011000258 HC RX REV CODE- 258: Performed by: NURSE ANESTHETIST, CERTIFIED REGISTERED

## 2022-08-15 DEVICE — SUMMIT FEMORAL STEM 12/14 TAPER TAPER ED W/POROCOAT SIZE 5 HI 145MM
Type: IMPLANTABLE DEVICE | Site: HIP | Status: FUNCTIONAL
Brand: SUMMIT POROCOAT

## 2022-08-15 DEVICE — BIOLOX DELTA CERAMIC FEMORAL HEAD +1.5 36MM DIA 12/14 TAPER
Type: IMPLANTABLE DEVICE | Site: HIP | Status: FUNCTIONAL
Brand: BIOLOX DELTA

## 2022-08-15 DEVICE — HIP H2 TOT ADV OTHER HD IMPL CAPPED SYNTHES: Type: IMPLANTABLE DEVICE | Status: FUNCTIONAL

## 2022-08-15 DEVICE — PINNACLE GRIPTION ACETABULAR SHELL SECTOR 52MM OD
Type: IMPLANTABLE DEVICE | Site: HIP | Status: FUNCTIONAL
Brand: PINNACLE GRIPTION

## 2022-08-15 DEVICE — PINNACLE CANCELLOUS BONE SCREW 6.5MM X 25MM
Type: IMPLANTABLE DEVICE | Site: HIP | Status: FUNCTIONAL
Brand: PINNACLE

## 2022-08-15 DEVICE — PINNACLE HIP SOLUTIONS ALTRX POLYETHYLENE ACETABULAR LINER NEUTRAL 36MM ID 52MM OD
Type: IMPLANTABLE DEVICE | Site: HIP | Status: FUNCTIONAL
Brand: PINNACLE ALTRX

## 2022-08-15 RX ORDER — POLYETHYLENE GLYCOL 3350 17 G/17G
17 POWDER, FOR SOLUTION ORAL DAILY
Status: DISCONTINUED | OUTPATIENT
Start: 2022-08-16 | End: 2022-08-22 | Stop reason: HOSPADM

## 2022-08-15 RX ORDER — AMOXICILLIN 250 MG
1 CAPSULE ORAL 2 TIMES DAILY
Status: DISCONTINUED | OUTPATIENT
Start: 2022-08-15 | End: 2022-08-22 | Stop reason: HOSPADM

## 2022-08-15 RX ORDER — CARVEDILOL 12.5 MG/1
12.5 TABLET ORAL 2 TIMES DAILY WITH MEALS
Status: DISCONTINUED | OUTPATIENT
Start: 2022-08-15 | End: 2022-08-22 | Stop reason: HOSPADM

## 2022-08-15 RX ORDER — FENTANYL CITRATE 50 UG/ML
50 INJECTION, SOLUTION INTRAMUSCULAR; INTRAVENOUS AS NEEDED
Status: DISCONTINUED | OUTPATIENT
Start: 2022-08-15 | End: 2022-08-15 | Stop reason: HOSPADM

## 2022-08-15 RX ORDER — LIDOCAINE HYDROCHLORIDE 10 MG/ML
0.1 INJECTION, SOLUTION EPIDURAL; INFILTRATION; INTRACAUDAL; PERINEURAL AS NEEDED
Status: DISCONTINUED | OUTPATIENT
Start: 2022-08-15 | End: 2022-08-15 | Stop reason: HOSPADM

## 2022-08-15 RX ORDER — ROCURONIUM BROMIDE 10 MG/ML
INJECTION, SOLUTION INTRAVENOUS AS NEEDED
Status: DISCONTINUED | OUTPATIENT
Start: 2022-08-15 | End: 2022-08-15 | Stop reason: HOSPADM

## 2022-08-15 RX ORDER — HYDROMORPHONE HYDROCHLORIDE 1 MG/ML
0.5 INJECTION, SOLUTION INTRAMUSCULAR; INTRAVENOUS; SUBCUTANEOUS
Status: DISPENSED | OUTPATIENT
Start: 2022-08-15 | End: 2022-08-16

## 2022-08-15 RX ORDER — NEOSTIGMINE METHYLSULFATE 1 MG/ML
INJECTION, SOLUTION INTRAVENOUS AS NEEDED
Status: DISCONTINUED | OUTPATIENT
Start: 2022-08-15 | End: 2022-08-15 | Stop reason: HOSPADM

## 2022-08-15 RX ORDER — GLYCOPYRROLATE 0.2 MG/ML
INJECTION INTRAMUSCULAR; INTRAVENOUS AS NEEDED
Status: DISCONTINUED | OUTPATIENT
Start: 2022-08-15 | End: 2022-08-15 | Stop reason: HOSPADM

## 2022-08-15 RX ORDER — MIDAZOLAM HYDROCHLORIDE 1 MG/ML
1 INJECTION, SOLUTION INTRAMUSCULAR; INTRAVENOUS AS NEEDED
Status: DISCONTINUED | OUTPATIENT
Start: 2022-08-15 | End: 2022-08-15 | Stop reason: HOSPADM

## 2022-08-15 RX ORDER — DEXAMETHASONE SODIUM PHOSPHATE 4 MG/ML
INJECTION, SOLUTION INTRA-ARTICULAR; INTRALESIONAL; INTRAMUSCULAR; INTRAVENOUS; SOFT TISSUE AS NEEDED
Status: DISCONTINUED | OUTPATIENT
Start: 2022-08-15 | End: 2022-08-15 | Stop reason: HOSPADM

## 2022-08-15 RX ORDER — POTASSIUM CHLORIDE 750 MG/1
20 TABLET, FILM COATED, EXTENDED RELEASE ORAL DAILY
Refills: 3 | Status: DISCONTINUED | OUTPATIENT
Start: 2022-08-16 | End: 2022-08-22 | Stop reason: HOSPADM

## 2022-08-15 RX ORDER — ACETAMINOPHEN 325 MG/1
650 TABLET ORAL EVERY 6 HOURS
Status: DISCONTINUED | OUTPATIENT
Start: 2022-08-15 | End: 2022-08-22 | Stop reason: HOSPADM

## 2022-08-15 RX ORDER — ONDANSETRON 2 MG/ML
4 INJECTION INTRAMUSCULAR; INTRAVENOUS
Status: ACTIVE | OUTPATIENT
Start: 2022-08-15 | End: 2022-08-16

## 2022-08-15 RX ORDER — HYDROMORPHONE HYDROCHLORIDE 2 MG/1
1 TABLET ORAL
Status: DISCONTINUED | OUTPATIENT
Start: 2022-08-15 | End: 2022-08-16

## 2022-08-15 RX ORDER — HYDROMORPHONE HYDROCHLORIDE 2 MG/1
2 TABLET ORAL
Status: DISCONTINUED | OUTPATIENT
Start: 2022-08-15 | End: 2022-08-16

## 2022-08-15 RX ORDER — BUMETANIDE 1 MG/1
1 TABLET ORAL DAILY
Status: DISCONTINUED | OUTPATIENT
Start: 2022-08-17 | End: 2022-08-22 | Stop reason: HOSPADM

## 2022-08-15 RX ORDER — SODIUM CHLORIDE 0.9 % (FLUSH) 0.9 %
5-40 SYRINGE (ML) INJECTION AS NEEDED
Status: DISCONTINUED | OUTPATIENT
Start: 2022-08-15 | End: 2022-08-15 | Stop reason: HOSPADM

## 2022-08-15 RX ORDER — VANCOMYCIN/0.9 % SOD CHLORIDE 1.5G/250ML
1500 PLASTIC BAG, INJECTION (ML) INTRAVENOUS ONCE
Status: COMPLETED | OUTPATIENT
Start: 2022-08-15 | End: 2022-08-15

## 2022-08-15 RX ORDER — ONDANSETRON 4 MG/1
4 TABLET, ORALLY DISINTEGRATING ORAL
Status: DISCONTINUED | OUTPATIENT
Start: 2022-08-15 | End: 2022-08-22 | Stop reason: HOSPADM

## 2022-08-15 RX ORDER — NALOXONE HYDROCHLORIDE 0.4 MG/ML
0.4 INJECTION, SOLUTION INTRAMUSCULAR; INTRAVENOUS; SUBCUTANEOUS AS NEEDED
Status: DISCONTINUED | OUTPATIENT
Start: 2022-08-15 | End: 2022-08-22 | Stop reason: HOSPADM

## 2022-08-15 RX ORDER — ASPIRIN 81 MG/1
81 TABLET ORAL 2 TIMES DAILY
Status: DISCONTINUED | OUTPATIENT
Start: 2022-08-15 | End: 2022-08-22 | Stop reason: HOSPADM

## 2022-08-15 RX ORDER — FACIAL-BODY WIPES
10 EACH TOPICAL DAILY PRN
Status: DISCONTINUED | OUTPATIENT
Start: 2022-08-17 | End: 2022-08-22 | Stop reason: HOSPADM

## 2022-08-15 RX ORDER — PRAVASTATIN SODIUM 20 MG/1
40 TABLET ORAL
Status: DISCONTINUED | OUTPATIENT
Start: 2022-08-15 | End: 2022-08-22 | Stop reason: HOSPADM

## 2022-08-15 RX ORDER — FENTANYL CITRATE 50 UG/ML
INJECTION, SOLUTION INTRAMUSCULAR; INTRAVENOUS AS NEEDED
Status: DISCONTINUED | OUTPATIENT
Start: 2022-08-15 | End: 2022-08-15 | Stop reason: HOSPADM

## 2022-08-15 RX ORDER — FENTANYL CITRATE 50 UG/ML
25 INJECTION, SOLUTION INTRAMUSCULAR; INTRAVENOUS
Status: COMPLETED | OUTPATIENT
Start: 2022-08-15 | End: 2022-08-15

## 2022-08-15 RX ORDER — SODIUM CHLORIDE 0.9 % (FLUSH) 0.9 %
5-40 SYRINGE (ML) INJECTION EVERY 8 HOURS
Status: DISCONTINUED | OUTPATIENT
Start: 2022-08-15 | End: 2022-08-15 | Stop reason: HOSPADM

## 2022-08-15 RX ORDER — HYDROXYZINE HYDROCHLORIDE 10 MG/1
5 TABLET, FILM COATED ORAL
Status: DISCONTINUED | OUTPATIENT
Start: 2022-08-15 | End: 2022-08-22 | Stop reason: HOSPADM

## 2022-08-15 RX ORDER — ACETAMINOPHEN 325 MG/1
650 TABLET ORAL ONCE
Status: DISCONTINUED | OUTPATIENT
Start: 2022-08-15 | End: 2022-08-15 | Stop reason: SDUPTHER

## 2022-08-15 RX ORDER — PROPOFOL 10 MG/ML
INJECTION, EMULSION INTRAVENOUS AS NEEDED
Status: DISCONTINUED | OUTPATIENT
Start: 2022-08-15 | End: 2022-08-15 | Stop reason: HOSPADM

## 2022-08-15 RX ORDER — BUPIVACAINE HYDROCHLORIDE AND EPINEPHRINE 5; 5 MG/ML; UG/ML
INJECTION, SOLUTION EPIDURAL; INTRACAUDAL; PERINEURAL AS NEEDED
Status: DISCONTINUED | OUTPATIENT
Start: 2022-08-15 | End: 2022-08-15 | Stop reason: HOSPADM

## 2022-08-15 RX ORDER — TROSPIUM CHLORIDE 20 MG/1
20 TABLET, FILM COATED ORAL DAILY
Refills: 11 | Status: DISCONTINUED | OUTPATIENT
Start: 2022-08-16 | End: 2022-08-15

## 2022-08-15 RX ORDER — HYDROMORPHONE HYDROCHLORIDE 1 MG/ML
0.2 INJECTION, SOLUTION INTRAMUSCULAR; INTRAVENOUS; SUBCUTANEOUS
Status: DISCONTINUED | OUTPATIENT
Start: 2022-08-15 | End: 2022-08-15 | Stop reason: HOSPADM

## 2022-08-15 RX ORDER — SODIUM BICARBONATE 650 MG/1
325 TABLET ORAL 2 TIMES DAILY
Status: DISCONTINUED | OUTPATIENT
Start: 2022-08-15 | End: 2022-08-17

## 2022-08-15 RX ORDER — HYDROMORPHONE HYDROCHLORIDE 2 MG/ML
INJECTION, SOLUTION INTRAMUSCULAR; INTRAVENOUS; SUBCUTANEOUS AS NEEDED
Status: DISCONTINUED | OUTPATIENT
Start: 2022-08-15 | End: 2022-08-15 | Stop reason: HOSPADM

## 2022-08-15 RX ORDER — SODIUM CHLORIDE 9 MG/ML
50 INJECTION, SOLUTION INTRAVENOUS CONTINUOUS
Status: DISCONTINUED | OUTPATIENT
Start: 2022-08-15 | End: 2022-08-16

## 2022-08-15 RX ORDER — FAMOTIDINE 20 MG/1
20 TABLET, FILM COATED ORAL DAILY
Status: DISCONTINUED | OUTPATIENT
Start: 2022-08-15 | End: 2022-08-15

## 2022-08-15 RX ORDER — HYDRALAZINE HYDROCHLORIDE 10 MG/1
10 TABLET, FILM COATED ORAL 3 TIMES DAILY
Status: DISCONTINUED | OUTPATIENT
Start: 2022-08-16 | End: 2022-08-22 | Stop reason: HOSPADM

## 2022-08-15 RX ORDER — FAMOTIDINE 20 MG/1
20 TABLET, FILM COATED ORAL EVERY 12 HOURS
Status: DISCONTINUED | OUTPATIENT
Start: 2022-08-15 | End: 2022-08-15

## 2022-08-15 RX ORDER — LIDOCAINE HYDROCHLORIDE 20 MG/ML
INJECTION, SOLUTION EPIDURAL; INFILTRATION; INTRACAUDAL; PERINEURAL AS NEEDED
Status: DISCONTINUED | OUTPATIENT
Start: 2022-08-15 | End: 2022-08-15 | Stop reason: HOSPADM

## 2022-08-15 RX ORDER — SODIUM CHLORIDE 0.9 % (FLUSH) 0.9 %
5-40 SYRINGE (ML) INJECTION EVERY 8 HOURS
Status: DISCONTINUED | OUTPATIENT
Start: 2022-08-15 | End: 2022-08-22 | Stop reason: HOSPADM

## 2022-08-15 RX ORDER — ACETAMINOPHEN 500 MG
1000 TABLET ORAL ONCE
Status: COMPLETED | OUTPATIENT
Start: 2022-08-15 | End: 2022-08-15

## 2022-08-15 RX ORDER — MIDAZOLAM HYDROCHLORIDE 1 MG/ML
INJECTION, SOLUTION INTRAMUSCULAR; INTRAVENOUS AS NEEDED
Status: DISCONTINUED | OUTPATIENT
Start: 2022-08-15 | End: 2022-08-15 | Stop reason: HOSPADM

## 2022-08-15 RX ORDER — SODIUM CHLORIDE 9 MG/ML
INJECTION, SOLUTION INTRAVENOUS
Status: DISCONTINUED | OUTPATIENT
Start: 2022-08-15 | End: 2022-08-15 | Stop reason: HOSPADM

## 2022-08-15 RX ORDER — ONDANSETRON 2 MG/ML
4 INJECTION INTRAMUSCULAR; INTRAVENOUS AS NEEDED
Status: DISCONTINUED | OUTPATIENT
Start: 2022-08-15 | End: 2022-08-15 | Stop reason: HOSPADM

## 2022-08-15 RX ORDER — NORETHINDRONE AND ETHINYL ESTRADIOL 0.5-0.035
KIT ORAL AS NEEDED
Status: DISCONTINUED | OUTPATIENT
Start: 2022-08-15 | End: 2022-08-15 | Stop reason: HOSPADM

## 2022-08-15 RX ORDER — ALLOPURINOL 100 MG/1
100 TABLET ORAL 2 TIMES DAILY
Status: DISCONTINUED | OUTPATIENT
Start: 2022-08-15 | End: 2022-08-19

## 2022-08-15 RX ORDER — SODIUM CHLORIDE 0.9 % (FLUSH) 0.9 %
5-40 SYRINGE (ML) INJECTION AS NEEDED
Status: DISCONTINUED | OUTPATIENT
Start: 2022-08-15 | End: 2022-08-22 | Stop reason: HOSPADM

## 2022-08-15 RX ORDER — PREGABALIN 25 MG/1
25 CAPSULE ORAL ONCE
Status: COMPLETED | OUTPATIENT
Start: 2022-08-15 | End: 2022-08-15

## 2022-08-15 RX ORDER — PROCHLORPERAZINE EDISYLATE 5 MG/ML
5 INJECTION INTRAMUSCULAR; INTRAVENOUS
Status: ACTIVE | OUTPATIENT
Start: 2022-08-15 | End: 2022-08-16

## 2022-08-15 RX ORDER — SODIUM CHLORIDE 9 MG/ML
25 INJECTION, SOLUTION INTRAVENOUS CONTINUOUS
Status: DISCONTINUED | OUTPATIENT
Start: 2022-08-15 | End: 2022-08-15 | Stop reason: HOSPADM

## 2022-08-15 RX ORDER — SODIUM CHLORIDE, SODIUM LACTATE, POTASSIUM CHLORIDE, CALCIUM CHLORIDE 600; 310; 30; 20 MG/100ML; MG/100ML; MG/100ML; MG/100ML
50 INJECTION, SOLUTION INTRAVENOUS CONTINUOUS
Status: DISCONTINUED | OUTPATIENT
Start: 2022-08-15 | End: 2022-08-15 | Stop reason: HOSPADM

## 2022-08-15 RX ORDER — ONDANSETRON 2 MG/ML
INJECTION INTRAMUSCULAR; INTRAVENOUS AS NEEDED
Status: DISCONTINUED | OUTPATIENT
Start: 2022-08-15 | End: 2022-08-15 | Stop reason: HOSPADM

## 2022-08-15 RX ORDER — AMLODIPINE BESYLATE 5 MG/1
10 TABLET ORAL DAILY
Status: DISCONTINUED | OUTPATIENT
Start: 2022-08-17 | End: 2022-08-22 | Stop reason: HOSPADM

## 2022-08-15 RX ADMIN — SODIUM CHLORIDE: 900 INJECTION, SOLUTION INTRAVENOUS at 09:01

## 2022-08-15 RX ADMIN — LIDOCAINE HYDROCHLORIDE 60 MG: 20 INJECTION, SOLUTION EPIDURAL; INFILTRATION; INTRACAUDAL; PERINEURAL at 09:13

## 2022-08-15 RX ADMIN — EPHEDRINE SULFATE 5 MG: 50 INJECTION INTRAVENOUS at 09:35

## 2022-08-15 RX ADMIN — ASPIRIN 81 MG: 81 TABLET, COATED ORAL at 23:58

## 2022-08-15 RX ADMIN — ROCURONIUM BROMIDE 10 MG: 10 SOLUTION INTRAVENOUS at 10:01

## 2022-08-15 RX ADMIN — SODIUM CHLORIDE 50 ML/HR: 9 INJECTION, SOLUTION INTRAVENOUS at 12:00

## 2022-08-15 RX ADMIN — HYDROMORPHONE HYDROCHLORIDE 0.5 MG: 1 INJECTION, SOLUTION INTRAMUSCULAR; INTRAVENOUS; SUBCUTANEOUS at 21:01

## 2022-08-15 RX ADMIN — ACETAMINOPHEN 1000 MG: 500 TABLET ORAL at 08:25

## 2022-08-15 RX ADMIN — PREGABALIN 25 MG: 25 CAPSULE ORAL at 08:47

## 2022-08-15 RX ADMIN — ACETAMINOPHEN 650 MG: 325 TABLET ORAL at 13:47

## 2022-08-15 RX ADMIN — FENTANYL CITRATE 25 MCG: 50 INJECTION, SOLUTION INTRAMUSCULAR; INTRAVENOUS at 11:48

## 2022-08-15 RX ADMIN — FENTANYL CITRATE 25 MCG: 50 INJECTION, SOLUTION INTRAMUSCULAR; INTRAVENOUS at 12:45

## 2022-08-15 RX ADMIN — HYDROMORPHONE HYDROCHLORIDE 1 MG: 2 TABLET ORAL at 14:41

## 2022-08-15 RX ADMIN — GLYCOPYRROLATE 0.2 MG: 0.2 INJECTION, SOLUTION INTRAMUSCULAR; INTRAVENOUS at 11:04

## 2022-08-15 RX ADMIN — SODIUM BICARBONATE 325 MG: 650 TABLET ORAL at 17:42

## 2022-08-15 RX ADMIN — NEOSTIGMINE METHYLSULFATE 2 MG: 1 INJECTION, SOLUTION INTRAVENOUS at 11:04

## 2022-08-15 RX ADMIN — EPHEDRINE SULFATE 15 MG: 50 INJECTION INTRAVENOUS at 11:18

## 2022-08-15 RX ADMIN — ONDANSETRON HYDROCHLORIDE 4 MG: 2 INJECTION, SOLUTION INTRAMUSCULAR; INTRAVENOUS at 10:59

## 2022-08-15 RX ADMIN — GLYCOPYRROLATE 0.2 MG: 0.2 INJECTION, SOLUTION INTRAMUSCULAR; INTRAVENOUS at 09:48

## 2022-08-15 RX ADMIN — ACETAMINOPHEN 650 MG: 325 TABLET ORAL at 19:07

## 2022-08-15 RX ADMIN — SENNOSIDES AND DOCUSATE SODIUM 1 TABLET: 50; 8.6 TABLET ORAL at 17:42

## 2022-08-15 RX ADMIN — EPHEDRINE SULFATE 5 MG: 50 INJECTION INTRAVENOUS at 10:23

## 2022-08-15 RX ADMIN — PROPOFOL 130 MG: 10 INJECTION, EMULSION INTRAVENOUS at 09:13

## 2022-08-15 RX ADMIN — PRAVASTATIN SODIUM 40 MG: 40 TABLET ORAL at 21:01

## 2022-08-15 RX ADMIN — TRANEXAMIC ACID 1 G: 100 INJECTION, SOLUTION INTRAVENOUS at 09:47

## 2022-08-15 RX ADMIN — CARVEDILOL 12.5 MG: 12.5 TABLET, FILM COATED ORAL at 17:42

## 2022-08-15 RX ADMIN — HYDROMORPHONE HYDROCHLORIDE 1 MG: 2 TABLET ORAL at 13:48

## 2022-08-15 RX ADMIN — WATER 2 G: 1 INJECTION INTRAMUSCULAR; INTRAVENOUS; SUBCUTANEOUS at 09:50

## 2022-08-15 RX ADMIN — EPHEDRINE SULFATE 5 MG: 50 INJECTION INTRAVENOUS at 09:27

## 2022-08-15 RX ADMIN — SODIUM CHLORIDE, PRESERVATIVE FREE 10 ML: 5 INJECTION INTRAVENOUS at 21:02

## 2022-08-15 RX ADMIN — HYDROMORPHONE HYDROCHLORIDE 0.5 MG: 1 INJECTION, SOLUTION INTRAMUSCULAR; INTRAVENOUS; SUBCUTANEOUS at 17:17

## 2022-08-15 RX ADMIN — CEFAZOLIN 2 G: 1 INJECTION, POWDER, FOR SOLUTION INTRAMUSCULAR; INTRAVENOUS at 17:43

## 2022-08-15 RX ADMIN — FENTANYL CITRATE 50 MCG: 50 INJECTION, SOLUTION INTRAMUSCULAR; INTRAVENOUS at 09:13

## 2022-08-15 RX ADMIN — FENTANYL CITRATE 50 MCG: 50 INJECTION, SOLUTION INTRAMUSCULAR; INTRAVENOUS at 09:50

## 2022-08-15 RX ADMIN — Medication 1500 MG: at 08:26

## 2022-08-15 RX ADMIN — DEXAMETHASONE SODIUM PHOSPHATE 4 MG: 4 INJECTION, SOLUTION INTRAMUSCULAR; INTRAVENOUS at 09:45

## 2022-08-15 RX ADMIN — HYDROMORPHONE HYDROCHLORIDE 0.6 MG: 2 INJECTION, SOLUTION INTRAMUSCULAR; INTRAVENOUS; SUBCUTANEOUS at 11:12

## 2022-08-15 RX ADMIN — ROCURONIUM BROMIDE 40 MG: 10 SOLUTION INTRAVENOUS at 09:13

## 2022-08-15 RX ADMIN — EPHEDRINE SULFATE 10 MG: 50 INJECTION INTRAVENOUS at 10:47

## 2022-08-15 RX ADMIN — MIDAZOLAM 2 MG: 1 INJECTION INTRAMUSCULAR; INTRAVENOUS at 09:01

## 2022-08-15 RX ADMIN — FENTANYL CITRATE 25 MCG: 50 INJECTION, SOLUTION INTRAMUSCULAR; INTRAVENOUS at 12:50

## 2022-08-15 RX ADMIN — FENTANYL CITRATE 25 MCG: 50 INJECTION, SOLUTION INTRAMUSCULAR; INTRAVENOUS at 12:00

## 2022-08-15 RX ADMIN — PHENYLEPHRINE HYDROCHLORIDE 40 MCG/MIN: 10 INJECTION INTRAVENOUS at 09:13

## 2022-08-15 RX ADMIN — SODIUM CHLORIDE 25 ML/HR: 9 INJECTION, SOLUTION INTRAVENOUS at 08:36

## 2022-08-15 RX ADMIN — HYDROMORPHONE HYDROCHLORIDE 0.4 MG: 2 INJECTION, SOLUTION INTRAMUSCULAR; INTRAVENOUS; SUBCUTANEOUS at 11:31

## 2022-08-15 NOTE — PERIOP NOTES
Patient: Donnie Roman MRN: [id number]  SSN: [id number]   YOB: 1960  Age: 57 y.o. Sex: female     Patient is status post Procedure(s):  RIGHT TOTAL HIP ARTHROPLASTY ANTERIOR APPROACH. Surgeon(s) and Role:     * Stanley Roberson MD - Primary    Local/Dose/Irrigation:  30 ml 0.5% marcaine with epi                  Peripheral IV 08/15/22 Right Hand (Active)   Site Assessment Clean, dry, & intact 08/15/22 0823   Phlebitis Assessment 0 08/15/22 0823   Dressing Status Clean, dry, & intact 08/15/22 0823   Dressing Type Transparent 08/15/22 0823   Hub Color/Line Status Blue; Infusing 08/15/22 0823            Airway - Endotracheal Tube 08/15/22 Oral (Active)                   Dressing/Packing:  Incision 08/15/22 Hip Right-Dressing/Treatment: Other (Comment) (Dermabond/Primaseal) (08/15/22 1107)    Splint/Cast:  ]    Other:

## 2022-08-15 NOTE — ANESTHESIA POSTPROCEDURE EVALUATION
Post-Anesthesia Evaluation and Assessment    Patient: Vanessa Cleveland MRN: [id number]  SSN: [id number]    YOB: 1960  Age: 57 y.o. Sex: female      I have evaluated the patient and they are stable and ready for discharge from the PACU. Cardiovascular Function/Vital Signs  Visit Vitals  /79   Pulse 88   Temp 36.4 °C (97.6 °F)   Resp 16   Ht 5' 6\" (1.676 m)   Wt 101.6 kg (224 lb)   SpO2 95%   BMI 36.15 kg/m²       Patient is status post General anesthesia for Procedure(s):  RIGHT TOTAL HIP ARTHROPLASTY ANTERIOR APPROACH. Nausea/Vomiting: None    Postoperative hydration reviewed and adequate. Pain:  Pain Scale 1: Numeric (0 - 10) (08/15/22 1215)  Pain Intensity 1: 5 (08/15/22 1215)   Managed    Neurological Status:   Neuro (WDL): Exceptions to WDL (08/15/22 1215)  Neuro  Neurologic State: Drowsy (08/15/22 1215)  LUE Motor Response: Purposeful (08/15/22 1215)  LLE Motor Response: Purposeful (08/15/22 1215)  RUE Motor Response: Purposeful (08/15/22 1215)  RLE Motor Response: Purposeful (08/15/22 1215)   At baseline    Mental Status, Level of Consciousness: Alert and  oriented to person, place, and time    Pulmonary Status:   O2 Device: Nasal cannula (08/15/22 1215)   Adequate oxygenation and airway patent    Complications related to anesthesia: None    Post-anesthesia assessment completed.  No concerns    Signed By: Jesenia Rico MD     August 15, 2022

## 2022-08-15 NOTE — PROGRESS NOTES
Famotidine - Renal dosing by Pharmacy  Current regimen:  20 mg PO Q 12 hr  No results for input(s): CREA, BUN in the last 72 hours. SCr Trend:  Recent Labs     08/10/22  1534 08/05/22  1609 07/13/22  1441 06/10/22  1701 05/13/22  1624 04/19/22  1138   CREA 6.55* 6.04* 6.22* 5.71* 5.16* 4.67*   BUN 75* 70* 62* 57* 60* 44*   Estimated CrCl:  10.4 ml/min    Plan:   Change to 20 mg PO Q 24 hr with respect to estimated creatinine clearance (CrCl <50 mL/min, not currently on dialysis, patient on kidney transplant list) per [facility]/[employer]-approved Renal Dosing Adjustment protocol. Pharmacy will continue to monitor this patient daily for changes in clinical status and renal function. Please contact pharmacy with any questions/clarifications at .      Annie Mcnamara, PharmD  Clinical Pharmacist, Orthopedics and Med/Surg  [id number] [facility] ()

## 2022-08-15 NOTE — PROGRESS NOTES
Ortho NP Note    POD# 0  s/p RIGHT TOTAL HIP ARTHROPLASTY ANTERIOR APPROACH     Pt recently returned to bed by PT. Therapist reports that patient was transferred to [facility] and on transfer back patient's legs gave out and was subsequently supported by therapy staff then lowered to kneeling on floor. No sudden fall, no impact. Patient denies pain to knees, remembers event in its entirety. Per patient, she states that she felt her legs giving away but denies dizziness, lightheadedness. Patient able to assist therapy staff with stand again and then transferred back to bed. Unable to void while in [facility], with repeated complaint of need to urinate after returning to bed. Per patient, makes urine at home and \"voids all the time. \"     Currently rating pain 10/10 despite dilaudid dosing but requesting quiet room to nap/rest.      Patient has not had something to eat/drink. No nausea. VSS Afebrile. Visit Vitals  /72 (BP 1 Location: Left upper arm, BP Patient Position: At rest)   Pulse 91   Temp 97.4 °F (36.3 °C)   Resp 18   Ht 5' 6\" (1.676 m)   Wt 101.6 kg (224 lb)   SpO2 92%   BMI 36.15 kg/m²       Most Recent Labs:   Lab Results   Component Value Date/Time    HGB 9.0 (L) 08/10/2022 03:34 PM    Hemoglobin A1c 5.2 08/10/2022 03:34 PM       Body mass index is 36.15 kg/m². Reference: BMI greater than 30 is classified as obesity and greater than 40 is classified as morbid obesity. STOP BANG Score:  upcoming sleep study    Voiding status: remains due to void    Aquacel to right hip c.d.i. Cryotherapy in place over incision. Bilateral LEs warm, dry. 1+ DP pulses  Sensation and motor intact. DF/PF/EHL 4/5. SCDs for mechanical DVT proph    Plan:  1) PT: starting today, WBAT  2) Beba-op Antibiotics  Ancef, Vanco  3) Pain:  Received tylenol, Lyrica in preop. Perioperative anesthesia: General.  Post operative analgesia includes scheduled tylenol and PRN dilaudid PO or IV depending on severity.   Use caution with narcotics d/t CKD  4) DVT Prophylaxis:  Aspirin 81 mg PO BID. Encouraged early mobilization, bed exercises, and SCD use. 5) Hx of CKD stage 5 2/2 HTN nephrosclerosis: Caution with narcotic dosing and nephrotoxins. Lab check in AM.  6) Urinary management: bladder scan, pure wick to be placed overnight until repeat therapy evaluation. D/C'ed trospium until voiding spontaneously.      7) Hx of HTN: Resume home carvedilol, hydralazine PO      Bird Cristobal NP

## 2022-08-15 NOTE — PERIOP NOTES
TRANSFER - OUT REPORT:    Verbal report given to HUGO Guevara(name) on Cony Vargas  being transferred to 569(unit) for routine post - op       Report consisted of patients Situation, Background, Assessment and   Recommendations(SBAR). Time Pre op antibiotic given:9:50 Ancef, Vancomycin  Anesthesia Stop time: 11:35  Zavala Present on Transfer to floor:no  Order for Zavala on Chart:no  Discharge Prescriptions with Chart:no    Information from the following report(s) SBAR, Kardex, OR Summary, Procedure Summary, Intake/Output, MAR, Recent Results, Med Rec Status, and Cardiac Rhythm SR  was reviewed with the receiving nurse. Opportunity for questions and clarification was provided. Is the patient on 02? YES       L/Min 2       Other     Is the patient on a monitor? NO    Is the nurse transporting with the patient? NO    Surgical Waiting Area notified of patient's transfer from PACU? YES      The following personal items collected during your admission accompanied patient upon transfer:   Dental Appliance: Dental Appliances: None  Vision: Visual Aid: Glasses  Hearing Aid:    Jewelry: Jewelry: None  Clothing: Clothing:  (bag of clothing and glasses returned to pt.)  Other Valuables:  Other Valuables: None  Valuables sent to safe:

## 2022-08-15 NOTE — PROGRESS NOTES
Problem: Falls - Risk of  Goal: *Absence of Falls  Description: Document Akil Robles Fall Risk and appropriate interventions in the flowsheet.   Outcome: Progressing Towards Goal  Note: Fall Risk Interventions:  Mobility Interventions: Communicate number of staff needed for ambulation/transfer, OT consult for ADLs, Patient to call before getting OOB, PT Consult for mobility concerns, PT Consult for assist device competence, Strengthening exercises (ROM-active/passive), Utilize walker, cane, or other assistive device, Utilize gait belt for transfers/ambulation         Medication Interventions: Assess postural VS orthostatic hypotension, Patient to call before getting OOB, Teach patient to arise slowly, Utilize gait belt for transfers/ambulation    Elimination Interventions: Patient to call for help with toileting needs (bed side commode)              Problem: Hip Replacement: Day of Surgery/Unit  Goal: Activity/Safety  Outcome: Progressing Towards Goal  Goal: Respiratory  Outcome: Progressing Towards Goal  Goal: *Hemodynamically stable  Outcome: Progressing Towards Goal     Problem: Hip Replacement: Day of Surgery/Unit  Goal: *Initiate mobility  Outcome: Not Progressing Towards Goal  Note: Mobilized with PT, unable to ambulate  Goal: *Optimal pain control at patient's stated goal  Outcome: Not Progressing Towards Goal  Note: Severe pain

## 2022-08-15 NOTE — OP NOTES
Operative Note  OPERATIVE NOTE    8/15/2022  DJD RIGHT HIP  DJD RIGHT HIP    PREOPERATIVE DIAGNOSIS: AVN, right hip. POSTOPERATIVE DIAGNOSIS: AVN, right hip. OPERATIVE PROCEDURE: right total hip replacement. SURGEON: Ernie Kimble MD    ASSISTANT SURGEON: Don Smith [facility]    ANESTHESIA: general anesthesia    ESTIMATED BLOOD LOSS: 200cc. Pre op Antibiotics: Ancef    INDICATIONS: A 57 y.o.  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 COMPLICATIONS: None    PROCEDURE: Patient was taken to the operating room and underwent general anesthesia. The patient was placed on the [facility] table with both legs in boots, and intermittent compression hose placed on bilateral calves. The right hip and leg were prepped and draped in the usual fashion. Standard anterior approach was made to the hip, down through the skin and subcutaneous tissue. Hemostasis was obtained using Bovie apparatus. Fascia overlying the tensor muscle was incised longitudinally and the muscles were dissected off the fascia, and retracted posterolaterally. Bovie was used to incise the fascia. Circumflex vessels identified, clamped and cauterized. Retractors were placed medially and laterally around the femoral neck. .The capsule was incised longitudinally in a T-shape fashion. The capsule was dissected subperiosteally. Sutures were place at 2 ends of the capsule medially and laterally and used for attraction. Retractors were placed intracapsular. .Femoral neck was cut at the appropriate level. The femoral head was removed using corkscrew. Acetabular retractors were placed. The acetabulum was reamed up to a size 51 reamer. A size 52 acetabular component was impacted into the acetabulum in the appropriate amount of anteversion and horizontal inclination. A single screw was placed superiorly in the ilium with good fixation.  Fluoroscopy was used to confirm placement of the cup and screw. This was irrigated, A 36 mm inner diameter polyethylene liner was impacted into the acetabular component. We then turned our attention to the femur. Capsular releases were performed. The leg was brought into hyperextension, and retractors placed, exposing the proximal femur. Canal finder was used to establish the canal. The femoral canal was reamed w/ the tapered reamers up to a size of 5. Canal was broached up to a size 5 broach. Trial reduction was performed, noting excellent range of motion and stability. Fluoroscopy was used to confirm size of the implant and leg lengths. Trials were removed. Canal was cleansed using pulsatile lavage system with antibiotic solution. A size 5 high offset stem was impacted down the intramedullary canal with good fixation. Again, trial reduction was performed, and the 1.5 mm head was chosen. Leg lengths were measured. The trial head was removed, and a 1.5 mm ceramichead was impacted onto the stem. The hip reduced. The hip was stable at 90 degrees of external rotation. Fluoroscopy was used to confirm placement of all hardware, and to measure leg length using a horizontal line parallel to bilateral ischium, looking at the level of the lesser trochanters. Joint was extensively irrigated with antibiotic solution. Capsular repair was performed using #2 Vicryl interrupted figure-of-eight fashion. Capsule was infiltrated with 30 mL of 0.5% Marcaine with epinephrine. Fascia over the tensor muscle was repaired using #2 Stratafix quilled suture in a running fashion. The subcutaneous tissue was approximated using 2-0 Vicryl in interrupted fashion. The skin edges were approximated using 3-0 Monocryl in running subcuticular fashion. Dermabond was applied. A bulky, sterile dressing was applied. The patient was transferred to the recovery room in stable condition. There were no complications.   The Physician Assistant was critical during the procedure by assisting with positioning of the leg, retraction, hemostasis, and wound closure.     SPECIMEN:* No specimens in log *    Kalli Nath MD  8/15/2022  11:05 AM

## 2022-08-15 NOTE — PROGRESS NOTES
Problem: Mobility Impaired (Adult and Pediatric)  Goal: *Acute Goals and Plan of Care (Insert Text)  Description: FUNCTIONAL STATUS PRIOR TO ADMISSION: Patient was independent and active without use of DME.    HOME SUPPORT PRIOR TO ADMISSION: The patient lived with spouse/children but did not require assist.    Physical Therapy Goals  Initiated 8/15/2022    1. Patient will move from supine to sit and sit to supine , scoot up and down, and roll side to side in bed with modified independence within 4 days. 2. Patient will perform sit to stand with modified independence within 4 days. 3. Patient will ambulate with modified independence for 150 feet with the least restrictive device within 4 days. 4. Patient will perform post-MEL home exercise program per protocol with independence within 4 days. Outcome: Progressing Towards Goal   PHYSICAL THERAPY EVALUATION  Patient: Theresa Torres (3 y.o. female)  Date: 8/15/2022  Primary Diagnosis: Avascular necrosis of bone of right hip ([facility] [address] [M87.051]  Procedure(s) (LRB):  RIGHT TOTAL HIP ARTHROPLASTY ANTERIOR APPROACH (Right) Day of Surgery   Precautions:   Fall, WBAT      8/15/2022 1918 by Zac Badillo  Outcome: Progressing Towards Goal   ASSESSMENT  Based on the objective data described below, the patient presents with  impairment in functional mobility, activity tolerance and balance s/p R MEL. Patient stated that she needed to urinate, but stated that she was groggy. She spoke very little and only answered questions with \"mm-hmm\". Grunted and groaned throughout session due to righthip pain (had been medicated). PLOF:  ?Independent with ADLs and IADLs? (Patient is communicating poorly). Patient lives with family(?) in one story apartment with no steps to enter: elevators. Patient groggy and pain focused today.     Patient performed bed mobility, sit-stand transfer, ambulated 15 ft in room, ambulated and transferred to bed side commode, stood for toilet hygiene and stood at [facility] for brief to be adjusted by PT and PT Tech. Patient stated \"My legs are giving out, I am going to fall\". Patient was lowered to the ground by PT and PT Tech, onto her knees in kneeling position. (It was a lowering due to buckling knees, not a hard fall). Patient was lifted back up onto her feet by PT, PT Tech and OT, using gait belt and ambulated back to bed (2 ft), was transferred back to bed. Patient had no complaints of knee pain, just right hip pain \"same as before, nothing different\". River Kennedy, Ortho NP for 5S was notified and assessed the patient. RLE ROM was within defined limits and there was no apparent injury. Patient instructed NOT to get up from bed, chair or commode without calling for assistance. Initiated post MEL exercise protocol and wrote same on [employer]. Current Level of Function Impacting Discharge (mobility/balance): Moderate assistance of 1 for bed mobility, minimal for sit-stand-sit to -from bed and bed side commode. Minimal-contact guard assistance 15 ft in room with gait belt and RW, decreased step clearance, step-to gait, trunk sway, extremely slow. Encouraged to keep moving and keep her eyes open. Functional Outcome Measure: The patient scored 45/100 on the Barthel outcome measure which is indicative of moderate amimpaired ability to care for basic self-needs/dependency on others. .      Other factors to consider for discharge: Motivated/A & O x 4/Supportive Family/Independent PLOF      Patient will benefit from skilled therapy intervention to address the above noted impairments. PLAN :  Recommendations and Planned Interventions: bed mobility training, transfer training, gait training, therapeutic exercises, patient and family training/education, and therapeutic activities      Frequency/Duration: Patient will be followed by physical therapy:  twice daily to address goals.     Recommendation for discharge: (in order for the patient to meet his/her long term goals)  Physical therapy at least 2 days/week in the home AND ensure assist and/or supervision for safety with ADLs and mobility. This discharge recommendation:  Has been made in collaboration with the attending provider and/or case management    IF patient discharges home will need the following DME: patient owns DME required for discharge         SUBJECTIVE:   Patient stated I have CKD, you know.     OBJECTIVE DATA SUMMARY:   HISTORY:    Past Medical History:   Diagnosis Date    Arthritis     L knee    Chronic kidney disease     secondary to hypertensive nephrosclerosis, CKD STG 5, ON ACTIVE KIDNEY TRANSPLANT LIST AT [facility]- DR. SANCHEZ    Gastritis     HISTORY OF 10/2020     Gout     History of claustrophobia     Hyperparathyroidism ([facility] [address]     Hypertension      Past Surgical History:   Procedure Laterality Date    HX  SECTION      HX COLONOSCOPY      HX GYN      ECTOPIC PREGNANCY SURGERY     HX HIP REPLACEMENT Left     HX LAP CHOLECYSTECTOMY      HX LUMBAR LAMINECTOMY  2020    L3L4L5    HX WISDOM TEETH EXTRACTION         Personal factors and/or comorbidities impacting plan of care: Motivated/A & O x 4/Supportive Family/Independent PLOF     Home Situation  Home Environment: Private residence  # Steps to Enter: 7  Rails to Enter: Yes  Hand Rails : Left  One/Two Story Residence: Two story  Living Alone: Yes  Support Systems: Spouse/Significant Other, Child(aravind)  Patient Expects to be Discharged to[de-identified] Home with family assistance  Current DME Used/Available at Home: Puneet Gutierrez, straight, Walker, rolling  Tub or Shower Type: Tub/Shower combination    EXAMINATION/PRESENTATION/DECISION MAKING:   Critical Behavior:  Neurologic State: Alert, Drowsy           Hearing:     Skin:  Aquacel Intact with no drainage appreciated.    Edema: Normal post-op inflammation   Range Of Motion:  AROM: Generally decreased, functional           PROM: Generally decreased, functional           Strength: Strength: Generally decreased, functional                    Tone & Sensation:   Tone: Normal              Sensation: Intact               Coordination:  Coordination: Within functional limits  Vision:      Functional Mobility:  Bed Mobility:     Supine to Sit: Moderate assistance  Sit to Supine: Moderate assistance  Scooting: Minimum assistance  Transfers:  Sit to Stand: Minimum assistance  Stand to Sit: Minimum assistance                       Balance:   Sitting: Intact  Standing: Impaired; Without support  Standing - Static: Good;Constant support  Standing - Dynamic : Fair;Constant support  Ambulation/Gait Training:  Distance (ft): 15 Feet (ft)  Assistive Device: Walker, rolling;Gait belt  Ambulation - Level of Assistance: Minimal assistance;Contact guard assistance        Gait Abnormalities: Antalgic;Decreased step clearance;Shuffling gait;Trunk sway increased; Step to gait  Right Side Weight Bearing: As tolerated     Base of Support: Widened;Shift to left  Stance: Right decreased  Speed/Adriana: Shuffled; Slow  Step Length: Right shortened;Left shortened  Swing Pattern: Right asymmetrical     Interventions: Safety awareness training; Tactile cues; Verbal cues                  Therapeutic Exercises: Ankle Pumps  Ham Sets  Quad Sets  Glut Sets  Heel Slides  X 10 each, 5-7x daily      Functional Measure:  Barthel Index:    Bathin  Bladder: 5  Bowels: 10  Groomin  Dressin  Feeding: 10  Mobility: 0  Stairs: 0  Toilet Use: 5  Transfer (Bed to Chair and Back): 10  Total: 45/100       The Barthel ADL Index: Guidelines  1. The index should be used as a record of what a patient does, not as a record of what a patient could do. 2. The main aim is to establish degree of independence from any help, physical or verbal, however minor and for whatever reason. 3. The need for supervision renders the patient not independent. 4. A patient's performance should be established using the best available evidence.  Asking the patient, friends/relatives and nurses are the usual sources, but direct observation and common sense are also important. However direct testing is not needed. 5. Usually the patient's performance over the preceding 24-48 hours is important, but occasionally longer periods will be relevant. 6. Middle categories imply that the patient supplies over 50 per cent of the effort. 7. Use of aids to be independent is allowed. Score Interpretation (from [address] [facility])    Independent   60-79 Minimally independent   40-59 Partially dependent   20-39 Very dependent   <20 Totally dependent     -Kaiden Lee., Barthel, D.W. (1965). Functional evaluation: the Barthel Index. [address] ([address] (1997). The Barthel activities of daily living index: self-reporting versus actual performance in the old (> or = 75 years). Journal of [address] [place] [address] 45(7), [facility], LUPEMARY ANNE, Kyler Rodas., Shae Ramsay. (1999). Measuring the change in disability after inpatient rehabilitation; comparison of the responsiveness of the Barthel Index and Functional [place] Measure. Journal of Neurology, Neurosurgery, and Psychiatry, 66(4), 774-240. Luisa Kwong, N.J.A, Dusty Alcazar  W.J.JUNITO, & Melquiades Amaro, MValeriA. (2004) Assessment of post-stroke quality of life in cost-effectiveness studies: The usefulness of the Barthel Index and the EuroQoL-5D.  Quality of Life Research, 15, 601-29           Physical Therapy Evaluation Charge Determination   History Examination Presentation Decision-Making   LOW Complexity : Zero comorbidities / personal factors that will impact the outcome / POC LOW Complexity : 1-2 Standardized tests and measures addressing body structure, function, activity limitation and / or participation in recreation  LOW Complexity : Stable, uncomplicated  LOW Complexity : FOTO score of       Based on the above components, the patient evaluation is determined to be of the following complexity level: LOW     Pain Ratin/10    Activity Tolerance:   Fair-sleepy    After treatment patient left in no apparent distress:   Supine in bed, Call bell within reach, Side rails x 3, and nurse notified. COMMUNICATION/EDUCATION:   The patients plan of care was discussed with: Registered nurse, Physician, and Case management. Fall prevention education was provided and the patient/caregiver indicated understanding., Patient/family have participated as able in goal setting and plan of care. , and Patient/family agree to work toward stated goals and plan of care.     Thank you for this referral.  Mirella Loja   Time Calculation: 30 mins

## 2022-08-15 NOTE — ANESTHESIA PREPROCEDURE EVALUATION
Relevant Problems   CARDIOVASCULAR   (+) HTN (hypertension)      RENAL FAILURE   (+) Chronic renal failure syndrome, stage 4 (severe) (HCC)      ENDOCRINE   (+) Arthritis of left hip   (+) Obesity   (+) Severe obesity (BMI 35.0-39. 9) with comorbidity ([address]       Anesthetic History   No history of anesthetic complications            Review of Systems / Medical History  Patient summary reviewed, nursing notes reviewed and pertinent labs reviewed    Pulmonary  Within defined limits                 Neuro/Psych   Within defined limits           Cardiovascular  Within defined limits  Hypertension              Exercise tolerance: >4 METS     GI/Hepatic/Renal     GERD    Renal disease: CRI       Endo/Other        Obesity and arthritis     Other Findings              Physical Exam    Airway  Mallampati: II  TM Distance: 4 - 6 cm  Neck ROM: normal range of motion   Mouth opening: Normal     Cardiovascular  Regular rate and rhythm,  S1 and S2 normal,  no murmur, click, rub, or gallop             Dental  No notable dental hx       Pulmonary  Breath sounds clear to auscultation               Abdominal  GI exam deferred       Other Findings            Anesthetic Plan    ASA: 3  Anesthesia type: general          Induction: Intravenous  Anesthetic plan and risks discussed with: Patient

## 2022-08-15 NOTE — PROGRESS NOTES
Colletta Cough provided to patient/representative with verbal explanation of the notice. Time allotted for questions regarding the notice. Patient /representative provided a completed copy of the VOON notice. Copy placed on bedside chart.   Jennifer Canela, Care Management Assistant

## 2022-08-15 NOTE — BRIEF OP NOTE
Brief Postoperative Note    Patient: Marilin Woods  YOB: 1960  MRN: [id number]    Date of Procedure: 8/15/2022     Pre-Op Diagnosis: AVN RIGHT HIP    Post-Op Diagnosis: Same as preoperative diagnosis. Procedure(s):  RIGHT TOTAL HIP ARTHROPLASTY ANTERIOR APPROACH    Surgeon(s):  Zachary Molina MD    Surgical Assistant: Physician Assistant: Apoorva Chaparro PA-C  Surg Asst-1: Ivana Cavazos    Anesthesia: General     Estimated Blood Loss (mL): 088     Complications: None    Specimens: * No specimens in log *     Implants:   Implant Name Type Inv.  Item Serial No.  Lot No. LRB No. Used Action   CUP ACET [id number] HIP GRIPTION MIREYA CEMENTLESS FIX SECT SER - SNA  CUP ACET [id number] HIP GRIPTION MIREYA CEMENTLESS FIX SECT SER NA [facility] [id number] Right 1 Implanted   SCR ACET CANC PINN 6.5X25MM SS --  - SNA  SCR ACET CANC PINN 6.5X25MM SS --  NA [facility] [employer] [facility] [id number] Right 1 Implanted   LINER ACET OD52MM ID36MM HIP ALTRX PINN - SNA  LINER ACET OD52MM ID36MM HIP ALTRX PINN NA [facility] [employer] [facility] [id number] Right 1 Implanted   STEM FEM SZ 5 L145MM NK L39MM 130DEG HI OFFSET CALCAR HIP - SNA  STEM FEM SZ 5 L145MM NK L39MM 130DEG HI OFFSET CALCAR HIP NA [facility] [id number] Right 1 Implanted   HEAD FEM [id number] +1.5MM OFFSET 12/14 TAPR HIP CERAMIC - SNA  HEAD FEM [id number] +1.5MM OFFSET 12/14 TAPR HIP CERAMIC NA [facility] [employer] [facility] [id number] Right 1 Implanted       Drains: * No LDAs found *    Findings: AVN femoral head    Electronically Signed by Dacia Ernst MD on 8/15/2022 at 11:04 AM

## 2022-08-15 NOTE — DISCHARGE INSTRUCTIONS
Discharge Instructions:   Anterior Approach             Hip Replacement- Dr. Leretha Angelucci     Patient Name:Cony Panda   Date of procedure:8/15/2022   Procedure:Procedure(s):  RIGHT TOTAL HIP ARTHROPLASTY ANTERIOR APPROACH   Surgeon:Surgeon(s) and Role:     Rashid Ponce MD - Primary       Follow up appointments   Follow up with Dr. Leretha Angelucci in 2 weeks. Call [phone] to make an appointment  If home health has been arranged for you the agency will contact you to arrange dates/times for visits. Please call them if you do not hear from them within 24 hours after you are discharged       When to call your Orthopaedic Surgeon:   Call [phone]. If you call after 5pm or on a weekend; the on call physician will be contacted    Pain:  Increased or unrelieved hip pain  Inability or difficulty walking    Signs of infection:  Persistent fever over 100.4 degrees or shaking/chills  Increased redness, tenderness, swelling, or drainage from incision  Drainage has a foul odor  Increased pain during activity or rest   Signs of a blood clot in your leg: Increased calf pain, tenderness, or redness  Increased swelling of knee, calf, ankle or foot      When to call your Primary Care Physician:   Concerns about medical conditions such as diabetes, high blood pressure, asthma, congestive heart failure   Call if blood sugars are elevated, persistent headache or dizziness, coughing or congestion, constipation or diarrhea, burning with urination, abnormal heart rate      When to call 911 and go to the nearest emergency room:   Acute onset of chest pain, shortness of breath, difficulty breathing       Activity   Weight bearing as tolerated with walker or cane.  Refer to your patient handbook for instructions and pictures  Complete your Home Exercise Program daily as instructed by your therapist. Refer to your handbook for instructions and pictures   Get up every hour and walk (except at night when sleeping)   AVOID sudden and extreme movement of your hip (surgical leg)   Do not drive or operate heavy machinery       Incision Care   The Aquacel (brown, waterproof) surgical dressing is to remain over your incision for 7 days  On the 7th day, have someone gently peel the dressing off by carefully lifting the edge and stretching it slightly to break the adhesive seal.  You may then leave the incision open to air as it should no longer be draining. You have absorbable stitches so their removal is not necessary  If the Aquacel dressing becomes loose or begins to fall off before the 7th day, replace it with a sterile dry dressing that is to be changed daily. Once the incision is not draining, you may leave it open to air  Showering is allowed with the Aquacel dressing in place or beginning on the 3rd day after surgery if the Aquacel dressing came off prematurely. Gently wash the incision with soap and water  Do not submerge the incision under water until your incision is completely healed (bath tub, hot tub, swimming pool, etc.)      Preventing blood clots   Take Aspirin 81 mg twice a day as prescribed x 14 days      Pain management   Keep ice wrap in place except when walking; changing gel packs every 4 hours  Get up and walk a short distance to relieve pain and stiffness  Lie down and elevate your leg on pillows for 15-30 minutes after walking/exercising to decrease swelling  Take Tylenol 1,000 mg every 6-8 hours as needed for 2 weeks  Do not take over 4,000 mg in a 24 hour period  Do not take any other anti-inflammatories. Take the narcotic pain pill as prescribed. Take with food; avoid alcohol while taking pain medication  As your pain decreases, take the narcotics less often or take ½ of a pill      Diet   Take the pericolace as needed for constipation  Resume usual diet; include vegetables, fruit, whole grains, lean meats, and low-fat dairy products.   Eat foods high in fiber  Drink plenty of fluids, including 8 cups of water daily  Take over-the-counter stool softeners and laxatives to prevent constipation

## 2022-08-15 NOTE — PROGRESS NOTES
Occupational Therapy   Orders received, chart review completed. Note patient POD #0 R MEL. OT will follow up tomorrow for evaluation. Recommend OOB to chair three times a day for meals, self-completion of ADLs as able and medically stable. Thank you.

## 2022-08-16 LAB
ANION GAP SERPL CALC-SCNC: 11 MMOL/L (ref 5–15)
BUN SERPL-MCNC: 80 MG/DL (ref 6–20)
BUN/CREAT SERPL: 12 (ref 12–20)
CALCIUM SERPL-MCNC: 10.5 MG/DL (ref 8.5–10.1)
CHLORIDE SERPL-SCNC: 112 MMOL/L (ref 97–108)
CO2 SERPL-SCNC: 18 MMOL/L (ref 21–32)
CREAT SERPL-MCNC: 6.55 MG/DL (ref 0.55–1.02)
GLUCOSE SERPL-MCNC: 135 MG/DL (ref 65–100)
HGB BLD-MCNC: 8.6 G/DL (ref 11.5–16)
POTASSIUM SERPL-SCNC: 5.2 MMOL/L (ref 3.5–5.1)
SODIUM SERPL-SCNC: 141 MMOL/L (ref 136–145)

## 2022-08-16 PROCEDURE — 74011000250 HC RX REV CODE- 250: Performed by: PHYSICIAN ASSISTANT

## 2022-08-16 PROCEDURE — 97116 GAIT TRAINING THERAPY: CPT

## 2022-08-16 PROCEDURE — 97530 THERAPEUTIC ACTIVITIES: CPT

## 2022-08-16 PROCEDURE — 85018 HEMOGLOBIN: CPT

## 2022-08-16 PROCEDURE — 74011250636 HC RX REV CODE- 250/636: Performed by: PHYSICIAN ASSISTANT

## 2022-08-16 PROCEDURE — 97535 SELF CARE MNGMENT TRAINING: CPT

## 2022-08-16 PROCEDURE — 74011250637 HC RX REV CODE- 250/637: Performed by: PHYSICIAN ASSISTANT

## 2022-08-16 PROCEDURE — 97165 OT EVAL LOW COMPLEX 30 MIN: CPT

## 2022-08-16 PROCEDURE — 74011250636 HC RX REV CODE- 250/636

## 2022-08-16 PROCEDURE — 36415 COLL VENOUS BLD VENIPUNCTURE: CPT

## 2022-08-16 PROCEDURE — 51798 US URINE CAPACITY MEASURE: CPT

## 2022-08-16 PROCEDURE — 80048 BASIC METABOLIC PNL TOTAL CA: CPT

## 2022-08-16 RX ORDER — HYDROMORPHONE HYDROCHLORIDE 2 MG/1
2 TABLET ORAL
Status: DISCONTINUED | OUTPATIENT
Start: 2022-08-16 | End: 2022-08-22 | Stop reason: HOSPADM

## 2022-08-16 RX ORDER — AMOXICILLIN 250 MG
1 CAPSULE ORAL 2 TIMES DAILY
Qty: 30 TABLET | Refills: 0 | Status: SHIPPED | OUTPATIENT
Start: 2022-08-16

## 2022-08-16 RX ORDER — HYDROMORPHONE HYDROCHLORIDE 2 MG/1
1-2 TABLET ORAL
Qty: 40 TABLET | Refills: 0 | Status: SHIPPED | OUTPATIENT
Start: 2022-08-16 | End: 2022-08-23

## 2022-08-16 RX ORDER — ASPIRIN 81 MG/1
81 TABLET ORAL 2 TIMES DAILY
Qty: 28 TABLET | Refills: 0 | Status: SHIPPED | OUTPATIENT
Start: 2022-08-16

## 2022-08-16 RX ORDER — HYDROMORPHONE HYDROCHLORIDE 1 MG/ML
0.5 INJECTION, SOLUTION INTRAMUSCULAR; INTRAVENOUS; SUBCUTANEOUS
Status: ACTIVE | OUTPATIENT
Start: 2022-08-16 | End: 2022-08-16

## 2022-08-16 RX ORDER — HYDROMORPHONE HYDROCHLORIDE 2 MG/1
1 TABLET ORAL
Status: DISCONTINUED | OUTPATIENT
Start: 2022-08-16 | End: 2022-08-22 | Stop reason: HOSPADM

## 2022-08-16 RX ADMIN — ASPIRIN 81 MG: 81 TABLET, COATED ORAL at 10:12

## 2022-08-16 RX ADMIN — CEFAZOLIN 2 G: 1 INJECTION, POWDER, FOR SOLUTION INTRAMUSCULAR; INTRAVENOUS at 01:23

## 2022-08-16 RX ADMIN — ALLOPURINOL 100 MG: 100 TABLET ORAL at 18:21

## 2022-08-16 RX ADMIN — ALLOPURINOL 100 MG: 100 TABLET ORAL at 10:12

## 2022-08-16 RX ADMIN — ACETAMINOPHEN 650 MG: 325 TABLET ORAL at 01:23

## 2022-08-16 RX ADMIN — HYDRALAZINE HYDROCHLORIDE 10 MG: 10 TABLET, FILM COATED ORAL at 17:08

## 2022-08-16 RX ADMIN — POLYETHYLENE GLYCOL 3350 17 G: 17 POWDER, FOR SOLUTION ORAL at 10:11

## 2022-08-16 RX ADMIN — CARVEDILOL 12.5 MG: 12.5 TABLET, FILM COATED ORAL at 17:08

## 2022-08-16 RX ADMIN — SENNOSIDES AND DOCUSATE SODIUM 1 TABLET: 50; 8.6 TABLET ORAL at 18:21

## 2022-08-16 RX ADMIN — HYDRALAZINE HYDROCHLORIDE 10 MG: 10 TABLET, FILM COATED ORAL at 22:08

## 2022-08-16 RX ADMIN — SODIUM CHLORIDE, PRESERVATIVE FREE 10 ML: 5 INJECTION INTRAVENOUS at 06:30

## 2022-08-16 RX ADMIN — ASPIRIN 81 MG: 81 TABLET, COATED ORAL at 18:21

## 2022-08-16 RX ADMIN — PRAVASTATIN SODIUM 40 MG: 40 TABLET ORAL at 22:08

## 2022-08-16 RX ADMIN — HYDROMORPHONE HYDROCHLORIDE 0.5 MG: 1 INJECTION, SOLUTION INTRAMUSCULAR; INTRAVENOUS; SUBCUTANEOUS at 13:38

## 2022-08-16 RX ADMIN — SENNOSIDES AND DOCUSATE SODIUM 1 TABLET: 50; 8.6 TABLET ORAL at 10:12

## 2022-08-16 RX ADMIN — ACETAMINOPHEN 650 MG: 325 TABLET ORAL at 06:29

## 2022-08-16 RX ADMIN — HYDRALAZINE HYDROCHLORIDE 10 MG: 10 TABLET, FILM COATED ORAL at 10:13

## 2022-08-16 RX ADMIN — CARVEDILOL 12.5 MG: 12.5 TABLET, FILM COATED ORAL at 10:14

## 2022-08-16 RX ADMIN — SODIUM BICARBONATE 325 MG: 650 TABLET ORAL at 18:21

## 2022-08-16 RX ADMIN — HYDROMORPHONE HYDROCHLORIDE 2 MG: 2 TABLET ORAL at 22:08

## 2022-08-16 RX ADMIN — SODIUM CHLORIDE, PRESERVATIVE FREE 10 ML: 5 INJECTION INTRAVENOUS at 22:08

## 2022-08-16 RX ADMIN — HYDROMORPHONE HYDROCHLORIDE 2 MG: 2 TABLET ORAL at 06:29

## 2022-08-16 RX ADMIN — SODIUM BICARBONATE 325 MG: 650 TABLET ORAL at 10:12

## 2022-08-16 RX ADMIN — HYDROMORPHONE HYDROCHLORIDE 2 MG: 2 TABLET ORAL at 01:23

## 2022-08-16 RX ADMIN — HYDROMORPHONE HYDROCHLORIDE 2 MG: 2 TABLET ORAL at 11:06

## 2022-08-16 NOTE — PROGRESS NOTES
Bedside and Verbal shift change report given to Enid Darby (oncoming nurse) by Blake Alaniz (offgoing nurse). Report included the following information SBAR and Kardex.

## 2022-08-16 NOTE — PROGRESS NOTES
Ortho Daily Progress Note      Patient: Jeferson Ordoñez                   MRN: [id number]  Sex: female  YOB: 1960           Age: 57 y.o.     1 Day Post-Op     Procedure(s):  RIGHT TOTAL HIP ARTHROPLASTY ANTERIOR APPROACH    Subjective:    -Pain:  pt in moderate pain. The dilaudid gives her pain relief but does not last the full 6 hours between doses.  -No complaints overnight.    -Ambulating with PT but had her legs give out on her yesterday.  -Tolerating PO diet, no nausea. Pain meds tolerated. -ESRD awaiting kidney transplant; normally voids at home. Straight cath'd twice with last time around 5am.  -Vitals stable    Visit Vitals  BP (!) 154/80   Pulse 90   Temp 97.4 °F (36.3 °C)   Resp 18   Ht 5' 6\" (1.676 m)   Wt 101.6 kg (224 lb)   SpO2 93%   BMI 36.15 kg/m²        Recent Labs     08/16/22  0129 08/10/22  1534 08/05/22  1609 07/13/22  1441 06/10/22  1701 05/13/22  1624 04/19/22  1138   HGB 8.6* 9.0* 9.3* 9.3* 9.4* 8.7* 9.4*   CREA 6.55* 6.55* 6.04* 6.22* 5.71* 5.16* 4.67*   BUN 80* 75* 70* 62* 57* 60* 44*       Physical Exam:    GENERAL: alert, no acute distress  VASCULAR: DP/TP pulses 2+. Extremity warm. mild edema of RLE  DRESSING:  Clean, dry, and intact and Aquacel in place  MSK:  Moving lower extremities well. DVT EXAM: No posterior calf tenderness, tightness, erythema, palpable cords, or pain upon active dorsi/plantar flexion of the foot. Plan:    DVT ppx: ASA 81mg BID x14 days due to ESRD. Activity: WBAT with PT-mobilization    Pain Control: Will increase dilaudid dosing to Q4 hours. Instructed to be aware of increased sedation given her CKD. Dressing: Continue aquacel x7 days unless the integrity of the seal is lost.  Replace w/ abd pads that are to be changed daily if this is the case. Hgb:  Stable, repeat in AM if still here    ESRD:  Avoid nephrotoxins. Continue oral bicarbonate for low CO2. IV Fluid d/c.     Urinary Retention: Straight cath'd twice. Insert estrada if unable to void by 11am.  Follow-up with urology outpatient if estrada inserted for voiding trial.    Hyperkalemia: Will hold PTA potassium. Discharge: Pending progress with PT. Hopeful discharge today if cleared by PT and voiding/estrada inserted. Scripts sent to outpatient pharm.     Marissa Cid PA-C  8/16/2022   8:25 AM

## 2022-08-16 NOTE — PROGRESS NOTES
Ortho NP Note    POD# 2 s/p RIGHT TOTAL HIP ARTHROPLASTY ANTERIOR APPROACH   Pt seen RN at bedside    Pt resting in bed. States pain today has improved but is still pending physical therapy evaluation today so is unable to comment about pain with mobilization. Overall patient feels dilaudid is effective--denies side effects. Ate breakfast, no N/V. No CP, no SOB, no REESE, no palpitations. Patient denies dizziness at rest.  On review of bleeding history, denies hemoptysis, hematochezia, recent injury aside from surgery, dark stools, bleeding gums. BP soft this AM.  Afebrile. Visit Vitals  /72 (BP 1 Location: Right upper arm, BP Patient Position: At rest)   Pulse 88   Temp 98.2 °F (36.8 °C)   Resp 18   Ht 5' 6\" (1.676 m)   Wt 101.6 kg (224 lb)   SpO2 93%   BMI 36.15 kg/m²       Voiding status: estrada placed on POD 1  Output (mL)  Urine Voided: 75 ml (08/16/22 1018)  Last Bowel Movement Date:  (PTA) (08/16/22 2101)  Straight Cath  Straight Cath: Nurse performed cath;Sterile technique used (08/16/22 0553)  Number of Attempts: 1 (08/16/22 0553)  Catheter Size: 14 FR (08/16/22 0553)  Time Catheter Inserted: 4015 (08/16/22 0553)  Time Catheter Removed: 4684 (08/16/22 0553)  Urine: 500 mL (08/16/22 0553)      Labs    Lab Results   Component Value Date/Time    HGB 7.2 (L) 08/17/2022 05:30 AM      Lab Results   Component Value Date/Time    INR 1.0 08/10/2022 03:34 PM      Lab Results   Component Value Date/Time    Sodium 135 (L) 08/17/2022 05:30 AM    Potassium 4.2 08/17/2022 05:30 AM    Chloride 105 08/17/2022 05:30 AM    CO2 16 (L) 08/17/2022 05:30 AM    Glucose 91 08/17/2022 05:30 AM    BUN 82 (H) 08/17/2022 05:30 AM    Creatinine 6.43 (H) 08/17/2022 05:30 AM    Calcium 9.8 08/17/2022 05:30 AM     Recent Glucose Results:   Lab Results   Component Value Date/Time    GLU 91 08/17/2022 05:30 AM       Aquacel dressing to right hip c.d.i  Cryotherapy in place over incision  Calves soft and supple;  No pain with passive stretch  Bilateral LEs warm, dry. 2+ DP pulses. Sensation and motor intact - PF/DF/EHL intact 5/5  SCDs for mechanical DVT proph while in bed     PLAN:    1) PT: BID WBAT in hospital.  Therapy to be continued at home with HH--CM involved to arrange, still pending  2) Anticoagulation: ASA 81 mg PO BID for DVT Prophylaxis. Encouraged ongoing mobilization, bed exercises. 3) Pain - Multimodal approach including cryotherapy, scheduled tylenol with PRN PO dilaudid while in hospital.  4) Hx of CKD stage 5 2/2 HTN nephrosclerosis: Caution with narcotic dosing and nephrotoxins. 5) Urinary management: Estrada placed POD 1, urology follow up. 6) Anemia, present on admission: Pre op Hgb on 8/10: 9.0. EBL: 200 mL. Hgb on POD 1: 8.6, today (POD 2): 7.2. Acute on chronic anemia--expected Acute blood loss post-op anemia compounding anemia of chronic disease. Iron studies, ferritin ordered. Fecal occult blood. Nephrology consult re epogen dosing--appreciate input including recommendation for IV iron 200 mg once. Recheck in AM, type and screen to be repeated. 7) Post op care: Continue OBR, encouraged IS. Follow up in 2 weeks with Dr Monica Neff. Aquacel to remain in place x 7 days unless integrity is lost.   8) Readiness for discharge:      [] Vital Signs stable    [] Hgb stable    [x] + Voiding : estrada in place    [x] Wound intact, drainage minimal    [x] Tolerating PO intake     [] Cleared by PT (OT if applicable)     [] Stair training completed (if applicable)    [] Independent / Contact Guard Assist (household distance)     [] Bed mobility     [] Car transfers     [] ADLs    [x] Adequate pain control on oral medication alone     Consult to nephrology pending, recheck Hgb in AM.    Not yet medically ready for discharge, also pending PT clearance.      Lucita Luevano NP  Available via [employer]

## 2022-08-16 NOTE — PROGRESS NOTES
Problem: Mobility Impaired (Adult and Pediatric)  Goal: *Acute Goals and Plan of Care (Insert Text)  Description: FUNCTIONAL STATUS PRIOR TO ADMISSION: Patient was independent and active without use of DME.    HOME SUPPORT PRIOR TO ADMISSION: The patient lived with spouse/children but did not require assist.    Physical Therapy Goals  Initiated 8/15/2022    1. Patient will move from supine to sit and sit to supine , scoot up and down, and roll side to side in bed with modified independence within 4 days. 2. Patient will perform sit to stand with modified independence within 4 days. 3. Patient will ambulate with modified independence for 150 feet with the least restrictive device within 4 days. 4. Patient will perform post-MEL home exercise program per protocol with independence within 4 days. Outcome: Progressing Towards Goal   PHYSICAL THERAPY TREATMENT  Patient: Mal Taylor (36 y.o. female)  Date: 8/16/2022  Diagnosis: Avascular necrosis of bone of right hip (HCC) [M87.051] Avascular necrosis of left femur (HCC)  Procedure(s) (LRB):  RIGHT TOTAL HIP ARTHROPLASTY ANTERIOR APPROACH (Right) 1 Day Post-Op  Precautions: Fall, WBAT  Chart, physical therapy assessment, plan of care and goals were reviewed. ASSESSMENT  Patient continues with skilled PT services and is progressing towards goals. She is a bit hesitant this AM w/ mobility d/t pain and after incident during PT eval (pt w/ slow controlled decent to floor), however agreeable and willing w/ encouragement and reassuring. Pt did require Min A + additional time and use of gait belt for RLE support to EOB and CGA w/ bed minimally elevated to pt comfort and to height of bed at home. PT amb to [facility] in room and then out in hallway. Gait steady w/no LOB or buckling. She returned to chair at bedside and instructed to sit for 1hr at maximum and to call for NSG/RN assist for needs or when ready to return to bed.  Will follow up this afternoon for continued mobility training. Pt may benefit form 1-2 more sessions to ensure stability and safety w/ transfers. Also noted CM having difficulty finding HH. Anticipate stair training this afternoon vs tomorrow AM in preparation for d/c. Current Level of Function Impacting Discharge (mobility/balance): CGA-Min A x1 overall + additional time     Other factors to consider for discharge: Mobility below baseline         PLAN :  Patient continues to benefit from skilled intervention to address the above impairments. Continue treatment per established plan of care. to address goals. Recommendation for discharge: (in order for the patient to meet his/her long term goals)  Physical therapy at least 2 days/week in the home     This discharge recommendation:  Has been made in collaboration with the attending provider and/or case management    IF patient discharges home will need the following DME: patient owns DME required for discharge       SUBJECTIVE:   Patient stated You want me to stand up again?     OBJECTIVE DATA SUMMARY:   Critical Behavior:  Neurologic State: Alert           Functional Mobility Training:  Bed Mobility:     Supine to Sit: Minimum assistance; Additional time; Adaptive equipment;Assist x1 (gait belt for RLE)  Sit to Supine:  (remained OOB in chair)           Transfers:  Sit to Stand: Minimum assistance;Contact guard assistance  Stand to Sit: Contact guard assistance                             Balance:  Sitting: Intact  Standing: Impaired; With support  Standing - Static: Good;Constant support  Standing - Dynamic : Constant support; Fair  Ambulation/Gait Training:  Distance (ft): 40 Feet (ft)  Assistive Device: Gait belt;Walker, rolling  Ambulation - Level of Assistance: Contact guard assistance; Additional time;Assist x1        Gait Abnormalities: Antalgic;Decreased step clearance  Right Side Weight Bearing: As tolerated  Left Side Weight Bearing: Full  Base of Support: Widened;Shift to left  Stance: Right decreased  Speed/Adriana: Slow  Step Length: Left shortened;Right shortened  Swing Pattern: Right asymmetrical     Interventions: Safety awareness training     Pain Ratin-6/10    Activity Tolerance:   Fair      After treatment patient left in no apparent distress:   Sitting in chair and Call bell within reach    COMMUNICATION/COLLABORATION:   The patients plan of care was discussed with: Registered nurse.      Jana ZAVALA Means,PTA   Time Calculation: 44 mins

## 2022-08-16 NOTE — PROGRESS NOTES
Transition Of Care: The patient plans to discharge home with home health and family to transport when stable for discharge needs. The patient has difficult insurance and home health is pending. CM contacted [facility] to assist with home health. CM faxed all orders and clinicals to: [phone]. Care coordinator: Lam Rodriguez [phone], option 7, ext: [phone]. CM awaiting assigned home health agency. RUR: N/A    The patient is from home and lives with . Orthopedics, PT/OT following. The patient plans to discharge home. Care Management Interventions  PCP Verified by CM: Yes  Palliative Care Criteria Met (RRAT>21 & CHF Dx)?: No  Mode of Transport at Discharge: Other (see comment)  Transition of Care Consult (CM Consult): [address]: No  Reason Outside [place]: Physician referred to specific agency  MyChart Signup: Yes  Discharge Durable Medical Equipment: No  Health Maintenance Reviewed: Yes  Physical Therapy Consult: Yes  Occupational Therapy Consult: Yes  Speech Therapy Consult: No  Support Systems: Spouse/Significant Other, Other Family Member(s)  Confirm Follow Up Transport: Family  The Plan for Transition of Care is Related to the Following Treatment Goals : The patient plans to discharge home with home health. The Patient and/or Patient Representative was Provided with a Choice of Provider and Agrees with the Discharge Plan?: Yes  Freedom of Choice List was Provided with Basic Dialogue that Supports the Patient's Individualized Plan of Care/Goals, Treatment Preferences and Shares the Quality Data Associated with the Providers?: Yes   Resource Information Provided?: No  Discharge Location  Patient Expects to be Discharged to[de-identified] Home with home health     Reason for Admission:  Right Total Hip                     RUR Score:   N/A                Plan for utilizing home health:           The Plan for Transition of Care is related to the following treatment goals: Home Health     The Patient and/or patient representative Dulce Messer was provided with a choice of provider and agrees   with the discharge plan. [x] Yes [] No    Freedom of choice list was provided with basic dialogue that supports the patient's individualized plan of care/goals, treatment preferences and shares the quality data associated with the providers. [x] Yes [] No     PCP: First and Last name:  Palak Qiu MD     Name of Practice:    Are you a current patient: Yes/No: Y   Approximate date of last visit: May, 2022   Can you participate in a virtual visit with your PCP: Y                    Current Advanced Directive/Advance Care Plan: Full Code      Healthcare Decision Maker:   Click here to complete [address] including selection of the Healthcare Decision Maker Relationship (ie \"Primary\")             Primary Decision MakerEvert Ask - [phone]    Primary Decision Maker: Carlos Calderon - [phone]                  Transition of Care Plan:       CM met with the patient in room 569. The patient is alert and oriented x4. Confirmed demographics. The patient is independent with ADL's/IADL's, drives a vehicle, owns a rolling walker and uses [address]. The patient lives in a private tri level home with 5 steps to enter and 7 step up to 2nd level bedroom. The patient plans to discharge home with home health and family to transport when stable for discharge. CM following for discharge needs.     Umair Lr RN/CRM

## 2022-08-16 NOTE — PROGRESS NOTES
Problem: Mobility Impaired (Adult and Pediatric)  Goal: *Acute Goals and Plan of Care (Insert Text)  Description: FUNCTIONAL STATUS PRIOR TO ADMISSION: Patient was independent and active without use of DME.    HOME SUPPORT PRIOR TO ADMISSION: The patient lived with spouse/children but did not require assist.    Physical Therapy Goals  Initiated 8/15/2022    1. Patient will move from supine to sit and sit to supine , scoot up and down, and roll side to side in bed with modified independence within 4 days. 2. Patient will perform sit to stand with modified independence within 4 days. 3. Patient will ambulate with modified independence for 150 feet with the least restrictive device within 4 days. 4. Patient will perform post-MEL home exercise program per protocol with independence within 4 days. 8/16/2022 [place] by Jana Guzman  Outcome: Progressing Towards Goal  PHYSICAL THERAPY AFTERNOON SESSION NOTE  Patient: Carissa Olguin (22 y.o. female)  Date: 8/16/2022  Primary Diagnosis: Avascular necrosis of bone of right hip ([facility] [address] [M87.051]  Procedure(s) (LRB):  RIGHT TOTAL HIP ARTHROPLASTY ANTERIOR APPROACH (Right) 1 Day Post-Op   Precautions:   Fall, WBAT    Cony Glynn was seen for afternoon PT session. She did receive IV Dilaudid prior to therapy arrival. Pt reports no pain at rest and minimal pain w/ ROM in bed, but does c/o feeling woozy throughout session. Pt only able to tolerate transfer to chair this session. The primary limiting factors are pain management. Currently, halina Olguin will need 1-2 more session(s) to meet the therapy goals in preparation for returning home.      Afternoon session functional mobility:  Bed Mobility:     Supine to Sit: Stand-by assistance;Assist x1  Sit to Supine:  (remained OOB in chair)     Transfers:  Sit to Stand: Minimum assistance  Stand to Sit: Minimum assistance (eccentric control)        Bed to Chair: Contact guard assistance (chair to bed) Balance:   Sitting: Intact  Standing: Impaired; With support  Standing - Static: Good;Constant support  Standing - Dynamic : Constant support; Fair  Ambulation/Gait Training:  Distance (ft):  (bed>chair)  Assistive Device: Gait belt;Walker, rolling  Ambulation - Level of Assistance: Contact guard assistance;Assist x1        Gait Abnormalities: Antalgic;Decreased step clearance; Step to gait  Right Side Weight Bearing: As tolerated  Left Side Weight Bearing: Full  Base of Support: Widened;Shift to left  Stance: Right decreased  Speed/Adriana: Slow  Step Length: Left shortened;Right shortened  Swing Pattern: Right asymmetrical     Interventions: Safety awareness training        Stairs:       Will plan for tomorrow AM    Thank you,  Jana Guzman,PTA   Time Calculation: 33 mins

## 2022-08-16 NOTE — PROGRESS NOTES
OCCUPATIONAL THERAPY EVALUATION/DISCHARGE  Patient: Carissa Olguin (20 y.o. female)  Date: 8/16/2022  Primary Diagnosis: Avascular necrosis of bone of right hip ([address] [facility] [M87.051]  Procedure(s) (LRB):  RIGHT TOTAL HIP ARTHROPLASTY ANTERIOR APPROACH (Right) 1 Day Post-Op   Precautions:  Fall, WBAT    ASSESSMENT  Based on the objective data described below, the patient presents with decreased higher level mobility/balance, decreased strength/endurance, decreased distal LE ADL management, decreased activity tolerance and c/o 8/10 pain in R hip post evaluation/treatment; however, she is demonstrating a high level of safe functional independence, completing RW level ADLs with Min A. Pt reports successful recovery from L hip replacement in 2020 and demonstrates good understanding of modified dressing techniques, as well as, use of long-handled AE to distal LE's. Pt reports in-home DME needs fulfilled, as well as, good PRN assist from family members, with pt declining need for further skilled OT. Given above mentioned, as well as, physical therapy following mobility/balance and activity tolerance deficits, no other acute OT needs identified, with OT answering pt's questions/concerns and pt thanking therapist for his efforts. Current Level of Function (ADLs/self-care): Min A    Functional Outcome Measure: The patient scored 60/100 on the Barthel Index outcome measure. Other factors to consider for discharge: none     PLAN :  Recommendation for discharge: (in order for the patient to meet his/her long term goals)  No skilled occupational therapy/ follow up rehabilitation needs identified at this time.     This discharge recommendation:  Has been made in collaboration with the attending provider and/or case management    IF patient discharges home will need the following DME: patient owns DME required for discharge       SUBJECTIVE:   Patient stated I have good help at home, I did well when I had my left hip done, I don't think I need anymore OT.    OBJECTIVE DATA SUMMARY:   HISTORY:   Past Medical History:   Diagnosis Date    Arthritis     L knee    Chronic kidney disease     secondary to hypertensive nephrosclerosis, CKD STG 5, ON ACTIVE KIDNEY TRANSPLANT LIST AT [facility]- DR. SANCHEZ    Gastritis     HISTORY OF 10/2020     Gout     History of claustrophobia     Hyperparathyroidism ([facility] [address] [facility]     Hypertension      Past Surgical History:   Procedure Laterality Date    HX  SECTION      HX COLONOSCOPY      HX GYN      ECTOPIC PREGNANCY SURGERY     HX HIP REPLACEMENT Left     HX LAP CHOLECYSTECTOMY      HX LUMBAR LAMINECTOMY  2020    [id number]    HX WISDOM TEETH EXTRACTION         Prior Level of Function/Environment/Context: Independent   Expanded or extensive additional review of patient history:     Home Situation  Home Environment: Private residence  # Steps to Enter: 7  Rails to Enter: Yes  Hand Rails : Left  One/Two Story Residence: Two story  Living Alone: Yes  Support Systems: Spouse/Significant Other, Other Family Member(s)  Patient Expects to be Discharged to[de-identified] Home with home health  Current DME Used/Available at Home: Cane, straight, Walker, rolling, Shower chair  Tub or Shower Type: Tub/Shower combination    Hand dominance: Right    EXAMINATION OF PERFORMANCE DEFICITS:  Cognitive/Behavioral Status:  Neurologic State: Alert  Orientation Level: Oriented X4  Cognition: Appropriate decision making; Appropriate for age attention/concentration; Appropriate safety awareness  Perception: Appears intact  Perseveration: No perseveration noted  Safety/Judgement: Awareness of environment;Home safety; Insight into deficits    Hearing:   Auditory  Auditory Impairment: None    Vision/Perceptual:                                Corrective Lenses: Glasses    Range of Motion:  AROM: Generally decreased, functional  PROM: Generally decreased, functional                      Strength:  Strength: Generally decreased, functional Coordination:  Coordination: Within functional limits  Fine Motor Skills-Upper: Left Intact; Right Intact    Gross Motor Skills-Upper: Left Intact; Right Intact    Tone & Sensation:  Tone: Normal  Sensation: Intact                      Balance:  Sitting: Intact  Standing: Impaired; With support  Standing - Static: Good;Constant support  Standing - Dynamic : Constant support; Fair    Functional Mobility and Transfers for ADLs:  Bed Mobility:  Supine to Sit: Minimum assistance; Additional time; Adaptive equipment;Assist x1 (gait belt for RLE)  Sit to Supine: Moderate assistance    Transfers:  Sit to Stand: Minimum assistance  Stand to Sit: Contact guard assistance  Bed to Chair: Contact guard assistance (chair to bed)    ADL Assessment:  Feeding: Independent    Oral Facial Hygiene/Grooming: Setup (seated)    Bathing: Minimum assistance    Upper Body Dressing: Setup    Lower Body Dressing: Minimum assistance (for distal management)    Toileting: Minimum assistance    ADL Intervention and task modifications:  Pt educated on safe transfer techniques, with specific emphasis on proper hand placement to push up from seated surface rather than attempt to pull self up, fully positioning self in-front of desired seated location, feeling chair on back of legs and reaching back with 1-2 UE to slowly lower self to seated position. Patient instructed and indicated understanding the benefits of maintaining activity tolerance, functional mobility, and independence with self care tasks during acute stay  to ensure safe return home and to baseline. Encouraged patient to increase frequency and duration OOB, be out of bed for all meals, perform daily ADLs (as approved by RN/MD regarding bathing etc), and performing functional mobility to/from [facility]. Cognitive Retraining  Safety/Judgement: Awareness of environment;Home safety; Insight into deficits    Precautions: Patient recalled and demonstrated avoiding extreme planes of movement with Right LE during ADLs and functional mobility with minimal cues. Bathing: Patient instructed when bathing to not submerge wound in water, stand to shower or sponge bathe, cover wound with plastic and tape to ensure no water reaches bandage/wound without cues. Patient indicated understanding. Dressing joint: Patient instructed and demonstrated understanding to don/doff Right LE first/last with minimal cues. Patient instructed and demonstrated to don all clothing while sitting prior to standing, doff all clothing to knees while standing, then sit to doff clothing off from knees to feet to facilitate fall prevention, pain management, and energy conservation with Minimum assistance. Home safety: Patient instructed on home modifications and safety (raise height of ADL objects, appropriate height of chair surfaces, recliner safety, change of floor surfaces, clear pathways) to increase independence and fall prevention. Patient indicated understanding. Standing: Patient instructed and demonstrated to walk up to sink/countertop/surfaces, step into walker to increase safety of joint and fall prevention with Contact guard assistance. Instructed to apply concept of hip contraindications to ADLs within the home (no extreme RLE combination of movements, square off while using objects, slide objects along surfaces). Patient instructed to increase amount of time standing, observe standing position during ADLs to increase even weight bearing through bilateral LEs to increase independence with ADLs. Goal to be reached 30 days post - op, per orthopedic surgeon or per PT. Patient indicated understanding. Tub transfer: Patient instructed regarding when it is safe to begin transfer into tub (complete stairs with PT, advance exercises with PT high enough to clear tub height).   Patient instructed to use the same technique as used with stairs when entering and exiting tub (\"up with the non-surgical, down with the surgical leg\"). Patient indicated understanding  Functional Measure:    Barthel Index:  Bathin  Bladder: 5  Bowels: 10  Groomin  Dressin  Feeding: 10  Mobility: 10  Stairs: 0  Toilet Use: 5  Transfer (Bed to Chair and Back): 10  Total: 60/100      The Barthel ADL Index: Guidelines  1. The index should be used as a record of what a patient does, not as a record of what a patient could do. 2. The main aim is to establish degree of independence from any help, physical or verbal, however minor and for whatever reason. 3. The need for supervision renders the patient not independent. 4. A patient's performance should be established using the best available evidence. Asking the patient, friends/relatives and nurses are the usual sources, but direct observation and common sense are also important. However direct testing is not needed. 5. Usually the patient's performance over the preceding 24-48 hours is important, but occasionally longer periods will be relevant. 6. Middle categories imply that the patient supplies over 50 per cent of the effort. 7. Use of aids to be independent is allowed. Score Interpretation (from [address] [address])    Independent   60-79 Minimally independent   40-59 Partially dependent   20-39 Very dependent   <20 Totally dependent     -Kaiden Lee., Barthel, D.W. (1965). Functional evaluation: the Barthel Index. [address] [facility] ([address] [facility] [address] (1997). The Barthel activities of daily living index: self-reporting versus actual performance in the old (> or = 75 years). Journal of [address] [place] [address] 45(7), [address] [facility], J.J.M.F, Mohamuddo Nguyen., Yola Diaz. (). Measuring the change in disability after inpatient rehabilitation; comparison of the responsiveness of the Barthel Index and Functional [place] Measure. Journal of Neurology, Neurosurgery, and Psychiatry, 66(4), 615-466.   ARELY Acuna, ISAURA Howell, & Cyndy Mejia M.A. (2004) Assessment of post-stroke quality of life in cost-effectiveness studies: The usefulness of the Barthel Index and the EuroQoL-5D. Quality of Life Research, 15, 112-55        Occupational Therapy Evaluation Charge Determination   History Examination Decision-Making   LOW Complexity : Brief history review  MEDIUM Complexity : 3-5 performance deficits relating to physical, cognitive , or psychosocial skils that result in activity limitations and / or participation restrictions LOW Complexity : No comorbidities that affect functional and no verbal or physical assistance needed to complete eval tasks       Based on the above components, the patient evaluation is determined to be of the following complexity level: LOW   Pain Ratin/10 R hip, ice applied, RN aware and following    Activity Tolerance:   Good, Fair, and requires rest breaks      After treatment patient left in no apparent distress:    Supine in bed, Call bell within reach, Bed / chair alarm activated, and Side rails x 3    COMMUNICATION/EDUCATION:   The patients plan of care was discussed with: Physical therapy assistant, Registered nurse, and Case management.      Thank you for this referral.  Mandi Moreno OT  Time Calculation: 34 mins

## 2022-08-17 LAB
ANION GAP SERPL CALC-SCNC: 14 MMOL/L (ref 5–15)
BUN SERPL-MCNC: 82 MG/DL (ref 6–20)
BUN/CREAT SERPL: 13 (ref 12–20)
CALCIUM SERPL-MCNC: 9.8 MG/DL (ref 8.5–10.1)
CHLORIDE SERPL-SCNC: 105 MMOL/L (ref 97–108)
CO2 SERPL-SCNC: 16 MMOL/L (ref 21–32)
COMMENT, HOLDF: NORMAL
CREAT SERPL-MCNC: 6.43 MG/DL (ref 0.55–1.02)
ERYTHROCYTE [DISTWIDTH] IN BLOOD BY AUTOMATED COUNT: 17.2 % (ref 11.5–14.5)
FERRITIN SERPL-MCNC: 265 NG/ML (ref 8–252)
GLUCOSE SERPL-MCNC: 91 MG/DL (ref 65–100)
HCT VFR BLD AUTO: 23.9 % (ref 35–47)
HGB BLD-MCNC: 7.2 G/DL (ref 11.5–16)
HGB BLD-MCNC: 7.2 G/DL (ref 11.5–16)
IRON SATN MFR SERPL: 12 % (ref 20–50)
IRON SERPL-MCNC: 21 UG/DL (ref 35–150)
MCH RBC QN AUTO: 27.5 PG (ref 26–34)
MCHC RBC AUTO-ENTMCNC: 30.1 G/DL (ref 30–36.5)
MCV RBC AUTO: 91.2 FL (ref 80–99)
NRBC # BLD: 0.04 K/UL (ref 0–0.01)
NRBC BLD-RTO: 0.3 PER 100 WBC
PLATELET # BLD AUTO: 196 K/UL (ref 150–400)
PMV BLD AUTO: 10.6 FL (ref 8.9–12.9)
POTASSIUM SERPL-SCNC: 4.2 MMOL/L (ref 3.5–5.1)
RBC # BLD AUTO: 2.62 M/UL (ref 3.8–5.2)
SAMPLES BEING HELD,HOLD: NORMAL
SODIUM SERPL-SCNC: 135 MMOL/L (ref 136–145)
TIBC SERPL-MCNC: 171 UG/DL (ref 250–450)
WBC # BLD AUTO: 11.6 K/UL (ref 3.6–11)

## 2022-08-17 PROCEDURE — 74011250637 HC RX REV CODE- 250/637: Performed by: PHYSICIAN ASSISTANT

## 2022-08-17 PROCEDURE — 74011250636 HC RX REV CODE- 250/636: Performed by: ORTHOPAEDIC SURGERY

## 2022-08-17 PROCEDURE — 36415 COLL VENOUS BLD VENIPUNCTURE: CPT

## 2022-08-17 PROCEDURE — 97530 THERAPEUTIC ACTIVITIES: CPT

## 2022-08-17 PROCEDURE — 97116 GAIT TRAINING THERAPY: CPT

## 2022-08-17 PROCEDURE — 80048 BASIC METABOLIC PNL TOTAL CA: CPT

## 2022-08-17 PROCEDURE — 82728 ASSAY OF FERRITIN: CPT

## 2022-08-17 PROCEDURE — 74011250637 HC RX REV CODE- 250/637: Performed by: INTERNAL MEDICINE

## 2022-08-17 PROCEDURE — 83540 ASSAY OF IRON: CPT

## 2022-08-17 PROCEDURE — 74011000250 HC RX REV CODE- 250: Performed by: PHYSICIAN ASSISTANT

## 2022-08-17 PROCEDURE — 85027 COMPLETE CBC AUTOMATED: CPT

## 2022-08-17 PROCEDURE — 85018 HEMOGLOBIN: CPT

## 2022-08-17 RX ORDER — SODIUM BICARBONATE 650 MG/1
325 TABLET ORAL 3 TIMES DAILY
Status: DISCONTINUED | OUTPATIENT
Start: 2022-08-17 | End: 2022-08-18

## 2022-08-17 RX ADMIN — SENNOSIDES AND DOCUSATE SODIUM 1 TABLET: 50; 8.6 TABLET ORAL at 09:06

## 2022-08-17 RX ADMIN — SODIUM CHLORIDE, PRESERVATIVE FREE 10 ML: 5 INJECTION INTRAVENOUS at 14:00

## 2022-08-17 RX ADMIN — SODIUM BICARBONATE 325 MG: 650 TABLET ORAL at 22:37

## 2022-08-17 RX ADMIN — CARVEDILOL 12.5 MG: 12.5 TABLET, FILM COATED ORAL at 17:49

## 2022-08-17 RX ADMIN — SODIUM BICARBONATE 325 MG: 650 TABLET ORAL at 16:24

## 2022-08-17 RX ADMIN — SODIUM CHLORIDE, PRESERVATIVE FREE 10 ML: 5 INJECTION INTRAVENOUS at 22:38

## 2022-08-17 RX ADMIN — CARVEDILOL 12.5 MG: 12.5 TABLET, FILM COATED ORAL at 09:06

## 2022-08-17 RX ADMIN — HYDROMORPHONE HYDROCHLORIDE 2 MG: 2 TABLET ORAL at 17:49

## 2022-08-17 RX ADMIN — ALLOPURINOL 100 MG: 100 TABLET ORAL at 09:06

## 2022-08-17 RX ADMIN — SODIUM CHLORIDE, PRESERVATIVE FREE 10 ML: 5 INJECTION INTRAVENOUS at 06:47

## 2022-08-17 RX ADMIN — POLYETHYLENE GLYCOL 3350 17 G: 17 POWDER, FOR SOLUTION ORAL at 09:05

## 2022-08-17 RX ADMIN — ACETAMINOPHEN 650 MG: 325 TABLET ORAL at 14:14

## 2022-08-17 RX ADMIN — ASPIRIN 81 MG: 81 TABLET, COATED ORAL at 09:05

## 2022-08-17 RX ADMIN — ASPIRIN 81 MG: 81 TABLET, COATED ORAL at 17:49

## 2022-08-17 RX ADMIN — IRON SUCROSE 200 MG: 20 INJECTION, SOLUTION INTRAVENOUS at 15:49

## 2022-08-17 RX ADMIN — HYDROMORPHONE HYDROCHLORIDE 2 MG: 2 TABLET ORAL at 06:46

## 2022-08-17 RX ADMIN — SENNOSIDES AND DOCUSATE SODIUM 1 TABLET: 50; 8.6 TABLET ORAL at 17:49

## 2022-08-17 RX ADMIN — SODIUM BICARBONATE 325 MG: 650 TABLET ORAL at 09:06

## 2022-08-17 RX ADMIN — ACETAMINOPHEN 650 MG: 325 TABLET ORAL at 06:46

## 2022-08-17 RX ADMIN — PRAVASTATIN SODIUM 40 MG: 40 TABLET ORAL at 22:37

## 2022-08-17 RX ADMIN — ALLOPURINOL 100 MG: 100 TABLET ORAL at 17:49

## 2022-08-17 NOTE — PROGRESS NOTES
Bedside shift change report given to benjy (oncoming nurse) by maida (offgoing nurse). Report included the following information SBAR, Kardex, Intake/Output, and MAR.

## 2022-08-17 NOTE — PROGRESS NOTES
Problem: Mobility Impaired (Adult and Pediatric)  Goal: *Acute Goals and Plan of Care (Insert Text)  Description: FUNCTIONAL STATUS PRIOR TO ADMISSION: Patient was independent and active without use of DME.    HOME SUPPORT PRIOR TO ADMISSION: The patient lived with spouse/children but did not require assist.    Physical Therapy Goals  Initiated 8/15/2022    1. Patient will move from supine to sit and sit to supine , scoot up and down, and roll side to side in bed with modified independence within 4 days. 2. Patient will perform sit to stand with modified independence within 4 days. 3. Patient will ambulate with modified independence for 150 feet with the least restrictive device within 4 days. 4. Patient will perform post-MEL home exercise program per protocol with independence within 4 days. Outcome: Progressing Towards Goal   PHYSICAL THERAPY TREATMENT  Patient: Abram Torres (61 y.o. female)  Date: 8/17/2022  Diagnosis: Avascular necrosis of bone of right hip (HCC) [M87.051] Avascular necrosis of left femur (HCC)  Procedure(s) (LRB):  RIGHT TOTAL HIP ARTHROPLASTY ANTERIOR APPROACH (Right) 2 Days Post-Op  Precautions: Fall, WBAT  Chart, physical therapy assessment, plan of care and goals were reviewed. ASSESSMENT  Patient continues with skilled PT services and is progressing towards goals slowly. She is experiencing burning sensation/pain in R thigh and glut as well as stiffness. She continues to require additional time to complete mobility and required Min A via (R)HHA/support when completing transfer to EOB as well as w/ RLE to EOB. She is only able to tolerate approx 40 ft of gait and unable to progress further d/t pain. She also continued to report \"woozy\" feeling once she transferred to EOB. BP  She is agreeable to remained OOB in chair. All needs in reach and ice pack in place to R hip (post) and to R thigh.      Vitals:    08/17/22 1344 08/17/22 1351   BP: 111/69 114/77   BP 1 Location: BP Patient Position: At rest;Supine Sitting   Pulse: 88 95   Temp:     Resp:     Height:     Weight:     SpO2:  93%       PM Session: Pt received in chair w/ NSG present to assist pt back to bed. Pt agreeable to therapy assist but declined amb. She reports need for new IV to placed in preparation for transfusion (Iron? Per pt). Pain continues to be factor regarding timing w/ activity. CGA for sit<>stand from chair and transfer from chair>bed. Continues to require Min A for RLE support into bed. Current Level of Function Impacting Discharge (mobility/balance): CGA-Min Ax1     Other factors to consider for discharge: Medical (nephrology following); estrada catheter in place. Drop in Hgb (7.2 on 8/17). mobility below baseline. PLAN :  Patient continues to benefit from skilled intervention to address the above impairments. Continue treatment per established plan of care. to address goals. Recommendation for discharge: (in order for the patient to meet his/her long term goals)  Physical therapy at least 2 days/week in the home     This discharge recommendation:  Has been made in collaboration with the attending provider and/or case management    IF patient discharges home will need the following DME: patient owns DME required for discharge       SUBJECTIVE:   Patient stated Oo! And there goes the burning. Diya Scarce c/o burning in R thigh and glut    OBJECTIVE DATA SUMMARY:   Critical Behavior:  Neurologic State: Alert  Orientation Level: Oriented X4  Cognition: Follows commands  Safety/Judgement: Awareness of environment, Home safety, Insight into deficits  Functional Mobility Training:  Bed Mobility:     Supine to Sit: Minimum assistance;Assist x1  Sit to Supine:  (remained OOB in chair)           Transfers:  Sit to Stand: Minimum assistance;Assist x1 (bed elevated minimally)  Stand to Sit: Minimum assistance (fair eccentric control)                             Balance:  Sitting: Intact  Standing: Impaired; With support  Standing - Static: Constant support;Good  Standing - Dynamic : Constant support; Fair  Ambulation/Gait Training:  Distance (ft): 40 Feet (ft)  Assistive Device: Gait belt;Walker, rolling  Ambulation - Level of Assistance: Contact guard assistance        Gait Abnormalities: Antalgic;Decreased step clearance; Step to gait        Base of Support: Widened;Shift to left  Stance: Right decreased  Speed/Adriana: Slow  Step Length: Left shortened;Right shortened  Swing Pattern: Right asymmetrical             Stairs:    Will plan for during 2nd session           Therapeutic Exercises: Completed w/ rehab Tech prior to OOB mobility  SUPINE  EXERCISES   Sets   Reps   Active Active Assist   Passive Self ROM   Comments   Ankle Pumps 2 10 [x]                                        []                                        []                                        []                                           Quad Sets 2 10 [x]                                        []                                        []                                        []                                           Heel Slides 2 10 []                                        [x]                                        []                                        []                                           Hip Abduction   []                                        []                                        []                                        []                                           Glut Sets   []                                        []                                        []                                        []                                              []                                        []                                        []                                        []                                              []                                        []                                        [] []                                               Pain Ratin-6/10; describes pain/discomfort as burning and stiff      Activity Tolerance:   Fair      After treatment patient left in no apparent distress:   Sitting in chair and Call bell within reach    COMMUNICATION/COLLABORATION:   The patients plan of care was discussed with: Registered nurse.      Jana GuzmanPTA   Time Calculation: 35 mins

## 2022-08-17 NOTE — CONSULTS
NEPHROLOGY CONSULT NOTE     Patient: Kendra Brito MRN: [id number]  PCP: Jenniffer Cleaning MD   :     1960  Age:   57 y.o. Sex:  female      Referring physician: Franco Streeter MD  Reason for consultation: CKD, anemia  Admission Date: 8/15/2022  6:48 AM  LOS: 0 days     DISCUSSION / PLAN :   CKD5-6. cr at baseline  -f/up RFT  -avoid nephrotoxins    Acute on chronic anemia  Anemia of CKD  -drop in Hb post op  -on EPOgen as outpatient, due in September  -check Iron panel, might need IV Iron  -rule out other sources of blood loss    [place] acidosis-on po bicarb    Hypokalemia, K high yesterday  -agree with holding kcl    HTN  -BP low today, BP meds held    [facility] hyperparathyroidism  Hypercalcemia  -has not tolerated sensioar, currently ca in acceptable range  -will need parathyroidectomy eventually    Gout-on allopurinol    S/p Rt hip arthroplasty       Active Problems / Assessment AAActive  :   Principal Problem:    Avascular necrosis of left femur ([address] (10/4/2020)    Active Problems:    Avascular necrosis of bone of right hip ([address] (8/15/2022)         Subjective:   HPI: Kendra Brito is a 57 y.o. female with h/o CKD5, HTNB, HLP and hyperparathyroidism whio is s/p RT hip arthroplasty on 8/15 for Avascular necrosis of bone of right hip ([address] [M87.051]   Patient has anemia of CKD on procrit as outpatient. Last Hb 9 on 8/10, has dropped to 7.2 post op. Feels lightheaded, no chest pain/SOB. Had nausea which has resolved. No BM since admission. Renal function at baseline, co2 16, BP low this am  She has been listed for kidney transplant for her advanced CKD    Past Medical Hx:   Past Medical History:   Diagnosis Date    Arthritis     L knee    Chronic kidney disease     secondary to hypertensive nephrosclerosis, CKD STG 5, ON ACTIVE KIDNEY TRANSPLANT LIST AT [facility]- DR. SANCHEZ    Gastritis     HISTORY OF 10/2020     Gout     History of claustrophobia     Hyperparathyroidism ([address]     Hypertension Past Surgical Hx:     Past Surgical History:   Procedure Laterality Date    HX  SECTION      HX COLONOSCOPY      HX GYN      ECTOPIC PREGNANCY SURGERY     HX HIP REPLACEMENT Left     HX LAP CHOLECYSTECTOMY      HX LUMBAR LAMINECTOMY  2020    [id number]    HX WISDOM TEETH EXTRACTION         Medications:  Prior to Admission medications    Medication Sig Start Date End Date Taking? Authorizing Provider   aspirin delayed-release 81 mg tablet Take 1 Tablet by mouth two (2) times a day. 22  Yes Stanton Fast, PA-C   HYDROmorphone (DILAUDID) 2 mg tablet Take 0.5-1 Tablets by mouth every four (4) hours as needed for Pain for up to 7 days. Max Daily Amount: 12 mg. 22 Yes Stanton Fast, PA-C   senna-docusate (PERICOLACE) 8.6-50 mg per tablet Take 1 Tablet by mouth two (2) times a day. 22  Yes Stanton Fast, PA-C   diphenhydrAMINE (BENADRYL) 50 mg tablet Take 50 mg by mouth nightly as needed for Sleep. Yes Provider, Historical   allopurinoL (ZYLOPRIM) 100 mg tablet TAKE TWO TABLETS BY MOUTH DAILY 22  Yes Shu Webber MD   predniSONE (DELTASONE) 20 mg tablet Take 1 Tablet by mouth two (2) times a day. 22  Yes Shu Webber MD   sodium bicarbonate 325 mg tablet Take 325 mg by mouth two (2) times a day. 22  Yes Provider, Historical   carvediloL (COREG) 6.25 mg tablet Take 2 Tablets by mouth two (2) times daily (with meals). 4/10/22  Yes Shu Webber MD   pravastatin (PRAVACHOL) 40 mg tablet TAKE 1 TABLET BY MOUTH EVERY DAY AT BEDTIME  Patient taking differently: Take 40 mg by mouth. TAKE 1 TABLET BY MOUTH EVERY DAY AT BEDTIME 10/17/21  Yes Shu Webber MD   amLODIPine (NORVASC) 10 mg tablet TAKE 1 TABLET BY MOUTH DAILY 10/17/21  Yes Shu Webber MD   potassium chloride (K-DUR, KLOR-CON) 20 mEq tablet TAKE 1 TABLET BY MOUTH DAILY 10/17/21  Yes Shu Webber MD   tolterodine ER (DETROL LA) 4 mg ER capsule Take 1 Capsule by mouth daily.  10/17/21 Yes Bonifacio Rose MD   bumetanide (BUMEX) 1 mg tablet Take 1 mg by mouth daily. 6/25/21  Yes Provider, Historical   hydrALAZINE (APRESOLINE) 10 mg tablet Take 10 mg by mouth three (3) times daily. 6/19/21  Yes Provider, Historical   acetaminophen (TYLENOL) 500 mg tablet Take 500 mg by mouth every six (6) hours as needed for Pain. Yes Provider, Historical       Allergies   Allergen Reactions    Morphine Nausea and Vomiting and Cough     Blood pressure and 158-96 heart rate up to 156. [place] Swelling     PATIENT DENIES THIS ALLERGY        Social Hx:  reports that she quit smoking about 22 years ago. Her smoking use included cigarettes. She has a 6.25 pack-year smoking history. She has never used smokeless tobacco. She reports that she does not drink alcohol and does not use drugs. Family History   Problem Relation Age of Onset    Hypertension Mother     Liver Disease Father     Diabetes Sister     Breast Cancer Paternal Grandmother     Anesth Problems Neg Hx        Review of Systems:  A twelve point review of system was performed today. Pertinent positives and negatives are mentioned in the HPI. The reminder of the ROS is negative and noncontributory. Objective:    Vitals:    Vitals:    08/16/22 1441 08/16/22 2029 08/17/22 0235 08/17/22 0742   BP: (!) 131/91 125/65 118/71 106/72   Pulse: 84 88 92 88   Resp: 18 18 18 18   Temp: 97.3 °F (36.3 °C) 98.1 °F (36.7 °C) 97.7 °F (36.5 °C) 98.2 °F (36.8 °C)   SpO2: 99% 93% 95% 93%   Weight:       Height:         I&O's:  08/16 0701 - 08/17 0700  In: -   Out: 75 [Urine:75]  Visit Vitals  /72 (BP 1 Location: Right upper arm, BP Patient Position: At rest)   Pulse 88   Temp 98.2 °F (36.8 °C)   Resp 18   Ht 5' 6\" (1.676 m)   Wt 101.6 kg (224 lb)   SpO2 93%   BMI 36.15 kg/m²       Physical Exam:  General:Alert, No distress,   HEENT: The sclerae without icterus.  .   Neck:Supple,no mass palpable  Lungs : Clears to auscultation Bilaterally, Normal respiratory effort  CVS: RRR, S1 S2 normal, No rub, no LE edema  Abdomen: Soft, Non tender, No hepatosplenomegaly, bowel sounds present  Extremities: No edema  Skin: No rash   Neurologic:  AAO x 3, nl speech  Psych: normal affect    Laboratory Results:    Recent Results (from the past 24 hour(s))   METABOLIC PANEL, BASIC    Collection Time: 08/17/22  5:30 AM   Result Value Ref Range    Sodium 135 (L) 136 - 145 mmol/L    Potassium 4.2 3.5 - 5.1 mmol/L    Chloride 105 97 - 108 mmol/L    CO2 16 (L) 21 - 32 mmol/L    Anion gap 14 5 - 15 mmol/L    Glucose 91 65 - 100 mg/dL    BUN 82 (H) 6 - 20 MG/DL    Creatinine 6.43 (H) 0.55 - 1.02 MG/DL    BUN/Creatinine ratio 13 12 - 20      GFR est AA 8 (L) >60 ml/min/1.73m2    GFR est non-AA 7 (L) >60 ml/min/1.73m2    Calcium 9.8 8.5 - 10.1 MG/DL   HEMOGLOBIN    Collection Time: 08/17/22  5:30 AM   Result Value Ref Range    HGB 7.2 (L) 11.5 - 16.0 g/dL        Lab Results   Component Value Date    BUN 82 (H) 08/17/2022     (L) 08/17/2022    K 4.2 08/17/2022     08/17/2022    CO2 16 (L) 08/17/2022       Lab Results   Component Value Date    BUN 82 (H) 08/17/2022    BUN 80 (H) 08/16/2022    BUN 75 (H) 08/10/2022    BUN 70 (H) 08/05/2022    BUN 62 (H) 07/13/2022    K 4.2 08/17/2022    K 5.2 (H) 08/16/2022    K 4.4 08/10/2022    K 4.1 08/05/2022    K 3.9 07/13/2022       Lab Results   Component Value Date    WBC 11.4 (H) 08/10/2022    RBC 3.21 (L) 08/10/2022    HGB 7.2 (L) 08/17/2022    HCT 30.5 (L) 08/10/2022    MCV 95.0 08/10/2022    MCH 28.0 08/10/2022    RDW 17.1 (H) 08/10/2022     08/10/2022       Lab Results   Component Value Date    PHOS 4.9 (H) 08/05/2022       Urine dipstick:   Lab Results   Component Value Date/Time    Color YELLOW/STRAW 08/10/2022 03:34 PM    Appearance CLOUDY (A) 08/10/2022 03:34 PM    Specific gravity 1.016 08/10/2022 03:34 PM    pH (UA) 5.5 08/10/2022 03:34 PM    Protein >300 (A) 08/10/2022 03:34 PM    Glucose 100 (A) 08/10/2022 03:34 PM    Ketone Negative 08/10/2022 03:34 PM    Bilirubin Negative 08/10/2022 03:34 PM    Urobilinogen 0.2 08/10/2022 03:34 PM    Nitrites Negative 08/10/2022 03:34 PM    Leukocyte Esterase TRACE (A) 08/10/2022 03:34 PM    Epithelial cells MODERATE (A) 08/10/2022 03:34 PM    Bacteria 1+ (A) 08/10/2022 03:34 PM    WBC 0-4 08/10/2022 03:34 PM    RBC 0-5 08/10/2022 03:34 PM       I have reviewed the following: All pertinent labs, microbiology data, radiology imaging for my assessment     ECG- Rev: Yes / No  Xray/CT/US/MRI REV: Yes/ No       Medications list Personally Reviewed   [x]      Yes     []               No      Thank you for allowing us to participate in the care of this patient. We will follow patient.  Please dont hesitate to call with any questions    Antonio Thomson MD  8/17/2022    [address] [facility]

## 2022-08-18 LAB
ABO + RH BLD: NORMAL
ANION GAP SERPL CALC-SCNC: 10 MMOL/L (ref 5–15)
ANION GAP SERPL CALC-SCNC: 12 MMOL/L (ref 5–15)
BLOOD GROUP ANTIBODIES SERPL: NORMAL
BUN SERPL-MCNC: 90 MG/DL (ref 6–20)
BUN SERPL-MCNC: 91 MG/DL (ref 6–20)
BUN/CREAT SERPL: 13 (ref 12–20)
BUN/CREAT SERPL: 13 (ref 12–20)
CALCIUM SERPL-MCNC: 9.7 MG/DL (ref 8.5–10.1)
CALCIUM SERPL-MCNC: 9.9 MG/DL (ref 8.5–10.1)
CHLORIDE SERPL-SCNC: 100 MMOL/L (ref 97–108)
CHLORIDE SERPL-SCNC: 102 MMOL/L (ref 97–108)
CO2 SERPL-SCNC: 17 MMOL/L (ref 21–32)
CO2 SERPL-SCNC: 19 MMOL/L (ref 21–32)
CREAT SERPL-MCNC: 6.87 MG/DL (ref 0.55–1.02)
CREAT SERPL-MCNC: 7.03 MG/DL (ref 0.55–1.02)
GLUCOSE SERPL-MCNC: 86 MG/DL (ref 65–100)
GLUCOSE SERPL-MCNC: 94 MG/DL (ref 65–100)
HGB BLD-MCNC: 7.2 G/DL (ref 11.5–16)
HGB BLD-MCNC: 8.3 G/DL (ref 11.5–16)
HISTORY CHECKED?,CKHIST: NORMAL
POTASSIUM SERPL-SCNC: 4.2 MMOL/L (ref 3.5–5.1)
POTASSIUM SERPL-SCNC: 4.3 MMOL/L (ref 3.5–5.1)
SODIUM SERPL-SCNC: 129 MMOL/L (ref 136–145)
SODIUM SERPL-SCNC: 131 MMOL/L (ref 136–145)
SPECIMEN EXP DATE BLD: NORMAL

## 2022-08-18 PROCEDURE — 97530 THERAPEUTIC ACTIVITIES: CPT

## 2022-08-18 PROCEDURE — 86923 COMPATIBILITY TEST ELECTRIC: CPT

## 2022-08-18 PROCEDURE — 36415 COLL VENOUS BLD VENIPUNCTURE: CPT

## 2022-08-18 PROCEDURE — 36430 TRANSFUSION BLD/BLD COMPNT: CPT

## 2022-08-18 PROCEDURE — 97116 GAIT TRAINING THERAPY: CPT

## 2022-08-18 PROCEDURE — 85018 HEMOGLOBIN: CPT

## 2022-08-18 PROCEDURE — 74011000250 HC RX REV CODE- 250: Performed by: PHYSICIAN ASSISTANT

## 2022-08-18 PROCEDURE — 94760 N-INVAS EAR/PLS OXIMETRY 1: CPT

## 2022-08-18 PROCEDURE — 74011250637 HC RX REV CODE- 250/637: Performed by: PHYSICIAN ASSISTANT

## 2022-08-18 PROCEDURE — 86900 BLOOD TYPING SEROLOGIC ABO: CPT

## 2022-08-18 PROCEDURE — 80048 BASIC METABOLIC PNL TOTAL CA: CPT

## 2022-08-18 PROCEDURE — 74011250637 HC RX REV CODE- 250/637: Performed by: INTERNAL MEDICINE

## 2022-08-18 PROCEDURE — P9016 RBC LEUKOCYTES REDUCED: HCPCS

## 2022-08-18 RX ORDER — SODIUM CHLORIDE 9 MG/ML
250 INJECTION, SOLUTION INTRAVENOUS AS NEEDED
Status: DISCONTINUED | OUTPATIENT
Start: 2022-08-18 | End: 2022-08-22 | Stop reason: HOSPADM

## 2022-08-18 RX ORDER — SODIUM BICARBONATE 650 MG/1
650 TABLET ORAL 3 TIMES DAILY
Status: DISCONTINUED | OUTPATIENT
Start: 2022-08-18 | End: 2022-08-22 | Stop reason: HOSPADM

## 2022-08-18 RX ADMIN — HYDRALAZINE HYDROCHLORIDE 10 MG: 10 TABLET, FILM COATED ORAL at 16:52

## 2022-08-18 RX ADMIN — CARVEDILOL 12.5 MG: 12.5 TABLET, FILM COATED ORAL at 09:01

## 2022-08-18 RX ADMIN — SODIUM BICARBONATE 650 MG: 650 TABLET ORAL at 16:52

## 2022-08-18 RX ADMIN — POLYETHYLENE GLYCOL 3350 17 G: 17 POWDER, FOR SOLUTION ORAL at 09:01

## 2022-08-18 RX ADMIN — ASPIRIN 81 MG: 81 TABLET, COATED ORAL at 09:01

## 2022-08-18 RX ADMIN — SODIUM CHLORIDE, PRESERVATIVE FREE 10 ML: 5 INJECTION INTRAVENOUS at 06:00

## 2022-08-18 RX ADMIN — ALLOPURINOL 100 MG: 100 TABLET ORAL at 17:03

## 2022-08-18 RX ADMIN — ACETAMINOPHEN 650 MG: 325 TABLET ORAL at 06:35

## 2022-08-18 RX ADMIN — SENNOSIDES AND DOCUSATE SODIUM 1 TABLET: 50; 8.6 TABLET ORAL at 09:01

## 2022-08-18 RX ADMIN — BUMETANIDE 1 MG: 1 TABLET ORAL at 09:01

## 2022-08-18 RX ADMIN — ACETAMINOPHEN 650 MG: 325 TABLET ORAL at 21:58

## 2022-08-18 RX ADMIN — HYDROMORPHONE HYDROCHLORIDE 2 MG: 2 TABLET ORAL at 21:58

## 2022-08-18 RX ADMIN — HYDROMORPHONE HYDROCHLORIDE 2 MG: 2 TABLET ORAL at 13:54

## 2022-08-18 RX ADMIN — HYDROMORPHONE HYDROCHLORIDE 2 MG: 2 TABLET ORAL at 06:35

## 2022-08-18 RX ADMIN — SODIUM CHLORIDE, PRESERVATIVE FREE 10 ML: 5 INJECTION INTRAVENOUS at 13:55

## 2022-08-18 RX ADMIN — SODIUM BICARBONATE 650 MG: 650 TABLET ORAL at 21:58

## 2022-08-18 RX ADMIN — SODIUM BICARBONATE 325 MG: 650 TABLET ORAL at 09:01

## 2022-08-18 RX ADMIN — ALLOPURINOL 100 MG: 100 TABLET ORAL at 09:02

## 2022-08-18 RX ADMIN — ASPIRIN 81 MG: 81 TABLET, COATED ORAL at 17:04

## 2022-08-18 RX ADMIN — ACETAMINOPHEN 650 MG: 325 TABLET ORAL at 13:54

## 2022-08-18 RX ADMIN — SENNOSIDES AND DOCUSATE SODIUM 1 TABLET: 50; 8.6 TABLET ORAL at 17:07

## 2022-08-18 RX ADMIN — CARVEDILOL 12.5 MG: 12.5 TABLET, FILM COATED ORAL at 16:52

## 2022-08-18 RX ADMIN — PRAVASTATIN SODIUM 40 MG: 40 TABLET ORAL at 21:58

## 2022-08-18 NOTE — PROGRESS NOTES
Ortho NP Note    POD# 3  s/p RIGHT TOTAL HIP ARTHROPLASTY ANTERIOR APPROACH   Pt seen in room    Pt resting in bed. Reports she slept well overnight, pain well controlled with PO dilaudid. Was not up overnight but endorses lightheadedness with therapy yesterday and \"seeing rainbows. \"  Patient otherwise states she felt she moved well on PT evaluation and is hopeful for discharge to home     No CP, SOB. Denies palpitations. No N/V.  + flatus no bowel movement yet     VSS Afebrile. Visit Vitals  /67 (BP 1 Location: Right lower arm, BP Patient Position: Lying)   Pulse 88   Temp 98.3 °F (36.8 °C)   Resp 16   Ht 5' 6\" (1.676 m)   Wt 101.6 kg (224 lb)   SpO2 93%   BMI 36.15 kg/m²       Voiding status: spontaneous void   Output (mL)  Urine Voided: 750 ml (08/17/22 1423)  Last Bowel Movement Date:  (PTA) (08/17/22 2110)  Straight Cath  Straight Cath: Nurse performed cath;Sterile technique used (08/16/22 0553)  Number of Attempts: 1 (08/16/22 0553)  Catheter Size: 14 FR (08/16/22 0553)  Time Catheter Inserted: 5901 (08/16/22 0553)  Time Catheter Removed: 3720 (08/16/22 0553)  Urine: 500 mL (08/16/22 0553)      Labs    Lab Results   Component Value Date/Time    HGB 7.2 (L) 08/18/2022 03:42 AM      Lab Results   Component Value Date/Time    INR 1.0 08/10/2022 03:34 PM      Lab Results   Component Value Date/Time    Sodium 129 (L) 08/18/2022 03:42 AM    Potassium 4.3 08/18/2022 03:42 AM    Chloride 102 08/18/2022 03:42 AM    CO2 17 (L) 08/18/2022 03:42 AM    Glucose 86 08/18/2022 03:42 AM    BUN 91 (H) 08/18/2022 03:42 AM    Creatinine 6.87 (H) 08/18/2022 03:42 AM    Calcium 9.9 08/18/2022 03:42 AM     Recent Glucose Results:   Lab Results   Component Value Date/Time    GLU 86 08/18/2022 03:42 AM           Body mass index is 36.15 kg/m². : A BMI > 30 is classified as obesity and > 40 is classified as morbid obesity. Dressing c.d.i  Cryotherapy in place over incision  Calves soft and supple;  No pain with passive stretch  Bilateral LEs warm, dry. 2+ DP pulses. Sensation and motor intact - PF/DF/EHL intact 5/5  SCDs for mechanical DVT proph while in bed     PLAN:  1) PT: BID WBAT  2) Anticoagulation:  Aspirin  81  mg PO BID for DVT Prophylaxis. Encouraged early mobilization, bed exercises, and SCD use. 3) Pain - Multimodal approach including cryotherapy, scheduled Tylenol with  PRN  PO dilaudid   4) Hx of CKD stage 5 2/2 HTN nephrosclerosis: Caution with narcotic dosing and nephrotoxins. 5) Urinary management: Estrada placed POD 1, urology follow up. 6) Anemia, present on admission: Pre op Hgb on 8/10: 9.0. EBL: 200 mL. Hgb on POD 1: 8.6; POD 2: 7.2; today: 7.2. Acute on chronic anemia--expected Acute blood loss post-op anemia compounding anemia of chronic disease. Nephrology consult re epogen dosing--appreciate input; repeating IV iron 200 mg. Ordered 1 unit PRBCs as patient symptomatic, recheck Hgb and activity tolerance following transfusion. 7) Post op care: Continue OBR, encouraged IS. Follow up in 2 weeks with Dr Arturo Don. Aquacel to remain in place x 7 days unless integrity is lost.   8) Readiness for discharge: [facility] arranged through      [x] Vital Signs stable    [] Hgb stable    [x] + Voiding : discharge with estrada and urology follow up   [x] Wound intact, drainage minimal    [x] Tolerating PO intake     [] Cleared by PT (OT if applicable)     [] Stair training completed (if applicable)    [] Independent / Contact Guard Assist (household distance)     [] Bed mobility     [] Car transfers     [] ADLs    [x] Adequate pain control on oral medication alone      Nephro following: wanting AM lab recheck d/t Cr bump, awaiting their clearance.      Pending PT clearance  D/C with estrada (urology follow up arranged)   Med rec done by PA--sent to Samantha Palomino NP  Available via [employer]

## 2022-08-18 NOTE — PROGRESS NOTES
Problem: Mobility Impaired (Adult and Pediatric)  Goal: *Acute Goals and Plan of Care (Insert Text)  Description: FUNCTIONAL STATUS PRIOR TO ADMISSION: Patient was independent and active without use of DME.    HOME SUPPORT PRIOR TO ADMISSION: The patient lived with spouse/children but did not require assist.    Physical Therapy Goals  Initiated 8/15/2022    1. Patient will move from supine to sit and sit to supine , scoot up and down, and roll side to side in bed with modified independence within 4 days. 2. Patient will perform sit to stand with modified independence within 4 days. 3. Patient will ambulate with modified independence for 150 feet with the least restrictive device within 4 days. 4. Patient will perform post-MEL home exercise program per protocol with independence within 4 days. 8/18/2022 1543 by Jakob Zhang  Outcome: Progressing Towards Goal   PHYSICAL THERAPY TREATMENT  Patient: Daisy Wolfe (30 y.o. female)  Date: 8/18/2022  Diagnosis: Avascular necrosis of bone of right hip (HCC) [M87.051] Avascular necrosis of left femur (HCC)  Procedure(s) (LRB):  RIGHT TOTAL HIP ARTHROPLASTY ANTERIOR APPROACH (Right) 3 Days Post-Op  Precautions: Fall, WBAT  Chart, physical therapy assessment, plan of care and goals were reviewed. ASSESSMENT  Patient continues with skilled PT services and is progressing towards goals. Patient performed better with pain meds on board. NO DIZZINESS reported during session. Again reinforced importance of taking same regularly so that she can effectively participate in therapy. Ambulated household distance (45 ft) very slowly, but steadily. Requested to return to chair. Tomorrow will wheel patient to the gym to see if she can negotiate steps in order to get into her home (has 5 steps with one rail). Discussed staying downstairs for awhile instead of 2nd floor bed & bath and she stated that this would be possible.     Current Level of Function Impacting Discharge (mobility/balance): Minimal assistance x 2 for sit-stand, cues for rocking and using momentum to pushup to stand. Ambulated with RW and gait belt slowly but steadily 45 ft. Other factors to consider for discharge: Pain not well-managed/moderate-minimal assistance for bed mobility/steps to enter and steps in the home/supportive family         PLAN :  Patient continues to benefit from skilled intervention to address the above impairments. Continue treatment per established plan of care. to address goals. Recommendation for discharge: (in order for the patient to meet his/her long term goals)  Physical therapy at least 2 days/week in the home AND ensure assist and/or supervision for safety with all mobility and ADLs. Patient really wants to return home and states that she has 2 strong sons and  to assist her. This discharge recommendation:  Has been made in collaboration with the attending provider and/or case management    IF patient discharges home will need the following DME: patient owns DME required for discharge       SUBJECTIVE:   Patient stated I really want to go home.     OBJECTIVE DATA SUMMARY:   Critical Behavior:  Neurologic State: Alert  Orientation Level: Oriented X4  Cognition: Appropriate decision making, Follows commands  Safety/Judgement: Awareness of environment, Home safety, Insight into deficits  Functional Mobility Training:  Bed Mobility:     Supine to Sit:  (presented up in chair)  Sit to Supine:  (returned to chair)           Transfers:  Sit to Stand: Minimum assistance;Assist x2  Stand to Sit: Minimum assistance;Assist x2                             Balance:  Sitting: Intact  Standing: Impaired; With support  Standing - Static: Good;Constant support  Standing - Dynamic : Fair;Constant support  Ambulation/Gait Training:  Distance (ft): 45 Feet (ft)  Assistive Device: Walker, rolling;Gait belt  Ambulation - Level of Assistance: Contact guard assistance        Gait Abnormalities: Antalgic;Decreased step clearance  Right Side Weight Bearing: As tolerated     Base of Support: Widened;Shift to left  Stance: Right decreased  Speed/Adriana: Slow  Step Length: Left shortened  Swing Pattern: Right asymmetrical     Interventions: Safety awareness training;Verbal cues                Therapeutic Exercises:   Encouraged to perform exercise 5-7 times per day    Pain Ratin/10    Activity Tolerance:   Fair    After treatment patient left in no apparent distress:   Sitting in chair, Call bell within reach, Bed / chair alarm activated, and nurse notified. Patient instructed to call nurse when ready to go back to bed. COMMUNICATION/COLLABORATION:   The patients plan of care was discussed with: Registered nurse, Physician, and Case management.      Leo Padgett   Time Calculation: 30 mins

## 2022-08-18 NOTE — PROGRESS NOTES
Problem: Falls - Risk of  Goal: *Absence of Falls  Description: Document Samuel Cease Fall Risk and appropriate interventions in the flowsheet.   Outcome: Progressing Towards Goal  Note: Fall Risk Interventions:  Mobility Interventions: Bed/chair exit alarm, Communicate number of staff needed for ambulation/transfer         Medication Interventions: Bed/chair exit alarm    Elimination Interventions: Call light in reach              Problem: Patient Education: Go to Patient Education Activity  Goal: Patient/Family Education  Outcome: Progressing Towards Goal  Note: Pain management

## 2022-08-18 NOTE — PROGRESS NOTES
Problem: Mobility Impaired (Adult and Pediatric)  Goal: *Acute Goals and Plan of Care (Insert Text)  Description: FUNCTIONAL STATUS PRIOR TO ADMISSION: Patient was independent and active without use of DME.    HOME SUPPORT PRIOR TO ADMISSION: The patient lived with spouse/children but did not require assist.    Physical Therapy Goals  Initiated 8/15/2022    1. Patient will move from supine to sit and sit to supine , scoot up and down, and roll side to side in bed with modified independence within 4 days. 2. Patient will perform sit to stand with modified independence within 4 days. 3. Patient will ambulate with modified independence for 150 feet with the least restrictive device within 4 days. 4. Patient will perform post-MEL home exercise program per protocol with independence within 4 days. Outcome: Not Progressing Towards Goal   PHYSICAL THERAPY TREATMENT  Patient: Myah Eller (29 y.o. female)  Date: 8/18/2022  Diagnosis: Avascular necrosis of bone of right hip (HCC) [M87.051] Avascular necrosis of left femur (HCC)  Procedure(s) (LRB):  RIGHT TOTAL HIP ARTHROPLASTY ANTERIOR APPROACH (Right) 3 Days Post-Op  Precautions: Fall, WBAT  Chart, physical therapy assessment, plan of care and goals were reviewed. ASSESSMENT  Patient continues with skilled PT services and is not progressing towards goals. Patient presented in bed as she received a unit of blood earlier. Performed bed mobility with cues, leg strap. It was quite a struggle for her and took a long time. Patient was initially dizzy when she sat up, but it resolved and BP was fine. Performed sit-stance from bed with +2, was unable to walk due to pain, so took a few small steps with RW and transferred to chair. Patient has not had pain meds since 6:30am. Nurse offered same but she deferred. Nurse to give pain meds at end of session and will try to ambulate again in about an hour.     Current Level of Function Impacting Discharge (mobility/balance): Minimal assistance, lots of additional time, use of strap to perform supine-sit. Sat on edge of bed, 3 attempts and +2 moderate assistance for sit-stand, minimal assistance of 2 for stand-sit. Left up in chair with call bell within reach. Will see again in about an hour and attempt ambulation after pain meds kick in. Other factors to consider for discharge: High pain levels/slow progress/co-morbidities         PLAN :  Patient continues to benefit from skilled intervention to address the above impairments. Continue treatment per established plan of care. to address goals. Recommendation for discharge: (in order for the patient to meet his/her long term goals)  Physical therapy at least 2 days/week in the home AND ensure assist and/or supervision for safety with all mobility and ADLs. Patient really wants to go home, but if she does not improve significantly, she may need placement (has steps to enter the home and to get to second floor bed and bath). This discharge recommendation:  A follow-up discussion with the attending provider and/or case management is planned    IF patient discharges home will need the following DME: patient owns DME required for discharge       SUBJECTIVE:   Patient stated As soon as I move the burning starts.     OBJECTIVE DATA SUMMARY:   Critical Behavior:  Neurologic State: Alert  Orientation Level: Oriented X4  Cognition: Appropriate decision making, Follows commands  Safety/Judgement: Awareness of environment, Home safety, Insight into deficits  Functional Mobility Training:  Bed Mobility:     Supine to Sit: Minimum assistance; Additional time; Adaptive equipment (VERY VERY slow/use of strap)  Sit to Supine:  (Left up in chair)           Transfers:  Sit to Stand: Moderate assistance;Assist x2  Stand to Sit: Minimum assistance;Assist x2                             Balance:  Sitting: Intact  Standing: Impaired; With support  Standing - Static: Fair;Constant support  Standing - Dynamic : Poor;Constant support  Ambulation/Gait Training:        Therapeutic Exercises: Ankle Pumps  Ham Sets  Quad Sets  Heel Slides  X 10 each, 5-7x daily      Pain Ratin/10  (has not had pain medication since 6:30 this am. Spoke to RN who stated that it was offered but patient declined). Educated patient on importance of pain management and how it affects ability to perform in [address]. Activity Tolerance:   Poor--limited by pain    After treatment patient left in no apparent distress:   Sitting in chair, Call bell within reach, Bed / chair alarm activated, and nurse notified. COMMUNICATION/COLLABORATION:   The patients plan of care was discussed with: Registered nurse, Physician, and Case management.      Daniel Baez   Time Calculation: 30 mins

## 2022-08-18 NOTE — PROGRESS NOTES
Problem: Falls - Risk of  Goal: *Absence of Falls  Description: Document Larissa Meyers Fall Risk and appropriate interventions in the flowsheet.   Outcome: Progressing Towards Goal  Note: Fall Risk Interventions:  Mobility Interventions: Bed/chair exit alarm, Patient to call before getting OOB         Medication Interventions: Bed/chair exit alarm, Patient to call before getting OOB    Elimination Interventions: Call light in reach              Problem: Patient Education: Go to Patient Education Activity  Goal: Patient/Family Education  Outcome: Progressing Towards Goal

## 2022-08-18 NOTE — PROGRESS NOTES
Renal Progress Note    NAME:  Giorgio Sepulveda   :   1960   MRN:   [id number]     Date/Time:  2022 9:38 AM  Subjective:       . Patient seen and examined, receiving blood transfusion, denies nausea, SOB.  Bps soft, cr higher        Medications reviewed:  Current Facility-Administered Medications   Medication Dose Route Frequency    0.9% sodium chloride infusion 250 mL  250 mL IntraVENous PRN    sodium bicarbonate tablet 325 mg  325 mg Oral TID    iron sucrose (VENOFER) 200 mg in 0.9% sodium chloride 100 mL IVPB  200 mg IntraVENous ONCE    HYDROmorphone (DILAUDID) tablet 1 mg  1 mg Oral Q4H PRN    HYDROmorphone (DILAUDID) tablet 2 mg  2 mg Oral Q4H PRN    allopurinoL (ZYLOPRIM) tablet 100 mg  100 mg Oral BID    amLODIPine (NORVASC) tablet 10 mg  10 mg Oral DAILY    [Held by provider] bumetanide (BUMEX) tablet 1 mg  1 mg Oral DAILY    carvediloL (COREG) tablet 12.5 mg  12.5 mg Oral BID WITH MEALS    hydrALAZINE (APRESOLINE) tablet 10 mg  10 mg Oral TID    [Held by provider] potassium chloride SR (KLOR-CON 10) tablet 20 mEq  20 mEq Oral DAILY    pravastatin (PRAVACHOL) tablet 40 mg  40 mg Oral QHS    sodium chloride (NS) flush 5-40 mL  5-40 mL IntraVENous Q8H    sodium chloride (NS) flush 5-40 mL  5-40 mL IntraVENous PRN    acetaminophen (TYLENOL) tablet 650 mg  650 mg Oral Q6H    naloxone (NARCAN) injection 0.4 mg  0.4 mg IntraVENous PRN    ondansetron (ZOFRAN ODT) tablet 4 mg  4 mg Oral Q6H PRN    hydrOXYzine HCL (ATARAX) tablet 5 mg  5 mg Oral Q8H PRN    senna-docusate (PERICOLACE) 8.6-50 mg per tablet 1 Tablet  1 Tablet Oral BID    polyethylene glycol (MIRALAX) packet 17 g  17 g Oral DAILY    bisacodyL (DULCOLAX) suppository 10 mg  10 mg Rectal DAILY PRN    aspirin delayed-release tablet 81 mg  81 mg Oral BID        Objective:   Vitals:  Visit Vitals  /71 (BP 1 Location: Right upper arm, BP Patient Position: At rest)   Pulse 86   Temp 97.7 °F (36.5 °C)   Resp 16   Ht 5' 6\" (1.676 m)   Wt 101.6 kg (224 lb)   SpO2 95%   BMI 36.15 kg/m²     Temp (24hrs), Av.8 °F (36.6 °C), Min:97.5 °F (36.4 °C), Max:98.3 °F (36.8 °C)    O2 Flow Rate (L/min): 2 l/min O2 Device: None (Room air)    Last 24hr Input/Output:    Intake/Output Summary (Last 24 hours) at 2022 5502  Last data filed at 2022 1423  Gross per 24 hour   Intake --   Output 750 ml   Net -750 ml        Physical Exam:  General:Alert,oriented,  No distress,   HEENT: The sclerae without icterus. .   Neck:Supple,no mass palpable  Lungs : Clears to auscultation Bilaterally, Normal respiratory effort  CVS: RRR, S1 S2 normal, No rub, no LE edema  Abdomen: Soft, Non tender, No hepatosplenomegaly, bowel sounds present  Extremities: No edema  Skin: No rash   Neurologic:  AAO x 3, nl speech  Psych: normal affect        Lab Data Reviewed:    Recent Results (from the past 24 hour(s))   IRON PROFILE    Collection Time: 22 10:05 AM   Result Value Ref Range    Iron 21 (L) 35 - 150 ug/dL    TIBC 171 (L) 250 - 450 ug/dL    Iron % saturation 12 (L) 20 - 50 %   FERRITIN    Collection Time: 22 10:05 AM   Result Value Ref Range    Ferritin 265 (H) 8 - 252 NG/ML   SAMPLES BEING HELD    Collection Time: 22 10:05 AM   Result Value Ref Range    SAMPLES BEING HELD 1SST     COMMENT        Add-on orders for these samples will be processed based on acceptable specimen integrity and analyte stability, which may vary by analyte.    METABOLIC PANEL, BASIC    Collection Time: 22  3:42 AM   Result Value Ref Range    Sodium 129 (L) 136 - 145 mmol/L    Potassium 4.3 3.5 - 5.1 mmol/L    Chloride 102 97 - 108 mmol/L    CO2 17 (L) 21 - 32 mmol/L    Anion gap 10 5 - 15 mmol/L    Glucose 86 65 - 100 mg/dL    BUN 91 (H) 6 - 20 MG/DL    Creatinine 6.87 (H) 0.55 - 1.02 MG/DL    BUN/Creatinine ratio 13 12 - 20      GFR est AA 7 (L) >60 ml/min/1.73m2    GFR est non-AA 6 (L) >60 ml/min/1.73m2    Calcium 9.9 8.5 - 10.1 MG/DL   HEMOGLOBIN    Collection Time: 08/18/22  3:42 AM   Result Value Ref Range    HGB 7.2 (L) 11.5 - 16.0 g/dL   TYPE & SCREEN    Collection Time: 08/18/22  3:42 AM   Result Value Ref Range    Crossmatch Expiration 08/21/2022,2353     ABO/Rh(D) A POSITIVE     Antibody screen NEG     Unit number [id number]     Blood component type RC LR     Unit division 00     Status of unit ISSUED     Crossmatch result Compatible    RBC, ALLOCATE    Collection Time: 08/18/22  7:30 AM   Result Value Ref Range    HISTORY CHECKED? Historical check performed          Assessment/Plan:     CKD5-6. On transplant list at [address] [place] [address]  -cr increasing, Bps soft  -hold bumex  -receiving PRBC will help as volume expander, will try to avoid blood transfusion as much as possible for future renal transplantaion, but patient was symptomatic  -f/up RFT  -avoid nephrotoxins  -needs preparation for starting HD soon as outpatient. She is interested in home HD.  Has follow up with Dr Chakraborty Query     Acute on chronic anemia  Anemia of CKD  -drop in Hb post op  -on EPOgen as outpatient, due in September  -low Iron, s/p IV venofer 400 mg, PRBC today    Hyponatremia, most likely due to voume depletion  -hold bumex for now  -f/up serum Na in am     Metabolioc acidosis-on po bicarb, increase to 650 mg tid     H/o Hypokalemia, K 5.2  -agree with holding kcl     HTN  -Bps soft  -hold bumex  -holding criteria for coreg and amlodipine     Tertiary hyperparathyroidism  Hypercalcemia  -has not tolerated sensioar, currently ca in acceptable range  -check serum albumin  -will need parathyroidectomy eventually     Gout-on allopurinol     S/p Rt hip arthroplasty           Care Plan discussed with:   Brad Hartman NP and patient      []   >50% of visit spent in counseling and coordination of care   (Discussed [] CODE status,  [] Care Plan, [] D/C Planning)    ____________________________________________    Attending Physician: MD Milton Callahan

## 2022-08-19 LAB
ABO + RH BLD: NORMAL
ALBUMIN SERPL-MCNC: 2.3 G/DL (ref 3.5–5)
ALBUMIN/GLOB SERPL: 0.6 {RATIO} (ref 1.1–2.2)
ALP SERPL-CCNC: 72 U/L (ref 45–117)
ALT SERPL-CCNC: <6 U/L (ref 12–78)
ANION GAP SERPL CALC-SCNC: 12 MMOL/L (ref 5–15)
AST SERPL-CCNC: 11 U/L (ref 15–37)
BILIRUB SERPL-MCNC: 0.4 MG/DL (ref 0.2–1)
BLD PROD TYP BPU: NORMAL
BLOOD GROUP ANTIBODIES SERPL: NORMAL
BPU ID: NORMAL
BUN SERPL-MCNC: 94 MG/DL (ref 6–20)
BUN/CREAT SERPL: 14 (ref 12–20)
CALCIUM SERPL-MCNC: 10 MG/DL (ref 8.5–10.1)
CHLORIDE SERPL-SCNC: 100 MMOL/L (ref 97–108)
CO2 SERPL-SCNC: 16 MMOL/L (ref 21–32)
CREAT SERPL-MCNC: 6.89 MG/DL (ref 0.55–1.02)
CROSSMATCH RESULT,%XM: NORMAL
GLOBULIN SER CALC-MCNC: 3.9 G/DL (ref 2–4)
GLUCOSE SERPL-MCNC: 83 MG/DL (ref 65–100)
HGB BLD-MCNC: 8.1 G/DL (ref 11.5–16)
POTASSIUM SERPL-SCNC: 4.5 MMOL/L (ref 3.5–5.1)
PROT SERPL-MCNC: 6.2 G/DL (ref 6.4–8.2)
SODIUM SERPL-SCNC: 128 MMOL/L (ref 136–145)
SPECIMEN EXP DATE BLD: NORMAL
STATUS OF UNIT,%ST: NORMAL
UNIT DIVISION, %UDIV: 0

## 2022-08-19 PROCEDURE — 97116 GAIT TRAINING THERAPY: CPT

## 2022-08-19 PROCEDURE — 97110 THERAPEUTIC EXERCISES: CPT

## 2022-08-19 PROCEDURE — 74011250636 HC RX REV CODE- 250/636: Performed by: INTERNAL MEDICINE

## 2022-08-19 PROCEDURE — 74011000250 HC RX REV CODE- 250: Performed by: PHYSICIAN ASSISTANT

## 2022-08-19 PROCEDURE — 74011250637 HC RX REV CODE- 250/637: Performed by: INTERNAL MEDICINE

## 2022-08-19 PROCEDURE — 85018 HEMOGLOBIN: CPT

## 2022-08-19 PROCEDURE — 97530 THERAPEUTIC ACTIVITIES: CPT

## 2022-08-19 PROCEDURE — 74011250637 HC RX REV CODE- 250/637: Performed by: PHYSICIAN ASSISTANT

## 2022-08-19 PROCEDURE — 80053 COMPREHEN METABOLIC PANEL: CPT

## 2022-08-19 PROCEDURE — 36415 COLL VENOUS BLD VENIPUNCTURE: CPT

## 2022-08-19 RX ORDER — SODIUM CHLORIDE 9 MG/ML
50 INJECTION, SOLUTION INTRAVENOUS CONTINUOUS
Status: DISCONTINUED | OUTPATIENT
Start: 2022-08-19 | End: 2022-08-20

## 2022-08-19 RX ORDER — ALLOPURINOL 100 MG/1
100 TABLET ORAL DAILY
Status: DISCONTINUED | OUTPATIENT
Start: 2022-08-20 | End: 2022-08-22 | Stop reason: HOSPADM

## 2022-08-19 RX ADMIN — ACETAMINOPHEN 650 MG: 325 TABLET ORAL at 22:09

## 2022-08-19 RX ADMIN — SODIUM BICARBONATE 650 MG: 650 TABLET ORAL at 22:00

## 2022-08-19 RX ADMIN — SODIUM CHLORIDE 50 ML/HR: 9 INJECTION, SOLUTION INTRAVENOUS at 14:47

## 2022-08-19 RX ADMIN — ACETAMINOPHEN 650 MG: 325 TABLET ORAL at 06:49

## 2022-08-19 RX ADMIN — ASPIRIN 81 MG: 81 TABLET, COATED ORAL at 18:22

## 2022-08-19 RX ADMIN — HYDRALAZINE HYDROCHLORIDE 10 MG: 10 TABLET, FILM COATED ORAL at 10:07

## 2022-08-19 RX ADMIN — SODIUM CHLORIDE, PRESERVATIVE FREE 10 ML: 5 INJECTION INTRAVENOUS at 14:00

## 2022-08-19 RX ADMIN — POLYETHYLENE GLYCOL 3350 17 G: 17 POWDER, FOR SOLUTION ORAL at 10:07

## 2022-08-19 RX ADMIN — CARVEDILOL 12.5 MG: 12.5 TABLET, FILM COATED ORAL at 10:06

## 2022-08-19 RX ADMIN — AMLODIPINE BESYLATE 10 MG: 5 TABLET ORAL at 10:07

## 2022-08-19 RX ADMIN — SODIUM BICARBONATE 650 MG: 650 TABLET ORAL at 10:07

## 2022-08-19 RX ADMIN — ASPIRIN 81 MG: 81 TABLET, COATED ORAL at 10:07

## 2022-08-19 RX ADMIN — PRAVASTATIN SODIUM 40 MG: 40 TABLET ORAL at 22:09

## 2022-08-19 RX ADMIN — SENNOSIDES AND DOCUSATE SODIUM 1 TABLET: 50; 8.6 TABLET ORAL at 18:22

## 2022-08-19 RX ADMIN — HYDROMORPHONE HYDROCHLORIDE 2 MG: 2 TABLET ORAL at 14:33

## 2022-08-19 RX ADMIN — CARVEDILOL 12.5 MG: 12.5 TABLET, FILM COATED ORAL at 18:22

## 2022-08-19 RX ADMIN — ALLOPURINOL 100 MG: 100 TABLET ORAL at 10:07

## 2022-08-19 RX ADMIN — ACETAMINOPHEN 650 MG: 325 TABLET ORAL at 14:33

## 2022-08-19 RX ADMIN — SODIUM CHLORIDE, PRESERVATIVE FREE 10 ML: 5 INJECTION INTRAVENOUS at 22:11

## 2022-08-19 RX ADMIN — SENNOSIDES AND DOCUSATE SODIUM 1 TABLET: 50; 8.6 TABLET ORAL at 10:07

## 2022-08-19 RX ADMIN — SODIUM CHLORIDE, PRESERVATIVE FREE 10 ML: 5 INJECTION INTRAVENOUS at 06:49

## 2022-08-19 RX ADMIN — HYDROMORPHONE HYDROCHLORIDE 2 MG: 2 TABLET ORAL at 06:49

## 2022-08-19 RX ADMIN — SODIUM BICARBONATE 650 MG: 650 TABLET ORAL at 18:22

## 2022-08-19 RX ADMIN — HYDRALAZINE HYDROCHLORIDE 10 MG: 10 TABLET, FILM COATED ORAL at 22:09

## 2022-08-19 NOTE — PROGRESS NOTES
Problem: Falls - Risk of  Goal: *Absence of Falls  Description: Document Jez Harpreet Fall Risk and appropriate interventions in the flowsheet.   Outcome: Progressing Towards Goal  Note: Fall Risk Interventions:  Mobility Interventions: Assess mobility with egress test         Medication Interventions: Bed/chair exit alarm, Patient to call before getting OOB    Elimination Interventions: Bed/chair exit alarm, Call light in reach              Problem: Patient Education: Go to Patient Education Activity  Goal: Patient/Family Education  Outcome: Progressing Towards Goal

## 2022-08-19 NOTE — PROGRESS NOTES
TOTAL HIP PROGRESS NOTE      Subjective:     Post-Operative Day: 4 Status Post right Total Hip Arthroplasty  Systemic or Specific Complaints:No Complaints    Objective:     Patient Vitals for the past 24 hrs:   BP Temp Pulse Resp SpO2   08/19/22 0302 127/83 98.1 °F (36.7 °C) 90 18 94 %   08/18/22 2151 109/72 98 °F (36.7 °C) 94 16 94 %   08/18/22 1517 (!) 144/91 97.9 °F (36.6 °C) 86 17 95 %   08/18/22 1225 130/76 97.5 °F (36.4 °C) 85 17 98 %   08/18/22 0906 123/71 97.7 °F (36.5 °C) 86 16 95 %   08/18/22 0849 (!) 112/54 97.7 °F (36.5 °C) 88 16 95 %       General: alert, cooperative, no distress, appears stated age   Wound: Wound clean and dry no evidence of infection. , No Erythema, No Edema, No Drainage, and Wound Intact   Motion: Painless Range of Motion   DVT Exam: No evidence of DVT seen on physical exam.  Negative Isabella's sign. No cords or calf tenderness. No significant calf/ankle edema.      Data Review:    Recent Results (from the past 24 hour(s))   HEMOGLOBIN    Collection Time: 08/18/22  1:59 PM   Result Value Ref Range    HGB 8.3 (L) 11.5 - 04.7 g/dL   METABOLIC PANEL, BASIC    Collection Time: 08/18/22  1:59 PM   Result Value Ref Range    Sodium 131 (L) 136 - 145 mmol/L    Potassium 4.2 3.5 - 5.1 mmol/L    Chloride 100 97 - 108 mmol/L    CO2 19 (L) 21 - 32 mmol/L    Anion gap 12 5 - 15 mmol/L    Glucose 94 65 - 100 mg/dL    BUN 90 (H) 6 - 20 MG/DL    Creatinine 7.03 (H) 0.55 - 1.02 MG/DL    BUN/Creatinine ratio 13 12 - 20      GFR est AA 7 (L) >60 ml/min/1.73m2    GFR est non-AA 6 (L) >60 ml/min/1.73m2    Calcium 9.7 8.5 - 10.1 MG/DL   HEMOGLOBIN    Collection Time: 08/19/22  3:03 AM   Result Value Ref Range    HGB 8.1 (L) 11.5 - 74.2 g/dL   METABOLIC PANEL, COMPREHENSIVE    Collection Time: 08/19/22  3:03 AM   Result Value Ref Range    Sodium 128 (L) 136 - 145 mmol/L    Potassium 4.5 3.5 - 5.1 mmol/L    Chloride 100 97 - 108 mmol/L    CO2 16 (L) 21 - 32 mmol/L    Anion gap 12 5 - 15 mmol/L    Glucose 83 65 - 100 mg/dL    BUN 94 (H) 6 - 20 MG/DL    Creatinine 6.89 (H) 0.55 - 1.02 MG/DL    BUN/Creatinine ratio 14 12 - 20      GFR est AA 7 (L) >60 ml/min/1.73m2    GFR est non-AA 6 (L) >60 ml/min/1.73m2    Calcium 10.0 8.5 - 10.1 MG/DL    Bilirubin, total 0.4 0.2 - 1.0 MG/DL    ALT (SGPT) <6 (L) 12 - 78 U/L    AST (SGOT) 11 (L) 15 - 37 U/L    Alk. phosphatase 72 45 - 117 U/L    Protein, total 6.2 (L) 6.4 - 8.2 g/dL    Albumin 2.3 (L) 3.5 - 5.0 g/dL    Globulin 3.9 2.0 - 4.0 g/dL    A-G Ratio 0.6 (L) 1.1 - 2.2           Assessment:     Status Post right Total Hip Arthroplasty. Doing well postoperatively from orthopedic standpoint. .  Bandage aquacell, clean/dry. Labs stable. PT progressing with walking. Pain control appropriate. Awaiting Nephro clearance, updated Crt stable     Plan:     Continues current post-op course  Continue PT per protocol. Awaiting nephro/medical clearance; okay to discharge per ortho  Pre-established orders.

## 2022-08-19 NOTE — PROGRESS NOTES
Transition Of Care: Home health pending with [facility]. Noted, the patient is beding followed by nephrology and currently has a estrada cathter. The patient has difficult insurance and CM having difficulty establishing home health. [facility] assisting with home health. CM faxed all orders and clinicals to: [phone]. Care coordinator: Lilia Dela Cruz [phone], option 7, ext: [phone]. CM awaiting final confirmation of home health agency established. CM received call from [facility] that [address] is checking on referral. Awaiting confirmation from [facility]. RUR: N/A    The patient is from home and lives with . Orthopedics, Nephrology, PT/OT following. The patient plans to discharge home. CM following for discharge needs.     Danii Flores RN/CRM

## 2022-08-19 NOTE — PROGRESS NOTES
Renal Progress Note    NAME:  Theresa Torres   :   1960   MRN:   [id number]     Date/Time:  2022   Subjective:       . Patient seen and examined, receiving blood transfusion, denies nausea, SOB. Bps soft, cr higher    22 --> F/U - FER, CKD    Felt better with the incentive spirometer. Her oral intake and appetite are stable. Ms Hector Mix is ambulating.     Medications reviewed:  Current Facility-Administered Medications   Medication Dose Route Frequency    0.9% sodium chloride infusion 250 mL  250 mL IntraVENous PRN    sodium bicarbonate tablet 650 mg  650 mg Oral TID    HYDROmorphone (DILAUDID) tablet 1 mg  1 mg Oral Q4H PRN    HYDROmorphone (DILAUDID) tablet 2 mg  2 mg Oral Q4H PRN    allopurinoL (ZYLOPRIM) tablet 100 mg  100 mg Oral BID    amLODIPine (NORVASC) tablet 10 mg  10 mg Oral DAILY    [Held by provider] bumetanide (BUMEX) tablet 1 mg  1 mg Oral DAILY    carvediloL (COREG) tablet 12.5 mg  12.5 mg Oral BID WITH MEALS    hydrALAZINE (APRESOLINE) tablet 10 mg  10 mg Oral TID    [Held by provider] potassium chloride SR (KLOR-CON 10) tablet 20 mEq  20 mEq Oral DAILY    pravastatin (PRAVACHOL) tablet 40 mg  40 mg Oral QHS    sodium chloride (NS) flush 5-40 mL  5-40 mL IntraVENous Q8H    sodium chloride (NS) flush 5-40 mL  5-40 mL IntraVENous PRN    acetaminophen (TYLENOL) tablet 650 mg  650 mg Oral Q6H    naloxone (NARCAN) injection 0.4 mg  0.4 mg IntraVENous PRN    ondansetron (ZOFRAN ODT) tablet 4 mg  4 mg Oral Q6H PRN    hydrOXYzine HCL (ATARAX) tablet 5 mg  5 mg Oral Q8H PRN    senna-docusate (PERICOLACE) 8.6-50 mg per tablet 1 Tablet  1 Tablet Oral BID    polyethylene glycol (MIRALAX) packet 17 g  17 g Oral DAILY    bisacodyL (DULCOLAX) suppository 10 mg  10 mg Rectal DAILY PRN    aspirin delayed-release tablet 81 mg  81 mg Oral BID        Objective:   Vitals:  Visit Vitals  /72 (BP 1 Location: Left upper arm, BP Patient Position: Sitting)   Pulse 87   Temp 97.8 °F (36.6 °C)   Resp 18   Ht 5' 6\" (1.676 m)   Wt 101.6 kg (224 lb)   SpO2 93%   BMI 36.15 kg/m²     Temp (24hrs), Av.9 °F (36.6 °C), Min:97.5 °F (36.4 °C), Max:98.1 °F (36.7 °C)    O2 Flow Rate (L/min): 2 l/min O2 Device: None (Room air)    Last 24hr Input/Output:    Intake/Output Summary (Last 24 hours) at 2022 1153  Last data filed at 2022 0853  Gross per 24 hour   Intake 310 ml   Output 2100 ml   Net -1790 ml          Physical Exam:    Seen in Room 569. General:Alert,oriented,  No distress,     HEENT: Sclerae without icterus. Lungs : Clears to auscultation, no wheezes, no rales    CVS:  S1 S2 normal, No S 3 gallop    Abdomen: Not distended     : [place]    Extremities: No edema      Neurologic:  Alert + Oriented.     Psych: normal affect        Lab Data Reviewed:    Recent Results (from the past 24 hour(s))   HEMOGLOBIN    Collection Time: 22  1:59 PM   Result Value Ref Range    HGB 8.3 (L) 11.5 - 20.0 g/dL   METABOLIC PANEL, BASIC    Collection Time: 22  1:59 PM   Result Value Ref Range    Sodium 131 (L) 136 - 145 mmol/L    Potassium 4.2 3.5 - 5.1 mmol/L    Chloride 100 97 - 108 mmol/L    CO2 19 (L) 21 - 32 mmol/L    Anion gap 12 5 - 15 mmol/L    Glucose 94 65 - 100 mg/dL    BUN 90 (H) 6 - 20 MG/DL    Creatinine 7.03 (H) 0.55 - 1.02 MG/DL    BUN/Creatinine ratio 13 12 - 20      GFR est AA 7 (L) >60 ml/min/1.73m2    GFR est non-AA 6 (L) >60 ml/min/1.73m2    Calcium 9.7 8.5 - 10.1 MG/DL   HEMOGLOBIN    Collection Time: 22  3:03 AM   Result Value Ref Range    HGB 8.1 (L) 11.5 - 48.1 g/dL   METABOLIC PANEL, COMPREHENSIVE    Collection Time: 22  3:03 AM   Result Value Ref Range    Sodium 128 (L) 136 - 145 mmol/L    Potassium 4.5 3.5 - 5.1 mmol/L    Chloride 100 97 - 108 mmol/L    CO2 16 (L) 21 - 32 mmol/L    Anion gap 12 5 - 15 mmol/L    Glucose 83 65 - 100 mg/dL    BUN 94 (H) 6 - 20 MG/DL    Creatinine 6.89 (H) 0.55 - 1.02 MG/DL    BUN/Creatinine ratio 14 12 - 20      GFR est AA 7 (L) >60 ml/min/1.73m2    GFR est non-AA 6 (L) >60 ml/min/1.73m2    Calcium 10.0 8.5 - 10.1 MG/DL    Bilirubin, total 0.4 0.2 - 1.0 MG/DL    ALT (SGPT) <6 (L) 12 - 78 U/L    AST (SGOT) 11 (L) 15 - 37 U/L    Alk. phosphatase 72 45 - 117 U/L    Protein, total 6.2 (L) 6.4 - 8.2 g/dL    Albumin 2.3 (L) 3.5 - 5.0 g/dL    Globulin 3.9 2.0 - 4.0 g/dL    A-G Ratio 0.6 (L) 1.1 - 2.2           Assessment/Plan:     CKD5-6. On transplant list at [facility]  -cr increasing, Bps soft  -hold bumex  -receiving PRBC will help as volume expander, will try to avoid blood transfusion as much as possible for future renal transplantaion, but patient was symptomatic  -f/up RFT  -avoid nephrotoxins  -needs preparation for starting HD soon as outpatient. She is interested in home HD. Has follow up with Dr Sammy Francois     Acute on chronic anemia  Anemia of CKD  -drop in Hb post op  -on EPOgen as outpatient, due in September  -low Iron, s/p IV venofer 400 mg, PRBC today    Hyponatremia, most likely due to voume depletion  -hold bumex for now  -Start N/Saline @ 50 mls per hour     Metabolioc acidosis-on po bicarb, increased to 650 mg tid     HTN  -Bps soft  -hold bumex  -holding criteria for coreg and amlodipine     Tertiary hyperparathyroidism  Hypercalcemia  -has not tolerated sensipar, currently ca in acceptable range  -check serum albumin  -will need parathyroidectomy eventually     Gout-on allopurinol     S/p Rt hip arthroplasty      There is no indication for dialysis at this time.            Care Plan discussed with:    Ms Chasity Jain       []   >50% of visit spent in counseling and coordination of care   (Discussed [] CODE status,  [] Care Plan, [] D/C Planning)    ____________________________________________    Attending Physician: Vicente Troy, [facility]

## 2022-08-19 NOTE — PROGRESS NOTES
Problem: Mobility Impaired (Adult and Pediatric)  Goal: *Acute Goals and Plan of Care (Insert Text)  Description: FUNCTIONAL STATUS PRIOR TO ADMISSION: Patient was independent and active without use of DME.    HOME SUPPORT PRIOR TO ADMISSION: The patient lived with spouse/children but did not require assist.    Physical Therapy Goals  Initiated 8/15/2022    1. Patient will move from supine to sit and sit to supine , scoot up and down, and roll side to side in bed with modified independence within 4 days. 2. Patient will perform sit to stand with modified independence within 4 days. 3. Patient will ambulate with modified independence for 150 feet with the least restrictive device within 4 days. 4. Patient will perform post-MEL home exercise program per protocol with independence within 4 days. Outcome: Progressing Towards Goal   PHYSICAL THERAPY TREATMENT  Patient: Carissa Olguin (89 y.o. female)  Date: 8/19/2022  Diagnosis: Avascular necrosis of bone of right hip (HCC) [M87.051] Avascular necrosis of left femur (HCC)  Procedure(s) (LRB):  RIGHT TOTAL HIP ARTHROPLASTY ANTERIOR APPROACH (Right) 4 Days Post-Op  Precautions: Fall, WBAT  Chart, physical therapy assessment, plan of care and goals were reviewed. ASSESSMENT  Patient continues with skilled PT services and is progressing towards goals. Patient is making daily gains with intervention but remains limited by limited endurance and balance requiring multiple standing rest breaks during gait. Received her seated EOB and agreeable to intervention. Challenged with sit to stand requiring moderate assist and use of momentum via rocking to stand from an elevated bedside. Gait training completed x65' using a RW requiring CGA. Cues required for posture and sequencing d/t shuffling steps, flexed posture, and a slow alexander that continued to decline over distance leading to ~3 short standing rests breaks before making it back to the room.  Deferred stair training this am d/t fatigue and slow mobility. Activity tolerance remains concerning in preparation for discharge home d/t speed of mobility and multiple stairs to navigate. Ideal discharge would be SNF rehab but recommend home with 24 hour supervision and HHPT with continued progress. Current Level of Function Impacting Discharge (mobility/balance): moderate assist sit to stand             PLAN :  Patient continues to benefit from skilled intervention to address the above impairments. Continue treatment per established plan of care. to address goals. Recommendation for discharge: (in order for the patient to meet his/her long term goals)  Physical therapy at least 2 days/week in the home AND ensure assist and/or supervision for safety with functional mobility    This discharge recommendation:  Has been made in collaboration with the attending provider and/or case management    IF patient discharges home will need the following DME: none       SUBJECTIVE:   Patient stated I'm getting tired.     OBJECTIVE DATA SUMMARY:   Critical Behavior:  Neurologic State: Alert  Orientation Level: Oriented X4  Cognition: Follows commands  Safety/Judgement: Awareness of environment, Home safety, Insight into deficits  Functional Mobility Training:  Bed Mobility:                    Transfers:  Sit to Stand: Moderate assistance; Additional time  Stand to Sit: Minimum assistance                             Balance:  Sitting: Intact  Standing: Impaired; With support  Standing - Static: Good;Constant support  Standing - Dynamic : Fair;Constant support  Ambulation/Gait Training:  Distance (ft): 65 Feet (ft)  Assistive Device: Walker, rolling;Gait belt  Ambulation - Level of Assistance: Contact guard assistance        Gait Abnormalities: Antalgic;Decreased step clearance  Right Side Weight Bearing: As tolerated     Base of Support: Widened;Shift to left  Stance: Right decreased  Speed/Adriana: Pace decreased (<100 feet/min)  Step Length: Left shortened  Swing Pattern: Right asymmetrical     Interventions: Safety awareness training;Verbal cues; Tactile cues           Stairs:                  Pain Rating:      Activity Tolerance:   Fair and requires frequent rest breaks    After treatment patient left in no apparent distress:   Call bell within reach and seated on EOB    COMMUNICATION/COLLABORATION:   The patients plan of care was discussed with: Registered nurse.      Davide Delcid, PT, DPT   Time Calculation: 25 mins

## 2022-08-19 NOTE — PROGRESS NOTES
Problem: Mobility Impaired (Adult and Pediatric)  Goal: *Acute Goals and Plan of Care (Insert Text)  Description: FUNCTIONAL STATUS PRIOR TO ADMISSION: Patient was independent and active without use of DME.    HOME SUPPORT PRIOR TO ADMISSION: The patient lived with spouse/children but did not require assist.    Physical Therapy Goals  Initiated 8/15/2022    1. Patient will move from supine to sit and sit to supine , scoot up and down, and roll side to side in bed with modified independence within 4 days. 2. Patient will perform sit to stand with modified independence within 4 days. 3. Patient will ambulate with modified independence for 150 feet with the least restrictive device within 4 days. 4. Patient will perform post-MEL home exercise program per protocol with independence within 4 days. 8/19/2022 1635 by Saúl Hallman, PT, DPT  Outcome: Progressing Towards Goal  8/19/2022 1435 by Saúl Hallman, PT, DPT  Outcome: Progressing Towards Goal   PHYSICAL THERAPY TREATMENT  Patient: Charlesetta Bumpers (31 y.o. female)  Date: 8/19/2022  Diagnosis: Avascular necrosis of bone of right hip (HCC) [M87.051] Avascular necrosis of left femur (HCC)  Procedure(s) (LRB):  RIGHT TOTAL HIP ARTHROPLASTY ANTERIOR APPROACH (Right) 4 Days Post-Op  Precautions: Fall, WBAT  Chart, physical therapy assessment, plan of care and goals were reviewed. ASSESSMENT  Patient continues with skilled PT services and is progressing towards goals. Remain concerned regarding home disposition d/t activity tolerance and speed of mobility. She requires min-moderate assist for all aspects of mobility and greatly increased time. Assessed 4 stairs after transporting with a wheelchair. Completed 4 stairs with a combination of rail + SPC and 2 rails requiring minimal assistance. She required ~10-15 minutes to ascend/descend 4 stairs with rest breaks on each step and cues to maintain progress.  Offered to stop and descend  but she asked to continue. She c/o additional knee pain as she descended the stairs but was without injury and stated pain improved with rest. Following stairs training, she stated that she can enter the home bypassing stairs completely to remain on 1 level. Would recommend discharge to a rehab setting based on progression so far. If not rehab, home with 24 hour assistance and HHPT while remaining on the 1st floor of her home. Current Level of Function Impacting Discharge (mobility/balance): min-mod for all mobility, low gait tolerance           PLAN :  Patient continues to benefit from skilled intervention to address the above impairments. Continue treatment per established plan of care. to address goals. Recommendation for discharge: (in order for the patient to meet his/her long term goals)  SNF vs 24 hour supervision and HHPT    This discharge recommendation:  Has not yet been discussed the attending provider and/or case management    IF patient discharges home will need the following DME: to be determined (TBD)       SUBJECTIVE:   Patient stated Thank you.     OBJECTIVE DATA SUMMARY:   Critical Behavior:  Neurologic State: Alert  Orientation Level: Oriented X4  Cognition: Follows commands  Safety/Judgement: Awareness of environment, Home safety, Insight into deficits  Functional Mobility Training:  Bed Mobility:     Supine to Sit: Minimum assistance; Additional time     Scooting: Minimum assistance; Additional time        Transfers:  Sit to Stand: Moderate assistance; Additional time (utilized rocking; same level of assist over 3 trials)  Stand to Sit: Minimum assistance                             Balance:  Sitting: Intact  Standing: Impaired; With support  Standing - Static: Good;Constant support  Standing - Dynamic : Fair;Constant support  Ambulation/Gait Training:  Distance (ft): 30 Feet (ft)  Assistive Device: Walker, rolling;Gait belt  Ambulation - Level of Assistance: Contact guard assistance        Gait Abnormalities: Antalgic;Decreased step clearance  Right Side Weight Bearing: As tolerated     Base of Support: Widened;Shift to left  Stance: Right decreased  Speed/Adriana: Pace decreased (<100 feet/min)  Step Length: Left shortened  Swing Pattern: Right asymmetrical     Interventions: Safety awareness training;Verbal cues; Tactile cues           Stairs:  Number of Stairs Trained: 4  Stairs - Level of Assistance: Minimum assistance; Additional time   Rail Use: Both    Therapeutic Exercises:   SUPINE  EXERCISES   Sets   Reps   Active Active Assist   Passive Self ROM   Comments   Ankle Pumps 1 10 []                                        []                                        []                                        []                                           Quad Sets 1 10 []                                        []                                        []                                        []                                           Heel Slides   []                                        []                                        []                                        []                                           Hip Abduction 1 10 []                                        [x]                                        []                                        []                                           Glut Sets 1 10 [x]                                        []                                        []                                        []                                              []                                        []                                        []                                        []                                              []                                        []                                        []                                        []                                             STANDING  EXERCISES   Sets   Reps   Active Active Assist   Passive Self ROM Comments   Heel Raises   []                                        []                                        []                                        []                                           Hip Abduction   []                                        []                                        []                                        []                                              []                                        []                                        []                                        []                                              []                                        []                                        []                                        []                                             Pain Ratin/10 right hip w/ activity    Activity Tolerance:   Fair and Poor      After treatment patient left in no apparent distress:   Supine in bed and Call bell within reach    COMMUNICATION/COLLABORATION:   The patients plan of care was discussed with: Registered nurse.      Zane Ibrahim, PT, DPT   Time Calculation: 38 mins

## 2022-08-20 LAB
ALBUMIN SERPL-MCNC: 2.2 G/DL (ref 3.5–5)
ALBUMIN SERPL-MCNC: 2.3 G/DL (ref 3.5–5)
ALBUMIN/GLOB SERPL: 0.6 {RATIO} (ref 1.1–2.2)
ALP SERPL-CCNC: 62 U/L (ref 45–117)
ALT SERPL-CCNC: <6 U/L (ref 12–78)
ANION GAP SERPL CALC-SCNC: 11 MMOL/L (ref 5–15)
ANION GAP SERPL CALC-SCNC: 13 MMOL/L (ref 5–15)
AST SERPL-CCNC: 10 U/L (ref 15–37)
BILIRUB SERPL-MCNC: 0.3 MG/DL (ref 0.2–1)
BUN SERPL-MCNC: 95 MG/DL (ref 6–20)
BUN SERPL-MCNC: 95 MG/DL (ref 6–20)
BUN/CREAT SERPL: 14 (ref 12–20)
BUN/CREAT SERPL: 14 (ref 12–20)
CALCIUM SERPL-MCNC: 10 MG/DL (ref 8.5–10.1)
CALCIUM SERPL-MCNC: 10.3 MG/DL (ref 8.5–10.1)
CHLORIDE SERPL-SCNC: 100 MMOL/L (ref 97–108)
CHLORIDE SERPL-SCNC: 99 MMOL/L (ref 97–108)
CO2 SERPL-SCNC: 16 MMOL/L (ref 21–32)
CO2 SERPL-SCNC: 18 MMOL/L (ref 21–32)
CREAT SERPL-MCNC: 6.97 MG/DL (ref 0.55–1.02)
CREAT SERPL-MCNC: 6.97 MG/DL (ref 0.55–1.02)
GLOBULIN SER CALC-MCNC: 3.7 G/DL (ref 2–4)
GLUCOSE SERPL-MCNC: 101 MG/DL (ref 65–100)
GLUCOSE SERPL-MCNC: 99 MG/DL (ref 65–100)
HGB BLD-MCNC: 7.6 G/DL (ref 11.5–16)
PHOSPHATE SERPL-MCNC: 8.4 MG/DL (ref 2.6–4.7)
POTASSIUM SERPL-SCNC: 4.1 MMOL/L (ref 3.5–5.1)
POTASSIUM SERPL-SCNC: 4.2 MMOL/L (ref 3.5–5.1)
PROT SERPL-MCNC: 6 G/DL (ref 6.4–8.2)
SODIUM SERPL-SCNC: 128 MMOL/L (ref 136–145)
SODIUM SERPL-SCNC: 129 MMOL/L (ref 136–145)

## 2022-08-20 PROCEDURE — 36415 COLL VENOUS BLD VENIPUNCTURE: CPT

## 2022-08-20 PROCEDURE — 80053 COMPREHEN METABOLIC PANEL: CPT

## 2022-08-20 PROCEDURE — 85018 HEMOGLOBIN: CPT

## 2022-08-20 PROCEDURE — 74011250637 HC RX REV CODE- 250/637: Performed by: INTERNAL MEDICINE

## 2022-08-20 PROCEDURE — 74011250637 HC RX REV CODE- 250/637: Performed by: PHYSICIAN ASSISTANT

## 2022-08-20 PROCEDURE — 80069 RENAL FUNCTION PANEL: CPT

## 2022-08-20 PROCEDURE — 97116 GAIT TRAINING THERAPY: CPT

## 2022-08-20 PROCEDURE — 97110 THERAPEUTIC EXERCISES: CPT

## 2022-08-20 PROCEDURE — 74011000250 HC RX REV CODE- 250: Performed by: PHYSICIAN ASSISTANT

## 2022-08-20 PROCEDURE — 97530 THERAPEUTIC ACTIVITIES: CPT

## 2022-08-20 RX ADMIN — SODIUM BICARBONATE 650 MG: 650 TABLET ORAL at 15:47

## 2022-08-20 RX ADMIN — SODIUM CHLORIDE, PRESERVATIVE FREE 10 ML: 5 INJECTION INTRAVENOUS at 15:43

## 2022-08-20 RX ADMIN — ASPIRIN 81 MG: 81 TABLET, COATED ORAL at 17:53

## 2022-08-20 RX ADMIN — HYDRALAZINE HYDROCHLORIDE 10 MG: 10 TABLET, FILM COATED ORAL at 10:26

## 2022-08-20 RX ADMIN — SODIUM BICARBONATE 650 MG: 650 TABLET ORAL at 10:25

## 2022-08-20 RX ADMIN — SODIUM BICARBONATE 650 MG: 650 TABLET ORAL at 20:54

## 2022-08-20 RX ADMIN — ALLOPURINOL 100 MG: 100 TABLET ORAL at 10:25

## 2022-08-20 RX ADMIN — ACETAMINOPHEN 650 MG: 325 TABLET ORAL at 20:53

## 2022-08-20 RX ADMIN — ACETAMINOPHEN 650 MG: 325 TABLET ORAL at 15:42

## 2022-08-20 RX ADMIN — AMLODIPINE BESYLATE 10 MG: 5 TABLET ORAL at 10:25

## 2022-08-20 RX ADMIN — POLYETHYLENE GLYCOL 3350 17 G: 17 POWDER, FOR SOLUTION ORAL at 10:26

## 2022-08-20 RX ADMIN — PRAVASTATIN SODIUM 40 MG: 40 TABLET ORAL at 20:54

## 2022-08-20 RX ADMIN — HYDRALAZINE HYDROCHLORIDE 10 MG: 10 TABLET, FILM COATED ORAL at 15:47

## 2022-08-20 RX ADMIN — SENNOSIDES AND DOCUSATE SODIUM 1 TABLET: 50; 8.6 TABLET ORAL at 17:53

## 2022-08-20 RX ADMIN — CARVEDILOL 12.5 MG: 12.5 TABLET, FILM COATED ORAL at 17:54

## 2022-08-20 RX ADMIN — SODIUM CHLORIDE, PRESERVATIVE FREE 10 ML: 5 INJECTION INTRAVENOUS at 20:56

## 2022-08-20 RX ADMIN — CARVEDILOL 12.5 MG: 12.5 TABLET, FILM COATED ORAL at 07:02

## 2022-08-20 RX ADMIN — HYDROMORPHONE HYDROCHLORIDE 2 MG: 2 TABLET ORAL at 15:12

## 2022-08-20 RX ADMIN — HYDRALAZINE HYDROCHLORIDE 10 MG: 10 TABLET, FILM COATED ORAL at 20:54

## 2022-08-20 RX ADMIN — SODIUM CHLORIDE, PRESERVATIVE FREE 10 ML: 5 INJECTION INTRAVENOUS at 06:22

## 2022-08-20 RX ADMIN — ACETAMINOPHEN 650 MG: 325 TABLET ORAL at 07:02

## 2022-08-20 RX ADMIN — ACETAMINOPHEN 650 MG: 325 TABLET ORAL at 02:55

## 2022-08-20 RX ADMIN — HYDROMORPHONE HYDROCHLORIDE 2 MG: 2 TABLET ORAL at 02:49

## 2022-08-20 RX ADMIN — HYDROMORPHONE HYDROCHLORIDE 2 MG: 2 TABLET ORAL at 23:24

## 2022-08-20 RX ADMIN — SENNOSIDES AND DOCUSATE SODIUM 1 TABLET: 50; 8.6 TABLET ORAL at 10:25

## 2022-08-20 RX ADMIN — ASPIRIN 81 MG: 81 TABLET, COATED ORAL at 10:25

## 2022-08-20 NOTE — PROGRESS NOTES
Patient seen today at request of my partner Dr. Daria Landry. Patient expresses desire to go home today. Has been evaluated by nephrology. O:  Afebrile  Dressing intact  Distal motor and sensory function intact to baseline    A/P:  I discussed patient with nephrology consultant. Anticipate home today after evaluation if medically appropriate. Anticipate DC with estrada, with out patient follow up regarding urine retention.     Follow up surgical team outpatient

## 2022-08-20 NOTE — PROGRESS NOTES
0800 Assumed care of pt  1300 removed estrada cath  1400 Pt voided in BSC  1500 Pt too weak to void in [facility] - used bedpan

## 2022-08-20 NOTE — PROGRESS NOTES
Renal Progress Note    NAME:  Suhas Gavin   :   1960   MRN:   [id number]     Date/Time:  2022   Subjective:       . Patient seen and examined, receiving blood transfusion, denies nausea, SOB. Bps soft, cr higher    22 --> F/U - FER, CKD    Felt better with the incentive spirometer. Her oral intake and appetite are stable. Ms Roque Diehl is ambulating. F/U - 22--> F/U FER, CKD    No new complaints. Looking forward to going home.     Medications reviewed:  Current Facility-Administered Medications   Medication Dose Route Frequency    allopurinoL (ZYLOPRIM) tablet 100 mg  100 mg Oral DAILY    0.9% sodium chloride infusion 250 mL  250 mL IntraVENous PRN    sodium bicarbonate tablet 650 mg  650 mg Oral TID    HYDROmorphone (DILAUDID) tablet 1 mg  1 mg Oral Q4H PRN    HYDROmorphone (DILAUDID) tablet 2 mg  2 mg Oral Q4H PRN    amLODIPine (NORVASC) tablet 10 mg  10 mg Oral DAILY    [Held by provider] bumetanide (BUMEX) tablet 1 mg  1 mg Oral DAILY    carvediloL (COREG) tablet 12.5 mg  12.5 mg Oral BID WITH MEALS    hydrALAZINE (APRESOLINE) tablet 10 mg  10 mg Oral TID    [Held by provider] potassium chloride SR (KLOR-CON 10) tablet 20 mEq  20 mEq Oral DAILY    pravastatin (PRAVACHOL) tablet 40 mg  40 mg Oral QHS    sodium chloride (NS) flush 5-40 mL  5-40 mL IntraVENous Q8H    sodium chloride (NS) flush 5-40 mL  5-40 mL IntraVENous PRN    acetaminophen (TYLENOL) tablet 650 mg  650 mg Oral Q6H    naloxone (NARCAN) injection 0.4 mg  0.4 mg IntraVENous PRN    ondansetron (ZOFRAN ODT) tablet 4 mg  4 mg Oral Q6H PRN    hydrOXYzine HCL (ATARAX) tablet 5 mg  5 mg Oral Q8H PRN    senna-docusate (PERICOLACE) 8.6-50 mg per tablet 1 Tablet  1 Tablet Oral BID    polyethylene glycol (MIRALAX) packet 17 g  17 g Oral DAILY    bisacodyL (DULCOLAX) suppository 10 mg  10 mg Rectal DAILY PRN    aspirin delayed-release tablet 81 mg  81 mg Oral BID        Objective:   Vitals:  Visit Vitals  BP (!) 144/84 Pulse 90   Temp 98 °F (36.7 °C)   Resp 16   Ht 5' 6\" (1.676 m)   Wt 101.6 kg (224 lb)   SpO2 95%   BMI 36.15 kg/m²     Temp (24hrs), Av °F (36.7 °C), Min:97.7 °F (36.5 °C), Max:98.3 °F (36.8 °C)    O2 Flow Rate (L/min): 2 l/min O2 Device: None (Room air)    Last 24hr Input/Output:    Intake/Output Summary (Last 24 hours) at 2022 1220  Last data filed at 2022 0748  Gross per 24 hour   Intake --   Output 1600 ml   Net -1600 ml          Physical Exam:      Seen in Room 569. General:Alert,oriented,  No distress,   Sitting in the chair comfortably    HEENT: Sclerae without icterus. Lungs : Clears to auscultation, no wheezes, no rales    CVS:  S1 S2 normal, No S 3 gallop    Abdomen: Not distended     : Zavala     Extremities: No edema    Neurologic:  Alert + Oriented. Psych: normal affect        Lab Data Reviewed:    Recent Results (from the past 24 hour(s))   HEMOGLOBIN    Collection Time: 22  1:34 AM   Result Value Ref Range    HGB 7.6 (L) 11.5 - 73.8 g/dL   METABOLIC PANEL, COMPREHENSIVE    Collection Time: 22  1:34 AM   Result Value Ref Range    Sodium 129 (L) 136 - 145 mmol/L    Potassium 4.2 3.5 - 5.1 mmol/L    Chloride 100 97 - 108 mmol/L    CO2 16 (L) 21 - 32 mmol/L    Anion gap 13 5 - 15 mmol/L    Glucose 99 65 - 100 mg/dL    BUN 95 (H) 6 - 20 MG/DL    Creatinine 6.97 (H) 0.55 - 1.02 MG/DL    BUN/Creatinine ratio 14 12 - 20      GFR est AA 7 (L) >60 ml/min/1.73m2    GFR est non-AA 6 (L) >60 ml/min/1.73m2    Calcium 10.3 (H) 8.5 - 10.1 MG/DL    Bilirubin, total 0.3 0.2 - 1.0 MG/DL    ALT (SGPT) <6 (L) 12 - 78 U/L    AST (SGOT) 10 (L) 15 - 37 U/L    Alk.  phosphatase 62 45 - 117 U/L    Protein, total 6.0 (L) 6.4 - 8.2 g/dL    Albumin 2.3 (L) 3.5 - 5.0 g/dL    Globulin 3.7 2.0 - 4.0 g/dL    A-G Ratio 0.6 (L) 1.1 - 2.2     RENAL FUNCTION PANEL    Collection Time: 22  1:35 AM   Result Value Ref Range    Sodium 128 (L) 136 - 145 mmol/L    Potassium 4.1 3.5 - 5.1 mmol/L Chloride 99 97 - 108 mmol/L    CO2 18 (L) 21 - 32 mmol/L    Anion gap 11 5 - 15 mmol/L    Glucose 101 (H) 65 - 100 mg/dL    BUN 95 (H) 6 - 20 MG/DL    Creatinine 6.97 (H) 0.55 - 1.02 MG/DL    BUN/Creatinine ratio 14 12 - 20      GFR est AA 7 (L) >60 ml/min/1.73m2    GFR est non-AA 6 (L) >60 ml/min/1.73m2    Calcium 10.0 8.5 - 10.1 MG/DL    Phosphorus 8.4 (H) 2.6 - 4.7 MG/DL    Albumin 2.2 (L) 3.5 - 5.0 g/dL         Assessment/Plan:     CKD5-6. On transplant list at [address] [place] [address] - stable  -hold bumex  --avoid nephrotoxins  -  Acute on chronic anemia  Anemia of CKD  -drop in Hb post op  -on EPOgen as outpatient, due in September  -low Iron, s/p IV venofer 400 mg, Had PRBCs this week. Hyponatremia - Stable  Fluid restriction - 1500 mls per day on discharge. OK to resume the diuretic at home. Metabolioc acidosis-on po bicarb (650 mg tid)     HTN  -  Tertiary hyperparathyroidism  Hypercalcemia  -has not tolerated sensipar, currently ca in acceptable range  -check serum albumin  -will need parathyroidectomy eventually     Gout-on allopurinol     S/p Rt hip arthroplasty      There is no indication for dialysis at this time. [id number] Laila Kimball for discharge today (8/20) from a renal standpoint. From a renal standpoint, she does not need a estrada catheter. It may be worthwhile to consider a voiding trail prior to discharge. Will defer to Ortho. Ms Dianah Moritz was asked to call the office on Monday (8/22) to schedule a lab draw for 8/23. F/U in the office in two weeks.            Care Plan discussed with:    Ms Dianah Moritz    D/W Nursing       []   >50% of visit spent in counseling and coordination of care   (Discussed [] CODE status,  [] Care Plan, [] D/C Planning)    ____________________________________________    Attending Physician: Laly Church, [address] [facility]

## 2022-08-20 NOTE — PROGRESS NOTES
Problem: Falls - Risk of  Goal: *Absence of Falls  Description: Document Gregg Reason Fall Risk and appropriate interventions in the flowsheet.   Outcome: Progressing Towards Goal  Note: Fall Risk Interventions:  Mobility Interventions: Communicate number of staff needed for ambulation/transfer         Medication Interventions: Evaluate medications/consider consulting pharmacy, Patient to call before getting OOB, Teach patient to arise slowly    Elimination Interventions: Bed/chair exit alarm, Call light in reach              Problem: Patient Education: Go to Patient Education Activity  Goal: Patient/Family Education  Outcome: Progressing Towards Goal     Problem: Patient Education: Go to Patient Education Activity  Goal: Patient/Family Education  Outcome: Progressing Towards Goal     Problem: Hip Replacement: Day of Surgery/Unit  Goal: Off Pathway (Use only if patient is Off Pathway)  Outcome: Progressing Towards Goal  Goal: Activity/Safety  Outcome: Progressing Towards Goal  Goal: Consults, if ordered  Outcome: Progressing Towards Goal  Goal: Diagnostic Test/Procedures  Outcome: Progressing Towards Goal  Goal: Nutrition/Diet  Outcome: Progressing Towards Goal  Goal: Medications  Outcome: Progressing Towards Goal  Goal: Respiratory  Outcome: Progressing Towards Goal  Goal: Treatments/Interventions/Procedures  Outcome: Progressing Towards Goal  Goal: Psychosocial  Outcome: Progressing Towards Goal  Goal: *Initiate mobility  Outcome: Progressing Towards Goal  Goal: *Optimal pain control at patient's stated goal  Outcome: Progressing Towards Goal  Goal: *Hemodynamically stable  Outcome: Progressing Towards Goal     Problem: Hip Replacement: Post Op Day 1  Goal: Off Pathway (Use only if patient is Off Pathway)  Outcome: Progressing Towards Goal  Goal: Activity/Safety  Outcome: Progressing Towards Goal  Goal: Diagnostic Test/Procedures  Outcome: Progressing Towards Goal  Goal: Nutrition/Diet  Outcome: Progressing Towards Goal  Goal: Medications  Outcome: Progressing Towards Goal  Goal: Respiratory  Outcome: Progressing Towards Goal  Goal: Treatments/Interventions/Procedures  Outcome: Progressing Towards Goal  Goal: Psychosocial  Outcome: Progressing Towards Goal  Goal: Discharge Planning  Outcome: Progressing Towards Goal  Goal: *Demonstrates progressive activity  Outcome: Progressing Towards Goal  Goal: *Optimal pain control at patient's stated goal  Outcome: Progressing Towards Goal  Goal: *Hemodynamically stable  Outcome: Progressing Towards Goal  Goal: *Discharge plan identified  Outcome: Progressing Towards Goal     Problem: Hip Replacement: Post-Op Day 2  Goal: Off Pathway (Use only if patient is Off Pathway)  Outcome: Progressing Towards Goal  Goal: Activity/Safety  Outcome: Progressing Towards Goal  Goal: Diagnostic Test/Procedures  Outcome: Progressing Towards Goal  Goal: Nutrition/Diet  Outcome: Progressing Towards Goal  Goal: Medications  Outcome: Progressing Towards Goal  Goal: Respiratory  Outcome: Progressing Towards Goal  Goal: Treatments/Interventions/Procedures  Outcome: Progressing Towards Goal  Goal: Psychosocial  Outcome: Progressing Towards Goal  Goal: *Met physical therapy criteria for discharge to the next level of care  Outcome: Progressing Towards Goal  Goal: *Optimal pain control with oral analgesia  Outcome: Progressing Towards Goal  Goal: *Hemodynamically stable  Outcome: Progressing Towards Goal  Goal: *Tolerating diet  Outcome: Progressing Towards Goal  Goal: *Verbalizes understanding of any indicated hip precautions  Outcome: Progressing Towards Goal  Goal: *Patient verbalizes understanding of discharge instructions  Outcome: Progressing Towards Goal     Problem: Patient Education: Go to Patient Education Activity  Goal: Patient/Family Education  Outcome: Progressing Towards Goal

## 2022-08-20 NOTE — PROGRESS NOTES
Problem: Mobility Impaired (Adult and Pediatric)  Goal: *Acute Goals and Plan of Care (Insert Text)  Description: FUNCTIONAL STATUS PRIOR TO ADMISSION: Patient was independent and active without use of DME.    HOME SUPPORT PRIOR TO ADMISSION: The patient lived with spouse/children but did not require assist.    Physical Therapy Goals  Initiated 8/15/2022    1. Patient will move from supine to sit and sit to supine , scoot up and down, and roll side to side in bed with modified independence within 4 days. 2. Patient will perform sit to stand with modified independence within 4 days. 3. Patient will ambulate with modified independence for 150 feet with the least restrictive device within 4 days. 4. Patient will perform post-MEL home exercise program per protocol with independence within 4 days. Outcome: Progressing Towards Goal     PHYSICAL THERAPY TREATMENT  Patient: Jovani Crook (69 y.o. female)  Date: 8/20/2022  Diagnosis: Avascular necrosis of bone of right hip (HCC) [M87.051] Avascular necrosis of left femur (HCC)  Procedure(s) (LRB):  RIGHT TOTAL HIP ARTHROPLASTY ANTERIOR APPROACH (Right) 5 Days Post-Op  Precautions: Fall, WBAT  Chart, physical therapy assessment, plan of care and goals were reviewed. ASSESSMENT  Patient continues with skilled PT services and is progressing towards goals. Patient received up in chair. Patient overall mod A for sit to stand, required momentum assist.  Patient required increased time for all mobility. Ambulated 60 feet with RW and CGA, step to pattern with few standing rest breaks. Patient confirmed she will not be climbing stairs to enter her home and has bed + bathroom access on entry level. She also confirms she has 24/7 caregiver assistance for discharge. She could likely benefit from rehab placement although states she wants to go home. She is cleared for discharge with 24/7 physical assistance, RN aware. Recommend HHPT.      Current Level of Function Impacting Discharge (mobility/balance): CGA for gait    Other factors to consider for discharge:          PLAN :  Patient continues to benefit from skilled intervention to address the above impairments. Continue treatment per established plan of care. to address goals. Recommendation for discharge: (in order for the patient to meet his/her long term goals)  Physical therapy at least 2 days/week in the home AND ensure assist and/or supervision for safety with mobility    This discharge recommendation:  Has not yet been discussed the attending provider and/or case management    IF patient discharges home will need the following DME: patient owns DME required for discharge       SUBJECTIVE:   Patient stated I want to go home.     OBJECTIVE DATA SUMMARY:   Critical Behavior:  Neurologic State: Alert  Orientation Level: Oriented X4  Cognition: Appropriate decision making  Safety/Judgement: Awareness of environment, Home safety, Insight into deficits  Functional Mobility Training:  Bed Mobility:     Supine to Sit:  (received up in chair)              Transfers:  Sit to Stand: Moderate assistance;Assist x2; Additional time  Stand to Sit: Minimum assistance        Bed to Chair: Contact guard assistance                    Balance:  Sitting: Intact  Standing: Impaired; With support  Standing - Static: Good;Constant support  Standing - Dynamic : Fair;Constant support  Ambulation/Gait Training:  Distance (ft): 60 Feet (ft)  Assistive Device: Gait belt;Walker, rolling  Ambulation - Level of Assistance: Contact guard assistance        Gait Abnormalities: Antalgic;Decreased step clearance  Right Side Weight Bearing: As tolerated     Base of Support: Shift to left  Stance: Right decreased  Speed/Adriana: Pace decreased (<100 feet/min)  Step Length: Right shortened;Left shortened                    Stairs:               Therapeutic Exercises:     Pain Rating:      Activity Tolerance:   requires rest breaks      After treatment patient left in no apparent distress:   Sitting in chair and Call bell within reach    COMMUNICATION/COLLABORATION:   The patients plan of care was discussed with: Registered nurse.      Esdras Bauman, PT   Time Calculation: 20 mins

## 2022-08-20 NOTE — PROGRESS NOTES
Problem: Mobility Impaired (Adult and Pediatric)  Goal: *Acute Goals and Plan of Care (Insert Text)  Description: FUNCTIONAL STATUS PRIOR TO ADMISSION: Patient was independent and active without use of DME.    HOME SUPPORT PRIOR TO ADMISSION: The patient lived with spouse/children but did not require assist.    Physical Therapy Goals  Initiated 8/15/2022    1. Patient will move from supine to sit and sit to supine , scoot up and down, and roll side to side in bed with modified independence within 4 days. 2. Patient will perform sit to stand with modified independence within 4 days. 3. Patient will ambulate with modified independence for 150 feet with the least restrictive device within 4 days. 4. Patient will perform post-MEL home exercise program per protocol with independence within 4 days. 8/20/2022 1604 by Edd Weiner, PT  Outcome: Progressing Towards Goal     PHYSICAL THERAPY TREATMENT  Patient: Alisson Campbell (1 y.o. female)  Date: 8/20/2022  Diagnosis: Avascular necrosis of bone of right hip (HCC) [M87.051] Avascular necrosis of left femur (HCC)  Procedure(s) (LRB):  RIGHT TOTAL HIP ARTHROPLASTY ANTERIOR APPROACH (Right) 5 Days Post-Op  Precautions: Fall, WBAT  Chart, physical therapy assessment, plan of care and goals were reviewed. ASSESSMENT  Patient continues with skilled PT services and is progressing towards goals. Patient completed supine exercises as described below. Required mod A x 2 for supine to sit and sit to stand. Patient too fatigued to ambulated to restroom, opted for [facility]. Returned to supine due to fatigue, required max A x 2. Patient requires increased time all mobility. Discussed with patient that rehab may be better option given current fatigue level and very slow progress despite POD 5 today. Patient stating she would consider it. Would benefit from rehab placement if agreeable otherwise HHPT with 24 hour physical assistance required for safety.      Current Level of Function Impacting Discharge (mobility/balance): mod/max A x 2    Other factors to consider for discharge:          PLAN :  Patient continues to benefit from skilled intervention to address the above impairments. Continue treatment per established plan of care. to address goals. Recommendation for discharge: (in order for the patient to meet his/her long term goals)  To be determined: rehab    This discharge recommendation:  Has not yet been discussed the attending provider and/or case management    IF patient discharges home will need the following DME: patient owns DME required for discharge       SUBJECTIVE:   Patient stated Leila Romano this is a lot to do if I go home.     OBJECTIVE DATA SUMMARY:   Critical Behavior:  Neurologic State: Alert  Orientation Level: Oriented X4  Cognition: Appropriate decision making  Safety/Judgement: Awareness of environment, Home safety, Insight into deficits  Functional Mobility Training:  Bed Mobility:     Supine to Sit: Moderate assistance;Assist x2  Sit to Supine: Maximum assistance;Assist x2           Transfers:  Sit to Stand: Moderate assistance;Assist x2  Stand to Sit: Minimum assistance        Bed to Chair: Contact guard assistance; Adaptive equipment                    Balance:  Sitting: Intact  Standing: Impaired; With support  Standing - Static: Good;Constant support  Standing - Dynamic : Fair;Constant support  Ambulation/Gait Training:  Distance (ft): 60 Feet (ft)  Assistive Device: Gait belt;Walker, rolling  Ambulation - Level of Assistance: Contact guard assistance        Gait Abnormalities: Antalgic;Decreased step clearance  Right Side Weight Bearing: As tolerated     Base of Support: Shift to left  Stance: Right decreased  Speed/Adriana: Pace decreased (<100 feet/min)  Step Length: Right shortened;Left shortened                    Stairs:               Therapeutic Exercises:   SUPINE  EXERCISES   Sets   Reps   Active Active Assist   Passive Self ROM   Comments Ankle Pumps 1 10 [x]                                        []                                        []                                        []                                           Quad Sets 1 10 [x]                                        []                                        []                                        []                                           Heel Slides 1 10 []                                        [x]                                        []                                        []                                           Hip Abduction 1 10 []                                        [x]                                        []                                        []                                           Glut Sets 1 10 [x]                                        []                                        []                                        []                                              []                                        []                                        []                                        []                                              []                                        []                                        []                                        []                                             STANDING  EXERCISES   Sets   Reps   Active Active Assist   Passive Self ROM   Comments   Heel Raises   []                                        []                                        []                                        []                                           Hip Abduction   []                                        []                                        []                                        []                                              []                                        []                                        []                                        []                                              [] []                                        []                                        []                                               Activity Tolerance:   Fair      After treatment patient left in no apparent distress:   Supine in bed, Call bell within reach, and Side rails x 3    COMMUNICATION/COLLABORATION:   The patients plan of care was discussed with: Occupational therapist and Registered nurse.      Danielle Jack PT   Time Calculation: 34 mins

## 2022-08-21 LAB
ALBUMIN SERPL-MCNC: 2.3 G/DL (ref 3.5–5)
ALBUMIN/GLOB SERPL: 0.6 {RATIO} (ref 1.1–2.2)
ALP SERPL-CCNC: 66 U/L (ref 45–117)
ALT SERPL-CCNC: <6 U/L (ref 12–78)
ANION GAP SERPL CALC-SCNC: 12 MMOL/L (ref 5–15)
AST SERPL-CCNC: 12 U/L (ref 15–37)
BILIRUB SERPL-MCNC: 0.3 MG/DL (ref 0.2–1)
BUN SERPL-MCNC: 94 MG/DL (ref 6–20)
BUN/CREAT SERPL: 14 (ref 12–20)
CALCIUM SERPL-MCNC: 10.6 MG/DL (ref 8.5–10.1)
CHLORIDE SERPL-SCNC: 101 MMOL/L (ref 97–108)
CO2 SERPL-SCNC: 16 MMOL/L (ref 21–32)
CREAT SERPL-MCNC: 6.64 MG/DL (ref 0.55–1.02)
GLOBULIN SER CALC-MCNC: 3.9 G/DL (ref 2–4)
GLUCOSE SERPL-MCNC: 99 MG/DL (ref 65–100)
HGB BLD-MCNC: 7.7 G/DL (ref 11.5–16)
POTASSIUM SERPL-SCNC: 4.1 MMOL/L (ref 3.5–5.1)
PROT SERPL-MCNC: 6.2 G/DL (ref 6.4–8.2)
SODIUM SERPL-SCNC: 129 MMOL/L (ref 136–145)

## 2022-08-21 PROCEDURE — 80053 COMPREHEN METABOLIC PANEL: CPT

## 2022-08-21 PROCEDURE — 74011000250 HC RX REV CODE- 250: Performed by: PHYSICIAN ASSISTANT

## 2022-08-21 PROCEDURE — 85018 HEMOGLOBIN: CPT

## 2022-08-21 PROCEDURE — 74011250637 HC RX REV CODE- 250/637: Performed by: PHYSICIAN ASSISTANT

## 2022-08-21 PROCEDURE — 97530 THERAPEUTIC ACTIVITIES: CPT

## 2022-08-21 PROCEDURE — 36415 COLL VENOUS BLD VENIPUNCTURE: CPT

## 2022-08-21 PROCEDURE — 74011250637 HC RX REV CODE- 250/637: Performed by: INTERNAL MEDICINE

## 2022-08-21 RX ADMIN — ASPIRIN 81 MG: 81 TABLET, COATED ORAL at 10:29

## 2022-08-21 RX ADMIN — AMLODIPINE BESYLATE 10 MG: 5 TABLET ORAL at 10:29

## 2022-08-21 RX ADMIN — SODIUM BICARBONATE 650 MG: 650 TABLET ORAL at 15:52

## 2022-08-21 RX ADMIN — SODIUM CHLORIDE, PRESERVATIVE FREE 10 ML: 5 INJECTION INTRAVENOUS at 21:23

## 2022-08-21 RX ADMIN — SODIUM CHLORIDE, PRESERVATIVE FREE 10 ML: 5 INJECTION INTRAVENOUS at 15:53

## 2022-08-21 RX ADMIN — ALLOPURINOL 100 MG: 100 TABLET ORAL at 10:29

## 2022-08-21 RX ADMIN — HYDROMORPHONE HYDROCHLORIDE 2 MG: 2 TABLET ORAL at 04:46

## 2022-08-21 RX ADMIN — CARVEDILOL 12.5 MG: 12.5 TABLET, FILM COATED ORAL at 07:26

## 2022-08-21 RX ADMIN — HYDRALAZINE HYDROCHLORIDE 10 MG: 10 TABLET, FILM COATED ORAL at 21:16

## 2022-08-21 RX ADMIN — ACETAMINOPHEN 650 MG: 325 TABLET ORAL at 07:26

## 2022-08-21 RX ADMIN — ASPIRIN 81 MG: 81 TABLET, COATED ORAL at 17:32

## 2022-08-21 RX ADMIN — SODIUM CHLORIDE, PRESERVATIVE FREE 10 ML: 5 INJECTION INTRAVENOUS at 07:26

## 2022-08-21 RX ADMIN — HYDROMORPHONE HYDROCHLORIDE 2 MG: 2 TABLET ORAL at 18:59

## 2022-08-21 RX ADMIN — ACETAMINOPHEN 650 MG: 325 TABLET ORAL at 15:52

## 2022-08-21 RX ADMIN — ACETAMINOPHEN 650 MG: 325 TABLET ORAL at 19:00

## 2022-08-21 RX ADMIN — SODIUM BICARBONATE 650 MG: 650 TABLET ORAL at 10:29

## 2022-08-21 RX ADMIN — SENNOSIDES AND DOCUSATE SODIUM 1 TABLET: 50; 8.6 TABLET ORAL at 17:33

## 2022-08-21 RX ADMIN — HYDRALAZINE HYDROCHLORIDE 10 MG: 10 TABLET, FILM COATED ORAL at 10:29

## 2022-08-21 RX ADMIN — SODIUM BICARBONATE 650 MG: 650 TABLET ORAL at 21:16

## 2022-08-21 RX ADMIN — CARVEDILOL 12.5 MG: 12.5 TABLET, FILM COATED ORAL at 17:33

## 2022-08-21 RX ADMIN — SENNOSIDES AND DOCUSATE SODIUM 1 TABLET: 50; 8.6 TABLET ORAL at 10:29

## 2022-08-21 RX ADMIN — PRAVASTATIN SODIUM 40 MG: 40 TABLET ORAL at 21:16

## 2022-08-21 RX ADMIN — HYDRALAZINE HYDROCHLORIDE 10 MG: 10 TABLET, FILM COATED ORAL at 15:52

## 2022-08-21 NOTE — PROGRESS NOTES
Patient seen today at request of my partner Dr. Ashley Prado. Evaluated by nephrology yesterday. Safe for discharge from their standpoint. Zavala removed.  PT recommending another day of rehab    O:  Afebrile  Dressing intact  Distal motor and sensory function intact to baseline    A/P:  Dispo is pending PT evaluation and progress    Follow up surgical team outpatient

## 2022-08-21 NOTE — PROGRESS NOTES
Problem: Mobility Impaired (Adult and Pediatric)  Goal: *Acute Goals and Plan of Care (Insert Text)  Description: FUNCTIONAL STATUS PRIOR TO ADMISSION: Patient was independent and active without use of DME.    HOME SUPPORT PRIOR TO ADMISSION: The patient lived with spouse/children but did not require assist.    Physical Therapy Goals  Initiated 8/15/2022    1. Patient will move from supine to sit and sit to supine , scoot up and down, and roll side to side in bed with modified independence within 4 days. 2. Patient will perform sit to stand with modified independence within 4 days. 3. Patient will ambulate with modified independence for 150 feet with the least restrictive device within 4 days. 4. Patient will perform post-MEL home exercise program per protocol with independence within 4 days. Outcome: Progressing Towards Goal     PHYSICAL THERAPY TREATMENT  Patient: Denis Rogers (70 y.o. female)  Date: 8/21/2022  Diagnosis: Avascular necrosis of bone of right hip (HCC) [M87.051] Avascular necrosis of left femur (HCC)  Procedure(s) (LRB):  RIGHT TOTAL HIP ARTHROPLASTY ANTERIOR APPROACH (Right) 6 Days Post-Op  Precautions: Fall, WBAT  Chart, physical therapy assessment, plan of care and goals were reviewed. ASSESSMENT  Patient continues with skilled PT services and is slowly progressing towards goals. Patient received sitting edge of bed. [employer] had just helped patient to the [address] reported that it took over 25 mins. Patient sitting EOB and initially agreeable to walk with PT. Patient said she was tired and to give her a minute and eventually decided that she didn't have the strength to ambulate again. Patient returned to bed with mod assist x 2 and assistance needed to scoot self in bed as well. Patient will likely need SNF placement upon dc. .     Current Level of Function Impacting Discharge (mobility/balance): mod-max assist needed for bed mobility and transfers, additional time needed for all mobility    Other factors to consider for discharge:          PLAN :  Patient continues to benefit from skilled intervention to address the above impairments. Continue treatment per established plan of care. to address goals. Recommendation for discharge: (in order for the patient to meet his/her long term goals)  Therapy up to 5 days/week in SNF setting    This discharge recommendation:  A follow-up discussion with the attending provider and/or case management is planned    IF patient discharges home will need the following DME: to be determined (TBD)       SUBJECTIVE:   Patient stated Mmmhmm- yeah.  pt does not verbalize much during session     OBJECTIVE DATA SUMMARY:   Critical Behavior:  Neurologic State: Alert  Orientation Level: Oriented X4  Cognition: Appropriate decision making, Appropriate for age attention/concentration, Appropriate safety awareness  Safety/Judgement: Awareness of environment, Home safety, Insight into deficits  Functional Mobility Training:  Bed Mobility:        Sit to Supine: Moderate assistance;Assist x2           Transfers:                                   Balance:     Ambulation/Gait Training:                                                        Stairs: Therapeutic Exercises:     Pain Rating:      Activity Tolerance:   Poor    After treatment patient left in no apparent distress:   Supine in bed and Call bell within reach    COMMUNICATION/COLLABORATION:   The patients plan of care was discussed with: Registered nurse.      Sammi Lubin PT   Time Calculation: 10 mins

## 2022-08-21 NOTE — PROGRESS NOTES
Bedside shift change report given to [address] [place] [address] Blvd (oncoming nurse) by Anatoliy Lockhart RN (offgoing nurse). Report included the following information SBAR, Kardex, ED Summary, OR Summary, Procedure Summary, Intake/Output, MAR, Alarm Parameters , and Dual Neuro Assessment.

## 2022-08-21 NOTE — PROGRESS NOTES
Renal Progress Note    NAME:  Myah Seen   :   1960   MRN:   [id number]     Date/Time:  2022   Subjective:       . Patient seen and examined, receiving blood transfusion, denies nausea, SOB. Bps soft, cr higher    22 --> F/U - FER, CKD    Felt better with the incentive spirometer. Her oral intake and appetite are stable. Ms Lucrecia King is ambulating. F/U - 22--> F/U FER, CKD    No new complaints. Looking forward to going home. F/U - 22 --> Tena Cadena is out. Voiding ok (per ms Lucrecia King). May be discharged tomorrow.     Medications reviewed:  Current Facility-Administered Medications   Medication Dose Route Frequency    allopurinoL (ZYLOPRIM) tablet 100 mg  100 mg Oral DAILY    0.9% sodium chloride infusion 250 mL  250 mL IntraVENous PRN    sodium bicarbonate tablet 650 mg  650 mg Oral TID    HYDROmorphone (DILAUDID) tablet 1 mg  1 mg Oral Q4H PRN    HYDROmorphone (DILAUDID) tablet 2 mg  2 mg Oral Q4H PRN    amLODIPine (NORVASC) tablet 10 mg  10 mg Oral DAILY    [Held by provider] bumetanide (BUMEX) tablet 1 mg  1 mg Oral DAILY    carvediloL (COREG) tablet 12.5 mg  12.5 mg Oral BID WITH MEALS    hydrALAZINE (APRESOLINE) tablet 10 mg  10 mg Oral TID    [Held by provider] potassium chloride SR (KLOR-CON 10) tablet 20 mEq  20 mEq Oral DAILY    pravastatin (PRAVACHOL) tablet 40 mg  40 mg Oral QHS    sodium chloride (NS) flush 5-40 mL  5-40 mL IntraVENous Q8H    sodium chloride (NS) flush 5-40 mL  5-40 mL IntraVENous PRN    acetaminophen (TYLENOL) tablet 650 mg  650 mg Oral Q6H    naloxone (NARCAN) injection 0.4 mg  0.4 mg IntraVENous PRN    ondansetron (ZOFRAN ODT) tablet 4 mg  4 mg Oral Q6H PRN    hydrOXYzine HCL (ATARAX) tablet 5 mg  5 mg Oral Q8H PRN    senna-docusate (PERICOLACE) 8.6-50 mg per tablet 1 Tablet  1 Tablet Oral BID    polyethylene glycol (MIRALAX) packet 17 g  17 g Oral DAILY    bisacodyL (DULCOLAX) suppository 10 mg  10 mg Rectal DAILY PRN    aspirin delayed-release tablet 81 mg  81 mg Oral BID        Objective:   Vitals:  Visit Vitals  /74   Pulse 94   Temp 98.5 °F (36.9 °C)   Resp 16   Ht 5' 6\" (1.676 m)   Wt 101.6 kg (224 lb)   SpO2 97%   BMI 36.15 kg/m²     Temp (24hrs), Av.2 °F (36.8 °C), Min:98.1 °F (36.7 °C), Max:98.5 °F (36.9 °C)    O2 Flow Rate (L/min): 2 l/min O2 Device: None (Room air)    Last 24hr Input/Output:    Intake/Output Summary (Last 24 hours) at 2022 1248  Last data filed at 2022 0914  Gross per 24 hour   Intake --   Output 1300 ml   Net -1300 ml          Physical Exam:      Seen in Room 569. General:Alert,oriented,  No distress,   Lying in bed comfortably    HEENT: Sclerae without icterus. Lungs : Clears to auscultation, no wheezes, no rales    CVS:  S1 S2 normal, No S 3 gallop    Abdomen: Not distended      Extremities: No edema    Neurologic:  Alert + Oriented. Psych: normal affect        Lab Data Reviewed:    Recent Results (from the past 24 hour(s))   HEMOGLOBIN    Collection Time: 22  1:01 AM   Result Value Ref Range    HGB 7.7 (L) 11.5 - 20.9 g/dL   METABOLIC PANEL, COMPREHENSIVE    Collection Time: 22  1:01 AM   Result Value Ref Range    Sodium 129 (L) 136 - 145 mmol/L    Potassium 4.1 3.5 - 5.1 mmol/L    Chloride 101 97 - 108 mmol/L    CO2 16 (L) 21 - 32 mmol/L    Anion gap 12 5 - 15 mmol/L    Glucose 99 65 - 100 mg/dL    BUN 94 (H) 6 - 20 MG/DL    Creatinine 6.64 (H) 0.55 - 1.02 MG/DL    BUN/Creatinine ratio 14 12 - 20      GFR est AA 8 (L) >60 ml/min/1.73m2    GFR est non-AA 6 (L) >60 ml/min/1.73m2    Calcium 10.6 (H) 8.5 - 10.1 MG/DL    Bilirubin, total 0.3 0.2 - 1.0 MG/DL    ALT (SGPT) <6 (L) 12 - 78 U/L    AST (SGOT) 12 (L) 15 - 37 U/L    Alk. phosphatase 66 45 - 117 U/L    Protein, total 6.2 (L) 6.4 - 8.2 g/dL    Albumin 2.3 (L) 3.5 - 5.0 g/dL    Globulin 3.9 2.0 - 4.0 g/dL    A-G Ratio 0.6 (L) 1.1 - 2.2           Assessment/Plan:     CKD5-6.  On transplant list at [address] [place] [address] - stable  - bumex on hold for now  --avoid nephrotoxins  -  Acute on chronic anemia  Anemia of CKD  -drop in Hb post op  -on EPOgen as outpatient, due in September  -low Iron, s/p IV venofer 400 mg, Had PRBCs this week. Hyponatremia - improving  Fluid restriction - 1500 mls per day on discharge. OK to resume the diuretic at home. Metabolioc acidosis-on po bicarb (650 mg tid)     HTN  -  Tertiary hyperparathyroidism  Hypercalcemia  -has not tolerated sensipar, currently ca in acceptable range  -check serum albumin  -will need parathyroidectomy eventually     Gout-on allopurinol     S/p Rt hip arthroplasty      There is no indication for dialysis at this time. Ok for discharge  from a renal standpoint. Ms Henriette Meckel was asked to call the office on Monday (8/22) to schedule a lab draw for 8/23. F/U in the office in two weeks.            Care Plan discussed with:    Ms Henriette Meckel           []   >50% of visit spent in counseling and coordination of care   (Discussed [] CODE status,  [] Care Plan, [] D/C Planning)    ____________________________________________    Attending Physician: Anat De Guzman, [id number]

## 2022-08-22 VITALS
BODY MASS INDEX: 36 KG/M2 | HEIGHT: 66 IN | OXYGEN SATURATION: 97 % | RESPIRATION RATE: 17 BRPM | SYSTOLIC BLOOD PRESSURE: 112 MMHG | TEMPERATURE: 98 F | DIASTOLIC BLOOD PRESSURE: 75 MMHG | WEIGHT: 224 LBS | HEART RATE: 80 BPM

## 2022-08-22 LAB — HGB BLD-MCNC: 7.4 G/DL (ref 11.5–16)

## 2022-08-22 PROCEDURE — 74011000250 HC RX REV CODE- 250: Performed by: PHYSICIAN ASSISTANT

## 2022-08-22 PROCEDURE — 97116 GAIT TRAINING THERAPY: CPT

## 2022-08-22 PROCEDURE — 74011250637 HC RX REV CODE- 250/637: Performed by: INTERNAL MEDICINE

## 2022-08-22 PROCEDURE — 36415 COLL VENOUS BLD VENIPUNCTURE: CPT

## 2022-08-22 PROCEDURE — 85018 HEMOGLOBIN: CPT

## 2022-08-22 PROCEDURE — 74011250637 HC RX REV CODE- 250/637: Performed by: PHYSICIAN ASSISTANT

## 2022-08-22 PROCEDURE — 97530 THERAPEUTIC ACTIVITIES: CPT

## 2022-08-22 RX ORDER — SODIUM BICARBONATE 650 MG/1
650 TABLET ORAL 3 TIMES DAILY
Qty: 90 TABLET | Refills: 0 | Status: SHIPPED | OUTPATIENT
Start: 2022-08-22

## 2022-08-22 RX ADMIN — ACETAMINOPHEN 650 MG: 325 TABLET ORAL at 08:51

## 2022-08-22 RX ADMIN — SENNOSIDES AND DOCUSATE SODIUM 1 TABLET: 50; 8.6 TABLET ORAL at 08:51

## 2022-08-22 RX ADMIN — HYDROMORPHONE HYDROCHLORIDE 2 MG: 2 TABLET ORAL at 05:35

## 2022-08-22 RX ADMIN — SODIUM CHLORIDE, PRESERVATIVE FREE 10 ML: 5 INJECTION INTRAVENOUS at 06:13

## 2022-08-22 RX ADMIN — CARVEDILOL 12.5 MG: 12.5 TABLET, FILM COATED ORAL at 08:51

## 2022-08-22 RX ADMIN — ACETAMINOPHEN 650 MG: 325 TABLET ORAL at 13:27

## 2022-08-22 RX ADMIN — SODIUM BICARBONATE 650 MG: 650 TABLET ORAL at 08:51

## 2022-08-22 RX ADMIN — ACETAMINOPHEN 650 MG: 325 TABLET ORAL at 02:21

## 2022-08-22 RX ADMIN — ASPIRIN 81 MG: 81 TABLET, COATED ORAL at 08:52

## 2022-08-22 RX ADMIN — HYDROMORPHONE HYDROCHLORIDE 2 MG: 2 TABLET ORAL at 13:27

## 2022-08-22 RX ADMIN — ALLOPURINOL 100 MG: 100 TABLET ORAL at 08:50

## 2022-08-22 RX ADMIN — SODIUM CHLORIDE, PRESERVATIVE FREE 10 ML: 5 INJECTION INTRAVENOUS at 13:29

## 2022-08-22 NOTE — PROGRESS NOTES
I have reviewed discharge instructions with the patient. The patient verbalized understanding. Provided time for questions.  Patient discharged with D/C paperwork and personal belongings

## 2022-08-22 NOTE — PROGRESS NOTES
Problem: Mobility Impaired (Adult and Pediatric)  Goal: *Acute Goals and Plan of Care (Insert Text)  Description: FUNCTIONAL STATUS PRIOR TO ADMISSION: Patient was independent and active without use of DME.    HOME SUPPORT PRIOR TO ADMISSION: The patient lived with spouse/children but did not require assist.    Physical Therapy Goals  Initiated 8/15/2022    1. Patient will move from supine to sit and sit to supine , scoot up and down, and roll side to side in bed with modified independence within 4 days. 2. Patient will perform sit to stand with modified independence within 4 days. 3. Patient will ambulate with modified independence for 150 feet with the least restrictive device within 4 days. 4. Patient will perform post-MEL home exercise program per protocol with independence within 4 days. Outcome: Progressing Towards Goal   PHYSICAL THERAPY TREATMENT  Patient: Theresa Torres (42 y.o. female)  Date: 8/22/2022  Diagnosis: Avascular necrosis of bone of right hip (HCC) [M87.051] Avascular necrosis of left femur (HCC)  Procedure(s) (LRB):  RIGHT TOTAL HIP ARTHROPLASTY ANTERIOR APPROACH (Right) 7 Days Post-Op  Precautions: Fall, WBAT  Chart, physical therapy assessment, plan of care and goals were reviewed. ASSESSMENT  Patient continues with skilled PT services and is progressing towards goals. Pt up dressed and expressing interest in discharging home. Pt confirmed she does plan to enter lower level - no steps and stay on that level with full bathroom and bed. Pt actually did much better today amb with RW at faster pace with standing rests. Pt performed stairs with left hand rail/cane right with CGA.   Pt cleared for discharge from PT standpoint with family assist.  Pt also instructed and performed supine to sit with use of gait belt to lift right leg into bed with min assist.      Current Level of Function Impacting Discharge (mobility/balance): Min assist sit to supine; standby to supervision transfers/amb with RW; CGA for stairs with rail/cane    Other factors to consider for discharge: Lives with  and 2 sons to also assist         PLAN :  Patient continues to benefit from skilled intervention to address the above impairments. Continue treatment per established plan of care. to address goals. Recommendation for discharge: (in order for the patient to meet his/her long term goals)  Physical therapy at least 2 days/week in the home     This discharge recommendation:  Has been made in collaboration with the attending provider and/or case management    IF patient discharges home will need the following DME: patient owns DME required for discharge       SUBJECTIVE:   Patient stated I'm ready to get out of here.     OBJECTIVE DATA SUMMARY:   Critical Behavior:  Neurologic State: Alert  Orientation Level: Oriented X4  Cognition: Appropriate for age attention/concentration, Follows commands  Safety/Judgement: Awareness of environment, Home safety, Insight into deficits  Functional Mobility Training:  Bed Mobility:     Supine to Sit: Other (comment) (standing with RW with PCT present returning from bathroom)  Sit to Supine: Minimum assistance; Adaptive equipment; Additional time (used gait belt to lift leg into bed)  Scooting: Supervision; Additional time        Transfers:  Sit to Stand: Supervision; Additional time (from low w/c surface)  Stand to Sit: Supervision; Additional time        Bed to Chair: Supervision                    Balance:  Sitting: Intact  Standing: Impaired; With support  Standing - Static: Good;Constant support  Standing - Dynamic : Not tested (safe for amb with RW; limited dynamic secondary to requires bilateral support of RW at all times)  Ambulation/Gait Training:  Distance (ft): 125 Feet (ft)  Assistive Device: Walker, rolling;Gait belt  Ambulation - Level of Assistance: Supervision        Gait Abnormalities: Antalgic (takes standing rest)  Right Side Weight Bearing: As tolerated  Left Side Weight Bearing: Full  Base of Support: Shift to left  Stance: Right decreased  Speed/Adriana: Slow  Step Length: Right shortened;Left shortened  Swing Pattern: Right asymmetrical     Stairs:  Number of Stairs Trained: 4  Stairs - Level of Assistance: Contact guard assistance   Rail Use: Left  (cane right hand)    Pain Rating:  Right hip 2/10    Activity Tolerance:   Improving - increased amb distance with standing rests    After treatment patient left in no apparent distress:   Supine in bed, Call bell within reach, Side rails x 3, and ice applied right hip - watching Discharge video    COMMUNICATION/COLLABORATION:   The patients plan of care was discussed with: Registered nurse.      Lia Zafar, PT   Time Calculation: 35 mins

## 2022-08-22 NOTE — PROGRESS NOTES
OUT Patient-     Transition of [facility] [phone] -CM spoke with Javed Bowman from [facility] [place] Coordinator to confirm- [phone] Option 7- ext: [phone], and fax [phone]. The patient is ready for discharge. Family to provide transport home- follow up with Ortho.      NOA CarpenterW

## 2022-08-22 NOTE — DISCHARGE SUMMARY
Ortho Discharge Summary    Patient ID:  Vignesh Vinson  [id number]  female  57 y.o.  1960    Admit date: 8/15/2022    Discharge date: 8/22/2022    Admitting Physician: Tani Lyles MD     Consulting Physician(s):   Treatment Team: Attending Provider: Song Cisse MD; Care Manager: Hope Palmer RN; Consulting Provider: Donna Lloyd MD; Consulting Provider: Juan Carlos Davila MD; Staff Nurse: Rosalind Callahan; Physical Therapist: Adam Sepulveda PT; Care Manager: Henrik Echeverria BSW    Date of Surgery:   8/15/2022     Preoperative Diagnosis:  DJD RIGHT HIP    Postoperative Diagnosis:   DJD RIGHT HIP    Procedure(s):   RIGHT TOTAL HIP ARTHROPLASTY ANTERIOR APPROACH     Anesthesia Type:   General     Surgeon: Song Cisse MD                            HPI:  Pt is a 57 y.o. female who has a history of DJD RIGHT HIP  with pain and limitations of activities of daily living who presents at this time for a right RIGHT TOTAL HIP ARTHROPLASTY ANTERIOR APPROACH following the failure of conservative management. PMH:   Past Medical History:   Diagnosis Date    Arthritis     L knee    Chronic kidney disease     secondary to hypertensive nephrosclerosis, CKD STG 5, ON ACTIVE KIDNEY TRANSPLANT LIST AT [facility]- DR. SANCHEZ    Gastritis     HISTORY OF 10/2020     Gout     History of claustrophobia     Hyperparathyroidism ([facility] [address]     Hypertension        Body mass index is 36.15 kg/m². : A BMI > 30 is classified as obesity and > 40 is classified as morbid obesity. Medications upon admission :   Prior to Admission Medications   Prescriptions Last Dose Informant Patient Reported? Taking?   acetaminophen (TYLENOL) 500 mg tablet 8/14/2022  Yes Yes   Sig: Take 500 mg by mouth every six (6) hours as needed for Pain.    allopurinoL (ZYLOPRIM) 100 mg tablet 8/15/2022  No Yes   Sig: TAKE TWO TABLETS BY MOUTH DAILY   amLODIPine (NORVASC) 10 mg tablet 8/15/2022  No Yes   Sig: TAKE 1 TABLET BY MOUTH DAILY bumetanide (BUMEX) 1 mg tablet 8/14/2022  Yes Yes   Sig: Take 1 mg by mouth daily. carvediloL (COREG) 6.25 mg tablet 8/15/2022 at 0615  No Yes   Sig: Take 2 Tablets by mouth two (2) times daily (with meals). diphenhydrAMINE (BENADRYL) 50 mg tablet 8/8/2022  Yes Yes   Sig: Take 50 mg by mouth nightly as needed for Sleep.   hydrALAZINE (APRESOLINE) 10 mg tablet 8/15/2022  Yes Yes   Sig: Take 10 mg by mouth three (3) times daily. potassium chloride (K-DUR, KLOR-CON) 20 mEq tablet 8/14/2022  No Yes   Sig: TAKE 1 TABLET BY MOUTH DAILY   pravastatin (PRAVACHOL) 40 mg tablet 8/8/2022  No Yes   Sig: TAKE 1 TABLET BY MOUTH EVERY DAY AT BEDTIME   Patient taking differently: Take 40 mg by mouth. TAKE 1 TABLET BY MOUTH EVERY DAY AT BEDTIME   predniSONE (DELTASONE) 20 mg tablet 8/8/2022  No Yes   Sig: Take 1 Tablet by mouth two (2) times a day. sodium bicarbonate 325 mg tablet 8/15/2022  Yes Yes   Sig: Take 325 mg by mouth two (2) times a day. tolterodine ER (DETROL LA) 4 mg ER capsule 8/8/2022  No Yes   Sig: Take 1 Capsule by mouth daily. Facility-Administered Medications: None        Allergies: Allergies   Allergen Reactions    Morphine Nausea and Vomiting and Cough     Blood pressure and 158-96 heart rate up to 156. [facility] 19 Swelling     PATIENT DENIES THIS ALLERGY         Hospital Course: The patient underwent surgery. Complications:  None; patient tolerated the procedure well. Was taken to the PACU in stable condition and then transferred to the ortho floor. Patient mobilized on day of surgery with weakness to LEs bilaterally leading to patient being lowered to floor by therapy, no injury. Returned to bed. Overnight, pain control using scheduled tylenol, as needed dilaudid. Therapy continued on POD 1 with complaint of pain limiting progress--increased frequency of PRN PO dilaudid dosing. Also with urinary retention requiring straight cath x 2 with eventual estrada placement.         On POD 2, patient with noted Hgb drop to 7.2. During therapy session, patient symptomatic with fatigue, and feeling \"woozy. \"  Nephrology consulted for management of baseline anemia compounded by post operative blood loss anemia. Iron studies conducted with IV venofer 200 mg x 2 given to patient. Hgb rechecked on POD 3, remained at 7.2.  1 unit PRBCs transfused as patient remained symptomatic. Following transfusion Hgb as follows:  POD 3 subsequent to transfusion: 8.3, POD 4: 8.1, POD 5: 7.6, POD 6: 7.6, POD 7: 7.4. Patient symptomatic at time of discharge. Nephrology followed patient through remainder of hospitalization due to rising Cr in setting of CKD stage 5. Hyponatremia, metabolic acidosis managed by nephrology. Zavala removed on POD 5 with return of spontaneous void. On POD 7, patient cleared by therapy and nephrology for discharge to home on POD 7 with [facility] and family support. Perioperative Antibiotics:  Ancef and vancomycin     Postoperative Pain Management:  Dilaudid & Tylenol     DVT Prophylaxis: Aspirin 81mg PO BID    Postoperative transfusions:    Number of units banked PRBCs =   1     Post Op complications: none    Hemoglobin at discharge:    Lab Results   Component Value Date/Time    HGB 7.4 (L) 08/22/2022 05:24 AM    INR 1.0 08/10/2022 03:34 PM       Aquacel dressing remained in place - clean, dry and intact. No significant erythema or swelling. Wound appears to be healing without any evidence of infection. Neurovascular exam found to be within normal limits. Physical Therapy started following surgery and participated in bed mobility, transfers and ambulation.         Gait:  Gait  Base of Support: Shift to left  Speed/Adriana: Slow  Step Length: Right shortened, Left shortened  Swing Pattern: Right asymmetrical  Stance: Right decreased  Gait Abnormalities: Antalgic (takes standing rest)  Ambulation - Level of Assistance: Supervision  Distance (ft): 125 Feet (ft)  Assistive Device: Walker, rolling, Gait belt  Rail Use: Left  (cane right hand)  Stairs - Level of Assistance: Contact guard assistance  Number of Stairs Trained: 4  Interventions: Safety awareness training, Verbal cues, Tactile cues            Interventions: Safety awareness training, Verbal cues, Tactile cues      Discharged to: Home. Condition on Discharge:   stable    Discharge instructions:  - Anticoagulate with Aspirin 81 mg PO BID  - Take pain medications as prescribed  - Resume pre hospital diet      - Discharge activity: activity as tolerated  - Ambulate with assistive device as needed. - Weight bearing status as tolerated  - Wound Care Keep wound clean and dry. See discharge instruction sheet. -DISCHARGE MEDICATION LIST     Current Discharge Medication List        START taking these medications    Details   aspirin delayed-release 81 mg tablet Take 1 Tablet by mouth two (2) times a day. Qty: 28 Tablet, Refills: 0  Start date: 8/16/2022      HYDROmorphone (DILAUDID) 2 mg tablet Take 0.5-1 Tablets by mouth every four (4) hours as needed for Pain for up to 7 days. Max Daily Amount: 12 mg.  Qty: 40 Tablet, Refills: 0  Start date: 8/16/2022, End date: 8/23/2022    Associated Diagnoses: Aftercare following right hip joint replacement surgery      senna-docusate (PERICOLACE) 8.6-50 mg per tablet Take 1 Tablet by mouth two (2) times a day. Qty: 30 Tablet, Refills: 0  Start date: 8/16/2022           CONTINUE these medications which have CHANGED    Details   sodium bicarbonate 650 mg tablet Take 1 Tablet by mouth three (3) times daily.   Qty: 90 Tablet, Refills: 0  Start date: 8/22/2022           CONTINUE these medications which have NOT CHANGED    Details   diphenhydrAMINE (BENADRYL) 50 mg tablet Take 50 mg by mouth nightly as needed for Sleep.      allopurinoL (ZYLOPRIM) 100 mg tablet TAKE TWO TABLETS BY MOUTH DAILY  Qty: 60 Tablet, Refills: 11    Associated Diagnoses: Gout, unspecified cause, unspecified chronicity, unspecified site      predniSONE (DELTASONE) 20 mg tablet Take 1 Tablet by mouth two (2) times a day. Qty: 21 Tablet, Refills: 0      carvediloL (COREG) 6.25 mg tablet Take 2 Tablets by mouth two (2) times daily (with meals). Qty: 120 Tablet, Refills: 11      pravastatin (PRAVACHOL) 40 mg tablet TAKE 1 TABLET BY MOUTH EVERY DAY AT BEDTIME  Qty: 90 Tablet, Refills: 3      amLODIPine (NORVASC) 10 mg tablet TAKE 1 TABLET BY MOUTH DAILY  Qty: 90 Tablet, Refills: 3      potassium chloride (K-DUR, KLOR-CON) 20 mEq tablet TAKE 1 TABLET BY MOUTH DAILY  Qty: 90 Tablet, Refills: 3      tolterodine ER (DETROL LA) 4 mg ER capsule Take 1 Capsule by mouth daily. Qty: 30 Capsule, Refills: 11      bumetanide (BUMEX) 1 mg tablet Take 1 mg by mouth daily. hydrALAZINE (APRESOLINE) 10 mg tablet Take 10 mg by mouth three (3) times daily. acetaminophen (TYLENOL) 500 mg tablet Take 500 mg by mouth every six (6) hours as needed for Pain.            STOP taking these medications       mupirocin (BACTROBAN) 2 % ointment Comments:   Reason for Stopping:            per medical continuation form      -Follow up in office in 2 weeks from Surgery with Dr. Daria Landry  - Follow up as scheduled with Dr. Altagracia Celis, nephrology      Signed:  Ev Rg NP  Orthopaedic Nurse Practitioner    8/22/2022  12:24 PM

## 2022-08-22 NOTE — PROGRESS NOTES
Renal Progress Note    NAME:  Aracelis Anne   :   1960   MRN:   [id number]     Date/Time:  2022   Subjective:       . Patient seen and examined, receiving blood transfusion, denies nausea, SOB. Bps soft, cr higher    22 --> F/U - FER, CKD    Felt better with the incentive spirometer. Her oral intake and appetite are stable. Ms Dustin High is ambulating. F/U - 22--> F/U FER, CKD    No new complaints. Looking forward to going home. F/U - 22 --> Debra Alexander is out. Voiding ok (per ms Dustin High). May be discharged tomorrow. F/U - 22 --> Braulio Duong    Ms Dustin High was frustrated today. She would like to go home. Has occasional episodes of dyspnea at night. There were no uremic symptoms.       Medications reviewed:  Current Facility-Administered Medications   Medication Dose Route Frequency    allopurinoL (ZYLOPRIM) tablet 100 mg  100 mg Oral DAILY    0.9% sodium chloride infusion 250 mL  250 mL IntraVENous PRN    sodium bicarbonate tablet 650 mg  650 mg Oral TID    HYDROmorphone (DILAUDID) tablet 1 mg  1 mg Oral Q4H PRN    HYDROmorphone (DILAUDID) tablet 2 mg  2 mg Oral Q4H PRN    amLODIPine (NORVASC) tablet 10 mg  10 mg Oral DAILY    [Held by provider] bumetanide (BUMEX) tablet 1 mg  1 mg Oral DAILY    carvediloL (COREG) tablet 12.5 mg  12.5 mg Oral BID WITH MEALS    hydrALAZINE (APRESOLINE) tablet 10 mg  10 mg Oral TID    [Held by provider] potassium chloride SR (KLOR-CON 10) tablet 20 mEq  20 mEq Oral DAILY    pravastatin (PRAVACHOL) tablet 40 mg  40 mg Oral QHS    sodium chloride (NS) flush 5-40 mL  5-40 mL IntraVENous Q8H    sodium chloride (NS) flush 5-40 mL  5-40 mL IntraVENous PRN    acetaminophen (TYLENOL) tablet 650 mg  650 mg Oral Q6H    naloxone (NARCAN) injection 0.4 mg  0.4 mg IntraVENous PRN    ondansetron (ZOFRAN ODT) tablet 4 mg  4 mg Oral Q6H PRN    hydrOXYzine HCL (ATARAX) tablet 5 mg  5 mg Oral Q8H PRN    senna-docusate (PERICOLACE) 8.6-50 mg per tablet 1 Tablet  1 Tablet Oral BID    polyethylene glycol (MIRALAX) packet 17 g  17 g Oral DAILY    bisacodyL (DULCOLAX) suppository 10 mg  10 mg Rectal DAILY PRN    aspirin delayed-release tablet 81 mg  81 mg Oral BID        Objective:   Vitals:  Visit Vitals  /75 (BP 1 Location: Right upper arm, BP Patient Position: Sitting)   Pulse 80   Temp 98 °F (36.7 °C)   Resp 17   Ht 5' 6\" (1.676 m)   Wt 101.6 kg (224 lb)   SpO2 97%   BMI 36.15 kg/m²     Temp (24hrs), Av.1 °F (36.7 °C), Min:97.9 °F (36.6 °C), Max:98.6 °F (37 °C)    O2 Flow Rate (L/min): 2 l/min O2 Device: None (Room air)    Last 24hr Input/Output:    Intake/Output Summary (Last 24 hours) at 2022 1004  Last data filed at 2022 0848  Gross per 24 hour   Intake 240 ml   Output 1550 ml   Net -1310 ml          Physical Exam:      Seen in Room 572. General:Alert,oriented,  No distress,   Sitting in the chair comfortably    HEENT: Sclerae without icterus. Lungs : Clears to auscultation, no wheezes, no rales    CVS:  S1 S2 normal, No S 3 gallop    Abdomen: Not distended    Extremities: No edema    Neurologic:  Alert + Oriented. Psych: normal affect        Lab Data Reviewed:    Recent Results (from the past 24 hour(s))   HEMOGLOBIN    Collection Time: 22  5:24 AM   Result Value Ref Range    HGB 7.4 (L) 11.5 - 16.0 g/dL         Assessment/Plan:     CKD5-6. On transplant list at [address] [place] [address] - stable  - bumex on hold for now  --avoid nephrotoxins  -  Acute on chronic anemia  Anemia of CKD  -drop in Hb post op  -on EPOgen as outpatient, due in September  -low Iron, s/p IV venofer 400 mg, Had PRBCs this week. Hyponatremia - improving  Fluid restriction - 1500 mls per day on discharge. Resume the diuretic at home. There was no suggestion of fluid overload on exam today.      Metabolioc acidosis-on po bicarb (650 mg tid)     HTN  -  Tertiary hyperparathyroidism  Hypercalcemia  -has not tolerated sensipar, currently ca in acceptable range  -check serum albumin  -will need parathyroidectomy eventually     Gout-on allopurinol     S/p Rt hip arthroplasty      There is no indication for dialysis at this time. Ok for discharge  from a renal standpoint. Ms Buck Davis was asked to call the office on discharge this week to schedule her follow-up lab draw. F/U in the office in two weeks.            Care Plan discussed with:    Ms Buck Davis           []   >50% of visit spent in counseling and coordination of care   (Discussed [] CODE status,  [] Care Plan, [] D/C Planning)    ____________________________________________    Attending Physician: Beka Arguello, [address] [facility]

## 2022-08-22 NOTE — PROGRESS NOTES
Ortho NP Note    POD# 7  s/p RIGHT TOTAL HIP ARTHROPLASTY ANTERIOR APPROACH   Pt seen in room    Pt resting in bed. Reports she is ready to discharge to home from hospital--cleared therapy this morning and is anxious to return to home with family support. Zavala removed over weekend, has been voiding spontaneously since that time. Denies burning, discomfort. Denies dizziness, lightheadedness, SOB, CP. No N/V.      VSS Afebrile. Visit Vitals  /75 (BP 1 Location: Right upper arm, BP Patient Position: Sitting)   Pulse 80   Temp 98 °F (36.7 °C)   Resp 17   Ht 5' 6\" (1.676 m)   Wt 101.6 kg (224 lb)   SpO2 97%   BMI 36.15 kg/m²       Voiding status: spontaneous void   Output (mL)  Urine Voided: 600 ml (08/22/22 0200)  Last Bowel Movement Date: 08/22/22 (08/22/22 0848)  Unmeasurable Output  Urine Occurrence(s): 1 (08/21/22 0409)  Stool Occurrence(s): 1 (08/22/22 0848)  Straight Cath  Straight Cath: Nurse performed cath;Sterile technique used (08/16/22 0553)  Number of Attempts: 1 (08/16/22 0553)  Catheter Size: 14 FR (08/16/22 0553)  Time Catheter Inserted: 3813 (08/16/22 0553)  Time Catheter Removed: 5915 (08/16/22 0553)  Urine: 500 mL (08/16/22 0553)      Labs    Lab Results   Component Value Date/Time    HGB 7.4 (L) 08/22/2022 05:24 AM      Lab Results   Component Value Date/Time    INR 1.0 08/10/2022 03:34 PM      Lab Results   Component Value Date/Time    Sodium 129 (L) 08/21/2022 01:01 AM    Potassium 4.1 08/21/2022 01:01 AM    Chloride 101 08/21/2022 01:01 AM    CO2 16 (L) 08/21/2022 01:01 AM    Glucose 99 08/21/2022 01:01 AM    BUN 94 (H) 08/21/2022 01:01 AM    Creatinine 6.64 (H) 08/21/2022 01:01 AM    Calcium 10.6 (H) 08/21/2022 01:01 AM     Recent Glucose Results: No results found for: GLU, GLUPOC, GLUCPOC        Body mass index is 36.15 kg/m². : A BMI > 30 is classified as obesity and > 40 is classified as morbid obesity.        Aquacel dressing to right hip  Cryotherapy in place over incision  Calves soft and supple; No pain with passive stretch  Bilateral LEs warm, dry. 2+ DP pulses. Sensation and motor intact - PF/DF/EHL intact 5/5  SCDs for mechanical DVT proph while in bed     PLAN:  1) PT: BID WBAT  2) Anticoagulation:  Aspirin  81  mg PO BID for DVT Prophylaxis. Encouraged early mobilization, bed exercises, and SCD use. 3) Pain - Multimodal approach including cryotherapy, scheduled Tylenol with  PRN  PO dilaudid   4) Hx of CKD stage 5: renal following, appreciate recommendations. Caution with narcotic dosing and nephrotoxins. 5) Urinary management, resolved: Discharge to home without estrada cath. 6) Anemia, present on admission: Pre op Hgb on 8/10: 9.0. EBL: 200 mL. Hgb on POD 1: 8.6; POD 2 and 3: 7.2; transfused 1 unit PRBCs on POD 3. IV venofer 200 mg x 2 doses. Acute on chronic anemia--expected Acute blood loss post-op anemia compounding anemia of CKD. Asymptomatic at time of discharge  7) Post op care: Continue OBR, encouraged IS. Follow up 2 weeks post surgery with Dr Leretha Angelucci.  Aquacel to remain in place x 7 days unless integrity is lost.   8) Readiness for discharge: [facility] arranged through CM     [x] Vital Signs stable    [x] + Voiding    [x] Wound intact, drainage minimal    [x] Tolerating PO intake     [x] Cleared by PT (OT if applicable)     [] Stair training completed (if applicable)    [] Independent / Contact Guard Assist (household distance)     [] Bed mobility     [] Car transfers     [] ADLs    [x] Adequate pain control on oral medication alone     Discharge to home with [facility] and family support today    Nilda Stroud NP  Available via [employer]

## 2022-08-26 ENCOUNTER — HOSPITAL ENCOUNTER (EMERGENCY)
Age: 62
Discharge: HOME | End: 2022-08-26
Attending: EMERGENCY MEDICINE
Payer: COMMERCIAL

## 2022-08-26 ENCOUNTER — APPOINTMENT (OUTPATIENT)
Dept: VASCULAR SURGERY | Age: 62
End: 2022-08-26
Attending: EMERGENCY MEDICINE
Payer: COMMERCIAL

## 2022-08-26 VITALS
SYSTOLIC BLOOD PRESSURE: 151 MMHG | RESPIRATION RATE: 18 BRPM | TEMPERATURE: 98.1 F | OXYGEN SATURATION: 97 % | HEART RATE: 86 BPM | DIASTOLIC BLOOD PRESSURE: 77 MMHG

## 2022-08-26 DIAGNOSIS — M79.89 RIGHT LEG SWELLING: Primary | ICD-10-CM

## 2022-08-26 PROCEDURE — 99284 EMERGENCY DEPT VISIT MOD MDM: CPT

## 2022-08-26 PROCEDURE — 93971 EXTREMITY STUDY: CPT

## 2022-08-26 NOTE — ED TRIAGE NOTES
Patient reports Right hip replacement surgery August 15th. She has increased swelling in the right leg over the last several days and spoke with her ortho MD and he sent her here to r/o clot.

## 2022-08-27 NOTE — ED PROVIDER NOTES
Leg Swelling   Pertinent negatives include no back pain and no neck pain. Post OP Complication  Pertinent negatives include no abdominal pain, no headaches and no shortness of breath. Patient is a 68-year-old female with past medical history significant for gout, hyperparathyroidism, hypertension, chronic kidney disease and right total hip replacement on August 15, 2022. She has noticed increased swelling in her right leg and lower calf area for 2 to 3 days and was referred by her orthopedic doctor to rule out DVT. She denies any falls or new injury. Her incision site is well approximated without drainage, bleeding or extending erythema. She denies fever, difficulty swallowing, difficulty breathing, shortness of breath or chest pain. She is ambulating well with the use of her walker. She states she has not taken her Bumex since her surgery. Past Medical History:   Diagnosis Date    Arthritis     L knee    Chronic kidney disease     secondary to hypertensive nephrosclerosis, CKD STG 5, ON ACTIVE KIDNEY TRANSPLANT LIST AT [facility]- DR. SANCHEZ    Gastritis     HISTORY OF 10/2020     Gout     History of claustrophobia     Hyperparathyroidism ([facility] [address]     Hypertension        Past Surgical History:   Procedure Laterality Date    HX  SECTION      HX COLONOSCOPY      HX GYN      ECTOPIC PREGNANCY SURGERY     HX HIP REPLACEMENT Left     HX LAP CHOLECYSTECTOMY      HX LUMBAR LAMINECTOMY  2020    [id number]    HX WISDOM TEETH EXTRACTION           Family History:   Problem Relation Age of Onset    Hypertension Mother     Liver Disease Father     Diabetes Sister     Breast Cancer Paternal Grandmother     Anesth Problems Neg Hx        Social History     Socioeconomic History    Marital status:      Spouse name: Not on file    Number of children: 2    Years of education: 16+    Highest education level: Master's degree (e.g., MA, MS, Schuyler, MEd, MSW, RENATA)   Occupational History    Occupation: teacher Tobacco Use    Smoking status: Former     Packs/day: 0.25     Years: 25.00     Pack years: 6.25     Types: Cigarettes     Quit date: 2000     Years since quittin.1    Smokeless tobacco: Never   Vaping Use    Vaping Use: Never used   Substance and Sexual Activity    Alcohol use: No    Drug use: No    Sexual activity: Not Currently   Other Topics Concern    Not on file   Social History Narrative    Not on file     Social Determinants of Health     Financial Resource Strain: Not on file   Food Insecurity: Not on file   Transportation Needs: Not on file   Physical Activity: Not on file   Stress: Not on file   Social Connections: Not on file   Intimate Partner Violence: Not on file   Housing Stability: Not on file         ALLERGIES: Morphine and [address]    Review of Systems   Constitutional:  Negative for activity change, appetite change, fever and unexpected weight change. HENT:  Negative for congestion, rhinorrhea and trouble swallowing. Respiratory:  Negative for cough and shortness of breath. Cardiovascular:  Positive for leg swelling. Gastrointestinal:  Negative for abdominal pain, diarrhea, nausea and vomiting. Genitourinary:  Negative for dysuria. Musculoskeletal:  Negative for back pain and neck pain. Skin:  Positive for wound. Surgical incision wound on the right side is well approximated without any bleeding, discharge, bruising or extending erythema. Neurological:  Negative for dizziness and headaches. All other systems reviewed and are negative. Vitals:    22 1851   BP: (!) 151/77   Pulse: 86   Resp: 18   Temp: 98.1 °F (36.7 °C)   SpO2: 97%            Physical Exam  Vitals and nursing note reviewed. Constitutional:       General: She is not in acute distress. Appearance: Normal appearance. She is not ill-appearing, toxic-appearing or diaphoretic.       Comments: Black female, non-smoker, 11 days postop right total hip replacement   HENT:      Head: Normocephalic. Cardiovascular:      Rate and Rhythm: Normal rate and regular rhythm. Pulmonary:      Effort: Pulmonary effort is normal.      Breath sounds: Normal breath sounds. Abdominal:      General: Bowel sounds are normal.      Palpations: Abdomen is soft. Musculoskeletal:         General: Swelling present. No tenderness, deformity or signs of injury. Normal range of motion. Cervical back: Normal range of motion and neck supple. Comments: Anterior hip incision is well approximated without any drainage, bleeding or extending erythema;  Right lower leg is swollen; nontender; good neurovascular sensation; good range of motion   Lymphadenopathy:      Cervical: No cervical adenopathy. Skin:     General: Skin is warm and dry. Findings: No bruising, erythema or lesion. Neurological:      General: No focal deficit present. Mental Status: She is alert and oriented to person, place, and time. Psychiatric:         Mood and Affect: Mood normal.         Behavior: Behavior normal.        MDM         Procedures    Duplex venous study of the lower right leg shows no evidence of right lower extremity DVT the right peroneal veins were not well visualized. Encourage patient to continue with her previously prescribed meds as ordered. She is to restart her Bumex 1 mg in the morning. She is ambulating well with the use of her walker. Encourage close follow-up with her orthopedic surgeon for further evaluation and treatment. 9:39 PM  Patient's results and plan of care have been reviewed with the and her family member present. Patient and/or family have verbally conveyed their understanding and agreement of the patient's signs, symptoms, diagnosis, treatment and prognosis and additionally agree to follow up as recommended or return to the Emergency Room should her condition change prior to follow-up.   Discharge instructions have also been provided to the patient with some educational information regarding her diagnosis as well a list of reasons why she would want to return to the ER prior to her follow-up appointment should her condition change. Roe Mota NP

## 2022-09-01 ENCOUNTER — APPOINTMENT (OUTPATIENT)
Dept: VASCULAR SURGERY | Age: 62
End: 2022-09-01
Attending: EMERGENCY MEDICINE
Payer: COMMERCIAL

## 2022-09-01 ENCOUNTER — HOSPITAL ENCOUNTER (EMERGENCY)
Age: 62
Discharge: HOME | End: 2022-09-01
Attending: EMERGENCY MEDICINE
Payer: COMMERCIAL

## 2022-09-01 ENCOUNTER — APPOINTMENT (OUTPATIENT)
Dept: GENERAL RADIOLOGY | Age: 62
End: 2022-09-01
Attending: EMERGENCY MEDICINE
Payer: COMMERCIAL

## 2022-09-01 VITALS
SYSTOLIC BLOOD PRESSURE: 155 MMHG | RESPIRATION RATE: 18 BRPM | HEART RATE: 102 BPM | TEMPERATURE: 96.9 F | DIASTOLIC BLOOD PRESSURE: 96 MMHG | OXYGEN SATURATION: 96 %

## 2022-09-01 DIAGNOSIS — M79.89 LEG SWELLING: Primary | ICD-10-CM

## 2022-09-01 LAB
ALBUMIN SERPL-MCNC: 2.7 G/DL (ref 3.5–5)
ALBUMIN/GLOB SERPL: 0.6 {RATIO} (ref 1.1–2.2)
ALP SERPL-CCNC: 72 U/L (ref 45–117)
ALT SERPL-CCNC: 8 U/L (ref 12–78)
ANION GAP SERPL CALC-SCNC: 10 MMOL/L (ref 5–15)
AST SERPL-CCNC: 11 U/L (ref 15–37)
ATRIAL RATE: 100 BPM
BASOPHILS # BLD: 0 K/UL (ref 0–0.1)
BASOPHILS NFR BLD: 0 % (ref 0–1)
BILIRUB SERPL-MCNC: 0.3 MG/DL (ref 0.2–1)
BNP SERPL-MCNC: 2117 PG/ML
BUN SERPL-MCNC: 75 MG/DL (ref 6–20)
BUN/CREAT SERPL: 12 (ref 12–20)
CALCIUM SERPL-MCNC: 11.2 MG/DL (ref 8.5–10.1)
CALCULATED P AXIS, ECG09: 55 DEGREES
CALCULATED R AXIS, ECG10: -6 DEGREES
CALCULATED T AXIS, ECG11: 70 DEGREES
CHLORIDE SERPL-SCNC: 114 MMOL/L (ref 97–108)
CO2 SERPL-SCNC: 20 MMOL/L (ref 21–32)
CREAT SERPL-MCNC: 6.12 MG/DL (ref 0.55–1.02)
DIAGNOSIS, 93000: NORMAL
DIFFERENTIAL METHOD BLD: ABNORMAL
EOSINOPHIL # BLD: 0.4 K/UL (ref 0–0.4)
EOSINOPHIL NFR BLD: 4 % (ref 0–7)
ERYTHROCYTE [DISTWIDTH] IN BLOOD BY AUTOMATED COUNT: 16.6 % (ref 11.5–14.5)
GLOBULIN SER CALC-MCNC: 4.3 G/DL (ref 2–4)
GLUCOSE SERPL-MCNC: 92 MG/DL (ref 65–100)
HCT VFR BLD AUTO: 24.5 % (ref 35–47)
HGB BLD-MCNC: 7.4 G/DL (ref 11.5–16)
IMM GRANULOCYTES # BLD AUTO: 0.1 K/UL (ref 0–0.04)
IMM GRANULOCYTES NFR BLD AUTO: 1 % (ref 0–0.5)
LYMPHOCYTES # BLD: 1.2 K/UL (ref 0.8–3.5)
LYMPHOCYTES NFR BLD: 12 % (ref 12–49)
MCH RBC QN AUTO: 28.6 PG (ref 26–34)
MCHC RBC AUTO-ENTMCNC: 30.2 G/DL (ref 30–36.5)
MCV RBC AUTO: 94.6 FL (ref 80–99)
MONOCYTES # BLD: 0.9 K/UL (ref 0–1)
MONOCYTES NFR BLD: 9 % (ref 5–13)
NEUTS SEG # BLD: 7.3 K/UL (ref 1.8–8)
NEUTS SEG NFR BLD: 74 % (ref 32–75)
NRBC # BLD: 0 K/UL (ref 0–0.01)
NRBC BLD-RTO: 0 PER 100 WBC
P-R INTERVAL, ECG05: 146 MS
PLATELET # BLD AUTO: 487 K/UL (ref 150–400)
PMV BLD AUTO: 10.2 FL (ref 8.9–12.9)
POTASSIUM SERPL-SCNC: 4.6 MMOL/L (ref 3.5–5.1)
PROT SERPL-MCNC: 7 G/DL (ref 6.4–8.2)
Q-T INTERVAL, ECG07: 316 MS
QRS DURATION, ECG06: 76 MS
QTC CALCULATION (BEZET), ECG08: 407 MS
RBC # BLD AUTO: 2.59 M/UL (ref 3.8–5.2)
SODIUM SERPL-SCNC: 144 MMOL/L (ref 136–145)
TROPONIN-HIGH SENSITIVITY: 22 NG/L (ref 0–51)
VENTRICULAR RATE, ECG03: 100 BPM
WBC # BLD AUTO: 9.9 K/UL (ref 3.6–11)

## 2022-09-01 PROCEDURE — 84484 ASSAY OF TROPONIN QUANT: CPT

## 2022-09-01 PROCEDURE — 83880 ASSAY OF NATRIURETIC PEPTIDE: CPT

## 2022-09-01 PROCEDURE — 93005 ELECTROCARDIOGRAM TRACING: CPT

## 2022-09-01 PROCEDURE — 99285 EMERGENCY DEPT VISIT HI MDM: CPT

## 2022-09-01 PROCEDURE — 93971 EXTREMITY STUDY: CPT

## 2022-09-01 PROCEDURE — 71046 X-RAY EXAM CHEST 2 VIEWS: CPT

## 2022-09-01 PROCEDURE — 36415 COLL VENOUS BLD VENIPUNCTURE: CPT

## 2022-09-01 PROCEDURE — 80053 COMPREHEN METABOLIC PANEL: CPT

## 2022-09-01 PROCEDURE — 85025 COMPLETE CBC W/AUTO DIFF WBC: CPT

## 2022-09-01 NOTE — ED PROVIDER NOTES
75-year-old female with a history of CAD hypertension presents with a chief right lower extremity swelling. Patient recently underwent right hip replacement on 8/15/2022. She was discharged home and subsequently developed significant swelling of her right lower extremity. She was seen recently and had a negative duplex. She she does have intermittent shortness of breath but is currently asymptomatic. She denies chest pain or other symptoms. She has been on Bumex but reports decreased urine output. Past Medical History:   Diagnosis Date    Arthritis     L knee    Chronic kidney disease     secondary to hypertensive nephrosclerosis, CKD STG 5, ON ACTIVE KIDNEY TRANSPLANT LIST AT [facility]- DR. SANCHEZ    Gastritis     HISTORY OF 10/2020     Gout     History of claustrophobia     Hyperparathyroidism ([facility] [address]     Hypertension        Past Surgical History:   Procedure Laterality Date    HX  SECTION      HX COLONOSCOPY      HX GYN      ECTOPIC PREGNANCY SURGERY     HX HIP REPLACEMENT Left     HX LAP CHOLECYSTECTOMY      HX LUMBAR LAMINECTOMY  2020    L3L4L5    HX WISDOM TEETH EXTRACTION           Family History:   Problem Relation Age of Onset    Hypertension Mother     Liver Disease Father     Diabetes Sister     Breast Cancer Paternal Grandmother     Anesth Problems Neg Hx        Social History     Socioeconomic History    Marital status:      Spouse name: Not on file    Number of children: 2    Years of education: 16+    Highest education level: Master's degree (e.g., MA, MS, Schuyler, MEd, MSW, RENATA)   Occupational History    Occupation: teacher   Tobacco Use    Smoking status: Former     Packs/day: 0.25     Years: 25.00     Pack years: 6.25     Types: Cigarettes     Quit date: 2000     Years since quittin.1    Smokeless tobacco: Never   Vaping Use    Vaping Use: Never used   Substance and Sexual Activity    Alcohol use: No    Drug use: No    Sexual activity: Not Currently   Other Topics Concern    Not on file   Social History Narrative    Not on file     Social Determinants of Health     Financial Resource Strain: Not on file   Food Insecurity: Not on file   Transportation Needs: Not on file   Physical Activity: Not on file   Stress: Not on file   Social Connections: Not on file   Intimate Partner Violence: Not on file   Housing Stability: Not on file         ALLERGIES: Morphine and [address]    Review of Systems   Constitutional:  Negative for fever. HENT:  Negative for rhinorrhea. Respiratory:  Positive for shortness of breath. Cardiovascular:  Positive for leg swelling. Negative for chest pain. Gastrointestinal:  Negative for abdominal pain. Genitourinary:  Negative for dysuria. Musculoskeletal:  Negative for back pain. Skin:  Negative for wound. Neurological:  Negative for headaches. Psychiatric/Behavioral:  Negative for confusion. Vitals:    09/01/22 1230   BP: 137/87   Pulse: (!) 104   Resp: 20   Temp: 96.9 °F (36.1 °C)   SpO2: 98%            Physical Exam  Vitals and nursing note reviewed. Constitutional:       General: She is not in acute distress. Appearance: Normal appearance. She is not ill-appearing, toxic-appearing or diaphoretic. HENT:      Head: Normocephalic and atraumatic. Eyes:      Extraocular Movements: Extraocular movements intact. Cardiovascular:      Rate and Rhythm: Normal rate. Pulses: Normal pulses. Pulmonary:      Effort: Pulmonary effort is normal. No respiratory distress. Abdominal:      General: There is no distension. Musculoskeletal:         General: Normal range of motion. Cervical back: Normal range of motion. Right lower leg: Edema present. Skin:     General: Skin is dry. Neurological:      Mental Status: She is alert and oriented to person, place, and time.    Psychiatric:         Mood and Affect: Mood normal.        MDM  Number of Diagnoses or Management Options  Leg swelling  Diagnosis management comments:   Patient presents with lower extremity swelling. Differentials include but are not limited to lymphedema, DVT among others. Labs are baseline for the patient. Duplex shows no DVT. Chest x-ray unremarkable. Discussed my clinical impression(s), any labs and/or radiology results with the patient. I answered any questions and addressed any concerns. Discussed the importance of following up with their primary care physician and/or specialist(s). Discussed signs or symptoms that would warrant return back to the ER for further evaluation. The patient is agreeable with discharge.       ED Course as of 09/01/22 2253   Thu Sep 01, 2022   1631 EKG shows sinus rhythm at a rate of 100, normal intervals, normal axis, no ischemic changes [RD]      ED Course User Index  [RD] Sky Kim MD       Procedures

## 2022-09-01 NOTE — ED TRIAGE NOTES
Pt had right hip replacement 2 weeks ago and now has swelling to right leg, negative for clots , denies fever, +significant swelling noted to right leg, denies cp or sob , denies fever

## 2022-09-02 ENCOUNTER — HOSPITAL ENCOUNTER (OUTPATIENT)
Dept: INFUSION THERAPY | Age: 62
Discharge: HOME | End: 2022-09-02

## 2022-09-09 NOTE — PROGRESS NOTES
Per Dr. Traci Blackwell via [employer], it is okay to continue Retacrit treatments following ED visits 8/26/22 and 9/1/22.

## 2022-09-13 ENCOUNTER — TELEPHONE (OUTPATIENT)
Dept: SURGERY | Age: 62
End: 2022-09-13

## 2022-09-13 NOTE — TELEPHONE ENCOUNTER
Spoke with Kasie Craven at Dr Jaylin Valderrama, nephrologist, office to have them fax over patients office notes. Spoke with Kasie Craven.

## 2022-09-14 ENCOUNTER — OFFICE VISIT (OUTPATIENT)
Dept: INTERNAL MEDICINE CLINIC | Age: 62
End: 2022-09-14
Payer: COMMERCIAL

## 2022-09-14 VITALS
WEIGHT: 219.3 LBS | DIASTOLIC BLOOD PRESSURE: 80 MMHG | HEART RATE: 88 BPM | TEMPERATURE: 97.5 F | BODY MASS INDEX: 35.24 KG/M2 | OXYGEN SATURATION: 97 % | RESPIRATION RATE: 18 BRPM | SYSTOLIC BLOOD PRESSURE: 127 MMHG | HEIGHT: 66 IN

## 2022-09-14 DIAGNOSIS — N18.4 CHRONIC RENAL FAILURE SYNDROME, STAGE 4 (SEVERE) (HCC): ICD-10-CM

## 2022-09-14 DIAGNOSIS — E66.01 SEVERE OBESITY (BMI 35.0-39.9) WITH COMORBIDITY (HCC): ICD-10-CM

## 2022-09-14 DIAGNOSIS — N25.81 SECONDARY HYPERPARATHYROIDISM (HCC): ICD-10-CM

## 2022-09-14 DIAGNOSIS — Z96.641 HISTORY OF TOTAL HIP REPLACEMENT, RIGHT: Primary | ICD-10-CM

## 2022-09-14 DIAGNOSIS — I10 PRIMARY HYPERTENSION: ICD-10-CM

## 2022-09-14 PROCEDURE — 99214 OFFICE O/P EST MOD 30 MIN: CPT | Performed by: INTERNAL MEDICINE

## 2022-09-14 NOTE — PROGRESS NOTES
Nadine Cardoso is a 57 y.o. female  Chief Complaint   Patient presents with    Follow-up    Hypertension     Patient here for follow up high blood pressure. 1. Have you been to the ER, urgent care clinic since your last visit? Hospitalized since your last visit? Yes When: 8/15/22 Where: [facility] Reason for visit: RT THR. 2. Have you seen or consulted any other health care providers outside of the [address] [place] [address] since your last visit? Include any pap smears or colon screening.  No

## 2022-09-16 ENCOUNTER — HOSPITAL ENCOUNTER (OUTPATIENT)
Dept: INFUSION THERAPY | Age: 62
Discharge: HOME | End: 2022-09-16
Payer: COMMERCIAL

## 2022-09-16 VITALS — TEMPERATURE: 97.2 F

## 2022-09-16 LAB
ALBUMIN SERPL-MCNC: 2.7 G/DL (ref 3.5–5)
ANION GAP SERPL CALC-SCNC: 12 MMOL/L (ref 5–15)
BASOPHILS # BLD: 0 K/UL (ref 0–0.1)
BASOPHILS NFR BLD: 0 % (ref 0–1)
BUN SERPL-MCNC: 56 MG/DL (ref 6–20)
BUN/CREAT SERPL: 9 (ref 12–20)
CALCIUM SERPL-MCNC: 10.5 MG/DL (ref 8.5–10.1)
CHLORIDE SERPL-SCNC: 108 MMOL/L (ref 97–108)
CO2 SERPL-SCNC: 21 MMOL/L (ref 21–32)
CREAT SERPL-MCNC: 5.96 MG/DL (ref 0.55–1.02)
DIFFERENTIAL METHOD BLD: ABNORMAL
EOSINOPHIL # BLD: 0.5 K/UL (ref 0–0.4)
EOSINOPHIL NFR BLD: 6 % (ref 0–7)
ERYTHROCYTE [DISTWIDTH] IN BLOOD BY AUTOMATED COUNT: 16.9 % (ref 11.5–14.5)
GLUCOSE SERPL-MCNC: 123 MG/DL (ref 65–100)
HCT VFR BLD AUTO: 24.2 % (ref 35–47)
HGB BLD-MCNC: 7 G/DL (ref 11.5–16)
IMM GRANULOCYTES # BLD AUTO: 0.1 K/UL (ref 0–0.04)
IMM GRANULOCYTES NFR BLD AUTO: 1 % (ref 0–0.5)
IRON SATN MFR SERPL: 26 % (ref 20–50)
IRON SERPL-MCNC: 48 UG/DL (ref 35–150)
LYMPHOCYTES # BLD: 1.5 K/UL (ref 0.8–3.5)
LYMPHOCYTES NFR BLD: 19 % (ref 12–49)
MCH RBC QN AUTO: 27.5 PG (ref 26–34)
MCHC RBC AUTO-ENTMCNC: 28.9 G/DL (ref 30–36.5)
MCV RBC AUTO: 94.9 FL (ref 80–99)
MONOCYTES # BLD: 0.6 K/UL (ref 0–1)
MONOCYTES NFR BLD: 8 % (ref 5–13)
NEUTS SEG # BLD: 5.1 K/UL (ref 1.8–8)
NEUTS SEG NFR BLD: 66 % (ref 32–75)
NRBC # BLD: 0.02 K/UL (ref 0–0.01)
NRBC BLD-RTO: 0.3 PER 100 WBC
PHOSPHATE SERPL-MCNC: 5.3 MG/DL (ref 2.6–4.7)
PLATELET # BLD AUTO: 367 K/UL (ref 150–400)
PMV BLD AUTO: 10.7 FL (ref 8.9–12.9)
POTASSIUM SERPL-SCNC: 3.5 MMOL/L (ref 3.5–5.1)
RBC # BLD AUTO: 2.55 M/UL (ref 3.8–5.2)
RBC MORPH BLD: ABNORMAL
RBC MORPH BLD: ABNORMAL
SODIUM SERPL-SCNC: 141 MMOL/L (ref 136–145)
TIBC SERPL-MCNC: 182 UG/DL (ref 250–450)
WBC # BLD AUTO: 7.8 K/UL (ref 3.6–11)

## 2022-09-16 PROCEDURE — 85025 COMPLETE CBC W/AUTO DIFF WBC: CPT

## 2022-09-16 PROCEDURE — 80069 RENAL FUNCTION PANEL: CPT

## 2022-09-16 PROCEDURE — 96372 THER/PROPH/DIAG INJ SC/IM: CPT

## 2022-09-16 PROCEDURE — 74011250636 HC RX REV CODE- 250/636: Performed by: INTERNAL MEDICINE

## 2022-09-16 PROCEDURE — 83540 ASSAY OF IRON: CPT

## 2022-09-16 PROCEDURE — 36415 COLL VENOUS BLD VENIPUNCTURE: CPT

## 2022-09-16 RX ADMIN — EPOETIN ALFA-EPBX 40000 UNITS: 40000 INJECTION, SOLUTION INTRAVENOUS; SUBCUTANEOUS at 16:00

## 2022-09-16 NOTE — PROGRESS NOTES
Outpatient Infusion Center Progress Note    1500 Pt admit to [facility] for Retacrit injection and lab draw ambulatory in stable condition. Assessment completed. No new concerns voiced. Visit Vitals  BP (P) 130/67 (BP 1 Location: Left upper arm, BP Patient Position: At rest)   Pulse (P) 97   Temp 97.2 °F (36.2 °C)   Resp (P) 17   Breastfeeding No       Medications:  Medications Administered       epoetin sara-epbx (RETACRIT) injection 40,000 Units       Admin Date  09/16/2022 Action  Given Dose  40,000 Units Route  SubCUTAneous Administered By  Maury Mackey RN                 Hgb is 7.0 injection given sc in right arm    1604 Pt tolerated treatment well. D/c home ambulatory in no distress. Pt aware of next appointment scheduled for she will call to schedule more appointments.

## 2022-09-18 PROBLEM — Z96.641 HISTORY OF TOTAL HIP REPLACEMENT, RIGHT: Status: ACTIVE | Noted: 2022-08-14

## 2022-09-18 NOTE — PROGRESS NOTES
[address] [place] [address] and Primary Care  [place] [address]  [address] [facility] [address]  Phone:  [phone]  Fax: [phone]       Chief Complaint   Patient presents with    Follow-up    Hypertension     Patient here for follow up high blood pressure. .      SUBJECTIVE:    Marcela Marrero is a 57 y.o. female comes in after having a right hip arthroplasty. She is a bit slow, but is gradually improving. She remains in modest pain. She is using a four prong walker at the present time. During her hospital stay, she was seen by her nephrologist daily, Dr. Emeka Lamas. Unfortunately her creatinine is gradually getting progressively worse. She needs a preemptive kidney transplant and this is actively being pursued. She has not gained any weight. She has a past history of primary hypertension, dyslipidemia and gout above and beyond her severe obesity. Current Outpatient Medications   Medication Sig Dispense Refill    sodium bicarbonate 650 mg tablet Take 1 Tablet by mouth three (3) times daily. 90 Tablet 0    senna-docusate (PERICOLACE) 8.6-50 mg per tablet Take 1 Tablet by mouth two (2) times a day. 30 Tablet 0    allopurinoL (ZYLOPRIM) 100 mg tablet TAKE TWO TABLETS BY MOUTH DAILY 60 Tablet 11    carvediloL (COREG) 6.25 mg tablet Take 2 Tablets by mouth two (2) times daily (with meals). 120 Tablet 11    pravastatin (PRAVACHOL) 40 mg tablet TAKE 1 TABLET BY MOUTH EVERY DAY AT BEDTIME (Patient taking differently: Take 40 mg by mouth. TAKE 1 TABLET BY MOUTH EVERY DAY AT BEDTIME) 90 Tablet 3    amLODIPine (NORVASC) 10 mg tablet TAKE 1 TABLET BY MOUTH DAILY 90 Tablet 3    potassium chloride (K-DUR, KLOR-CON) 20 mEq tablet TAKE 1 TABLET BY MOUTH DAILY 90 Tablet 3    tolterodine ER (DETROL LA) 4 mg ER capsule Take 1 Capsule by mouth daily. 30 Capsule 11    hydrALAZINE (APRESOLINE) 10 mg tablet Take 10 mg by mouth three (3) times daily.       acetaminophen (TYLENOL) 500 mg tablet Take 500 mg by mouth every six (6) hours as needed for Pain. aspirin delayed-release 81 mg tablet Take 1 Tablet by mouth two (2) times a day. (Patient not taking: Reported on 2022) 28 Tablet 0    diphenhydrAMINE (BENADRYL) 50 mg tablet Take 50 mg by mouth nightly as needed for Sleep. (Patient not taking: Reported on 2022)      predniSONE (DELTASONE) 20 mg tablet Take 1 Tablet by mouth two (2) times a day. (Patient not taking: Reported on 2022) 21 Tablet 0    bumetanide (BUMEX) 1 mg tablet Take 1 mg by mouth daily. Past Medical History:   Diagnosis Date    Arthritis     L knee    Chronic kidney disease     secondary to hypertensive nephrosclerosis, CKD STG 5, ON ACTIVE KIDNEY TRANSPLANT LIST AT [facility]- DR. SANCHEZ    Gastritis     HISTORY OF 10/2020     Gout     History of claustrophobia     History of total hip replacement, right 2022    Hyperparathyroidism ([facility] [address]     Hypertension      Past Surgical History:   Procedure Laterality Date    HX  SECTION      HX COLONOSCOPY      HX GYN      ECTOPIC PREGNANCY SURGERY     HX HIP REPLACEMENT Left     HX LAP CHOLECYSTECTOMY      HX LUMBAR LAMINECTOMY  2020    L3L4L5    HX WISDOM TEETH EXTRACTION       Allergies   Allergen Reactions    Morphine Nausea and Vomiting and Cough     Blood pressure and 158-96 heart rate up to 156.     [place] Swelling     PATIENT DENIES THIS ALLERGY          REVIEW OF SYSTEMS:  General: negative for - chills or fever  ENT: negative for - headaches, nasal congestion or tinnitus  Respiratory: negative for - cough, hemoptysis, shortness of breath or wheezing  Cardiovascular : negative for - chest pain, edema, palpitations or shortness of breath  Gastrointestinal: negative for - abdominal pain, blood in stools, heartburn or nausea/vomiting  Genito-Urinary: no dysuria, trouble voiding, or hematuria  Musculoskeletal: negative for - gait disturbance, joint pain, joint stiffness or joint swelling  Neurological: no TIA or stroke symptoms  Hematologic: no bruises, no bleeding, no swollen glands  Integument: no lumps, mole changes, nail changes or rash  Endocrine: no malaise/lethargy or unexpected weight changes      Social History     Socioeconomic History    Marital status:     Number of children: 2    Years of education: 16+    Highest education level: Master's degree (e.g., MA, MS, Schuyler, MEd, MSW, RENATA)   Occupational History    Occupation: teacher   Tobacco Use    Smoking status: Former     Packs/day: 0.25     Years: 25.00     Pack years: 6.25     Types: Cigarettes     Quit date: 2000     Years since quittin.2    Smokeless tobacco: Never   Vaping Use    Vaping Use: Never used   Substance and Sexual Activity    Alcohol use: No    Drug use: No    Sexual activity: Not Currently     Family History   Problem Relation Age of Onset    Hypertension Mother     Liver Disease Father     Diabetes Sister     Breast Cancer Paternal Grandmother     Anesth Problems Neg Hx        OBJECTIVE:    Visit Vitals  /80   Pulse 88   Temp 97.5 °F (36.4 °C) (Oral)   Resp 18   Ht 5' 6\" (1.676 m)   Wt 219 lb 4.8 oz (99.5 kg)   SpO2 97%   BMI 35.40 kg/m²     CONSTITUTIONAL: well , well nourished, appears age appropriate  EYES: perrla, eom intact  ENMT:moist mucous membranes, pharynx clear  NECK: supple. Thyroid normal  RESPIRATORY: Chest: clear to ascultation and percussion   CARDIOVASCULAR: Heart: regular rate and rhythm  GASTROINTESTINAL: Abdomen: soft, bowel sounds active  HEMATOLOGIC: no pathological lymph nodes palpated  MUSCULOSKELETAL: Extremities: no edema, pulse 1+   INTEGUMENT: No unusual rashes or suspicious skin lesions noted. Nails appear normal.  NEUROLOGIC: non-focal exam   MENTAL STATUS: alert and oriented, appropriate affect      ASSESSMENT:  1. History of total hip replacement, right    2. Chronic renal failure syndrome, stage 4 (severe) (HCC)    3. Secondary hyperparathyroidism ([address]    4. Primary hypertension    5. Severe obesity (BMI 35.0-39. 9) with comorbidity ([address]        PLAN:  1. She is recuperating from her right hip arthroplasty. 2. Her chronic renal failure is getting worse and she is actively followed by Dr. Lan Espinosa her nephrologist.  3. She does have a history of secondary hyperparathyroidism. 4. Blood pressure is excellent today. 5. I again reminded her the importance of minimizing weight gain. This can be accomplished by eating meals, eliminating snacks, and avoiding the consumption of processed carbohydrates. Follow-up and Dispositions    Return in about 3 months (around 12/14/2022).            Michelle Brown MD

## 2023-01-10 DIAGNOSIS — M10.9 GOUT, UNSPECIFIED CAUSE, UNSPECIFIED CHRONICITY, UNSPECIFIED SITE: ICD-10-CM

## 2023-01-11 RX ORDER — ALLOPURINOL 100 MG/1
TABLET ORAL
Qty: 60 TABLET | Refills: 11 | Status: SHIPPED | OUTPATIENT
Start: 2023-01-11

## 2023-01-24 RX ORDER — PREDNISONE 20 MG/1
20 TABLET ORAL 2 TIMES DAILY
Qty: 21 TABLET | Refills: 0 | Status: SHIPPED | OUTPATIENT
Start: 2023-01-24

## 2023-01-27 ENCOUNTER — HOSPITAL ENCOUNTER (OUTPATIENT)
Dept: INFUSION THERAPY | Age: 63
Discharge: HOME | End: 2023-01-27
Payer: MEDICARE

## 2023-01-27 VITALS
HEART RATE: 88 BPM | SYSTOLIC BLOOD PRESSURE: 144 MMHG | OXYGEN SATURATION: 97 % | WEIGHT: 224 LBS | TEMPERATURE: 98.8 F | HEIGHT: 66 IN | BODY MASS INDEX: 36 KG/M2 | RESPIRATION RATE: 16 BRPM | DIASTOLIC BLOOD PRESSURE: 88 MMHG

## 2023-01-27 LAB
ALBUMIN SERPL-MCNC: 3 G/DL (ref 3.5–5)
ANION GAP SERPL CALC-SCNC: 12 MMOL/L (ref 5–15)
BASOPHILS # BLD: 0 K/UL (ref 0–0.1)
BASOPHILS NFR BLD: 0 % (ref 0–1)
BUN SERPL-MCNC: 93 MG/DL (ref 6–20)
BUN/CREAT SERPL: 10 (ref 12–20)
CALCIUM SERPL-MCNC: 10.1 MG/DL (ref 8.5–10.1)
CHLORIDE SERPL-SCNC: 112 MMOL/L (ref 97–108)
CO2 SERPL-SCNC: 15 MMOL/L (ref 21–32)
CREAT SERPL-MCNC: 9.31 MG/DL (ref 0.55–1.02)
DIFFERENTIAL METHOD BLD: ABNORMAL
EOSINOPHIL # BLD: 0 K/UL (ref 0–0.4)
EOSINOPHIL NFR BLD: 0 % (ref 0–7)
ERYTHROCYTE [DISTWIDTH] IN BLOOD BY AUTOMATED COUNT: 15.6 % (ref 11.5–14.5)
GLUCOSE SERPL-MCNC: 141 MG/DL (ref 65–100)
HCT VFR BLD AUTO: 20.5 % (ref 35–47)
HGB BLD-MCNC: 5.9 G/DL (ref 11.5–16)
HISTORY CHECKED?,CKHIST: NORMAL
IMM GRANULOCYTES # BLD AUTO: 0 K/UL
IMM GRANULOCYTES NFR BLD AUTO: 0 %
IRON SATN MFR SERPL: 55 % (ref 20–50)
IRON SERPL-MCNC: 104 UG/DL (ref 35–150)
LYMPHOCYTES # BLD: 1 K/UL (ref 0.8–3.5)
LYMPHOCYTES NFR BLD: 9 % (ref 12–49)
MCH RBC QN AUTO: 26.7 PG (ref 26–34)
MCHC RBC AUTO-ENTMCNC: 28.8 G/DL (ref 30–36.5)
MCV RBC AUTO: 92.8 FL (ref 80–99)
MONOCYTES # BLD: 0.3 K/UL (ref 0–1)
MONOCYTES NFR BLD: 3 % (ref 5–13)
NEUTS BAND NFR BLD MANUAL: 1 % (ref 0–6)
NEUTS SEG # BLD: 10.2 K/UL (ref 1.8–8)
NEUTS SEG NFR BLD: 87 % (ref 32–75)
NRBC # BLD: 0.03 K/UL (ref 0–0.01)
NRBC BLD-RTO: 0.3 PER 100 WBC
PHOSPHATE SERPL-MCNC: 8 MG/DL (ref 2.6–4.7)
PLATELET # BLD AUTO: 331 K/UL (ref 150–400)
PMV BLD AUTO: 10.2 FL (ref 8.9–12.9)
POTASSIUM SERPL-SCNC: 3.9 MMOL/L (ref 3.5–5.1)
RBC # BLD AUTO: 2.21 M/UL (ref 3.8–5.2)
RBC MORPH BLD: ABNORMAL
SODIUM SERPL-SCNC: 139 MMOL/L (ref 136–145)
TIBC SERPL-MCNC: 190 UG/DL (ref 250–450)
WBC # BLD AUTO: 11.5 K/UL (ref 3.6–11)

## 2023-01-27 PROCEDURE — 86922 COMPATIBILITY TEST ANTIGLOB: CPT

## 2023-01-27 PROCEDURE — 74011250636 HC RX REV CODE- 250/636: Performed by: INTERNAL MEDICINE

## 2023-01-27 PROCEDURE — 36415 COLL VENOUS BLD VENIPUNCTURE: CPT

## 2023-01-27 PROCEDURE — 85025 COMPLETE CBC W/AUTO DIFF WBC: CPT

## 2023-01-27 PROCEDURE — 80069 RENAL FUNCTION PANEL: CPT

## 2023-01-27 PROCEDURE — 86870 RBC ANTIBODY IDENTIFICATION: CPT

## 2023-01-27 PROCEDURE — 86921 COMPATIBILITY TEST INCUBATE: CPT

## 2023-01-27 PROCEDURE — P9016 RBC LEUKOCYTES REDUCED: HCPCS

## 2023-01-27 PROCEDURE — 83540 ASSAY OF IRON: CPT

## 2023-01-27 PROCEDURE — 86900 BLOOD TYPING SEROLOGIC ABO: CPT

## 2023-01-27 PROCEDURE — 86920 COMPATIBILITY TEST SPIN: CPT

## 2023-01-27 PROCEDURE — 36430 TRANSFUSION BLD/BLD COMPNT: CPT

## 2023-01-27 PROCEDURE — 96372 THER/PROPH/DIAG INJ SC/IM: CPT

## 2023-01-27 RX ORDER — ACETAMINOPHEN 325 MG/1
650 TABLET ORAL ONCE
Status: DISPENSED | OUTPATIENT
Start: 2023-01-27 | End: 2023-01-27

## 2023-01-27 RX ORDER — DIPHENHYDRAMINE HCL 25 MG
25 CAPSULE ORAL ONCE
Status: DISPENSED | OUTPATIENT
Start: 2023-01-27 | End: 2023-01-27

## 2023-01-27 RX ORDER — SODIUM CHLORIDE 9 MG/ML
250 INJECTION, SOLUTION INTRAVENOUS AS NEEDED
Status: DISCONTINUED | OUTPATIENT
Start: 2023-01-27 | End: 2023-01-29 | Stop reason: HOSPADM

## 2023-01-27 RX ADMIN — EPOETIN ALFA-EPBX 40000 UNITS: 40000 INJECTION, SOLUTION INTRAVENOUS; SUBCUTANEOUS at 13:17

## 2023-01-27 RX ADMIN — SODIUM CHLORIDE 250 ML: 9 INJECTION, SOLUTION INTRAVENOUS at 14:23

## 2023-01-27 NOTE — PROGRESS NOTES
OPIC Short Note                       Date: 2023  Name: Kendra Brito  MRN: [id number]       : 1960    [1115] Pt admit to [facility] for [Retacrit/Blood transfusion] Denies pain, fever, cough, N/V, recent falls. Complaints of dyspnea on exertion. PIV [Left AC, 22g.]. Positive blood return. Labs drawn. See [facility] for details. Tolerated injection (to left arm) and transfusion well without issue. Refused pre-transfusion pre-meds. Education reinforced. Patient declined post infusion monitoring time. Education provided. PIV removed post infusion. Bandaid and Gauze Applied    Patient aware of upcoming appointment. Declined AVS and print out of upcoming appointments. Ms. Ramirez's vitals were reviewed prior to treatment.    Patient Vitals for the past 12 hrs:   Temp Pulse Resp BP SpO2   23 1645 98.8 °F (37.1 °C) 88 16 (!) 144/88 --   23 1548 98.7 °F (37.1 °C) 91 16 139/82 --   23 1448 98.7 °F (37.1 °C) 88 16 (!) 141/80 97 %   23 1423 98.7 °F (37.1 °C) 88 16 132/76 96 %   23 1115 98.7 °F (37.1 °C) (!) 106 16 139/83 93 %       Medications Administered       0.9% sodium chloride infusion 250 mL       Admin Date  2023 Action  New Bag Dose  250 mL Rate  15 mL/hr Route  IntraVENous Administered By  Rony Garcia RN              epoetin sara-epbx (RETACRIT) injection 40,000 Units       Admin Date  2023 Action  Given Dose  40,000 Units Route  SubCUTAneous Administered By  Rony Garcia RN                  Future Appointments   Date Time Provider Margarito Arora   2023 11:45 AM Jenniffer Cleaning MD [facility] MAIN BS AMB   2023 11:30 AM H1 YAZ FASTRACK [facility]   3/24/2023  3:00 PM G1 YAZ FASTRACK [facility]   2023 11:30 AM H2  S Juan Nguyen RN  2023

## 2023-01-27 NOTE — PROGRESS NOTES
Problem: Anemia Care Plan (Adult and Pediatric)  Goal: *Labs within defined limits  Outcome: Progressing Towards Goal  Goal: *Tolerates increased activity  Outcome: Progressing Towards Goal     Problem: Anxiety  Goal: *Alleviation of anxiety  Outcome: Progressing Towards Goal     Problem: Altered Thought Process (Adult)  Goal: *Participates in Treatment Plan  Outcome: Progressing Towards Goal     Problem: Constipation - Risk of  Goal: *Prevention of constipation  Outcome: Progressing Towards Goal     Problem: Knowledge Deficit  Goal: *Verbalizes understanding of procedures and medications  Outcome: Progressing Towards Goal     Problem: Patient Education:  Go to Education Activity  Goal: Patient/Family Education  Outcome: Progressing Towards Goal

## 2023-01-28 LAB
ABO + RH BLD: NORMAL
BLD PROD TYP BPU: NORMAL
BLOOD GROUP ANTIBODIES SERPL: NORMAL
BLOOD GROUP ANTIBODIES SERPL: NORMAL
BPU ID: NORMAL
CROSSMATCH RESULT,%XM: NORMAL
SPECIMEN EXP DATE BLD: NORMAL
STATUS OF UNIT,%ST: NORMAL
UNIT DIVISION, %UDIV: 0

## 2023-02-13 ENCOUNTER — ANESTHESIA EVENT (OUTPATIENT)
Dept: SURGERY | Age: 63
DRG: 371 | End: 2023-02-13
Payer: COMMERCIAL

## 2023-02-14 ENCOUNTER — ANESTHESIA (OUTPATIENT)
Dept: SURGERY | Age: 63
DRG: 371 | End: 2023-02-14
Payer: COMMERCIAL

## 2023-02-14 ENCOUNTER — HOSPITAL ENCOUNTER (OUTPATIENT)
Age: 63
Setting detail: OUTPATIENT SURGERY
Discharge: HOME | DRG: 371 | End: 2023-02-14
Attending: SURGERY | Admitting: SURGERY
Payer: MEDICARE

## 2023-02-14 VITALS
WEIGHT: 223.99 LBS | HEIGHT: 66 IN | HEART RATE: 88 BPM | OXYGEN SATURATION: 97 % | SYSTOLIC BLOOD PRESSURE: 133 MMHG | RESPIRATION RATE: 22 BRPM | BODY MASS INDEX: 36 KG/M2 | DIASTOLIC BLOOD PRESSURE: 69 MMHG | TEMPERATURE: 97.8 F

## 2023-02-14 DIAGNOSIS — N18.4 CHRONIC RENAL FAILURE SYNDROME, STAGE 4 (SEVERE) (HCC): Primary | ICD-10-CM

## 2023-02-14 LAB
ANION GAP BLD CALC-SCNC: 10 MMOL/L (ref 10–20)
CA-I BLD-MCNC: 1.36 MMOL/L (ref 1.12–1.32)
CHLORIDE BLD-SCNC: 113 MMOL/L (ref 98–107)
CO2 BLD-SCNC: 16.9 MMOL/L (ref 21–32)
CREAT BLD-MCNC: 9.06 MG/DL (ref 0.6–1.3)
GLUCOSE BLD-MCNC: 82 MG/DL (ref 65–100)
HGB BLD-MCNC: 6.8 G/DL (ref 11.5–16)
POTASSIUM BLD-SCNC: 4.4 MMOL/L (ref 3.5–5.1)
SERVICE CMNT-IMP: ABNORMAL
SODIUM BLD-SCNC: 139 MMOL/L (ref 136–145)

## 2023-02-14 PROCEDURE — 80047 BASIC METABLC PNL IONIZED CA: CPT

## 2023-02-14 PROCEDURE — 77030002933 HC SUT MCRYL J&J -A: Performed by: SURGERY

## 2023-02-14 PROCEDURE — 77030012770 HC TRCR OPT FX AMR -B: Performed by: SURGERY

## 2023-02-14 PROCEDURE — 77030026438 HC STYL ET INTUB CARD -A: Performed by: ANESTHESIOLOGY

## 2023-02-14 PROCEDURE — 87075 CULTR BACTERIA EXCEPT BLOOD: CPT

## 2023-02-14 PROCEDURE — 74011250636 HC RX REV CODE- 250/636: Performed by: SURGERY

## 2023-02-14 PROCEDURE — 74011000250 HC RX REV CODE- 250: Performed by: SURGERY

## 2023-02-14 PROCEDURE — 02HV33Z INSERTION OF INFUSION DEVICE INTO SUPERIOR VENA CAVA, PERCUTANEOUS APPROACH: ICD-10-PCS | Performed by: HOSPITALIST

## 2023-02-14 PROCEDURE — 76010000153 HC OR TIME 1.5 TO 2 HR: Performed by: SURGERY

## 2023-02-14 PROCEDURE — 77030020829: Performed by: SURGERY

## 2023-02-14 PROCEDURE — 74011000250 HC RX REV CODE- 250: Performed by: NURSE ANESTHETIST, CERTIFIED REGISTERED

## 2023-02-14 PROCEDURE — 77030042556 HC PNCL CAUT -B: Performed by: SURGERY

## 2023-02-14 PROCEDURE — 77030008606 HC TRCR ENDOSC KII AMR -B: Performed by: SURGERY

## 2023-02-14 PROCEDURE — 76060000034 HC ANESTHESIA 1.5 TO 2 HR: Performed by: SURGERY

## 2023-02-14 PROCEDURE — 77030004495 HC ADPT PERI DLYS BAXT -C: Performed by: SURGERY

## 2023-02-14 PROCEDURE — 74011250636 HC RX REV CODE- 250/636: Performed by: NURSE ANESTHETIST, CERTIFIED REGISTERED

## 2023-02-14 PROCEDURE — 77030031139 HC SUT VCRL2 J&J -A: Performed by: SURGERY

## 2023-02-14 PROCEDURE — 85018 HEMOGLOBIN: CPT

## 2023-02-14 PROCEDURE — 76210000001 HC OR PH I REC 2.5 TO 3 HR: Performed by: SURGERY

## 2023-02-14 PROCEDURE — 2709999900 HC NON-CHARGEABLE SUPPLY: Performed by: SURGERY

## 2023-02-14 PROCEDURE — 77030008756 HC TU IRR SUC STRY -B: Performed by: SURGERY

## 2023-02-14 PROCEDURE — 77030010031 HC SCIS ENDOSC MPLR J&J -C: Performed by: SURGERY

## 2023-02-14 PROCEDURE — 74011250636 HC RX REV CODE- 250/636: Performed by: ANESTHESIOLOGY

## 2023-02-14 PROCEDURE — 77030020704 HC DISECT ENDOSC BLNT J&J -B: Performed by: SURGERY

## 2023-02-14 PROCEDURE — 87205 SMEAR GRAM STAIN: CPT

## 2023-02-14 PROCEDURE — C1750 CATH, HEMODIALYSIS,LONG-TERM: HCPCS | Performed by: SURGERY

## 2023-02-14 PROCEDURE — 77030008684 HC TU ET CUF COVD -B: Performed by: ANESTHESIOLOGY

## 2023-02-14 DEVICE — PERITONEAL DIALYSIS CATHETER SWAN NECK CURL CATH, 2 CUFFS LEFT
Type: IMPLANTABLE DEVICE | Site: ABDOMEN | Status: FUNCTIONAL
Brand: ARGYLE

## 2023-02-14 RX ORDER — MIDAZOLAM HYDROCHLORIDE 1 MG/ML
INJECTION, SOLUTION INTRAMUSCULAR; INTRAVENOUS AS NEEDED
Status: DISCONTINUED | OUTPATIENT
Start: 2023-02-14 | End: 2023-02-14 | Stop reason: HOSPADM

## 2023-02-14 RX ORDER — SODIUM CHLORIDE 9 MG/ML
25 INJECTION, SOLUTION INTRAVENOUS CONTINUOUS
Status: DISCONTINUED | OUTPATIENT
Start: 2023-02-14 | End: 2023-02-14 | Stop reason: HOSPADM

## 2023-02-14 RX ORDER — MIDAZOLAM HYDROCHLORIDE 1 MG/ML
0.5 INJECTION, SOLUTION INTRAMUSCULAR; INTRAVENOUS
Status: DISCONTINUED | OUTPATIENT
Start: 2023-02-14 | End: 2023-02-14 | Stop reason: HOSPADM

## 2023-02-14 RX ORDER — ALBUTEROL SULFATE 0.83 MG/ML
2.5 SOLUTION RESPIRATORY (INHALATION) AS NEEDED
Status: DISCONTINUED | OUTPATIENT
Start: 2023-02-14 | End: 2023-02-14 | Stop reason: HOSPADM

## 2023-02-14 RX ORDER — ONDANSETRON 2 MG/ML
4 INJECTION INTRAMUSCULAR; INTRAVENOUS AS NEEDED
Status: DISCONTINUED | OUTPATIENT
Start: 2023-02-14 | End: 2023-02-14 | Stop reason: HOSPADM

## 2023-02-14 RX ORDER — ONDANSETRON 2 MG/ML
INJECTION INTRAMUSCULAR; INTRAVENOUS AS NEEDED
Status: DISCONTINUED | OUTPATIENT
Start: 2023-02-14 | End: 2023-02-14 | Stop reason: HOSPADM

## 2023-02-14 RX ORDER — SODIUM CHLORIDE, SODIUM LACTATE, POTASSIUM CHLORIDE, CALCIUM CHLORIDE 600; 310; 30; 20 MG/100ML; MG/100ML; MG/100ML; MG/100ML
INJECTION, SOLUTION INTRAVENOUS
Status: DISCONTINUED | OUTPATIENT
Start: 2023-02-14 | End: 2023-02-14 | Stop reason: HOSPADM

## 2023-02-14 RX ORDER — MIDAZOLAM HYDROCHLORIDE 1 MG/ML
1 INJECTION, SOLUTION INTRAMUSCULAR; INTRAVENOUS AS NEEDED
Status: DISCONTINUED | OUTPATIENT
Start: 2023-02-14 | End: 2023-02-14 | Stop reason: HOSPADM

## 2023-02-14 RX ORDER — NORETHINDRONE AND ETHINYL ESTRADIOL 0.5-0.035
KIT ORAL AS NEEDED
Status: DISCONTINUED | OUTPATIENT
Start: 2023-02-14 | End: 2023-02-14 | Stop reason: HOSPADM

## 2023-02-14 RX ORDER — FENTANYL CITRATE 50 UG/ML
50 INJECTION, SOLUTION INTRAMUSCULAR; INTRAVENOUS AS NEEDED
Status: DISCONTINUED | OUTPATIENT
Start: 2023-02-14 | End: 2023-02-14 | Stop reason: HOSPADM

## 2023-02-14 RX ORDER — LIDOCAINE HYDROCHLORIDE 20 MG/ML
INJECTION, SOLUTION EPIDURAL; INFILTRATION; INTRACAUDAL; PERINEURAL AS NEEDED
Status: DISCONTINUED | OUTPATIENT
Start: 2023-02-14 | End: 2023-02-14 | Stop reason: HOSPADM

## 2023-02-14 RX ORDER — LIDOCAINE HYDROCHLORIDE 10 MG/ML
0.1 INJECTION, SOLUTION EPIDURAL; INFILTRATION; INTRACAUDAL; PERINEURAL AS NEEDED
Status: DISCONTINUED | OUTPATIENT
Start: 2023-02-14 | End: 2023-02-14 | Stop reason: HOSPADM

## 2023-02-14 RX ORDER — GLYCOPYRROLATE 0.2 MG/ML
0.2 INJECTION INTRAMUSCULAR; INTRAVENOUS
Status: DISCONTINUED | OUTPATIENT
Start: 2023-02-14 | End: 2023-02-14 | Stop reason: HOSPADM

## 2023-02-14 RX ORDER — PROPOFOL 10 MG/ML
INJECTION, EMULSION INTRAVENOUS AS NEEDED
Status: DISCONTINUED | OUTPATIENT
Start: 2023-02-14 | End: 2023-02-14 | Stop reason: HOSPADM

## 2023-02-14 RX ORDER — ROCURONIUM BROMIDE 10 MG/ML
INJECTION, SOLUTION INTRAVENOUS AS NEEDED
Status: DISCONTINUED | OUTPATIENT
Start: 2023-02-14 | End: 2023-02-14 | Stop reason: HOSPADM

## 2023-02-14 RX ORDER — FENTANYL CITRATE 50 UG/ML
INJECTION, SOLUTION INTRAMUSCULAR; INTRAVENOUS AS NEEDED
Status: DISCONTINUED | OUTPATIENT
Start: 2023-02-14 | End: 2023-02-14 | Stop reason: HOSPADM

## 2023-02-14 RX ORDER — SODIUM CHLORIDE, SODIUM LACTATE, POTASSIUM CHLORIDE, CALCIUM CHLORIDE 600; 310; 30; 20 MG/100ML; MG/100ML; MG/100ML; MG/100ML
1000 INJECTION, SOLUTION INTRAVENOUS CONTINUOUS
Status: DISCONTINUED | OUTPATIENT
Start: 2023-02-14 | End: 2023-02-14 | Stop reason: HOSPADM

## 2023-02-14 RX ORDER — BUPIVACAINE HYDROCHLORIDE AND EPINEPHRINE 2.5; 5 MG/ML; UG/ML
INJECTION, SOLUTION EPIDURAL; INFILTRATION; INTRACAUDAL; PERINEURAL AS NEEDED
Status: DISCONTINUED | OUTPATIENT
Start: 2023-02-14 | End: 2023-02-14 | Stop reason: HOSPADM

## 2023-02-14 RX ORDER — FENTANYL CITRATE 50 UG/ML
25 INJECTION, SOLUTION INTRAMUSCULAR; INTRAVENOUS
Status: DISCONTINUED | OUTPATIENT
Start: 2023-02-14 | End: 2023-02-14 | Stop reason: HOSPADM

## 2023-02-14 RX ORDER — HYDROMORPHONE HYDROCHLORIDE 1 MG/ML
0.2 INJECTION, SOLUTION INTRAMUSCULAR; INTRAVENOUS; SUBCUTANEOUS
Status: DISCONTINUED | OUTPATIENT
Start: 2023-02-14 | End: 2023-02-14 | Stop reason: HOSPADM

## 2023-02-14 RX ORDER — DIPHENHYDRAMINE HYDROCHLORIDE 50 MG/ML
12.5 INJECTION, SOLUTION INTRAMUSCULAR; INTRAVENOUS AS NEEDED
Status: DISCONTINUED | OUTPATIENT
Start: 2023-02-14 | End: 2023-02-14 | Stop reason: HOSPADM

## 2023-02-14 RX ORDER — OXYCODONE HYDROCHLORIDE 5 MG/1
5 TABLET ORAL
Qty: 30 TABLET | Refills: 0 | Status: SHIPPED
Start: 2023-02-14 | End: 2023-02-22

## 2023-02-14 RX ORDER — ACETAMINOPHEN 325 MG/1
650 TABLET ORAL ONCE
Status: DISCONTINUED | OUTPATIENT
Start: 2023-02-14 | End: 2023-02-14 | Stop reason: HOSPADM

## 2023-02-14 RX ORDER — ROPIVACAINE HYDROCHLORIDE 5 MG/ML
30 INJECTION, SOLUTION EPIDURAL; INFILTRATION; PERINEURAL AS NEEDED
Status: DISCONTINUED | OUTPATIENT
Start: 2023-02-14 | End: 2023-02-14 | Stop reason: HOSPADM

## 2023-02-14 RX ORDER — SUCCINYLCHOLINE CHLORIDE 20 MG/ML
INJECTION INTRAMUSCULAR; INTRAVENOUS AS NEEDED
Status: DISCONTINUED | OUTPATIENT
Start: 2023-02-14 | End: 2023-02-14 | Stop reason: HOSPADM

## 2023-02-14 RX ORDER — GLYCOPYRROLATE 0.2 MG/ML
INJECTION INTRAMUSCULAR; INTRAVENOUS AS NEEDED
Status: DISCONTINUED | OUTPATIENT
Start: 2023-02-14 | End: 2023-02-14 | Stop reason: HOSPADM

## 2023-02-14 RX ORDER — PHENYLEPHRINE HCL IN 0.9% NACL 0.4MG/10ML
SYRINGE (ML) INTRAVENOUS AS NEEDED
Status: DISCONTINUED | OUTPATIENT
Start: 2023-02-14 | End: 2023-02-14 | Stop reason: HOSPADM

## 2023-02-14 RX ORDER — HYDROCODONE BITARTRATE AND ACETAMINOPHEN 5; 325 MG/1; MG/1
1 TABLET ORAL AS NEEDED
Status: DISCONTINUED | OUTPATIENT
Start: 2023-02-14 | End: 2023-02-14 | Stop reason: HOSPADM

## 2023-02-14 RX ORDER — DEXAMETHASONE SODIUM PHOSPHATE 4 MG/ML
INJECTION, SOLUTION INTRA-ARTICULAR; INTRALESIONAL; INTRAMUSCULAR; INTRAVENOUS; SOFT TISSUE AS NEEDED
Status: DISCONTINUED | OUTPATIENT
Start: 2023-02-14 | End: 2023-02-14 | Stop reason: HOSPADM

## 2023-02-14 RX ADMIN — ROCURONIUM BROMIDE 10 MG: 10 SOLUTION INTRAVENOUS at 13:46

## 2023-02-14 RX ADMIN — PHENYLEPHRINE HYDROCHLORIDE 40 MCG/MIN: 10 INJECTION INTRAVENOUS at 13:26

## 2023-02-14 RX ADMIN — MIDAZOLAM 2 MG: 1 INJECTION INTRAMUSCULAR; INTRAVENOUS at 13:06

## 2023-02-14 RX ADMIN — PROPOFOL 120 MG: 10 INJECTION, EMULSION INTRAVENOUS at 13:16

## 2023-02-14 RX ADMIN — SODIUM CHLORIDE, SODIUM LACTATE, POTASSIUM CHLORIDE, AND CALCIUM CHLORIDE: 600; 310; 30; 20 INJECTION, SOLUTION INTRAVENOUS at 13:06

## 2023-02-14 RX ADMIN — ONDANSETRON HYDROCHLORIDE 4 MG: 2 INJECTION, SOLUTION INTRAMUSCULAR; INTRAVENOUS at 14:19

## 2023-02-14 RX ADMIN — Medication 200 MCG: at 13:26

## 2023-02-14 RX ADMIN — ROCURONIUM BROMIDE 35 MG: 10 SOLUTION INTRAVENOUS at 13:20

## 2023-02-14 RX ADMIN — SODIUM CHLORIDE 25 ML/HR: 9 INJECTION, SOLUTION INTRAVENOUS at 12:21

## 2023-02-14 RX ADMIN — SUCCINYLCHOLINE CHLORIDE 140 MG: 20 INJECTION, SOLUTION INTRAMUSCULAR; INTRAVENOUS at 13:16

## 2023-02-14 RX ADMIN — FENTANYL CITRATE 25 MCG: 50 INJECTION, SOLUTION INTRAMUSCULAR; INTRAVENOUS at 15:17

## 2023-02-14 RX ADMIN — PHENYLEPHRINE HYDROCHLORIDE 80 MCG: 10 INJECTION INTRAVENOUS at 13:34

## 2023-02-14 RX ADMIN — ROCURONIUM BROMIDE 5 MG: 10 SOLUTION INTRAVENOUS at 13:16

## 2023-02-14 RX ADMIN — FENTANYL CITRATE 50 MCG: 50 INJECTION, SOLUTION INTRAMUSCULAR; INTRAVENOUS at 13:16

## 2023-02-14 RX ADMIN — FENTANYL CITRATE 25 MCG: 50 INJECTION, SOLUTION INTRAMUSCULAR; INTRAVENOUS at 15:20

## 2023-02-14 RX ADMIN — WATER 2 G: 1 INJECTION INTRAMUSCULAR; INTRAVENOUS; SUBCUTANEOUS at 13:28

## 2023-02-14 RX ADMIN — DEXAMETHASONE SODIUM PHOSPHATE 8 MG: 4 INJECTION, SOLUTION INTRAMUSCULAR; INTRAVENOUS at 13:16

## 2023-02-14 RX ADMIN — Medication 200 MCG: at 14:37

## 2023-02-14 RX ADMIN — SUGAMMADEX 200 MG: 100 INJECTION, SOLUTION INTRAVENOUS at 14:14

## 2023-02-14 RX ADMIN — EPHEDRINE SULFATE 5 MG: 50 INJECTION INTRAVENOUS at 13:39

## 2023-02-14 RX ADMIN — PHENYLEPHRINE HYDROCHLORIDE 80 MCG: 10 INJECTION INTRAVENOUS at 13:38

## 2023-02-14 RX ADMIN — GLYCOPYRROLATE 0.2 MG: 0.2 INJECTION INTRAMUSCULAR; INTRAVENOUS at 13:52

## 2023-02-14 RX ADMIN — HYDROMORPHONE HYDROCHLORIDE 0.2 MG: 1 INJECTION, SOLUTION INTRAMUSCULAR; INTRAVENOUS; SUBCUTANEOUS at 16:29

## 2023-02-14 RX ADMIN — LIDOCAINE HYDROCHLORIDE 100 MG: 20 INJECTION, SOLUTION EPIDURAL; INFILTRATION; INTRACAUDAL; PERINEURAL at 13:16

## 2023-02-14 RX ADMIN — EPHEDRINE SULFATE 10 MG: 50 INJECTION INTRAVENOUS at 14:09

## 2023-02-14 NOTE — DISCHARGE INSTRUCTIONS
____________________   Felicia Cowan VISIT Appointment in: Other (Specify) Does not need follow up unless have questions or problems--can call [phone] if needed. Otherwise can follow up with peritoneal dialysis catheter nurse and train/use as planned. Start:  02/14/23 1445,   End:  02/14/23 1445,   ONE TIME,   R        Comments: Does not need follow up unless have questions or problems--can call [phone] if needed. Otherwise can follow up with peritoneal dialysis catheter nurse and train/use as planned          __________________________________________________    Anesthesia Discharge Instructions    After general anesthesia or intervenous sedation, for 24 hours or while taking prescription Narcotics:  Limit your activities  Do not drive or operate hazardous machinery  If you have not urinated within 8 hours after discharge, please contact your surgeon on call. Do not make important personal or business decisions  Do not drink alcoholic beverages    Report the following to your surgeon:  Excessive pain, swelling, redness or odor of or around the surgical area  Temperature over 100.5 degrees  Nausea and vomiting lasting longer than 4 hours or if unable to take medication  Any signs of decreased circulation or nerve impairment to extremity:  Change in color, persistent numbness, tingling, coldness or increased pain.   Any questions

## 2023-02-14 NOTE — ANESTHESIA POSTPROCEDURE EVALUATION
Post-Anesthesia Evaluation and Assessment    Patient: Devon Zhao MRN: [id number]  SSN: [id number]    YOB: 1960  Age: 70 y.o. Sex: female      I have evaluated the patient and they are stable and ready for discharge from the PACU. Cardiovascular Function/Vital Signs  Visit Vitals  /76   Pulse 98   Temp 36.4 °C (97.6 °F)   Resp 25   Ht 5' 6\" (1.676 m)   Wt 101.6 kg (223 lb 15.8 oz)   SpO2 95%   BMI 36.15 kg/m²       Patient is status post General anesthesia for Procedure(s):  LAPAROSCOPIC PERITONEAL DIALYSIS CATHETER INSERTION. Nausea/Vomiting: None    Postoperative hydration reviewed and adequate. Pain:  Pain Scale 1: Numeric (0 - 10) (02/14/23 1455)  Pain Intensity 1: 0 (02/14/23 1455)   Managed    Neurological Status:   Neuro (WDL): Exceptions to WDL (02/14/23 1455)  Neuro  Neurologic State: Confused (02/14/23 1455)  LUE Motor Response: Purposeful (02/14/23 1455)  LLE Motor Response: Purposeful (02/14/23 1455)  RUE Motor Response: Purposeful (02/14/23 1455)  RLE Motor Response: Purposeful (02/14/23 1455)   At baseline    Mental Status, Level of Consciousness: Alert and  oriented to person, place, and time    Pulmonary Status:   O2 Device: Nasal cannula (02/14/23 1455)   Adequate oxygenation and airway patent    Complications related to anesthesia: None    Post-anesthesia assessment completed. No concerns    Signed By: Scott Noel MD     February 14, 2023              Procedure(s):  LAPAROSCOPIC PERITONEAL DIALYSIS CATHETER INSERTION. general    <BSHSIANPOST>    INITIAL Post-op Vital signs:   Vitals Value Taken Time   /84 02/14/23 1515   Temp 36.4 °C (97.6 °F) 02/14/23 1455   Pulse 90 02/14/23 1516   Resp 20 02/14/23 1516   SpO2 94 % 02/14/23 1516   Vitals shown include unvalidated device data.

## 2023-02-14 NOTE — BRIEF OP NOTE
Brief Postoperative Note    Patient: Rodrick Baker  YOB: 1960  MRN: [id number]    Date of Procedure: 2/14/2023     Pre-Op Diagnosis: Chronic kidney disease ([facility] [address] [N18.6]    Post-Op Diagnosis: Same as preoperative diagnosis. Procedure(s):  LAPAROSCOPIC PERITONEAL DIALYSIS CATHETER INSERTION  LYSIS OF ADHESIONS    Surgeon(s):  Breanna Rogers MD    Surgical Assistant: Surg Asst-1: Lisbeth Watson RN; Huma Maharaj    Anesthesia: General     Estimated Blood Loss (mL): Minimal    Complications: None    Specimens:   ID Type Source Tests Collected by Time Destination   1 : BODY FLUID Body Fluid Abdominal Fluid ANAEROBIC/AEROBIC/GRAM Jess Rogers MD 2/14/2023 6707 Microbiology        Implants:   Implant Name Type Inv.  Item Serial No.  Lot No. LRB No. Used Action   ARGYLE PERITONEAL DIALYSIS 62.5CM   N/A COVIDIEN [id number] Right 1 Implanted       Drains: none    Findings: + some murky fluid in pelvis sent for culture    Electronically Signed by Yvonne Jean MD on 2/14/2023 at 2:27 PM

## 2023-02-15 NOTE — OP NOTES
[address]   OPERATIVE REPORT    Name:  Tl Quiroga  MR#:  [id number]  :  1960  ACCOUNT #:  [de-identified]  DATE OF SERVICE:  2023    PREOPERATIVE DIAGNOSIS:  Chronic kidney disease. POSTOPERATIVE DIAGNOSIS:  Chronic kidney disease. PROCEDURES PERFORMED:  Laparoscopic peritoneal dialysis catheter insertion and lysis of adhesions. SURGEON:  Raymond Hernandez MD    ASSISTANTCletung Skinner SA    ANESTHESIA:  General.    COMPLICATIONS:  None. SPECIMENS REMOVED:  Blood fluid sent for culture. IMPLANTS:  Peritoneal dialysis catheter. ESTIMATED BLOOD LOSS:  Minimal.    INDICATIONS FOR PROCEDURE:  The patient is a 78-year-old morbidly obese female with multiple medical problems who has requested placement of peritoneal dialysis catheter with assistance with her nephrologist, Dr. Mallory Gonzalez. Risks and benefits of the procedure were discussed preoperatively with her. She voiced understanding and wished to proceed. PROCEDURE:  The patient was placed supine on the operating table and general anesthesia was established. The abdomen was prepped and draped in the standard surgical fashion. Using a 5-mm laparoscopic port under VisiPort technique, a 5-mm port was placed in the left upper quadrant after infiltrating the area with 0.25% Marcaine and making a small incision. The gas was connected and pneumoperitoneum was achieved. The camera was inserted, and there was noted to be a fair amount of adhesions near the midline. It was concerned that this will eventually create a possibility of kinking the catheter. Based on this finding and the location of adhesions, I thought it best to place the catheter on the right at the adhesions tract, somewhat to the left of the midline. I placed two more 5-mm ports, one on the left and one on the right lower abdomen. Using a grasper and cautery, we lysed the adhesions off the anterior surface of the abdominal wall sharply.   After this was completed, the area was hemostatic. There were no further adhesions. This took approximately 15 minutes. Afterwards, the visualization of the peritoneum revealed some murky fluid in the pelvis. It did not appear to be grossly infected, and the patient had no abdominal symptoms, so I sent a swab of the culture and then irrigated and washed out the fluid and then suctioned it out. After this, the peritoneal catheter was then placed through the right side of the port, and the pigtail portion was pulled into the pelvis. The gas was then evacuated under direct visualization, and all the ports were removed. The port site where the catheter was exiting was then incised longitudinally and taken down to the skin and subcutaneous tissue down to the fascia. I placed the first cuff just below the fascia and secured this using 0 Vicryl suture and secured it to the catheter. A separate stab incision was made and the remainder of the catheter was pulled out through this as the drain exit site. The second cuff was placed in the subcutaneous space. The wounds were then cleansed and dried and closed in layers of Vicryl suture followed by Monocryl closure of the skin. Skin glue was used for dressing. The catheter was then dressed with a drain sponge followed by placement of gauze on top of this. The patient was then awoken and transferred to the recovery room in stable condition.       Deja Daley MD [id number]  D:  02/14/2023 14:34  T:  02/14/2023 23:57  JOB #:  [id number]

## 2023-02-16 PROCEDURE — 36430 TRANSFUSION BLD/BLD COMPNT: CPT

## 2023-02-16 PROCEDURE — 99285 EMERGENCY DEPT VISIT HI MDM: CPT

## 2023-02-16 PROCEDURE — 75810000455 HC PLCMT CENT VENOUS CATH LVL 2 5182

## 2023-02-17 ENCOUNTER — HOSPITAL ENCOUNTER (INPATIENT)
Age: 63
LOS: 5 days | Discharge: HOME HEALTH | DRG: 371 | End: 2023-02-22
Attending: EMERGENCY MEDICINE | Admitting: FAMILY MEDICINE
Payer: MEDICARE

## 2023-02-17 ENCOUNTER — APPOINTMENT (OUTPATIENT)
Dept: GENERAL RADIOLOGY | Age: 63
DRG: 371 | End: 2023-02-17
Attending: ANESTHESIOLOGY
Payer: MEDICARE

## 2023-02-17 ENCOUNTER — APPOINTMENT (OUTPATIENT)
Dept: GENERAL RADIOLOGY | Age: 63
DRG: 371 | End: 2023-02-17
Attending: STUDENT IN AN ORGANIZED HEALTH CARE EDUCATION/TRAINING PROGRAM
Payer: MEDICARE

## 2023-02-17 ENCOUNTER — ANESTHESIA (OUTPATIENT)
Dept: EMERGENCY DEPT | Age: 63
DRG: 371 | End: 2023-02-17
Payer: MEDICARE

## 2023-02-17 ENCOUNTER — APPOINTMENT (OUTPATIENT)
Dept: GENERAL RADIOLOGY | Age: 63
DRG: 371 | End: 2023-02-17
Attending: EMERGENCY MEDICINE
Payer: MEDICARE

## 2023-02-17 ENCOUNTER — ANESTHESIA EVENT (OUTPATIENT)
Dept: EMERGENCY DEPT | Age: 63
DRG: 371 | End: 2023-02-17
Payer: MEDICARE

## 2023-02-17 ENCOUNTER — APPOINTMENT (OUTPATIENT)
Dept: CT IMAGING | Age: 63
DRG: 371 | End: 2023-02-17
Attending: EMERGENCY MEDICINE
Payer: MEDICARE

## 2023-02-17 ENCOUNTER — APPOINTMENT (OUTPATIENT)
Dept: VASCULAR SURGERY | Age: 63
DRG: 371 | End: 2023-02-17
Attending: PHYSICIAN ASSISTANT
Payer: MEDICARE

## 2023-02-17 ENCOUNTER — APPOINTMENT (OUTPATIENT)
Dept: INTERVENTIONAL RADIOLOGY/VASCULAR | Age: 63
DRG: 371 | End: 2023-02-17
Attending: INTERNAL MEDICINE
Payer: MEDICARE

## 2023-02-17 DIAGNOSIS — T81.44XA SEPSIS FOLLOWING PROCEDURE, INITIAL ENCOUNTER (HCC): Primary | ICD-10-CM

## 2023-02-17 DIAGNOSIS — D64.89 ANEMIA DUE TO OTHER CAUSE, NOT CLASSIFIED: ICD-10-CM

## 2023-02-17 PROBLEM — R00.0 TACHYCARDIA: Status: ACTIVE | Noted: 2023-02-17

## 2023-02-17 PROBLEM — D64.9 ANEMIA: Status: ACTIVE | Noted: 2023-02-17

## 2023-02-17 PROBLEM — A41.9 SEPSIS (HCC): Status: ACTIVE | Noted: 2023-02-17

## 2023-02-17 LAB
ALBUMIN SERPL-MCNC: 3 G/DL (ref 3.5–5)
ALBUMIN/GLOB SERPL: 0.8 (ref 1.1–2.2)
ALP SERPL-CCNC: 53 U/L (ref 45–117)
ALT SERPL-CCNC: <6 U/L (ref 12–78)
ANION GAP SERPL CALC-SCNC: 14 MMOL/L (ref 5–15)
AST SERPL-CCNC: 8 U/L (ref 15–37)
BASOPHILS # BLD: 0 K/UL (ref 0–0.1)
BASOPHILS NFR BLD: 0 % (ref 0–1)
BILIRUB SERPL-MCNC: 0.3 MG/DL (ref 0.2–1)
BUN SERPL-MCNC: 125 MG/DL (ref 6–20)
BUN/CREAT SERPL: 14 (ref 12–20)
CALCIUM SERPL-MCNC: 10.7 MG/DL (ref 8.5–10.1)
CHLORIDE SERPL-SCNC: 105 MMOL/L (ref 97–108)
CO2 SERPL-SCNC: 17 MMOL/L (ref 21–32)
COMMENT, HOLDF: NORMAL
COMMENT, HOLDF: NORMAL
CREAT SERPL-MCNC: 9.23 MG/DL (ref 0.55–1.02)
DIFFERENTIAL METHOD BLD: ABNORMAL
EOSINOPHIL # BLD: 0.3 K/UL (ref 0–0.4)
EOSINOPHIL NFR BLD: 2 % (ref 0–7)
ERYTHROCYTE [DISTWIDTH] IN BLOOD BY AUTOMATED COUNT: 16.3 % (ref 11.5–14.5)
GLOBULIN SER CALC-MCNC: 3.6 G/DL (ref 2–4)
GLUCOSE SERPL-MCNC: 94 MG/DL (ref 65–100)
HCT VFR BLD AUTO: 21.5 % (ref 35–47)
HGB BLD-MCNC: 6.2 G/DL (ref 11.5–16)
HISTORY CHECKED?,CKHIST: NORMAL
IMM GRANULOCYTES # BLD AUTO: 0.2 K/UL (ref 0–0.04)
IMM GRANULOCYTES NFR BLD AUTO: 1 % (ref 0–0.5)
LACTATE SERPL-SCNC: 0.6 MMOL/L (ref 0.4–2)
LYMPHOCYTES # BLD: 1.4 K/UL (ref 0.8–3.5)
LYMPHOCYTES NFR BLD: 9 % (ref 12–49)
MCH RBC QN AUTO: 26.8 PG (ref 26–34)
MCHC RBC AUTO-ENTMCNC: 28.8 G/DL (ref 30–36.5)
MCV RBC AUTO: 93.1 FL (ref 80–99)
MONOCYTES # BLD: 1.1 K/UL (ref 0–1)
MONOCYTES NFR BLD: 7 % (ref 5–13)
NEUTS SEG # BLD: 12.5 K/UL (ref 1.8–8)
NEUTS SEG NFR BLD: 81 % (ref 32–75)
NRBC # BLD: 0.04 K/UL (ref 0–0.01)
NRBC BLD-RTO: 0.3 PER 100 WBC
PHOSPHATE SERPL-MCNC: 9.8 MG/DL (ref 2.6–4.7)
PLATELET # BLD AUTO: 324 K/UL (ref 150–400)
PMV BLD AUTO: 10.6 FL (ref 8.9–12.9)
POTASSIUM SERPL-SCNC: 5.1 MMOL/L (ref 3.5–5.1)
PROT SERPL-MCNC: 6.6 G/DL (ref 6.4–8.2)
RBC # BLD AUTO: 2.31 M/UL (ref 3.8–5.2)
RBC MORPH BLD: ABNORMAL
SAMPLES BEING HELD,HOLD: NORMAL
SAMPLES BEING HELD,HOLD: NORMAL
SODIUM SERPL-SCNC: 136 MMOL/L (ref 136–145)
URATE SERPL-MCNC: 6.1 MG/DL (ref 2.6–6)
WBC # BLD AUTO: 15.5 K/UL (ref 3.6–11)

## 2023-02-17 PROCEDURE — 74011250636 HC RX REV CODE- 250/636: Performed by: PHYSICIAN ASSISTANT

## 2023-02-17 PROCEDURE — 74011250637 HC RX REV CODE- 250/637: Performed by: PHYSICIAN ASSISTANT

## 2023-02-17 PROCEDURE — 86706 HEP B SURFACE ANTIBODY: CPT

## 2023-02-17 PROCEDURE — C1752 CATH,HEMODIALYSIS,SHORT-TERM: HCPCS

## 2023-02-17 PROCEDURE — 74176 CT ABD & PELVIS W/O CONTRAST: CPT

## 2023-02-17 PROCEDURE — 65270000046 HC RM TELEMETRY

## 2023-02-17 PROCEDURE — 74011000258 HC RX REV CODE- 258: Performed by: PHYSICIAN ASSISTANT

## 2023-02-17 PROCEDURE — 90935 HEMODIALYSIS ONE EVALUATION: CPT

## 2023-02-17 PROCEDURE — 87340 HEPATITIS B SURFACE AG IA: CPT

## 2023-02-17 PROCEDURE — 71045 X-RAY EXAM CHEST 1 VIEW: CPT

## 2023-02-17 PROCEDURE — P9016 RBC LEUKOCYTES REDUCED: HCPCS

## 2023-02-17 PROCEDURE — 2709999900 HC NON-CHARGEABLE SUPPLY

## 2023-02-17 PROCEDURE — 84100 ASSAY OF PHOSPHORUS: CPT

## 2023-02-17 PROCEDURE — 36600 WITHDRAWAL OF ARTERIAL BLOOD: CPT

## 2023-02-17 PROCEDURE — 74011250636 HC RX REV CODE- 250/636: Performed by: STUDENT IN AN ORGANIZED HEALTH CARE EDUCATION/TRAINING PROGRAM

## 2023-02-17 PROCEDURE — 85025 COMPLETE CBC W/AUTO DIFF WBC: CPT

## 2023-02-17 PROCEDURE — 76942 ECHO GUIDE FOR BIOPSY: CPT

## 2023-02-17 PROCEDURE — C1894 INTRO/SHEATH, NON-LASER: HCPCS

## 2023-02-17 PROCEDURE — 86920 COMPATIBILITY TEST SPIN: CPT

## 2023-02-17 PROCEDURE — 86921 COMPATIBILITY TEST INCUBATE: CPT

## 2023-02-17 PROCEDURE — 30233N1 TRANSFUSION OF NONAUTOLOGOUS RED BLOOD CELLS INTO PERIPHERAL VEIN, PERCUTANEOUS APPROACH: ICD-10-PCS | Performed by: HOSPITALIST

## 2023-02-17 PROCEDURE — 84550 ASSAY OF BLOOD/URIC ACID: CPT

## 2023-02-17 PROCEDURE — 87040 BLOOD CULTURE FOR BACTERIA: CPT

## 2023-02-17 PROCEDURE — 86900 BLOOD TYPING SEROLOGIC ABO: CPT

## 2023-02-17 PROCEDURE — 93970 EXTREMITY STUDY: CPT

## 2023-02-17 PROCEDURE — 74011000250 HC RX REV CODE- 250: Performed by: STUDENT IN AN ORGANIZED HEALTH CARE EDUCATION/TRAINING PROGRAM

## 2023-02-17 PROCEDURE — 80053 COMPREHEN METABOLIC PANEL: CPT

## 2023-02-17 PROCEDURE — 3E1M39Z IRRIGATION OF PERITONEAL CAVITY USING DIALYSATE, PERCUTANEOUS APPROACH: ICD-10-PCS | Performed by: HOSPITALIST

## 2023-02-17 PROCEDURE — 74011000250 HC RX REV CODE- 250: Performed by: PHYSICIAN ASSISTANT

## 2023-02-17 PROCEDURE — 36415 COLL VENOUS BLD VENIPUNCTURE: CPT

## 2023-02-17 PROCEDURE — 36430 TRANSFUSION BLD/BLD COMPNT: CPT

## 2023-02-17 PROCEDURE — 83605 ASSAY OF LACTIC ACID: CPT

## 2023-02-17 PROCEDURE — 86922 COMPATIBILITY TEST ANTIGLOB: CPT

## 2023-02-17 RX ORDER — OXYCODONE HYDROCHLORIDE 5 MG/1
10 TABLET ORAL
Status: DISCONTINUED | OUTPATIENT
Start: 2023-02-17 | End: 2023-02-22 | Stop reason: HOSPADM

## 2023-02-17 RX ORDER — AMLODIPINE BESYLATE 5 MG/1
10 TABLET ORAL DAILY
Status: DISCONTINUED | OUTPATIENT
Start: 2023-02-17 | End: 2023-02-22 | Stop reason: HOSPADM

## 2023-02-17 RX ORDER — BUMETANIDE 1 MG/1
2 TABLET ORAL 2 TIMES DAILY
Status: DISCONTINUED | OUTPATIENT
Start: 2023-02-17 | End: 2023-02-17

## 2023-02-17 RX ORDER — ACETAMINOPHEN 325 MG/1
650 TABLET ORAL
Status: DISCONTINUED | OUTPATIENT
Start: 2023-02-17 | End: 2023-02-22 | Stop reason: HOSPADM

## 2023-02-17 RX ORDER — TROSPIUM CHLORIDE 20 MG/1
20 TABLET, FILM COATED ORAL DAILY
Status: DISCONTINUED | OUTPATIENT
Start: 2023-02-17 | End: 2023-02-22 | Stop reason: HOSPADM

## 2023-02-17 RX ORDER — HYDRALAZINE HYDROCHLORIDE 50 MG/1
50 TABLET, FILM COATED ORAL 3 TIMES DAILY
Status: DISCONTINUED | OUTPATIENT
Start: 2023-02-17 | End: 2023-02-22 | Stop reason: HOSPADM

## 2023-02-17 RX ORDER — SODIUM CHLORIDE 0.9 % (FLUSH) 0.9 %
5-40 SYRINGE (ML) INJECTION EVERY 8 HOURS
Status: DISCONTINUED | OUTPATIENT
Start: 2023-02-17 | End: 2023-02-22 | Stop reason: HOSPADM

## 2023-02-17 RX ORDER — SODIUM CHLORIDE 0.9 % (FLUSH) 0.9 %
5-40 SYRINGE (ML) INJECTION AS NEEDED
Status: DISCONTINUED | OUTPATIENT
Start: 2023-02-17 | End: 2023-02-22 | Stop reason: HOSPADM

## 2023-02-17 RX ORDER — POLYETHYLENE GLYCOL 3350 17 G/17G
17 POWDER, FOR SOLUTION ORAL DAILY PRN
Status: DISCONTINUED | OUTPATIENT
Start: 2023-02-17 | End: 2023-02-19

## 2023-02-17 RX ORDER — SODIUM BICARBONATE 650 MG/1
1300 TABLET ORAL 2 TIMES DAILY
Status: DISCONTINUED | OUTPATIENT
Start: 2023-02-17 | End: 2023-02-22 | Stop reason: HOSPADM

## 2023-02-17 RX ORDER — SODIUM BICARBONATE 650 MG/1
650 TABLET ORAL 2 TIMES DAILY
Status: DISCONTINUED | OUTPATIENT
Start: 2023-02-17 | End: 2023-02-17

## 2023-02-17 RX ORDER — ACETAMINOPHEN 650 MG/1
650 SUPPOSITORY RECTAL
Status: DISCONTINUED | OUTPATIENT
Start: 2023-02-17 | End: 2023-02-22 | Stop reason: HOSPADM

## 2023-02-17 RX ORDER — LIDOCAINE HYDROCHLORIDE 10 MG/ML
10 INJECTION INFILTRATION; PERINEURAL
Status: COMPLETED | OUTPATIENT
Start: 2023-02-17 | End: 2023-02-17

## 2023-02-17 RX ORDER — CARVEDILOL 12.5 MG/1
12.5 TABLET ORAL 2 TIMES DAILY WITH MEALS
Status: DISCONTINUED | OUTPATIENT
Start: 2023-02-17 | End: 2023-02-22 | Stop reason: HOSPADM

## 2023-02-17 RX ORDER — HEPARIN SODIUM 1000 [USP'U]/ML
5000 INJECTION, SOLUTION INTRAVENOUS; SUBCUTANEOUS
Status: COMPLETED | OUTPATIENT
Start: 2023-02-17 | End: 2023-02-17

## 2023-02-17 RX ORDER — BUMETANIDE 1 MG/1
2 TABLET ORAL DAILY
Status: DISCONTINUED | OUTPATIENT
Start: 2023-02-18 | End: 2023-02-19

## 2023-02-17 RX ORDER — FENTANYL CITRATE 50 UG/ML
50 INJECTION, SOLUTION INTRAMUSCULAR; INTRAVENOUS ONCE
Status: DISCONTINUED | OUTPATIENT
Start: 2023-02-17 | End: 2023-02-17

## 2023-02-17 RX ORDER — OXYCODONE HYDROCHLORIDE 5 MG/1
5 TABLET ORAL
Status: DISCONTINUED | OUTPATIENT
Start: 2023-02-17 | End: 2023-02-20

## 2023-02-17 RX ORDER — SODIUM CHLORIDE 9 MG/ML
250 INJECTION, SOLUTION INTRAVENOUS AS NEEDED
Status: DISCONTINUED | OUTPATIENT
Start: 2023-02-17 | End: 2023-02-22 | Stop reason: HOSPADM

## 2023-02-17 RX ORDER — ALLOPURINOL 100 MG/1
100 TABLET ORAL DAILY
Status: DISCONTINUED | OUTPATIENT
Start: 2023-02-17 | End: 2023-02-22 | Stop reason: HOSPADM

## 2023-02-17 RX ADMIN — SODIUM CHLORIDE, PRESERVATIVE FREE 10 ML: 5 INJECTION INTRAVENOUS at 18:19

## 2023-02-17 RX ADMIN — PIPERACILLIN AND TAZOBACTAM 3.38 G: 3; .375 INJECTION, POWDER, FOR SOLUTION INTRAVENOUS at 18:18

## 2023-02-17 RX ADMIN — CARVEDILOL 12.5 MG: 12.5 TABLET, FILM COATED ORAL at 09:28

## 2023-02-17 RX ADMIN — OXYCODONE 10 MG: 5 TABLET ORAL at 12:13

## 2023-02-17 RX ADMIN — ALLOPURINOL 100 MG: 100 TABLET ORAL at 12:13

## 2023-02-17 RX ADMIN — TROSPIUM CHLORIDE 20 MG: 20 TABLET, FILM COATED ORAL at 12:13

## 2023-02-17 RX ADMIN — HYDRALAZINE HYDROCHLORIDE 50 MG: 50 TABLET, FILM COATED ORAL at 09:28

## 2023-02-17 RX ADMIN — LIDOCAINE HYDROCHLORIDE 10 ML: 10 INJECTION, SOLUTION INFILTRATION; PERINEURAL at 16:35

## 2023-02-17 RX ADMIN — SODIUM CHLORIDE, PRESERVATIVE FREE 10 ML: 5 INJECTION INTRAVENOUS at 21:51

## 2023-02-17 RX ADMIN — SODIUM CHLORIDE, POTASSIUM CHLORIDE, SODIUM LACTATE AND CALCIUM CHLORIDE 250 ML: 600; 310; 30; 20 INJECTION, SOLUTION INTRAVENOUS at 13:00

## 2023-02-17 RX ADMIN — HEPARIN SODIUM 2.6 UNITS: 1000 INJECTION INTRAVENOUS; SUBCUTANEOUS at 16:35

## 2023-02-17 RX ADMIN — VANCOMYCIN HYDROCHLORIDE 2500 MG: 10 INJECTION, POWDER, LYOPHILIZED, FOR SOLUTION INTRAVENOUS at 12:14

## 2023-02-17 RX ADMIN — PIPERACILLIN AND TAZOBACTAM 4.5 G: 4; .5 INJECTION, POWDER, FOR SOLUTION INTRAVENOUS at 12:13

## 2023-02-17 RX ADMIN — AMLODIPINE BESYLATE 10 MG: 5 TABLET ORAL at 12:13

## 2023-02-17 NOTE — ED TRIAGE NOTES
Pt arrives via wheelchair from home for abdominal pain. She had a PD catheter placed yesterday and the insertion site is causing pain. She states she talked to her doctor because she has been cold and they told her to come to the ED for evaluation for anemia. A&OX4.

## 2023-02-17 NOTE — PROGRESS NOTES
Pharmacist Note - Vancomycin Dosing    Consult provided for this 70 y.o. female for indication of sepsis. Antibiotic regimen(s): vancomycin + Zosyn  Patient on vancomycin PTA? NO     Recent Labs     23  0308   WBC 15.5*   CREA 9.23*   *     Frequency of BMP: x 1 tomorrow   Height: 167.6 cm  Weight: 101.6 kg  Est CrCl: 7 ml/min  Temp (24hrs), Av.9 °F (36.6 °C), Min:97.8 °F (36.6 °C), Max:98 °F (36.7 °C)    Cultures:   abdominal fluid - GPC in clusters     MRSA Swab ordered (if applicable)? N/A    The plan below is expected to result in a target range of Trough 10-15 mcg/mL    Therapy will be initiated with a loading dose of 2500 mg IV x 1 to be followed by dosing by levels. Pharmacy to follow patient daily and order levels / make dose adjustments as appropriate. *Vancomycin has been dosed used Bayesian kinetics software to target an AUC/ILIR of 400-600, which provides adequate exposure for an assumed infection due to MRSA with an ILIR of 1 or less while reducing the risk of nephrotoxicity as seen with traditional trough based dosing goals.

## 2023-02-17 NOTE — CONSULTS
Vascular Surgery Consult  --s/p lap PD catheter 3 days ago  --drowsy now; able to answer most questions  --currently not in pain  --abdomen appears ok; appropriate tenderness  --dried blood on dressing  --will follow up culture from OR (pelvic fluid)  --agree with outpatient usage

## 2023-02-17 NOTE — ANESTHESIA PROCEDURE NOTES
Central Line Placement    Start time: 2/17/2023 9:45 AM  End time: 2/17/2023 10:00 AM  Performed by: Naida Chang MD  Authorized by: Naida Chang MD     Indications: vascular access  Preanesthetic Checklist: patient identified, risks and benefits discussed, anesthesia consent, site marked, patient being monitored and timeout performed      Pre-procedure: All elements of maximal sterile barrier technique followed?  Yes    2% Chlorhexidine for cutaneous antisepsis, Hand hygiene performed prior to catheter insertion and Ultrasound guidance    Sterile Ultrasound Technique followed?: Yes            Procedure:   Prep:  ChloraPrep  Location: internal jugular  Orientation:  Right  Patient position:  Trendelenburg  Catheter type:  Triple lumen  Catheter size:  7 Fr  Catheter length:  20 cm  Number of attempts:  1  Successful placement: Yes      Assessment:   Post-procedure:  Catheter secured, sterile dressing applied and sterile dressing with CHG applied  Assessment:  Free fluid flow, blood return through all ports, placement verified by x-ray and guidewire removal verified  Insertion:  Uncomplicated  Patient tolerance:  Patient tolerated the procedure well with no immediate complications

## 2023-02-17 NOTE — PROGRESS NOTES
I have discussed with the patient the rationale for blood component transfusion; its benefits in treating or preventing fatigue, organ damage, or death; and its risk which includes mild transfusion reactions, rare risk of blood borne infection, or more serious but rare reactions. I have discussed the alternatives to transfusion, including the risk and consequences of not receiving transfusion. The patient had an opportunity to ask questions and had agreed to proceed with transfusion of blood components.     Marvin Linares PA-C

## 2023-02-17 NOTE — ED PROVIDER NOTES
Patient is a 64-year-old female with history of chronic kidney disease, recent placement of PD catheter per vascular surgery Dr. Chris Lowery, she reports she is on the kidney transplant list at [facility], left knee arthritis, gout, hyperparathyroidism, hypertension, right hip replacement who presents with feeling fatigued and weak. Patient says that when she was at her kidney appointment at [facility], they recommended she come to the ED to get her hemoglobin level checked. She says it was low during the last check and she needed a blood transfusion. Denies any abnormal bleeding. Patient initially reported in triage that her abdomen hurts at the site of insertion of the PD catheter. To me she said that she was having diffuse back pain because she had been sitting for so long in the waiting room. Denies any redness around catheter insertion site, any malodorous discharge. Denies nausea, vomiting, fevers, chills. Past Medical History:   Diagnosis Date    Arthritis     L knee    Chronic kidney disease     secondary to hypertensive nephrosclerosis, CKD STG 5, ON ACTIVE KIDNEY TRANSPLANT LIST [facility] SANCHEZ    Gastritis     HISTORY OF 10/2020     Gout     History of claustrophobia     History of total hip replacement, right 2022    Hyperparathyroidism ([address]     Hypertension        Past Surgical History:   Procedure Laterality Date    HX  SECTION      HX COLONOSCOPY      HX GYN      ECTOPIC PREGNANCY SURGERY     HX HIP REPLACEMENT Left     HX LAP CHOLECYSTECTOMY      HX LUMBAR LAMINECTOMY  2020    [id number]    HX WISDOM TEETH EXTRACTION           Family History:   Problem Relation Age of Onset    Hypertension Mother     Liver Disease Father     Diabetes Sister     Breast Cancer Paternal Grandmother     Anesth Problems Neg Hx        Social History     Socioeconomic History    Marital status:      Spouse name: Not on file    Number of children: 2    Years of education: 16+    Highest education level: Master's degree (e.g., MA, MS, [place], MEd, MSW, RENATA)   Occupational History    Occupation: teacher   Tobacco Use    Smoking status: Former     Packs/day: 0.25     Years: 25.00     Pack years: 6.25     Types: Cigarettes     Quit date: 2000     Years since quittin.6    Smokeless tobacco: Never   Vaping Use    Vaping Use: Never used   Substance and Sexual Activity    Alcohol use: No    Drug use: No    Sexual activity: Not Currently   Other Topics Concern    Not on file   Social History Narrative    Not on file     Social Determinants of Health     Financial Resource Strain: Not on file   Food Insecurity: Not on file   Transportation Needs: Not on file   Physical Activity: Not on file   Stress: Not on file   Social Connections: Not on file   Intimate Partner Violence: Not on file   Housing Stability: Not on file         ALLERGIES: Morphine and [address]    Review of Systems   Constitutional:  Positive for fatigue. Negative for chills and fever. HENT:  Negative for drooling and nosebleeds. Eyes:  Negative for pain and itching. Respiratory:  Negative for choking and stridor. Cardiovascular:  Negative for leg swelling. Gastrointestinal:  Positive for abdominal pain. Negative for abdominal distention and rectal pain. Endocrine: Negative for heat intolerance and polyphagia. Genitourinary:  Negative for enuresis and genital sores. Musculoskeletal:  Negative for arthralgias and joint swelling. Skin:  Negative for color change. Allergic/Immunologic: Negative for immunocompromised state. Neurological:  Negative for tremors and speech difficulty. Hematological:  Negative for adenopathy. Psychiatric/Behavioral:  Negative for dysphoric mood and sleep disturbance.       Vitals:    23 2232 23 0431 23 0456 23 0546   BP: (!) 182/76   (!) 158/92   Pulse: (!) 102  (!) 109 (!) 110   Resp: 18  21 19   Temp: 97.8 °F (36.6 °C)      SpO2: 95% 92% 92%             Physical Exam  Vitals and nursing note reviewed. Constitutional:       Appearance: She is well-developed. She is not ill-appearing, toxic-appearing or diaphoretic. HENT:      Head: Normocephalic. Nose: Nose normal.   Eyes:      Conjunctiva/sclera: Conjunctivae normal.   Cardiovascular:      Rate and Rhythm: Normal rate and regular rhythm. Pulmonary:      Effort: Pulmonary effort is normal. No respiratory distress. Abdominal:      General: There is no distension. Palpations: Abdomen is soft. Tenderness: There is no abdominal tenderness. Comments: PD catheter site with dressings clean dry and intact. No surrounding cellulitis. Musculoskeletal:         General: No deformity. Normal range of motion. Cervical back: Normal range of motion and neck supple. Skin:     General: Skin is warm and dry. Neurological:      Mental Status: She is alert. Coordination: Coordination normal.   Psychiatric:         Behavior: Behavior normal.        Medical Decision Making  Given symptoms of fatigue, and abdominal pain that she initially reported, there is concern for worsening anemia, postop infection, catheter displacement, sepsis. Patient's abdomen is soft, and no cellulitis of the abdominal wall noted. CT scan with no fluid collection, and catheter in place. Given tachycardia, new leukocytosis, concern for sepsis and culture sent. Will cover patient with a dose of broad-spectrum antibiotics. 1 unit packed red blood cells ordered as patient's anemia noted. Discussed care with hospitalist, and patient is admitted for further management. Amount and/or Complexity of Data Reviewed  External Data Reviewed: labs and notes. Labs: ordered. Radiology: ordered and independent interpretation performed. ECG/medicine tests: ordered and independent interpretation performed.   Discussion of management or test interpretation with external provider(s): Discussed care with hospitalist    Risk  Prescription drug management. Parenteral controlled substances. Decision regarding hospitalization. Procedures    Perfect Serve Consult for Admission  6:39 AM    ED Room Number: ER10/10  Patient Name and age:  Tamara Anthony 70 y.o.  female  Working Diagnosis:   1. Sepsis following procedure, initial encounter ([address]    2. Anemia due to other cause, not classified        COVID-19 Suspicion:  no  Sepsis present:  yes  Reassessment needed: no  Code Status:  Full Code  Readmission: no  Isolation Requirements:  no  Recommended Level of Care:  telemetry  Department: [facility] Adult ED - 21   Admitting Provider: Sheryn Rubinstein    Other:  pt with recent PD catheter per vascular surgery, now with fatigue, tachycardia and leukocytosis. Pt with abdominal pain, no fluid collection on CT scan. Vanc/cefepime given. Also 1 U PRBC for anemia. Total critical care time spent exclusive of procedures:  65 minutes. Patient is being admitted to the hospital.  The results of their tests and reasons for their admission have been discussed with them and/or available family. They convey agreement and understanding for the need to be admitted and for their admission diagnosis.

## 2023-02-17 NOTE — ED NOTES
Bedside shift change report given to Carlita Law (oncoming nurse) by Jessenia Rick (offgoing nurse). Report included the following information SBAR, Kardex, ED Summary, Procedure Summary, Intake/Output, MAR, Cardiac Rhythm sinus tachy, and Quality Measures.

## 2023-02-17 NOTE — PROGRESS NOTES
0745: Unable to obtain PIV or blood cultures after several attempts. Art stick ordered for blood cultures. Antibiotics and IVF delayed due to lack of access. Pending central line placement. 1030: Central line placed. Placement verification pending.

## 2023-02-17 NOTE — PROGRESS NOTES
Care Management:    Transition of Care Plan:     RUR: 14%  Disposition: home with new PD  Transportation:  or son    Nohelia Katz to meet with patient in room. She was not there-possibly gone for procedure. Called  Fabian Tejada ([phone]).  works full time. Patient goes to MD appointments by herself. Asked about her nephrologist. He did not know the name or the name of the dialysis center she will be using. Will ask patient if she returns to room. Demographics confirmed: yes  Living Situation: Patient lives with her  in a tri-level home with 5 steps to enter. There are 7 steps upstairs to get to bedroom. DME: uses cane and bedside commode (at night), has walker  ADLs: independent, drives (not when feeling week)  Pharmacy: [facility] on [address]  Previous HH/SNF/rehab: [address]  (8/22)  Insurance: [facility] Medicare Complete   Transportation: self,     Reason for Admission:   anemia, sepsis, tachycardia                  RUR Score:   14%               PCP: First and Last name:   Ya Hayden MD   Name of Practice: [facility]   Are you a current patient: Yes/No: yes   Approximate date of last visit: 9-14-22   Can you participate in a virtual visit if needed:     Do you (patient/family) have any concerns for transition/discharge? Per MD note, patient will begin PD outpatient. Plan for utilizing home health:   none likely    Current Advanced Directive/Advance Care Plan:  Full Code      Healthcare Decision Maker:               Primary Decision Maker: Madai Peralta - [phone]    Secondary Decision Maker: Sonido Saavedra - [phone]      Care Management Interventions  PCP Verified by CM: Yes (Dr. Glenn Delarosa)  Palliative Care Criteria Met (RRAT>21 & CHF Dx)?: No  Mode of Transport at Discharge:  Other (see comment) ( or son)  Transition of Care Consult (CM Consult): Discharge Planning  Discharge Durable Medical Equipment: No  Physical Therapy Consult: No  Occupational Therapy Consult: No  Speech Therapy Consult: No  Support Systems: Spouse/Significant Other, Child(aravind)  Confirm Follow Up Transport: Family  [employer] Information Provided?: No  Discharge Location  Patient Expects to be Discharged to[de-identified] [address] [place] [address]

## 2023-02-17 NOTE — CONSULTS
[facility]    NEPHROLOGY CONSULT NOTE     Patient: Chely Garrett MRN: [id number]  PCP: Cleveland Barros MD   :     1960  Age:   70 y.o. Sex:  female      Referring physician: Holland Blount PA-C  Reason for consultation: 70 y.o. female with Sepsis ([facility] [address] [A41.9]  Anemia [D64.9]  Tachycardia [N80.9] complicated by FER   Admission Date: 2023  2:40 AM  LOS: 0 days     DISCUSSION / PLAN :     ESRD, s/p PD catheter placement on 23-not on dialysis yet  -patient is uremic, fluid overload and has metabolic acidosis  -PD catheter not ready for starting PD  -will ask IR to place a temp HD catheter and start HD today and then tomorrow and . HD catheter will be removed prior to DC home and she can start PD as outpatient  Discussed with patient and her  and they agree with the plan  -c/w Bumex and increase Na bicarb to 1300 mg bid  -check UA and UC  -check phos, uric acid, PTH    Abdominal pain  -seen by vascular surgery. Had small amount of fluid in pelvis at the time of PD catheter placement which sent for culture. Culture so far negative but had few G+cocci and rare WBC  -Agree with Abx coverage    Anemia  -transfuse for Hb<7 today on HD  -check iron panel  -will start Procrit after blood transfusion    HTN-c/w home meds         Active Problems / Assessment AAActive  : Active Problems:    Anemia (2023)      Tachycardia (2023)      Sepsis ([facility] [address] (2023)         Subjective:   HPI: Chely Garrett is a 70 y.o.  female who has been admitted to the hospital for abd pain and weakness. She has ESRD, HTN and gout and had PD catheter placed on 23. She presented to ER with c/o feeling weak, increase leg edema and abdominal pain. She had small amount of fluid in pelvis at the time of PD catheter placement which sent for culture. Culture so far negative but had few G+cocci and rare WBC.  In ER labs showed hb 5.9, leukocytosis, , K 5.1, cr 9.2, CO2 17, AG 14, Ca 10.7, phos 8. Patient has been given narcotic and is sleepy but arousable and able to answer Qs. She denies N/V/D. Her appetite has been low. Denies fever or chills. Has abd pain around her PD catheter. Past Medical Hx:   Past Medical History:   Diagnosis Date    Arthritis     L knee    Chronic kidney disease     secondary to hypertensive nephrosclerosis, CKD STG 5, ON ACTIVE KIDNEY TRANSPLANT LIST AT [facility]- DR. SANCHEZ    Gastritis     HISTORY OF 10/2020     Gout     History of claustrophobia     History of total hip replacement, right 2022    Hyperparathyroidism ([facility] [address]     Hypertension         Past Surgical Hx:     Past Surgical History:   Procedure Laterality Date    HX  SECTION      HX COLONOSCOPY      HX GYN      ECTOPIC PREGNANCY SURGERY     HX HIP REPLACEMENT Left     HX LAP CHOLECYSTECTOMY      HX LUMBAR LAMINECTOMY  2020    [id number]    HX WISDOM TEETH EXTRACTION         Medications:  Prior to Admission medications    Medication Sig Start Date End Date Taking? Authorizing Provider   oxyCODONE IR (ROXICODONE) 5 mg immediate release tablet Take 1 Tablet by mouth every four (4) hours as needed for Pain for up to 3 days. Max Daily Amount: 30 mg. Indications: pain 23 Yes Shaina Kuhn MD   allopurinoL (ZYLOPRIM) 100 mg tablet TAKE TWO TABLETS BY MOUTH DAILY 23  Yes Tone Luna MD   amLODIPine (NORVASC) 10 mg tablet TAKE ONE TABLET BY MOUTH DAILY 22  Yes Tone Luna MD   sodium bicarbonate 650 mg tablet Take 1 Tablet by mouth three (3) times daily. Patient taking differently: Take 650 mg by mouth two (2) times a day. 22  Yes Leah Tejada NP   carvediloL (COREG) 6.25 mg tablet Take 2 Tablets by mouth two (2) times daily (with meals). 4/10/22  Yes Tone Luna MD   tolterodine ER (DETROL LA) 4 mg ER capsule Take 1 Capsule by mouth daily.  10/17/21  Yes Tone Luna MD   bumetanide (BUMEX) 1 mg tablet Take 1 mg by mouth daily. 6/25/21  Yes Provider, Historical   hydrALAZINE (APRESOLINE) 10 mg tablet Take 10 mg by mouth three (3) times daily. 6/19/21  Yes Provider, Historical   predniSONE (DELTASONE) 20 mg tablet Take 1 Tablet by mouth two (2) times a day. 1/24/23   María Alex MD   senna-docusate (PERICOLACE) 8.6-50 mg per tablet Take 1 Tablet by mouth two (2) times a day. 8/16/22   Elyssa Henriquez PA-C   diphenhydrAMINE (BENADRYL) 50 mg tablet Take 50 mg by mouth nightly as needed for Sleep. Patient not taking: No sig reported    Provider, Historical       Allergies   Allergen Reactions    Morphine Nausea and Vomiting and Cough     Blood pressure and 158-96 heart rate up to 156. Patient does not feel that this is a true reaction and denies    [facility] Swelling     PATIENT DENIES THIS ALLERGY        Social Hx:  reports that she quit smoking about 22 years ago. Her smoking use included cigarettes. She has a 6.25 pack-year smoking history. She has never used smokeless tobacco. She reports that she does not drink alcohol and does not use drugs. Family History   Problem Relation Age of Onset    Hypertension Mother     Liver Disease Father     Diabetes Sister     Breast Cancer Paternal Grandmother     Anesth Problems Neg Hx        Review of Systems:  A twelve point review of system was performed today. Pertinent positives and negatives are mentioned in the HPI. The reminder of the ROS is negative and noncontributory. Objective:    Vitals:    Vitals:    02/17/23 0546 02/17/23 0716 02/17/23 0731 02/17/23 0928   BP: (!) 158/92 (!) 147/88 (!) 149/79 (!) 155/84   Pulse: (!) 110 (!) 109 (!) 108 (!) 113   Resp: 19 21 16    Temp:   98 °F (36.7 °C)    SpO2:  92% 95%      I&O's:  No intake/output data recorded. Visit Vitals  BP (!) 155/84   Pulse (!) 113   Temp 98 °F (36.7 °C)   Resp 16   SpO2 95%       Physical Exam:  General:sleepy, arousable, oriented. obese  HEENT: Eyes are PERRL. Conjunctiva without pallor ,erythema. The sclerae without icterus. .   Neck:Supple  Lungs : Clears to auscultation Bilaterally, Normal respiratory effort  CVS: RRR, S1 S2 normal, SM 2/6,  No rub, 3+ LE edema  Abdomen: Soft, tender on epigastrum, PD catheter RLQ, bowel sounds present  Extremities: No cyanosis, No clubbing  Skin: No rash   Neurologic: non focal, no Astrexis  Psych: normal affect    Laboratory Results:    Recent Results (from the past 24 hour(s))   CBC WITH AUTOMATED DIFF    Collection Time: 02/17/23  3:08 AM   Result Value Ref Range    WBC 15.5 (H) 3.6 - 11.0 K/uL    RBC 2.31 (L) 3.80 - 5.20 M/uL    HGB 6.2 (L) 11.5 - 16.0 g/dL    HCT 21.5 (L) 35.0 - 47.0 %    MCV 93.1 80.0 - 99.0 FL    MCH 26.8 26.0 - 34.0 PG    MCHC 28.8 (L) 30.0 - 36.5 g/dL    RDW 16.3 (H) 11.5 - 14.5 %    PLATELET 891 275 - 900 K/uL    MPV 10.6 8.9 - 12.9 FL    NRBC 0.3 (H) 0  WBC    ABSOLUTE NRBC 0.04 (H) 0.00 - 0.01 K/uL    NEUTROPHILS 81 (H) 32 - 75 %    LYMPHOCYTES 9 (L) 12 - 49 %    MONOCYTES 7 5 - 13 %    EOSINOPHILS 2 0 - 7 %    BASOPHILS 0 0 - 1 %    IMMATURE GRANULOCYTES 1 (H) 0.0 - 0.5 %    ABS. NEUTROPHILS 12.5 (H) 1.8 - 8.0 K/UL    ABS. LYMPHOCYTES 1.4 0.8 - 3.5 K/UL    ABS. MONOCYTES 1.1 (H) 0.0 - 1.0 K/UL    ABS. EOSINOPHILS 0.3 0.0 - 0.4 K/UL    ABS. BASOPHILS 0.0 0.0 - 0.1 K/UL    ABS. IMM.  GRANS. 0.2 (H) 0.00 - 0.04 K/UL    DF SMEAR SCANNED      RBC COMMENTS ANISOCYTOSIS  1+        RBC COMMENTS OVALOCYTES  PRESENT        RBC COMMENTS DEMARCUS CELLS  PRESENT       METABOLIC PANEL, COMPREHENSIVE    Collection Time: 02/17/23  3:08 AM   Result Value Ref Range    Sodium 136 136 - 145 mmol/L    Potassium 5.1 3.5 - 5.1 mmol/L    Chloride 105 97 - 108 mmol/L    CO2 17 (L) 21 - 32 mmol/L    Anion gap 14 5 - 15 mmol/L    Glucose 94 65 - 100 mg/dL     (H) 6 - 20 MG/DL    Creatinine 9.23 (H) 0.55 - 1.02 MG/DL    BUN/Creatinine ratio 14 12 - 20      eGFR 4 (L) >60 ml/min/1.73m2    Calcium 10.7 (H) 8.5 - 10.1 MG/DL    Bilirubin, total 0.3 0.2 - 1.0 MG/DL ALT (SGPT) <6 (L) 12 - 78 U/L    AST (SGOT) 8 (L) 15 - 37 U/L    Alk. phosphatase 53 45 - 117 U/L    Protein, total 6.6 6.4 - 8.2 g/dL    Albumin 3.0 (L) 3.5 - 5.0 g/dL    Globulin 3.6 2.0 - 4.0 g/dL    A-G Ratio 0.8 (L) 1.1 - 2.2     SAMPLES BEING HELD    Collection Time: 02/17/23  3:08 AM   Result Value Ref Range    SAMPLES BEING HELD 1red 1blue     COMMENT        Add-on orders for these samples will be processed based on acceptable specimen integrity and analyte stability, which may vary by analyte. RBC, ALLOCATE    Collection Time: 02/17/23  6:00 AM   Result Value Ref Range    HISTORY CHECKED? Historical check performed    TYPE & SCREEN    Collection Time: 02/17/23  7:12 AM   Result Value Ref Range    Crossmatch Expiration 02/20/2023,2359     ABO/Rh(D) A POSITIVE     Antibody screen NEG     Comment PREVIOUSLY IDENTIFIED NONSPECIFIC ANTIBODIES     Unit number [id number]     Blood component type  LR     Unit division 00     Status of unit ALLOCATED     Crossmatch result Compatible     Unit number [id number]     Blood component type  LR     Unit division 00     Status of unit ALLOCATED     Crossmatch result Compatible    LACTIC ACID    Collection Time: 02/17/23  7:12 AM   Result Value Ref Range    Lactic acid 0.6 0.4 - 2.0 MMOL/L   SAMPLES BEING HELD    Collection Time: 02/17/23  8:22 AM   Result Value Ref Range    SAMPLES BEING HELD 4 BC (2 LAV, 2BLU)     COMMENT        Add-on orders for these samples will be processed based on acceptable specimen integrity and analyte stability, which may vary by analyte.         Lab Results   Component Value Date     (H) 02/17/2023     02/17/2023    K 5.1 02/17/2023     02/17/2023    CO2 17 (L) 02/17/2023       Lab Results   Component Value Date     (H) 02/17/2023    BUN 93 (H) 01/27/2023    BUN 56 (H) 09/16/2022    BUN 75 (H) 09/01/2022    BUN 94 (H) 08/21/2022    K 5.1 02/17/2023    K 3.9 01/27/2023    K 3.5 09/16/2022    K 4.6 09/01/2022    K 4.1 08/21/2022       Lab Results   Component Value Date    WBC 15.5 (H) 02/17/2023    RBC 2.31 (L) 02/17/2023    HGB 6.2 (L) 02/17/2023    HCT 21.5 (L) 02/17/2023    MCV 93.1 02/17/2023    MCH 26.8 02/17/2023    RDW 16.3 (H) 02/17/2023     02/17/2023       Lab Results   Component Value Date    PHOS 8.0 (H) 01/27/2023       Urine dipstick:   Lab Results   Component Value Date/Time    Color YELLOW/STRAW 08/10/2022 03:34 PM    Appearance CLOUDY (A) 08/10/2022 03:34 PM    Specific gravity 1.016 08/10/2022 03:34 PM    pH (UA) 5.5 08/10/2022 03:34 PM    Protein >300 (A) 08/10/2022 03:34 PM    Glucose 100 (A) 08/10/2022 03:34 PM    Ketone Negative 08/10/2022 03:34 PM    Bilirubin Negative 08/10/2022 03:34 PM    Urobilinogen 0.2 08/10/2022 03:34 PM    Nitrites Negative 08/10/2022 03:34 PM    Leukocyte Esterase TRACE (A) 08/10/2022 03:34 PM    Epithelial cells MODERATE (A) 08/10/2022 03:34 PM    Bacteria 1+ (A) 08/10/2022 03:34 PM    WBC 0-4 08/10/2022 03:34 PM    RBC 0-5 08/10/2022 03:34 PM       I have reviewed the following: All pertinent labs, microbiology data, radiology imaging for my assessment     ECG- Rev: Yes / No  Xray/CT/US/MRI REV: Yes/ No    Care Plan discussed with:  Patient and her . ER nurse and Marvin ROSSI     Chart reviewed. Total time spent with patient:  50 minutes  Medications list Personally Reviewed   [x]      Yes     []               No      Thank you for allowing us to participate in the care of this patient. We will follow patient.  Please dont hesitate to call with any questions    Jo Ann aKte MD  2/17/2023    [address] [place] [address] [address]

## 2023-02-17 NOTE — H&P
History and Physical    Date of Service:  2/17/2023  Primary Care Provider: Mariela Agudelo MD  Source of information: The patient and Chart review    Chief Complaint: Abdominal Pain      History of Presenting Illness:   Dana Carmona is a 70 y.o. female with PMHx of Stage V CKD, gout, morbid obesity, s/p R total hip replacement, and HTN who underwent laparoscopic PD catheter insertion and lysis of adhesions on 2/14/23 with Vascular Surgeon, Dr. Cheryl Nickerson, who now presents to the ED on 2/17/23 with abdominal pain/weakness/LE pain and swelling. Vascular Surgery and Nephrology consulted. Ms. Aneta Maxwell is not feeling well at the time of our encounter. She notes that her symptoms began on 2/16/23 with weakness and feeling cold. She also notes severe pain and swelling in her legs that is exacerbated with pressing. She also is complaining of pain in the abdomen, most notably around the PD catheter site. She denies any headaches or vision changes, chest pain, SOB. She denies any subjective fevers. She continues to make urine and has not started on dialysis at this time but the plan was for initiation of peritoneal dialysis. Reviewed her medication list and re-ordered to reflect. She takes Allopurinol 200mg daily for gout, Amlodipine 10mg daily/Carvedilol 12.5mg BID/Hydralazine 50mg TID for hypertension. She takes Bumex 2mg BID and Sodium Bicarb 650mg BID. She takes Tolterodine ER 4mg. She does not smoke, drink, or use illicit drugs. She lives in [place] with her  and children. She is Full Code. We reviewed blood consent including the risks of transfusion and she consents to receiving a transfusion. She has received blood transfusions in the past and has tolerated without issue. Reviewed the operative report from 2/14 and notably that in the peritoneum with \"murky fluid\" that did not appear grossly infected. A swab was sent and was irrigated and washed out and suctioned out.      ED Course:  -- Vitals: T 97.8F, , /76, SpO2 95% on RA  -- Labs: WBC of 15.5, H/H of 6.2/21.5, Plt 324, Na 136, K 5.1, CO2 17, BUN/SCr of 125/9.23, AG 14, Ca 10.7, T Bili 0.3, ALT/AST < 6/8, Alk Phos 53, Lactate 0.6.  -- Micro: None. Have been unable to get blood cultures due to difficult access from ED Report  -- Radiology: CT A/P without contrast on  with severe degenerative changes of the lumbar spine, interval PD catheter looping in the deep pelvis on the right, minimal fluid abutting the catheter tract in the subcutaneous tissue  -- Access: None. Has lost multiple IV's  -- Medications: None  -- Consult: Hospitalist       REVIEW OF SYSTEMS:  A comprehensive review of systems was negative except for that written in the History of Present Illness. Past Medical History:   Diagnosis Date    Arthritis     L knee    Chronic kidney disease     secondary to hypertensive nephrosclerosis, CKD STG 5, ON ACTIVE KIDNEY TRANSPLANT LIST AT [facility]- DR. SANCHEZ    Gastritis     HISTORY OF 10/2020     Gout     History of claustrophobia     History of total hip replacement, right 2022    Hyperparathyroidism ([facility] [address]     Hypertension       Past Surgical History:   Procedure Laterality Date    HX  SECTION      HX COLONOSCOPY      HX GYN      ECTOPIC PREGNANCY SURGERY     HX HIP REPLACEMENT Left     HX LAP CHOLECYSTECTOMY      HX LUMBAR LAMINECTOMY  2020    [id number]    HX WISDOM TEETH EXTRACTION       Prior to Admission medications    Medication Sig Start Date End Date Taking? Authorizing Provider   oxyCODONE IR (ROXICODONE) 5 mg immediate release tablet Take 1 Tablet by mouth every four (4) hours as needed for Pain for up to 3 days. Max Daily Amount: 30 mg.  Indications: pain 23 Yes Teresa Hamm MD   allopurinoL (ZYLOPRIM) 100 mg tablet TAKE TWO TABLETS BY MOUTH DAILY 23  Yes Red Riggins MD   amLODIPine (NORVASC) 10 mg tablet TAKE ONE TABLET BY MOUTH DAILY 22  Yes Mariela Agudelo MD   sodium bicarbonate 650 mg tablet Take 1 Tablet by mouth three (3) times daily. Patient taking differently: Take 650 mg by mouth two (2) times a day. 8/22/22  Yes Lorie Apple NP   carvediloL (COREG) 6.25 mg tablet Take 2 Tablets by mouth two (2) times daily (with meals). 4/10/22  Yes Mariela Agudelo MD   tolterodine ER (DETROL LA) 4 mg ER capsule Take 1 Capsule by mouth daily. 10/17/21  Yes Mariela Agudelo MD   bumetanide (BUMEX) 1 mg tablet Take 1 mg by mouth daily. 6/25/21  Yes Provider, Historical   hydrALAZINE (APRESOLINE) 10 mg tablet Take 10 mg by mouth three (3) times daily. 6/19/21  Yes Provider, Historical   predniSONE (DELTASONE) 20 mg tablet Take 1 Tablet by mouth two (2) times a day. 1/24/23   Mariela Agudelo MD   senna-docusate (PERICOLACE) 8.6-50 mg per tablet Take 1 Tablet by mouth two (2) times a day. 8/16/22   Cris Garrido PA-C   diphenhydrAMINE (BENADRYL) 50 mg tablet Take 50 mg by mouth nightly as needed for Sleep. Patient not taking: No sig reported    Provider, Historical     Allergies   Allergen Reactions    Morphine Nausea and Vomiting and Cough     Blood pressure and 158-96 heart rate up to 156. Patient does not feel that this is a true reaction and denies    [facility] Swelling     PATIENT DENIES THIS ALLERGY       Family History   Problem Relation Age of Onset    Hypertension Mother     Liver Disease Father     Diabetes Sister     Breast Cancer Paternal Grandmother     Anesth Problems Neg Hx       Social History:  reports that she quit smoking about 22 years ago. Her smoking use included cigarettes. She has a 6.25 pack-year smoking history. She has never used smokeless tobacco. She reports that she does not drink alcohol and does not use drugs.    Social Determinants of Health     Tobacco Use: Medium Risk    Smoking Tobacco Use: Former    Smokeless Tobacco Use: Never    Passive Exposure: Not on file   Alcohol Use: Not on file   Financial Resource Strain: Not on file   Food Insecurity: Not on file   Transportation Needs: Not on file   Physical Activity: Not on file   Stress: Not on file   Social Connections: Not on file   Intimate Partner Violence: Not on file   Depression: Not at risk    PHQ-2 Score: 0   Housing Stability: Not on file        Medications were reconciled to the best of my ability given all available resources at the time of admission. Route is PO if not otherwise noted. Family and social history were personally reviewed, all pertinent and relevant details are outlined as above. Objective:   Visit Vitals  BP (!) 149/79   Pulse (!) 108   Temp 97.8 °F (36.6 °C)   Resp 16   SpO2 95%    O2 Flow Rate (L/min): 2 l/min O2 Device: Nasal cannula    PHYSICAL EXAM:   General: Alert x oriented x 3, awake, appears uncomfortable, shivering  HEENT: PEERL, EOMI. MM dry  Neck: Supple  Chest: Clear to auscultation bilaterally   CVS: Tachycardic, harsh/loud holosystolic murmur best appreciated in the RUSB  Abd: Soft, non-tender, non-distended. In the RLQ is the PD catheter site with an old dressing overlying with dry/caked-on blood. No active leaking/discharge/pus from site  Ext: LLE> RLE edema with severe pain illicited with palpation  Neuro/Psych: Appears anxious. No appreciable CN deficits.  Moves all extremities spontaneously, but hesitant to move her legs due to the severe pain  Skin: Warm, dry, without rashes or lesions    Data Review:   I have independently reviewed and interpreted patient's lab and all other diagnostic data  Lab Results   Component Value Date/Time    WBC 15.5 (H) 02/17/2023 03:08 AM    Hemoglobin (POC) 6.8 (L) 02/14/2023 12:04 PM    HGB 6.2 (L) 02/17/2023 03:08 AM    HCT 21.5 (L) 02/17/2023 03:08 AM    PLATELET 917 03/78/6015 03:08 AM    MCV 93.1 02/17/2023 03:08 AM      Lab Results   Component Value Date/Time    Sodium 136 02/17/2023 03:08 AM    Potassium 5.1 02/17/2023 03:08 AM    Chloride 105 02/17/2023 03:08 AM    CO2 17 (L) 02/17/2023 03:08 AM    Anion gap 14 02/17/2023 03:08 AM    Glucose 94 02/17/2023 03:08 AM     (H) 02/17/2023 03:08 AM    Creatinine 9.23 (H) 02/17/2023 03:08 AM    BUN/Creatinine ratio 14 02/17/2023 03:08 AM    GFR est AA 9 (L) 09/16/2022 03:02 PM    GFR est non-AA 7 (L) 09/16/2022 03:02 PM    eGFR 4 (L) 02/17/2023 03:08 AM    Calcium 10.7 (H) 02/17/2023 03:08 AM      Lab Results   Component Value Date/Time    ALT (SGPT) <6 (L) 02/17/2023 03:08 AM    AST (SGOT) 8 (L) 02/17/2023 03:08 AM    Alk. phosphatase 53 02/17/2023 03:08 AM    Bilirubin, total 0.3 02/17/2023 03:08 AM      Lactate: 0.6 on 2/17    Wound Culture from 2/14: Occasional G+ cocci in clusters  Anaerobic Culture from 2/14: PENDING    IMAGING:   CT ABD PELV WO CONT   Final Result   Interval peritoneal dialysis catheter with looping in the deep pelvis on the   right. There is minimal fluid abutting the catheter tract in the subcutaneous   tissue. Anasarca. .      Severe degenerative changes of the lumbar spine with severe canal and foraminal   stenoses. Status post laminectomy. Additional incidental/nonemergent findings as described above.             XR CHEST PORT    (Results Pending)   DUPLEX LOWER EXT VENOUS BILAT    (Results Pending)        ECG/ECHO:    Results for orders placed or performed during the hospital encounter of 09/01/22   EKG, 12 LEAD, INITIAL   Result Value Ref Range    Ventricular Rate 100 BPM    Atrial Rate 100 BPM    P-R Interval 146 ms    QRS Duration 76 ms    Q-T Interval 316 ms    QTC Calculation (Bezet) 407 ms    Calculated P Axis 55 degrees    Calculated R Axis -6 degrees    Calculated T Axis 70 degrees    Diagnosis       Normal sinus rhythm  Minimal voltage criteria for LVH, may be normal variant ( R in aVL )  Borderline ECG  When compared with ECG of 19-APR-2022 10:48,  No significant change was found  Confirmed by Cristina Strickland MD, Thaisnn Flax ([id number]) on 9/1/2022 9:04:21 PM            Notes reviewed from all clinical/nonclinical/nursing services involved in patient's clinical care. Care coordination discussions were held with appropriate clinical/nonclinical/ nursing providers based on care coordination needs. Assessment:   Jemima Angel is a 70 y.o. female with PMHx of Stage V CKD, gout, morbid obesity, s/p R total hip replacement, and HTN who underwent laparoscopic PD catheter insertion and lysis of adhesions on 2/14/23 with Vascular Surgeon, Dr. Chris Lowery, who now presents to the ED on 2/17/23 with abdominal pain/weakness/LE pain and swelling. Vascular Surgery and Nephrology consulted. Active Problems:    Anemia (2/17/2023)      Tachycardia (2/17/2023)      Sepsis ([address] (2/17/2023)        Plan:   Stage V CKD, progressing to ESRD  Electrolyte derangements  Anemia  Tachycardia  Leukocytosis  -- Consult with Vascular Surgery, Dr. Lake Pineda, and Nephrology. She may require dialysis during this inpatient course  -- Hold her Bumex 2mg BID for now due to clinical status and risk for decompensation  -- Renally dose medications  -- Cultures: BCx on 2/17AM (Drawn as arterial stick with respiratory), OR cultures from 2/14 (wound culture with G+ cocci in clusters, anaerobic pending). Send UA and Urine culture  -- Abx: Start renally dosed Zosyn and then Vancomycin (pharmacy dosing) with secured access. Appreciate pharmacy dosing  -- IVF: Start with LR 250cc bolus and see how tolerates with her CKD when access secured. Further dosing TBD  -- Anemia: Likely due to CKD. Completed iron panel on 1/27/23 with iron of 104, TIBC 190, Iron % 55. Ordered for 1 unit pRBC. Needs access. Consented for blood products.  Has active T&S from 2/17  -- Her SCr of 9.23 is due to ESRD    Poor Access  -- She is not a candidate for a PICC or midline due to her severe CKD  -- Central line venous catheter ordered    Acute LE pain/swelling  -- Gilbert ordered, high clinical suspicion for DVT    HTN  -- Continue home Carvedilol BID, Hydralazine TID, and Amlodipine daily for now.     Gout  -- Decrease her Allopurinol dose to 100mg daily with renal disease    Urinary incontinence  -- Pharmacy to substitute home Tolterodine ER 4mg  based on hospital formulary    Addendum at 11:00: I called and updated her , Jesus Amaya, and answered all questions and concerns possible. She had the central line placed and awaiting confirmation of placement. She will then get pRBC, LR 250cc bolus, and IV antibiotics. Discussed with unit receptionist about consults for Vascular Surgery/Nephrology and they will call. Gilbert performed and negative for DVT, but hold off on AC at this moment due to degree of anemia. Reviewed case with Attending MD    Addendum at 15:29: Met with ED RN. Spoke about case with Nephrology they are planning to start HD on 2/17 due to concern for uremia. BP medications held at request of Nephrology. Will re-time the blood transfusion with dialysis. Nephrology assisting with HD line placement. Vascular Surgery was in with patient and will follow-up their recommendations    DIET: DIET NPO Sips of Water with Meds   ISOLATION PRECAUTIONS: There are currently no Active Isolations  CODE STATUS: Full Code   DVT PROPHYLAXIS: SCDs as needs central line. After TBD based on Gilbert results and clinical status  EMERGENCY CONTACT/SURROGATE DECISION MAKER: Paula Diop, [phone]    CRITICAL CARE WAS PERFORMED FOR THIS ENCOUNTER: NO.      Signed By: Josie Ellis PA-C     February 17, 2023         Please note that this dictation may have been completed with [employer], the computer voice recognition software. Quite often unanticipated grammatical, syntax, homophones, and other interpretive errors are inadvertently transcribed by the computer software. Please disregard these errors. Please excuse any errors that have escaped final proofreading.

## 2023-02-18 LAB
ALBUMIN SERPL-MCNC: 2.5 G/DL (ref 3.5–5)
ALBUMIN/GLOB SERPL: 1 (ref 1.1–2.2)
ALP SERPL-CCNC: 48 U/L (ref 45–117)
ALT SERPL-CCNC: <6 U/L (ref 12–78)
ANION GAP SERPL CALC-SCNC: 14 MMOL/L (ref 5–15)
APTT PPP: 25.7 SEC (ref 22.1–31)
AST SERPL-CCNC: 12 U/L (ref 15–37)
BASOPHILS # BLD: 0 K/UL (ref 0–0.1)
BASOPHILS NFR BLD: 0 % (ref 0–1)
BILIRUB SERPL-MCNC: 0.8 MG/DL (ref 0.2–1)
BUN SERPL-MCNC: 93 MG/DL (ref 6–20)
BUN/CREAT SERPL: 13 (ref 12–20)
CALCIUM SERPL-MCNC: 9.9 MG/DL (ref 8.5–10.1)
CALCIUM SERPL-MCNC: 9.9 MG/DL (ref 8.5–10.1)
CHLORIDE SERPL-SCNC: 105 MMOL/L (ref 97–108)
CO2 SERPL-SCNC: 19 MMOL/L (ref 21–32)
CREAT SERPL-MCNC: 7 MG/DL (ref 0.55–1.02)
DIFFERENTIAL METHOD BLD: ABNORMAL
EOSINOPHIL # BLD: 0.2 K/UL (ref 0–0.4)
EOSINOPHIL NFR BLD: 1 % (ref 0–7)
ERYTHROCYTE [DISTWIDTH] IN BLOOD BY AUTOMATED COUNT: 15.5 % (ref 11.5–14.5)
FERRITIN SERPL-MCNC: 544 NG/ML (ref 26–388)
GLOBULIN SER CALC-MCNC: 2.6 G/DL (ref 2–4)
GLUCOSE SERPL-MCNC: 64 MG/DL (ref 65–100)
HBV SURFACE AB SER QL: NONREACTIVE
HBV SURFACE AB SER-ACNC: <3.1 MIU/ML
HBV SURFACE AG SER QL: <0.1 INDEX
HBV SURFACE AG SER QL: NEGATIVE
HCT VFR BLD AUTO: 20.5 % (ref 35–47)
HGB BLD-MCNC: 6.3 G/DL (ref 11.5–16)
HGB BLD-MCNC: 7 G/DL (ref 11.5–16)
HISTORY CHECKED?,CKHIST: NORMAL
IMM GRANULOCYTES # BLD AUTO: 0.2 K/UL (ref 0–0.04)
IMM GRANULOCYTES NFR BLD AUTO: 1 % (ref 0–0.5)
INR PPP: 1.1 (ref 0.9–1.1)
IRON SATN MFR SERPL: 43 % (ref 20–50)
IRON SERPL-MCNC: 59 UG/DL (ref 35–150)
LYMPHOCYTES # BLD: 0.7 K/UL (ref 0.8–3.5)
LYMPHOCYTES NFR BLD: 4 % (ref 12–49)
MAGNESIUM SERPL-MCNC: 1.3 MG/DL (ref 1.6–2.4)
MCH RBC QN AUTO: 27.5 PG (ref 26–34)
MCHC RBC AUTO-ENTMCNC: 30.7 G/DL (ref 30–36.5)
MCV RBC AUTO: 89.5 FL (ref 80–99)
MONOCYTES # BLD: 1.3 K/UL (ref 0–1)
MONOCYTES NFR BLD: 8 % (ref 5–13)
NEUTS SEG # BLD: 13.9 K/UL (ref 1.8–8)
NEUTS SEG NFR BLD: 86 % (ref 32–75)
NRBC # BLD: 0.02 K/UL (ref 0–0.01)
NRBC BLD-RTO: 0.1 PER 100 WBC
PHOSPHATE SERPL-MCNC: 8.7 MG/DL (ref 2.6–4.7)
PLATELET # BLD AUTO: 288 K/UL (ref 150–400)
PLATELET COMMENTS,PCOM: ABNORMAL
PMV BLD AUTO: 11 FL (ref 8.9–12.9)
POTASSIUM SERPL-SCNC: 4.5 MMOL/L (ref 3.5–5.1)
PROT SERPL-MCNC: 5.1 G/DL (ref 6.4–8.2)
PROTHROMBIN TIME: 11.3 SEC (ref 9–11.1)
PTH-INTACT SERPL-MCNC: >2000 PG/ML (ref 18.4–88)
RBC # BLD AUTO: 2.29 M/UL (ref 3.8–5.2)
RBC MORPH BLD: ABNORMAL
RBC MORPH BLD: ABNORMAL
SODIUM SERPL-SCNC: 138 MMOL/L (ref 136–145)
THERAPEUTIC RANGE,PTTT: NORMAL SECS (ref 58–77)
TIBC SERPL-MCNC: 137 UG/DL (ref 250–450)
VANCOMYCIN SERPL-MCNC: 20.8 UG/ML
WBC # BLD AUTO: 16.3 K/UL (ref 3.6–11)

## 2023-02-18 PROCEDURE — 74011250637 HC RX REV CODE- 250/637: Performed by: INTERNAL MEDICINE

## 2023-02-18 PROCEDURE — 36430 TRANSFUSION BLD/BLD COMPNT: CPT

## 2023-02-18 PROCEDURE — 74011000250 HC RX REV CODE- 250: Performed by: PHYSICIAN ASSISTANT

## 2023-02-18 PROCEDURE — 74011250636 HC RX REV CODE- 250/636: Performed by: PHYSICIAN ASSISTANT

## 2023-02-18 PROCEDURE — 80053 COMPREHEN METABOLIC PANEL: CPT

## 2023-02-18 PROCEDURE — 83540 ASSAY OF IRON: CPT

## 2023-02-18 PROCEDURE — 74011250637 HC RX REV CODE- 250/637: Performed by: HOSPITALIST

## 2023-02-18 PROCEDURE — 74011250637 HC RX REV CODE- 250/637: Performed by: PHYSICIAN ASSISTANT

## 2023-02-18 PROCEDURE — 80202 ASSAY OF VANCOMYCIN: CPT

## 2023-02-18 PROCEDURE — 82728 ASSAY OF FERRITIN: CPT

## 2023-02-18 PROCEDURE — 83735 ASSAY OF MAGNESIUM: CPT

## 2023-02-18 PROCEDURE — 85025 COMPLETE CBC W/AUTO DIFF WBC: CPT

## 2023-02-18 PROCEDURE — 85730 THROMBOPLASTIN TIME PARTIAL: CPT

## 2023-02-18 PROCEDURE — 85018 HEMOGLOBIN: CPT

## 2023-02-18 PROCEDURE — 65270000046 HC RM TELEMETRY

## 2023-02-18 PROCEDURE — P9016 RBC LEUKOCYTES REDUCED: HCPCS

## 2023-02-18 PROCEDURE — 84100 ASSAY OF PHOSPHORUS: CPT

## 2023-02-18 PROCEDURE — 83970 ASSAY OF PARATHORMONE: CPT

## 2023-02-18 PROCEDURE — 90935 HEMODIALYSIS ONE EVALUATION: CPT

## 2023-02-18 PROCEDURE — 36415 COLL VENOUS BLD VENIPUNCTURE: CPT

## 2023-02-18 PROCEDURE — 74011000258 HC RX REV CODE- 258: Performed by: PHYSICIAN ASSISTANT

## 2023-02-18 PROCEDURE — 74011250636 HC RX REV CODE- 250/636: Performed by: INTERNAL MEDICINE

## 2023-02-18 PROCEDURE — 85610 PROTHROMBIN TIME: CPT

## 2023-02-18 RX ORDER — SEVELAMER CARBONATE FOR ORAL SUSPENSION 800 MG/1
0.8 POWDER, FOR SUSPENSION ORAL
Status: DISCONTINUED | OUTPATIENT
Start: 2023-02-18 | End: 2023-02-21

## 2023-02-18 RX ORDER — SODIUM CHLORIDE 9 MG/ML
250 INJECTION, SOLUTION INTRAVENOUS AS NEEDED
Status: DISCONTINUED | OUTPATIENT
Start: 2023-02-18 | End: 2023-02-22 | Stop reason: HOSPADM

## 2023-02-18 RX ORDER — LANOLIN ALCOHOL/MO/W.PET/CERES
400 CREAM (GRAM) TOPICAL 2 TIMES DAILY
Status: COMPLETED | OUTPATIENT
Start: 2023-02-18 | End: 2023-02-19

## 2023-02-18 RX ADMIN — ALLOPURINOL 100 MG: 100 TABLET ORAL at 09:59

## 2023-02-18 RX ADMIN — VANCOMYCIN HYDROCHLORIDE 1000 MG: 1 INJECTION, POWDER, LYOPHILIZED, FOR SOLUTION INTRAVENOUS at 22:27

## 2023-02-18 RX ADMIN — PIPERACILLIN AND TAZOBACTAM 3.38 G: 3; .375 INJECTION, POWDER, FOR SOLUTION INTRAVENOUS at 04:44

## 2023-02-18 RX ADMIN — OXYCODONE HYDROCHLORIDE 5 MG: 5 TABLET ORAL at 22:32

## 2023-02-18 RX ADMIN — PIPERACILLIN AND TAZOBACTAM 3.38 G: 3; .375 INJECTION, POWDER, FOR SOLUTION INTRAVENOUS at 15:23

## 2023-02-18 RX ADMIN — ACETAMINOPHEN 650 MG: 325 TABLET ORAL at 22:33

## 2023-02-18 RX ADMIN — SODIUM BICARBONATE 1300 MG: 650 TABLET ORAL at 09:59

## 2023-02-18 RX ADMIN — MAGNESIUM OXIDE 400 MG (241.3 MG MAGNESIUM) TABLET 400 MG: TABLET at 10:04

## 2023-02-18 RX ADMIN — TROSPIUM CHLORIDE 20 MG: 20 TABLET, FILM COATED ORAL at 09:59

## 2023-02-18 RX ADMIN — SODIUM CHLORIDE, PRESERVATIVE FREE 10 ML: 5 INJECTION INTRAVENOUS at 14:00

## 2023-02-18 RX ADMIN — SEVELAMER CARBONATE FOR ORAL SUSPENSION 0.8 G: 800 POWDER, FOR SUSPENSION ORAL at 20:40

## 2023-02-18 RX ADMIN — EPOETIN ALFA-EPBX 10000 UNITS: 10000 INJECTION, SOLUTION INTRAVENOUS; SUBCUTANEOUS at 12:37

## 2023-02-18 RX ADMIN — MAGNESIUM OXIDE 400 MG (241.3 MG MAGNESIUM) TABLET 400 MG: TABLET at 20:40

## 2023-02-18 RX ADMIN — SEVELAMER CARBONATE FOR ORAL SUSPENSION 0.8 G: 800 POWDER, FOR SUSPENSION ORAL at 12:36

## 2023-02-18 RX ADMIN — SODIUM BICARBONATE 1300 MG: 650 TABLET ORAL at 20:40

## 2023-02-18 RX ADMIN — SODIUM CHLORIDE, PRESERVATIVE FREE 10 ML: 5 INJECTION INTRAVENOUS at 22:27

## 2023-02-18 NOTE — PROCEDURES
Hemodialysis / [phone]    Vitals Pre Post Assessment Pre Post   BP BP: (!) 146/85 (02/18/23 1740)   140/77 LOC A&Ox4 A&Ox4   HR Pulse (Heart Rate): (!) 109 (02/18/23 1800)   107 Lungs CTA bilaterally CTA bilaterally   Resp Resp Rate: 18 (02/18/23 1740) 18 Cardiac Regular, S1, S2 Regular, S1, S2   Temp Temp: 99.3 °F (37.4 °C) (02/18/23 1740) 98 Skin R IJ Maldonado, PD catheter RLQ R IJ Maldonado, PD catheter RLQ   Weight Pre-Dialysis Weight: 112 kg (246 lb 14.6 oz) (02/17/23 2304)  Edema 2+ lower extremity 2+ lower extremity   Tele status ST ST Pain Pain Intensity 1: 0 (02/18/23 0930) 0     Orders   Duration: Start: 1740 End: 2011 Total: 2.5 hours   Dialyzer: Dialyzer/Set Up Inspection: Adolm Holstein (02/18/23 1740)   K Bath: Dialysate K (mEq/L): 2 (02/18/23 1740)   Ca Bath: Dialysate CA (mEq/L): 2.0 (02/18/23 1740)   Na: Dialysate NA (mEq/L): 138 (02/18/23 1740)   Bicarb: Dialysate HCO3 (mEq/L): 35 (02/18/23 1740)   Target Fluid Removal: Goal/Amount of Fluid to Remove (mL): 1000 mL (02/18/23 1740)     Access   Type & Location: R IJ Maldonado: Pre- Assessment: Dressing CDI. No s/s of infection. Both lumens aspirate & flush well. Running well at . Post assessment: Dressing CDI. No changes. Dressing dated 02/17/23.     RLQ PD Catheter (not in current use)   Comments:                                        Labs   HBsAg (Antigen) / date:  02/17/23 Negative                                             HBsAb (Antibody) / date: 02/17/23 Susceptible    Source:    Obtained/Reviewed  Critical Results Called HGB   Date Value Ref Range Status   02/18/2023 6.3 (L) 11.5 - 16.0 g/dL Final     Comment:     RESULTS REPEATED     Potassium   Date Value Ref Range Status   02/18/2023 4.5 3.5 - 5.1 mmol/L Final     Calcium   Date Value Ref Range Status   02/18/2023 9.9 8.5 - 10.1 MG/DL Final   02/18/2023 9.9 8.5 - 10.1 MG/DL Final     BUN   Date Value Ref Range Status   02/18/2023 93 (H) 6 - 20 MG/DL Final     Creatinine   Date Value Ref Range Status   02/18/2023 7.00 (H) 0.55 - 1.02 MG/DL Final     Comment:     INVESTIGATED PER DELTA CHECK PROTOCOL        Meds Given   Name Dose Route   None                 Adequacy / Fluid    Total Liters Process: 43.1 L   Net Fluid Removed: 1000 mL      Comments   Time Out Done:   (Time) @ 1182   Admitting Diagnosis: Stage V CKD, progressing to ESRD  Electrolyte derangements  Anemia  Tachycardia  Leukocytosis   Consent obtained/signed: Informed Consent Verified: Yes (02/18/23 1740)   Machine / Martine Joseph # Machine Number: [id number] (02/18/23 1740)   Primary Nurse Rpt Pre: Cande Ruvalcaba RN   Primary Nurse Rpt Post: Cande Ruvalcaba RN   Pt Education: Ordered Dialysis Treatment    Care Plan: CKD   Pts outpatient clinic: N/A     Tx Summary   Comments:                   SBAR received from Primary RN. Pt A&Ox4. Consent signed & on file. 1740: Each catheter limb disinfected per p&p, caps removed, hubs disinfected per p&p. Each lumen aspirated for blood return and flushed with Normal Saline per policy. VSS. Dialysis Tx initiated. 1800: Lines visible, patent, and intact. 1900: Pt tolerating treatment well, vital signs stable, lines patent and intact, pt resting quietly. 2011: Tx ended. VSS. Each dialysis catheter limb disinfected per p&p, all possible blood returned per p&p, and each dialysis hub disinfected per p&p. Each lumen flushed, post dialysis catheter normal saline dwell instilled per order, and caps applied. Bed locked and in the lowest position, call bell and belongings in reach. SBAR given to Primary, HUGO. Patient is stable at time of my departure. All Dialysis related medications have been reviewed.

## 2023-02-18 NOTE — PROGRESS NOTES
Renal Progress Note    NAME:  Philemon Spurling   :   1960   MRN:   [id number]     Date/Time:  2023 10:16 AM  Subjective:       -patient awake and alert, denies any complaints, no abdominal pain      Medications reviewed:  Current Facility-Administered Medications   Medication Dose Route Frequency    0.9% sodium chloride infusion 250 mL  250 mL IntraVENous PRN    magnesium oxide (MAG-OX) tablet 400 mg  400 mg Oral BID    0.9% sodium chloride infusion 250 mL  250 mL IntraVENous PRN    sodium chloride (NS) flush 5-40 mL  5-40 mL IntraVENous Q8H    sodium chloride (NS) flush 5-40 mL  5-40 mL IntraVENous PRN    acetaminophen (TYLENOL) tablet 650 mg  650 mg Oral Q6H PRN    Or    acetaminophen (TYLENOL) suppository 650 mg  650 mg Rectal Q6H PRN    polyethylene glycol (MIRALAX) packet 17 g  17 g Oral DAILY PRN    oxyCODONE IR (ROXICODONE) tablet 5 mg  5 mg Oral Q4H PRN    oxyCODONE IR (ROXICODONE) tablet 10 mg  10 mg Oral Q4H PRN    allopurinoL (ZYLOPRIM) tablet 100 mg  100 mg Oral DAILY    [Held by provider] amLODIPine (NORVASC) tablet 10 mg  10 mg Oral DAILY    [Held by provider] carvediloL (COREG) tablet 12.5 mg  12.5 mg Oral BID WITH MEALS    [Held by provider] hydrALAZINE (APRESOLINE) tablet 50 mg  50 mg Oral TID    trospium (SANCTURA) tablet 20 mg  20 mg Oral DAILY    piperacillin-tazobactam (ZOSYN) 3.375 g in 0.9% sodium chloride (MBP/ADV) 100 mL MBP  3.375 g IntraVENous Q12H    vancomycin - pharmacy to dose   Other Rx Dosing/Monitoring    sodium bicarbonate tablet 1,300 mg  1,300 mg Oral BID    bumetanide (BUMEX) tablet 2 mg  2 mg Oral DAILY        Objective:   Vitals:  Visit Vitals  BP 90/60 (BP 1 Location: Right upper arm, BP Patient Position: At rest;Semi fowlers)   Pulse (!) 111   Temp 98.9 °F (37.2 °C)   Resp 18   SpO2 93%     Temp (24hrs), Av.6 °F (37 °C), Min:98 °F (36.7 °C), Max:98.9 °F (37.2 °C)    O2 Flow Rate (L/min): 2 l/min O2 Device: None (Room air)    Last 24hr Input/Output:    Intake/Output Summary (Last 24 hours) at 2/18/2023 1016  Last data filed at 2/18/2023 0110  Gross per 24 hour   Intake 280 ml   Output 1000 ml   Net -720 ml        Physical Exam:  General: awake and alert, NAD, obese  HEENT: Eyes are PERRL. Conjunctiva without pallor ,erythema. The sclerae without icterus. .   Neck:Supple, RT IJ HD catheter  Lungs : Clears to auscultation Bilaterally, Normal respiratory effort  CVS: RRR, S1 S2 normal, SM 2/6,  No rub, 2+ LE edema  Abdomen: Soft, NT , PD catheter RLQ, bowel sounds present  Extremities: No cyanosis, No clubbing  Skin: No rash   Neurologic: non focal, no Astrexis, AOx3  Psych: normal affect       [] Telemetry Reviewed     [] NSR [] PAC/PVCs   [] Afib  [] Paced   [] NSVT   [] Zavala [] NGT  [] Intubated on vent    Lab Data Reviewed:    Recent Results (from the past 24 hour(s))   HEP B SURFACE AG    Collection Time: 02/17/23 11:17 PM   Result Value Ref Range    Hepatitis B surface Ag <0.10 Index    Hep B surface Ag Interp. Negative NEG     HEP B SURFACE AB    Collection Time: 02/17/23 11:17 PM   Result Value Ref Range    Hepatitis B surface Ab <3.10 mIU/mL    Hep B surface Ab Interp. NONREACTIVE NR     METABOLIC PANEL, COMPREHENSIVE    Collection Time: 02/18/23  4:49 AM   Result Value Ref Range    Sodium 138 136 - 145 mmol/L    Potassium 4.5 3.5 - 5.1 mmol/L    Chloride 105 97 - 108 mmol/L    CO2 19 (L) 21 - 32 mmol/L    Anion gap 14 5 - 15 mmol/L    Glucose 64 (L) 65 - 100 mg/dL    BUN 93 (H) 6 - 20 MG/DL    Creatinine 7.00 (H) 0.55 - 1.02 MG/DL    BUN/Creatinine ratio 13 12 - 20      eGFR 6 (L) >60 ml/min/1.73m2    Calcium 9.9 8.5 - 10.1 MG/DL    Bilirubin, total 0.8 0.2 - 1.0 MG/DL    ALT (SGPT) <6 (L) 12 - 78 U/L    AST (SGOT) 12 (L) 15 - 37 U/L    Alk.  phosphatase 48 45 - 117 U/L    Protein, total 5.1 (L) 6.4 - 8.2 g/dL    Albumin 2.5 (L) 3.5 - 5.0 g/dL    Globulin 2.6 2.0 - 4.0 g/dL    A-G Ratio 1.0 (L) 1.1 - 2.2     MAGNESIUM    Collection Time: 02/18/23  4:49 AM   Result Value Ref Range    Magnesium 1.3 (L) 1.6 - 2.4 mg/dL   CBC WITH AUTOMATED DIFF    Collection Time: 02/18/23  4:49 AM   Result Value Ref Range    WBC 16.3 (H) 3.6 - 11.0 K/uL    RBC 2.29 (L) 3.80 - 5.20 M/uL    HGB 6.3 (L) 11.5 - 16.0 g/dL    HCT 20.5 (L) 35.0 - 47.0 %    MCV 89.5 80.0 - 99.0 FL    MCH 27.5 26.0 - 34.0 PG    MCHC 30.7 30.0 - 36.5 g/dL    RDW 15.5 (H) 11.5 - 14.5 %    PLATELET 545 190 - 244 K/uL    MPV 11.0 8.9 - 12.9 FL    NRBC 0.1 (H) 0  WBC    ABSOLUTE NRBC 0.02 (H) 0.00 - 0.01 K/uL    NEUTROPHILS 86 (H) 32 - 75 %    LYMPHOCYTES 4 (L) 12 - 49 %    MONOCYTES 8 5 - 13 %    EOSINOPHILS 1 0 - 7 %    BASOPHILS 0 0 - 1 %    IMMATURE GRANULOCYTES 1 (H) 0.0 - 0.5 %    ABS. NEUTROPHILS 13.9 (H) 1.8 - 8.0 K/UL    ABS. LYMPHOCYTES 0.7 (L) 0.8 - 3.5 K/UL    ABS. MONOCYTES 1.3 (H) 0.0 - 1.0 K/UL    ABS. EOSINOPHILS 0.2 0.0 - 0.4 K/UL    ABS. BASOPHILS 0.0 0.0 - 0.1 K/UL    ABS. IMM.  GRANS. 0.2 (H) 0.00 - 0.04 K/UL    DF SMEAR SCANNED      PLATELET COMMENTS CLUMPED PLATELETS      RBC COMMENTS ANISOCYTOSIS  1+        RBC COMMENTS HYPOCHROMIA  PRESENT       PROTHROMBIN TIME + INR    Collection Time: 02/18/23  4:49 AM   Result Value Ref Range    INR 1.1 0.9 - 1.1      Prothrombin time 11.3 (H) 9.0 - 11.1 sec   PTT    Collection Time: 02/18/23  4:49 AM   Result Value Ref Range    aPTT 25.7 22.1 - 31.0 sec    aPTT, therapeutic range     58.0 - 77.0 SECS   IRON PROFILE    Collection Time: 02/18/23  4:49 AM   Result Value Ref Range    Iron 59 35 - 150 ug/dL    TIBC 137 (L) 250 - 450 ug/dL    Iron % saturation 43 20 - 50 %   FERRITIN    Collection Time: 02/18/23  4:49 AM   Result Value Ref Range    Ferritin 544 (H) 26 - 388 NG/ML   PTH INTACT    Collection Time: 02/18/23  4:49 AM   Result Value Ref Range    Calcium 9.9 8.5 - 10.1 MG/DL    PTH, Intact >2,000.0 (H) 18.4 - 88.0 pg/mL   VANCOMYCIN, RANDOM    Collection Time: 02/18/23  4:49 AM   Result Value Ref Range    Vancomycin, random 20.8 UG/ML   PHOSPHORUS    Collection Time: 02/18/23  4:49 AM   Result Value Ref Range    Phosphorus 8.7 (H) 2.6 - 4.7 MG/DL   RBC, ALLOCATE    Collection Time: 02/18/23  9:30 AM   Result Value Ref Range    HISTORY CHECKED? Historical check performed          Assessment/Plan:   Active Problems:    Anemia (2/17/2023)      Tachycardia (2/17/2023)      Sepsis ([address] (2/17/2023)         ESRD, s/p PD catheter placement on 2/14/23-not on dialysis yet  -patient is uremic, fluid overload and has metabolic acidosis  -PD catheter not ready for starting PD  Discussed with patient and her  and they agree with the plan-2/17  -s/p temp HD catheter, had HD last night. HD today and  Sunday. -HD catheter will be removed prior to DC home and she can start PD as outpatient  - increase Na bicarb to 1300 mg bid  -check UA and UC    Hyperphosphatemia, start renvela     Abdominal pain  -seen by vascular surgery. Had small amount of fluid in pelvis at the time of PD catheter placement which sent for culture. Culture so far negative but had few G+cocci and rare WBC  -Agree with Abx coverage  -improved     Anemia  -transfuse for Hb<7 today on HD  -iron sat ok  -epogen 63366 unit x1      HTN-BP low this am, hold BP meds and bumex.  Resume bumex on discharge  ___________________________________________________    Total time spent with patient:  [] 15   [] 25   [] 35   []  __ minutes    [] Critical Care Provided    Care Plan discussed with:    [x] Patient   [] Family    [] Care Manager   [] Consultant/Specialist :      []   >50% of visit spent in counseling and coordination of care   (Discussed [] CODE status,  [] Care Plan, [] D/C Planning)    ___________________________________________________    Attending Physician: MD Milton Page

## 2023-02-18 NOTE — ED NOTES
TRANSFER - OUT REPORT:    Verbal report given to [facility]) on Henok Iyer  being transferred to (unit) for routine progression of care       Report consisted of patients Situation, Background, Assessment and   Recommendations(SBAR). Information from the following report(s) SBAR, Kardex, and ED Summary was reviewed with the receiving nurse. Lines:   Triple Lumen 02/17/23 Right Internal jugular (Active)   Central Line Being Utilized Yes 02/17/23 1600   Criteria for Appropriate Use Limited/no vessel suitable for conventional peripheral access 02/17/23 1600   Site Assessment Clean, dry, & intact 02/17/23 1600   Infiltration Assessment 0 02/17/23 1600   Date of Last Dressing Change 02/17/23 02/17/23 1600   Dressing Status Clean, dry, & intact 02/17/23 1600   Dressing Type Disk with Chlorhexadine gluconate (CHG) 02/17/23 1600   Action Taken Open ports on tubing capped 02/17/23 1600   Alcohol Cap Used Yes 02/17/23 1600        Opportunity for questions and clarification was provided.       Patient transported with:   Monitor  Registered Nurse

## 2023-02-18 NOTE — PROGRESS NOTES
Bedside and Verbal shift change report given to Navarro (oncoming nurse) by Beatriz Berg (offgoing nurse). Report included the following information SBAR, Kardex, MAR, and Recent Results.

## 2023-02-18 NOTE — PROGRESS NOTES
Hospitalist Progress Note  Corin Mirza MD  Answering service: [phone] OR 36 from in house phone        Date of Service:  2023  NAME:  Chase Sánchez  :  1960  MRN:  [id number]      Admission Summary:   Chase Sánchez is a 70 y.o. female with PMHx of Stage V CKD, gout, morbid obesity, s/p R total hip replacement, and HTN who underwent laparoscopic PD catheter insertion and lysis of adhesions on 23 with Vascular Surgeon, Dr. Jolene Mota, who now presents to the ED on 23 with abdominal pain/weakness/LE pain and swelling. Vascular Surgery and Nephrology consulted. Ms. Goldie Kwon is not feeling well at the time of our encounter. She notes that her symptoms began on 23 with weakness and feeling cold. She also notes severe pain and swelling in her legs that is exacerbated with pressing. She also is complaining of pain in the abdomen, most notably around the PD catheter site. She denies any headaches or vision changes, chest pain, SOB. She denies any subjective fevers. She continues to make urine and has not started on dialysis at this time but the plan was for initiation of peritoneal dialysis. Interval history / Subjective:   Overall weak  No sob. No bleeding noticed. Assessment & Plan:        Stage V CKD, progressing to ESRD  Electrolyte derangements  Anemia  Tachycardia  Leukocytosis  -- Consulted with Vascular Surgery, Dr. Renny Velasco, and Nephrology. - s/p PD catheter placement on 23-not on dialysis yet  -patient is uremic, now  fluid overload and has metabolic acidosis  -PD catheter not ready for starting PD  -  Discussed with patient and her  and they agree with the plan-  -IR s/p temp HD catheter , had HD , and will have HD today and  .    -HD catheter will be removed prior to DC home and she can start PD as outpatient  - increase Na bicarb to 1300 mg bid    Acute metabolic encephalopathy  Due to uremia  Improved after dialysis     Abdominal pain may have peritonitis   -seen by vascular surgery. Had small amount of fluid in pelvis at the time of PD catheter placement which sent for culture. Culture so far negative but had few G+cocci and rare WBC  -Agree with Abx coverage: vanco and zosyn   -improved       Anemia: Likely due to CKD. Completed iron panel on 1/27/23 with iron of 104, TIBC 190, Iron % 55. Ordered for 1 unit pRBC. -- ego per renal     Sinus tachycardia:  Due to above      Poor Access  -- She is not a candidate for a PICC or midline due to her severe CKD  -- Central line venous catheter right ij      Acute LE pain/swelling  -- no dvt  --DUE TO FLUID OVERLOAD      HTN  -- home Carvedilol BID, Hydralazine TID, and Amlodipine on hold now  Will monitor and re-eval      Gout  -- Decrease her Allopurinol dose to 100mg daily with renal disease     Urinary incontinence  -- Pharmacy to substitute home Tolterodine ER 4mg  based on hospital formulary          Code status: FULL   Prophylaxis: SCD, SC HEPARIN   Care Plan discussed with: PT, RN,   Anticipated Disposition: TBD. Hospital Problems  Date Reviewed: 8/9/2022            Codes Class Noted POA    Anemia ICD-10-CM: D64.9  ICD-9-CM: 285.9  2/17/2023 Unknown        Tachycardia ICD-10-CM: R00.0  ICD-9-CM: 785.0  2/17/2023 Unknown        Sepsis ([facility] [address] ICD-10-CM: A41.9  ICD-9-CM: 038.9, 995.91  2/17/2023 Unknown               Review of Systems:   A comprehensive review of systems was negative except for that written in the HPI. Vital Signs:    Last 24hrs VS reviewed since prior progress note.  Most recent are:  Visit Vitals  /67   Pulse (!) 105   Temp 98.6 °F (37 °C)   Resp 18   SpO2 94%         Intake/Output Summary (Last 24 hours) at 2/18/2023 1338  Last data filed at 2/18/2023 1000  Gross per 24 hour   Intake 405 ml   Output 1000 ml   Net -595 ml        Physical Examination:     I had a face to face encounter with this patient and independently examined them on 2/18/2023 as outlined below:    General: awake and alert, NAD, obese  HEENT: Eyes are PERRL. Conjunctiva without pallor ,erythema. The sclerae without icterus. .   Neck:Supple, RT IJ HD catheter  Lungs : Clears to auscultation Bilaterally, Normal respiratory effort  CVS: RRR, S1 S2 normal, SM 2/6,  No rub, 2+ LE edema  Abdomen: Soft, NT , PD catheter RLQ, bowel sounds present  Extremities: No cyanosis, No clubbing  Skin: No rash   Neurologic: non focal, no Astrexis, AOx3  Psych: normal affect           Data Review:    Review and/or order of clinical lab test    I have independently reviewed and interpreted patient's lab and all other diagnostic data    Notes reviewed from all clinical/nonclinical/nursing services involved in patient's clinical care. Care coordination discussions were held with appropriate clinical/nonclinical/ nursing providers based on care coordination needs. Labs:     Recent Labs     02/18/23 0449 02/17/23 0308   WBC 16.3* 15.5*   HGB 6.3* 6.2*   HCT 20.5* 21.5*    324     Recent Labs     02/18/23 0449 02/17/23 0308    136   K 4.5 5.1    105   CO2 19* 17*   BUN 93* 125*   CREA 7.00* 9.23*   GLU 64* 94   CA 9.9  9.9 10.7*   MG 1.3*  --    PHOS 8.7* 9.8*   URICA  --  6.1*     Recent Labs     02/18/23 0449 02/17/23 0308   ALT <6* <6*   AP 48 53   TBILI 0.8 0.3   TP 5.1* 6.6   ALB 2.5* 3.0*   GLOB 2.6 3.6     Recent Labs     02/18/23 0449   INR 1.1   PTP 11.3*   APTT 25.7      Recent Labs     02/18/23 0449   TIBC 137*   PSAT 43   FERR 544*      No results found for: FOL, RBCF   No results for input(s): PH, PCO2, PO2 in the last 72 hours. No results for input(s): CPK, CKNDX, TROIQ in the last 72 hours.     No lab exists for component: CPKMB  Lab Results   Component Value Date/Time    Cholesterol, total 167 07/06/2021 12:00 AM    HDL Cholesterol 42 07/06/2021 12:00 AM    LDL, calculated 103 (H) 07/06/2021 12:00 AM    LDL, calculated 90 06/17/2019 05:47 PM    Triglyceride 122 07/06/2021 12:00 AM     Lab Results   Component Value Date/Time    Glucose (POC) 82 02/14/2023 12:04 PM    Glucose (POC) 78 08/15/2022 08:21 AM    Glucose (POC) 90 11/09/2020 08:36 AM     Lab Results   Component Value Date/Time    Color YELLOW/STRAW 08/10/2022 03:34 PM    Appearance CLOUDY (A) 08/10/2022 03:34 PM    Specific gravity 1.016 08/10/2022 03:34 PM    pH (UA) 5.5 08/10/2022 03:34 PM    Protein >300 (A) 08/10/2022 03:34 PM    Glucose 100 (A) 08/10/2022 03:34 PM    Ketone Negative 08/10/2022 03:34 PM    Bilirubin Negative 08/10/2022 03:34 PM    Urobilinogen 0.2 08/10/2022 03:34 PM    Nitrites Negative 08/10/2022 03:34 PM    Leukocyte Esterase TRACE (A) 08/10/2022 03:34 PM    Epithelial cells MODERATE (A) 08/10/2022 03:34 PM    Bacteria 1+ (A) 08/10/2022 03:34 PM    WBC 0-4 08/10/2022 03:34 PM    RBC 0-5 08/10/2022 03:34 PM         Medications Reviewed:     Current Facility-Administered Medications   Medication Dose Route Frequency    0.9% sodium chloride infusion 250 mL  250 mL IntraVENous PRN    magnesium oxide (MAG-OX) tablet 400 mg  400 mg Oral BID    sevelamer carbonate (RENVELA) oral powder 0.8 g  0.8 g Oral TID WITH MEALS    0.9% sodium chloride infusion 250 mL  250 mL IntraVENous PRN    sodium chloride (NS) flush 5-40 mL  5-40 mL IntraVENous Q8H    sodium chloride (NS) flush 5-40 mL  5-40 mL IntraVENous PRN    acetaminophen (TYLENOL) tablet 650 mg  650 mg Oral Q6H PRN    Or    acetaminophen (TYLENOL) suppository 650 mg  650 mg Rectal Q6H PRN    polyethylene glycol (MIRALAX) packet 17 g  17 g Oral DAILY PRN    oxyCODONE IR (ROXICODONE) tablet 5 mg  5 mg Oral Q4H PRN    oxyCODONE IR (ROXICODONE) tablet 10 mg  10 mg Oral Q4H PRN    allopurinoL (ZYLOPRIM) tablet 100 mg  100 mg Oral DAILY    [Held by provider] amLODIPine (NORVASC) tablet 10 mg  10 mg Oral DAILY    [Held by provider] carvediloL (COREG) tablet 12.5 mg 12.5 mg Oral BID WITH MEALS    [Held by provider] hydrALAZINE (APRESOLINE) tablet 50 mg  50 mg Oral TID    trospium (SANCTURA) tablet 20 mg  20 mg Oral DAILY    piperacillin-tazobactam (ZOSYN) 3.375 g in 0.9% sodium chloride (MBP/ADV) 100 mL MBP  3.375 g IntraVENous Q12H    vancomycin - pharmacy to dose   Other Rx Dosing/Monitoring    sodium bicarbonate tablet 1,300 mg  1,300 mg Oral BID    [Held by provider] bumetanide (BUMEX) tablet 2 mg  2 mg Oral DAILY     ______________________________________________________________________  EXPECTED LENGTH OF STAY: - - -  ACTUAL LENGTH OF STAY:          1                 James Whyte MD

## 2023-02-18 NOTE — PROCEDURES
Hemodialysis / [phone]    Vitals Pre Post Assessment Pre Post   BP BP: (!) 158/90 (02/17/23 2304)   123/89   LOC Alert and oriented x 3 Alert and oriented x 3   HR Pulse (Heart Rate): (!) 108 (02/17/23 2304)   106 Lungs Diminished  Diminished    Resp Resp Rate: 18 (02/17/23 2304) 18 Cardiac Sinus Tachy  Sinus Tachy    Temp Temp: 98.7 °F (37.1 °C) (02/17/23 2304) 98.8 Skin Warm and dry and intact  Warm and dry and intact    Weight Pre-Dialysis Weight: 112 kg (246 lb 14.6 oz) (02/17/23 2304) NA Edema Trace  Trace    Tele status Monitored remotely  Monitored remotely  Pain Pain Intensity 1: 0 (02/17/23 2215) 0     Orders   Duration: Start: 0726 End: 0110 Total: 2hrs   Dialyzer: Dialyzer/Set Up Inspection: Revaclear (02/17/23 2304)   K Bath: Dialysate K (mEq/L): 2 (02/17/23 2304)   Ca Bath: Dialysate CA (mEq/L): 2.5 (02/17/23 2304)   Na: Dialysate NA (mEq/L): 138 (02/17/23 2304)   Bicarb: Dialysate HCO3 (mEq/L): 40 (02/17/23 2304)   Target Fluid Removal: Goal/Amount of Fluid to Remove (mL): 1000 mL (02/17/23 2304)     Access   Type & Location: Right IJ CVC    Comments:                                 Newly inserted CVC, dressing clean and intact.         Labs   HBsAg (Antigen) / date:         Josie Huggins                                      HBsAb (Antibody) / date: UNK   Source:    Obtained/Reviewed  Critical Results Called HGB   Date Value Ref Range Status   02/17/2023 6.2 (L) 11.5 - 16.0 g/dL Final     Potassium   Date Value Ref Range Status   02/17/2023 5.1 3.5 - 5.1 mmol/L Final     Calcium   Date Value Ref Range Status   02/17/2023 10.7 (H) 8.5 - 10.1 MG/DL Final     BUN   Date Value Ref Range Status   02/17/2023 125 (H) 6 - 20 MG/DL Final     Creatinine   Date Value Ref Range Status   02/17/2023 9.23 (H) 0.55 - 1.02 MG/DL Final        Meds Given   Name Dose Route   1 unit of blood  1 unit of blood  HD Circuit                Adequacy / Fluid    Total Liters Process: 29.6L   Net Fluid Removed: 1000ML      Comments Time Out Done:   (Time) 2300   Admitting Diagnosis: FER   Consent obtained/signed:  YES   Machine / RO #  [id number]   Primary Nurse Rpt Pre: Akil Upton RN    Primary Nurse Rpt Post: Akil Upton RN    Pt Education: Procedural education    Care Plan: Continue with HD care plan    Pts outpatient clinic: None      Tx Summary   Comments:       2304:Each catheter limb disinfected per p&p, caps removed, hubs disinfected per p&p. Both lines aspirated and flushed with no difficulty. Treatment initiated with 200ml nss given. CVC running well at 350. No distress reported. Will continue to monitor patient. 2313:Blood transfusion started. Will continue to monitor patient. 2330:Patient tolerating treatment with no issues will continue to monitor patient. 0000:Patient alert, vss no complain voiced will continue to monitor patient. 0100:Blood transfusion completed     0110:Treatment completed with all possible blood returned. Each dialysis catheter limb disinfected per p&p, blood returned per p&p, each dialysis hub disinfected per p&p, post dialysis catheter dwell instilled per order, and caps applied. SBAR given to primary nurse. Patient bed at lowest position and call bell and patient's belongings at her reach. At baseline upon departure.

## 2023-02-18 NOTE — ED NOTES
0830: unable to get IV access overnight or obtain blood cultures, orders received for arterial stick in order to get blood cultures and for central line placement   0950: anaesthesilogy at bedside to place central line consent witnessed.     1015: central line  placed, order placed for chest xray to confirm placement  1145: central line placement confirmed, will begin giving IV abx.   1500: dialysis catheter placed in agio, per nephro patient will be getting hemodialysis tonight and will get blood with dialysis, see nephros note  0726: attempted to call dialysis to ensure patient was on schedule for tonight   1959: attempted to call report

## 2023-02-18 NOTE — PROGRESS NOTES
Pharmacist Note - Vancomycin Dosing  Therapy day 2  Indication: Sepsis  Current regimen: 2500 mg load on 2/17    Recent Labs     02/18/23  0449 02/17/23  0308   WBC 16.3* 15.5*   CREA 7.00* 9.23*   BUN 93* 125*       A random vancomycin level of 20.8 mcg/mL was obtained  ~17 hours from loading dose given. Goal target range: 20-25 mcg/mL pre HD to yield 15-20 mcg/mL post HD (assuming ~35% removal by dialysis). Plan: Change to 1 gram IV after HD today . Pharmacy will continue to monitor this patient daily for changes in clinical status and renal function.

## 2023-02-19 ENCOUNTER — APPOINTMENT (OUTPATIENT)
Dept: GENERAL RADIOLOGY | Age: 63
DRG: 371 | End: 2023-02-19
Attending: NURSE PRACTITIONER
Payer: MEDICARE

## 2023-02-19 LAB
ALBUMIN SERPL-MCNC: 2.2 G/DL (ref 3.5–5)
ANION GAP SERPL CALC-SCNC: 8 MMOL/L (ref 5–15)
BASOPHILS # BLD: 0 K/UL (ref 0–0.1)
BASOPHILS NFR BLD: 0 % (ref 0–1)
BUN SERPL-MCNC: 58 MG/DL (ref 6–20)
BUN/CREAT SERPL: 12 (ref 12–20)
CALCIUM SERPL-MCNC: 9.1 MG/DL (ref 8.5–10.1)
CHLORIDE SERPL-SCNC: 105 MMOL/L (ref 97–108)
CO2 SERPL-SCNC: 26 MMOL/L (ref 21–32)
CREAT SERPL-MCNC: 4.99 MG/DL (ref 0.55–1.02)
DIFFERENTIAL METHOD BLD: ABNORMAL
EOSINOPHIL # BLD: 0.2 K/UL (ref 0–0.4)
EOSINOPHIL NFR BLD: 2 % (ref 0–7)
ERYTHROCYTE [DISTWIDTH] IN BLOOD BY AUTOMATED COUNT: 15.5 % (ref 11.5–14.5)
GLUCOSE SERPL-MCNC: 97 MG/DL (ref 65–100)
HCT VFR BLD AUTO: 21.4 % (ref 35–47)
HGB BLD-MCNC: 6.7 G/DL (ref 11.5–16)
HISTORY CHECKED?,CKHIST: NORMAL
IMM GRANULOCYTES # BLD AUTO: 0.1 K/UL (ref 0–0.04)
IMM GRANULOCYTES NFR BLD AUTO: 1 % (ref 0–0.5)
LYMPHOCYTES # BLD: 0.7 K/UL (ref 0.8–3.5)
LYMPHOCYTES NFR BLD: 6 % (ref 12–49)
MCH RBC QN AUTO: 28.3 PG (ref 26–34)
MCHC RBC AUTO-ENTMCNC: 31.3 G/DL (ref 30–36.5)
MCV RBC AUTO: 90.3 FL (ref 80–99)
MONOCYTES # BLD: 1.4 K/UL (ref 0–1)
MONOCYTES NFR BLD: 11 % (ref 5–13)
NEUTS SEG # BLD: 10 K/UL (ref 1.8–8)
NEUTS SEG NFR BLD: 80 % (ref 32–75)
NRBC # BLD: 0 K/UL (ref 0–0.01)
NRBC BLD-RTO: 0 PER 100 WBC
PHOSPHATE SERPL-MCNC: 6.6 MG/DL (ref 2.6–4.7)
PLATELET # BLD AUTO: 252 K/UL (ref 150–400)
PMV BLD AUTO: 10.2 FL (ref 8.9–12.9)
POTASSIUM SERPL-SCNC: 3.8 MMOL/L (ref 3.5–5.1)
RBC # BLD AUTO: 2.37 M/UL (ref 3.8–5.2)
RBC MORPH BLD: ABNORMAL
SODIUM SERPL-SCNC: 139 MMOL/L (ref 136–145)
WBC # BLD AUTO: 12.4 K/UL (ref 3.6–11)

## 2023-02-19 PROCEDURE — P9016 RBC LEUKOCYTES REDUCED: HCPCS

## 2023-02-19 PROCEDURE — 86921 COMPATIBILITY TEST INCUBATE: CPT

## 2023-02-19 PROCEDURE — 85025 COMPLETE CBC W/AUTO DIFF WBC: CPT

## 2023-02-19 PROCEDURE — 74011250637 HC RX REV CODE- 250/637: Performed by: PHYSICIAN ASSISTANT

## 2023-02-19 PROCEDURE — 74011000250 HC RX REV CODE- 250: Performed by: PHYSICIAN ASSISTANT

## 2023-02-19 PROCEDURE — 74011250637 HC RX REV CODE- 250/637: Performed by: HOSPITALIST

## 2023-02-19 PROCEDURE — 74011250637 HC RX REV CODE- 250/637: Performed by: INTERNAL MEDICINE

## 2023-02-19 PROCEDURE — 74011250636 HC RX REV CODE- 250/636: Performed by: PHYSICIAN ASSISTANT

## 2023-02-19 PROCEDURE — 86922 COMPATIBILITY TEST ANTIGLOB: CPT

## 2023-02-19 PROCEDURE — 71045 X-RAY EXAM CHEST 1 VIEW: CPT

## 2023-02-19 PROCEDURE — 74011000258 HC RX REV CODE- 258: Performed by: PHYSICIAN ASSISTANT

## 2023-02-19 PROCEDURE — 36430 TRANSFUSION BLD/BLD COMPNT: CPT

## 2023-02-19 PROCEDURE — 80069 RENAL FUNCTION PANEL: CPT

## 2023-02-19 PROCEDURE — 36591 DRAW BLOOD OFF VENOUS DEVICE: CPT

## 2023-02-19 PROCEDURE — 65270000046 HC RM TELEMETRY

## 2023-02-19 RX ORDER — POLYETHYLENE GLYCOL 3350 17 G/17G
17 POWDER, FOR SOLUTION ORAL DAILY
Status: DISCONTINUED | OUTPATIENT
Start: 2023-02-19 | End: 2023-02-22 | Stop reason: HOSPADM

## 2023-02-19 RX ORDER — BUMETANIDE 1 MG/1
2 TABLET ORAL DAILY
Status: DISCONTINUED | OUTPATIENT
Start: 2023-02-19 | End: 2023-02-20

## 2023-02-19 RX ORDER — DOCUSATE SODIUM 100 MG/1
100 CAPSULE, LIQUID FILLED ORAL 2 TIMES DAILY
Status: DISCONTINUED | OUTPATIENT
Start: 2023-02-19 | End: 2023-02-19

## 2023-02-19 RX ORDER — AMOXICILLIN 250 MG
2 CAPSULE ORAL
Status: DISCONTINUED | OUTPATIENT
Start: 2023-02-19 | End: 2023-02-22 | Stop reason: HOSPADM

## 2023-02-19 RX ORDER — SODIUM CHLORIDE 9 MG/ML
250 INJECTION, SOLUTION INTRAVENOUS AS NEEDED
Status: DISCONTINUED | OUTPATIENT
Start: 2023-02-19 | End: 2023-02-22 | Stop reason: HOSPADM

## 2023-02-19 RX ORDER — CEPHALEXIN 500 MG/1
500 CAPSULE ORAL DAILY
Status: DISCONTINUED | OUTPATIENT
Start: 2023-02-19 | End: 2023-02-22 | Stop reason: HOSPADM

## 2023-02-19 RX ADMIN — MAGNESIUM OXIDE 400 MG (241.3 MG MAGNESIUM) TABLET 400 MG: TABLET at 17:27

## 2023-02-19 RX ADMIN — MAGNESIUM OXIDE 400 MG (241.3 MG MAGNESIUM) TABLET 400 MG: TABLET at 08:24

## 2023-02-19 RX ADMIN — SODIUM BICARBONATE 1300 MG: 650 TABLET ORAL at 08:24

## 2023-02-19 RX ADMIN — ACETAMINOPHEN 650 MG: 325 TABLET ORAL at 23:05

## 2023-02-19 RX ADMIN — SODIUM CHLORIDE, PRESERVATIVE FREE 10 ML: 5 INJECTION INTRAVENOUS at 21:54

## 2023-02-19 RX ADMIN — SODIUM BICARBONATE 1300 MG: 650 TABLET ORAL at 17:27

## 2023-02-19 RX ADMIN — SEVELAMER CARBONATE FOR ORAL SUSPENSION 0.8 G: 800 POWDER, FOR SUSPENSION ORAL at 17:27

## 2023-02-19 RX ADMIN — SEVELAMER CARBONATE FOR ORAL SUSPENSION 0.8 G: 800 POWDER, FOR SUSPENSION ORAL at 08:25

## 2023-02-19 RX ADMIN — BUMETANIDE 2 MG: 1 TABLET ORAL at 12:18

## 2023-02-19 RX ADMIN — HYDRALAZINE HYDROCHLORIDE 50 MG: 50 TABLET, FILM COATED ORAL at 21:53

## 2023-02-19 RX ADMIN — CARVEDILOL 12.5 MG: 12.5 TABLET, FILM COATED ORAL at 12:19

## 2023-02-19 RX ADMIN — SENNOSIDES AND DOCUSATE SODIUM 2 TABLET: 50; 8.6 TABLET ORAL at 21:54

## 2023-02-19 RX ADMIN — HYDRALAZINE HYDROCHLORIDE 50 MG: 50 TABLET, FILM COATED ORAL at 17:27

## 2023-02-19 RX ADMIN — PIPERACILLIN AND TAZOBACTAM 3.38 G: 3; .375 INJECTION, POWDER, FOR SOLUTION INTRAVENOUS at 04:14

## 2023-02-19 RX ADMIN — SEVELAMER CARBONATE FOR ORAL SUSPENSION 0.8 G: 800 POWDER, FOR SUSPENSION ORAL at 12:18

## 2023-02-19 RX ADMIN — SODIUM CHLORIDE, PRESERVATIVE FREE 10 ML: 5 INJECTION INTRAVENOUS at 06:58

## 2023-02-19 RX ADMIN — TROSPIUM CHLORIDE 20 MG: 20 TABLET, FILM COATED ORAL at 08:24

## 2023-02-19 RX ADMIN — CEPHALEXIN 500 MG: 500 CAPSULE ORAL at 12:18

## 2023-02-19 RX ADMIN — POLYETHYLENE GLYCOL 3350 17 G: 17 POWDER, FOR SOLUTION ORAL at 12:18

## 2023-02-19 RX ADMIN — CARVEDILOL 12.5 MG: 12.5 TABLET, FILM COATED ORAL at 17:27

## 2023-02-19 RX ADMIN — ALLOPURINOL 100 MG: 100 TABLET ORAL at 08:24

## 2023-02-19 NOTE — PROGRESS NOTES
Renal Progress Note    NAME:  Reza Laws   :   1960   MRN:   [id number]     Date/Time:  2023 10:16 AM  Subjective:       -patient awake and alert, denies any complaints, no abdominal pain  -feels ok, no N/V/SOB. Had HD last night and had 1 Kg UF.  Has constipation, has not been OOB      Medications reviewed:  Current Facility-Administered Medications   Medication Dose Route Frequency    bumetanide (BUMEX) tablet 2 mg  2 mg Oral DAILY    docusate sodium (COLACE) capsule 100 mg  100 mg Oral BID    0.9% sodium chloride infusion 250 mL  250 mL IntraVENous PRN    magnesium oxide (MAG-OX) tablet 400 mg  400 mg Oral BID    sevelamer carbonate (RENVELA) oral powder 0.8 g  0.8 g Oral TID WITH MEALS    0.9% sodium chloride infusion 250 mL  250 mL IntraVENous PRN    sodium chloride (NS) flush 5-40 mL  5-40 mL IntraVENous Q8H    sodium chloride (NS) flush 5-40 mL  5-40 mL IntraVENous PRN    acetaminophen (TYLENOL) tablet 650 mg  650 mg Oral Q6H PRN    Or    acetaminophen (TYLENOL) suppository 650 mg  650 mg Rectal Q6H PRN    polyethylene glycol (MIRALAX) packet 17 g  17 g Oral DAILY PRN    oxyCODONE IR (ROXICODONE) tablet 5 mg  5 mg Oral Q4H PRN    oxyCODONE IR (ROXICODONE) tablet 10 mg  10 mg Oral Q4H PRN    allopurinoL (ZYLOPRIM) tablet 100 mg  100 mg Oral DAILY    [Held by provider] amLODIPine (NORVASC) tablet 10 mg  10 mg Oral DAILY    [Held by provider] carvediloL (COREG) tablet 12.5 mg  12.5 mg Oral BID WITH MEALS    [Held by provider] hydrALAZINE (APRESOLINE) tablet 50 mg  50 mg Oral TID    trospium (SANCTURA) tablet 20 mg  20 mg Oral DAILY    piperacillin-tazobactam (ZOSYN) 3.375 g in 0.9% sodium chloride (MBP/ADV) 100 mL MBP  3.375 g IntraVENous Q12H    vancomycin - pharmacy to dose   Other Rx Dosing/Monitoring    sodium bicarbonate tablet 1,300 mg  1,300 mg Oral BID        Objective:   Vitals:  Visit Vitals  /73 (BP 1 Location: Right upper arm, BP Patient Position: Semi fowlers) Pulse (!) 105   Temp 98.3 °F (36.8 °C)   Resp 18   Wt 107.4 kg (236 lb 12.4 oz)   SpO2 91%   BMI 38.22 kg/m²     Temp (24hrs), Av.4 °F (36.9 °C), Min:97.8 °F (36.6 °C), Max:99.3 °F (37.4 °C)    O2 Flow Rate (L/min): 2 l/min O2 Device: None (Room air)    Last 24hr Input/Output:    Intake/Output Summary (Last 24 hours) at 202348  Last data filed at 2023  Gross per 24 hour   Intake 446.25 ml   Output 1000 ml   Net -553.75 ml          Physical Exam:  General: awake and alert, NAD, obese  HEENT: Eyes are PERRL. Conjunctiva without pallor ,erythema. The sclerae without icterus.  .   Neck:Supple, RT IJ HD catheter  Lungs : Clears to auscultation Bilaterally, Normal respiratory effort  CVS: RRR, S1 S2 normal, SM 2/6,  No rub, 2+ LE edema  Abdomen: Soft, NT , PD catheter RLQ, bowel sounds present  Extremities: No cyanosis, No clubbing  Skin: No rash   Neurologic: non focal, no Astrexis, AOx3  Psych: normal affect       [] Telemetry Reviewed     [] NSR [] PAC/PVCs   [] Afib  [] Paced   [] NSVT   [] Zavala [] NGT  [] Intubated on vent    Lab Data Reviewed:    Recent Results (from the past 24 hour(s))   HEMOGLOBIN    Collection Time: 23 10:45 PM   Result Value Ref Range    HGB 7.0 (L) 11.5 - 16.0 g/dL   RENAL FUNCTION PANEL    Collection Time: 23  3:31 AM   Result Value Ref Range    Sodium 139 136 - 145 mmol/L    Potassium 3.8 3.5 - 5.1 mmol/L    Chloride 105 97 - 108 mmol/L    CO2 26 21 - 32 mmol/L    Anion gap 8 5 - 15 mmol/L    Glucose 97 65 - 100 mg/dL    BUN 58 (H) 6 - 20 MG/DL    Creatinine 4.99 (H) 0.55 - 1.02 MG/DL    BUN/Creatinine ratio 12 12 - 20      eGFR 9 (L) >60 ml/min/1.73m2    Calcium 9.1 8.5 - 10.1 MG/DL    Phosphorus 6.6 (H) 2.6 - 4.7 MG/DL    Albumin 2.2 (L) 3.5 - 5.0 g/dL   CBC WITH AUTOMATED DIFF    Collection Time: 23  3:31 AM   Result Value Ref Range    WBC 12.4 (H) 3.6 - 11.0 K/uL    RBC 2.37 (L) 3.80 - 5.20 M/uL    HGB 6.7 (L) 11.5 - 16.0 g/dL    HCT 21.4 (L) 35.0 - 47.0 %    MCV 90.3 80.0 - 99.0 FL    MCH 28.3 26.0 - 34.0 PG    MCHC 31.3 30.0 - 36.5 g/dL    RDW 15.5 (H) 11.5 - 14.5 %    PLATELET 327 073 - 406 K/uL    MPV 10.2 8.9 - 12.9 FL    NRBC 0.0 0  WBC    ABSOLUTE NRBC 0.00 0.00 - 0.01 K/uL    NEUTROPHILS 80 (H) 32 - 75 %    LYMPHOCYTES 6 (L) 12 - 49 %    MONOCYTES 11 5 - 13 %    EOSINOPHILS 2 0 - 7 %    BASOPHILS 0 0 - 1 %    IMMATURE GRANULOCYTES 1 (H) 0.0 - 0.5 %    ABS. NEUTROPHILS 10.0 (H) 1.8 - 8.0 K/UL    ABS. LYMPHOCYTES 0.7 (L) 0.8 - 3.5 K/UL    ABS. MONOCYTES 1.4 (H) 0.0 - 1.0 K/UL    ABS. EOSINOPHILS 0.2 0.0 - 0.4 K/UL    ABS. BASOPHILS 0.0 0.0 - 0.1 K/UL    ABS. IMM. GRANS. 0.1 (H) 0.00 - 0.04 K/UL    DF SMEAR SCANNED      RBC COMMENTS ANISOCYTOSIS  1+        RBC COMMENTS OVALOCYTES  PRESENT        RBC COMMENTS DEMARCUS CELLS  PRESENT             Assessment/Plan:   Active Problems:    Anemia (2/17/2023)      Tachycardia (2/17/2023)      Sepsis ([address] (2/17/2023)       ESRD, s/p PD catheter placement on 2/14/23-not on dialysis yet  -patient is uremic, fluid overload and has metabolic acidosis  -PD catheter not ready for starting PD  Discussed with patient and her  and they agree with the plan-2/17  -s/p temp HD catheter, s/p HD x2  - increased Na bicarb to 1300 mg bid  -no need for HD today, will consult IR to remove Maldonado catheter  She has appointment with PD clinic tomorrow to start PD training and low volume exchanges.   -resume bumex    Hyperphosphatemia, started renvela     Abdominal pain  -seen by vascular surgery. Had small amount of fluid in pelvis at the time of PD catheter placement which sent for culture.  Culture so far negative but had few G+cocci and rare WBC  -Agree with Abx coverage, may change to po as no growth on fluid culture  -improved     Anemia  -transfuse for Hb<7   -iron sat ok  -s/p epogen 65139 unit x1   -needs blood transfusion prior to discharge for Hb<7  -check stool OB    Constipation, start stool softener HTN, BP higher today, resume BP meds    She can be discharged tomorrow after removing Maldonado.  Needs to be at  PD clinic by 10 AM.     D/W Dr Carlos Martinez  ___________________________________________________    Total time spent with patient:  [] 15   [] 25   [] 35   []  __ minutes    [] Critical Care Provided    Care Plan discussed with:    [x] Patient   [] Family    [] Care Manager   [] Consultant/Specialist :      []   >50% of visit spent in counseling and coordination of care   (Discussed [] CODE status,  [] Care Plan, [] D/C Planning)    ___________________________________________________    Attending Physician: MD Milton Tan

## 2023-02-19 NOTE — PROGRESS NOTES
Hospitalist Progress Note  Twana Paget, MD  Answering service: [phone] OR 36 from in house phone        Date of Service:  2023  NAME:  Sadi Wallace  :  1960  MRN:  [id number]      Admission Summary:   Sadi Wallace is a 70 y.o. female with PMHx of Stage V CKD, gout, morbid obesity, s/p R total hip replacement, and HTN who underwent laparoscopic PD catheter insertion and lysis of adhesions on 23 with Vascular Surgeon, Dr. Caleb Jennings, who now presents to the ED on 23 with abdominal pain/weakness/LE pain and swelling. Vascular Surgery and Nephrology consulted. Ms. Ariana Palacios is not feeling well at the time of our encounter. She notes that her symptoms began on 23 with weakness and feeling cold. She also notes severe pain and swelling in her legs that is exacerbated with pressing. She also is complaining of pain in the abdomen, most notably around the PD catheter site. She denies any headaches or vision changes, chest pain, SOB. She denies any subjective fevers. She continues to make urine and has not started on dialysis at this time but the plan was for initiation of peritoneal dialysis. Interval history / Subjective:   Feeling better   No bleeding noticed. Assessment & Plan:        Stage V CKD, progressing to ESRD  Electrolyte derangements  Anemia  Tachycardia  Leukocytosis  -- Consulted with Vascular Surgery, Dr. María Guillermo, and Nephrology. - s/p PD catheter placement on 23-not on dialysis yet  -patient is uremic, now  fluid overload and has metabolic acidosis  -PD catheter not ready for starting PD  -  Discussed with patient and her  and they agree with the plan-  -IR s/p temp HD catheter , had HD , and .   - no HD  per renal.   - increase Na bicarb to 1300 mg bid  -plan to dc judy catheter.      Acute metabolic encephalopathy  Due to uremia  Improved after dialysis   Resolved     Abdominal pain may have peritonitis   -seen by vascular surgery. Had small amount of fluid in pelvis at the time of PD catheter placement which sent for culture. Culture so far negative but had few G+cocci and rare WBC  -d/w nephrologist, change to po abx since culture negative.   -will give keflex        Anemia: Likely due to CKD. Completed iron panel on 1/27/23 with iron of 104, TIBC 190, Iron % 55. Ordered for 1 unit pRBC. -- ego per renal   -Hb improved from 6.3 to 6.8 after one unit  -transfuse one more unit today     Sinus tachycardia:  Due to above      Poor Access  -- She is not a candidate for a PICC or midline due to her severe CKD  -- Central line venous catheter right ij      Acute LE pain/swelling  -- no dvt  --DUE TO FLUID OVERLOAD      HTN  -- home Carvedilol BID, Hydralazine TID, and Amlodipine on hold now  Will monitor and re-eval      Gout  -- Decrease her Allopurinol dose to 100mg daily with renal disease     Urinary incontinence  -- Pharmacy to substitute home Tolterodine ER 4mg  based on hospital formulary          Code status: FULL   Prophylaxis: SCD, SC HEPARIN   Care Plan discussed with: PT, RN,   Anticipated Disposition: home tomorrow after judy removed. Pt should go to dialysis center tomorrow at 10 am for PD education and 1st PD      Hospital Problems  Date Reviewed: 8/9/2022            Codes Class Noted POA    Anemia ICD-10-CM: D64.9  ICD-9-CM: 285.9  2/17/2023 Unknown        Tachycardia ICD-10-CM: R00.0  ICD-9-CM: 785.0  2/17/2023 Unknown        Sepsis ([facility] [address] ICD-10-CM: A41.9  ICD-9-CM: 038.9, 995.91  2/17/2023 Unknown             Review of Systems:   A comprehensive review of systems was negative except for that written in the HPI. Vital Signs:    Last 24hrs VS reviewed since prior progress note.  Most recent are:  Visit Vitals  /75 (BP 1 Location: Right upper arm, BP Patient Position: Semi fowlers)   Pulse (!) 102   Temp 98.7 °F (37.1 °C)   Resp 18   Wt 107.4 kg (236 lb 12.4 oz)   SpO2 91%   BMI 38.22 kg/m²         Intake/Output Summary (Last 24 hours) at 2/19/2023 1241  Last data filed at 2/18/2023 2015  Gross per 24 hour   Intake 321.25 ml   Output 1000 ml   Net -678.75 ml          Physical Examination:     I had a face to face encounter with this patient and independently examined them on 2/19/2023 as outlined below:    General: awake and alert, NAD, obese  HEENT: Eyes are PERRL. Conjunctiva without pallor ,erythema. The sclerae without icterus. .   Neck:Supple, RT IJ HD catheter  Lungs : Clears to auscultation Bilaterally, Normal respiratory effort  CVS: RRR, S1 S2 normal, SM 2/6,  No rub, 2+ LE edema  Abdomen: Soft, NT , PD catheter RLQ, bowel sounds present  Extremities: No cyanosis, No clubbing  Skin: No rash   Neurologic: non focal, no Astrexis, AOx3  Psych: normal affect           Data Review:    Review and/or order of clinical lab test    I have independently reviewed and interpreted patient's lab and all other diagnostic data    Notes reviewed from all clinical/nonclinical/nursing services involved in patient's clinical care. Care coordination discussions were held with appropriate clinical/nonclinical/ nursing providers based on care coordination needs.      Labs:     Recent Labs     02/19/23 0331 02/18/23 2245 02/18/23 0449   WBC 12.4*  --  16.3*   HGB 6.7* 7.0* 6.3*   HCT 21.4*  --  20.5*     --  288       Recent Labs     02/19/23 0331 02/18/23 0449 02/17/23  0308    138 136   K 3.8 4.5 5.1    105 105   CO2 26 19* 17*   BUN 58* 93* 125*   CREA 4.99* 7.00* 9.23*   GLU 97 64* 94   CA 9.1 9.9  9.9 10.7*   MG  --  1.3*  --    PHOS 6.6* 8.7* 9.8*   URICA  --   --  6.1*       Recent Labs     02/19/23 0331 02/18/23 0449 02/17/23  0308   ALT  --  <6* <6*   AP  --  48 53   TBILI  --  0.8 0.3   TP  --  5.1* 6.6   ALB 2.2* 2.5* 3.0*   GLOB  --  2.6 3.6       Recent Labs     02/18/23 0449   INR 1.1 PTP 11.3*   APTT 25.7        Recent Labs     02/18/23  0449   TIBC 137*   PSAT 43   FERR 544*        No results found for: FOL, RBCF   No results for input(s): PH, PCO2, PO2 in the last 72 hours. No results for input(s): CPK, CKNDX, TROIQ in the last 72 hours.     No lab exists for component: CPKMB  Lab Results   Component Value Date/Time    Cholesterol, total 167 07/06/2021 12:00 AM    HDL Cholesterol 42 07/06/2021 12:00 AM    LDL, calculated 103 (H) 07/06/2021 12:00 AM    LDL, calculated 90 06/17/2019 05:47 PM    Triglyceride 122 07/06/2021 12:00 AM     Lab Results   Component Value Date/Time    Glucose (POC) 82 02/14/2023 12:04 PM    Glucose (POC) 78 08/15/2022 08:21 AM    Glucose (POC) 90 11/09/2020 08:36 AM     Lab Results   Component Value Date/Time    Color YELLOW/STRAW 08/10/2022 03:34 PM    Appearance CLOUDY (A) 08/10/2022 03:34 PM    Specific gravity 1.016 08/10/2022 03:34 PM    pH (UA) 5.5 08/10/2022 03:34 PM    Protein >300 (A) 08/10/2022 03:34 PM    Glucose 100 (A) 08/10/2022 03:34 PM    Ketone Negative 08/10/2022 03:34 PM    Bilirubin Negative 08/10/2022 03:34 PM    Urobilinogen 0.2 08/10/2022 03:34 PM    Nitrites Negative 08/10/2022 03:34 PM    Leukocyte Esterase TRACE (A) 08/10/2022 03:34 PM    Epithelial cells MODERATE (A) 08/10/2022 03:34 PM    Bacteria 1+ (A) 08/10/2022 03:34 PM    WBC 0-4 08/10/2022 03:34 PM    RBC 0-5 08/10/2022 03:34 PM         Medications Reviewed:     Current Facility-Administered Medications   Medication Dose Route Frequency    bumetanide (BUMEX) tablet 2 mg  2 mg Oral DAILY    cephALEXin (KEFLEX) capsule 500 mg  500 mg Oral DAILY    0.9% sodium chloride infusion 250 mL  250 mL IntraVENous PRN    polyethylene glycol (MIRALAX) packet 17 g  17 g Oral DAILY    senna-docusate (PERICOLACE) 8.6-50 mg per tablet 2 Tablet  2 Tablet Oral QHS    0.9% sodium chloride infusion 250 mL  250 mL IntraVENous PRN    magnesium oxide (MAG-OX) tablet 400 mg  400 mg Oral BID    sevelamer carbonate (RENVELA) oral powder 0.8 g  0.8 g Oral TID WITH MEALS    0.9% sodium chloride infusion 250 mL  250 mL IntraVENous PRN    sodium chloride (NS) flush 5-40 mL  5-40 mL IntraVENous Q8H    sodium chloride (NS) flush 5-40 mL  5-40 mL IntraVENous PRN    acetaminophen (TYLENOL) tablet 650 mg  650 mg Oral Q6H PRN    Or    acetaminophen (TYLENOL) suppository 650 mg  650 mg Rectal Q6H PRN    oxyCODONE IR (ROXICODONE) tablet 5 mg  5 mg Oral Q4H PRN    oxyCODONE IR (ROXICODONE) tablet 10 mg  10 mg Oral Q4H PRN    allopurinoL (ZYLOPRIM) tablet 100 mg  100 mg Oral DAILY    amLODIPine (NORVASC) tablet 10 mg  10 mg Oral DAILY    carvediloL (COREG) tablet 12.5 mg  12.5 mg Oral BID WITH MEALS    hydrALAZINE (APRESOLINE) tablet 50 mg  50 mg Oral TID    trospium (SANCTURA) tablet 20 mg  20 mg Oral DAILY    sodium bicarbonate tablet 1,300 mg  1,300 mg Oral BID     ______________________________________________________________________  EXPECTED LENGTH OF STAY: - - -  ACTUAL LENGTH OF STAY:          2                 James Whyte MD

## 2023-02-20 LAB
ALBUMIN SERPL-MCNC: 2 G/DL (ref 3.5–5)
ANION GAP SERPL CALC-SCNC: 9 MMOL/L (ref 5–15)
BASOPHILS # BLD: 0 K/UL (ref 0–0.1)
BASOPHILS NFR BLD: 0 % (ref 0–1)
BUN SERPL-MCNC: 64 MG/DL (ref 6–20)
BUN/CREAT SERPL: 11 (ref 12–20)
CALCIUM SERPL-MCNC: 10.6 MG/DL (ref 8.5–10.1)
CHLORIDE SERPL-SCNC: 99 MMOL/L (ref 97–108)
CO2 SERPL-SCNC: 26 MMOL/L (ref 21–32)
CREAT SERPL-MCNC: 6.05 MG/DL (ref 0.55–1.02)
DIFFERENTIAL METHOD BLD: ABNORMAL
EOSINOPHIL # BLD: 0.3 K/UL (ref 0–0.4)
EOSINOPHIL NFR BLD: 3 % (ref 0–7)
ERYTHROCYTE [DISTWIDTH] IN BLOOD BY AUTOMATED COUNT: 16.2 % (ref 11.5–14.5)
GLUCOSE SERPL-MCNC: 96 MG/DL (ref 65–100)
HCT VFR BLD AUTO: 24.2 % (ref 35–47)
HGB BLD-MCNC: 7.3 G/DL (ref 11.5–16)
IMM GRANULOCYTES # BLD AUTO: 0.2 K/UL (ref 0–0.04)
IMM GRANULOCYTES NFR BLD AUTO: 2 % (ref 0–0.5)
LYMPHOCYTES # BLD: 0.9 K/UL (ref 0.8–3.5)
LYMPHOCYTES NFR BLD: 8 % (ref 12–49)
MCH RBC QN AUTO: 27.4 PG (ref 26–34)
MCHC RBC AUTO-ENTMCNC: 30.2 G/DL (ref 30–36.5)
MCV RBC AUTO: 91 FL (ref 80–99)
MONOCYTES # BLD: 1.3 K/UL (ref 0–1)
MONOCYTES NFR BLD: 11 % (ref 5–13)
NEUTS SEG # BLD: 9.1 K/UL (ref 1.8–8)
NEUTS SEG NFR BLD: 76 % (ref 32–75)
NRBC # BLD: 0 K/UL (ref 0–0.01)
NRBC BLD-RTO: 0 PER 100 WBC
PHOSPHATE SERPL-MCNC: 8.4 MG/DL (ref 2.6–4.7)
PLATELET # BLD AUTO: 265 K/UL (ref 150–400)
PMV BLD AUTO: 10.8 FL (ref 8.9–12.9)
POTASSIUM SERPL-SCNC: 3.9 MMOL/L (ref 3.5–5.1)
RBC # BLD AUTO: 2.66 M/UL (ref 3.8–5.2)
SODIUM SERPL-SCNC: 134 MMOL/L (ref 136–145)
WBC # BLD AUTO: 11.8 K/UL (ref 3.6–11)

## 2023-02-20 PROCEDURE — 74011636637 HC RX REV CODE- 636/637: Performed by: HOSPITALIST

## 2023-02-20 PROCEDURE — 36415 COLL VENOUS BLD VENIPUNCTURE: CPT

## 2023-02-20 PROCEDURE — 74011000250 HC RX REV CODE- 250: Performed by: PHYSICIAN ASSISTANT

## 2023-02-20 PROCEDURE — 97116 GAIT TRAINING THERAPY: CPT

## 2023-02-20 PROCEDURE — 97530 THERAPEUTIC ACTIVITIES: CPT

## 2023-02-20 PROCEDURE — 90945 DIALYSIS ONE EVALUATION: CPT

## 2023-02-20 PROCEDURE — 74011250637 HC RX REV CODE- 250/637: Performed by: HOSPITALIST

## 2023-02-20 PROCEDURE — 85025 COMPLETE CBC W/AUTO DIFF WBC: CPT

## 2023-02-20 PROCEDURE — 97161 PT EVAL LOW COMPLEX 20 MIN: CPT

## 2023-02-20 PROCEDURE — 97165 OT EVAL LOW COMPLEX 30 MIN: CPT

## 2023-02-20 PROCEDURE — 74011250637 HC RX REV CODE- 250/637: Performed by: PHYSICIAN ASSISTANT

## 2023-02-20 PROCEDURE — 90935 HEMODIALYSIS ONE EVALUATION: CPT

## 2023-02-20 PROCEDURE — 65270000046 HC RM TELEMETRY

## 2023-02-20 PROCEDURE — 74011250637 HC RX REV CODE- 250/637: Performed by: INTERNAL MEDICINE

## 2023-02-20 PROCEDURE — 74011250636 HC RX REV CODE- 250/636: Performed by: HOSPITALIST

## 2023-02-20 PROCEDURE — 80069 RENAL FUNCTION PANEL: CPT

## 2023-02-20 RX ORDER — BUMETANIDE 1 MG/1
2 TABLET ORAL 2 TIMES DAILY
Status: DISCONTINUED | OUTPATIENT
Start: 2023-02-20 | End: 2023-02-20

## 2023-02-20 RX ORDER — BISACODYL 5 MG
5 TABLET, DELAYED RELEASE (ENTERIC COATED) ORAL
Status: COMPLETED | OUTPATIENT
Start: 2023-02-20 | End: 2023-02-20

## 2023-02-20 RX ORDER — BUMETANIDE 1 MG/1
2 TABLET ORAL 2 TIMES DAILY
Status: DISCONTINUED | OUTPATIENT
Start: 2023-02-20 | End: 2023-02-22 | Stop reason: HOSPADM

## 2023-02-20 RX ORDER — TRAMADOL HYDROCHLORIDE 50 MG/1
50 TABLET ORAL
Status: DISCONTINUED | OUTPATIENT
Start: 2023-02-20 | End: 2023-02-22 | Stop reason: HOSPADM

## 2023-02-20 RX ADMIN — POLYETHYLENE GLYCOL 3350 17 G: 17 POWDER, FOR SOLUTION ORAL at 10:22

## 2023-02-20 RX ADMIN — SODIUM CHLORIDE, PRESERVATIVE FREE 10 ML: 5 INJECTION INTRAVENOUS at 06:10

## 2023-02-20 RX ADMIN — PREDNISONE 30 MG: 20 TABLET ORAL at 15:16

## 2023-02-20 RX ADMIN — SEVELAMER CARBONATE FOR ORAL SUSPENSION 0.8 G: 800 POWDER, FOR SUSPENSION ORAL at 07:27

## 2023-02-20 RX ADMIN — BUMETANIDE 2 MG: 1 TABLET ORAL at 10:20

## 2023-02-20 RX ADMIN — EPOETIN ALFA-EPBX 10000 UNITS: 10000 INJECTION, SOLUTION INTRAVENOUS; SUBCUTANEOUS at 23:04

## 2023-02-20 RX ADMIN — HYDRALAZINE HYDROCHLORIDE 50 MG: 50 TABLET, FILM COATED ORAL at 22:34

## 2023-02-20 RX ADMIN — CEPHALEXIN 500 MG: 500 CAPSULE ORAL at 10:19

## 2023-02-20 RX ADMIN — SENNOSIDES AND DOCUSATE SODIUM 2 TABLET: 50; 8.6 TABLET ORAL at 22:34

## 2023-02-20 RX ADMIN — AMLODIPINE BESYLATE 10 MG: 5 TABLET ORAL at 10:21

## 2023-02-20 RX ADMIN — SODIUM CHLORIDE, PRESERVATIVE FREE 10 ML: 5 INJECTION INTRAVENOUS at 22:34

## 2023-02-20 RX ADMIN — OXYCODONE 10 MG: 5 TABLET ORAL at 14:59

## 2023-02-20 RX ADMIN — HYDRALAZINE HYDROCHLORIDE 50 MG: 50 TABLET, FILM COATED ORAL at 10:19

## 2023-02-20 RX ADMIN — TRAMADOL HYDROCHLORIDE 50 MG: 50 TABLET ORAL at 10:20

## 2023-02-20 RX ADMIN — ALLOPURINOL 100 MG: 100 TABLET ORAL at 10:19

## 2023-02-20 RX ADMIN — CARVEDILOL 12.5 MG: 12.5 TABLET, FILM COATED ORAL at 07:27

## 2023-02-20 RX ADMIN — TROSPIUM CHLORIDE 20 MG: 20 TABLET, FILM COATED ORAL at 10:19

## 2023-02-20 RX ADMIN — SEVELAMER CARBONATE FOR ORAL SUSPENSION 0.8 G: 800 POWDER, FOR SUSPENSION ORAL at 13:01

## 2023-02-20 RX ADMIN — SODIUM BICARBONATE 1300 MG: 650 TABLET ORAL at 10:21

## 2023-02-20 RX ADMIN — BISACODYL 5 MG: 5 TABLET, COATED ORAL at 10:19

## 2023-02-20 RX ADMIN — EPOETIN ALFA-EPBX 20000 UNITS: 20000 INJECTION, SOLUTION INTRAVENOUS; SUBCUTANEOUS at 23:04

## 2023-02-20 NOTE — PROGRESS NOTES
Transition Of Care: Update 3:48pm: Per attending, the plan is home with home health pending therapy clearance. Update: PD [facility] appointment rescheduled for 9am Wednesday morning. CM to arrange home health. 11:53am:Noted, PT/OT eval pending. The patient is getting dialysis today. The patient plans to discharge home and start PD dialysis. Appointment is at [place] [id number] ([phone]) for tomorrow at 8:30am. Son to transport home when stable for discharge. RUR:  16%    CM following for discharge needs.     Tanisha Aden RN/CRM

## 2023-02-20 NOTE — PROGRESS NOTES
Hospitalist Progress Note  Geovani Smiley MD  Answering service: [phone] from in house phone        Date of Service:  2023  NAME:  Hector Pickett  :  1960  MRN:  [id number]      Admission Summary:   Hector Pickett is a 70 y.o. female with PMHx of Stage V CKD, gout, morbid obesity, s/p R total hip replacement, and HTN who underwent laparoscopic PD catheter insertion and lysis of adhesions on 23 with Vascular Surgeon, Dr. Vianca Recio, who now presents to the ED on 23 with abdominal pain/weakness/LE pain and swelling. Vascular Surgery and Nephrology consulted. Ms. Jeff Young is not feeling well at the time of our encounter. She notes that her symptoms began on 23 with weakness and feeling cold. She also notes severe pain and swelling in her legs that is exacerbated with pressing. She also is complaining of pain in the abdomen, most notably around the PD catheter site. She denies any headaches or vision changes, chest pain, SOB. She denies any subjective fevers. She continues to make urine and has not started on dialysis at this time but the plan was for initiation of peritoneal dialysis. Interval history / Subjective:   Left ankle and left lower leg pain      Assessment & Plan:        Stage V CKD, progressing to ESRD  Electrolyte derangements  Anemia  Tachycardia  Leukocytosis  -- Consulted with Vascular Surgery, Dr. Sena Drake, and Nephrology.   - s/p PD catheter placement on 23-not on dialysis yet  -patient is uremic, now  fluid overload and has metabolic acidosis  Discussed with patient and her  and they agree with the plan-  -IR s/p temp HD catheter , had HD , and .   - no HD  per renal.   - increase Na bicarb to 1300 mg bid  -plan to dc judy catheter before discharge and pt to initiate PD at home: she had appointment for 1st PD at dialysis center today: will need reschedule when pt is ready to d/c   -had HD again today     Left lower leg pain:  Had venous doppler neg for dvt  ? Acute gout flair  Short course of prednisone   Prn tramadol  PT/Ot EVAL     Acute metabolic encephalopathy  Due to uremia  Improved after dialysis   Resolved     Abdominal pain may have peritonitis   -seen by vascular surgery. Had small amount of fluid in pelvis at the time of PD catheter placement which sent for culture. Culture so far negative but had few G+cocci and rare WBC  -d/w nephrologist, change to po abx since culture negative. CHANGE TO PO keflex        Anemia: Likely due to CKD. Completed iron panel on 1/27/23 with iron of 104, TIBC 190, Iron % 55. Ordered for 1 unit pRBC. -- ego per renal   -Hb improved from 6.3 to 6.8 after one unit  -transfuse one more unit t2/20     Sinus tachycardia:  Due to above      Poor Access  -- She is not a candidate for a PICC or midline due to her severe CKD  -- Central line venous catheter right ij      Acute LE pain/swelling  -- no dvt  --DUE TO FLUID OVERLOAD      HTN  -- home Carvedilol BID, Hydralazine TID, and Amlodipine on hold now  Will monitor and re-eval      Gout  -- Decrease her Allopurinol dose to 100mg daily with renal disease  -possible acute flare now, short course of prednisone      Urinary incontinence  -- Pharmacy to substitute home Tolterodine ER 4mg  based on hospital formulary          Code status: FULL   Prophylaxis: SCD, [address] discussed with: PT, RN,   Anticipated Disposition: HH if able stand with mini assist, ? In 2 days.       Hospital Problems  Date Reviewed: 8/9/2022            Codes Class Noted POA    Anemia ICD-10-CM: D64.9  ICD-9-CM: 285.9  2/17/2023 Unknown        Tachycardia ICD-10-CM: R00.0  ICD-9-CM: 785.0  2/17/2023 Unknown        Sepsis ([facility] [address] ICD-10-CM: A41.9  ICD-9-CM: 038.9, 995.91  2/17/2023 Unknown           Review of Systems:   A comprehensive review of systems was negative except for that written in the HPI. Vital Signs:    Last 24hrs VS reviewed since prior progress note. Most recent are:  Visit Vitals  /77 (BP 1 Location: Right upper arm, BP Patient Position: At rest)   Pulse 89   Temp 98.1 °F (36.7 °C)   Resp 18   Wt 107.4 kg (236 lb 12.4 oz)   SpO2 95%   BMI 38.22 kg/m²         Intake/Output Summary (Last 24 hours) at 2/20/2023 1426  Last data filed at 2/20/2023 0730  Gross per 24 hour   Intake 253.33 ml   Output 750 ml   Net -496.67 ml          Physical Examination:     I had a face to face encounter with this patient and independently examined them on 2/20/2023 as outlined below:    General: awake and alert, NAD, obese  HEENT: Eyes are PERRL. Conjunctiva without pallor ,erythema. The sclerae without icterus. .   Neck:Supple, RT IJ HD catheter  Lungs : Clears to auscultation Bilaterally, Normal respiratory effort  CVS: RRR, S1 S2 normal, SM 2/6,  No rub, 2+ LE edema  Abdomen: Soft, NT , PD catheter RLQ, bowel sounds present  Extremities:bilat swelling ,left ankle and lower leg tender more swelling   Skin: No rash   Neurologic: non focal, no Astrexis, AOx3  Psych: normal affect           Data Review:    Review and/or order of clinical lab test    I have independently reviewed and interpreted patient's lab and all other diagnostic data    Notes reviewed from all clinical/nonclinical/nursing services involved in patient's clinical care. Care coordination discussions were held with appropriate clinical/nonclinical/ nursing providers based on care coordination needs.      Labs:     Recent Labs     02/20/23  0514 02/19/23 0331   WBC 11.8* 12.4*   HGB 7.3* 6.7*   HCT 24.2* 21.4*    252       Recent Labs     02/20/23  0514 02/19/23 0331 02/18/23  0449   * 139 138   K 3.9 3.8 4.5   CL 99 105 105   CO2 26 26 19*   BUN 64* 58* 93*   CREA 6.05* 4.99* 7.00*   GLU 96 97 64*   CA 10.6* 9.1 9.9  9.9   MG  --   --  1.3*   PHOS 8.4* 6.6* 8.7*       Recent Labs 02/20/23  0514 02/19/23  0331 02/18/23 0449   ALT  --   --  <6*   AP  --   --  48   TBILI  --   --  0.8   TP  --   --  5.1*   ALB 2.0* 2.2* 2.5*   GLOB  --   --  2.6       Recent Labs     02/18/23  0449   INR 1.1   PTP 11.3*   APTT 25.7        Recent Labs     02/18/23 0449   TIBC 137*   PSAT 43   FERR 544*        No results found for: FOL, RBCF   No results for input(s): PH, PCO2, PO2 in the last 72 hours. No results for input(s): CPK, CKNDX, TROIQ in the last 72 hours.     No lab exists for component: CPKMB  Lab Results   Component Value Date/Time    Cholesterol, total 167 07/06/2021 12:00 AM    HDL Cholesterol 42 07/06/2021 12:00 AM    LDL, calculated 103 (H) 07/06/2021 12:00 AM    LDL, calculated 90 06/17/2019 05:47 PM    Triglyceride 122 07/06/2021 12:00 AM     Lab Results   Component Value Date/Time    Glucose (POC) 82 02/14/2023 12:04 PM    Glucose (POC) 78 08/15/2022 08:21 AM    Glucose (POC) 90 11/09/2020 08:36 AM     Lab Results   Component Value Date/Time    Color YELLOW/STRAW 08/10/2022 03:34 PM    Appearance CLOUDY (A) 08/10/2022 03:34 PM    Specific gravity 1.016 08/10/2022 03:34 PM    pH (UA) 5.5 08/10/2022 03:34 PM    Protein >300 (A) 08/10/2022 03:34 PM    Glucose 100 (A) 08/10/2022 03:34 PM    Ketone Negative 08/10/2022 03:34 PM    Bilirubin Negative 08/10/2022 03:34 PM    Urobilinogen 0.2 08/10/2022 03:34 PM    Nitrites Negative 08/10/2022 03:34 PM    Leukocyte Esterase TRACE (A) 08/10/2022 03:34 PM    Epithelial cells MODERATE (A) 08/10/2022 03:34 PM    Bacteria 1+ (A) 08/10/2022 03:34 PM    WBC 0-4 08/10/2022 03:34 PM    RBC 0-5 08/10/2022 03:34 PM         Medications Reviewed:     Current Facility-Administered Medications   Medication Dose Route Frequency    traMADoL (ULTRAM) tablet 50 mg  50 mg Oral Q6H PRN    epoetin sara-epbx (RETACRIT) injection 30,000 Units  30,000 Units SubCUTAneous ONCE    bumetanide (BUMEX) tablet 2 mg  2 mg Oral BID    predniSONE (DELTASONE) tablet 30 mg  30 mg Oral DAILY WITH BREAKFAST    cephALEXin (KEFLEX) capsule 500 mg  500 mg Oral DAILY    0.9% sodium chloride infusion 250 mL  250 mL IntraVENous PRN    polyethylene glycol (MIRALAX) packet 17 g  17 g Oral DAILY    senna-docusate (PERICOLACE) 8.6-50 mg per tablet 2 Tablet  2 Tablet Oral QHS    0.9% sodium chloride infusion 250 mL  250 mL IntraVENous PRN    sevelamer carbonate (RENVELA) oral powder 0.8 g  0.8 g Oral TID WITH MEALS    0.9% sodium chloride infusion 250 mL  250 mL IntraVENous PRN    sodium chloride (NS) flush 5-40 mL  5-40 mL IntraVENous Q8H    sodium chloride (NS) flush 5-40 mL  5-40 mL IntraVENous PRN    acetaminophen (TYLENOL) tablet 650 mg  650 mg Oral Q6H PRN    Or    acetaminophen (TYLENOL) suppository 650 mg  650 mg Rectal Q6H PRN    oxyCODONE IR (ROXICODONE) tablet 10 mg  10 mg Oral Q4H PRN    allopurinoL (ZYLOPRIM) tablet 100 mg  100 mg Oral DAILY    amLODIPine (NORVASC) tablet 10 mg  10 mg Oral DAILY    carvediloL (COREG) tablet 12.5 mg  12.5 mg Oral BID WITH MEALS    hydrALAZINE (APRESOLINE) tablet 50 mg  50 mg Oral TID    trospium (SANCTURA) tablet 20 mg  20 mg Oral DAILY    sodium bicarbonate tablet 1,300 mg  1,300 mg Oral BID     ______________________________________________________________________  EXPECTED LENGTH OF STAY: 5d 0h  ACTUAL LENGTH OF STAY:          3                 James Whyte MD

## 2023-02-20 NOTE — PROGRESS NOTES
Physician Progress Note      PATIENTSenora   CSN #:                  [id number]  :                       1960  ADMIT DATE:       2023 2:40 AM  DISCH DATE:  RESPONDING  PROVIDER #:        YAKELIN ERICKSON MD          QUERY TEXT:    Dear Attending,    Pt admitted with Sepsis documented. Pt noted to have PD catheter. If possible, please document in the progress notes and discharge summary if you are evaluating and / or treating any of the following: The medical record reflects the following:  Risk Factors: s/p PD catheter   Clinical Indicators: lethargy, wbc 15.5, -114, low grade temp weakness and feeling cold, pain in the abdomen, most notably around the PD catheter site, CTA minimal fluid abutting the catheter tract in the subcutaneous tissue  Treatment: Catheter removal, BC, Keflex, Vanco, Zosyn,    Please call if you have any questions or need assistance. I can also be reached via [facility] or [facility] # [phone]. Thank you,  Isai Linder, [facility]  O: [phone]  Options provided:  -- Sepsis related to PD catheter  -- Sepsis unrelated to PD catheter  -- Other - I will add my own diagnosis  -- Disagree - Not applicable / Not valid  -- Disagree - Clinically unable to determine / Unknown  -- Refer to Clinical Documentation Reviewer    PROVIDER RESPONSE TEXT:    No sepsis    Query created by:  Amy Hendricks on 2023 1:38 PM      Electronically signed by:  Amy ERICKSON MD 2023 1:46 PM

## 2023-02-20 NOTE — PROGRESS NOTES
1900 Pt complaint of SOB. Blood pause. Md aware. 1920 Pt feeling better. Blood restarted at a lower dose. Orders for a stat chest xray was given.

## 2023-02-20 NOTE — PROGRESS NOTES
Problem: Self Care Deficits Care Plan (Adult)  Goal: *Acute Goals and Plan of Care (Insert Text)  Description: FUNCTIONAL STATUS PRIOR TO ADMISSION: pt lives at home with her , uses [facility] and reports mod I for ADLs. Pt with hx of MEL in 8/2022 and back sx in 2020. Pt reports no difficulty at baseline donning shoes and socks and does not use AD. No falls. HOME SUPPORT: lives at home with , driving    Occupational Therapy Goals  Initiated 2/20/2023  1. Patient will perform lower body dressing with minimal assistance using AE PRN within 7 day(s). 2.  Patient will perform seated bathing with moderate assistance  within 7 day(s). 3.  Patient will perform upper body dressing with supervision/set-up within 7 day(s). 4.  Patient will perform toilet transfers to [facility] with RW with moderate assistance  within 7 day(s). 5.  Patient will perform all aspects of toileting with moderate assistance  within 7 day(s). Outcome: Progressing Towards Goal    OCCUPATIONAL THERAPY EVALUATION  Patient: Naima Whitley (5 y.o. female)  Date: 2/20/2023  Primary Diagnosis: Sepsis ([facility] [address] [A41.9]  Anemia [D64.9]  Tachycardia [R00.0]       Precautions:   Fall    ASSESSMENT  Based on the objective data described below, the patient presents with increased weakness, B knee and ankle pain, impaired standing balance, high risk for falls and heavy assist x 2 to stand from surfaces. She is now significantly below her ADL baseline, needing max A x 2 to stand from chair (required two attempts) and up to max and total A for LB dressing and toileting, respectively. At this time, concerned about pt's ability to go home and manage with her spouse. If pt continues to require increased A, may need short term rehab (IPR) prior to returning home.      Current Level of Function Impacting Discharge (ADLs/self-care): max A x 2 sit<>stand, fall risk, B knee pain, pain with attempts at LB dressing, independent feeding and grooming seated, mod A UB, max A LB, total A toileting      Other factors to consider for discharge: from home, new PD for OP dialysis      Patient will benefit from skilled therapy intervention to address the above noted impairments. PLAN :  Recommendations and Planned Interventions: self care training, functional mobility training, therapeutic exercise, balance training, therapeutic activities, and patient education    Frequency/Duration: Patient will be followed by occupational therapy 5 times a week to address goals. Recommendation for discharge: (in order for the patient to meet his/her long term goals)  To be determined: IPR vs home with HHOT/PT if pt progresses mobility and requires less assist levels     This discharge recommendation:  Has not yet been discussed the attending provider and/or case management    IF patient discharges home will need the following DME: TBD       SUBJECTIVE:   Patient stated I just don't get how I got so weak.     OBJECTIVE DATA SUMMARY:   HISTORY:   Past Medical History:   Diagnosis Date    Arthritis     L knee    Chronic kidney disease     secondary to hypertensive nephrosclerosis, CKD STG 5, ON ACTIVE KIDNEY TRANSPLANT LIST AT [facility]- DR. SANCHEZ    Gastritis     HISTORY OF 10/2020     Gout     History of claustrophobia     History of total hip replacement, right 2022    Hyperparathyroidism ([address]     Hypertension      Past Surgical History:   Procedure Laterality Date    HX  SECTION      HX COLONOSCOPY      HX GYN      ECTOPIC PREGNANCY SURGERY     HX HIP REPLACEMENT Left     HX LAP CHOLECYSTECTOMY      HX LUMBAR LAMINECTOMY  2020    L3L4L5    HX WISDOM TEETH EXTRACTION      IR INSERT NON TUNL CVC OVER 5 YRS  2023       Expanded or extensive additional review of patient history:     Home Situation  Home Environment: Private residence  # Steps to Enter: 5 (has side door w/  no steps f/b 4 steps to main living area and another 5 to bedroom)  Rails to Criers Podium: Yes  One/Two Story Residence: Two story  # of Interior Steps: 5  Living Alone: No  Support Systems: Spouse/Significant Other  Patient Expects to be Discharged to[de-identified] Home  Current DME Used/Available at Home: Cane, straight, Walker, rolling, Shower chair  Tub or Shower Type: Tub/Shower combination    Hand dominance: Right    EXAMINATION OF PERFORMANCE DEFICITS:  Cognitive/Behavioral Status:  Neurologic State: Alert  Orientation Level: Oriented to person;Oriented to place;Oriented to situation;Oriented to time  Cognition: Appropriate decision making  Perception: Appears intact  Perseveration: No perseveration noted  Safety/Judgement: Awareness of environment    Skin: intact    Edema: B ankles, especially lateral L ankle    Hearing: Auditory  Auditory Impairment: None    Vision/Perceptual:                           Acuity: Within Defined Limits    Corrective Lenses: Reading glasses    Range of Motion:    AROM: Generally decreased, functional                         Strength:    Strength: Generally decreased, functional                Coordination:  Coordination: Within functional limits  Fine Motor Skills-Upper: Left Intact; Right Intact    Gross Motor Skills-Upper: Left Intact; Right Intact    Tone & Sensation:    Tone: Normal                         Balance:   Impaired standing balance, poor transitioning to fair, needing assist for anterior weight shifting     Functional Mobility and Transfers for ADLs:  Bed Mobility:    Sit to Supine: max A x 2    Transfers:  Sit to Stand: Maximum assistance;Assist x2; Additional time; Adaptive equipment  Stand to Sit: Maximum assistance;Assist x2 (to control descent into chair during standing attempt)  Bed to Chair: Minimum assistance;Assist x2; Additional time  Toilet Transfer : Minimum assistance;Assist x2; Additional time (for transfer, up to max A x 2 to stand)    ADL Assessment:  Feeding: Independent    Oral Facial Hygiene/Grooming: Setup    Bathing: Maximum assistance         Upper Body Dressing: Moderate assistance (decreaed UE AROM shoulders)    Lower Body Dressing: Maximum assistance    Toileting: Total assistance                  ADL Intervention and task modifications:                                          Cognitive Retraining  Safety/Judgement: Awareness of environment      Functional Measure:  [address] [facility] [address] [address] AM-PAC®      Daily Activity Inpatient Short Form (6-Clicks) Version 2  How much HELP from another person do you currently need. .. (If the patient hasn't done an activity recently, how much help from another person do you think they would need if they tried?) Total A Lot A Little None   1. Putting on and taking off regular lower body clothing? [x]   1 []   2 []   3 []   4   2. Bathing (including washing, rinsing, drying)? [x]   1 []   2 []   3 []   4   3. Toileting, which includes using toilet, bedpan, or urinal? [x]   1 []   2 []   3 []   4   4. Putting on and taking off regular upper body clothing? []   1 [x]   2 []   3 []   4   5. Taking care of personal grooming such as brushing teeth? []   1 []   2 []   3 [x]   4   6. Eating meals? []   1 []   2 []   3 [x]   4     Raw Score: 9/24                            Cutoff score ?191,2,3 had higher odds of discharging home with home health or need of SNF/IPR    1. Lane Dennis. Validity of the AM-PAC 6-Clicks Inpatient Daily Activity and Basic Mobility Short Forms. Physical Therapy Mar 2014, [phone]) 379-391; DOI: 10.2522/ptj.[id number]  2. Ruth Bourne. Association of AM-PAC \"6-Clicks\" Basic Mobility and Daily Activity Scores With Discharge Destination. Phys Ther. 2021 Apr 4;101(4):siof622. doi: 10.1093/ptj/pcad365. PMID: 04411260. V John Jurado, Priscilla MCDONALD, Nelia Baker, Andrew S.  Activity Measure for Post-Acute Care \"6-Clicks\" Basic Mobility Scores Predict Discharge Destination After Acute Care Hospitalization in Select Patient Groups: A Retrospective, Observational Study. Arch Rehabil Res Clin Transl. 2022;4(3):180329. doi: 10.1016/j.arrct. 6689.888627. PMID: 83684599; PMCID: [id number]. 4. Ni Espitia Ni P. AM-PAC Short Forms Manual 4.0. Revised 2020. Occupational Therapy Evaluation Charge Determination   History Examination Decision-Making   MEDIUM Complexity : Expanded review of history including physical, cognitive and psychosocial  history  MEDIUM Complexity : 3-5 performance deficits relating to physical, cognitive , or psychosocial skils that result in activity limitations and / or participation restrictions MEDIUM Complexity : Patient may present with comorbidities that affect occupational performnce. Miniml to moderate modification of tasks or assistance (eg, physical or verbal ) with assesment(s) is necessary to enable patient to complete evaluation       Based on the above components, the patient evaluation is determined to be of the following complexity level: MEDIUM  Pain Ratin/10 abdominal pain, RN notified. Activity Tolerance:   Good    After treatment patient left in no apparent distress:    Supine in bed, Heels elevated for pressure relief, and Call bell within reach    COMMUNICATION/EDUCATION:   The patients plan of care was discussed with: Physical therapist, Registered nurse, and Rehabilitation technician. Patient/family have participated as able in goal setting and plan of care. This patients plan of care is appropriate for delegation to [facility].     Thank you for this referral.  Shabana Bush OT  Time Calculation: 29 mins

## 2023-02-20 NOTE — PROGRESS NOTES
Problem: Patient Education: Go to Patient Education Activity  Goal: Patient/Family Education  Outcome: Progressing Towards Goal     Problem: Pressure Injury - Risk of  Goal: *Prevention of pressure injury  Description: Document Jose Scale and appropriate interventions in the flowsheet. Outcome: Progressing Towards Goal  Note: Pressure Injury Interventions:  Sensory Interventions: Assess changes in LOC, Avoid rigorous massage over bony prominences, Keep linens dry and wrinkle-free    Moisture Interventions: Absorbent underpads, Apply protective barrier, creams and emollients, Internal/External urinary devices, Minimize layers    Activity Interventions: Pressure redistribution bed/mattress(bed type)    Mobility Interventions: Pressure redistribution bed/mattress (bed type), HOB 30 degrees or less    Nutrition Interventions: Document food/fluid/supplement intake    Friction and Shear Interventions: Apply protective barrier, creams and emollients, Feet elevated on foot rest, Foam dressings/transparent film/skin sealants, Lift sheet, Minimize layers                Problem: Patient Education: Go to Patient Education Activity  Goal: Patient/Family Education  Outcome: Progressing Towards Goal     Problem: Falls - Risk of  Goal: *Absence of Falls  Description: Document Rosalind Fall Risk and appropriate interventions in the flowsheet.   Outcome: Progressing Towards Goal  Note: Fall Risk Interventions:  Mobility Interventions: Patient to call before getting OOB         Medication Interventions: Patient to call before getting OOB    Elimination Interventions: Call light in reach              Problem: Patient Education: Go to Patient Education Activity  Goal: Patient/Family Education  Outcome: Progressing Towards Goal

## 2023-02-20 NOTE — PROGRESS NOTES
Renal Progress Note    NAME:  Naima Whitley   :   1960   MRN:   [id number]     Date/Time:  2023 10:16 AM  Subjective:       -patient awake and alert, denies any complaints, no abdominal pain  -feels ok, no N/V/SOB. Had HD last night and had 1 Kg UF. Has constipation, has not been OOB  -c/o left leg pain, had blood transfusion yesterday an felt dizzy not SOB, nit ready to go home yet.  Has not ambulated yet, no BM yet      Medications reviewed:  Current Facility-Administered Medications   Medication Dose Route Frequency    traMADoL (ULTRAM) tablet 50 mg  50 mg Oral Q6H PRN    bumetanide (BUMEX) tablet 2 mg  2 mg Oral DAILY    cephALEXin (KEFLEX) capsule 500 mg  500 mg Oral DAILY    0.9% sodium chloride infusion 250 mL  250 mL IntraVENous PRN    polyethylene glycol (MIRALAX) packet 17 g  17 g Oral DAILY    senna-docusate (PERICOLACE) 8.6-50 mg per tablet 2 Tablet  2 Tablet Oral QHS    0.9% sodium chloride infusion 250 mL  250 mL IntraVENous PRN    sevelamer carbonate (RENVELA) oral powder 0.8 g  0.8 g Oral TID WITH MEALS    0.9% sodium chloride infusion 250 mL  250 mL IntraVENous PRN    sodium chloride (NS) flush 5-40 mL  5-40 mL IntraVENous Q8H    sodium chloride (NS) flush 5-40 mL  5-40 mL IntraVENous PRN    acetaminophen (TYLENOL) tablet 650 mg  650 mg Oral Q6H PRN    Or    acetaminophen (TYLENOL) suppository 650 mg  650 mg Rectal Q6H PRN    oxyCODONE IR (ROXICODONE) tablet 5 mg  5 mg Oral Q4H PRN    oxyCODONE IR (ROXICODONE) tablet 10 mg  10 mg Oral Q4H PRN    allopurinoL (ZYLOPRIM) tablet 100 mg  100 mg Oral DAILY    amLODIPine (NORVASC) tablet 10 mg  10 mg Oral DAILY    carvediloL (COREG) tablet 12.5 mg  12.5 mg Oral BID WITH MEALS    hydrALAZINE (APRESOLINE) tablet 50 mg  50 mg Oral TID    trospium (SANCTURA) tablet 20 mg  20 mg Oral DAILY    sodium bicarbonate tablet 1,300 mg  1,300 mg Oral BID        Objective:   Vitals:  Visit Vitals  /77   Pulse 98   Temp 98.3 °F (36.8 °C)   Resp 18   Wt 107.4 kg (236 lb 12.4 oz)   SpO2 92%   BMI 38.22 kg/m²     Temp (24hrs), Av.5 °F (36.9 °C), Min:98.1 °F (36.7 °C), Max:98.9 °F (37.2 °C)    O2 Flow Rate (L/min): 2 l/min O2 Device: Nasal cannula    Last 24hr Input/Output:    Intake/Output Summary (Last 24 hours) at 2023 0901  Last data filed at 2023 0730  Gross per 24 hour   Intake 253.33 ml   Output 750 ml   Net -496.67 ml          Physical Exam:  General: awake and alert, NAD, obese  HEENT: Eyes are PERRL. Conjunctiva without pallor ,erythema. The sclerae without icterus. .   Neck:Supple, RT IJ HD catheter  Lungs : Clears to auscultation Bilaterally, Normal respiratory effort  CVS: RRR, S1 S2 normal, SM 2/6,  No rub, 2+ LE edema  Abdomen: Soft, NT , PD catheter RLQ, bowel sounds present  Extremities: No cyanosis, No clubbing  Skin: No rash   Neurologic: non focal, no Astrexis, AOx3  Psych: normal affect       [] Telemetry Reviewed     [] NSR [] PAC/PVCs   [] Afib  [] Paced   [] NSVT   [] Zavala [] NGT  [] Intubated on vent    Lab Data Reviewed:    Recent Results (from the past 24 hour(s))   RBC, ALLOCATE    Collection Time: 23 11:15 AM   Result Value Ref Range    HISTORY CHECKED?  Historical check performed    RENAL FUNCTION PANEL    Collection Time: 23  5:14 AM   Result Value Ref Range    Sodium 134 (L) 136 - 145 mmol/L    Potassium 3.9 3.5 - 5.1 mmol/L    Chloride 99 97 - 108 mmol/L    CO2 26 21 - 32 mmol/L    Anion gap 9 5 - 15 mmol/L    Glucose 96 65 - 100 mg/dL    BUN 64 (H) 6 - 20 MG/DL    Creatinine 6.05 (H) 0.55 - 1.02 MG/DL    BUN/Creatinine ratio 11 (L) 12 - 20      eGFR 7 (L) >60 ml/min/1.73m2    Calcium 10.6 (H) 8.5 - 10.1 MG/DL    Phosphorus 8.4 (H) 2.6 - 4.7 MG/DL    Albumin 2.0 (L) 3.5 - 5.0 g/dL   CBC WITH AUTOMATED DIFF    Collection Time: 23  5:14 AM   Result Value Ref Range    WBC 11.8 (H) 3.6 - 11.0 K/uL    RBC 2.66 (L) 3.80 - 5.20 M/uL    HGB 7.3 (L) 11.5 - 16.0 g/dL    HCT 24.2 (L) 35.0 - 47.0 %    MCV 91.0 80.0 - 99.0 FL    MCH 27.4 26.0 - 34.0 PG    MCHC 30.2 30.0 - 36.5 g/dL    RDW 16.2 (H) 11.5 - 14.5 %    PLATELET 038 332 - 330 K/uL    MPV 10.8 8.9 - 12.9 FL    NRBC 0.0 0  WBC    ABSOLUTE NRBC 0.00 0.00 - 0.01 K/uL    NEUTROPHILS 76 (H) 32 - 75 %    LYMPHOCYTES 8 (L) 12 - 49 %    MONOCYTES 11 5 - 13 %    EOSINOPHILS 3 0 - 7 %    BASOPHILS 0 0 - 1 %    IMMATURE GRANULOCYTES 2 (H) 0.0 - 0.5 %    ABS. NEUTROPHILS 9.1 (H) 1.8 - 8.0 K/UL    ABS. LYMPHOCYTES 0.9 0.8 - 3.5 K/UL    ABS. MONOCYTES 1.3 (H) 0.0 - 1.0 K/UL    ABS. EOSINOPHILS 0.3 0.0 - 0.4 K/UL    ABS. BASOPHILS 0.0 0.0 - 0.1 K/UL    ABS. IMM. GRANS. 0.2 (H) 0.00 - 0.04 K/UL    DF AUTOMATED           Assessment/Plan:   Active Problems:    Anemia (2/17/2023)      Tachycardia (2/17/2023)      Sepsis ([facility] [address] (2/17/2023)       ESRD, s/p PD catheter placement on 2/14/23-not on dialysis yet  -patient is uremic, fluid overload and has metabolic acidosis  -PD catheter not ready for starting PD  Discussed with patient and her  and they agree with the plan-2/17  -s/p temp HD catheter, s/p HD x2  - increased Na bicarb to 1300 mg bid   -resumed bumex  -HD today, will remove judy when she is ready for discharge    Hyperphosphatemia, started renvela     Abdominal pain  -seen by vascular surgery. Had small amount of fluid in pelvis at the time of PD catheter placement which sent for culture.  Culture so far negative but had few G+cocci and rare WBC  -Agree with Abx coverage, may change to po as no growth on fluid culture  -improved    HD nurse to change the pD catheter site     Anemia  -transfuse for Hb<7   -iron sat ok  -s/p PRBC 2/29  -check stool OB  -epogen 51596 unit x 1 today    Constipation, start stool softener  -dulcolax x1 now     HTN, c/wBP meds    ambulate    D/W Dr Elio Reeves  ___________________________________________________    Total time spent with patient:  [] 15   [] 25   [] 35   []  __ minutes    [] Critical Care Provided    Care Plan discussed with:    [x] Patient   [] Family    [] Care Manager   [] Consultant/Specialist :      []   >50% of visit spent in counseling and coordination of care   (Discussed [] CODE status,  [] Care Plan, [] D/C Planning)    ___________________________________________________    Attending Physician: Yoselyn Tomas, [address]

## 2023-02-20 NOTE — PROGRESS NOTES
Problem: Mobility Impaired (Adult and Pediatric)  Goal: *Acute Goals and Plan of Care (Insert Text)  Description: FUNCTIONAL STATUS PRIOR TO ADMISSION: Patient was modified independent using a single point cane for functional mobility, driving    [address]: The patient lived with her spouse. Physical Therapy Goals  Initiated 2/20/2023  1. Patient will move from supine to sit and sit to supine  in bed with modified independence within 7 day(s). 2.  Patient will transfer from bed to chair and chair to bed with modified independence using the least restrictive device within 7 day(s). 3.  Patient will perform sit to stand with modified independence within 7 day(s). 4.  Patient will ambulate with modified independence for 150 feet with the least restrictive device within 7 day(s). 5.  Patient will ascend/descend 5 stairs with handrail(s) with supervision/set-up within 7 day(s). Outcome: Not Met     PHYSICAL THERAPY EVALUATION  Patient: Mary Lynn (57 y.o. female)  Date: 2/20/2023  Primary Diagnosis: Sepsis ([address] [A41.9]  Anemia [D64.9]  Tachycardia [R00.0]       Precautions:  Fall    ASSESSMENT  Based on the objective data described below, the patient presents with impaired functional independence s/p admission with sepsis, anemia, tachycardia. Currently pt's mobility is limited by bilateral LE weakness, pain (Ray knee and ankle), impaired balance, and decreased activity tolerance. Noted plan for DCtoday  and follow up with OP dialysis for training in the morning. At this time, pt requires considerable assist for all mobility. She is not able to ambulate household distances or the steps needed to enter her home. If discharging home this evening, pt would require medical transport home and to/ from dialysis appointments in addition to Max A of caregivers for transfers and a WC. Of note, this is pt's first time OOB since admission.   Hopeful that her her mobility will improve with a few sessions of acute PT to allow her to return home with family. At this time, recommending rehab (IPR). Current Level of Function Impacting Discharge (mobility/balance): Min A getting OOB w/ HOB max raised and use of bed rails (will likely require greater assist w/ HOB flat and no rails); Max A x2 sit<->stand; Min A x2 ambulating 10 ft with RW    Functional Outcome Measure: The patient scored 13/24 on the AM-PAC outcome measure which is indicative of  higher odds of discharging home with home health or need of SNF/IPR. Erskin Cram Other factors to consider for discharge: PLOF indep and driving, steps, dialysis appt for education     Patient will benefit from skilled therapy intervention to address the above noted impairments. PLAN :  Recommendations and Planned Interventions: bed mobility training, transfer training, gait training, therapeutic exercises, patient and family training/education, and therapeutic activities      Frequency/Duration: Patient will be followed by physical therapy:  5 times a week to address goals. Recommendation for discharge: (in order for the patient to meet his/her long term goals)  To be determined: Hopeful for home with HHPT. At this time, recommend IPR. This discharge recommendation:  Has been made in collaboration with the attending provider and/or case management    IF patient discharges home will need the following DME: patient owns DME required for discharge         SUBJECTIVE:   Patient stated My legs are weak.     OBJECTIVE DATA SUMMARY:   HISTORY:    Past Medical History:   Diagnosis Date    Arthritis     L knee    Chronic kidney disease     secondary to hypertensive nephrosclerosis, CKD STG 5, ON ACTIVE KIDNEY TRANSPLANT LIST AT [facility]- DR. SANCHEZ    Gastritis     HISTORY OF 10/2020     Gout     History of claustrophobia     History of total hip replacement, right 08/14/2022    Hyperparathyroidism ([facility] [address]     Hypertension      Past Surgical History:   Procedure Laterality Date    HX  SECTION      HX COLONOSCOPY      HX GYN      ECTOPIC PREGNANCY SURGERY     HX HIP REPLACEMENT Left     HX LAP CHOLECYSTECTOMY      HX LUMBAR LAMINECTOMY  2020    L3L4L5    HX WISDOM TEETH EXTRACTION      IR INSERT NON TUNL CVC OVER 5 YRS  2023       Personal factors and/or comorbidities impacting plan of care: PMH/ HPI (pt reporting bilateral THR)    Home Situation  Home Environment: Private residence  # Steps to Enter: 5 (has side door w/  no steps f/b 4 steps to main living area and another 5 to bedroom)  Rails to [employer]: Yes  One/Two Story Residence: Two story  # of Interior Steps: 5  Living Alone: No  Support Systems: Spouse/Significant Other  Patient Expects to be Discharged to[de-identified] Home  Current DME Used/Available at Home: [address], [place], straight, Walker, rolling, Shower chair  Tub or Shower Type: Tub/Shower combination    EXAMINATION/PRESENTATION/DECISION MAKING:   Critical Behavior:  Neurologic State: Alert  Orientation Level: Oriented to person, Oriented to place, Oriented to situation, Oriented to time  Cognition: Appropriate decision making  Safety/Judgement: Awareness of environment  Hearing: Auditory  Auditory Impairment: None  Skin:  exposed areas grossly intact    Range Of Motion:  AROM: Generally decreased, functional                       Strength:    Strength: Generally decreased, functional                    Tone & Sensation:   Tone: Normal                              Coordination:  Coordination: Within functional limits  Vision:   Acuity: Within Defined Limits  Corrective Lenses: Reading glasses  Functional Mobility:  Bed Mobility:  Supine to Sit: Minimum assistance; Additional time; Adaptive equipment;Assist x1;Bed Modified (HOB raised; bed rail used)  Sit to Supine: Other (comment) (remained sitting in chair)     Transfers:  Sit to Stand: Maximum assistance;Assist x2; Additional time; Adaptive equipment (from bed and chair)  Stand to Sit: Maximum assistance;Assist x2 (to control descent into chair during standing attempt)  Instructed in transfer technique. Cues required to improve hand placement, rocking momentum, forward trunk bend/ anterior weightshift, foot placement, and controlled descent when returning to sit. Bed to Chair: Minimum assistance;Assist x2; Additional time, w/ walker support (does not include sit<->stand). Manual assist for walker management and initially for lateral weight shifts which improved with time/ training               Balance:   Sitting, unsupported: intact  Standing, supported: impaired   Static - fair, constant support  Ambulation/Gait Training:  Distance (ft): 12 Feet (ft)  Assistive Device: Gait belt;Walker, rolling  Ambulation - Level of Assistance: Minimal assistance; Additional time; Adaptive equipment;Assist x1 (w/ SBA of another for safety d/t degree of difficulty with sit<->stand related to BLE weakness)     Gait Description (WDL): Exceptions to WDL  Gait Abnormalities: Decreased step clearance        Base of Support: Widened     Speed/Adriana: Slow;Shuffled  Step Length: Right shortened;Left shortened                   Therapeutic Exercises:   Instructed in LAQ w/ hold to perform outside of therapy sessions. Functional Measure:  [facility] AM-PAC®      Basic Mobility Inpatient Short Form (6-Clicks) Version 2  How much HELP from another person do you currently need. .. (If the patient hasn't done an activity recently, how much help from another person do you think they would need if they tried?) Total A Lot A Little None   1. Turning from your back to your side while in a flat bed without using bedrails? []  1 [x]  2 []  3  []  4   2. Moving from lying on your back to sitting on the side of a flat bed without using bedrails? []  1 [x]  2 []  3  []  4   3. Moving to and from a bed to a chair (including a wheelchair)? []  1 []  2 [x]  3  []  4   4.  Standing up from a chair using your arms (e.g. wheelchair or bedside chair)? []  1 [x]  2 []  3  []  4   5. Walking in hospital room? []  1 []  2 [x]  3  []  4   6. Climbing 3-5 steps with a railing? [x]  1 []  2 []  3  []  4     Raw Score: 13/24                            Cutoff score ?171,2,3 had higher odds of discharging home with home health or need of SNF/IPR. [address]ody RogersQian Mika Phelan. Validity of the AM-PAC 6-Clicks Inpatient Daily Activity and Basic Mobility Short Forms. Physical Therapy Mar 2014, [phone]) 379-391; DOI: 10.2522/ptj.70652889  2. Shelley Martínez. Association of AM-PAC \"6-Clicks\" Basic Mobility and Daily Activity Scores With Discharge Destination. Phys Ther. 2021 Apr 4;101(4):hppx893. doi: 10.1093/ptj/jbub661. PMID: 25836109. V John Jurado, Priscilla D, Mustapha Houston, Nelia K, Andrew S. Activity Measure for Post-Acute Care \"6-Clicks\" Basic Mobility Scores Predict Discharge Destination After Acute Care Hospitalization in Select Patient Groups: A Retrospective, Observational Study. Arch Rehabil Res Clin Transl. 2022 Jul 16;4(3):745396. doi: 10.1016/j.arrct. 2276.007746. PMID: 77091452; PMCID: [id number]. 4. Ni Santizo Ni P. AM-PAC Short Forms Manual 4.0. Revised 2/2020.          Physical Therapy Evaluation Charge Determination   History Examination Presentation Decision-Making   MEDIUM  Complexity : 1-2 comorbidities / personal factors will impact the outcome/ POC  MEDIUM Complexity : 3 Standardized tests and measures addressing body structure, function, activity limitation and / or participation in recreation  LOW Complexity : Stable, uncomplicated  Other outcome measures AM-PAC 13/24  HIGH       Based on the above components, the patient evaluation is determined to be of the following complexity level: LOW     Pain Rating:  RLE     Activity Tolerance:   Fair and requires rest breaks    After treatment patient left in no apparent distress:   Sitting in chair and Call bell within reach    COMMUNICATION/EDUCATION:   The patients plan of care was discussed with: Occupational therapist, Registered nurse, and Physician. Fall prevention education was provided and the patient/caregiver indicated understanding., Patient/family have participated as able in goal setting and plan of care. , and Patient/family agree to work toward stated goals and plan of care.     Thank you for this referral.  Starr Bruno, PT   Time Calculation: 48 mins

## 2023-02-21 LAB
ABO + RH BLD: NORMAL
ALBUMIN SERPL-MCNC: 2.2 G/DL (ref 3.5–5)
ANION GAP SERPL CALC-SCNC: 8 MMOL/L (ref 5–15)
BASOPHILS # BLD: 0 K/UL (ref 0–0.1)
BASOPHILS NFR BLD: 0 % (ref 0–1)
BLD PROD TYP BPU: NORMAL
BLOOD BANK CMNT PATIENT-IMP: NORMAL
BLOOD GROUP ANTIBODIES SERPL: NORMAL
BPU ID: NORMAL
BUN SERPL-MCNC: 39 MG/DL (ref 6–20)
BUN/CREAT SERPL: 9 (ref 12–20)
CALCIUM SERPL-MCNC: 9.7 MG/DL (ref 8.5–10.1)
CHLORIDE SERPL-SCNC: 97 MMOL/L (ref 97–108)
CO2 SERPL-SCNC: 27 MMOL/L (ref 21–32)
CREAT SERPL-MCNC: 4.35 MG/DL (ref 0.55–1.02)
CROSSMATCH RESULT,%XM: NORMAL
DIFFERENTIAL METHOD BLD: ABNORMAL
EOSINOPHIL # BLD: 0 K/UL (ref 0–0.4)
EOSINOPHIL NFR BLD: 0 % (ref 0–7)
ERYTHROCYTE [DISTWIDTH] IN BLOOD BY AUTOMATED COUNT: 15.6 % (ref 11.5–14.5)
GLUCOSE SERPL-MCNC: 102 MG/DL (ref 65–100)
HCT VFR BLD AUTO: 25.2 % (ref 35–47)
HGB BLD-MCNC: 7.7 G/DL (ref 11.5–16)
IMM GRANULOCYTES # BLD AUTO: 0.1 K/UL (ref 0–0.04)
IMM GRANULOCYTES NFR BLD AUTO: 1 % (ref 0–0.5)
LYMPHOCYTES # BLD: 0.3 K/UL (ref 0.8–3.5)
LYMPHOCYTES NFR BLD: 3 % (ref 12–49)
MCH RBC QN AUTO: 27.6 PG (ref 26–34)
MCHC RBC AUTO-ENTMCNC: 30.6 G/DL (ref 30–36.5)
MCV RBC AUTO: 90.3 FL (ref 80–99)
MONOCYTES # BLD: 0.6 K/UL (ref 0–1)
MONOCYTES NFR BLD: 7 % (ref 5–13)
NEUTS SEG # BLD: 8 K/UL (ref 1.8–8)
NEUTS SEG NFR BLD: 89 % (ref 32–75)
NRBC # BLD: 0 K/UL (ref 0–0.01)
NRBC BLD-RTO: 0 PER 100 WBC
PHOSPHATE SERPL-MCNC: 6.7 MG/DL (ref 2.6–4.7)
PLATELET # BLD AUTO: 279 K/UL (ref 150–400)
PMV BLD AUTO: 10.5 FL (ref 8.9–12.9)
POTASSIUM SERPL-SCNC: 4 MMOL/L (ref 3.5–5.1)
RBC # BLD AUTO: 2.79 M/UL (ref 3.8–5.2)
RBC MORPH BLD: ABNORMAL
SODIUM SERPL-SCNC: 132 MMOL/L (ref 136–145)
SPECIMEN EXP DATE BLD: NORMAL
STATUS OF UNIT,%ST: NORMAL
UNIT DIVISION, %UDIV: 0
WBC # BLD AUTO: 9 K/UL (ref 3.6–11)

## 2023-02-21 PROCEDURE — 85025 COMPLETE CBC W/AUTO DIFF WBC: CPT

## 2023-02-21 PROCEDURE — 97530 THERAPEUTIC ACTIVITIES: CPT

## 2023-02-21 PROCEDURE — 74011636637 HC RX REV CODE- 636/637: Performed by: HOSPITALIST

## 2023-02-21 PROCEDURE — 74011000250 HC RX REV CODE- 250: Performed by: PHYSICIAN ASSISTANT

## 2023-02-21 PROCEDURE — 97116 GAIT TRAINING THERAPY: CPT

## 2023-02-21 PROCEDURE — 74011250637 HC RX REV CODE- 250/637: Performed by: INTERNAL MEDICINE

## 2023-02-21 PROCEDURE — 74011250637 HC RX REV CODE- 250/637: Performed by: HOSPITALIST

## 2023-02-21 PROCEDURE — 97535 SELF CARE MNGMENT TRAINING: CPT

## 2023-02-21 PROCEDURE — 80069 RENAL FUNCTION PANEL: CPT

## 2023-02-21 PROCEDURE — 65270000046 HC RM TELEMETRY

## 2023-02-21 PROCEDURE — 74011250637 HC RX REV CODE- 250/637: Performed by: PHYSICIAN ASSISTANT

## 2023-02-21 PROCEDURE — 36415 COLL VENOUS BLD VENIPUNCTURE: CPT

## 2023-02-21 PROCEDURE — 74011250636 HC RX REV CODE- 250/636: Performed by: HOSPITALIST

## 2023-02-21 RX ORDER — PANTOPRAZOLE SODIUM 40 MG/1
40 TABLET, DELAYED RELEASE ORAL
Status: DISCONTINUED | OUTPATIENT
Start: 2023-02-21 | End: 2023-02-22 | Stop reason: HOSPADM

## 2023-02-21 RX ORDER — SEVELAMER CARBONATE FOR ORAL SUSPENSION 800 MG/1
1.6 POWDER, FOR SUSPENSION ORAL
Status: DISCONTINUED | OUTPATIENT
Start: 2023-02-21 | End: 2023-02-22 | Stop reason: HOSPADM

## 2023-02-21 RX ORDER — ONDANSETRON 2 MG/ML
4 INJECTION INTRAMUSCULAR; INTRAVENOUS
Status: DISCONTINUED | OUTPATIENT
Start: 2023-02-21 | End: 2023-02-22 | Stop reason: HOSPADM

## 2023-02-21 RX ADMIN — ALLOPURINOL 100 MG: 100 TABLET ORAL at 09:03

## 2023-02-21 RX ADMIN — SODIUM BICARBONATE 1300 MG: 650 TABLET ORAL at 18:36

## 2023-02-21 RX ADMIN — BUMETANIDE 2 MG: 1 TABLET ORAL at 18:36

## 2023-02-21 RX ADMIN — CARVEDILOL 12.5 MG: 12.5 TABLET, FILM COATED ORAL at 18:36

## 2023-02-21 RX ADMIN — HYDRALAZINE HYDROCHLORIDE 50 MG: 50 TABLET, FILM COATED ORAL at 09:03

## 2023-02-21 RX ADMIN — BUMETANIDE 2 MG: 1 TABLET ORAL at 09:02

## 2023-02-21 RX ADMIN — CARVEDILOL 12.5 MG: 12.5 TABLET, FILM COATED ORAL at 07:03

## 2023-02-21 RX ADMIN — HYDRALAZINE HYDROCHLORIDE 50 MG: 50 TABLET, FILM COATED ORAL at 18:36

## 2023-02-21 RX ADMIN — AMLODIPINE BESYLATE 10 MG: 5 TABLET ORAL at 09:03

## 2023-02-21 RX ADMIN — SEVELAMER CARBONATE FOR ORAL SUSPENSION 1.6 G: 800 POWDER, FOR SUSPENSION ORAL at 18:36

## 2023-02-21 RX ADMIN — PANTOPRAZOLE SODIUM 40 MG: 40 TABLET, DELAYED RELEASE ORAL at 18:36

## 2023-02-21 RX ADMIN — SODIUM CHLORIDE, PRESERVATIVE FREE 10 ML: 5 INJECTION INTRAVENOUS at 07:04

## 2023-02-21 RX ADMIN — PREDNISONE 30 MG: 20 TABLET ORAL at 07:03

## 2023-02-21 RX ADMIN — TROSPIUM CHLORIDE 20 MG: 20 TABLET, FILM COATED ORAL at 09:03

## 2023-02-21 RX ADMIN — ONDANSETRON HYDROCHLORIDE 4 MG: 2 SOLUTION INTRAMUSCULAR; INTRAVENOUS at 10:35

## 2023-02-21 RX ADMIN — SODIUM CHLORIDE, PRESERVATIVE FREE 10 ML: 5 INJECTION INTRAVENOUS at 22:18

## 2023-02-21 RX ADMIN — CEPHALEXIN 500 MG: 500 CAPSULE ORAL at 09:07

## 2023-02-21 RX ADMIN — SEVELAMER CARBONATE FOR ORAL SUSPENSION 0.8 G: 800 POWDER, FOR SUSPENSION ORAL at 07:03

## 2023-02-21 RX ADMIN — SODIUM BICARBONATE 1300 MG: 650 TABLET ORAL at 09:03

## 2023-02-21 NOTE — PROCEDURES
Hemodialysis / [phone]    Vitals Pre Post Assessment Pre Post   BP BP: (!) 185/116 (02/20/23 1849)   186/112 LOC See assessment See assessment   HR Pulse (Heart Rate): (!) 102 (02/20/23 1849)   104 Lungs See assessment See assessment   Resp Resp Rate: 16 (02/20/23 1849) 16 Cardiac See assessment See assessment   Temp Temp: 98 °F (36.7 °C) (02/20/23 1849) 97.8 Skin See assessment See assessment   Weight Pre-Dialysis Weight: 112 kg (246 lb 14.6 oz) (02/17/23 2304)  Edema See assessment See assessment   Tele status Remote Remote Pain Pain Intensity 1: 0 (02/20/23 0427) 0     Orders   Duration: Start: 0632 End: 2149 Total: 3 hrs   Dialyzer: Dialyzer/Set Up Inspection: Tian March (02/20/23 1849)   K Bath: Dialysate K (mEq/L): 3 (02/20/23 1849)   Ca Bath: Dialysate CA (mEq/L): 2.0 (02/20/23 1849)   Na: Dialysate NA (mEq/L): 138 (02/20/23 1849)   Bicarb: Dialysate HCO3 (mEq/L): 35 (02/20/23 1849)   Target Fluid Removal: Goal/Amount of Fluid to Remove (mL): 2000 mL (02/20/23 1849)     Access   Type & Location: Eddluis Hancock / PC : Pre- Assessment: Dressing CDI. No s/s of infection. Both lumens aspirate & flush well. Running well at . Post assessment: Dressing CDI. No changes.     Comments:                                        Labs   HBsAg (Antigen) / date: Negative 2/17/2023                                              HBsAb (Antibody) / date: Susceptible 2/17/2023   Source: Epic   Obtained/Reviewed  Critical Results Called HGB   Date Value Ref Range Status   02/20/2023 7.3 (L) 11.5 - 16.0 g/dL Final     Potassium   Date Value Ref Range Status   02/20/2023 3.9 3.5 - 5.1 mmol/L Final     Calcium   Date Value Ref Range Status   02/20/2023 10.6 (H) 8.5 - 10.1 MG/DL Final     BUN   Date Value Ref Range Status   02/20/2023 64 (H) 6 - 20 MG/DL Final     Creatinine   Date Value Ref Range Status   02/20/2023 6.05 (H) 0.55 - 1.02 MG/DL Final        Meds Given   Name Dose Route                    Adequacy / Fluid    Total Liters Process:    Net Fluid Removed:       Comments   Time Out Done:   (Time) 1845   Admitting Diagnosis: Anemia   Consent obtained/signed: Informed Consent Verified: Yes (02/20/23 1849)   Machine / Melisa Paterson # Machine Number: [id number] (02/20/23 6668)   Primary Nurse Rpt Pre: Madhuri Greenberg RN   Primary Nurse Rpt Post: Johan Grover RN   Pt Education: Procedural   Care Plan: Ongoing   Pts outpatient clinic: N/A     Tx Summary   SBAR received from Primary RN. Pt arrived to HD suite A&Ox4. Consent signed & on file OR Chronic consent applies. 1849: Each catheter limb disinfected per p&p, caps removed, hubs disinfected per p&p. Each lumen aspirated for blood return and flushed with Normal Saline per policy. VSS. Dialysis Tx initiated. *Pt tolerated treatment well. 2149: Tx ended. VSS. Each dialysis catheter limb disinfected per p&p, all possible blood returned per p&p, and each dialysis hub disinfected per p&p. Each lumen flushed, and caps applied. Bed locked and in the lowest position, call bell and belongings in reach. SBAR given to Primary, RN. Patient is stable at time of their departure. All Dialysis related medications have been reviewed. no

## 2023-02-21 NOTE — PROGRESS NOTES
Transition Of Care: The patient plans to discharge home with [facility] health and family to transport when stable for discharge. The patient inquired about renting a hospital bed when home. CM gave the patient resources. RUR: 15%    The attending plans to discharge the patient tomorrow after HD dialysis. The patient plans to discharge home and start PD dialysis. Appointment is scheduled for 2/23/22 at 11am. Address: [place] [address] ([phone]). The clinic has been in communication with the patient and will call the patient with the appointment time. CM following for discharge needs.     Bayron Garrison RN/CRM

## 2023-02-21 NOTE — PROGRESS NOTES
Problem: Self Care Deficits Care Plan (Adult)  Goal: *Acute Goals and Plan of Care (Insert Text)  Description: FUNCTIONAL STATUS PRIOR TO ADMISSION: pt lives at home with her , uses [facility] and reports mod I for ADLs. Pt with hx of MEL in 8/2022 and back sx in 2020. Pt reports no difficulty at baseline donning shoes and socks and does not use AD. No falls. HOME SUPPORT: lives at home with , driving    Occupational Therapy Goals  Initiated 2/20/2023  1. Patient will perform lower body dressing with minimal assistance using AE PRN within 7 day(s). 2.  Patient will perform seated bathing with moderate assistance  within 7 day(s). 3.  Patient will perform upper body dressing with supervision/set-up within 7 day(s). 4.  Patient will perform toilet transfers to [facility] with RW with moderate assistance  within 7 day(s). 5.  Patient will perform all aspects of toileting with moderate assistance  within 7 day(s). Outcome: Progressing Towards Goal   OCCUPATIONAL THERAPY TREATMENT  Patient: John Machado (82 y.o. female)  Date: 2/21/2023  Diagnosis: Sepsis ([facility] [address] [A41.9]  Anemia [D64.9]  Tachycardia [R00.0] <principal problem not specified>      Precautions: Fall  Chart, occupational therapy assessment, plan of care, and goals were reviewed. ASSESSMENT  Patient continues with skilled OT services and is progressing towards goals. Patient demonstrates greatly improved mobility and activity tolerance today, no complaints of pain. Patient does report dizziness sitting EOB though vitals stable. Patient attributes feeling to meds possibly. Standing tolerance > 2 min with support of RW, good balance with support. She was somewhat hesitant to stand though after 2 trials was agreeable to transfer to chair. Anticipate continued gains as patient is able to tolerate more activity.  Recommend [facility] therapy with increased support of family for ADL/IADL as needed    Current Level of Function Impacting Discharge (ADLs): min assist for bathing, dressing, toileting. Increased assist for footwear mgmt     Other factors to consider for discharge:          PLAN :  Patient continues to benefit from skilled intervention to address the above impairments. Continue treatment per established plan of care to address goals. Recommend with staff: in chair for meals, BSC use no bedpan     Recommend next OT session: progress POC_ grooming in stand at sink. Bathroom mobility, LB dressing     Recommendation for discharge: (in order for the patient to meet his/her long term goals)  Occupational therapy at least 2 days/week in the home     This discharge recommendation:  Has been made in collaboration with the attending provider and/or case management    IF patient discharges home will need the following DME: patient owns DME required for discharge       SUBJECTIVE:   Patient pleasant and cooperative     OBJECTIVE DATA SUMMARY:   Cognitive/Behavioral Status:  Neurologic State: Alert  Orientation Level: Oriented X4  Cognition: Appropriate decision making; Follows commands             Functional Mobility and Transfers for ADLs:  Bed Mobility:  Supine to Sit: Supervision; Adaptive equipment;Bed Modified    Transfers:  Sit to Stand: Moderate assistance;Assist x1;Additional time; Adaptive equipment  Functional Transfers  Adaptive Equipment: Walker (comment)  Bed to Chair: Contact guard assistance    Balance:  Sitting: Intact; Without support  Standing: Impaired  Standing - Static: Constant support;Good  Standing - Dynamic : Fair;Constant support    ADL Intervention:             Pain:  No complaints     Activity Tolerance:   BP stable     After treatment patient left in no apparent distress:   Sitting in chair and Call bell within reach    COMMUNICATION/COLLABORATION:   The patients plan of care was discussed with: Physical therapist and Registered nurse.      Gabriela Edwards OT  Time Calculation: 21 mins

## 2023-02-21 NOTE — PROGRESS NOTES
Renal Progress Note    NAME:  Chase Sánchez   :   1960   MRN:   [id number]     Date/Time:  2023 10:16 AM  Subjective:       -patient awake and alert, denies any complaints, no abdominal pain  -feels ok, no N/V/SOB. Had HD last night and had 1 Kg UF. Has constipation, has not been OOB  -c/o left leg pain, had blood transfusion yesterday an felt dizzy not SOB, nit ready to go home yet. Has not ambulated yet, no BM yet  -had acute gout in left foot, better today after starting prednisone.  Had HD last night, No SOB      Medications reviewed:  Current Facility-Administered Medications   Medication Dose Route Frequency    traMADoL (ULTRAM) tablet 50 mg  50 mg Oral Q6H PRN    bumetanide (BUMEX) tablet 2 mg  2 mg Oral BID    predniSONE (DELTASONE) tablet 30 mg  30 mg Oral DAILY WITH BREAKFAST    cephALEXin (KEFLEX) capsule 500 mg  500 mg Oral DAILY    0.9% sodium chloride infusion 250 mL  250 mL IntraVENous PRN    polyethylene glycol (MIRALAX) packet 17 g  17 g Oral DAILY    senna-docusate (PERICOLACE) 8.6-50 mg per tablet 2 Tablet  2 Tablet Oral QHS    0.9% sodium chloride infusion 250 mL  250 mL IntraVENous PRN    sevelamer carbonate (RENVELA) oral powder 0.8 g  0.8 g Oral TID WITH MEALS    0.9% sodium chloride infusion 250 mL  250 mL IntraVENous PRN    sodium chloride (NS) flush 5-40 mL  5-40 mL IntraVENous Q8H    sodium chloride (NS) flush 5-40 mL  5-40 mL IntraVENous PRN    acetaminophen (TYLENOL) tablet 650 mg  650 mg Oral Q6H PRN    Or    acetaminophen (TYLENOL) suppository 650 mg  650 mg Rectal Q6H PRN    oxyCODONE IR (ROXICODONE) tablet 10 mg  10 mg Oral Q4H PRN    allopurinoL (ZYLOPRIM) tablet 100 mg  100 mg Oral DAILY    amLODIPine (NORVASC) tablet 10 mg  10 mg Oral DAILY    carvediloL (COREG) tablet 12.5 mg  12.5 mg Oral BID WITH MEALS    hydrALAZINE (APRESOLINE) tablet 50 mg  50 mg Oral TID    trospium (SANCTURA) tablet 20 mg  20 mg Oral DAILY    sodium bicarbonate tablet 1,300 mg  1,300 mg Oral BID        Objective:   Vitals:  Visit Vitals  BP (!) 153/88 (BP 1 Location: Right upper arm, BP Patient Position: At rest)   Pulse 98   Temp 98.3 °F (36.8 °C)   Resp 18   Wt 109 kg (240 lb 4.8 oz)   SpO2 91%   BMI 38.79 kg/m²     Temp (24hrs), Av.1 °F (36.7 °C), Min:97.8 °F (36.6 °C), Max:98.4 °F (36.9 °C)    O2 Flow Rate (L/min): 2 l/min O2 Device: None (Room air)    Last 24hr Input/Output:    Intake/Output Summary (Last 24 hours) at 2023 1002  Last data filed at 2023 0707  Gross per 24 hour   Intake --   Output 2400 ml   Net -2400 ml          Physical Exam:  General: awake and alert, NAD, obese  HEENT: Eyes are PERRL. Conjunctiva without pallor ,erythema. The sclerae without icterus.  .   Neck:Supple, RT IJ HD catheter  Lungs : Clears to auscultation Bilaterally, Normal respiratory effort  CVS: RRR, S1 S2 normal, SM 2/6,  No rub, 2+ LE edema  Abdomen: Soft, NT , PD catheter RLQ, bowel sounds present  Extremities: No cyanosis, No clubbing  Skin: No rash   Neurologic: non focal, no Astrexis, AOx3  Psych: normal affect       [] Telemetry Reviewed     [] NSR [] PAC/PVCs   [] Afib  [] Paced   [] NSVT   [] Zavala [] NGT  [] Intubated on vent    Lab Data Reviewed:    Recent Results (from the past 24 hour(s))   RENAL FUNCTION PANEL    Collection Time: 23  5:35 AM   Result Value Ref Range    Sodium 132 (L) 136 - 145 mmol/L    Potassium 4.0 3.5 - 5.1 mmol/L    Chloride 97 97 - 108 mmol/L    CO2 27 21 - 32 mmol/L    Anion gap 8 5 - 15 mmol/L    Glucose 102 (H) 65 - 100 mg/dL    BUN 39 (H) 6 - 20 MG/DL    Creatinine 4.35 (H) 0.55 - 1.02 MG/DL    BUN/Creatinine ratio 9 (L) 12 - 20      eGFR 11 (L) >60 ml/min/1.73m2    Calcium 9.7 8.5 - 10.1 MG/DL    Phosphorus 6.7 (H) 2.6 - 4.7 MG/DL    Albumin 2.2 (L) 3.5 - 5.0 g/dL   CBC WITH AUTOMATED DIFF    Collection Time: 23  5:38 AM   Result Value Ref Range    WBC 9.0 3.6 - 11.0 K/uL    RBC 2.79 (L) 3.80 - 5.20 M/uL    HGB 7.7 (L) 11.5 - 16.0 g/dL    HCT 25.2 (L) 35.0 - 47.0 %    MCV 90.3 80.0 - 99.0 FL    MCH 27.6 26.0 - 34.0 PG    MCHC 30.6 30.0 - 36.5 g/dL    RDW 15.6 (H) 11.5 - 14.5 %    PLATELET 964 767 - 589 K/uL    MPV 10.5 8.9 - 12.9 FL    NRBC 0.0 0  WBC    ABSOLUTE NRBC 0.00 0.00 - 0.01 K/uL    NEUTROPHILS 89 (H) 32 - 75 %    LYMPHOCYTES 3 (L) 12 - 49 %    MONOCYTES 7 5 - 13 %    EOSINOPHILS 0 0 - 7 %    BASOPHILS 0 0 - 1 %    IMMATURE GRANULOCYTES 1 (H) 0.0 - 0.5 %    ABS. NEUTROPHILS 8.0 1.8 - 8.0 K/UL    ABS. LYMPHOCYTES 0.3 (L) 0.8 - 3.5 K/UL    ABS. MONOCYTES 0.6 0.0 - 1.0 K/UL    ABS. EOSINOPHILS 0.0 0.0 - 0.4 K/UL    ABS. BASOPHILS 0.0 0.0 - 0.1 K/UL    ABS. IMM. GRANS. 0.1 (H) 0.00 - 0.04 K/UL    DF SMEAR SCANNED      RBC COMMENTS ANISOCYTOSIS  1+             Assessment/Plan:   Active Problems:    Anemia (2/17/2023)      Tachycardia (2/17/2023)      Sepsis ([facility] [address] (2/17/2023)       ESRD, s/p PD catheter placement on 2/14/23-not on dialysis yet  -patient is uremic, fluid overload and has metabolic acidosis  -PD catheter not ready for starting PD  Discussed with patient and her  and they agree with the plan-2/17  -s/p temp HD catheter, s/p HD x3  - increased Na bicarb to 1300 mg bid   -resumed bumex  -HD tomorrow if stays inpatient    Hyperphosphatemia, started renvela, increase to 1300 mg tid meals     Abdominal pain  -seen by vascular surgery. Had small amount of fluid in pelvis at the time of PD catheter placement which sent for culture.  Culture so far negative but had few G+cocci and rare WBC  -Agree with Abx coverage, may change to po as no growth on fluid culture  -improved-on po keflex       Anemia  -transfuse for Hb<7   -iron sat ok  -s/p PRBC 2/29  -check stool OB  -epogen 93691 unit x 1 2/20    Constipation, start stool softener     HTN, c/wBP meds   Acute gout on prednisone   ambulate    D/W Dr Jose Crespo and RN  ___________________________________________________    Total time spent with patient:  [] 15   [] 25 [] 35   []  __ minutes    [] Critical Care Provided    Care Plan discussed with:    [x] Patient   [] Family    [] Care Manager   [] Consultant/Specialist :      []   >50% of visit spent in counseling and coordination of care   (Discussed [] CODE status,  [] Care Plan, [] D/C Planning)    ___________________________________________________    Attending Physician: MD Milton Sue

## 2023-02-21 NOTE — PROGRESS NOTES
Problem: Patient Education: Go to Patient Education Activity  Goal: Patient/Family Education  Outcome: Progressing Towards Goal     Problem: Patient Education: Go to Patient Education Activity  Goal: Patient/Family Education  Outcome: Progressing Towards Goal     Problem: Pressure Injury - Risk of  Goal: *Prevention of pressure injury  Description: Document Jose Scale and appropriate interventions in the flowsheet. Outcome: Progressing Towards Goal  Note: Pressure Injury Interventions:  Sensory Interventions: Assess changes in LOC    Moisture Interventions: Absorbent underpads, Limit adult briefs, Minimize layers, Internal/External urinary devices    Activity Interventions: PT/OT evaluation    Mobility Interventions: PT/OT evaluation    Nutrition Interventions: Document food/fluid/supplement intake    Friction and Shear Interventions: Apply protective barrier, creams and emollients, Feet elevated on foot rest, Lift sheet, Lift team/patient mobility team, Minimize layers                Problem: Patient Education: Go to Patient Education Activity  Goal: Patient/Family Education  Outcome: Progressing Towards Goal     Problem: Falls - Risk of  Goal: *Absence of Falls  Description: Document Rosalind Fall Risk and appropriate interventions in the flowsheet.   Outcome: Progressing Towards Goal  Note: Fall Risk Interventions:  Mobility Interventions: OT consult for ADLs, PT Consult for mobility concerns         Medication Interventions: Patient to call before getting OOB    Elimination Interventions: Call light in reach              Problem: Patient Education: Go to Patient Education Activity  Goal: Patient/Family Education  Outcome: Progressing Towards Goal     Problem: Patient Education: Go to Patient Education Activity  Goal: Patient/Family Education  Outcome: Progressing Towards Goal

## 2023-02-21 NOTE — DIALYSIS
1915 asked by nephrology to change PD catheter dressing. PD catheter placed 02/14/23. Upon presentation edges of dressing pulled away from skin, dried blood noted on 4x4 around exit site and PD catheter, dressing changed per policy using sterile technique. Exit site pink in its healing process, no redness or drainage. Bulky DSD applied and dated.

## 2023-02-21 NOTE — PROGRESS NOTES
Spiritual Care Partner Volunteer visited patient at [facility] in [facility] [place] [address] on 2/21/2023   Documented by:    Berhane Mercedes MDiv, [facility]

## 2023-02-21 NOTE — PROGRESS NOTES
Hospitalist Progress Note  Katerine Atkinson MD  Answering service: [phone] from in house phone        Date of Service:  2023  NAME:  Robby Ernst  :  1960  MRN:  [id number]      Admission Summary:   Robby Ernst is a 70 y.o. female with PMHx of Stage V CKD, gout, morbid obesity, s/p R total hip replacement, and HTN who underwent laparoscopic PD catheter insertion and lysis of adhesions on 23 with Vascular Surgeon, Dr. Charlene Rangel, who now presents to the ED on 23 with abdominal pain/weakness/LE pain and swelling. Vascular Surgery and Nephrology consulted. Ms. Phyllis Eng is not feeling well at the time of our encounter. She notes that her symptoms began on 23 with weakness and feeling cold. She also notes severe pain and swelling in her legs that is exacerbated with pressing. She also is complaining of pain in the abdomen, most notably around the PD catheter site. She denies any headaches or vision changes, chest pain, SOB. She denies any subjective fevers. She continues to make urine and has not started on dialysis at this time but the plan was for initiation of peritoneal dialysis. Interval history / Subjective:   Left ankle and left lower leg pain      Assessment & Plan:        Stage V CKD, progressing to ESRD  Electrolyte derangements  Anemia  Tachycardia  Leukocytosis  -- Consulted with Vascular Surgery, Dr. Shahid Del Rio, and Nephrology.   - s/p PD catheter placement on 23-not on dialysis yet  -patient is uremic, now  fluid overload and has metabolic acidosis  Discussed with patient and her  and they agree with the plan-  -IR s/p temp HD catheter , had HD , and .   - no HD  per renal.   - increase Na bicarb to 1300 mg bid  -plan to dc judy catheter before discharge and pt to initiate PD at home:her 1st PD appointment at dialysis center rescheduled on Thursday   -HD 2/20   Will have HD tomorrow, d/w nephrologist     Left lower leg pain:  Had venous doppler neg for dvt  Likely due to Acute gout flair  Short course of prednisone : prednisone 30mg daily   Prn tramadol  PT/Ot EVAL   Leg pain better today     Acute metabolic encephalopathy  Due to uremia  Improved after dialysis   Resolved     Abdominal pain may have peritonitis   -seen by vascular surgery. Had small amount of fluid in pelvis at the time of PD catheter placement which sent for culture. Culture so far negative but had few G+cocci and rare WBC  -d/w nephrologist, change to po abx since culture negative. CHANGE TO PO keflex        Anemia: Likely due to CKD. Completed iron panel on 1/27/23 with iron of 104, TIBC 190, Iron % 55. Ordered for 1 unit pRBC.   -- ego per renal   -Hb improved from 6.3 to 6.8 after one unit  -transfuse one more unit t2/20     Sinus tachycardia:  Due to above      Poor Access  -- She is not a candidate for a PICC or midline due to her severe CKD  -- Central line venous catheter right ij      Acute LE pain/swelling  -- no dvt  --DUE TO FLUID OVERLOAD      HTN  -- home Carvedilol BID, Hydralazine TID, and Amlodipine on hold now  Will monitor and re-eval      Gout  -- Decrease her Allopurinol dose to 100mg daily with renal disease  -possible acute flare now, short course of prednisone      Urinary incontinence  -- Pharmacy to substitute home Tolterodine ER 4mg  based on hospital formulary          Code status: FULL   Prophylaxis: SCD, [address] discussed with: PT, RN,   Anticipated Disposition:  tomorrow if leg pain better, able to oob with mini assist      Hospital Problems  Date Reviewed: 8/9/2022            Codes Class Noted POA    Anemia ICD-10-CM: D64.9  ICD-9-CM: 285.9  2/17/2023 Unknown        Tachycardia ICD-10-CM: R00.0  ICD-9-CM: 785.0  2/17/2023 Unknown        Sepsis ([facility] [address] ICD-10-CM: A41.9  ICD-9-CM: 038.9, 995.91  2/17/2023 Unknown Review of Systems:   A comprehensive review of systems was negative except for that written in the HPI. Vital Signs:    Last 24hrs VS reviewed since prior progress note. Most recent are:  Visit Vitals  BP (!) 152/88 (BP 1 Location: Left upper arm, BP Patient Position: Sitting)   Pulse 96   Temp 98.1 °F (36.7 °C)   Resp 18   Wt 109 kg (240 lb 4.8 oz)   SpO2 93%   BMI 38.79 kg/m²         Intake/Output Summary (Last 24 hours) at 2/21/2023 1315  Last data filed at 2/21/2023 9308  Gross per 24 hour   Intake --   Output 2400 ml   Net -2400 ml          Physical Examination:     I had a face to face encounter with this patient and independently examined them on 2/21/2023 as outlined below:    General: awake and alert, NAD, obese  HEENT: Eyes are PERRL. Conjunctiva without pallor ,erythema. The sclerae without icterus. .   Neck:Supple, RT IJ HD catheter  Lungs : Clears to auscultation Bilaterally, Normal respiratory effort  CVS: RRR, S1 S2 normal, SM 2/6,  No rub, 2+ LE edema  Abdomen: Soft, NT , PD catheter RLQ, bowel sounds present  Extremities:bilat swelling ,left ankle and lower leg tender more swelling   Skin: No rash   Neurologic: non focal, no Astrexis, AOx3  Psych: normal affect           Data Review:    Review and/or order of clinical lab test    I have independently reviewed and interpreted patient's lab and all other diagnostic data    Notes reviewed from all clinical/nonclinical/nursing services involved in patient's clinical care. Care coordination discussions were held with appropriate clinical/nonclinical/ nursing providers based on care coordination needs.      Labs:     Recent Labs     02/21/23  0538 02/20/23  0514   WBC 9.0 11.8*   HGB 7.7* 7.3*   HCT 25.2* 24.2*    265       Recent Labs     02/21/23  0535 02/20/23  0514 02/19/23  0331   * 134* 139   K 4.0 3.9 3.8   CL 97 99 105   CO2 27 26 26   BUN 39* 64* 58*   CREA 4.35* 6.05* 4.99*   * 96 97   CA 9.7 10.6* 9.1   PHOS 6. 7* 8.4* 6.6*       Recent Labs     02/21/23  0535 02/20/23  0514 02/19/23  0331   ALB 2.2* 2.0* 2.2*       No results for input(s): INR, PTP, APTT, INREXT, INREXT in the last 72 hours. No results for input(s): FE, TIBC, PSAT, FERR in the last 72 hours. No results found for: FOL, RBCF   No results for input(s): PH, PCO2, PO2 in the last 72 hours. No results for input(s): CPK, CKNDX, TROIQ in the last 72 hours.     No lab exists for component: CPKMB  Lab Results   Component Value Date/Time    Cholesterol, total 167 07/06/2021 12:00 AM    HDL Cholesterol 42 07/06/2021 12:00 AM    LDL, calculated 103 (H) 07/06/2021 12:00 AM    LDL, calculated 90 06/17/2019 05:47 PM    Triglyceride 122 07/06/2021 12:00 AM     Lab Results   Component Value Date/Time    Glucose (POC) 82 02/14/2023 12:04 PM    Glucose (POC) 78 08/15/2022 08:21 AM    Glucose (POC) 90 11/09/2020 08:36 AM     Lab Results   Component Value Date/Time    Color YELLOW/STRAW 08/10/2022 03:34 PM    Appearance CLOUDY (A) 08/10/2022 03:34 PM    Specific gravity 1.016 08/10/2022 03:34 PM    pH (UA) 5.5 08/10/2022 03:34 PM    Protein >300 (A) 08/10/2022 03:34 PM    Glucose 100 (A) 08/10/2022 03:34 PM    Ketone Negative 08/10/2022 03:34 PM    Bilirubin Negative 08/10/2022 03:34 PM    Urobilinogen 0.2 08/10/2022 03:34 PM    Nitrites Negative 08/10/2022 03:34 PM    Leukocyte Esterase TRACE (A) 08/10/2022 03:34 PM    Epithelial cells MODERATE (A) 08/10/2022 03:34 PM    Bacteria 1+ (A) 08/10/2022 03:34 PM    WBC 0-4 08/10/2022 03:34 PM    RBC 0-5 08/10/2022 03:34 PM         Medications Reviewed:     Current Facility-Administered Medications   Medication Dose Route Frequency    sevelamer carbonate (RENVELA) oral powder 1.6 g  1.6 g Oral TID WITH MEALS    ondansetron (ZOFRAN) injection 4 mg  4 mg IntraVENous Q4H PRN    pantoprazole (PROTONIX) tablet 40 mg  40 mg Oral ACB    traMADoL (ULTRAM) tablet 50 mg  50 mg Oral Q6H PRN    bumetanide (BUMEX) tablet 2 mg  2 mg Oral BID    predniSONE (DELTASONE) tablet 30 mg  30 mg Oral DAILY WITH BREAKFAST    cephALEXin (KEFLEX) capsule 500 mg  500 mg Oral DAILY    0.9% sodium chloride infusion 250 mL  250 mL IntraVENous PRN    polyethylene glycol (MIRALAX) packet 17 g  17 g Oral DAILY    senna-docusate (PERICOLACE) 8.6-50 mg per tablet 2 Tablet  2 Tablet Oral QHS    0.9% sodium chloride infusion 250 mL  250 mL IntraVENous PRN    0.9% sodium chloride infusion 250 mL  250 mL IntraVENous PRN    sodium chloride (NS) flush 5-40 mL  5-40 mL IntraVENous Q8H    sodium chloride (NS) flush 5-40 mL  5-40 mL IntraVENous PRN    acetaminophen (TYLENOL) tablet 650 mg  650 mg Oral Q6H PRN    Or    acetaminophen (TYLENOL) suppository 650 mg  650 mg Rectal Q6H PRN    oxyCODONE IR (ROXICODONE) tablet 10 mg  10 mg Oral Q4H PRN    allopurinoL (ZYLOPRIM) tablet 100 mg  100 mg Oral DAILY    amLODIPine (NORVASC) tablet 10 mg  10 mg Oral DAILY    carvediloL (COREG) tablet 12.5 mg  12.5 mg Oral BID WITH MEALS    hydrALAZINE (APRESOLINE) tablet 50 mg  50 mg Oral TID    trospium (SANCTURA) tablet 20 mg  20 mg Oral DAILY    sodium bicarbonate tablet 1,300 mg  1,300 mg Oral BID     ______________________________________________________________________  EXPECTED LENGTH OF STAY: 4d 14h  ACTUAL LENGTH OF STAY:          4                 Vanessa Bonner MD

## 2023-02-21 NOTE — PROGRESS NOTES
Problem: Mobility Impaired (Adult and Pediatric)  Goal: *Acute Goals and Plan of Care (Insert Text)  Description: FUNCTIONAL STATUS PRIOR TO ADMISSION: Patient was modified independent using a single point cane for functional mobility, driving    [address]: The patient lived with her spouse. Physical Therapy Goals  Initiated 2/20/2023  1. Patient will move from supine to sit and sit to supine  in bed with modified independence within 7 day(s). 2.  Patient will transfer from bed to chair and chair to bed with modified independence using the least restrictive device within 7 day(s). 3.  Patient will perform sit to stand with modified independence within 7 day(s). 4.  Patient will ambulate with modified independence for 150 feet with the least restrictive device within 7 day(s). 5.  Patient will ascend/descend 5 stairs with handrail(s) with supervision/set-up within 7 day(s). Outcome: Progressing Towards Goal     PHYSICAL THERAPY TREATMENT  Patient: Naima Whitley (43 y.o. female)  Date: 2/21/2023  Diagnosis: Sepsis ([address] [A41.9]  Anemia [D64.9]  Tachycardia [R00.0] <principal problem not specified>      Precautions: Fall  Chart, physical therapy assessment, plan of care and goals were reviewed. ASSESSMENT  Patient continues with skilled PT services and is progressing towards goals. She demonstrates improvement in her strength, mobility, pain, and activity tolerance. Received pt sitting in the chair appearing SOB during conversation (pausing to take a breath every few words), O2 sat low 90's w/ brief drop to 87% at times on room air. Patient continues to struggle with sit to stand transfers. She required less assist and increased her ambulation distance with the RW w/ steady gait, improved alexander and strides. RN reporting DC likely tomorrow. Will plan to stair train next session as appropriate. Informed RN of intermittent drop in O2 sat.      Current Level of Function Impacting Discharge (mobility/balance): Mod A sit to stand; CGA ambulating w/ RW; steps not attempted    Other factors to consider for discharge: steps to enter the home, transfers         PLAN :  Patient continues to benefit from skilled intervention to address the above impairments. Continue treatment per established plan of care. to address goals. Recommendation for discharge: (in order for the patient to meet his/her long term goals)  To be determined: Given pt's progress today, anticipate home with HHPT. Will need to clear stairs and require heavy assist w/ transfers, susu getting in / out of a vehicle. This discharge recommendation:  A follow-up discussion with the attending provider and/or case management is planned    IF patient discharges home will need the following DME: patient owns DME required for discharge (owns RW)       SUBJECTIVE:   Patient stated I've been sitting in the chair too long.     OBJECTIVE DATA SUMMARY:   Critical Behavior:  Neurologic State: Alert  Orientation Level: Oriented X4  Cognition: Appropriate decision making, Follows commands  Safety/Judgement: Awareness of environment  Functional Mobility Training:  Bed Mobility:  Supine to Sit: Supervision; Adaptive equipment;Bed Modified              Transfers:  Sit to Stand: Moderate assistance;Assist x1;Additional time (cues for rocking momentum, hand placement. Pt scooted to the edge of the chair and adjusted her feet w/ SBA)  Stand to Sit: Contact guard assistance        Bed to Chair: Contact guard assistance                    Balance:  Sitting: Intact; Without support  Standing: Impaired  Standing - Static: Good;Constant support  Standing - Dynamic : Good;Fair;Constant support  Ambulation/Gait Training:  Distance (ft): 100 Feet (ft) (20 ft f/b seated rest f/b 80 ft)  Assistive Device: Gait belt;Walker, rolling  Ambulation - Level of Assistance: Contact guard assistance;Assist x1;Additional time; Adaptive equipment Gait Abnormalities: Decreased step clearance        Base of Support: Widened     Speed/Adriana: Slow  Step Length: Right shortened;Left shortened                    Pain Rating:  Back pain on initial stance, improved with time. Pt reporting no pain when back in bed. Activity Tolerance:   Fair, requires rest breaks, and observed SOB with conversation    After treatment patient left in no apparent distress:   Supine in bed, Call bell within reach, and Side rails x 3    COMMUNICATION/COLLABORATION:   The patients plan of care was discussed with: Occupational therapist and Registered nurse.      Janine Weinstein, PT   Time Calculation: 45 mins

## 2023-02-22 VITALS
DIASTOLIC BLOOD PRESSURE: 78 MMHG | TEMPERATURE: 98.2 F | RESPIRATION RATE: 88 BRPM | OXYGEN SATURATION: 96 % | HEART RATE: 97 BPM | BODY MASS INDEX: 38.79 KG/M2 | WEIGHT: 240.3 LBS | SYSTOLIC BLOOD PRESSURE: 132 MMHG

## 2023-02-22 LAB
ALBUMIN SERPL-MCNC: 2.4 G/DL (ref 3.5–5)
ANION GAP SERPL CALC-SCNC: 8 MMOL/L (ref 5–15)
BACTERIA SPEC CULT: NORMAL
BUN SERPL-MCNC: 45 MG/DL (ref 6–20)
BUN/CREAT SERPL: 9 (ref 12–20)
CALCIUM SERPL-MCNC: 10.8 MG/DL (ref 8.5–10.1)
CHLORIDE SERPL-SCNC: 89 MMOL/L (ref 97–108)
CO2 SERPL-SCNC: 29 MMOL/L (ref 21–32)
CREAT SERPL-MCNC: 5.23 MG/DL (ref 0.55–1.02)
GLUCOSE SERPL-MCNC: 107 MG/DL (ref 65–100)
PHOSPHATE SERPL-MCNC: 7.6 MG/DL (ref 2.6–4.7)
POTASSIUM SERPL-SCNC: 3.9 MMOL/L (ref 3.5–5.1)
SERVICE CMNT-IMP: NORMAL
SODIUM SERPL-SCNC: 126 MMOL/L (ref 136–145)

## 2023-02-22 PROCEDURE — 97530 THERAPEUTIC ACTIVITIES: CPT

## 2023-02-22 PROCEDURE — 36415 COLL VENOUS BLD VENIPUNCTURE: CPT

## 2023-02-22 PROCEDURE — 90935 HEMODIALYSIS ONE EVALUATION: CPT

## 2023-02-22 PROCEDURE — 36589 REMOVAL TUNNELED CV CATH: CPT

## 2023-02-22 PROCEDURE — 74011000250 HC RX REV CODE- 250: Performed by: PHYSICIAN ASSISTANT

## 2023-02-22 PROCEDURE — 74011636637 HC RX REV CODE- 636/637: Performed by: HOSPITALIST

## 2023-02-22 PROCEDURE — 74011250637 HC RX REV CODE- 250/637: Performed by: HOSPITALIST

## 2023-02-22 PROCEDURE — 97116 GAIT TRAINING THERAPY: CPT

## 2023-02-22 PROCEDURE — 74011000250 HC RX REV CODE- 250

## 2023-02-22 PROCEDURE — 74011250637 HC RX REV CODE- 250/637: Performed by: PHYSICIAN ASSISTANT

## 2023-02-22 PROCEDURE — 97535 SELF CARE MNGMENT TRAINING: CPT

## 2023-02-22 PROCEDURE — 80069 RENAL FUNCTION PANEL: CPT

## 2023-02-22 PROCEDURE — 74011250637 HC RX REV CODE- 250/637: Performed by: INTERNAL MEDICINE

## 2023-02-22 RX ORDER — BACITRACIN 500 UNIT/G
PACKET (EA) TOPICAL
Status: COMPLETED
Start: 2023-02-22 | End: 2023-02-22

## 2023-02-22 RX ORDER — PREDNISONE 10 MG/1
TABLET ORAL
Qty: 6 TABLET | Refills: 0 | Status: SHIPPED | OUTPATIENT
Start: 2023-02-23 | End: 2023-02-27

## 2023-02-22 RX ORDER — SEVELAMER CARBONATE FOR ORAL SUSPENSION 800 MG/1
1.6 POWDER, FOR SUSPENSION ORAL
Qty: 90 PACKET | Refills: 0 | Status: SHIPPED | OUTPATIENT
Start: 2023-02-22

## 2023-02-22 RX ORDER — CEPHALEXIN 500 MG/1
500 CAPSULE ORAL DAILY
Qty: 6 CAPSULE | Refills: 0 | Status: SHIPPED | OUTPATIENT
Start: 2023-02-23 | End: 2023-03-01

## 2023-02-22 RX ADMIN — PREDNISONE 30 MG: 20 TABLET ORAL at 10:13

## 2023-02-22 RX ADMIN — SODIUM BICARBONATE 1300 MG: 650 TABLET ORAL at 10:13

## 2023-02-22 RX ADMIN — SEVELAMER CARBONATE FOR ORAL SUSPENSION 1.6 G: 800 POWDER, FOR SUSPENSION ORAL at 12:00

## 2023-02-22 RX ADMIN — BUMETANIDE 2 MG: 1 TABLET ORAL at 10:13

## 2023-02-22 RX ADMIN — SEVELAMER CARBONATE FOR ORAL SUSPENSION 1.6 G: 800 POWDER, FOR SUSPENSION ORAL at 08:00

## 2023-02-22 RX ADMIN — HYDRALAZINE HYDROCHLORIDE 50 MG: 50 TABLET, FILM COATED ORAL at 02:05

## 2023-02-22 RX ADMIN — CEPHALEXIN 500 MG: 500 CAPSULE ORAL at 10:13

## 2023-02-22 RX ADMIN — TROSPIUM CHLORIDE 20 MG: 20 TABLET, FILM COATED ORAL at 10:13

## 2023-02-22 RX ADMIN — Medication 1 PACKET: at 13:00

## 2023-02-22 RX ADMIN — SODIUM CHLORIDE, PRESERVATIVE FREE 10 ML: 5 INJECTION INTRAVENOUS at 05:55

## 2023-02-22 RX ADMIN — ALLOPURINOL 100 MG: 100 TABLET ORAL at 10:13

## 2023-02-22 RX ADMIN — Medication 1 PACKET: at 15:00

## 2023-02-22 RX ADMIN — AMLODIPINE BESYLATE 10 MG: 5 TABLET ORAL at 10:13

## 2023-02-22 RX ADMIN — ACETAMINOPHEN 650 MG: 325 TABLET ORAL at 00:27

## 2023-02-22 NOTE — PROGRESS NOTES
Physical Therapy - defer  Chart reviewed in prep for therapy session. Received pt sleeping in bed. Pt reporting she just finished dialysis, requested to defer therapy to allow her time to wake and eat breakfast.    Will plan to check back between 1130 and 12 as able.

## 2023-02-22 NOTE — PROCEDURES
Order for Maldonado removal verified. Explained procedure to patient, verbalized understanding. Catheter removed without difficulty, tip intact. Scant bleeding noted, pressure applied for 5 minutes; hemostasis noted at 1445. Dressing applied. Instruction for bedrest 1515 given. Tolerated procedure well. Report given to Yaneli Hooper RN.

## 2023-02-22 NOTE — PROGRESS NOTES
Bedside and Verbal shift change report given to [facility] Hoyt Umang (oncoming nurse) by Sheila Borges (offgoing nurse). Report included the following information SBAR, Kardex, Intake/Output, and MAR.

## 2023-02-22 NOTE — PROGRESS NOTES
Problem: Patient Education: Go to Patient Education Activity  Goal: Patient/Family Education  Outcome: Progressing Towards Goal     Problem: Patient Education: Go to Patient Education Activity  Goal: Patient/Family Education  Outcome: Progressing Towards Goal     Problem: Pressure Injury - Risk of  Goal: *Prevention of pressure injury  Description: Document Jose Scale and appropriate interventions in the flowsheet. Outcome: Progressing Towards Goal  Note: Pressure Injury Interventions:  Sensory Interventions: Assess changes in LOC    Moisture Interventions: Minimize layers    Activity Interventions: Increase time out of bed    Mobility Interventions: HOB 30 degrees or less    Nutrition Interventions: Document food/fluid/supplement intake    Friction and Shear Interventions: HOB 30 degrees or less                Problem: Patient Education: Go to Patient Education Activity  Goal: Patient/Family Education  Outcome: Progressing Towards Goal     Problem: Falls - Risk of  Goal: *Absence of Falls  Description: Document Rosalind Fall Risk and appropriate interventions in the flowsheet.   Outcome: Progressing Towards Goal  Note: Fall Risk Interventions:  Mobility Interventions: OT consult for ADLs, PT Consult for mobility concerns, Patient to call before getting OOB         Medication Interventions: Patient to call before getting OOB    Elimination Interventions: Call light in reach              Problem: Patient Education: Go to Patient Education Activity  Goal: Patient/Family Education  Outcome: Progressing Towards Goal     Problem: Patient Education: Go to Patient Education Activity  Goal: Patient/Family Education  Outcome: Progressing Towards Goal

## 2023-02-22 NOTE — PROGRESS NOTES
Problem: Self Care Deficits Care Plan (Adult)  Goal: *Acute Goals and Plan of Care (Insert Text)  Description: FUNCTIONAL STATUS PRIOR TO ADMISSION: pt lives at home with her , uses [facility] and reports mod I for ADLs. Pt with hx of MEL in 8/2022 and back sx in 2020. Pt reports no difficulty at baseline donning shoes and socks and does not use AD. No falls. HOME SUPPORT: lives at home with , driving    Occupational Therapy Goals  Initiated 2/20/2023  1. Patient will perform lower body dressing with minimal assistance using AE PRN within 7 day(s). 2.  Patient will perform seated bathing with moderate assistance  within 7 day(s). 3.  Patient will perform upper body dressing with supervision/set-up within 7 day(s). 4.  Patient will perform toilet transfers to [facility] with RW with moderate assistance  within 7 day(s). 5.  Patient will perform all aspects of toileting with moderate assistance  within 7 day(s). Outcome: Progressing Towards Goal   OCCUPATIONAL THERAPY TREATMENT  Patient: Jolene Lora (55 y.o. female)  Date: 2/22/2023  Diagnosis: Sepsis ([facility] [address] [A41.9]  Anemia [D64.9]  Tachycardia [R00.0] <principal problem not specified>      Precautions: Fall  Chart, occupational therapy assessment, plan of care, and goals were reviewed. ASSESSMENT  Patient continues with skilled OT services and is progressing towards goals. Patient continues to be impacted  by general weakness, fatigue, and impaired standing tolerance during ADL and related mobility. Increased assist required during sit to stand, then much more stable on once in standing. Anticipate safe DC home with family assist for IADL. [facility] OT services recommended at discharge     Current Level of Function Impacting Discharge (ADLs): set up for bathing, dressing. Other factors to consider for discharge: patient lives with family          PLAN :  Patient continues to benefit from skilled intervention to address the above impairments. Continue treatment per established plan of care to address goals. Recommend with staff: in chair for meals, ambulate to bathroom for ADL     Recommend next OT session: progress POC     Recommendation for discharge: (in order for the patient to meet his/her long term goals)  Occupational therapy at least 2 days/week in the home     This discharge recommendation:  Has been made in collaboration with the attending provider and/or case management    IF patient discharges home will need the following DME: patient owns DME required for discharge       SUBJECTIVE:   Patient pleasant and cooperative     OBJECTIVE DATA SUMMARY:   Cognitive/Behavioral Status:  Neurologic State: Alert  Orientation Level: Oriented X4  Cognition: Follows commands             Functional Mobility and Transfers for ADLs:    ADL Intervention:        Patient educated on safe ADL completion- DME needs reviewed- patient wants to rent hospital bed for 1st floor set up. NoAE for LB ADL required- patient able to use cross legged method. Patient has shower chair. Pain:  No complaints     Activity Tolerance:   requires rest breaks    After treatment patient left in no apparent distress:   Sitting in chair and Call bell within reach    COMMUNICATION/COLLABORATION:   The patients plan of care was discussed with: Registered nurse.      Tamy Grant OT  Time Calculation: 10 mins

## 2023-02-22 NOTE — PROGRESS NOTES
Problem: Mobility Impaired (Adult and Pediatric)  Goal: *Acute Goals and Plan of Care (Insert Text)  Description: FUNCTIONAL STATUS PRIOR TO ADMISSION: Patient was modified independent using a single point cane for functional mobility, driving    [address]: The patient lived with her spouse. Physical Therapy Goals  Initiated 2/20/2023  1. Patient will move from supine to sit and sit to supine  in bed with modified independence within 7 day(s). 2.  Patient will transfer from bed to chair and chair to bed with modified independence using the least restrictive device within 7 day(s). 3.  Patient will perform sit to stand with modified independence within 7 day(s). 4.  Patient will ambulate with modified independence for 150 feet with the least restrictive device within 7 day(s). 5.  Patient will ascend/descend 5 stairs with handrail(s) with supervision/set-up within 7 day(s). Outcome: Progressing Towards Goal     PHYSICAL THERAPY TREATMENT  Patient: Sharmin Amezquita (11 y.o. female)  Date: 2/22/2023  Diagnosis: Sepsis ([address] [A41.9]  Anemia [D64.9]  Tachycardia [R00.0] <principal problem not specified>      Precautions: Fall  Chart, physical therapy assessment, plan of care and goals were reviewed. ASSESSMENT  Patient continues with skilled PT services and is progressing towards goals. Received pt semi-fowlers in the bed w/ SpO2 87-88% at rest on RA, does not appear SOB though voiced feeling SOB at times. O2 sat improved to 94-96% on RA when sitting upright. Pt transitioned to EOB sitting using the bed features to assist (HOB max raised, bed rail used). She continues to require considerable assist and extra time to stand from the chair and bedside commode. Once standing, her balance with walker support support is good and gait steady. She ambulated with the walker with improved alexander and gait quality.   Completed stair training w/ pt ambulating 3 steps with Min A x2 for safety. Effort on the stairs was taxing and took pt extra time with brief standing rest breaks along the way. Noted the plan for DC today. Discussed with the pt this PT's concern with car transfers and stairs in the home. The patient endorses owning a gait belt and feels her two sons and spouse will be able to assist her with car transfers and stairs, they apparently assisted her after her back surgery years back. Patient is also considering other options to allow her to return home w/o having to negotiate stairs (hosp bed on the first floor of her home w/ entry through the garage where there are no stairs vs staying with another family member). Recommended pt inquire if the dialysis center has a wheelchair she can use for her visit tomorrow if needed to get from the car to the center. Discussed energy conservation, paced activity w/ placement of chairs in the home/ near steps for rest breaks. Made care management aware pt may need resources for a hosp bed rental though is considering other options. Report given to RN to include intermittent drop in O2 sat to 87% at rest in the bed. Acute therapy will continue to follow if pt does not discharge today. Current Level of Function Impacting Discharge (mobility/balance): SBA getting OOB utilizing bed features; Mod A to stand; CGA returning to sit; CGA/ SBA ambulating with the RW; Min A x2 on stairs, extra time, taxing effort. Other factors to consider for discharge: fall risk, stairs, level of assist needed for transfers, pt endorses family are able to assist         PLAN :  Patient continues to benefit from skilled intervention to address the above impairments. Continue treatment per established plan of care. to address goals.     Recommendation for discharge: (in order for the patient to meet his/her long term goals)  Physical therapy at least 2 days/week in the home AND ensure two person assist with car transfers and stairs    This discharge recommendation:  Has been made in collaboration with the attending provider and/or case management    IF patient discharges home will need the following DME: patient owns DME required for discharge (gait belt, rolling walker). Pt is considering hosp bed rental (CM aware)       SUBJECTIVE:   Patient stated sometimes I feel short of breath.     OBJECTIVE DATA SUMMARY:   Critical Behavior:  Neurologic State: Alert  Orientation Level: Oriented X4  Cognition: Follows commands  Safety/Judgement: Awareness of environment  Functional Mobility Training:  Bed Mobility:     Supine to Sit: Supervision;Bed Modified;Assist x1;Additional time; Adaptive equipment (HOB raised, bed rail used). Pt reporting her two sons and spouse will assit with bed mobiltiy in the home as needed. Sit to Supine: Other (comment) (remained sitting up in the chair)           Transfers:  Sit to Stand: Moderate assistance;Assist x1;Additional time  Stand to Sit: Contact guard assistance;Assist x1  Scooting: SBA  Cues for hand placement and to rock for momentum. Balance:  Sitting: Intact; Without support  Standing: Intact; With support (rolling walker)  Standing - Static: Good;Constant support  Standing - Dynamic : Good;Constant support  Ambulation/Gait Training:  Distance (ft): 50 Feet (ft) (25 ft x2 - limited distance to conserve energy for stair training)  Assistive Device: Gait belt;Walker, rolling  Ambulation - Level of Assistance: Stand-by assistance;Assist x1                 Base of Support: Widened                Stairs:  Number of Stairs Trained: 3  Stairs - Level of Assistance: Minimum assistance;Assist X1;Additional time (w/ CGA of another for safety)   Rail Use: Left    Instructed in stair technique using one rail and the cane or bilateral hands on one rail. Pt ambulated up w/ the cane, down with bilateral hands on one rail. Pain Rating:  Back pain when first getting OOB.       Activity Tolerance:   Fair, requires rest breaks, observed SOB with activity, and intermittent drop in spo2 when  awake in the bed in semi fowlers, improves to 90's  sitting upright, no SOB at rest.    After treatment patient left in no apparent distress:   Sitting in chair, Call bell within reach    COMMUNICATION/COLLABORATION:   The patients plan of care was discussed with: Registered nurse and Case management.      Radha Lara, PT   Time Calculation: 45 mins

## 2023-02-22 NOTE — DISCHARGE SUMMARY
Discharge Summary       PATIENT ID: Zeb Don  MRN: [id number]   YOB: 1960    DATE OF ADMISSION: 2/17/2023  2:40 AM    DATE OF DISCHARGE: 2/22/2023    PRIMARY CARE PROVIDER: Shyam Strickland MD     ATTENDING PHYSICIAN: Andrew Obregon MD   DISCHARGING PROVIDER: Karen Landry MD      CONSULTATIONS: IP CONSULT TO VASCULAR SURGERY  IP CONSULT TO NEPHROLOGY  IP CONSULT TO INTERVENTIONAL RADIOLOGY  IP CONSULT TO INTERVENTIONAL RADIOLOGY    PROCEDURES/SURGERIES: * No surgery found *    ADMISSION SUMMARY:   Zeb Don is a 70 y.o. female with PMHx of Stage V CKD, gout, morbid obesity, s/p R total hip replacement, and HTN who underwent laparoscopic PD catheter insertion and lysis of adhesions on 2/14/23 with Vascular Surgeon, Dr. Lan Wright, who now presents to the ED on 2/17/23 with abdominal pain/weakness/LE pain and swelling. Vascular Surgery and Nephrology consulted. Ms. Brandon Maahjan is not feeling well at the time of our encounter. She notes that her symptoms began on 2/16/23 with weakness and feeling cold. She also notes severe pain and swelling in her legs that is exacerbated with pressing. She also is complaining of pain in the abdomen, most notably around the PD catheter site. She denies any headaches or vision changes, chest pain, SOB. She denies any subjective fevers. She continues to make urine and has not started on dialysis at this time but the plan was for initiation of peritoneal dialysis. DISCHARGE DIAGNOSES /  AND HOSPITAL COURSE:      Stage V CKD, progressing to ESRD  Electrolyte derangements  Anemia  Tachycardia  Leukocytosis  -- Consulted with Vascular Surgery, Dr. Jerica Hamm, and Nephrology.   - s/p PD catheter placement on 2/14/23-not on dialysis yet  -patient is uremic, now  fluid overload and has metabolic acidosis  Discussed with patient and her  and they agree with the plan-2/17  -IR s/p temp HD catheter 2/17, had HD 2/17, and 2/18.   - no HD 2/19 per renal.   - increase Na bicarb to 1300 mg bid  -plan to dc judy catheter before discharge and pt to initiate PD at home:her 1st PD appointment at dialysis center rescheduled on Thursday   -HD 2/20 , 2/20   -reschedule for PD visit at [facility] tomorrow 2/23      Left lower leg pain:likely due to acute gout flair   Had venous doppler neg for dvt  Short course of prednisone : prednisone 30mg daily   Prn tramadol  PT/Ot EVAL   Leg pain much improved       Acute metabolic encephalopathy  Due to uremia  Improved after dialysis   Resolved      Abdominal pain may have peritonitis   -seen by vascular surgery. Had small amount of fluid in pelvis at the time of PD catheter placement which sent for culture. Culture so far negative but had few G+cocci and rare WBC  -d/w nephrologist, change to po abx since culture negative. CHANGE TO PO keflex until 2/28. Anemia: Likely due to CKD. Completed iron panel on 1/27/23 with iron of 104, TIBC 190, Iron % 55. Ordered for 1 unit pRBC. -- ego per renal   -Hb improved from 6.3 to 6.8 after one unit  -transfuse one more unit t2/20      Sinus tachycardia:  Due to above   Resolved           Acute LE pain/swelling  -- no dvt  --DUE TO FLUID OVERLOAD      HTN  -- home Carvedilol BID, Hydralazine TID, and Amlodipine on hold now       Gout  Ok to resume home allopurinol      Urinary incontinence  -- Pharmacy to substitute home Tolterodine ER 4mg  based on hospital formulary     ADDITIONAL CARE RECOMMENDATIONS:   Prednisone taper in 4 days for your gout   Oral antibiotics once a day 6 more days   You should go to the [facility] dialysis center for your first PD dialysis tomorrow at 11 AM   4. Follow up your pcp regarding your blood pressure   5. Follow with your kidney doctor       DIET: Regular Diet    ACTIVITY: Activity as tolerated        NOTIFY YOUR PHYSICIAN FOR ANY OF THE FOLLOWING:   Fever over 101 degrees for 24 hours.    Chest pain, shortness of breath, fever, chills, nausea, vomiting, diarrhea, change in mentation, falling, weakness, bleeding. Severe pain or pain not relieved by medications, as well as any other signs or symptoms that you may have questions about. FOLLOW UP APPOINTMENTS:    Follow-up Information       Follow up With Specialties Details Why Contact [facility] [facility]  Go on 2/23/2023 Call the clinic to verify appointment time please. [address]  Follow up Please call [facility] when home to establish schedule. [address] Phone: [phone]    Cleveland Barros MD Internal Medicine Physician   [place] [id number] [facility] [phone]               DISCHARGE MEDICATIONS:  Current Discharge Medication List        START taking these medications    Details   cephALEXin (KEFLEX) 500 mg capsule Take 1 Capsule by mouth daily for 6 doses. Qty: 6 Capsule, Refills: 0  Start date: 2/23/2023, End date: 3/1/2023      sevelamer carbonate (RENVELA) 0.8 gram pwpk oral powder Take 2 Packets by mouth three (3) times daily (with meals). Qty: 90 Packet, Refills: 0  Start date: 2/22/2023           CONTINUE these medications which have CHANGED    Details   predniSONE (DELTASONE) 10 mg tablet Take 20 mg by mouth daily (with breakfast) for 2 days, THEN 10 mg daily (with breakfast) for 2 days. Qty: 6 Tablet, Refills: 0  Start date: 2/23/2023, End date: 2/27/2023           CONTINUE these medications which have NOT CHANGED    Details   allopurinoL (ZYLOPRIM) 100 mg tablet TAKE TWO TABLETS BY MOUTH DAILY  Qty: 60 Tablet, Refills: 11    Associated Diagnoses: Gout, unspecified cause, unspecified chronicity, unspecified site      amLODIPine (NORVASC) 10 mg tablet TAKE ONE TABLET BY MOUTH DAILY  Qty: 30 Tablet, Refills: 11      sodium bicarbonate 650 mg tablet Take 1 Tablet by mouth three (3) times daily.   Qty: 90 Tablet, Refills: 0      carvediloL (COREG) 6.25 mg tablet Take 2 Tablets by mouth two (2) times daily (with meals). Qty: 120 Tablet, Refills: 11      tolterodine ER (DETROL LA) 4 mg ER capsule Take 1 Capsule by mouth daily. Qty: 30 Capsule, Refills: 11      bumetanide (BUMEX) 1 mg tablet Take 1 mg by mouth daily. hydrALAZINE (APRESOLINE) 10 mg tablet Take 10 mg by mouth three (3) times daily. senna-docusate (PERICOLACE) 8.6-50 mg per tablet Take 1 Tablet by mouth two (2) times a day.   Qty: 30 Tablet, Refills: 0           STOP taking these medications       oxyCODONE IR (ROXICODONE) 5 mg immediate release tablet Comments:   Reason for Stopping:         diphenhydrAMINE (BENADRYL) 50 mg tablet Comments:   Reason for Stopping:               DISPOSITION:    Home With:   OT x PT x HH x RNx       Long term SNF/Inpatient Rehab    Independent/assisted living    Hospice    Other:     PATIENT CONDITION AT DISCHARGE:     Functional status    Poor    x Deconditioned     Independent      Cognition   x  Lucid     Forgetful     Dementia      Catheters/lines (plus indication)    Zavala     PICC     PEG    x None      Code status   x  Full code     DNR          CHRONIC MEDICAL DIAGNOSES:  Problem List as of 2/22/2023 Date Reviewed: 8/9/2022            Codes Class Noted - Resolved    Anemia ICD-10-CM: D64.9  ICD-9-CM: 285.9  2/17/2023 - Present        Tachycardia ICD-10-CM: R00.0  ICD-9-CM: 785.0  2/17/2023 - Present        Sepsis ([facility] ICD-10-CM: A41.9  ICD-9-CM: 038.9, 995.91  2/17/2023 - Present        Avascular necrosis of bone of right hip ([facility] ICD-10-CM: M87.051  ICD-9-CM: 733.42  8/15/2022 - Present        History of total hip replacement, right ICD-10-CM: H10.293  ICD-9-CM: V43.64  8/14/2022 - Present        Chronic midline low back pain without sciatica ICD-10-CM: M54.50, G89.29  ICD-9-CM: 724.2, 338.29  4/6/2021 - Present        Arthritis of left hip ICD-10-CM: M16.12  ICD-9-CM: 716.95  11/9/2020 - Present        Hypercalcemia ICD-10-CM: J16.44  ICD-9-CM: 275.42 10/25/2020 - Present        Avascular necrosis of left femur (HCC) ICD-10-CM: T88.091  ICD-9-CM: 733.42  10/4/2020 - Present        Severe obesity (BMI 35.0-39. 9) with comorbidity ([facility] ICD-10-CM: E66.01  ICD-9-CM: 278.01  7/27/2020 - Present        Secondary hyperparathyroidism ([facility] ICD-10-CM: N25.81  ICD-9-CM: 588.81  6/14/2020 - Present        Reflux esophagitis ICD-10-CM: K21.00  ICD-9-CM: 530.11  3/10/2018 - Present        Chronic renal failure syndrome, stage 4 (severe) (HCC) ICD-10-CM: N18.4  ICD-9-CM: 585.4  11/13/2017 - Present        Family history of diabetes mellitus ICD-10-CM: Z83.3  ICD-9-CM: V18.0  6/15/2015 - Present        Pigmented skin lesion ICD-10-CM: L81.9  ICD-9-CM: 709.00  2/10/2015 - Present        HTN (hypertension) ICD-10-CM: I10  ICD-9-CM: 401.9  2/9/2015 - Present        Gout ICD-10-CM: M10.9  ICD-9-CM: 274.9  2/9/2015 - Present        Chronic left-sided lumbar radiculopathy ICD-10-CM: M54.16  ICD-9-CM: 724.4  2/9/2015 - Present        Osteoarthritis ICD-10-CM: M19.90  ICD-9-CM: 715.90  2/9/2015 - Present        Obesity ICD-10-CM: E66.9  ICD-9-CM: 278.00  2/9/2015 - Present        Dyslipidemia ICD-10-CM: E78.5  ICD-9-CM: 272.4  2/9/2015 - Present        RESOLVED: Chronic renal failure syndrome ICD-10-CM: N18.9  ICD-9-CM: 585.9  6/15/2015 - 11/13/2017        RESOLVED: Chronic renal failure in pediatric patient ICD-10-CM: N18.9  ICD-9-CM: 585.9  2/9/2015 - 6/15/2015           Greater than 30  minutes were spent with the patient on counseling and coordination of care    Signed:   Twana Paget, MD  2/22/2023  11:22 AM

## 2023-02-22 NOTE — PROGRESS NOTES
Problem: Patient Education: Go to Patient Education Activity  Goal: Patient/Family Education  Outcome: Resolved/Met     Problem: Patient Education: Go to Patient Education Activity  Goal: Patient/Family Education  Outcome: Resolved/Met     Problem: Pressure Injury - Risk of  Goal: *Prevention of pressure injury  Description: Document Jose Scale and appropriate interventions in the flowsheet.   2/22/2023 1612 by lGenn Marie RN  Outcome: Resolved/Met  Note: Pressure Injury Interventions:  Sensory Interventions: Assess changes in LOC    Moisture Interventions: Absorbent underpads    Activity Interventions: Pressure redistribution bed/mattress(bed type)    Mobility Interventions: Pressure redistribution bed/mattress (bed type)    Nutrition Interventions: Document food/fluid/supplement intake    Friction and Shear Interventions: HOB 30 degrees or less             2/22/2023 1044 by Glenn Marie RN  Outcome: Progressing Towards Goal  Note: Pressure Injury Interventions:  Sensory Interventions: Assess changes in LOC    Moisture Interventions: Absorbent underpads    Activity Interventions: Pressure redistribution bed/mattress(bed type)    Mobility Interventions: Pressure redistribution bed/mattress (bed type)    Nutrition Interventions: Document food/fluid/supplement intake    Friction and Shear Interventions: HOB 30 degrees or less

## 2023-02-22 NOTE — PROGRESS NOTES
Problem: Pressure Injury - Risk of  Goal: *Prevention of pressure injury  Description: Document Jose Scale and appropriate interventions in the flowsheet.   Outcome: Progressing Towards Goal  Note: Pressure Injury Interventions:  Sensory Interventions: Assess changes in LOC    Moisture Interventions: Absorbent underpads    Activity Interventions: Pressure redistribution bed/mattress(bed type)    Mobility Interventions: Pressure redistribution bed/mattress (bed type)    Nutrition Interventions: Document food/fluid/supplement intake    Friction and Shear Interventions: HOB 30 degrees or less

## 2023-02-22 NOTE — PROGRESS NOTES
Renal Progress Note    NAME:  Robby Ernst   :   1960   MRN:   [id number]     Date/Time:  2023 10:16 AM  Subjective:       -seen on HD in HD unit, feels ok, left foot/leg pain better. Was able to walk yesterday.  Na low, has been drinking a lot of water      Medications reviewed:  Current Facility-Administered Medications   Medication Dose Route Frequency    sevelamer carbonate (RENVELA) oral powder 1.6 g  1.6 g Oral TID WITH MEALS    ondansetron (ZOFRAN) injection 4 mg  4 mg IntraVENous Q4H PRN    pantoprazole (PROTONIX) tablet 40 mg  40 mg Oral ACB    traMADoL (ULTRAM) tablet 50 mg  50 mg Oral Q6H PRN    bumetanide (BUMEX) tablet 2 mg  2 mg Oral BID    predniSONE (DELTASONE) tablet 30 mg  30 mg Oral DAILY WITH BREAKFAST    cephALEXin (KEFLEX) capsule 500 mg  500 mg Oral DAILY    0.9% sodium chloride infusion 250 mL  250 mL IntraVENous PRN    polyethylene glycol (MIRALAX) packet 17 g  17 g Oral DAILY    senna-docusate (PERICOLACE) 8.6-50 mg per tablet 2 Tablet  2 Tablet Oral QHS    0.9% sodium chloride infusion 250 mL  250 mL IntraVENous PRN    0.9% sodium chloride infusion 250 mL  250 mL IntraVENous PRN    sodium chloride (NS) flush 5-40 mL  5-40 mL IntraVENous Q8H    sodium chloride (NS) flush 5-40 mL  5-40 mL IntraVENous PRN    acetaminophen (TYLENOL) tablet 650 mg  650 mg Oral Q6H PRN    Or    acetaminophen (TYLENOL) suppository 650 mg  650 mg Rectal Q6H PRN    oxyCODONE IR (ROXICODONE) tablet 10 mg  10 mg Oral Q4H PRN    allopurinoL (ZYLOPRIM) tablet 100 mg  100 mg Oral DAILY    amLODIPine (NORVASC) tablet 10 mg  10 mg Oral DAILY    carvediloL (COREG) tablet 12.5 mg  12.5 mg Oral BID WITH MEALS    hydrALAZINE (APRESOLINE) tablet 50 mg  50 mg Oral TID    trospium (SANCTURA) tablet 20 mg  20 mg Oral DAILY    sodium bicarbonate tablet 1,300 mg  1,300 mg Oral BID        Objective:   Vitals:  Visit Vitals  /69   Pulse 88   Temp 97.6 °F (36.4 °C)   Resp 18   Wt 109 kg (240 lb 4.8 oz) SpO2 96%   BMI 38.79 kg/m²     Temp (24hrs), Av.2 °F (36.8 °C), Min:97.6 °F (36.4 °C), Max:98.8 °F (37.1 °C)    O2 Flow Rate (L/min): 2 l/min O2 Device: None (Room air)    Last 24hr Input/Output:    Intake/Output Summary (Last 24 hours) at 2023 0931  Last data filed at 2023 0556  Gross per 24 hour   Intake --   Output 850 ml   Net -850 ml          Physical Exam:         seen in HD unit 531        General: awake and alert, NAD, obese, HD in progress  HEENT: Eyes are PERRL. Conjunctiva without pallor ,erythema. The sclerae without icterus.  .   Neck:Supple, RT IJ HD catheter  Lungs : Clears to auscultation Bilaterally, Normal respiratory effort  CVS: RRR, S1 S2 normal, SM 2/6,  No rub, 2+ LE edema  Abdomen: Soft, NT , PD catheter RLQ, bowel sounds present  Extremities: No cyanosis, No clubbing  Skin: No rash   Neurologic: non focal, no Astrexis, AOx3  Psych: normal affect       [] Telemetry Reviewed     [] NSR [] PAC/PVCs   [] Afib  [] Paced   [] NSVT   [] Zavala [] NGT  [] Intubated on vent    Lab Data Reviewed:    Recent Results (from the past 24 hour(s))   RENAL FUNCTION PANEL    Collection Time: 23  5:52 AM   Result Value Ref Range    Sodium 126 (L) 136 - 145 mmol/L    Potassium 3.9 3.5 - 5.1 mmol/L    Chloride 89 (L) 97 - 108 mmol/L    CO2 29 21 - 32 mmol/L    Anion gap 8 5 - 15 mmol/L    Glucose 107 (H) 65 - 100 mg/dL    BUN 45 (H) 6 - 20 MG/DL    Creatinine 5.23 (H) 0.55 - 1.02 MG/DL    BUN/Creatinine ratio 9 (L) 12 - 20      eGFR 9 (L) >60 ml/min/1.73m2    Calcium 10.8 (H) 8.5 - 10.1 MG/DL    Phosphorus 7.6 (H) 2.6 - 4.7 MG/DL    Albumin 2.4 (L) 3.5 - 5.0 g/dL         Assessment/Plan:   Active Problems:    Anemia (2023)      Tachycardia (2023)      Sepsis ([facility] (2023)       ESRD, s/p PD catheter placement on 23-not on dialysis yet  -patient is uremic, fluid overload and has metabolic acidosis  -PD catheter not ready for starting PD  Discussed with patient and her  and they agree with the plan-2/17  -s/p temp HD catheter, s/p HD x3  - increased Na bicarb to 1300 mg bid   -resumed bumex  -complete HD today, remove judy today after HD and f/up  with PD clinic after discharge    Hyperphosphatemia, started renvela, c/w 1300 mg tid meals     Abdominal pain  -seen by vascular surgery. Had small amount of fluid in pelvis at the time of PD catheter placement which sent for culture.  Culture so far negative but had few G+cocci and rare WBC  -Agree with Abx coverage, may change to po as no growth on fluid culture  -improved-on po keflex       Anemia  -transfuse for Hb<7   -iron sat ok  -s/p PRBC 2/29  -check stool OB  -epogen 59991 unit x 1 2/20    Constipation, start stool softener     HTN, c/wBP meds   Acute gout on prednisone   ambulate    D/W Dr Vimal Melvin   ___________________________________________________    Total time spent with patient:  [] 15   [] 25   [] 35   []  __ minutes    [] Critical Care Provided    Care Plan discussed with:    [x] Patient   [] Family    [] Care Manager   [] Consultant/Specialist :      []   >50% of visit spent in counseling and coordination of care   (Discussed [] CODE status,  [] Care Plan, [] D/C Planning)    ___________________________________________________    Attending Physician: MD Milton Rojas

## 2023-02-22 NOTE — PROGRESS NOTES
Bedside shift change report given to [address]  (oncoming nurse) by Arya Soria  (offgoing nurse). Report included the following information SBAR, Kardex, MAR, and Recent Results.

## 2023-02-22 NOTE — PROCEDURES
[facility]                      [phone]  Vitals Pre Post Assessment Pre Post   BP BP: 114/68 (02/22/23 0800) 116/65 LOC AOX4 AOX4   HR Pulse (Heart Rate): 88 (02/22/23 0730) 84 Lungs CTA, denies SOB CTA   Resp Resp Rate: 18 (02/22/23 0800) 18 Cardiac NRR NRR   Temp Temp: 97.6 °F (36.4 °C) (02/22/23 0730) 97.7 Skin WDI WDI   Weight 109 kg 107.2 kg Edema NO NO   Pain Pain Intensity 1: 0 (02/22/23 0635) 0 Tele Status Remote Remote     Orders   Duration: Start: 3303 End: 0940 Total: 3 hr   Dialyzer: Dialyzer/Set Up Inspection: Tigist Brewster (02/22/23 0640)   Whit Guajardo Bath: Dialysate K (mEq/L): 2 (02/22/23 0640)   Ca Bath: Dialysate CA (mEq/L): 2.5 (02/22/23 0640)   Na: Dialysate NA (mEq/L): 140 (02/22/23 0640)   Bicarb:  35   Target Fluid Removal: Goal/Amount of Fluid to Remove (mL): 2000 mL (02/22/23 0640)     Access   Type & Location: RIJ non-tunneled catheter   Comments:   Access Dressing is clean and intact, no evidence of used and dated. Each catheter limb disinfected per p&p, caps removed, hubs disinfected per p&p. Both lumen flushes without issues.                                       Labs   HBsAg (Antigen) / date:   Negative 2/17/2023                           HBsAb (Antibody) / date:  Susceptible 2/17/2023   Source: Epic   Obtained/Reviewed  Critical Results Called HGB   Date Value Ref Range Status   02/21/2023 7.7 (L) 11.5 - 16.0 g/dL Final     Potassium   Date Value Ref Range Status   02/22/2023 3.9 3.5 - 5.1 mmol/L Final     Calcium   Date Value Ref Range Status   02/22/2023 10.8 (H) 8.5 - 10.1 MG/DL Final     BUN   Date Value Ref Range Status   02/22/2023 45 (H) 6 - 20 MG/DL Final     Creatinine   Date Value Ref Range Status   02/22/2023 5.23 (H) 0.55 - 1.02 MG/DL Final        Meds Given   Name Dose Route   NONE                 Adequacy / Fluid    Total Liters Process:                   70 Liters   Net Fluid Removed:                   1800 ml      Comments   Time Out Done: 9067   Admitting Diagnosis: New ESRD   Consent obtained/signed: Informed Consent Verified: Yes (02/22/23 0640)   Machine / RO # Machine Number: [id number] (02/22/23 0733)   Primary Nurse Rpt Pre: Julia Killian R.N   Primary Nurse Rpt Post:    Pt Education: HD procedure, PD   Care Plan: HD as ordered   Pts outpatient clinic: TBD     Tx Summary   Comments:   0630:Time out done, order parameters checked, Dialysis related medications and consent have been reviewed. 5762: Treatment started with slow flow, gradually increased to ordered BF. Monitored closely. Lines visible and secured at all time. Extracorporeal lines flushed with 100CC NS to monitor for clots. Patients vitally stable. 0935:Dr. Fidencio Patten @ bedside, UF backed down to 1500ml as ordered. 0940: Treatment completed, all blood returned. Each dialysis catheter limb disinfected per p&p,  post dialysis catheter dwell instilled per order, and caps applied. Dressing kept intact and dated. Treatment tolerated well. Comfort and care provided, needs attended, questions answered. Call bell within reach, bed to lowest position. Reports given to primary nurse.

## 2023-02-22 NOTE — DISCHARGE INSTRUCTIONS
Discharge Instructions       PATIENT ID: Mani Marin  MRN: [id number]   YOB: 1960    DATE OF ADMISSION: [unfilled]    DATE OF DISCHARGE: 2/22/2023    PRIMARY CARE PROVIDER: @PCP@     ATTENDING PHYSICIAN: [unfilled]  DISCHARGING PROVIDER: Rima Stern MD    To contact this individual call [phone] and ask the  to page. If unavailable ask to be transferred the Adult Hospitalist Department. DISCHARGE DIAGNOSES ESRD Uremia, acute encephalopathy, acute gout     CONSULTATIONS: [unfilled]    PROCEDURES/SURGERIES: * No surgery found *    PENDING TEST RESULTS:   At the time of discharge the following test results are still pending: none     FOLLOW UP APPOINTMENTS:   [unfilled]     ADDITIONAL CARE RECOMMENDATIONS:   Prednisone taper in 4 days for your gout   Oral antibiotics once a day 6 more days   You should go to the [facility] dialysis center for your first PD dialysis tomorrow at 11 AM   4. Follow up your pcp regarding your blood pressure   5. Follow with your kidney doctor     DIET: Renal Diet    ACTIVITY: Activity as tolerated          Radiology      DISCHARGE MEDICATIONS:   See Medication Reconciliation Form    It is important that you take the medication exactly as they are prescribed. Keep your medication in the bottles provided by the pharmacist and keep a list of the medication names, dosages, and times to be taken in your wallet. Do not take other medications without consulting your doctor. NOTIFY YOUR PHYSICIAN FOR ANY OF THE FOLLOWING:   Fever over 101 degrees for 24 hours. Chest pain, shortness of breath, fever, chills, nausea, vomiting, diarrhea, change in mentation, falling, weakness, bleeding. Severe pain or pain not relieved by medications. Or, any other signs or symptoms that you may have questions about.       DISPOSITION:    Home With:   OT x PT x HH x RNx       SNF/Inpatient Rehab/LTAC    Independent/assisted living    Hospice    Other:     CDMP Checked: Yes x     PROBLEM LIST Updated:  Yes x       Signed:   Jeny Lopez MD  2/22/2023  11:28 AM

## 2023-02-23 ENCOUNTER — PATIENT OUTREACH (OUTPATIENT)
Dept: CASE MANAGEMENT | Age: 63
End: 2023-02-23

## 2023-02-23 NOTE — PROGRESS NOTES
Care Transitions Initial Call    Call within 2 business days of discharge: Yes     Patient Current Location: [place]    Patient: Mary Gamino Patient : 1960 MRN: [id number]    Last Discharge [address]       Date Complaint Diagnosis Description Type Department Provider    23 Abdominal Pain Sepsis following procedure, initial encounter ([facility] [address] . .. ED to Hosp-Admission (Discharged) (ADMIT) Marino Ramos MD; Grace Handy MD;... Was this an external facility discharge? No Discharge Facility: n/a    Challenges to be reviewed by the provider   Additional needs identified to be addressed with provider:              Method of communication with provider : none    Discussed COVID-19 related testing which was not done at this time. Test results were not done. Patient informed of results, if available? N/A     Inpatient Readmission Risk score: Unplanned Readmit Risk Score: 15.3    Was this a readmission? no   Patient stated reason for the admission: weakness, abdominal and leg pain    Patients top risk factors for readmission: functional physical ability and medical condition-CKD with PD, anemia,     Interventions to address risk factors: Obtained and reviewed discharge summary and/or continuity of care documents    Care Transition Nurse (CTN) contacted the patient by telephone to perform post hospital discharge assessment. Verified name and  with patient as identifiers. Provided introduction to self, and explanation of the CTN role. CTN reviewed discharge instructions, medical action plan and red flags with patient who verbalized understanding. Were discharge instructions available to patient? yes. Reviewed appropriate site of care based on symptoms and resources available to patient including: PCP, Specialist, and Home Health. Patient given an opportunity to ask questions and does not have any further questions or concerns at this time.  The patient agrees to contact the PCP office for questions related to their healthcare. Medication reconciliation was performed with patient, who verbalizes understanding of administration of home medications. Advised obtaining a 90-day supply of all daily and as-needed medications. Referral to Pharm D needed: no     Home Health/Outpatient orders at discharge: home health care  [address] [place] [address] Way: Robinson Brady  Date of initial visit: 2/23/2023    Durable Medical Equipment ordered at discharge: None      Discussed follow-up appointments. If no appointment was previously scheduled, appointment scheduling offered: yes. Is follow up appointment scheduled within 7 days of discharge? yes. [address] follow up appointment(s):   Future Appointments   Date Time Provider Margarito Arora   2/24/2023 11:30 AM H1 [facility]AZ FASTRACK [facility]   3/24/2023  3:00 PM G1 YAZ FASTRACK [facility]   4/21/2023 11:30 AM H2 [facility]AZ FASTRACK [facility]     Non-[facility] follow up appointment(s):   HD center 2/23 for PD education and 1st PD session    Plan for follow-up call in 5-7 days based on severity of symptoms and risk factors. Plan for next call: self management-monitor red flags and follow up appointment-schedule hospital follow up  CTN provided contact information for future needs.      Goals Addressed    None

## 2023-02-24 ENCOUNTER — PATIENT OUTREACH (OUTPATIENT)
Dept: CASE MANAGEMENT | Age: 63
End: 2023-02-24

## 2023-02-24 NOTE — PROGRESS NOTES
Care Transitions Follow Up Call    Patient Current Location: [facility] Transition Nurse (CTN) contacted the patient by telephone to follow up after admission on 2/17-2/22. Advance Care Planning:   Does patient have an Advance Directive:  currently not on file; patient declined education    CTN reviewed discharge instructions, medical action plan and red flags with patient and discussed any barriers to care and/or understanding of plan of care after discharge. Discussed appropriate site of care based on symptoms and resources available to patient including: PCP, Specialist, and Home Health. The patient agrees to contact the PCP office for questions related to their healthcare. [facility] follow up appointment(s):   Future Appointments   Date Time Provider Margarito Arora   3/2/2023  4:30 PM Arian Raya MD [facility]   3/24/2023  3:00 [facility]   4/21/2023 11:30 AM [facility]     Non-[facility] follow up appointment(s):   HD- MWF, with PD instruction    CTN provided contact information for future needs. Plan for follow-up call in 5-7 days based on severity of symptoms and risk factors. Plan for next call: self management-monitor red flags       Goals Addressed                   This Visit's Progress     Prevent complications post hospitalization. 02/24/23  Patient will follow up with PCP as directed. Patient will monitor BP and discuss with PCP at follow up.
right/radial artery

## 2023-03-01 ENCOUNTER — APPOINTMENT (OUTPATIENT)
Dept: GENERAL RADIOLOGY | Age: 63
DRG: 640 | End: 2023-03-01
Attending: EMERGENCY MEDICINE
Payer: MEDICARE

## 2023-03-01 ENCOUNTER — HOSPITAL ENCOUNTER (INPATIENT)
Age: 63
LOS: 8 days | Discharge: HOME HEALTH | DRG: 640 | End: 2023-03-09
Attending: EMERGENCY MEDICINE | Admitting: HOSPITALIST
Payer: MEDICARE

## 2023-03-01 DIAGNOSIS — R06.89 HYPERCAPNIA: ICD-10-CM

## 2023-03-01 DIAGNOSIS — R77.8 TROPONIN LEVEL ELEVATED: ICD-10-CM

## 2023-03-01 DIAGNOSIS — N18.6 ESRD (END STAGE RENAL DISEASE) (HCC): Primary | ICD-10-CM

## 2023-03-01 PROBLEM — J96.90 RESPIRATORY FAILURE (HCC): Status: ACTIVE | Noted: 2023-03-01

## 2023-03-01 LAB
ANION GAP SERPL CALC-SCNC: 7 MMOL/L (ref 5–15)
BASE EXCESS BLDV CALC-SCNC: 0 MMOL/L
BASOPHILS # BLD: 0 K/UL (ref 0–0.1)
BASOPHILS NFR BLD: 0 % (ref 0–1)
BNP SERPL-MCNC: ABNORMAL PG/ML
BUN SERPL-MCNC: 57 MG/DL (ref 6–20)
BUN/CREAT SERPL: 8 (ref 12–20)
CALCIUM SERPL-MCNC: 10.4 MG/DL (ref 8.5–10.1)
CALCULATED R AXIS, ECG10: 12 DEGREES
CALCULATED T AXIS, ECG11: 82 DEGREES
CHLORIDE SERPL-SCNC: 97 MMOL/L (ref 97–108)
CO2 SERPL-SCNC: 30 MMOL/L (ref 21–32)
COMMENT, HOLDF: NORMAL
CREAT SERPL-MCNC: 6.9 MG/DL (ref 0.55–1.02)
DIAGNOSIS, 93000: NORMAL
DIFFERENTIAL METHOD BLD: ABNORMAL
EOSINOPHIL # BLD: 0.1 K/UL (ref 0–0.4)
EOSINOPHIL NFR BLD: 1 % (ref 0–7)
ERYTHROCYTE [DISTWIDTH] IN BLOOD BY AUTOMATED COUNT: 16 % (ref 11.5–14.5)
GLUCOSE SERPL-MCNC: 93 MG/DL (ref 65–100)
HCO3 BLDV-SCNC: 27 MMOL/L (ref 23–28)
HCT VFR BLD AUTO: 27.7 % (ref 35–47)
HGB BLD-MCNC: 8.2 G/DL (ref 11.5–16)
IMM GRANULOCYTES # BLD AUTO: 0.2 K/UL (ref 0–0.04)
IMM GRANULOCYTES NFR BLD AUTO: 2 % (ref 0–0.5)
LYMPHOCYTES # BLD: 0.8 K/UL (ref 0.8–3.5)
LYMPHOCYTES NFR BLD: 7 % (ref 12–49)
MCH RBC QN AUTO: 28.3 PG (ref 26–34)
MCHC RBC AUTO-ENTMCNC: 29.6 G/DL (ref 30–36.5)
MCV RBC AUTO: 95.5 FL (ref 80–99)
MONOCYTES # BLD: 1 K/UL (ref 0–1)
MONOCYTES NFR BLD: 9 % (ref 5–13)
NEUTS SEG # BLD: 9 K/UL (ref 1.8–8)
NEUTS SEG NFR BLD: 81 % (ref 32–75)
NRBC # BLD: 0 K/UL (ref 0–0.01)
NRBC BLD-RTO: 0 PER 100 WBC
PCO2 BLDV: 56.4 MMHG (ref 41–51)
PH BLDV: 7.29 (ref 7.32–7.42)
PLATELET # BLD AUTO: 265 K/UL (ref 150–400)
PMV BLD AUTO: 9.8 FL (ref 8.9–12.9)
PO2 BLDV: 35 MMHG (ref 25–40)
POTASSIUM SERPL-SCNC: 4.2 MMOL/L (ref 3.5–5.1)
Q-T INTERVAL, ECG07: 334 MS
QRS DURATION, ECG06: 76 MS
QTC CALCULATION (BEZET), ECG08: 401 MS
RBC # BLD AUTO: 2.9 M/UL (ref 3.8–5.2)
RBC MORPH BLD: ABNORMAL
RBC MORPH BLD: ABNORMAL
SAMPLES BEING HELD,HOLD: NORMAL
SAO2 % BLDV: 59 % (ref 65–88)
SARS-COV-2 RDRP RESP QL NAA+PROBE: NOT DETECTED
SERVICE CMNT-IMP: ABNORMAL
SODIUM SERPL-SCNC: 134 MMOL/L (ref 136–145)
SOURCE, COVRS: NORMAL
SPECIMEN TYPE: ABNORMAL
TROPONIN I SERPL HS-MCNC: 60 NG/L (ref 0–37)
VENTRICULAR RATE, ECG03: 87 BPM
WBC # BLD AUTO: 11.1 K/UL (ref 3.6–11)

## 2023-03-01 PROCEDURE — 74011000250 HC RX REV CODE- 250: Performed by: EMERGENCY MEDICINE

## 2023-03-01 PROCEDURE — 94640 AIRWAY INHALATION TREATMENT: CPT

## 2023-03-01 PROCEDURE — 71045 X-RAY EXAM CHEST 1 VIEW: CPT

## 2023-03-01 PROCEDURE — 74011000250 HC RX REV CODE- 250: Performed by: INTERNAL MEDICINE

## 2023-03-01 PROCEDURE — 80048 BASIC METABOLIC PNL TOTAL CA: CPT

## 2023-03-01 PROCEDURE — 87635 SARS-COV-2 COVID-19 AMP PRB: CPT

## 2023-03-01 PROCEDURE — 83880 ASSAY OF NATRIURETIC PEPTIDE: CPT

## 2023-03-01 PROCEDURE — 94761 N-INVAS EAR/PLS OXIMETRY MLT: CPT

## 2023-03-01 PROCEDURE — 74011250637 HC RX REV CODE- 250/637: Performed by: INTERNAL MEDICINE

## 2023-03-01 PROCEDURE — 93005 ELECTROCARDIOGRAM TRACING: CPT

## 2023-03-01 PROCEDURE — 99285 EMERGENCY DEPT VISIT HI MDM: CPT

## 2023-03-01 PROCEDURE — 74011000250 HC RX REV CODE- 250: Performed by: HOSPITALIST

## 2023-03-01 PROCEDURE — 84484 ASSAY OF TROPONIN QUANT: CPT

## 2023-03-01 PROCEDURE — 96374 THER/PROPH/DIAG INJ IV PUSH: CPT

## 2023-03-01 PROCEDURE — 65660000001 HC RM ICU INTERMED STEPDOWN

## 2023-03-01 PROCEDURE — 85025 COMPLETE CBC W/AUTO DIFF WBC: CPT

## 2023-03-01 PROCEDURE — 36415 COLL VENOUS BLD VENIPUNCTURE: CPT

## 2023-03-01 PROCEDURE — 82803 BLOOD GASES ANY COMBINATION: CPT

## 2023-03-01 PROCEDURE — 94664 DEMO&/EVAL PT USE INHALER: CPT

## 2023-03-01 RX ORDER — SODIUM CHLORIDE 0.9 % (FLUSH) 0.9 %
5-40 SYRINGE (ML) INJECTION AS NEEDED
Status: DISCONTINUED | OUTPATIENT
Start: 2023-03-01 | End: 2023-03-09 | Stop reason: HOSPADM

## 2023-03-01 RX ORDER — CINACALCET 30 MG/1
60 TABLET, FILM COATED ORAL
Status: DISCONTINUED | OUTPATIENT
Start: 2023-03-02 | End: 2023-03-09 | Stop reason: HOSPADM

## 2023-03-01 RX ORDER — IPRATROPIUM BROMIDE AND ALBUTEROL SULFATE 2.5; .5 MG/3ML; MG/3ML
3 SOLUTION RESPIRATORY (INHALATION)
Status: COMPLETED | OUTPATIENT
Start: 2023-03-01 | End: 2023-03-01

## 2023-03-01 RX ORDER — ONDANSETRON 4 MG/1
4 TABLET, ORALLY DISINTEGRATING ORAL
Status: DISCONTINUED | OUTPATIENT
Start: 2023-03-01 | End: 2023-03-09 | Stop reason: HOSPADM

## 2023-03-01 RX ORDER — GENTAMICIN SULFATE 1 MG/G
CREAM TOPICAL DAILY
Status: DISCONTINUED | OUTPATIENT
Start: 2023-03-02 | End: 2023-03-02

## 2023-03-01 RX ORDER — IPRATROPIUM BROMIDE AND ALBUTEROL SULFATE 2.5; .5 MG/3ML; MG/3ML
3 SOLUTION RESPIRATORY (INHALATION)
Status: ACTIVE | OUTPATIENT
Start: 2023-03-01 | End: 2023-03-02

## 2023-03-01 RX ORDER — ACETAMINOPHEN 650 MG/1
650 SUPPOSITORY RECTAL
Status: DISCONTINUED | OUTPATIENT
Start: 2023-03-01 | End: 2023-03-09 | Stop reason: HOSPADM

## 2023-03-01 RX ORDER — IPRATROPIUM BROMIDE AND ALBUTEROL SULFATE 2.5; .5 MG/3ML; MG/3ML
3 SOLUTION RESPIRATORY (INHALATION)
Status: DISCONTINUED | OUTPATIENT
Start: 2023-03-01 | End: 2023-03-02

## 2023-03-01 RX ORDER — BUMETANIDE 0.25 MG/ML
4 INJECTION INTRAMUSCULAR; INTRAVENOUS 2 TIMES DAILY
Status: DISPENSED | OUTPATIENT
Start: 2023-03-01 | End: 2023-03-04

## 2023-03-01 RX ORDER — SODIUM CHLORIDE 0.9 % (FLUSH) 0.9 %
5-40 SYRINGE (ML) INJECTION EVERY 8 HOURS
Status: DISCONTINUED | OUTPATIENT
Start: 2023-03-01 | End: 2023-03-08

## 2023-03-01 RX ORDER — POLYETHYLENE GLYCOL 3350 17 G/17G
17 POWDER, FOR SOLUTION ORAL DAILY PRN
Status: DISCONTINUED | OUTPATIENT
Start: 2023-03-01 | End: 2023-03-09 | Stop reason: HOSPADM

## 2023-03-01 RX ORDER — ACETAMINOPHEN 325 MG/1
650 TABLET ORAL
Status: DISCONTINUED | OUTPATIENT
Start: 2023-03-01 | End: 2023-03-09 | Stop reason: HOSPADM

## 2023-03-01 RX ORDER — METOLAZONE 5 MG/1
5 TABLET ORAL ONCE
Status: COMPLETED | OUTPATIENT
Start: 2023-03-01 | End: 2023-03-01

## 2023-03-01 RX ORDER — ONDANSETRON 2 MG/ML
4 INJECTION INTRAMUSCULAR; INTRAVENOUS
Status: DISCONTINUED | OUTPATIENT
Start: 2023-03-01 | End: 2023-03-09 | Stop reason: HOSPADM

## 2023-03-01 RX ADMIN — BUMETANIDE 4 MG: 0.25 INJECTION, SOLUTION INTRAMUSCULAR; INTRAVENOUS at 12:23

## 2023-03-01 RX ADMIN — IPRATROPIUM BROMIDE AND ALBUTEROL SULFATE 3 ML: 2.5; .5 SOLUTION RESPIRATORY (INHALATION) at 10:03

## 2023-03-01 RX ADMIN — METOLAZONE 5 MG: 5 TABLET ORAL at 12:25

## 2023-03-01 RX ADMIN — SODIUM CHLORIDE, PRESERVATIVE FREE 10 ML: 5 INJECTION INTRAVENOUS at 22:23

## 2023-03-01 RX ADMIN — IPRATROPIUM BROMIDE AND ALBUTEROL SULFATE 3 ML: 2.5; .5 SOLUTION RESPIRATORY (INHALATION) at 12:25

## 2023-03-01 RX ADMIN — BUMETANIDE 4 MG: 0.25 INJECTION, SOLUTION INTRAMUSCULAR; INTRAVENOUS at 18:15

## 2023-03-01 NOTE — CONSULTS
NEPHROLOGY CONSULT NOTE     Patient: Anca Ramsay MRN: [id number]  PCP: Gato Martin MD   :     1960  Age:   70 y.o. Sex:  female      Referring physician: Travis Rausch MD    Reason for consultation:     End-stage renal disease on peritoneal dialysis. Ms. Eber Scott was seen and examined in the emergency room at [facility] this morning. She was in bed #7. Admission Date: 3/1/2023  9:07 AM  LOS: 0 days     DISCUSSION / PLAN :   Ms. Mary Nieves is on low volume PD exchanges as part of the initial phase of her training on peritoneal dialysis. Will resume dialysis with the cycler this evening. We will use an exchange volume of 1000 cc with the 1.5% dianeal solution. We will plan to place her on the cycler for approximately 8 hours per night. Additionally we will give Bumex 4 mg IV twice daily to assist with her volume status. A dose of metolazone will be ordered as well. Medication should be dosed for GFR. Follow electrolytes and CBC. Daily weights. Active Problems / Assessment AAActive  :   End-stage renal disease on PD. Fluid overload. Hypertension. Secondary hyperparathyroidism. Anemia chronic kidney disease. Subjective:     Shortness of breath    HPI:     Ms. Mary Nieves is a 57-year-old lady who is known to our service. She was recently diagnosed end-stage renal disease and started on low volume PD exchanges following initial phase of acute hemodialysis. She is followed in the [address] PD clinic. She receiving incidental low volume exchanges on a  schedule. Ms. Mary Nieves  reported a 1 to 2-day history of shortness of breath. It is accompanied by a nonproductive cough. There is no history of chest pain, nausea, vomiting, dysuria or hematuria. There is no suggestion of fever nor chills. It seems there was some concern about hypoxia. She proceed to the ER. Her findings seemed consistent with fluid overload.   She was admitted for further evaluation management. Her potassium was normal.        Past Medical Hx:   Past Medical History:   Diagnosis Date    Arthritis     L knee    Chronic kidney disease     secondary to hypertensive nephrosclerosis, CKD STG 5, ON ACTIVE KIDNEY TRANSPLANT LIST AT [facility]- DR. SANCHEZ    Gastritis     HISTORY OF 10/2020     Gout     History of claustrophobia     History of total hip replacement, right 2022    Hyperparathyroidism ([address]     Hypertension         Past Surgical Hx:     Past Surgical History:   Procedure Laterality Date    HX  SECTION      HX COLONOSCOPY      HX GYN      ECTOPIC PREGNANCY SURGERY     HX HIP REPLACEMENT Left     HX LAP CHOLECYSTECTOMY      HX LUMBAR LAMINECTOMY  2020    L3L4L5    HX WISDOM TEETH EXTRACTION      IR INSERT NON TUNL CVC OVER 5 YRS  2023       Medications:  Prior to Admission medications    Medication Sig Start Date End Date Taking? Authorizing Provider   cephALEXin (KEFLEX) 500 mg capsule Take 1 Capsule by mouth daily for 6 doses. 2/23/23 3/1/23  Jeferson Monzon MD   sevelamer carbonate (RENVELA) 0.8 gram pwpk oral powder Take 2 Packets by mouth three (3) times daily (with meals). Patient not taking: Reported on 2023   Jeferson Monzon MD   allopurinoL (ZYLOPRIM) 100 mg tablet TAKE TWO TABLETS BY MOUTH DAILY 23   Keyla Matos MD   amLODIPine (NORVASC) 10 mg tablet TAKE ONE TABLET BY MOUTH DAILY 22   Keyla Matos MD   sodium bicarbonate 650 mg tablet Take 1 Tablet by mouth three (3) times daily. Patient taking differently: Take 650 mg by mouth two (2) times a day. 22   Gerhardt Shack, NP   senna-docusate (PERICOLACE) 8.6-50 mg per tablet Take 1 Tablet by mouth two (2) times a day. 22   Aileen Mendez PA-C   carvediloL (COREG) 6.25 mg tablet Take 2 Tablets by mouth two (2) times daily (with meals).  4/10/22   Keyla Matos MD   tolterodine ER (DETROL LA) 4 mg ER capsule Take 1 Capsule by mouth daily. Patient not taking: Reported on 2/23/2023 10/17/21   Alie Terrazas MD   bumetanide (BUMEX) 1 mg tablet Take 1 mg by mouth daily. 6/25/21   Provider, Historical   hydrALAZINE (APRESOLINE) 10 mg tablet Take 10 mg by mouth three (3) times daily. 6/19/21   Provider, Historical       Allergies   Allergen Reactions    Morphine Nausea and Vomiting and Cough     Blood pressure and 158-96 heart rate up to 156. Patient does not feel that this is a true reaction and denies    [facility] Swelling     PATIENT DENIES THIS ALLERGY        Social Hx:  reports that she quit smoking about 22 years ago. Her smoking use included cigarettes. She has a 6.25 pack-year smoking history. She has never used smokeless tobacco. She reports that she does not drink alcohol and does not use drugs. Family History   Problem Relation Age of Onset    Hypertension Mother     Liver Disease Father     Diabetes Sister     Breast Cancer Paternal Grandmother     Anesth Problems Neg Hx        Review of Systems:    See HPI. Objective:    Vitals:    Vitals:    03/01/23 0915 03/01/23 1004 03/01/23 1045   BP: (!) 178/95  (!) 148/80   Pulse: 88  86   Resp: 30  27   Temp: 98.2 °F (36.8 °C)     SpO2: 96% 95% 97%     I&O's:  No intake/output data recorded. Visit Vitals  BP (!) 148/80   Pulse 86   Temp 98.2 °F (36.8 °C)   Resp 27   SpO2 97%       Physical Exam:    Seen in ER 7. General: Lying propped up in bed. Head normocephalic. Mucous membranes: Moist and anicteric. Oxygen via nasal cannula. Cardiac exam: S1, S2, no S3 gallop, no pericardial rub. Respiratory: Expiratory wheezes, no rales. Abdomen: PD catheter noted in right lower quadrant. A dressing was in place. There is no pericatheter or tract tenderness. Lower extremities: Mild pretibial edema. Neuro: Alert awake answering questions appropriate. Psych: Appropriate affect.       Laboratory Results:    Lab Results   Component Value Date    BUN 57 (H) 03/01/2023     (L) 03/01/2023    K 4.2 03/01/2023    CL 97 03/01/2023    CO2 30 03/01/2023       Lab Results   Component Value Date    BUN 57 (H) 03/01/2023    BUN 45 (H) 02/22/2023    BUN 39 (H) 02/21/2023    BUN 64 (H) 02/20/2023    BUN 58 (H) 02/19/2023    K 4.2 03/01/2023    K 3.9 02/22/2023    K 4.0 02/21/2023    K 3.9 02/20/2023    K 3.8 02/19/2023       Lab Results   Component Value Date    WBC 11.1 (H) 03/01/2023    RBC 2.90 (L) 03/01/2023    HGB 8.2 (L) 03/01/2023    HCT 27.7 (L) 03/01/2023    MCV 95.5 03/01/2023    MCH 28.3 03/01/2023    RDW 16.0 (H) 03/01/2023     03/01/2023       Lab Results   Component Value Date    PHOS 7.6 (H) 02/22/2023       Urine dipstick:   Lab Results   Component Value Date/Time    Color YELLOW/STRAW 08/10/2022 03:34 PM    Appearance CLOUDY (A) 08/10/2022 03:34 PM    Specific gravity 1.016 08/10/2022 03:34 PM    pH (UA) 5.5 08/10/2022 03:34 PM    Protein >300 (A) 08/10/2022 03:34 PM    Glucose 100 (A) 08/10/2022 03:34 PM    Ketone Negative 08/10/2022 03:34 PM    Bilirubin Negative 08/10/2022 03:34 PM    Urobilinogen 0.2 08/10/2022 03:34 PM    Nitrites Negative 08/10/2022 03:34 PM    Leukocyte Esterase TRACE (A) 08/10/2022 03:34 PM    Epithelial cells MODERATE (A) 08/10/2022 03:34 PM    Bacteria 1+ (A) 08/10/2022 03:34 PM    WBC 0-4 08/10/2022 03:34 PM    RBC 0-5 08/10/2022 03:34 PM       I have reviewed the following:       Care Plan discussed with:  ER Attending      Chart reviewed. Thank you for allowing us to participate in the care of this patient. We will follow patient. Please dont hesitate to call with any questions    Emeka Vazquez MD  3/1/2023    [facility]  Phone - [phone]   Fax - [phone]  [email or web address]

## 2023-03-01 NOTE — ED TRIAGE NOTES
Pt arrives via EMS. She was on the way to dialysis when she felt short of breath and had L shoulder pain. Pt stated she had a similar experience of feeling SOB yesterday. Pt said her girlfriend pulled over and called 911. EMS arrived and pt was sating in low 90s. EMS put pt on 3L and now sating in upper 90s. Pt denies chest/shoulder pain. + cough.  Duo-neb was given en route along with nitro paste to L chest.

## 2023-03-01 NOTE — H&P
History and Physical    Date of Service:  3/1/2023  Primary Care Provider: Devorah Aschoff, MD  Source of information: The patient    Chief Complaint: Shortness of Breath      History of Presenting Illness:   Lauren Darling is a 70 y.o. female who presents with shortness of breath. Patient was recently discharged from [facility] on -after being d/cd  for leg pain and swelling. Pt has  PMHx of Stage V CKD, gout, morbid obesity, s/p R total hip replacement, and HTN who underwent laparoscopic PD catheter insertion and lysis of adhesions on 23 with Vascular Surgeon, Dr. Monica Daly. Patient follows with nephrology Dr. Muro Son 8 PD catheter placement was done on  not on dialysis yet patient now came with shortness of breath fluid overload and uremia. The patient denies any headache, blurry vision, sore throat, trouble swallowing, trouble with speech, chest pain, SOB, cough, fever, chills, N/V/D, abd pain, urinary symptoms, constipation, recent travels, sick contacts, focal or generalized neurological symptoms, falls, injuries, rashes, contact with COVID-19 diagnosed patients, hematemesis, melena, hemoptysis, hematuria, rashes, denies starting any new medications and denies any other concerns or problems besides as mentioned above. REVIEW OF SYSTEMS:  Pertinent items are noted in the History of Present Illness. Past Medical History:   Diagnosis Date    Arthritis     L knee    Chronic kidney disease     secondary to hypertensive nephrosclerosis, CKD STG 5, ON ACTIVE KIDNEY TRANSPLANT LIST AT [facility]- DR. SANCHEZ    Gastritis     HISTORY OF 10/2020     Gout     History of claustrophobia     History of total hip replacement, right 2022    Hyperparathyroidism ([facility] [address]     Hypertension       Past Surgical History:   Procedure Laterality Date    HX  SECTION      HX COLONOSCOPY      HX GYN      ECTOPIC PREGNANCY SURGERY     HX HIP REPLACEMENT Left     HX LAP CHOLECYSTECTOMY      HX LUMBAR LAMINECTOMY  08/2020    L3L4L5    HX WISDOM TEETH EXTRACTION      IR INSERT NON TUNL CVC OVER 5 YRS  2/17/2023     Prior to Admission medications    Medication Sig Start Date End Date Taking? Authorizing Provider   cephALEXin (KEFLEX) 500 mg capsule Take 1 Capsule by mouth daily for 6 doses. 2/23/23 3/1/23  Delfina Gautam MD   sevelamer carbonate (RENVELA) 0.8 gram pwpk oral powder Take 2 Packets by mouth three (3) times daily (with meals). Patient not taking: Reported on 2/23/2023 2/22/23   Delfina Gautam MD   allopurinoL (ZYLOPRIM) 100 mg tablet TAKE TWO TABLETS BY MOUTH DAILY 1/11/23   Irma Panda MD   amLODIPine (NORVASC) 10 mg tablet TAKE ONE TABLET BY MOUTH DAILY 11/25/22   Irma Panda MD   sodium bicarbonate 650 mg tablet Take 1 Tablet by mouth three (3) times daily. Patient taking differently: Take 650 mg by mouth two (2) times a day. 8/22/22   Adelaida Cranker, NP   senna-docusate (PERICOLACE) 8.6-50 mg per tablet Take 1 Tablet by mouth two (2) times a day. 8/16/22   Costa Traylor PA-C   carvediloL (COREG) 6.25 mg tablet Take 2 Tablets by mouth two (2) times daily (with meals). 4/10/22   Irma Panda MD   tolterodine ER (DETROL LA) 4 mg ER capsule Take 1 Capsule by mouth daily. Patient not taking: Reported on 2/23/2023 10/17/21   Irma Panda MD   bumetanide (BUMEX) 1 mg tablet Take 1 mg by mouth daily. 6/25/21   Provider, Historical   hydrALAZINE (APRESOLINE) 10 mg tablet Take 10 mg by mouth three (3) times daily. 6/19/21   Provider, Historical     Allergies   Allergen Reactions    Morphine Nausea and Vomiting and Cough     Blood pressure and 158-96 heart rate up to 156.   Patient does not feel that this is a true reaction and denies    [facility] Swelling     PATIENT DENIES THIS ALLERGY       Family History   Problem Relation Age of Onset    Hypertension Mother     Liver Disease Father     Diabetes Sister     Breast Cancer Paternal Grandmother     Anesth Problems Neg Hx Social History:  reports that she quit smoking about 22 years ago. Her smoking use included cigarettes. She has a 6.25 pack-year smoking history. She has never used smokeless tobacco. She reports that she does not drink alcohol and does not use drugs. Social Determinants of Health     Tobacco Use: Medium Risk    Smoking Tobacco Use: Former    Smokeless Tobacco Use: Never    Passive Exposure: Not on file   Alcohol Use: Not on file   Financial Resource Strain: Not on file   Food Insecurity: Not on file   Transportation Needs: Not on file   Physical Activity: Not on file   Stress: Not on file   Social Connections: Not on file   Intimate Partner Violence: Not on file   Depression: Not at risk    PHQ-2 Score: 0   Housing Stability: Not on file        Medications were reconciled to the best of my ability given all available resources at the time of admission. Route is PO if not otherwise noted. Family and social history were personally reviewed, all pertinent and relevant details are outlined as above.     Objective:   Visit Vitals  BP (!) 171/80   Pulse 81   Temp 98.2 °F (36.8 °C)   Resp 23   Ht 5' 6\" (1.676 m)   Wt 99 kg (218 lb 4.1 oz)   SpO2 99%   BMI 35.23 kg/m²    O2 Flow Rate (L/min): 3 l/min O2 Device: Nasal cannula    PHYSICAL EXAM:   General: Alert x oriented x 3, awake, no acute distress,   HEENT: PEERL, EOMI, moist mucus membranes  Neck: Supple, no JVD, no meningeal signs  Chest: Clear to auscultation bilaterally   CVS: RRR, S1 S2 heard, no murmurs/rubs/gallops  Abd: Soft, non-tender, non-distended, +bowel sounds   Ext: No clubbing, no cyanosis, no edema  Neuro/Psych: Pleasant mood and affect, CN 2-12 grossly intact, sensory grossly within normal limit, Strength 5/5 in all extremities, DTR 1+ x 4  Cap refill: Brisk, less than 3 seconds  Pulses: 2+, symmetric in all extremities  Skin: Warm, dry, without rashes or lesions    Data Review:   I have independently reviewed and interpreted patient's lab and all other diagnostic data    Abnormal Labs Reviewed   CBC WITH AUTOMATED DIFF - Abnormal; Notable for the following components:       Result Value    WBC 11.1 (*)     RBC 2.90 (*)     HGB 8.2 (*)     HCT 27.7 (*)     MCHC 29.6 (*)     RDW 16.0 (*)     NEUTROPHILS 81 (*)     LYMPHOCYTES 7 (*)     IMMATURE GRANULOCYTES 2 (*)     ABS. NEUTROPHILS 9.0 (*)     ABS. IMM. GRANS. 0.2 (*)     All other components within normal limits   NT-PRO BNP - Abnormal; Notable for the following components:    NT pro-BNP 14,242 (*)     All other components within normal limits   TROPONIN-HIGH SENSITIVITY - Abnormal; Notable for the following components:    Troponin-High Sensitivity 60 (*)     All other components within normal limits   METABOLIC PANEL, BASIC - Abnormal; Notable for the following components:    Sodium 134 (*)     BUN 57 (*)     Creatinine 6.90 (*)     BUN/Creatinine ratio 8 (*)     eGFR 6 (*)     Calcium 10.4 (*)     All other components within normal limits   POC VENOUS BLOOD GAS - Abnormal; Notable for the following components:    pH, venous (POC) 7.29 (*)     pCO2, venous (POC) 56.4 (*)     sO2, venous (POC) 59.0 (*)     All other components within normal limits       All Micro Results       Procedure Component Value Units Date/Time    COVID-19 RAPID TEST [[id number]] Collected: 03/01/23 1050    Order Status: Completed Specimen: Nasopharyngeal Updated: 03/01/23 1128     Specimen source Nasopharyngeal        COVID-19 rapid test Not detected        Comment: Rapid Abbott ID Now       Rapid NAAT:  The specimen is NEGATIVE for SARS-CoV-2, the novel coronavirus associated with COVID-19. Negative results should be treated as presumptive and, if inconsistent with clinical signs and symptoms or necessary for patient management, should be tested with an alternative molecular assay. Negative results do not preclude SARS-CoV-2 infection and should not be used as the sole basis for patient management decisions. This test has been authorized by the FDA under an Emergency Use Authorization (EUA) for use by authorized laboratories. Fact sheet for Healthcare Providers:  http://www.jamin.kelsi/  Fact sheet for Patients: http://www.jamin.kelsi/       Methodology: Isothermal Nucleic Acid Amplification                 IMAGING:   XR CHEST PORT   Final Result      Low lung volumes. Repeat view with better inspiratory effort recommended. ECG/ECHO:    Results for orders placed or performed during the hospital encounter of 03/01/23   EKG, 12 LEAD, INITIAL   Result Value Ref Range    Ventricular Rate 87 BPM    QRS Duration 76 ms    Q-T Interval 334 ms    QTC Calculation (Bezet) 401 ms    Calculated R Axis 12 degrees    Calculated T Axis 82 degrees    Diagnosis       Atrial fibrillation  Nonspecific ST and T wave abnormality  Abnormal ECG  When compared with ECG of 01-SEP-2022 16:28,  Atrial fibrillation has replaced Sinus rhythm            Notes reviewed from all clinical/nonclinical/nursing services involved in patient's clinical care. Care coordination discussions were held with appropriate clinical/nonclinical/ nursing providers based on care coordination needs. Assessment:   Given the patient's current clinical presentation, there is a high level of concern for decompensation if discharged from the emergency department. Complex decision making was performed, which includes reviewing the patient's available past medical records, laboratory results, and imaging studies.     Principal Problem:    Respiratory failure ([address] (3/1/2023)        Plan:     Acute hypoxic respiratory failure due to volume overload on 3 L nasal cannula on arrival  CKD 5 advanced to ESRD--patient will need emergent dialysis  Preparing for peritoneal dialysis    --Admit patient to intermediate care  --Care with oxygen for shortness of breath with breathing treatment  --Seen by nephrology stat Bumex injection 4 mg twice daily for 6 doses  --Prepare for peritoneal dialysis for volume removal    Accelerated hypertension  --In the setting of volume overload  --Resume home medication when blood pressure elevated  --Volume removal will help    Anemia of chronic disease--will need EPO  Secondary hyperparathyroidism due to ESRD          DIET: ADULT DIET Regular; Low Sodium (2 gm)   ISOLATION PRECAUTIONS: There are currently no Active Isolations  CODE STATUS: Full Code   DVT PROPHYLAXIS: Heparin  FUNCTIONAL STATUS PRIOR TO HOSPITALIZATION: Fully active and ambulatory; able to carry on all self-care without restriction. Ambulatory status/function: By self   EARLY MOBILITY ASSESSMENT: Recommend routine ambulation while hospitalized with the assistance of nursing staff  ANTICIPATED DISCHARGE: 24-48 hours. ANTICIPATED DISPOSITION: Home with Home Healthcare  EMERGENCY CONTACT/SURROGATE DECISION MAKER:     CRITICAL CARE WAS PERFORMED FOR THIS ENCOUNTER: YES. I had a face to face encounter with the patient, reviewed and interpreted patient data including clinical events, labs, images, vital signs, I/O's, and examined patient. I have discussed the case and the plan and management of the patient's care with the consulting services, the bedside nurses and necessary ancillary providers. This patient has a high probability of imminent, clinically significant deterioration, which requires the highest level of preparedness to intervene urgently. I participated in the decision-making and personally managed or directed the management of the following life and organ supporting interventions that required my frequent assessment to treat or prevent imminent deterioration. I personally spent 55 minutes of critical care time. This is time spent at this critically ill patient's bedside actively involved in patient care as well as the coordination of care and discussions with the patient's family.   This does not include any procedural time which has been billed separately. Signed By: Zo Meyers MD     March 1, 2023         Please note that this dictation may have been completed with Dragon, the computer voice recognition software. Quite often unanticipated grammatical, syntax, homophones, and other interpretive errors are inadvertently transcribed by the computer software. Please disregard these errors. Please excuse any errors that have escaped final proofreading.

## 2023-03-01 NOTE — ED PROVIDER NOTES
HPI   56-year-old woman with a past medical history of end-stage renal disease on dialysis , hypertension, and hyperparathyroidism who presents to the emergency department due to shortness of breath. Her symptoms started yesterday with cough. She initially called dispatch health who recommended she go to the emergency department to have a chest x-ray because of \"low oxygen levels\". However she states that when the paramedics were called her oxygen level was fine and she did not want to go to the hospital.  However her symptoms got worse today and she has been constantly short of breath. Cough has been nonproductive. No fevers or chills. No chest pain. Last dialysis was on Monday. She said she had a full run. She also had some pain radiating down her right arm. She received some nitro in route. Past Medical History:   Diagnosis Date    Arthritis     L knee    Chronic kidney disease     secondary to hypertensive nephrosclerosis, CKD STG 5, ON ACTIVE KIDNEY TRANSPLANT LIST AT [facility]- DR. SANCHEZ    Gastritis     HISTORY OF 10/2020     Gout     History of claustrophobia     History of total hip replacement, right 2022    Hyperparathyroidism ([facility] [address]     Hypertension        Past Surgical History:   Procedure Laterality Date    HX  SECTION      HX COLONOSCOPY      HX GYN      ECTOPIC PREGNANCY SURGERY     HX HIP REPLACEMENT Left     HX LAP CHOLECYSTECTOMY      HX LUMBAR LAMINECTOMY  2020    L3L4L5    HX WISDOM TEETH EXTRACTION      IR INSERT NON TUNL CVC OVER 5 YRS  2023         Family History:   Problem Relation Age of Onset    Hypertension Mother     Liver Disease Father     Diabetes Sister     Breast Cancer Paternal Grandmother     Anesth Problems Neg Hx        Social History     Socioeconomic History    Marital status:      Spouse name: Not on file    Number of children: 2    Years of education: 16+    Highest education level: Master's degree (e.g., MA, MS, Schuyler, Jose Antonio, MSW, RENATA)   Occupational History    Occupation: teacher   Tobacco Use    Smoking status: Former     Packs/day: 0.25     Years: 25.00     Pack years: 6.25     Types: Cigarettes     Quit date: 2000     Years since quittin.6    Smokeless tobacco: Never   Vaping Use    Vaping Use: Never used   Substance and Sexual Activity    Alcohol use: No    Drug use: No    Sexual activity: Not Currently   Other Topics Concern    Not on file   Social History Narrative    Not on file     Social Determinants of Health     Financial Resource Strain: Not on file   Food Insecurity: Not on file   Transportation Needs: Not on file   Physical Activity: Not on file   Stress: Not on file   Social Connections: Not on file   Intimate Partner Violence: Not on file   Housing Stability: Not on file         ALLERGIES: Morphine and [address]    Review of Systems  A complete review of systems was performed and all systems reviewed are negative unless otherwise documented in HPI  Vitals:    23 0915 23 1004   BP: (!) 178/95    Pulse: 88    Resp: 30    Temp: 98.2 °F (36.8 °C)    SpO2: 96% 95%            Physical Exam  Constitutional:       Comments: Chronically ill-appearing. Older than stated age. Not diaphoretic   HENT:      Mouth/Throat:      Comments: Moist mucous membranes  Eyes:      Extraocular Movements: Extraocular movements intact. Comments: No scleral icterus   Neck:      Vascular: No JVD. Comments: Trachea midline  Cardiovascular:      Rate and Rhythm: Normal rate and regular rhythm. Comments: No murmurs. Pulmonary:      Comments: Conversational dyspnea. No accessory muscle use. Expiratory wheezing bilaterally  Abdominal:      Comments: Soft and nontender   Musculoskeletal:      Cervical back: Normal range of motion. Comments: Trace lower extremity edema bilaterally   Skin:     General: Skin is warm and dry. Neurological:      Comments: Awake and alert.   GCS 15        Medical Decision Making  Amount and/or Complexity of Data Reviewed  Labs: ordered. Radiology: ordered. ECG/medicine tests: ordered. Risk  Prescription drug management. Decision regarding hospitalization. ED Course as of 03/01/23 1020   Wed Mar 01, 2023   0957 EKG shows normal sinus rhythm with a rate of 86. QTc 406. No concerning ST elevations or depressions. [MM]      ED Course User Index  [MM] Jennifer Cho MD   56-year-old woman presenting with the above chief complaint. Vital signs are stable. Patient was in the low 90s with EMS and was placed on 3 L. Now she is in the upper 90s. Expiratory wheezing. Conversational dyspnea. Not obviously volume overloaded. Broad work-up with chest x-ray, COVID-19 test, and CHF/volume overload work-up will be performed. She will be treated symptomatically given her wheezing with bronchodilators as well. Perfect Serve Consult for Admission  12:10 PM    ED Room Number: ER07/07  Patient Name and age:  Malathi Ball 70 y.o.  female  Working Diagnosis:   1. ESRD (end stage renal disease) ([address]    2. Hypercapnia        COVID-19 Suspicion:  no  Sepsis present:  no  Reassessment needed: yes  Code Status:  Full Code  Readmission: no  Isolation Requirements:  no  Recommended Level of Care:  telemetry  Department: [facility] Adult ED - 21       Other: Patient presenting with shortness of breath for the last 2 days. Is currently on HD but there are plans to transition to PD. Borderline hypoxia and mild hypercapnia on work-up. Wheezing on exam with improvement after bronchodilators.  From nephrology saw the patient and is planning on doing a PD run [facility] and is going to start diuresing her. I do think she needs BiPAP.       Procedures

## 2023-03-02 PROBLEM — N18.6 ESRD ON PERITONEAL DIALYSIS (HCC): Status: ACTIVE | Noted: 2023-03-02

## 2023-03-02 PROBLEM — J96.01 ACUTE RESPIRATORY FAILURE WITH HYPOXIA (HCC): Status: ACTIVE | Noted: 2023-03-01

## 2023-03-02 PROBLEM — Z99.2 ESRD ON PERITONEAL DIALYSIS (HCC): Status: ACTIVE | Noted: 2023-03-02

## 2023-03-02 PROBLEM — E87.70 VOLUME OVERLOAD: Status: ACTIVE | Noted: 2023-03-02

## 2023-03-02 LAB
ANION GAP SERPL CALC-SCNC: 8 MMOL/L (ref 5–15)
BASOPHILS # BLD: 0 K/UL (ref 0–0.1)
BASOPHILS NFR BLD: 0 % (ref 0–1)
BUN SERPL-MCNC: 62 MG/DL (ref 6–20)
BUN/CREAT SERPL: 9 (ref 12–20)
CALCIUM SERPL-MCNC: 9.8 MG/DL (ref 8.5–10.1)
CHLORIDE SERPL-SCNC: 98 MMOL/L (ref 97–108)
CO2 SERPL-SCNC: 29 MMOL/L (ref 21–32)
CREAT SERPL-MCNC: 6.97 MG/DL (ref 0.55–1.02)
DIFFERENTIAL METHOD BLD: ABNORMAL
EOSINOPHIL # BLD: 0.1 K/UL (ref 0–0.4)
EOSINOPHIL NFR BLD: 1 % (ref 0–7)
ERYTHROCYTE [DISTWIDTH] IN BLOOD BY AUTOMATED COUNT: 15.9 % (ref 11.5–14.5)
GLUCOSE SERPL-MCNC: 77 MG/DL (ref 65–100)
HCT VFR BLD AUTO: 27.9 % (ref 35–47)
HGB BLD-MCNC: 7.8 G/DL (ref 11.5–16)
IMM GRANULOCYTES # BLD AUTO: 0.2 K/UL (ref 0–0.04)
IMM GRANULOCYTES NFR BLD AUTO: 2 % (ref 0–0.5)
LYMPHOCYTES # BLD: 0.8 K/UL (ref 0.8–3.5)
LYMPHOCYTES NFR BLD: 9 % (ref 12–49)
MCH RBC QN AUTO: 28.1 PG (ref 26–34)
MCHC RBC AUTO-ENTMCNC: 28 G/DL (ref 30–36.5)
MCV RBC AUTO: 100.4 FL (ref 80–99)
MONOCYTES # BLD: 0.9 K/UL (ref 0–1)
MONOCYTES NFR BLD: 10 % (ref 5–13)
NEUTS SEG # BLD: 7.3 K/UL (ref 1.8–8)
NEUTS SEG NFR BLD: 78 % (ref 32–75)
NRBC # BLD: 0 K/UL (ref 0–0.01)
NRBC BLD-RTO: 0 PER 100 WBC
PLATELET # BLD AUTO: 260 K/UL (ref 150–400)
PMV BLD AUTO: 10.2 FL (ref 8.9–12.9)
POTASSIUM SERPL-SCNC: 4.4 MMOL/L (ref 3.5–5.1)
RBC # BLD AUTO: 2.78 M/UL (ref 3.8–5.2)
RBC MORPH BLD: ABNORMAL
SODIUM SERPL-SCNC: 135 MMOL/L (ref 136–145)
WBC # BLD AUTO: 9.3 K/UL (ref 3.6–11)

## 2023-03-02 PROCEDURE — 77010033678 HC OXYGEN DAILY

## 2023-03-02 PROCEDURE — 80048 BASIC METABOLIC PNL TOTAL CA: CPT

## 2023-03-02 PROCEDURE — 90945 DIALYSIS ONE EVALUATION: CPT

## 2023-03-02 PROCEDURE — 65660000001 HC RM ICU INTERMED STEPDOWN

## 2023-03-02 PROCEDURE — 74011000250 HC RX REV CODE- 250: Performed by: HOSPITALIST

## 2023-03-02 PROCEDURE — A4726 DIALYS SOL FLD VOL > 5999CC: HCPCS | Performed by: INTERNAL MEDICINE

## 2023-03-02 PROCEDURE — 74011250637 HC RX REV CODE- 250/637: Performed by: INTERNAL MEDICINE

## 2023-03-02 PROCEDURE — 74011250636 HC RX REV CODE- 250/636: Performed by: INTERNAL MEDICINE

## 2023-03-02 PROCEDURE — 94640 AIRWAY INHALATION TREATMENT: CPT

## 2023-03-02 PROCEDURE — 85025 COMPLETE CBC W/AUTO DIFF WBC: CPT

## 2023-03-02 PROCEDURE — 3E1M39Z IRRIGATION OF PERITONEAL CAVITY USING DIALYSATE, PERCUTANEOUS APPROACH: ICD-10-PCS | Performed by: INTERNAL MEDICINE

## 2023-03-02 PROCEDURE — 99232 SBSQ HOSP IP/OBS MODERATE 35: CPT | Performed by: INTERNAL MEDICINE

## 2023-03-02 PROCEDURE — 94664 DEMO&/EVAL PT USE INHALER: CPT

## 2023-03-02 PROCEDURE — 74011250637 HC RX REV CODE- 250/637: Performed by: HOSPITALIST

## 2023-03-02 PROCEDURE — 36415 COLL VENOUS BLD VENIPUNCTURE: CPT

## 2023-03-02 RX ORDER — METOLAZONE 5 MG/1
5 TABLET ORAL DAILY
Status: DISCONTINUED | OUTPATIENT
Start: 2023-03-02 | End: 2023-03-09 | Stop reason: HOSPADM

## 2023-03-02 RX ORDER — GUAIFENESIN 100 MG/5ML
100 SOLUTION ORAL
Status: DISCONTINUED | OUTPATIENT
Start: 2023-03-02 | End: 2023-03-03 | Stop reason: SDUPTHER

## 2023-03-02 RX ORDER — AMLODIPINE BESYLATE 5 MG/1
10 TABLET ORAL DAILY
Status: DISCONTINUED | OUTPATIENT
Start: 2023-03-02 | End: 2023-03-09 | Stop reason: HOSPADM

## 2023-03-02 RX ORDER — HYDRALAZINE HYDROCHLORIDE 25 MG/1
25 TABLET, FILM COATED ORAL EVERY 4 HOURS
Status: DISCONTINUED | OUTPATIENT
Start: 2023-03-02 | End: 2023-03-03

## 2023-03-02 RX ORDER — GENTAMICIN SULFATE 1 MG/G
CREAM TOPICAL
Status: DISCONTINUED | OUTPATIENT
Start: 2023-03-02 | End: 2023-03-09 | Stop reason: HOSPADM

## 2023-03-02 RX ORDER — CARVEDILOL 6.25 MG/1
6.25 TABLET ORAL 2 TIMES DAILY WITH MEALS
Status: DISCONTINUED | OUTPATIENT
Start: 2023-03-02 | End: 2023-03-09 | Stop reason: HOSPADM

## 2023-03-02 RX ORDER — IPRATROPIUM BROMIDE AND ALBUTEROL SULFATE 2.5; .5 MG/3ML; MG/3ML
3 SOLUTION RESPIRATORY (INHALATION)
Status: DISCONTINUED | OUTPATIENT
Start: 2023-03-02 | End: 2023-03-09 | Stop reason: HOSPADM

## 2023-03-02 RX ADMIN — SODIUM CHLORIDE, PRESERVATIVE FREE 10 ML: 5 INJECTION INTRAVENOUS at 05:44

## 2023-03-02 RX ADMIN — HEPARIN SODIUM: 1000 INJECTION INTRAVENOUS; SUBCUTANEOUS at 12:09

## 2023-03-02 RX ADMIN — CARVEDILOL 6.25 MG: 6.25 TABLET, FILM COATED ORAL at 21:36

## 2023-03-02 RX ADMIN — GUAIFENESIN 100 MG: 100 SOLUTION ORAL at 21:35

## 2023-03-02 RX ADMIN — IPRATROPIUM BROMIDE AND ALBUTEROL SULFATE 3 ML: 2.5; .5 SOLUTION RESPIRATORY (INHALATION) at 20:57

## 2023-03-02 RX ADMIN — BUMETANIDE 4 MG: 0.25 INJECTION, SOLUTION INTRAMUSCULAR; INTRAVENOUS at 10:19

## 2023-03-02 RX ADMIN — AMLODIPINE BESYLATE 10 MG: 5 TABLET ORAL at 21:35

## 2023-03-02 RX ADMIN — BUMETANIDE 4 MG: 0.25 INJECTION, SOLUTION INTRAMUSCULAR; INTRAVENOUS at 20:52

## 2023-03-02 RX ADMIN — HYDRALAZINE HYDROCHLORIDE 25 MG: 25 TABLET, FILM COATED ORAL at 21:36

## 2023-03-02 RX ADMIN — GENTAMICIN SULFATE: 1 CREAM TOPICAL at 11:14

## 2023-03-02 RX ADMIN — EPOETIN ALFA-EPBX 20000 UNITS: 20000 INJECTION, SOLUTION INTRAVENOUS; SUBCUTANEOUS at 14:04

## 2023-03-02 RX ADMIN — ACETAMINOPHEN 650 MG: 325 TABLET ORAL at 21:36

## 2023-03-02 RX ADMIN — CINACALCET HYDROCHLORIDE 60 MG: 30 TABLET, FILM COATED ORAL at 10:19

## 2023-03-02 RX ADMIN — METOLAZONE 5 MG: 5 TABLET ORAL at 10:19

## 2023-03-02 RX ADMIN — SODIUM CHLORIDE, PRESERVATIVE FREE 10 ML: 5 INJECTION INTRAVENOUS at 21:36

## 2023-03-02 RX ADMIN — IPRATROPIUM BROMIDE AND ALBUTEROL SULFATE 3 ML: 2.5; .5 SOLUTION RESPIRATORY (INHALATION) at 08:33

## 2023-03-02 RX ADMIN — SODIUM CHLORIDE, PRESERVATIVE FREE 10 ML: 5 INJECTION INTRAVENOUS at 20:54

## 2023-03-02 NOTE — PROGRESS NOTES
Occupational Therapy Note:     Pt currently receiving PD. Will defer OT evaluation today and follow up tomorrow.         Clara Parish, OT

## 2023-03-02 NOTE — PROGRESS NOTES
Renal Progress Note    NAME:  Isela Elena   :   1960   MRN:   [id number]     Date/Time:  3/2/2023 10:25 AM      Assessment:     Resp Failure  Fluid Overload  ESRD --> s/p PD catheter () --> on low volume PD exchanges in PD clinic  Anemia in CKD  Hyperparathyroidism       Plan:     CCPD with one litre / low volume exchanges ( 4 exchanges --> 8 hour prescription). Will use 1.5 % Dianeal.  Bumex (4 MG IV BID) + metolazone (5 mg once daily). EPO today. Sensipar  2 gram sodium / renal diet. Daily weights. Follow lytes daily. Subjective:         F/U - ESRD --> 3/2/23      Dyspnea was still an issue. Staring CCPD this morning.       Review of Systems:  Y  N       Y  N  []         []          Fever/chills                                               []         []          Chest Pain  []         []          Cough                                                       []         []          Diarrhea   []         []          Sputum                                                     []         []          Constipation  []         []          SOB/REESE                                                []         []          Nausea/Vomit  []         []          Abd Pain                                                    []         []          Tolerating PT  []         []          Dysuria                                                      []         []          Tolerating Diet     []        Unable to obtain  ROS due to  []        mental status change  []        sedated   []        intubated    Medications reviewed:  Current Facility-Administered Medications   Medication Dose Route Frequency    metOLazone (ZAROXOLYN) tablet 5 mg  5 mg Oral DAILY    peritoneal dialysis DEXTROSE 1.5% (2.5 mEq/L low calcium) solution 6,000 mL  6,000 mL IntraPERitoneal DAILY    epoetin sara-epbx (RETACRIT) injection 20,000 Units  20,000 Units SubCUTAneous ONCE    bumetanide (BUMEX) injection 4 mg  4 mg IntraVENous BID    gentamicin (GARAMYCIN) 0.1 % cream   Topical DAILY    cinacalcet (SENSIPAR) tablet 60 mg  60 mg Oral DAILY WITH BREAKFAST    sodium chloride (NS) flush 5-40 mL  5-40 mL IntraVENous Q8H    sodium chloride (NS) flush 5-40 mL  5-40 mL IntraVENous PRN    acetaminophen (TYLENOL) tablet 650 mg  650 mg Oral Q6H PRN    Or    acetaminophen (TYLENOL) suppository 650 mg  650 mg Rectal Q6H PRN    polyethylene glycol (MIRALAX) packet 17 g  17 g Oral DAILY PRN    ondansetron (ZOFRAN ODT) tablet 4 mg  4 mg Oral Q8H PRN    Or    ondansetron (ZOFRAN) injection 4 mg  4 mg IntraVENous Q6H PRN    albuterol-ipratropium (DUO-NEB) 2.5 MG-0.5 MG/3 ML  3 mL Nebulization Q6H RT        Objective:   Vitals:  Visit Vitals  BP (!) 152/75   Pulse 98   Temp 98 °F (36.7 °C)   Resp 22   Ht 5' 6\" (1.676 m)   Wt 99 kg (218 lb 4.1 oz)   SpO2 95%   BMI 35.23 kg/m²     Temp (24hrs), Av.2 °F (36.8 °C), Min:98 °F (36.7 °C), Max:98.4 °F (36.9 °C)    O2 Flow Rate (L/min): 2 l/min O2 Device: None (Room air)    Last 24hr Input/Output:    Intake/Output Summary (Last 24 hours) at 3/2/2023 1025  Last data filed at 3/1/2023 1823  Gross per 24 hour   Intake --   Output 250 ml   Net -250 ml        PHYSICAL EXAM:      Seen in Room 422. General:    Alert, cooperative, no distress, appears stated age. Head:   Normocephalic. Eyes:   No icterus. Lungs:   Improved air entry, no rales , no wheezes. Heart:   No S3  gallop, No pericardial rub. Abdomen:   Not distended, pd catheter --> right lower quadrant. Extremities: Leg oedema +    Psych: Tad anxious. Neurologic: Alert and oriented .         []        Telemetry Reviewed     []        NSR []        PAC/PVCs   []        Afib  []        Paced   []        NSVT   []        Zavala []        NGT  []        Intubated on vent    Lab Data Reviewed:    Recent Results (from the past 24 hour(s))   COVID-19 RAPID TEST    Collection Time: 23 10:50 AM   Result Value Ref Range    Specimen source Nasopharyngeal      COVID-19 rapid test Not detected NOTD     POC VENOUS BLOOD GAS    Collection Time: 03/01/23 10:55 AM   Result Value Ref Range    pH, venous (POC) 7.29 (L) 7.32 - 7.42      pCO2, venous (POC) 56.4 (H) 41 - 51 MMHG    pO2, venous (POC) 35 25 - 40 mmHg    HCO3, venous (POC) 27.0 23.0 - 28.0 MMOL/L    sO2, venous (POC) 59.0 (L) 65 - 88 %    Base excess, venous (POC) 0.0 mmol/L    Specimen type (POC) VENOUS BLOOD      Performed by [facility]    METABOLIC PANEL, BASIC    Collection Time: 03/02/23  3:48 AM   Result Value Ref Range    Sodium 135 (L) 136 - 145 mmol/L    Potassium 4.4 3.5 - 5.1 mmol/L    Chloride 98 97 - 108 mmol/L    CO2 29 21 - 32 mmol/L    Anion gap 8 5 - 15 mmol/L    Glucose 77 65 - 100 mg/dL    BUN 62 (H) 6 - 20 MG/DL    Creatinine 6.97 (H) 0.55 - 1.02 MG/DL    BUN/Creatinine ratio 9 (L) 12 - 20      eGFR 6 (L) >60 ml/min/1.73m2    Calcium 9.8 8.5 - 10.1 MG/DL   CBC WITH AUTOMATED DIFF    Collection Time: 03/02/23  3:48 AM   Result Value Ref Range    WBC 9.3 3.6 - 11.0 K/uL    RBC 2.78 (L) 3.80 - 5.20 M/uL    HGB 7.8 (L) 11.5 - 16.0 g/dL    HCT 27.9 (L) 35.0 - 47.0 %    .4 (H) 80.0 - 99.0 FL    MCH 28.1 26.0 - 34.0 PG    MCHC 28.0 (L) 30.0 - 36.5 g/dL    RDW 15.9 (H) 11.5 - 14.5 %    PLATELET 900 836 - 947 K/uL    MPV 10.2 8.9 - 12.9 FL    NRBC 0.0 0  WBC    ABSOLUTE NRBC 0.00 0.00 - 0.01 K/uL    NEUTROPHILS 78 (H) 32 - 75 %    LYMPHOCYTES 9 (L) 12 - 49 %    MONOCYTES 10 5 - 13 %    EOSINOPHILS 1 0 - 7 %    BASOPHILS 0 0 - 1 %    IMMATURE GRANULOCYTES 2 (H) 0.0 - 0.5 %    ABS. NEUTROPHILS 7.3 1.8 - 8.0 K/UL    ABS. LYMPHOCYTES 0.8 0.8 - 3.5 K/UL    ABS. MONOCYTES 0.9 0.0 - 1.0 K/UL    ABS. EOSINOPHILS 0.1 0.0 - 0.4 K/UL    ABS. BASOPHILS 0.0 0.0 - 0.1 K/UL    ABS. IMM.  GRANS. 0.2 (H) 0.00 - 0.04 K/UL    DF SMEAR SCANNED      RBC COMMENTS ANISOCYTOSIS  1+        RBC COMMENTS MACROCYTOSIS  PRESENT        RBC COMMENTS HYPOCHROMIA  PRESENT        RBC COMMENTS POLYCHROMASIA  PRESENT           Total time spent with patient:  []        15   []        25   []        35   []         __ minutes    []        Critical Care Provided    Care Plan discussed with:          []        Patient   []        Family    []        Care Manager   []        Consultant/Specialist :      []          >50% of visit spent in counseling and coordination of care   (Discussed []        CODE status,  []        Care Plan, []        D/C Planning)    ___________________________________________________    Attending Physician: Tex Pereira MD

## 2023-03-02 NOTE — PROGRESS NOTES
Transition of Care: [facility] [place] and resuming her OP peritoneal dialysis at [facility]: possibly in car with family    RUR: 25%    1st IM medicare letter signed on 3/2/23    Main contact is - Mena Christina- [phone]    Discharge pending:  -pending inpatient dialysis  -pending echo (to be done on 3/1)  -pending weaning of O2    1615: this CM met with patient at bedside; she is alert and oriented x 4; patient lives at stated address with her ; trilevel home with 5 steps; has a cane, BSC, walker; fairly independent in her ADLs; she just started NEW OP peritoneal dialysis at the [facility] PD clinic- [address] on 2/23/23; patient was also followed by [address] [place], [place] [address] [place] prior to this readmission; patient states she does not use any home O2; has supportive ; patient stated she would like to resume with [facility]; referral sent via careport Medicare pt has received, reviewed, and signed 1st IM letter informing them of their right to appeal the discharge. Signed copy has been placed on pt bedside chart. Reason for Readmission:     respiratory failure         RUR Score/Risk Level:     25%    PCP: First and Last name:  Dr. Doug Sawant   Name of Practice:    Are you a current patient: Yes/No: yes   Approximate date of last visit:    Can you participate in a virtual visit with your PCP: unknown    Is a Care Conference indicated:   Not at this time    Did you attend your follow up appointment (s): If not, why not:  Yes- went to new OP PD [facility] clinic on 2/23 and an infusion at [facility]       Resources/supports as identified by patient/family:        Has supportive  and family; lives in a home; has medicare insurance  Top Challenges facing patient (as identified by patient/family and CM):          Finances/Medication cost?     Not a problem  Transportation      has a car  Support system or lack thereof? Supportive  and family  Living arrangements? In a trilevel home with       Self-care/ADLs/Cognition? Alert and oriented x 4        Current Advanced Directive/Advance Care Plan:  full code; no acp docs           Plan for utilizing home health:   yes- ZANE with [facility]             Transition of Care Plan:    Based on readmission, the patient's previous Plan of Care   has been evaluated and/or modified. The current Transition of Care Plan is:          Hopefully return home with ZANE with home health and resume her PD dialysis at [facility]  PCP Verified by CM: Yes (Dr. Pedrito Mcbride)  Mode of Transport at Discharge:  Other (see comment) (possibly in car)  Discharge Durable Medical Equipment: No  Physical Therapy Consult: Yes  Occupational Therapy Consult: Yes  Speech Therapy Consult: No  Support Systems: Spouse/Significant Other  Discharge Location  Patient Expects to be Discharged to[de-identified] Other: (hopefully home with f/u with specialists/pcp)     Readmission Assessment  Number of days since last admission?: 8-30 days  Previous disposition: Home with Home Health  Who is being interviewed?: Patient  What was the patient's/caregiver's perception as to why they think they needed to return back to the hospital?: Other (Comment) (symptons returned)  Did you visit your Primary Care Physician after you left the hospital, before you returned this time?: No  Did you see a specialist, such as Cardiac, Pulmonary, Orthopedic Physician, etc. after you left the hospital?: Yes (went to [facility] and went to an infusion appt.)  Who advised the patient to return to the hospital?: Self-referral  Does the patient report anything that got in the way of taking their medications?: No     CM following  John Bhatia RN

## 2023-03-02 NOTE — PROGRESS NOTES
Hospital Progress Note    NAME:  Akshat Hernandez   :   1960   MRN:  [id number]     Date/Time:  3/2/2023 7:59 AM    Plan:     PD today  Encourage activity as tolerated  Echo  Titrate O2  Risk of Deterioration: Low  []           Moderate  []           High  []                 Assessment:     Principal Problem:    Acute respiratory failure with hypoxia (HCC) (3/1/2023)     Volume overload      Titrate O2    Active Problems:    HTN (hypertension) (2015)     Titrate home meds      Chronic left-sided lumbar radiculopathy (2015)     Analgesics PRN      Anemia due to chronic kidney disease (6/15/2015)      EPO/renal      Secondary hyperparathyroidism ([facility] [address] (2020)      Volume overload (3/2/2023)      ESRD on peritoneal dialysis [facility]) (3/2/2023)       Admitting notes:63 y.o. female who presents with shortness of breath. Patient was recently discharged from [facility] on -after being d/cd  for leg pain and swelling. Pt has  PMHx of Stage V CKD, gout, morbid obesity, s/p R total hip replacement, and HTN who underwent laparoscopic PD catheter insertion and lysis of adhesions on 23 with Vascular Surgeon, Dr. Rosemary Delgadillo. Patient follows with nephrology Dr. Luis Abraham 8 PD catheter placement was done on  not on dialysis yet patient now came with shortness of breath fluid overload and uremia. The patient denies any headache, blurry vision, sore throat, trouble swallowing, trouble with speech, chest pain, SOB, cough, fever, chills, N/V/D, abd pain, urinary symptoms, constipation, recent travels, sick contacts, focal or generalized neurological symptoms, falls, injuries, rashes, contact with COVID-19 diagnosed patients, hematemesis, melena, hemoptysis, hematuria, rashes, denies starting any new medications and denies any other concerns or problems besides as mentioned above.       Subjective:     C/o cough,SOB   11 Point Review of Systems:   Negative except no cp    []            Unable to obtain ROS due to:       []            mental status change []            sedated []            intubated     Social History     Tobacco Use    Smoking status: Former     Packs/day: 0.25     Years: 25.00     Pack years: 6.25     Types: Cigarettes     Quit date: 2000     Years since quittin.6    Smokeless tobacco: Never   Substance Use Topics    Alcohol use: No     Medications reviewed:  Current Facility-Administered Medications   Medication Dose Route Frequency    metOLazone (ZAROXOLYN) tablet 5 mg  5 mg Oral DAILY    peritoneal dialysis DEXTROSE 1.5% (2.5 mEq/L low calcium) solution 6,000 mL  6,000 mL IntraPERitoneal QID    bumetanide (BUMEX) injection 4 mg  4 mg IntraVENous BID    gentamicin (GARAMYCIN) 0.1 % cream   Topical DAILY    cinacalcet (SENSIPAR) tablet 60 mg  60 mg Oral DAILY WITH BREAKFAST    sodium chloride (NS) flush 5-40 mL  5-40 mL IntraVENous Q8H    sodium chloride (NS) flush 5-40 mL  5-40 mL IntraVENous PRN    acetaminophen (TYLENOL) tablet 650 mg  650 mg Oral Q6H PRN    Or    acetaminophen (TYLENOL) suppository 650 mg  650 mg Rectal Q6H PRN    polyethylene glycol (MIRALAX) packet 17 g  17 g Oral DAILY PRN    ondansetron (ZOFRAN ODT) tablet 4 mg  4 mg Oral Q8H PRN    Or    ondansetron (ZOFRAN) injection 4 mg  4 mg IntraVENous Q6H PRN    albuterol-ipratropium (DUO-NEB) 2.5 MG-0.5 MG/3 ML  3 mL Nebulization Q6H RT        Objective:   Vitals:  Visit Vitals  BP (!) 153/80   Pulse 81   Temp 98 °F (36.7 °C)   Resp 22   Ht 5' 6\" (1.676 m)   Wt 218 lb 4.1 oz (99 kg)   SpO2 96%   BMI 35.23 kg/m²     Temp (24hrs), Av.2 °F (36.8 °C), Min:98 °F (36.7 °C), Max:98.4 °F (36.9 °C)    O2 Flow Rate (L/min): 2 l/min O2 Device: Nasal cannula    Last 24hr Input/Output:    Intake/Output Summary (Last 24 hours) at 3/2/2023 0759  Last data filed at 3/1/2023 1823  Gross per 24 hour   Intake --   Output 250 ml   Net -250 ml        PHYSICAL EXAM:  General:    Alert, cooperative, no distress, appears stated age.     Head:   Normocephalic, without obvious abnormality, atraumatic. Eyes:   Conjunctivae/corneas clear. PERRLA  Nose:  Nares normal. No drainage or sinus tenderness. Throat:    Lips, mucosa, and tongue normal.  No Thrush  Neck:  Supple, symmetrical,  no adenopathy, thyroid: non tender    no carotid bruit and no JVD. Back:    Symmetric,  No CVA tenderness. Lungs:   Decreased BS with rhomchi  Heart:   Regular rate and rhythm,  no murmur, rub or gallop. Abdomen:   Soft, non-tender. Not distended. Bowel sounds normal. No masses  Extremities: Extremities normal, atraumatic, No cyanosis. tr edema. No clubbing  Skin:     Texture, turgor normal. No rashes or lesions. Not Jaundiced  Lymph nodes: Cervical, supraclavicular normal.  Psych:  Good insight. Not depressed. Not anxious or agitated. Neurologic: Normal strength, Alert and oriented X 3. Lab Data Reviewed:    Recent Labs     03/02/23  0348 03/01/23  0930   WBC 9.3 11.1*   HGB 7.8* 8.2*   HCT 27.9* 27.7*    265     Recent Labs     03/02/23  0348 03/01/23  0930   * 134*   K 4.4 4.2   CL 98 97   CO2 29 30   GLU 77 93   BUN 62* 57*   CREA 6.97* 6.90*   CA 9.8 10.4*     Lab Results   Component Value Date/Time    Glucose (POC) 82 02/14/2023 12:04 PM    Glucose (POC) 78 08/15/2022 08:21 AM    Glucose (POC) 90 11/09/2020 08:36 AM     No results for input(s): PH, PCO2, PO2, HCO3, FIO2 in the last 72 hours. No results for input(s): INR, INREXT in the last 72 hours.   ___________________________________________________  ___________________________________________________    Attending Physician: Gómez Conner MD

## 2023-03-02 NOTE — DIALYSIS
Peritoneal Dialysis Initiation / [phone]     Orders   Therapy Time:  8 hrs   Cycles: 4   Fill Volume: 1000   Last Fill Volume: 0   Total Volume: 4000   Solution: Dextrose Dianeal bags 1.5% with heparin additive      Access   Type & Location: RLQ abdomen placed 02/14/23    Comments: Prior to connection, one-minute Alcavis scrub performed. Followed by one minute soak  Dsg change date:           53/41/70 per policy, exit site without active drainage, dried blood noted on old dressing, exit site equivocal still in the healing process, no indication of infection as evidence by no drainage, redness or warmth, PD catheter secured to abdomen bulky dressing to prevent manipulation while on cycler                         Labs   HBsAg (Antigen) / date:           02/17/23         neg                            HBsAb (Antibody) / date: 02/17/23 susceptible   Source: epic     Safety:   Time Out Done:   (Time) 1230   Consent obtained/signed: Chronic consent applies   Education: Procedural with patient and primary nurse    Primary Nurse Rpt Pre:    Primary Nurse Rpt Post:      Comments:   Pt orders, notes, labs, code status reviewed. Dr. Aldo Zabala in to see patient and orders reviewed. PD catheter placed February 14th, patient has been doing PD training [address] Primary nurse to monitor and notify Isis Mercados for any drainage from PD exit site - patient has been educated to notify nurse if she feels any \"wetness\". Start time: 1245  Estimated End Time:  2045    Remained present during initial drain followed by initiation of first fill. Prior to departure, bed in lowest position, call bell and personal belongings within reach.

## 2023-03-02 NOTE — ED NOTES
TRANSFER - OUT REPORT:    Verbal report given to Charlie(name) on Cony Melvin  being transferred to [address] [place] [address](unit) for routine progression of care       Report consisted of patients Situation, Background, Assessment and   Recommendations(SBAR). Information from the following report(s) SBAR, ED Summary, MAR, and Recent Results was reviewed with the receiving nurse. Lines:   Peripheral IV 03/01/23 Anterior;Proximal;Right Forearm (Active)        Opportunity for questions and clarification was provided.       Patient transported with:   Monitor  O2 @ 3 liters  Registered Nurse

## 2023-03-02 NOTE — ED NOTES
Verbal shift change report given to Aleyda Villegas (oncoming nurse) by Celina Hoover RN (offgoing nurse). Report included the following information SBAR, ED Summary, Intake/Output, MAR and Recent Results.

## 2023-03-03 ENCOUNTER — APPOINTMENT (OUTPATIENT)
Dept: NON INVASIVE DIAGNOSTICS | Age: 63
DRG: 640 | End: 2023-03-03
Attending: INTERNAL MEDICINE
Payer: MEDICARE

## 2023-03-03 LAB
ALBUMIN SERPL-MCNC: 2.5 G/DL (ref 3.5–5)
ALBUMIN/GLOB SERPL: 0.7 (ref 1.1–2.2)
ALP SERPL-CCNC: 65 U/L (ref 45–117)
ALT SERPL-CCNC: 9 U/L (ref 12–78)
ANION GAP SERPL CALC-SCNC: 9 MMOL/L (ref 5–15)
AST SERPL-CCNC: 9 U/L (ref 15–37)
ATRIAL RATE: 88 BPM
BASOPHILS # BLD: 0 K/UL (ref 0–0.1)
BASOPHILS NFR BLD: 0 % (ref 0–1)
BILIRUB SERPL-MCNC: 0.4 MG/DL (ref 0.2–1)
BUN SERPL-MCNC: 60 MG/DL (ref 6–20)
BUN/CREAT SERPL: 9 (ref 12–20)
CALCIUM SERPL-MCNC: 9.2 MG/DL (ref 8.5–10.1)
CALCULATED P AXIS, ECG09: 60 DEGREES
CALCULATED R AXIS, ECG10: 22 DEGREES
CALCULATED T AXIS, ECG11: 63 DEGREES
CHLORIDE SERPL-SCNC: 95 MMOL/L (ref 97–108)
CO2 SERPL-SCNC: 28 MMOL/L (ref 21–32)
CREAT SERPL-MCNC: 6.86 MG/DL (ref 0.55–1.02)
DIAGNOSIS, 93000: NORMAL
DIFFERENTIAL METHOD BLD: ABNORMAL
ECHO PULMONARY ARTERY SYSTOLIC PRESSURE (PASP): 35 MMHG
EOSINOPHIL # BLD: 0.1 K/UL (ref 0–0.4)
EOSINOPHIL NFR BLD: 1 % (ref 0–7)
ERYTHROCYTE [DISTWIDTH] IN BLOOD BY AUTOMATED COUNT: 15.9 % (ref 11.5–14.5)
GLOBULIN SER CALC-MCNC: 3.6 G/DL (ref 2–4)
GLUCOSE SERPL-MCNC: 91 MG/DL (ref 65–100)
HCT VFR BLD AUTO: 29.1 % (ref 35–47)
HGB BLD-MCNC: 8.3 G/DL (ref 11.5–16)
IMM GRANULOCYTES # BLD AUTO: 0.2 K/UL (ref 0–0.04)
IMM GRANULOCYTES NFR BLD AUTO: 2 % (ref 0–0.5)
INR PPP: 1 (ref 0.9–1.1)
LYMPHOCYTES # BLD: 0.8 K/UL (ref 0.8–3.5)
LYMPHOCYTES NFR BLD: 8 % (ref 12–49)
MCH RBC QN AUTO: 27.4 PG (ref 26–34)
MCHC RBC AUTO-ENTMCNC: 28.5 G/DL (ref 30–36.5)
MCV RBC AUTO: 96 FL (ref 80–99)
MONOCYTES # BLD: 0.7 K/UL (ref 0–1)
MONOCYTES NFR BLD: 7 % (ref 5–13)
NEUTS SEG # BLD: 8.8 K/UL (ref 1.8–8)
NEUTS SEG NFR BLD: 82 % (ref 32–75)
NRBC # BLD: 0 K/UL (ref 0–0.01)
NRBC BLD-RTO: 0 PER 100 WBC
P-R INTERVAL, ECG05: 146 MS
PLATELET # BLD AUTO: 266 K/UL (ref 150–400)
PMV BLD AUTO: 10.1 FL (ref 8.9–12.9)
POTASSIUM SERPL-SCNC: 4.4 MMOL/L (ref 3.5–5.1)
PROT SERPL-MCNC: 6.1 G/DL (ref 6.4–8.2)
PROTHROMBIN TIME: 10.9 SEC (ref 9–11.1)
Q-T INTERVAL, ECG07: 356 MS
QRS DURATION, ECG06: 88 MS
QTC CALCULATION (BEZET), ECG08: 430 MS
RBC # BLD AUTO: 3.03 M/UL (ref 3.8–5.2)
RBC MORPH BLD: ABNORMAL
RBC MORPH BLD: ABNORMAL
SODIUM SERPL-SCNC: 132 MMOL/L (ref 136–145)
TROPONIN I SERPL HS-MCNC: 84 NG/L (ref 0–37)
VENTRICULAR RATE, ECG03: 88 BPM
WBC # BLD AUTO: 10.6 K/UL (ref 3.6–11)

## 2023-03-03 PROCEDURE — 97535 SELF CARE MNGMENT TRAINING: CPT

## 2023-03-03 PROCEDURE — 74011000250 HC RX REV CODE- 250: Performed by: HOSPITALIST

## 2023-03-03 PROCEDURE — 97165 OT EVAL LOW COMPLEX 30 MIN: CPT

## 2023-03-03 PROCEDURE — A4726 DIALYS SOL FLD VOL > 5999CC: HCPCS | Performed by: INTERNAL MEDICINE

## 2023-03-03 PROCEDURE — 74011250637 HC RX REV CODE- 250/637: Performed by: HOSPITALIST

## 2023-03-03 PROCEDURE — 36415 COLL VENOUS BLD VENIPUNCTURE: CPT

## 2023-03-03 PROCEDURE — 80053 COMPREHEN METABOLIC PANEL: CPT

## 2023-03-03 PROCEDURE — 65660000001 HC RM ICU INTERMED STEPDOWN

## 2023-03-03 PROCEDURE — 74011250637 HC RX REV CODE- 250/637: Performed by: INTERNAL MEDICINE

## 2023-03-03 PROCEDURE — 85610 PROTHROMBIN TIME: CPT

## 2023-03-03 PROCEDURE — 85025 COMPLETE CBC W/AUTO DIFF WBC: CPT

## 2023-03-03 PROCEDURE — 93306 TTE W/DOPPLER COMPLETE: CPT

## 2023-03-03 PROCEDURE — 99231 SBSQ HOSP IP/OBS SF/LOW 25: CPT | Performed by: INTERNAL MEDICINE

## 2023-03-03 PROCEDURE — 93005 ELECTROCARDIOGRAM TRACING: CPT

## 2023-03-03 PROCEDURE — 84484 ASSAY OF TROPONIN QUANT: CPT

## 2023-03-03 PROCEDURE — 90945 DIALYSIS ONE EVALUATION: CPT

## 2023-03-03 PROCEDURE — 97530 THERAPEUTIC ACTIVITIES: CPT

## 2023-03-03 PROCEDURE — 99222 1ST HOSP IP/OBS MODERATE 55: CPT | Performed by: INTERNAL MEDICINE

## 2023-03-03 PROCEDURE — 93306 TTE W/DOPPLER COMPLETE: CPT | Performed by: INTERNAL MEDICINE

## 2023-03-03 PROCEDURE — 74011250636 HC RX REV CODE- 250/636: Performed by: INTERNAL MEDICINE

## 2023-03-03 PROCEDURE — 97161 PT EVAL LOW COMPLEX 20 MIN: CPT

## 2023-03-03 RX ORDER — CLONIDINE HYDROCHLORIDE 0.2 MG/1
0.2 TABLET ORAL 2 TIMES DAILY
Status: DISCONTINUED | OUTPATIENT
Start: 2023-03-03 | End: 2023-03-04

## 2023-03-03 RX ORDER — GUAIFENESIN 600 MG/1
600 TABLET, EXTENDED RELEASE ORAL EVERY 12 HOURS
Status: DISCONTINUED | OUTPATIENT
Start: 2023-03-03 | End: 2023-03-09 | Stop reason: HOSPADM

## 2023-03-03 RX ORDER — HYDRALAZINE HYDROCHLORIDE 50 MG/1
50 TABLET, FILM COATED ORAL 4 TIMES DAILY
Status: DISCONTINUED | OUTPATIENT
Start: 2023-03-03 | End: 2023-03-03

## 2023-03-03 RX ORDER — HYDRALAZINE HYDROCHLORIDE 25 MG/1
25 TABLET, FILM COATED ORAL 3 TIMES DAILY
Status: DISCONTINUED | OUTPATIENT
Start: 2023-03-04 | End: 2023-03-09 | Stop reason: HOSPADM

## 2023-03-03 RX ADMIN — HYDRALAZINE HYDROCHLORIDE 50 MG: 50 TABLET, FILM COATED ORAL at 13:41

## 2023-03-03 RX ADMIN — GENTAMICIN SULFATE: 1 CREAM TOPICAL at 17:29

## 2023-03-03 RX ADMIN — CARVEDILOL 6.25 MG: 6.25 TABLET, FILM COATED ORAL at 08:25

## 2023-03-03 RX ADMIN — HYDRALAZINE HYDROCHLORIDE 25 MG: 25 TABLET, FILM COATED ORAL at 05:27

## 2023-03-03 RX ADMIN — HYDRALAZINE HYDROCHLORIDE 50 MG: 50 TABLET, FILM COATED ORAL at 17:55

## 2023-03-03 RX ADMIN — GUAIFENESIN 100 MG: 100 SOLUTION ORAL at 09:45

## 2023-03-03 RX ADMIN — CINACALCET HYDROCHLORIDE 60 MG: 30 TABLET, FILM COATED ORAL at 08:24

## 2023-03-03 RX ADMIN — METOLAZONE 5 MG: 5 TABLET ORAL at 08:24

## 2023-03-03 RX ADMIN — CARVEDILOL 6.25 MG: 6.25 TABLET, FILM COATED ORAL at 17:55

## 2023-03-03 RX ADMIN — CLONIDINE HYDROCHLORIDE 0.2 MG: 0.2 TABLET ORAL at 17:55

## 2023-03-03 RX ADMIN — ACETAMINOPHEN 650 MG: 325 TABLET ORAL at 03:30

## 2023-03-03 RX ADMIN — SODIUM CHLORIDE, PRESERVATIVE FREE 10 ML: 5 INJECTION INTRAVENOUS at 05:27

## 2023-03-03 RX ADMIN — SODIUM CHLORIDE, PRESERVATIVE FREE 10 ML: 5 INJECTION INTRAVENOUS at 16:29

## 2023-03-03 RX ADMIN — SODIUM CHLORIDE, PRESERVATIVE FREE 10 ML: 5 INJECTION INTRAVENOUS at 23:02

## 2023-03-03 RX ADMIN — HYDRALAZINE HYDROCHLORIDE 50 MG: 50 TABLET, FILM COATED ORAL at 08:25

## 2023-03-03 RX ADMIN — GUAIFENESIN 600 MG: 600 TABLET, EXTENDED RELEASE ORAL at 10:36

## 2023-03-03 RX ADMIN — HYDRALAZINE HYDROCHLORIDE 25 MG: 25 TABLET, FILM COATED ORAL at 00:30

## 2023-03-03 RX ADMIN — BUMETANIDE 4 MG: 0.25 INJECTION, SOLUTION INTRAMUSCULAR; INTRAVENOUS at 08:23

## 2023-03-03 RX ADMIN — GUAIFENESIN 600 MG: 600 TABLET, EXTENDED RELEASE ORAL at 22:13

## 2023-03-03 RX ADMIN — HEPARIN SODIUM: 1000 INJECTION INTRAVENOUS; SUBCUTANEOUS at 17:29

## 2023-03-03 RX ADMIN — SODIUM CHLORIDE, PRESERVATIVE FREE 10 ML: 5 INJECTION INTRAVENOUS at 13:42

## 2023-03-03 RX ADMIN — CLONIDINE HYDROCHLORIDE 0.2 MG: 0.2 TABLET ORAL at 10:34

## 2023-03-03 NOTE — PROGRESS NOTES
Bedside and Verbal shift change report given to HUGO Guerra (oncoming nurse) by Chana Odom (offgoing nurse). Report included the following information SBAR, Kardex, ED Summary, MAR, and Quality Measures.

## 2023-03-03 NOTE — PROGRESS NOTES
BP trending up. 190/101, /100 Also has frequent dry, non productive cough. Dr Linda Hummel notified via [employer].

## 2023-03-03 NOTE — PROGRESS NOTES
Transition of Care: hopefully home with ZANE for home health with [facility] (they have accepted her back) and resuming her OP peritoneal dialysis at [address]: if patient goes home with home health then CM still needs to  get ZANE [facility]     Transport Plan: possibly in car with family     RUR: 25%     1st IM medicare letter signed on 3/2/23     Main contact is - Christiano Cohn- [phone]     Discharge pending:  -pending inpatient dialysis  -pending echo (to be done on 3/1)  -pending weaning of O2  -discharge likely greater than 48 hours    0930: this CM noted that [facility] has accepted back; still need to send them the [facility] order (order not written yet)     CM following  Shiva Hallman RN     NOTE:  patient at bedside; she is alert and oriented x 4; patient lives at stated address with her ; trilevel home with 5 steps; has a cane, BSC, walker; fairly independent in her ADLs; she just started NEW OP peritoneal dialysis at the [facility] PD clinic- [address] on 2/23/23; patient was also followed by [address] [place], [place] [address] prior to this readmission; patient states she does not use any home O2; has supportive ; patient stated she would like to resume with [facility] home [facility]; referral sent via [facility]                    CM following  Shiva Hallman RN            Revision History

## 2023-03-03 NOTE — CONSULTS
[facility]                    Cardiology Care Note     [x]Initial Encounter     []Follow-up    Patient Name: Vamsi Coe - FDB:[birth year] - NPB:[id number]  Primary Cardiologist: none  Consulting Cardiologist: [employer] [facility] Physicians: Nissa Nava MD     Reason for encounter: chest pain    HPI:   Vamsi Coe is a 70 y.o. female with PMH significant for HTN, ESRD on PD, Morbid obesity, gout, s/p right total hip replacement. She has no prior history of cardiac disease. Had a Lexiscan stress test in 3/2022 which was normal (done for kidney transplant workup). EF normal by echo 4/2022, trivial MR. She presented to ER on 3/1/23 with worsened SOB and hypoxia. She reports SOB started last week and progressively worsened. She was found to have volume overload, acute hypoxic respiratory insufficiency. Renal following and she was started on IV diuretics and metolazone as well as peritoneal dialysis. She reports SOB has not improved significantly. Today, RRT was called as pt stood with PT and developed sudden onset of midsternal chest pain which radiated to back. She is not sure if pain was worse with deep breathing. States pain lasted few minutes and then resolved. She has never had this pain before. HS troponin is trending up from presentation (64-->>84), her BP was also elevated ('s)   Reports that prior to last week, she was able to walk around house without sob. She denies any history of ARLENE. She does falls asleep frequently during interview. Reports some history of PND. SH: Former smoker (quit 30 years ago), no ETOH. FH: no FH of early CAD    Subjective:  oCny Martin reports no improvement in SOB. Denies any further chest pain episodes. She drifts off to sleep during conversation. + cough. Assessment and Plan      Chest pain:  Some typical feature but none further.   HS troponin mildly increased to 84. EKG today-NSR with no ischemic changes (note no evidence of afib on prior EKG 3/1/23). Unclear etiology of chest pain. Will recheck HS troponin in a.m.  Mildly elevated troponin elevation could be due to demand of uncontrolled BP earlier. Nonetheless, continue to trend HS troponin in a.m. and Tentatively plan nuclear stress test Monday if no further chest pain and HS troponin does not rise significantly. Do not feel that pt needs ACS anticoagulation at this time. HTN: bp improving. as high os 200 overnight but now improved- Now SBP in 150's. On amlodipine, clonidine, hydralazine. Defer to renal for management. Volume overload:  likely renal failure involved but check echo. (Last echo 4/2022, NL EF, no significant valve abnormality). Management of volume per renal. On IV bumex and po metolazone. Acute hypoxic respiratory failure:  echo pending. On 2 L NC, sats 100%  Morbid obesity. Body mass index is 40.89 kg/m². Suspected possible ARLENE: consider outpatient sleep study. Saw and evaluated pt and agree with above assessment and plan. Chest pain is atypical for cardiac etiology. Troponin trivially elevated. BP has been elevated. Decreased BS basilar on exam. Getting HD for volume overload; defer adjustments of BP meds/diuretics to renal given CKD. Will review echo when completed. Troponin elevation c/w being secondary to demand and not acute ischemia; reasonable to obtain stress test, tentatively Monday. Bren Brown MD         ____________________________________________________________    Cardiac testing    TTE 4/7/22: [facility]  Interpretation Summary   Technically suboptimal study quality may adversely affect interpretation. There is concentric left ventricular remodeling. Left ventricular systolic function is normal.   LV ejection fraction = 65-70%.    No significant valvular dysfunction is noted    Nuclear stress test/lexiscan 3/2022: negative for ischemia ([facility])          Most recent HS troponins:  Recent Labs     23  0941 23  0930   TROPHS 84* 60*       ECG: NSR, nonspecific ST abnormality  ECG 3/1/23: NSR (NOT afib)    Review of Systems:    [x]All other systems reviewed and all negative except as written in HPI    [] Patient unable to provide secondary to condition    Past Medical History:   Diagnosis Date    Arthritis     L knee    Chronic kidney disease     secondary to hypertensive nephrosclerosis, CKD STG 5, ON ACTIVE KIDNEY TRANSPLANT LIST AT [facility]- DR. SANCHEZ    Gastritis     HISTORY OF 10/2020     Gout     History of claustrophobia     History of total hip replacement, right 2022    Hyperparathyroidism ([address]     Hypertension      Past Surgical History:   Procedure Laterality Date    HX  SECTION      HX COLONOSCOPY      HX GYN      ECTOPIC PREGNANCY SURGERY     HX HIP REPLACEMENT Left     HX LAP CHOLECYSTECTOMY      HX LUMBAR LAMINECTOMY  2020    L3L4L5    HX WISDOM TEETH EXTRACTION      IR INSERT NON TUNL CVC OVER 5 YRS  2023     Social Hx:  reports that she quit smoking about 22 years ago. Her smoking use included cigarettes. She has a 6.25 pack-year smoking history. She has never used smokeless tobacco. She reports that she does not drink alcohol and does not use drugs. Family Hx: family history includes Breast Cancer in her paternal grandmother; Diabetes in her sister; Hypertension in her mother; Liver Disease in her father. Allergies   Allergen Reactions    Morphine Nausea and Vomiting and Cough     Blood pressure and 158-96 heart rate up to 156.   Patient does not feel that this is a true reaction and denies    [facility] Swelling     PATIENT DENIES THIS ALLERGY           OBJECTIVE:  Wt Readings from Last 3 Encounters:   23 114.9 kg (253 lb 4.9 oz)   23 109 kg (240 lb 4.8 oz)   23 101.6 kg (223 lb 15.8 oz)       Intake/Output Summary (Last 24 hours) at 3/3/2023 1147  Last data filed at 3/2/2023 2330  Gross per 24 hour   Intake --   Output 805 ml   Net -805 ml       Physical Exam:    Vitals:   Vitals:    03/03/23 0946 03/03/23 1000 03/03/23 1034 03/03/23 1040   BP: (!) 151/82  (!) 157/84    Pulse: 82 88 86    Resp: 23      Temp:    97.5 °F (36.4 °C)   SpO2: 98%      Weight:       Height:         Telemetry: NSR    Gen: Well-developed, obese female with +SOB but also drifts to sleep during interview. Neck: Supple, No JVD,  Resp: +mild SOB, diminished breath sounds, few faint bibasilar crackles. Card: Regular Rate,Rythm, Normal S1, S2, no definite murmurs. No rubs or gallop. No thrills. Abd:   Soft, non-tender, non-distended, BS+   MSK: No cyanosis  Skin: No rashes    Neuro: Moving all four extremities, follows commands appropriately. S  Psych: sleepy, normal affect  LE: tr edema    Data Review:     Radiology:   XR Results (most recent):  Results from Hospital Encounter encounter on 03/01/23    XR CHEST PORT    Narrative  EXAM:  XR CHEST PORT    INDICATION: Shortness of breath    COMPARISON: 2/19/2023    TECHNIQUE: 0954 hours portable chest AP view    FINDINGS: The 2 previously demonstrated right IJ lines are no longer shown. Low  lung volumes are demonstrated with ill-defined diffuse increased opacity in the  lungs which could relate to the low lung volume technique. Repeat view would  better inspiratory effort is recommended. No localized consolidation is evident. There is no pneumothorax or pleural effusion. Cardiac and mediastinal contours  are stable. Impression  Low lung volumes. Repeat view with better inspiratory effort recommended.       Recent Labs     03/03/23  0942 03/02/23  0348   * 135*   K 4.4 4.4   CL 95* 98   CO2 28 29   BUN 60* 62*   CREA 6.86* 6.97*   GLU 91 77   CA 9.2 9.8     Recent Labs     03/03/23  0942 03/02/23  0348   WBC 10.6 9.3   HGB 8.3* 7.8*   HCT 29.1* 27.9*    260     Recent Labs     03/03/23  0942   PTP 10.9   INR 1.0   AP 65     No results for input(s): CHOL, LDLC in the last 72 hours.    No lab exists for component: TGL, HDLC,  HBA1C      Current meds:    Current Facility-Administered Medications:     hydrALAZINE (APRESOLINE) tablet 50 mg, 50 mg, Oral, QID, Caden Devine MD, 50 mg at 03/03/23 0825    cloNIDine HCL (CATAPRES) tablet 0.2 mg, 0.2 mg, Oral, BID, Caden Devine MD, 0.2 mg at 03/03/23 1034    guaiFENesin ER (MUCINEX) tablet 600 mg, 600 mg, Oral, Q12H, Elliot La MD, 600 mg at 03/03/23 1036    metOLazone (ZAROXOLYN) tablet 5 mg, 5 mg, Oral, DAILY, Linnette Felix MD, 5 mg at 03/03/23 0563    peritoneal dialysis DEXTROSE 1.5% (2.5 mEq/L low calcium) 6,000 mL with heparin (porcine) 6,000 Units, , IntraPERitoneal, DAILY, Linnette Felix MD, Given at 03/02/23 1209    gentamicin (GARAMYCIN) 0.1 % cream, , Topical, DIALYSIS PRN, Linnette Felix MD    amLODIPine (NORVASC) tablet 10 mg, 10 mg, Oral, DAILY, Armin Franz MD, 10 mg at 03/02/23 2135    carvediloL (COREG) tablet 6.25 mg, 6.25 mg, Oral, BID WITH MEALS, Armin Franz MD, 6.25 mg at 03/03/23 0825    albuterol-ipratropium (DUO-NEB) 2.5 MG-0.5 MG/3 ML, 3 mL, Nebulization, Q6H PRN, Saundra Viramontes MD    bumetanide (BUMEX) injection 4 mg, 4 mg, IntraVENous, BID, Saundra Viramontes MD, 4 mg at 03/03/23 8558    cinacalcet (SENSIPAR) tablet 60 mg, 60 mg, Oral, DAILY WITH BREAKFAST, Saundra Viramontes MD, 60 mg at 03/03/23 0824    sodium chloride (NS) flush 5-40 mL, 5-40 mL, IntraVENous, Q8H, Saundra Viramontes MD, 10 mL at 03/03/23 0527    sodium chloride (NS) flush 5-40 mL, 5-40 mL, IntraVENous, PRN, Saundra Viramontes MD    acetaminophen (TYLENOL) tablet 650 mg, 650 mg, Oral, Q6H PRN, 650 mg at 03/03/23 0330 **OR** acetaminophen (TYLENOL) suppository 650 mg, 650 mg, Rectal, Q6H PRN, Paul Chand MD    polyethylene glycol (MIRALAX) packet 17 g, 17 g, Oral, DAILY PRN, Paul Chand MD    ondansetron (ZOFRAN ODT) tablet 4 mg, 4 mg, Oral, Q8H PRN **OR** ondansetron (ZOFRAN) injection 4 mg, 4 mg, IntraVENous, Q6H PRN, MD Luci Ayers. GALILEO Alberto    [facility]  Call center: (I) [phone]  (P) [phone]      CC: Glenn Mistry MD

## 2023-03-03 NOTE — PROGRESS NOTES
Problem: Self Care Deficits Care Plan (Adult)  Goal: *Acute Goals and Plan of Care (Insert Text)  Description:   FUNCTIONAL STATUS PRIOR TO ADMISSION: Patient was modified independent using a rolling walker for functional mobility. [facility] therapy had just been set up, would like to resume upon discharge. Has family assist PRN for ADL like bathing (unable to go up to 2nd floor full bathroom and is staying on 1st floor) and IADL    HOME SUPPORT: The patient lived with /sons, required some assist for ADL/IADL    Occupational Therapy Goals  Initiated 3/3/2023  1. Patient will perform grooming with supervision/set-up within 7 day(s). 2.  Patient will perform upper body dressing with minimal assistance/contact guard assist within 7 day(s). 3.  Patient will perform lower body dressing with moderate assistance  within 7 day(s). 4.  Patient will perform all aspects of toileting with minimal assistance/contact guard assist within 7 day(s). 5.  Patient will utilize energy conservation techniques during functional activities with verbal cues within 7 day(s). Outcome: Progressing Towards Goal   OCCUPATIONAL THERAPY EVALUATION  Patient: Zenon Guaman (39 y.o. female)  Date: 3/3/2023  Primary Diagnosis: Respiratory failure ([facility] [address] [J96.90]       Precautions:  Fall    ASSESSMENT  Based on the objective data described below, the patient presents with impaired balance, strength, and activity tolerance impacting ability to complete ADL/IADL at baseline. Patient received reclined in bed, drowsy but amenable to session. Completed transfers with x1 assist, reported fatigue from not sleeping well the night prior. Completed simple grooming ADL however declined further OOB at this time. Returned to bedlevel, left with all needs in reach, NAD. Anticipate patient will benefit from returning home with [facility] and family support.     Current Level of Function Impacting Discharge (ADLs/self-care): x1 bed mobility/ADL    Functional Outcome Measure: The patient scored 18/24 on the [facility] functional outcome measure. A score of ?191,2,3 had higher odds of discharging home with [facility] or needs of SNF/IPR       Other factors to consider for discharge: below baseline, 1st floor living without a full bathroom, working with [facility]     Patient will benefit from skilled therapy intervention to address the above noted impairments. PLAN :  Recommendations and Planned Interventions: self care training, functional mobility training, therapeutic exercise, balance training, therapeutic activities, endurance activities, neuromuscular re-education, patient education, home safety training, and family training/education    Frequency/Duration: Patient will be followed by occupational therapy 5 times a week to address goals. Recommendation for discharge: (in order for the patient to meet his/her long term goals)  Occupational therapy at least 2 days/week in the home AND ensure assist and/or supervision for safety with ADL/IADL    This discharge recommendation:  Has not yet been discussed the attending provider and/or case management    IF patient discharges home will need the following DME: TBD pending progress       SUBJECTIVE:   Patient stated I'm tired today.     OBJECTIVE DATA SUMMARY:   HISTORY:   Past Medical History:   Diagnosis Date    Arthritis     L knee    Chronic kidney disease     secondary to hypertensive nephrosclerosis, CKD STG 5, ON ACTIVE KIDNEY TRANSPLANT LIST AT [facility]- DR. SANCHEZ    Gastritis     HISTORY OF 10/2020     Gout     History of claustrophobia     History of total hip replacement, right 2022    Hyperparathyroidism ([facility] [address]     Hypertension      Past Surgical History:   Procedure Laterality Date    HX  SECTION      HX COLONOSCOPY      HX GYN      ECTOPIC PREGNANCY SURGERY     HX HIP REPLACEMENT Left     HX LAP CHOLECYSTECTOMY      HX LUMBAR LAMINECTOMY  2020    L3L4L5    HX WISDOM TEETH EXTRACTION      IR INSERT NON KATHERINE CVC OVER 5 YRS  2/17/2023       Expanded or extensive additional review of patient history:     Home Situation  Home Environment: Private residence  # Steps to Enter: 0  Hand Rails : Bilateral  One/Two Story Residence: Split level (staying on entry level with a half bathroom)  Living Alone: No  Support Systems: Spouse/Significant Other, Child(aravind)  Patient Expects to be Discharged to[de-identified] Other: (hopefully home with f/u with specialists/pcp)  Current DME Used/Available at Home: Commode, bedside, Hospital bed, Walker, rolling    Hand dominance: Right    EXAMINATION OF PERFORMANCE DEFICITS:  Cognitive/Behavioral Status:  Neurologic State: Drowsy  Orientation Level: Oriented X4  Cognition: Appropriate decision making; Appropriate safety awareness; Follows commands  Perception: Appears intact  Perseveration: No perseveration noted  Safety/Judgement: Insight into deficits    Skin: intact where visible    Edema: BLE    Hearing: Auditory  Auditory Impairment: None    Vision/Perceptual:                           Acuity: Impaired near vision; Impaired far vision    Corrective Lenses: Glasses    Range of Motion:  AROM: Generally decreased, functional                         Strength:  Strength: Generally decreased, functional                Coordination:  Coordination: Generally decreased, functional  Fine Motor Skills-Upper: Left Intact; Right Intact    Gross Motor Skills-Upper: Left Impaired;Right Impaired    Tone & Sensation:  Tone: Normal  Sensation: Intact                      Balance:  Sitting: Intact; Without support  Standing: Impaired  Standing - Static: Constant support;Good    Functional Mobility and Transfers for ADLs:  Bed Mobility:  Rolling: Stand-by assistance  Supine to Sit: Stand-by assistance  Sit to Supine: Stand-by assistance    Transfers:  Sit to Stand: Minimum assistance;Assist x1  Stand to Sit: Contact guard assistance;Assist x1    ADL Assessment:  Feeding: Supervision    Oral Facial Hygiene/Grooming: Supervision    Bathing: Moderate assistance    Upper Body Dressing: Contact guard assistance    Lower Body Dressing: Maximum assistance    Toileting: Moderate assistance                ADL Intervention and task modifications:                           Lower Body Dressing Assistance  Socks: Maximum assistance  Leg Crossed Method Used: Yes         Cognitive Retraining  Safety/Judgement: Insight into deficits    Functional Measure:   [employer] AM-PAC®      Daily Activity Inpatient Short Form (6-Clicks) Version 2  How much HELP from another person do you currently need. .. (If the patient hasn't done an activity recently, how much help from another person do you think they would need if they tried?) Total A Lot A Little None   1. Putting on and taking off regular lower body clothing? []   1 [x]   2 []   3 []   4   2. Bathing (including washing, rinsing, drying)? []   1 []   2 [x]   3 []   4   3. Toileting, which includes using toilet, bedpan, or urinal? []   1 []   2 [x]   3 []   4   4. Putting on and taking off regular upper body clothing? []   1 []   2 [x]   3 []   4   5. Taking care of personal grooming such as brushing teeth? []   1 []   2 [x]   3 []   4   6. Eating meals? []   1 []   2 []   3 [x]   4     Raw Score: 18/24                            Cutoff score ?191,2,3 had higher odds of discharging home with home health or need of SNF/IPR    1. Barber Barajas. Validity of the AM-PAC 6-Clicks Inpatient Daily Activity and Basic Mobility Short Forms. Physical Therapy Mar 2014, [phone]) [phone]; DOI: 10.2522/ptj.69349376  2. Jerilyn Garibay. Association of AM-PAC \"6-Clicks\" Basic Mobility and Daily Activity Scores With Discharge Destination. Phys Ther. 2021 Apr 4;101(4):qxlm675. doi: 10.1093/ptj/qsrw124. PMID: 28942286. V John Jurado, Kaitlyn Bates Agayby K, Andrew S.  Activity Measure for Post-Acute Care \"6-Clicks\" Basic Mobility Scores Predict Discharge Destination After Acute Care Hospitalization in Select Patient Groups: A Retrospective, Observational Study. Arch Rehabil Res Clin Transl. 2022 Jul 16;4(3):220531. doi: 10.1016/j.arrct. 7843.420382. PMID: 05700773; PMCID: ALP3215595. 4. Ni Hernandez Ni P. AM-PAC Short Forms Manual 4.0. Revised 2/2020. Occupational Therapy Evaluation Charge Determination   History Examination Decision-Making   LOW Complexity : Brief history review  LOW Complexity : 1-3 performance deficits relating to physical, cognitive , or psychosocial skils that result in activity limitations and / or participation restrictions  LOW Complexity : No comorbidities that affect functional and no verbal or physical assistance needed to complete eval tasks       Based on the above components, the patient evaluation is determined to be of the following complexity level: LOW   Pain Rating:  None reported    Activity Tolerance:   Fair and requires rest breaks    After treatment patient left in no apparent distress:    Supine in bed, Call bell within reach, and Side rails x 3    COMMUNICATION/EDUCATION:   The patients plan of care was discussed with: Physical therapist and Registered nurse. Home safety education was provided and the patient/caregiver indicated understanding., Patient/family have participated as able in goal setting and plan of care. , and Patient/family agree to work toward stated goals and plan of care. This patients plan of care is appropriate for delegation to [facility].     Thank you for this referral.  Blanca Helms OT  Time Calculation: 24 mins

## 2023-03-03 NOTE — PROGRESS NOTES
Renal Progress Note    NAME:  Marga Pichardo   :   1960   MRN:   [id number]     Date/Time:  3/3/2023 10:25 AM      Assessment:     Resp Failure  Fluid Overload  ESRD --> s/p PD catheter () --> on low volume PD exchanges in PD clinic  Anemia in CKD  Hyperparathyroidism       Plan:     CCPD with one litre / low volume exchanges ( 4 exchanges --> 8 hour prescription). Will use 1.5 % Dianeal.  Bumex (4 MG IV BID) + metolazone (5 mg once daily). S/p EPO x1- 3/2  Sensipar  2 gram sodium / renal diet. Daily weights. Follow lytes daily. Is and Os         Subjective:         Still feels SOB, on 2 lit O2. Sat 96%. Had chest pain this morning, has cough.  Bps better this am          Medications reviewed:  Current Facility-Administered Medications   Medication Dose Route Frequency    hydrALAZINE (APRESOLINE) tablet 50 mg  50 mg Oral QID    cloNIDine HCL (CATAPRES) tablet 0.2 mg  0.2 mg Oral BID    metOLazone (ZAROXOLYN) tablet 5 mg  5 mg Oral DAILY    peritoneal dialysis DEXTROSE 1.5% (2.5 mEq/L low calcium) 6,000 mL with heparin (porcine) 6,000 Units   IntraPERitoneal DAILY    gentamicin (GARAMYCIN) 0.1 % cream   Topical DIALYSIS PRN    amLODIPine (NORVASC) tablet 10 mg  10 mg Oral DAILY    carvediloL (COREG) tablet 6.25 mg  6.25 mg Oral BID WITH MEALS    guaiFENesin (ROBITUSSIN) 100 mg/5 mL oral liquid 100 mg  100 mg Oral Q4H PRN    albuterol-ipratropium (DUO-NEB) 2.5 MG-0.5 MG/3 ML  3 mL Nebulization Q6H PRN    bumetanide (BUMEX) injection 4 mg  4 mg IntraVENous BID    cinacalcet (SENSIPAR) tablet 60 mg  60 mg Oral DAILY WITH BREAKFAST    sodium chloride (NS) flush 5-40 mL  5-40 mL IntraVENous Q8H    sodium chloride (NS) flush 5-40 mL  5-40 mL IntraVENous PRN    acetaminophen (TYLENOL) tablet 650 mg  650 mg Oral Q6H PRN    Or    acetaminophen (TYLENOL) suppository 650 mg  650 mg Rectal Q6H PRN    polyethylene glycol (MIRALAX) packet 17 g  17 g Oral DAILY PRN    ondansetron (ZOFRAN ODT) tablet 4 mg 4 mg Oral Q8H PRN    Or    ondansetron (ZOFRAN) injection 4 mg  4 mg IntraVENous Q6H PRN        Objective:   Vitals:  Visit Vitals  BP (!) 146/76 (BP 1 Location: Right upper arm, BP Patient Position: At rest;Semi fowlers)   Pulse 84   Temp 98.4 °F (36.9 °C)   Resp (!) 32   Ht 5' 6\" (1.676 m)   Wt 114.9 kg (253 lb 4.9 oz)   SpO2 94%   BMI 40.89 kg/m²     Temp (24hrs), Av.6 °F (37 °C), Min:98.4 °F (36.9 °C), Max:99 °F (37.2 °C)    O2 Flow Rate (L/min): 1 l/min O2 Device: None (Room air)    Last 24hr Input/Output:    Intake/Output Summary (Last 24 hours) at 3/3/2023 5135  Last data filed at 3/2/2023 2330  Gross per 24 hour   Intake --   Output 1155 ml   Net -1155 ml          PHYSICAL EXAM:      Seen in Room 422. General:    Alert, cooperative, no distress, appears stated age. Head:   Normocephalic. Eyes:   No icterus. Lungs:   Improved air entry, no rales , no wheezes. Heart:   RRR, No S3  gallop, No pericardial rub. Abdomen:   Not distended, pd catheter --> right lower quadrant. Extremities: Leg oedema +    Psych:  anxious. Neurologic: Alert and oriented . []        Telemetry Reviewed     []        NSR []        PAC/PVCs   []        Afib  []        Paced   []        NSVT   []        Zavala []        NGT  []        Intubated on vent    Lab Data Reviewed:    No results found for this or any previous visit (from the past 24 hour(s)).       Total time spent with patient:  []        15   []        25   []        35   []         __ minutes    []        Critical Care Provided    Care Plan discussed with:          [x]        Patient   []        Family    []        Care Manager   []        Consultant/Specialist :      []          >50% of visit spent in counseling and coordination of care   (Discussed []        CODE status,  []        Care Plan, []        D/C Planning)    ___________________________________________________    Attending Physician: Rio Stanley MD

## 2023-03-03 NOTE — PROGRESS NOTES
Hospital Progress Note    NAME:  Yomaira Jean   :   1960   MRN:  [id number]     Date/Time:  3/3/2023 7:59 AM    Plan:     PD today  Encourage activity as tolerated  Echo  Titrate O2  Risk of Deterioration: Low  []           Moderate  []           High  []                 Assessment:     Principal Problem:    Acute respiratory failure with hypoxia (HCC) (3/1/2023)     Volume overload      Titrate O2       Ck ECHO    Active Problems:    HTN (hypertension) (2015)     Titrate home meds      Chronic left-sided lumbar radiculopathy (2015)     Analgesics PRN      Anemia due to chronic kidney disease (6/15/2015)      EPO/renal      Secondary hyperparathyroidism ([facility] [address] (2020)      Volume overload (3/2/2023)      ESRD on peritoneal dialysis [facility]) (3/2/2023)       Admitting notes:63 y.o. female who presents with shortness of breath. Patient was recently discharged from [facility] on -after being d/cd  for leg pain and swelling. Pt has  PMHx of Stage V CKD, gout, morbid obesity, s/p R total hip replacement, and HTN who underwent laparoscopic PD catheter insertion and lysis of adhesions on 23 with Vascular Surgeon, Dr. Jordyn Vega. Patient follows with nephrology Dr. Radha Gonzalez 8 PD catheter placement was done on  not on dialysis yet patient now came with shortness of breath fluid overload and uremia. The patient denies any headache, blurry vision, sore throat, trouble swallowing, trouble with speech, chest pain, SOB, cough, fever, chills, N/V/D, abd pain, urinary symptoms, constipation, recent travels, sick contacts, focal or generalized neurological symptoms, falls, injuries, rashes, contact with COVID-19 diagnosed patients, hematemesis, melena, hemoptysis, hematuria, rashes, denies starting any new medications and denies any other concerns or problems besides as mentioned above.       Subjective:     C/o cough,SOB, dizziness when OOB  11 Point Review of Systems:   Negative except no cp    []            Unable to obtain ROS due to:       []            mental status change []            sedated []            intubated     Social History     Tobacco Use    Smoking status: Former     Packs/day: 0.25     Years: 25.00     Pack years: 6.25     Types: Cigarettes     Quit date: 2000     Years since quittin.6    Smokeless tobacco: Never   Substance Use Topics    Alcohol use: No     Medications reviewed:  Current Facility-Administered Medications   Medication Dose Route Frequency    hydrALAZINE (APRESOLINE) tablet 50 mg  50 mg Oral QID    metOLazone (ZAROXOLYN) tablet 5 mg  5 mg Oral DAILY    peritoneal dialysis DEXTROSE 1.5% (2.5 mEq/L low calcium) 6,000 mL with heparin (porcine) 6,000 Units   IntraPERitoneal DAILY    gentamicin (GARAMYCIN) 0.1 % cream   Topical DIALYSIS PRN    amLODIPine (NORVASC) tablet 10 mg  10 mg Oral DAILY    carvediloL (COREG) tablet 6.25 mg  6.25 mg Oral BID WITH MEALS    guaiFENesin (ROBITUSSIN) 100 mg/5 mL oral liquid 100 mg  100 mg Oral Q4H PRN    albuterol-ipratropium (DUO-NEB) 2.5 MG-0.5 MG/3 ML  3 mL Nebulization Q6H PRN    bumetanide (BUMEX) injection 4 mg  4 mg IntraVENous BID    cinacalcet (SENSIPAR) tablet 60 mg  60 mg Oral DAILY WITH BREAKFAST    sodium chloride (NS) flush 5-40 mL  5-40 mL IntraVENous Q8H    sodium chloride (NS) flush 5-40 mL  5-40 mL IntraVENous PRN    acetaminophen (TYLENOL) tablet 650 mg  650 mg Oral Q6H PRN    Or    acetaminophen (TYLENOL) suppository 650 mg  650 mg Rectal Q6H PRN    polyethylene glycol (MIRALAX) packet 17 g  17 g Oral DAILY PRN    ondansetron (ZOFRAN ODT) tablet 4 mg  4 mg Oral Q8H PRN    Or    ondansetron (ZOFRAN) injection 4 mg  4 mg IntraVENous Q6H PRN        Objective:   Vitals:  Visit Vitals  BP (!) 184/94   Pulse 85   Temp 98.4 °F (36.9 °C)   Resp 26   Ht 5' 6\" (1.676 m)   Wt 253 lb 4.9 oz (114.9 kg)   SpO2 95%   BMI 40.89 kg/m²     Temp (24hrs), Av.6 °F (37 °C), Min:98.4 °F (36.9 °C), Max:99 °F (37.2 °C)    O2 Flow Rate (L/min): 1 l/min O2 Device: Nasal cannula    Last 24hr Input/Output:    Intake/Output Summary (Last 24 hours) at 3/3/2023 0824  Last data filed at 3/2/2023 2330  Gross per 24 hour   Intake --   Output 1155 ml   Net -1155 ml          PHYSICAL EXAM:  General:    Alert, cooperative, no distress, appears stated age. Head:   Normocephalic, without obvious abnormality, atraumatic. Eyes:   Conjunctivae/corneas clear. PERRLA  Nose:  Nares normal. No drainage or sinus tenderness. Throat:    Lips, mucosa, and tongue normal.  No Thrush  Neck:  Supple, symmetrical,  no adenopathy, thyroid: non tender    no carotid bruit and no JVD. Back:    Symmetric,  No CVA tenderness. Lungs:   Decreased BS with rhomchi  Heart:   Regular rate and rhythm,  no murmur, rub or gallop. Abdomen:   Soft, non-tender. Not distended. Bowel sounds normal. No masses  Extremities: Extremities normal, atraumatic, No cyanosis. tr edema. No clubbing  Skin:     Texture, turgor normal. No rashes or lesions. Not Jaundiced  Lymph nodes: Cervical, supraclavicular normal.  Psych:  Good insight. Not depressed. Not anxious or agitated. Neurologic: Normal strength, Alert and oriented X 3. Lab Data Reviewed:    Recent Labs     03/02/23  0348 03/01/23  0930   WBC 9.3 11.1*   HGB 7.8* 8.2*   HCT 27.9* 27.7*    265       Recent Labs     03/02/23  0348 03/01/23  0930   * 134*   K 4.4 4.2   CL 98 97   CO2 29 30   GLU 77 93   BUN 62* 57*   CREA 6.97* 6.90*   CA 9.8 10.4*       Lab Results   Component Value Date/Time    Glucose (POC) 82 02/14/2023 12:04 PM    Glucose (POC) 78 08/15/2022 08:21 AM    Glucose (POC) 90 11/09/2020 08:36 AM     No results for input(s): PH, PCO2, PO2, HCO3, FIO2 in the last 72 hours. No results for input(s): INR, INREXT, INREXT in the last 72 hours.   ___________________________________________________  ___________________________________________________    Attending Physician: Georgia Bump Griselda Grewal MD

## 2023-03-03 NOTE — PROGRESS NOTES
[address] Grisedla Grewal called during rapid about pt acute onset of chest pain after working with PT. He ordered for cardiology consult, EKG, and troponin labs drawn. [place]le[place]on notified via perfect serve voicemail that pt BP was 88/57 with a MAP of 66 and then 114/66 MAP 81. I left a voicemail about holding Bumex with pt BPs.     2008- MD Griselda Grewal called back and said to hold Bumex for low BP.

## 2023-03-03 NOTE — PROGRESS NOTES
Vitals:     03/03/23 0904 03/03/23 0909 03/03/23 0917   BP:  (!) 146/76 (!) 157/89 (!) 151/87   BP 1 Location:  Right upper arm Right upper arm Right upper arm   BP Patient Position:  At rest;Semi fowlers Sitting Standing   Pulse:  84 86 92   Temp:       Resp:       Height:       Weight:       SpO2:on room air  95% 100% 97%

## 2023-03-03 NOTE — DIALYSIS
Daniel Cori  /  [phone]     Orders  Therapy Time: 8 hrs   Cycles: 4   Fill Volume: 1000 mL   Last Fill Volume: 0 mL   Total Volume: 4000 mL   Solution: Dextrose Dianeal bags 1.5% w/ heparin 1000 units/L         Comments:   Pt orders, notes, labs, code status and consent reviewed. Time out complete. PD site care: Old dsg with dried blood. Catheter insertion site cleansed with Exsept followed by Gentamycin application and dry gauze dressing. No redness or drainage at exit site. Prior to connection, one minute Alcavis scrub performed, followed by one minute soak. Start time: 1730  Estimated End Time: 0130    Remained present during initial drain followed by initiation of first fill. Prior to departure, bed in lowest position, call bell and personal belongings within reach. Education pre/post with patient and primary nurse.

## 2023-03-03 NOTE — PROGRESS NOTES
Problem: Mobility Impaired (Adult and Pediatric)  Goal: *Acute Goals and Plan of Care (Insert Text)  Description: FUNCTIONAL STATUS PRIOR TO ADMISSION: Patient was modified independent using a rolling walker for functional mobility since very recent hospitalization. She reports that HHPT has just started and that baseline is room air. HOME SUPPORT PRIOR TO ADMISSION: The patient lived with her  and sons but did not require assist for mobility using adaptive equipment. Physical Therapy Goals  Initiated 3/3/2023  1. Patient will move from supine to sit and sit to supine , scoot up and down, and roll side to side in bed with modified independence within 7 day(s). 2.  Patient will transfer from bed to chair and chair to bed with modified independence using the least restrictive device within 7 day(s). 3.  Patient will perform sit to stand with modified independence within 7 day(s). 4.  Patient will ambulate with modified independence for 50 feet with the least restrictive device within 7 day(s). Outcome: Progressing Towards Goal   PHYSICAL THERAPY EVALUATION  Patient: Farhad Kwong (85 y.o. female)  Date: 3/3/2023  Primary Diagnosis: Respiratory failure ([facility] [address] [J96.90]       Precautions:   Fall    ASSESSMENT  Based on the objective data described below, the patient presents with reported chest pain after she returned to sitting from standing X 1 rep. She stated that the pain was in the center of her chest and then moved to her back. Called a rapid response. Returned her to supine. Prior to the report of chest pain she participated in bed mobility and one rep of standing. She was moving well and Sp02 was stable on room air through out entire session. I did return her to 2 liters all things considered. In addition pt reported feeling lightheaded after supine to sit, but VSS, see chart below. She was admitted with a primary diagnosis of respiratory failure.  Anticipate steady gains and a return to home with her family and resumption of HHPT. Vitals:       03/03/23 0904 03/03/23 0909 03/03/23 0917   BP:   (!) 146/76 (!) 157/89 (!) 151/87   BP 1 Location:   Right upper arm Right upper arm Right upper arm   BP Patient Position:   At rest;Semi fowlers Sitting Standing   Pulse:   84 86 92   Temp:           Resp:           Height:           Weight:           SpO2:on room air   95% 100% 97%       . Current Level of Function Impacting Discharge (mobility/balance): at most min assist to come to standing. Functional Outcome Measure: The patient scored 19/24 on the AM-PAC outcome measure which is indicative of Cutoff score ?171,2,3 had higher odds of discharging home with home health or need of SNF/IPR. Arsalan Soriano Other factors to consider for discharge: recent hospitalization, CKD and new to peritoneal dialysis, currently on supplemental 02 but baseline is room air. Patient will benefit from skilled therapy intervention to address the above noted impairments. PLAN :  Recommendations and Planned Interventions: bed mobility training, transfer training, gait training, therapeutic exercises, patient and family training/education, and therapeutic activities      Frequency/Duration: Patient will be followed by physical therapy:  5 times a week to address goals. Recommendation for discharge: (in order for the patient to meet his/her long term goals)  Physical therapy at least 2 days/week in the home AND ensure assist and/or supervision for safety with mobility prn    This discharge recommendation:  A follow-up discussion with the attending provider and/or case management is planned    IF patient discharges home will need the following DME: patient owns DME required for discharge         SUBJECTIVE:   Patient stated It's a 7, this regarding chest pain reported after she returned to sitting from standing X 1 rep.  She stated that the pain was in the center of her chest and then moved to her back.    OBJECTIVE DATA SUMMARY:   Consult received, chart reviewed, pt cleared by nursing  HISTORY:    Past Medical History:   Diagnosis Date    Arthritis     L knee    Chronic kidney disease     secondary to hypertensive nephrosclerosis, CKD STG 5, ON ACTIVE KIDNEY TRANSPLANT LIST AT [facility]- DR. SANCHEZ    Gastritis     HISTORY OF 10/2020     Gout     History of claustrophobia     History of total hip replacement, right 2022    Hyperparathyroidism ([address]     Hypertension      Past Surgical History:   Procedure Laterality Date    HX  SECTION      HX COLONOSCOPY      HX GYN      ECTOPIC PREGNANCY SURGERY     HX HIP REPLACEMENT Left     HX LAP CHOLECYSTECTOMY      HX LUMBAR LAMINECTOMY  2020    L3L4L5    HX WISDOM TEETH EXTRACTION      IR INSERT NON TUNL CVC OVER 5 YRS  2023       Personal factors and/or comorbidities impacting plan of care: recent hospitalization, CKD and new to peritoneal dialysis, currently on supplemental 02 but baseline is room air. Home Situation  Home Environment: Private residence  # Steps to Enter: 0  Hand Rails : Bilateral  One/Two Story Residence: Split level (staying on entry level with a half bathroom)  Living Alone: No  Support Systems: Spouse/Significant Other, Child(aravind)  Patient Expects to be Discharged to[de-identified] Other: (hopefully home with f/u with specialists/pcp)  Current DME Used/Available at Home: Commode, bedside, Hospital bed, Walker, rolling    EXAMINATION/PRESENTATION/DECISION MAKING:   Critical Behavior:  Neurologic State: Alert  Orientation Level: Oriented X4  Cognition: Follows commands, Appropriate decision making, Appropriate for age attention/concentration, Appropriate safety awareness  Safety/Judgement: Awareness of environment, Insight into deficits  Hearing:   Auditory  Auditory Impairment: None  Skin:  refer to MD and nursing notes  Edema: refer to MD and nursing notes  Range Of Motion:  AROM: Generally decreased, functional Strength:    Strength: Generally decreased, functional                    Tone & Sensation:                  Sensation: Intact               Coordination:     Vision:      Functional Mobility:  Bed Mobility:  Rolling: Stand-by assistance  Supine to Sit: Stand-by assistance  Sit to Supine: Stand-by assistance     Transfers:  Sit to Stand: Minimum assistance;Assist x1  Stand to Sit: Contact guard assistance;Assist x1                       Balance:   Sitting: Intact; Without support  Standing: Impaired  Standing - Static: Constant support;Good  Ambulation/Gait Training:                                                      Not assessed   Stairs: Therapeutic Exercises:   Pursed lip breathing    Functional Measure:  [facility] AM-PAC®      Basic Mobility Inpatient Short Form (6-Clicks) Version 2  How much HELP from another person do you currently need. .. (If the patient hasn't done an activity recently, how much help from another person do you think they would need if they tried?) Total A Lot A Little None   1. Turning from your back to your side while in a flat bed without using bedrails? []  1 []  2 []  3  [x]  4   2. Moving from lying on your back to sitting on the side of a flat bed without using bedrails? []  1 []  2 []  3  [x]  4   3. Moving to and from a bed to a chair (including a wheelchair)? []  1 []  2 [x]  3  []  4   4. Standing up from a chair using your arms (e.g. wheelchair or bedside chair)? []  1 []  2 [x]  3  []  4   5. Walking in hospital room? []  1 []  2 [x]  3  []  4   6. Climbing 3-5 steps with a railing? []  1 [x]  2 []  3  []  4     Raw Score: 19/24                            Cutoff score ?171,2,3 had higher odds of discharging home with home health or need of SNF/IPR. [address] Brynda Regulus.   Validity of the AM-PAC 6-Clicks Inpatient Daily Activity and Basic Mobility Short Forms. Physical Therapy Mar 2014, [phone]; DOI: 10.2522/ptj.87872141  2. Harsh Soliman. Association of AM-PAC \"6-Clicks\" Basic Mobility and Daily Activity Scores With Discharge Destination. Phys Ther. 2021 4;101(4):umxd021. doi: 10.1093/ptj/fsfh931. PMID: 38298852. V John Jurado, Priscilla D, Meghna Leon, Nelia K, Andrew S. Activity Measure for Post-Acute Care \"6-Clicks\" Basic Mobility Scores Predict Discharge Destination After Acute Care Hospitalization in Select Patient Groups: A Retrospective, Observational Study. Arch Rehabil Res Clin Transl. 2022 16;4(3):907949. doi: 10.1016/j.arrct. 9654.734306. PMID: 97625031; PMCID: [id number]. 4. Ni Jamil Ni P. AM-PAC Short Forms Manual 4.0. Revised 2020. Physical Therapy Evaluation Charge Determination   History Examination Presentation Decision-Making   HIGH Complexity :3+ comorbidities / personal factors will impact the outcome/ POC  MEDIUM Complexity : 3 Standardized tests and measures addressing body structure, function, activity limitation and / or participation in recreation  MEDIUM Complexity : Evolving with changing characteristics  LOW Complexity : FOTO score of       Based on the above components, the patient evaluation is determined to be of the following complexity level: LOW     Pain Ratin chest and back, see assessment    Activity Tolerance:   Fair, SpO2 stable on RA, and see assessment    After treatment patient left in no apparent distress:   Supine in bed, Call bell within reach, Side rails x 3, and team responding to the rapid response I called    COMMUNICATION/EDUCATION:   The patients plan of care was discussed with: Occupational therapist, Registered nurse, and rapid response team .     Fall prevention education was provided and the patient/caregiver indicated understanding., Patient/family have participated as able in goal setting and plan of care. , and Patient/family agree to work toward stated goals and plan of care.     Thank you for this referral.  Nicole Comes   Time Calculation: 37 mins

## 2023-03-03 NOTE — PROGRESS NOTES
Orders received, chart reviewed and patient evaluated by occupational therapy. Pending progression with skilled acute occupational therapy, recommend:  Occupational therapy at least 2 days/week in the home AND ensure assist and/or supervision for safety with ADL/IADL      Recommend with nursing ADLs with supervision/setup, OOB to chair 3x/day and toileting via beside commode x1 assist and walker. Thank you for completing as able in order to maintain patient strength, endurance and independence. Full evaluation to follow.      Lavonne Valentin MS, OTR/L

## 2023-03-03 NOTE — PROGRESS NOTES
Problem: Hypertension  Goal: *Blood pressure within specified parameters  Outcome: Progressing Towards Goal     Problem: Hypertension  Goal: *Fluid volume balance  Outcome: Progressing Towards Goal

## 2023-03-03 NOTE — DIALYSIS
Peritoneal Dialysis Disconnection / [phone]     Metrics   I-Drain: 34 ml   Total UF: 271 ml   Last Fill: 0 ml   Last Manual Drain: 0 ml   Net UF:         305 ml   Avg Dwell Time: 1 hr 35 min   Lost Dwell Time: 49 min   Alarms: N/a   Effluent: Clear/yellow     Access   Type & Location: RLQ abd pd cath   Comments: Prior to disconnection, one-minute Alcavis scrub performed. Sterile mini cap applied and secured to abdomen. Dsg clean, dry and intact. Dsg date: 03/02/23                                         Safety:   Primary Nurse Rpt Pre: HUGO Fontenot   Primary Nurse Rpt Post: HUGO Fontenot     Comments:   Rondal Kehr RN at bedside to disconnect pt from [facility] tx. Orders, consent, pt, and code status confirmed. Tx completed. Used cassette and bags discarded. Education and pre/post report given to primary RN.

## 2023-03-04 LAB
ALBUMIN SERPL-MCNC: 2.3 G/DL (ref 3.5–5)
ANION GAP SERPL CALC-SCNC: 12 MMOL/L (ref 5–15)
BUN SERPL-MCNC: 58 MG/DL (ref 6–20)
BUN/CREAT SERPL: 8 (ref 12–20)
CALCIUM SERPL-MCNC: 7.9 MG/DL (ref 8.5–10.1)
CHLORIDE SERPL-SCNC: 94 MMOL/L (ref 97–108)
CO2 SERPL-SCNC: 26 MMOL/L (ref 21–32)
CREAT SERPL-MCNC: 6.84 MG/DL (ref 0.55–1.02)
GLUCOSE SERPL-MCNC: 78 MG/DL (ref 65–100)
PHOSPHATE SERPL-MCNC: 8.2 MG/DL (ref 2.6–4.7)
POTASSIUM SERPL-SCNC: 4.5 MMOL/L (ref 3.5–5.1)
SODIUM SERPL-SCNC: 132 MMOL/L (ref 136–145)
TROPONIN I SERPL HS-MCNC: 55 NG/L (ref 0–37)

## 2023-03-04 PROCEDURE — 36415 COLL VENOUS BLD VENIPUNCTURE: CPT

## 2023-03-04 PROCEDURE — 74011250637 HC RX REV CODE- 250/637: Performed by: NURSE PRACTITIONER

## 2023-03-04 PROCEDURE — 74011000250 HC RX REV CODE- 250: Performed by: HOSPITALIST

## 2023-03-04 PROCEDURE — 80069 RENAL FUNCTION PANEL: CPT

## 2023-03-04 PROCEDURE — 84484 ASSAY OF TROPONIN QUANT: CPT

## 2023-03-04 PROCEDURE — 99232 SBSQ HOSP IP/OBS MODERATE 35: CPT | Performed by: INTERNAL MEDICINE

## 2023-03-04 PROCEDURE — 74011250637 HC RX REV CODE- 250/637: Performed by: INTERNAL MEDICINE

## 2023-03-04 PROCEDURE — 74011250637 HC RX REV CODE- 250/637: Performed by: HOSPITALIST

## 2023-03-04 PROCEDURE — A4726 DIALYS SOL FLD VOL > 5999CC: HCPCS | Performed by: INTERNAL MEDICINE

## 2023-03-04 PROCEDURE — 74011250636 HC RX REV CODE- 250/636: Performed by: INTERNAL MEDICINE

## 2023-03-04 PROCEDURE — 90945 DIALYSIS ONE EVALUATION: CPT

## 2023-03-04 PROCEDURE — 65660000001 HC RM ICU INTERMED STEPDOWN

## 2023-03-04 RX ORDER — LANOLIN ALCOHOL/MO/W.PET/CERES
3 CREAM (GRAM) TOPICAL
Status: DISCONTINUED | OUTPATIENT
Start: 2023-03-04 | End: 2023-03-07

## 2023-03-04 RX ADMIN — HYDRALAZINE HYDROCHLORIDE 25 MG: 25 TABLET, FILM COATED ORAL at 16:37

## 2023-03-04 RX ADMIN — SODIUM CHLORIDE, SODIUM LACTATE, CALCIUM CHLORIDE, MAGNESIUM CHLORIDE AND DEXTROSE 6000 ML: 1.5; 538; 448; 18.3; 5.08 INJECTION, SOLUTION INTRAPERITONEAL at 18:59

## 2023-03-04 RX ADMIN — CINACALCET HYDROCHLORIDE 60 MG: 30 TABLET, FILM COATED ORAL at 09:53

## 2023-03-04 RX ADMIN — SODIUM CHLORIDE, PRESERVATIVE FREE 10 ML: 5 INJECTION INTRAVENOUS at 16:38

## 2023-03-04 RX ADMIN — AMLODIPINE BESYLATE 10 MG: 5 TABLET ORAL at 09:53

## 2023-03-04 RX ADMIN — GENTAMICIN SULFATE: 1 CREAM TOPICAL at 18:52

## 2023-03-04 RX ADMIN — CARVEDILOL 6.25 MG: 6.25 TABLET, FILM COATED ORAL at 16:37

## 2023-03-04 RX ADMIN — HYDRALAZINE HYDROCHLORIDE 25 MG: 25 TABLET, FILM COATED ORAL at 21:53

## 2023-03-04 RX ADMIN — METOLAZONE 5 MG: 5 TABLET ORAL at 09:53

## 2023-03-04 RX ADMIN — SODIUM CHLORIDE, SODIUM LACTATE, CALCIUM CHLORIDE, MAGNESIUM CHLORIDE AND DEXTROSE 6000 ML: 1.5; 538; 448; 18.3; 5.08 INJECTION, SOLUTION INTRAPERITONEAL at 19:00

## 2023-03-04 RX ADMIN — HYDRALAZINE HYDROCHLORIDE 25 MG: 25 TABLET, FILM COATED ORAL at 09:54

## 2023-03-04 RX ADMIN — SODIUM CHLORIDE, SODIUM LACTATE, CALCIUM CHLORIDE, MAGNESIUM CHLORIDE AND DEXTROSE 6000 ML: 2.5; 538; 448; 18.3; 5.08 INJECTION, SOLUTION INTRAPERITONEAL at 18:52

## 2023-03-04 RX ADMIN — CARVEDILOL 6.25 MG: 6.25 TABLET, FILM COATED ORAL at 09:53

## 2023-03-04 RX ADMIN — GUAIFENESIN 600 MG: 600 TABLET, EXTENDED RELEASE ORAL at 09:54

## 2023-03-04 RX ADMIN — Medication 3 MG: at 23:12

## 2023-03-04 RX ADMIN — SODIUM CHLORIDE, PRESERVATIVE FREE 10 ML: 5 INJECTION INTRAVENOUS at 23:08

## 2023-03-04 NOTE — PROGRESS NOTES
[facility]                    Cardiology Care Note     []Initial Encounter     [x]Follow-up    Patient Name: Yomaira Jean - B:[birth year] - [facility] [place]:[id number]  Primary Cardiologist: none  Consulting Cardiologist: Gwen [facility] Physicians: Benjamin Abdul MD     Reason for encounter: chest pain    HPI:   Yomaira Jean is a 70 y.o. female with PMH significant for HTN, ESRD on PD, Morbid obesity, gout, s/p right total hip replacement. She has no prior history of cardiac disease. Had a Lexiscan stress test in 3/2022 which was normal (done for kidney transplant workup). EF normal by echo 4/2022, trivial MR. She presented to ER on 3/1/23 with worsened SOB and hypoxia. She reports SOB started last week and progressively worsened. She was found to have volume overload, acute hypoxic respiratory insufficiency. Renal following and she was started on IV diuretics and metolazone as well as peritoneal dialysis. She reports SOB has not improved significantly. Today, RRT was called as pt stood with PT and developed sudden onset of midsternal chest pain which radiated to back. She is not sure if pain was worse with deep breathing. States pain lasted few minutes and then resolved. She has never had this pain before. HS troponin is trending up from presentation (64-->>84), her BP was also elevated ('s)   Reports that prior to last week, she was able to walk around house without sob. She denies any history of ARLENE. She does falls asleep frequently during interview. Reports some history of PND. SH: Former smoker (quit 30 years ago), no ETOH. FH: no FH of early CAD    Subjective:  Cony Ramirez no chest pain. Breathing better. Less sleepy. Assessment and Plan      Chest pain:  Some typical feature but none further. HS troponin mildly increased to 84. Unclear etiology of chest pain.   Tentatively plan nuclear stress test Monday. Do not feel that pt needs ACS anticoagulation at this time. HTN: bp improving. Getting HD for volume overload; defer adjustments of BP meds/diuretics to renal given CKD. Will review echo when completed. Volume overload: diuretic per nephrology given CKD  Acute hypoxic respiratory failure:  echo pending. On 2 L NC, sats 100%  Morbid obesity. Body mass index is 41.1 kg/m². Suspected possible ARLENE: consider outpatient sleep study. ____________________________________________________________    Cardiac testing    TTE 22: [facility]  Interpretation Summary   Technically suboptimal study quality may adversely affect interpretation. There is concentric left ventricular remodeling. Left ventricular systolic function is normal.   LV ejection fraction = 65-70%. No significant valvular dysfunction is noted    Nuclear stress test/lexiscan 3/2022: negative for ischemia ([facility])          Most recent HS troponins:  Recent Labs     23  0646 23  0941 23  0930   TROPHS 55* 84* 60*         ECG: NSR, nonspecific ST abnormality  ECG 3/1/23: NSR (NOT afib)    Review of Systems:    [x]All other systems reviewed and all negative except as written in HPI    [] Patient unable to provide secondary to condition    Past Medical History:   Diagnosis Date    Arthritis     L knee    Chronic kidney disease     secondary to hypertensive nephrosclerosis, CKD STG 5, ON ACTIVE KIDNEY TRANSPLANT LIST AT [facility]- DR. SANCHEZ    Gastritis     HISTORY OF 10/2020     Gout     History of claustrophobia     History of total hip replacement, right 2022    Hyperparathyroidism ([facility] [address]     Hypertension      Past Surgical History:   Procedure Laterality Date    HX  SECTION      HX COLONOSCOPY      HX GYN      ECTOPIC PREGNANCY SURGERY     HX HIP REPLACEMENT Left     HX LAP CHOLECYSTECTOMY      HX LUMBAR LAMINECTOMY  2020    L3L4L5    HX WISDOM TEETH EXTRACTION      IR INSERT NON TUNL CVC OVER 5 YRS 2/17/2023     Social Hx:  reports that she quit smoking about 22 years ago. Her smoking use included cigarettes. She has a 6.25 pack-year smoking history. She has never used smokeless tobacco. She reports that she does not drink alcohol and does not use drugs. Family Hx: family history includes Breast Cancer in her paternal grandmother; Diabetes in her sister; Hypertension in her mother; Liver Disease in her father. Allergies   Allergen Reactions    Morphine Nausea and Vomiting and Cough     Blood pressure and 158-96 heart rate up to 156. Patient does not feel that this is a true reaction and denies    [facility] Swelling     PATIENT DENIES THIS ALLERGY           OBJECTIVE:  Wt Readings from Last 3 Encounters:   03/04/23 254 lb 10.1 oz (115.5 kg)   02/20/23 240 lb 4.8 oz (109 kg)   02/14/23 223 lb 15.8 oz (101.6 kg)       Intake/Output Summary (Last 24 hours) at 3/4/2023 0923  Last data filed at 3/4/2023 0158  Gross per 24 hour   Intake --   Output 423 ml   Net -423 ml         Physical Exam:    Vitals:   Vitals:    03/04/23 0353 03/04/23 0400 03/04/23 0558 03/04/23 0834   BP: 127/78   (!) (P) 141/73   Pulse: 87 91 88 (P) 85   Resp: 13   (P) 20   Temp:    (P) 98 °F (36.7 °C)   SpO2: 99%   (P) 98%   Weight:  254 lb 10.1 oz (115.5 kg)     Height:         Telemetry: NSR    Gen: Well-developed, obese female with +SOB but also drifts to sleep during interview. Neck: Supple, No JVD,  Resp: +mild SOB, diminished breath sounds, few faint bibasilar crackles. Card: Regular Rate,Rythm, Normal S1, S2, no definite murmurs. No rubs or gallop. No thrills. Abd:   Soft, non-tender, non-distended, BS+   MSK: No cyanosis  Skin: No rashes    Neuro: Moving all four extremities, follows commands appropriately.  S  Psych: sleepy, normal affect  LE: tr edema    Data Review:     Radiology:   XR Results (most recent):  Results from Hospital Encounter encounter on 03/01/23    XR CHEST PORT    Narrative  EXAM:  XR CHEST PORT    INDICATION: Shortness of breath    COMPARISON: 2/19/2023    TECHNIQUE: 0954 hours portable chest AP view    FINDINGS: The 2 previously demonstrated right IJ lines are no longer shown. Low  lung volumes are demonstrated with ill-defined diffuse increased opacity in the  lungs which could relate to the low lung volume technique. Repeat view would  better inspiratory effort is recommended. No localized consolidation is evident. There is no pneumothorax or pleural effusion. Cardiac and mediastinal contours  are stable. Impression  Low lung volumes. Repeat view with better inspiratory effort recommended. Recent Labs     03/04/23  0646 03/03/23  0942   * 132*   K 4.5 4.4   CL 94* 95*   CO2 26 28   BUN 58* 60*   CREA 6.84* 6.86*   GLU 78 91   PHOS 8.2*  --    CA 7.9* 9.2       Recent Labs     03/03/23  0942 03/02/23  0348   WBC 10.6 9.3   HGB 8.3* 7.8*   HCT 29.1* 27.9*    260       Recent Labs     03/03/23  0942   PTP 10.9   INR 1.0   AP 65       No results for input(s): CHOL, LDLC in the last 72 hours.     No lab exists for component: TGL, HDLC,  HBA1C      Current meds:    Current Facility-Administered Medications:     cloNIDine HCL (CATAPRES) tablet 0.2 mg, 0.2 mg, Oral, BID, Armin Franz MD, 0.2 mg at 03/03/23 1755    guaiFENesin ER (MUCINEX) tablet 600 mg, 600 mg, Oral, Q12H, Nereida Dallas MD, 600 mg at 03/03/23 2213    hydrALAZINE (APRESOLINE) tablet 25 mg, 25 mg, Oral, TID, Armin Franz MD    metOLazone (ZAROXOLYN) tablet 5 mg, 5 mg, Oral, DAILY, Hernan Nicole MD, 5 mg at 03/03/23 7119    peritoneal dialysis DEXTROSE 1.5% (2.5 mEq/L low calcium) 6,000 mL with heparin (porcine) 6,000 Units, , IntraPERitoneal, DAILY, Je Mccauley MD, Given at 03/03/23 1729    gentamicin (GARAMYCIN) 0.1 % cream, , Topical, DIALYSIS PRN, Je Mccauley MD, Given at 03/03/23 1729    amLODIPine (NORVASC) tablet 10 mg, 10 mg, Oral, DAILY, Armin Franz MD, 10 mg at 03/02/23 1839 carvediloL (COREG) tablet 6.25 mg, 6.25 mg, Oral, BID WITH MEALS, Armin Franz MD, 6.25 mg at 03/03/23 1755    albuterol-ipratropium (DUO-NEB) 2.5 MG-0.5 MG/3 ML, 3 mL, Nebulization, Q6H PRN, Ronald Pal MD    cinacalcet (SENSIPAR) tablet 60 mg, 60 mg, Oral, DAILY WITH BREAKFAST, Ronald Pal MD, 60 mg at 03/03/23 0824    sodium chloride (NS) flush 5-40 mL, 5-40 mL, IntraVENous, Q8H, Ronald Pal MD, 10 mL at 03/03/23 2302    sodium chloride (NS) flush 5-40 mL, 5-40 mL, IntraVENous, PRN, Ronald Pal MD    acetaminophen (TYLENOL) tablet 650 mg, 650 mg, Oral, Q6H PRN, 650 mg at 03/03/23 0330 **OR** acetaminophen (TYLENOL) suppository 650 mg, 650 mg, Rectal, Q6H PRN, Paul Morales MD    polyethylene glycol (MIRALAX) packet 17 g, 17 g, Oral, DAILY PRN, Paul Morales MD    ondansetron (ZOFRAN ODT) tablet 4 mg, 4 mg, Oral, Q8H PRN **OR** ondansetron (ZOFRAN) injection 4 mg, 4 mg, IntraVENous, Q6H PRN, MD Etienne Dodson MD    [facility] Cardiology  Call center: Viet Hutson [phone]  (I) [phone]      CC: Jessica Luis MD

## 2023-03-04 NOTE — DIALYSIS
Peritoneal Dialysis Disconnection / [phone]         Metrics   I-Drain: 14 ml   Total UF: 59 ml   Last Fill: 0 ml   Last Manual Drain: 0 ml   Net UF:         73 ml   Avg Dwell Time: 1 hr 44 min   Lost Dwell Time: 11 min   Alarms: N/a   Effluent: Clear/yellow          Access   Type & Location: RLQ abd pd cath   Comments: Prior to disconnection, one-minute Alcavis scrub performed. Sterile mini cap applied and secured to abdomen. Dsg clean, dry and intact. Dsg date: 03/03/23                                              Safety:   Primary Nurse Rpt Pre: JASON Ledbetter RN   Primary Nurse Rpt Post: JASON Ledbetter RN      Comments:   Horacio Salomon RN at bedside to disconnect pt from [facility] tx. Orders, consent, pt, and code status confirmed. Tx completed. Used cassette and bags discarded. Education and pre/post report given to primary RN.

## 2023-03-04 NOTE — PROGRESS NOTES
Bedside and Verbal shift change report given to [place], RN (oncoming nurse) by aP [place] LPN (offgoing nurse). Report included the following information SBAR, Kardex, ED Summary, Intake/Output, Cardiac Rhythm NSR, and Quality Measures.

## 2023-03-04 NOTE — PROGRESS NOTES
[facility]    Renal Progress Note    NAME:  Yomaira Jean   :   1960   MRN:   [id number]     Date/Time:  3/4/2023 10:25 AM      Assessment:     Resp Failure  Fluid Overload  ESRD --> s/p PD catheter () --> on low volume PD exchanges in PD clinic  Anemia in CKD  Hyperparathyroidism       Plan:     CCPD with 2 litre / low volume exchanges ( 6 exchanges --> 10 hour prescription). Will use 1.5 % and 2.5% Dianeal.  Bumex (4 MG IV BID) + metolazone (5 mg once daily). S/p EPO x1- 3/2  Sensipar  2 gram sodium / renal diet. Daily weights. Follow lytes daily. Is and Os         Subjective:         Still feels SOB, on 2 lit O2. Sat 96%. Had chest pain this morning, has cough. Bps better this am.    3-4-23 remains dyspneic and with non productive cough, no significant UF on current PD Rx.  Also c/o of being cold        Medications reviewed:  Current Facility-Administered Medications   Medication Dose Route Frequency    guaiFENesin ER (MUCINEX) tablet 600 mg  600 mg Oral Q12H    hydrALAZINE (APRESOLINE) tablet 25 mg  25 mg Oral TID    metOLazone (ZAROXOLYN) tablet 5 mg  5 mg Oral DAILY    peritoneal dialysis DEXTROSE 1.5% (2.5 mEq/L low calcium) 6,000 mL with heparin (porcine) 6,000 Units   IntraPERitoneal DAILY    gentamicin (GARAMYCIN) 0.1 % cream   Topical DIALYSIS PRN    amLODIPine (NORVASC) tablet 10 mg  10 mg Oral DAILY    carvediloL (COREG) tablet 6.25 mg  6.25 mg Oral BID WITH MEALS    albuterol-ipratropium (DUO-NEB) 2.5 MG-0.5 MG/3 ML  3 mL Nebulization Q6H PRN    cinacalcet (SENSIPAR) tablet 60 mg  60 mg Oral DAILY WITH BREAKFAST    sodium chloride (NS) flush 5-40 mL  5-40 mL IntraVENous Q8H    sodium chloride (NS) flush 5-40 mL  5-40 mL IntraVENous PRN    acetaminophen (TYLENOL) tablet 650 mg  650 mg Oral Q6H PRN    Or    acetaminophen (TYLENOL) suppository 650 mg  650 mg Rectal Q6H PRN    polyethylene glycol (MIRALAX) packet 17 g  17 g Oral DAILY PRN    ondansetron (ZOFRAN ODT) tablet 4 mg  4 mg Oral Q8H PRN    Or    ondansetron (ZOFRAN) injection 4 mg  4 mg IntraVENous Q6H PRN        Objective:   Vitals:  Visit Vitals  /74 (BP 1 Location: Left upper arm, BP Patient Position: At rest;Semi fowlers)   Pulse 86   Temp 97.9 °F (36.6 °C)   Resp 20   Ht 5' 6\" (1.676 m)   Wt 115.5 kg (254 lb 10.1 oz)   SpO2 96%   BMI 41.10 kg/m²     Temp (24hrs), Av.8 °F (36.6 °C), Min:97.5 °F (36.4 °C), Max:98 °F (36.7 °C)    O2 Flow Rate (L/min): 1 l/min O2 Device: Nasal cannula    Last 24hr Input/Output:    Intake/Output Summary (Last 24 hours) at 3/4/2023 1447  Last data filed at 3/4/2023 0158  Gross per 24 hour   Intake --   Output 423 ml   Net -423 ml          PHYSICAL EXAM:      Seen in Room 422. General:    Alert, cooperative, no distress, appears stated age. Head:   Normocephalic. Eyes:   No icterus. Lungs:   Scattered basilar rales, occasional  wheezes. Heart:   RRR, No S3  gallop, No pericardial rub. Abdomen:   Not distended, pd catheter --> right lower quadrant. Extremities: Leg edema +    Psych:  anxious. Neurologic: Alert and oriented .         []        Telemetry Reviewed     []        NSR []        PAC/PVCs   []        Afib  []        Paced   []        NSVT   []        Zavala []        NGT  []        Intubated on vent    Lab Data Reviewed:    Recent Results (from the past 24 hour(s))   RENAL FUNCTION PANEL    Collection Time: 23  6:46 AM   Result Value Ref Range    Sodium 132 (L) 136 - 145 mmol/L    Potassium 4.5 3.5 - 5.1 mmol/L    Chloride 94 (L) 97 - 108 mmol/L    CO2 26 21 - 32 mmol/L    Anion gap 12 5 - 15 mmol/L    Glucose 78 65 - 100 mg/dL    BUN 58 (H) 6 - 20 MG/DL    Creatinine 6.84 (H) 0.55 - 1.02 MG/DL    BUN/Creatinine ratio 8 (L) 12 - 20      eGFR 6 (L) >60 ml/min/1.73m2    Calcium 7.9 (L) 8.5 - 10.1 MG/DL    Phosphorus 8.2 (H) 2.6 - 4.7 MG/DL    Albumin 2.3 (L) 3.5 - 5.0 g/dL   TROPONIN-HIGH SENSITIVITY    Collection Time: 23  6:46 AM   Result Value Ref Range    Troponin-High Sensitivity 55 (H) 0 - 37 ng/L         Total time spent with patient:  []        15   []        25   []        35   []         __ minutes    []        Critical Care Provided    Care Plan discussed with:          [x]        Patient   []        Family    []        Care Manager   []        Consultant/Specialist :      []          >50% of visit spent in counseling and coordination of care   (Discussed []        CODE status,  []        Care Plan, []        D/C Planning)    ___________________________________________________    Attending Physician: Kane Butler MD

## 2023-03-04 NOTE — PROGRESS NOTES
Progress Note          Pt Name  Annemarie Pulliam   Date of Birth 1960   Medical Record Number  [id number]      Age  70 y.o. PCP Gwendolyn Medellin MD   Admit date:  3/1/2023    Room Number  422/01  @ [facility]   Date of Service  3/4/2023     Admission Diagnoses:  Acute respiratory failure with hypoxia [facility])     Admission Summary:  \" Annemarie Pulliam is a 70 y.o. female who presents with shortness of breath. Patient was recently discharged from [facility] on 2/22-after being d/cd  for leg pain and swelling. Pt has  PMHx of Stage V CKD, gout, morbid obesity, s/p R total hip replacement, and HTN who underwent laparoscopic PD catheter insertion and lysis of adhesions on 2/14/23 with Vascular Surgeon, Dr. Geovanny De Luna. Patient follows with nephrology Dr. Eugene Client 8 PD catheter placement was done on 2/14 not on dialysis yet patient now came with shortness of breath fluid overload and uremia. The patient denies any headache, blurry vision, sore throat, trouble swallowing, trouble with speech, chest pain, SOB, cough, fever, chills, N/V/D, abd pain, urinary symptoms, constipation, recent travels, sick contacts, focal or generalized neurological symptoms, falls, injuries, rashes, contact with COVID-19 diagnosed patients, hematemesis, melena, hemoptysis, hematuria, rashes, denies starting any new medications and denies any other concerns or problems besides as mentioned above. \"     Assessment and plan:       Acute respiratory failure with hypoxia due to   Volume overload   ESRD on HD -appreciate help from nephrology service   Continue Oxygen supplementation as is - at this time SpO2 94% in RA !!  HD per Dr. Timmy Hendricks recommendation       Chest pain -appreciate guidance from Dr. Jason Brennan for stress test is noted, agreed.      HTN -some fluctuation   Non change in current Rx     Chronic left sided radiculopathy -per PCP note   PRN pain medications as tolerated     Anemia of chronic disease and Anemia of chronic kidney disease -  No change in current Rx. Diarrhea started this morning. Stool sent for C diff infection test     Body mass index is 41.1 kg/m². -Class III obesity   Will benefit from OP weight management counseling. Present on Admission:   Acute respiratory failure with hypoxia (HCC)   HTN (hypertension)   Chronic left-sided lumbar radiculopathy   Secondary hyperparathyroidism ([address]   Anemia due to chronic kidney disease   Volume overload   ESRD on peritoneal dialysis [facility])          CODE STATUS   Full    Functional Status  Pt is  and lives with her  and children in her house.    She is independent with all ADLs     Surrogate decision maker:  Pt's       Prophylaxis   Lovenox   Discharge Plan:  Home w/Family,      Misc INPATIENT  Payor: [address] [place] [address] / Plan: [address] / Product Type: [employer] /   There are currently no Active Isolations No active infections     Query   None noted today    Prognosis   Fair    Social issues  3/4/23 2:36 PM No family or visitor here with the patient today            Subjective Data     \"I feel like I have a brain fog \"  Review of Systems - Negative except    History obtained from the patient  Respiratory ROS: no cough, shortness of breath, or wheezing  Cardiovascular ROS: no chest pain or dyspnea on exertion  Gastrointestinal ROS: positive for - diarrhea    Objective Data       Comments  Pleasant lady lying in bed in no distress        Patient Vitals for the past 24 hrs:   BP   03/04/23 1138 127/74   03/04/23 0834 (!) 141/73   03/04/23 0353 127/78   03/04/23 0000 128/77   03/03/23 2300 118/68   03/03/23 2200 118/68   03/03/23 2100 114/60   03/03/23 2000 (!) 81/53   03/03/23 1905 114/66   03/03/23 1755 123/87   03/03/23 1627 (!) 140/79      Patient Vitals for the past 24 hrs:   Pulse   03/04/23 1200 77   03/04/23 1138 84   03/04/23 1000 90   03/04/23 0834 85   03/04/23 0558 88   03/04/23 0400 91   03/04/23 0353 87   03/04/23 0153 80   03/04/23 0000 81   03/03/23 2300 81   03/03/23 2200 81   03/03/23 2156 74   03/03/23 2100 71   03/03/23 2000 70   03/03/23 1905 80   03/03/23 1800 78   03/03/23 1755 81   03/03/23 1700 83   03/03/23 1627 88   03/03/23 1400 76      Patient Vitals for the past 24 hrs:   Resp   03/04/23 1138 20   03/04/23 0954 22   03/04/23 0834 20   03/04/23 0353 13   03/04/23 0000 18   03/03/23 2300 19   03/03/23 2200 27   03/03/23 2100 25   03/03/23 2000 27   03/03/23 1905 28   03/03/23 1627 27      Patient Vitals for the past 24 hrs:   Temp   03/04/23 1138 97.9 °F (36.6 °C)   03/04/23 0834 98 °F (36.7 °C)   03/03/23 2300 97.8 °F (36.6 °C)   03/03/23 2212 97.8 °F (36.6 °C)   03/03/23 1905 97.8 °F (36.6 °C)   03/03/23 1627 97.5 °F (36.4 °C)        SpO2 Readings from Last 6 Encounters:   03/04/23 96%   02/22/23 96%   02/14/23 97%   01/27/23 97%   09/14/22 97%   09/01/22 96%       O2 Flow Rate (L/min): 1 l/min  O2 Device: Nasal cannula Body mass index is 41.1 kg/m². -  Wt Readings from Last 10 Encounters:   03/04/23 115.5 kg (254 lb 10.1 oz)   02/20/23 109 kg (240 lb 4.8 oz)   02/14/23 101.6 kg (223 lb 15.8 oz)   01/27/23 101.6 kg (224 lb)   09/14/22 99.5 kg (219 lb 4.8 oz)   08/08/22 101.6 kg (224 lb)   08/10/22 100.8 kg (222 lb 3.6 oz)   07/19/22 101.2 kg (223 lb 1.6 oz)   05/09/22 101.6 kg (224 lb)   04/19/22 102.1 kg (225 lb)        Intake/Output Summary (Last 24 hours) at 3/4/2023 1342  Last data filed at 3/4/2023 0158  Gross per 24 hour   Intake --   Output 423 ml   Net -423 ml         Physical Exam:             General:  Alert, cooperative,   well noursished,   well developed,   appears stated age    Ears/Eyes:  Hearing intact  Sclera anicteric.    Pupils equal   Mouth/Throat:  Mucous membranes normal pink and moist  Oral pharynx clear    Neck:  Supple   Lungs:  Chest excursion symmetrical   Auscultation B/L Symmetrical with   Vesicular breath sounds     No Crepitations noted CVS:  Regular rate and rhythm   no  murmur,   No click, rub or gallop  S1 normal   S2 normal      Abdomen:  Obese   Soft, non-tender  Bowel sounds normal     Extremities:  No cyanosis, jaundice  No edema noted   No sign of DVT/cord like lesion on palpation  No sign of acute trauma . Skin:    Skin color, texture, turgor normal. no acute rash or lesions    Lymph nodes:     Musculoskeletal Muscle bulk B/L symmetrical   Neuro Cranial nerves are intact,   motor movement b/l symmetrical,   Sensory evaluation b/l symmetrical    Psych:  Alert and oriented,   normal mood & affect          Medications reviewed     Current Facility-Administered Medications   Medication Dose Route Frequency    guaiFENesin ER (MUCINEX) tablet 600 mg  600 mg Oral Q12H    hydrALAZINE (APRESOLINE) tablet 25 mg  25 mg Oral TID    metOLazone (ZAROXOLYN) tablet 5 mg  5 mg Oral DAILY    peritoneal dialysis DEXTROSE 1.5% (2.5 mEq/L low calcium) 6,000 mL with heparin (porcine) 6,000 Units   IntraPERitoneal DAILY    gentamicin (GARAMYCIN) 0.1 % cream   Topical DIALYSIS PRN    amLODIPine (NORVASC) tablet 10 mg  10 mg Oral DAILY    carvediloL (COREG) tablet 6.25 mg  6.25 mg Oral BID WITH MEALS    albuterol-ipratropium (DUO-NEB) 2.5 MG-0.5 MG/3 ML  3 mL Nebulization Q6H PRN    cinacalcet (SENSIPAR) tablet 60 mg  60 mg Oral DAILY WITH BREAKFAST    sodium chloride (NS) flush 5-40 mL  5-40 mL IntraVENous Q8H    sodium chloride (NS) flush 5-40 mL  5-40 mL IntraVENous PRN    acetaminophen (TYLENOL) tablet 650 mg  650 mg Oral Q6H PRN    Or    acetaminophen (TYLENOL) suppository 650 mg  650 mg Rectal Q6H PRN    polyethylene glycol (MIRALAX) packet 17 g  17 g Oral DAILY PRN    ondansetron (ZOFRAN ODT) tablet 4 mg  4 mg Oral Q8H PRN    Or    ondansetron (ZOFRAN) injection 4 mg  4 mg IntraVENous Q6H PRN       Relevant other informations:      Other medical conditions listed in [facility] problem list section; all of these and other pertinent data were taken into consideration when treatment plan is developed and customized to this patient's unique overall circumstances and needs. We have reviewed available old medical records within the constraints of this admission process. Data Review:   Recent Days:  All Micro Results       Procedure Component Value Units Date/Time    COVID-19 RAPID TEST [[id number]] Collected: 03/01/23 1050    Order Status: Completed Specimen: Nasopharyngeal Updated: 03/01/23 1128     Specimen source Nasopharyngeal        COVID-19 rapid test Not detected        Comment: Rapid Abbott ID Now       Rapid NAAT:  The specimen is NEGATIVE for SARS-CoV-2, the novel coronavirus associated with COVID-19. Negative results should be treated as presumptive and, if inconsistent with clinical signs and symptoms or necessary for patient management, should be tested with an alternative molecular assay. Negative results do not preclude SARS-CoV-2 infection and should not be used as the sole basis for patient management decisions. This test has been authorized by the FDA under an Emergency Use Authorization (EUA) for use by authorized laboratories.    Fact sheet for Healthcare Providers:  http://www.jamin.kelsi/  Fact sheet for Patients: http://www.megan-john.kelsi/       Methodology: Isothermal Nucleic Acid Amplification                  Recent Labs     03/03/23  0942 03/02/23  0348   WBC 10.6 9.3   HGB 8.3* 7.8*   HCT 29.1* 27.9*    260     Recent Labs     03/04/23  0646 03/03/23  0942 03/02/23  0348   * 132* 135*   K 4.5 4.4 4.4   CL 94* 95* 98   CO2 26 28 29   GLU 78 91 77   BUN 58* 60* 62*   CREA 6.84* 6.86* 6.97*   CA 7.9* 9.2 9.8   PHOS 8.2*  --   --    ALB 2.3* 2.5*  --    TBILI  --  0.4  --    ALT  --  9*  --    INR  --  1.0  --       Lab Results   Component Value Date/Time    TSH 2.750 07/06/2021 12:00 AM            Care Plan discussed with:Patient/Family and Nurse   Other medical conditions are listed in the active hospital problem list section; these and other pertinent data were taken into consideration when the treatment plan was developed and customized to this patient's unique overall circumstances and needs. High complexity decision making was performed for this patient who is at high risk for decompensation with multiple organ involvement. Today total floor/unit time was 35 minutes while caring for this patient and greater than 50% of that time was spent with patient (and/or family) coordinating patients clinical issues; this includes time spent during multidisciplinary rounds.         Jimmy Herbert MD MPH FACP [facility]  3/4/2023

## 2023-03-04 NOTE — PROGRESS NOTES
Bedside and Verbal shift change report given to Elaine Falk LPN (oncoming nurse) by Chana Odom (offgoing nurse). Report included the following information SBAR, Kardex, MAR, Cardiac Rhythm NSR, and Quality Measures.

## 2023-03-05 ENCOUNTER — APPOINTMENT (OUTPATIENT)
Dept: GENERAL RADIOLOGY | Age: 63
DRG: 640 | End: 2023-03-05
Attending: HOSPITALIST
Payer: MEDICARE

## 2023-03-05 LAB
ANION GAP BLD CALC-SCNC: 6 (ref 10–20)
BASE EXCESS BLD CALC-SCNC: 1.5 MMOL/L
CA-I BLD-MCNC: 1.08 MMOL/L (ref 1.12–1.32)
CHLORIDE BLD-SCNC: 92 MMOL/L (ref 100–108)
CO2 BLD-SCNC: 29 MMOL/L (ref 19–24)
CREAT UR-MCNC: 6.3 MG/DL (ref 0.6–1.3)
GLUCOSE BLD STRIP.AUTO-MCNC: 140 MG/DL (ref 65–117)
GLUCOSE BLD STRIP.AUTO-MCNC: 140 MG/DL (ref 74–106)
HCO3 BLDA-SCNC: 29 MMOL/L
LACTATE BLD-SCNC: 0.9 MMOL/L (ref 0.4–2)
PCO2 BLD: 62.2 MMHG (ref 35–45)
PH BLD: 7.28 (ref 7.35–7.45)
PO2 BLD: 90 MMHG (ref 80–100)
POTASSIUM BLD-SCNC: 3.7 MMOL/L (ref 3.5–5.5)
SAO2 % BLD: 96 %
SERVICE CMNT-IMP: ABNORMAL
SODIUM BLD-SCNC: 127 MMOL/L (ref 136–145)
SPECIMEN SITE: ABNORMAL

## 2023-03-05 PROCEDURE — 99233 SBSQ HOSP IP/OBS HIGH 50: CPT | Performed by: INTERNAL MEDICINE

## 2023-03-05 PROCEDURE — 5A09357 ASSISTANCE WITH RESPIRATORY VENTILATION, LESS THAN 24 CONSECUTIVE HOURS, CONTINUOUS POSITIVE AIRWAY PRESSURE: ICD-10-PCS | Performed by: INTERNAL MEDICINE

## 2023-03-05 PROCEDURE — 74011250637 HC RX REV CODE- 250/637: Performed by: INTERNAL MEDICINE

## 2023-03-05 PROCEDURE — 90945 DIALYSIS ONE EVALUATION: CPT

## 2023-03-05 PROCEDURE — 65660000001 HC RM ICU INTERMED STEPDOWN

## 2023-03-05 PROCEDURE — 94660 CPAP INITIATION&MGMT: CPT

## 2023-03-05 PROCEDURE — 74011250637 HC RX REV CODE- 250/637: Performed by: HOSPITALIST

## 2023-03-05 PROCEDURE — 74011250636 HC RX REV CODE- 250/636: Performed by: INTERNAL MEDICINE

## 2023-03-05 PROCEDURE — 82947 ASSAY GLUCOSE BLOOD QUANT: CPT

## 2023-03-05 PROCEDURE — 71045 X-RAY EXAM CHEST 1 VIEW: CPT

## 2023-03-05 PROCEDURE — A4726 DIALYS SOL FLD VOL > 5999CC: HCPCS | Performed by: INTERNAL MEDICINE

## 2023-03-05 PROCEDURE — 82962 GLUCOSE BLOOD TEST: CPT

## 2023-03-05 PROCEDURE — 74011000250 HC RX REV CODE- 250: Performed by: HOSPITALIST

## 2023-03-05 RX ORDER — LORAZEPAM 2 MG/ML
1 INJECTION, SOLUTION INTRAMUSCULAR; INTRAVENOUS ONCE
Status: COMPLETED | OUTPATIENT
Start: 2023-03-05 | End: 2023-03-05

## 2023-03-05 RX ORDER — HYDRALAZINE HYDROCHLORIDE 20 MG/ML
20 INJECTION INTRAMUSCULAR; INTRAVENOUS
Status: DISCONTINUED | OUTPATIENT
Start: 2023-03-05 | End: 2023-03-09 | Stop reason: HOSPADM

## 2023-03-05 RX ORDER — BUMETANIDE 0.25 MG/ML
2 INJECTION INTRAMUSCULAR; INTRAVENOUS ONCE
Status: COMPLETED | OUTPATIENT
Start: 2023-03-05 | End: 2023-03-05

## 2023-03-05 RX ORDER — LORAZEPAM 1 MG/1
0.5 TABLET ORAL 2 TIMES DAILY
Status: DISCONTINUED | OUTPATIENT
Start: 2023-03-05 | End: 2023-03-05

## 2023-03-05 RX ORDER — BUMETANIDE 0.25 MG/ML
4 INJECTION INTRAMUSCULAR; INTRAVENOUS 2 TIMES DAILY
Status: DISCONTINUED | OUTPATIENT
Start: 2023-03-05 | End: 2023-03-06

## 2023-03-05 RX ADMIN — SODIUM CHLORIDE, PRESERVATIVE FREE 10 ML: 5 INJECTION INTRAVENOUS at 13:37

## 2023-03-05 RX ADMIN — GENTAMICIN SULFATE: 1 CREAM TOPICAL at 15:20

## 2023-03-05 RX ADMIN — HYDRALAZINE HYDROCHLORIDE 25 MG: 25 TABLET, FILM COATED ORAL at 09:48

## 2023-03-05 RX ADMIN — AMLODIPINE BESYLATE 10 MG: 5 TABLET ORAL at 09:48

## 2023-03-05 RX ADMIN — CARVEDILOL 6.25 MG: 6.25 TABLET, FILM COATED ORAL at 09:48

## 2023-03-05 RX ADMIN — BUMETANIDE 4 MG: 0.25 INJECTION INTRAMUSCULAR; INTRAVENOUS at 22:53

## 2023-03-05 RX ADMIN — CINACALCET HYDROCHLORIDE 60 MG: 30 TABLET, FILM COATED ORAL at 09:54

## 2023-03-05 RX ADMIN — GUAIFENESIN 600 MG: 600 TABLET, EXTENDED RELEASE ORAL at 22:52

## 2023-03-05 RX ADMIN — BUMETANIDE 2 MG: 0.25 INJECTION INTRAMUSCULAR; INTRAVENOUS at 18:26

## 2023-03-05 RX ADMIN — SODIUM CHLORIDE, SODIUM LACTATE, CALCIUM CHLORIDE, MAGNESIUM CHLORIDE AND DEXTROSE 6000 ML: 2.5; 538; 448; 18.3; 5.08 INJECTION, SOLUTION INTRAPERITONEAL at 15:20

## 2023-03-05 RX ADMIN — LORAZEPAM 1 MG: 2 INJECTION, SOLUTION INTRAMUSCULAR; INTRAVENOUS at 13:36

## 2023-03-05 RX ADMIN — SODIUM CHLORIDE, PRESERVATIVE FREE 10 ML: 5 INJECTION INTRAVENOUS at 22:00

## 2023-03-05 RX ADMIN — METOLAZONE 5 MG: 5 TABLET ORAL at 09:48

## 2023-03-05 RX ADMIN — SODIUM CHLORIDE, SODIUM LACTATE, CALCIUM CHLORIDE, MAGNESIUM CHLORIDE AND DEXTROSE 6000 ML: 2.5; 538; 448; 18.3; 5.08 INJECTION, SOLUTION INTRAPERITONEAL at 15:19

## 2023-03-05 RX ADMIN — HYDRALAZINE HYDROCHLORIDE 25 MG: 25 TABLET, FILM COATED ORAL at 22:52

## 2023-03-05 RX ADMIN — SODIUM CHLORIDE, PRESERVATIVE FREE 10 ML: 5 INJECTION INTRAVENOUS at 06:32

## 2023-03-05 RX ADMIN — GUAIFENESIN 600 MG: 600 TABLET, EXTENDED RELEASE ORAL at 09:48

## 2023-03-05 NOTE — PROGRESS NOTES
[facility]                    Cardiology Care Note     []Initial Encounter     [x]Follow-up    Patient Name: Vamsi Coe - MXD:[id number] - DWB:[id number]  Primary Cardiologist: none  Consulting Cardiologist: [facility] Cardiology Physicians: Nissa Nava MD     Reason for encounter: chest pain    HPI:   Vamsi Coe is a 70 y.o. female with PMH significant for HTN, ESRD on PD, Morbid obesity, gout, s/p right total hip replacement. She has no prior history of cardiac disease. Had a Lexiscan stress test in 3/2022 which was normal (done for kidney transplant workup). EF normal by echo 4/2022, trivial MR. She presented to ER on 3/1/23 with worsened SOB and hypoxia. She reports SOB started last week and progressively worsened. She was found to have volume overload, acute hypoxic respiratory insufficiency. Renal following and she was started on IV diuretics and metolazone as well as peritoneal dialysis. She reports SOB has not improved significantly. Today, RRT was called as pt stood with PT and developed sudden onset of midsternal chest pain which radiated to back. She is not sure if pain was worse with deep breathing. States pain lasted few minutes and then resolved. She has never had this pain before. HS troponin is trending up from presentation (64-->>84), her BP was also elevated ('s)   Reports that prior to last week, she was able to walk around house without sob. She denies any history of ARLENE. She does falls asleep frequently during interview. Reports some history of PND. SH: Former smoker (quit 30 years ago), no ETOH. FH: no FH of early CAD    Subjective:  Cony Ramirez no chest pain. Breathing better. Assessment and Plan      Chest pain:  Has not recurred. HS troponin mildly increased. Unclear etiology of chest pain. Plan nuclear stress test tomorrow.  [id number] Laila Kimball for [facility] if this is normal.  HTN: bp improving. Getting HD for volume overload; defer adjustments of BP meds/diuretics to renal given CKD. Will review echo when completed. Volume overload: diuretic per nephrology given CKD  Acute hypoxic respiratory failure:  echo pending. On 2 L NC, sats 100%  Morbid obesity. Body mass index is 41.1 kg/m². Suspected possible ARLENE: consider outpatient sleep study. ____________________________________________________________    Cardiac testing    TTE 22: [facility]  Interpretation Summary   Technically suboptimal study quality may adversely affect interpretation. There is concentric left ventricular remodeling. Left ventricular systolic function is normal.   LV ejection fraction = 65-70%. No significant valvular dysfunction is noted    Nuclear stress test/lexiscan 3/2022: negative for ischemia ([facility])          Most recent HS troponins:  Recent Labs     23  0646 23  0941   TROPHS 55* 84*         ECG: NSR, nonspecific ST abnormality  ECG 3/1/23: NSR (NOT afib)    Review of Systems:    [x]All other systems reviewed and all negative except as written in HPI    [] Patient unable to provide secondary to condition    Past Medical History:   Diagnosis Date    Arthritis     L knee    Chronic kidney disease     secondary to hypertensive nephrosclerosis, CKD STG 5, ON ACTIVE KIDNEY TRANSPLANT LIST AT [facility]- DR. SANCHEZ    Gastritis     HISTORY OF 10/2020     Gout     History of claustrophobia     History of total hip replacement, right 2022    Hyperparathyroidism ([facility] [address]     Hypertension      Past Surgical History:   Procedure Laterality Date    HX  SECTION      HX COLONOSCOPY      HX GYN      ECTOPIC PREGNANCY SURGERY     HX HIP REPLACEMENT Left     HX LAP CHOLECYSTECTOMY      HX LUMBAR LAMINECTOMY  2020    L3L4L5    HX WISDOM TEETH EXTRACTION      IR INSERT NON TUNL CVC OVER 5 YRS  2023     Social Hx:  reports that she quit smoking about 22 years ago.  Her smoking use included cigarettes. She has a 6.25 pack-year smoking history. She has never used smokeless tobacco. She reports that she does not drink alcohol and does not use drugs. Family Hx: family history includes Breast Cancer in her paternal grandmother; Diabetes in her sister; Hypertension in her mother; Liver Disease in her father. Allergies   Allergen Reactions    Morphine Nausea and Vomiting and Cough     Blood pressure and 158-96 heart rate up to 156. Patient does not feel that this is a true reaction and denies    [facility] Swelling     PATIENT DENIES THIS ALLERGY           OBJECTIVE:  Wt Readings from Last 3 Encounters:   03/05/23 254 lb 10.1 oz (115.5 kg)   02/20/23 240 lb 4.8 oz (109 kg)   02/14/23 223 lb 15.8 oz (101.6 kg)       Intake/Output Summary (Last 24 hours) at 3/5/2023 0926  Last data filed at 3/5/2023 0731  Gross per 24 hour   Intake --   Output 2350 ml   Net -2350 ml         Physical Exam:    Vitals:   Vitals:    03/05/23 0500 03/05/23 0600 03/05/23 0700 03/05/23 0820   BP: (!) 166/79 (!) 163/90 (!) 176/96 (!) 170/93   Pulse: 82 80 82 100   Resp:    18   Temp:    98.4 °F (36.9 °C)   SpO2: 94% 94% 93% 94%   Weight:       Height:         Telemetry: NSR    Gen: Well-developed, obese female with +SOB but also drifts to sleep during interview. Neck: Supple, No JVD,  Resp: +mild SOB, diminished breath sounds, few faint bibasilar crackles. Card: Regular Rate,Rythm, Normal S1, S2, no definite murmurs. No rubs or gallop. No thrills. Abd:   Soft, non-tender, non-distended, BS+   MSK: No cyanosis  Skin: No rashes    Neuro: Moving all four extremities, follows commands appropriately.  S  Psych: sleepy, normal affect  LE: tr edema    Data Review:     Radiology:   XR Results (most recent):  Results from Hospital Encounter encounter on 03/01/23    XR CHEST PORT    Narrative  EXAM:  XR CHEST PORT    INDICATION: Shortness of breath    COMPARISON: 2/19/2023    TECHNIQUE: 0954 hours portable chest AP view    FINDINGS: The 2 previously demonstrated right IJ lines are no longer shown. Low  lung volumes are demonstrated with ill-defined diffuse increased opacity in the  lungs which could relate to the low lung volume technique. Repeat view would  better inspiratory effort is recommended. No localized consolidation is evident. There is no pneumothorax or pleural effusion. Cardiac and mediastinal contours  are stable. Impression  Low lung volumes. Repeat view with better inspiratory effort recommended. Recent Labs     03/04/23  0646 03/03/23  0942   * 132*   K 4.5 4.4   CL 94* 95*   CO2 26 28   BUN 58* 60*   CREA 6.84* 6.86*   GLU 78 91   PHOS 8.2*  --    CA 7.9* 9.2       Recent Labs     03/03/23  0942   WBC 10.6   HGB 8.3*   HCT 29.1*          Recent Labs     03/03/23  0942   PTP 10.9   INR 1.0   AP 65       No results for input(s): CHOL, LDLC in the last 72 hours.     No lab exists for component: TGL, HDLC,  HBA1C      Current meds:    Current Facility-Administered Medications:     peritoneal dialysis DEXTROSE 2.5% (2.5 mEq/L low calcium) solution 6,000 mL, 6,000 mL, IntraPERitoneal, DAILY, Josh Isabel MD, 6,000 mL at 03/04/23 1852    peritoneal dialysis DEXTROSE 1.5% (2.5 mEq/L low calcium) solution 6,000 mL, 6,000 mL, IntraPERitoneal, DAILY, Josh Isabel MD, 6,000 mL at 03/04/23 1900    peritoneal dialysis DEXTROSE 1.5% (2.5 mEq/L low calcium) solution 6,000 mL, 6,000 mL, IntraPERitoneal, DAILY, Josh Isabel MD, 6,000 mL at 03/04/23 1859    melatonin tablet 3 mg, 3 mg, Oral, QHS PRN, Shanda Coyle NP, 3 mg at 03/04/23 2312    guaiFENesin ER (MUCINEX) tablet 600 mg, 600 mg, Oral, Q12H, Tho Sims MD, 600 mg at 03/04/23 0954    hydrALAZINE (APRESOLINE) tablet 25 mg, 25 mg, Oral, TID, Aster Wellington MD, 25 mg at 03/04/23 2153    metOLazone (ZAROXOLYN) tablet 5 mg, 5 mg, Oral, DAILY, Devonte Newton MD, 5 mg at 03/04/23 0997    gentamicin (GARAMYCIN) 0.1 % cream, , Topical, DIALYSIS PRN, Tish Otoole MD, Given at 03/04/23 1852    amLODIPine (NORVASC) tablet 10 mg, 10 mg, Oral, DAILY, Jeremiah Castaneda MD, 10 mg at 03/04/23 0953    carvediloL (COREG) tablet 6.25 mg, 6.25 mg, Oral, BID WITH MEALS, Armin Franz MD, 6.25 mg at 03/04/23 1637    albuterol-ipratropium (DUO-NEB) 2.5 MG-0.5 MG/3 ML, 3 mL, Nebulization, Q6H PRN, Delonte Parker MD    cinacalcet (SENSIPAR) tablet 60 mg, 60 mg, Oral, DAILY WITH BREAKFAST, Delonte Parker MD, 60 mg at 03/04/23 0953    sodium chloride (NS) flush 5-40 mL, 5-40 mL, IntraVENous, Q8H, Delonte Parker MD, 10 mL at 03/05/23 1246    sodium chloride (NS) flush 5-40 mL, 5-40 mL, IntraVENous, PRN, Delonte Parker MD    acetaminophen (TYLENOL) tablet 650 mg, 650 mg, Oral, Q6H PRN, 650 mg at 03/03/23 0330 **OR** acetaminophen (TYLENOL) suppository 650 mg, 650 mg, Rectal, Q6H PRN, Delonte Parker MD    polyethylene glycol (MIRALAX) packet 17 g, 17 g, Oral, DAILY PRN, Paul Aviles MD    ondansetron (ZOFRAN ODT) tablet 4 mg, 4 mg, Oral, Q8H PRN **OR** ondansetron (ZOFRAN) injection 4 mg, 4 mg, IntraVENous, Q6H PRN, MD Humberto Proctor MD    [address] [place] [address] Cardiology  Call center: Charles Dickey [phone]  (O) [phone]      CC: Jonathan Lay MD

## 2023-03-05 NOTE — DIALYSIS
[facility]  /  [phone]     Orders  Therapy Time: 10 hrs   Cycles: 6   Fill Volume: 2000 mL   Last Fill Volume: 0 mL   Total Volume: 12,000 mL   Solution: Bag 1 2.5% Dextrose Dianeal bag          Comments:   Pt orders, notes, labs, code status and consent reviewed. Time out complete. PD site care: Catheter insertion site cleansed with Exsept followed by Gentamycin application and dry gauze dressing. No redness or drainage at exit site. Prior to connection, one minute Alcavis scrub performed, followed by one minute soak. Start time: 200  Estimated End Time: 4834    Remained present during initial drain followed by initiation of first fill. Prior to departure, bed in lowest position, call bell and personal belongings within reach. Education pre/post with patient and primary nurse, Marito Vigil RN .

## 2023-03-05 NOTE — DIALYSIS
[facility]  /  [phone]     Orders  Therapy Time: 10 hrs   Cycles: 6   Fill Volume: 2000 mL   Last Fill Volume: 0 mL   Total Volume: 12,000 mL   Solution: (3) 2.5% Dextrose Dianeal bags          Comments:   Pt orders, notes, labs, code status and consent reviewed. Time out complete. Patient with increase work of breathing. Primary RN notified, started on O2 @ 2L. PD site care: Catheter insertion site cleansed with Exsept followed by Gentamycin application and dry gauze dressing. No redness or drainage at exit site. Prior to connection, one minute Alcavis scrub performed, followed by one minute soak. Start time: 1520  Estimated End Time: 0120    Remained present during initial drain followed by initiation of first fill. Prior to departure, bed in lowest position, call bell and personal belongings within reach. Education pre/post with patient and primary nurse, Jered Gould RN .

## 2023-03-05 NOTE — PROGRESS NOTES
Attempted to place patient on Bipap per MD order. Patient refused and notably confused. Dr. Sandra Tyson notified.

## 2023-03-05 NOTE — PROGRESS NOTES
Progress Note          Pt Name  Creola Burkitt   Date of Birth 1960   Medical Record Number  [id number]      Age  70 y.o. PCP Eric Fernández MD   Admit date:  3/1/2023    Room Number  422/01  @ [facility]   Date of Service  3/5/2023     Admission Diagnoses:  Acute respiratory failure with hypoxia [facility])     Admission Summary:  \" Creola Burkitt is a 70 y.o. female who presents with shortness of breath. Patient was recently discharged from [facility] on 2/22-after being d/cd  for leg pain and swelling. Pt has  PMHx of Stage V CKD, gout, morbid obesity, s/p R total hip replacement, and HTN who underwent laparoscopic PD catheter insertion and lysis of adhesions on 2/14/23 with Vascular Surgeon, Dr. Vitor Dodd. Patient follows with nephrology Dr. Gómez Wick 8 PD catheter placement was done on 2/14 not on dialysis yet patient now came with shortness of breath fluid overload and uremia. The patient denies any headache, blurry vision, sore throat, trouble swallowing, trouble with speech, chest pain, SOB, cough, fever, chills, N/V/D, abd pain, urinary symptoms, constipation, recent travels, sick contacts, focal or generalized neurological symptoms, falls, injuries, rashes, contact with COVID-19 diagnosed patients, hematemesis, melena, hemoptysis, hematuria, rashes, denies starting any new medications and denies any other concerns or problems besides as mentioned above. \"     Assessment and plan:       Acute respiratory failure with hypoxia due to   Volume overload   ESRD on HD -appreciate help from nephrology service   Continue Oxygen supplementation as is - at this time SpO2 94% in RA !!  HD per Dr. Rakesh Beatty recommendation       Chest pain -appreciate guidance from Dr. Lilia Leary for stress test is noted, agreed.      HTN -some fluctuation   Non change in current Rx     Chronic left sided radiculopathy -per PCP note   PRN pain medications as tolerated     Anemia of chronic disease and Anemia of chronic kidney disease -  No change in current Rx. Diarrhea started this morning. Stool sent for C diff infection test     Body mass index is 41.1 kg/m². -Class III obesity   Will benefit from OP weight management counseling. Present on Admission:   Acute respiratory failure with hypoxia (HCC)   HTN (hypertension)   Chronic left-sided lumbar radiculopathy   Secondary hyperparathyroidism ([address]   Anemia due to chronic kidney disease   Volume overload   ESRD on peritoneal dialysis [facility])          CODE STATUS   Full    Functional Status  Pt is  and lives with her  and children in her house. She is independent with all ADLs     Surrogate decision maker:  Pt's       Prophylaxis   Lovenox   Discharge Plan:  Home w/Family,      Misc INPATIENT  Payor: Schuyler Garduno / Plan: [address] / Product Type: [employer] Care Medicare /   There are currently no Active Isolations No active infections     Query   None noted today    Prognosis   Fair    Social issues  3/4/23 2:36 PM No family or visitor here with the patient today            Subjective Data     \"I am tired  \"  Review of Systems - Negative except    History obtained from the patient  Respiratory ROS: no cough, shortness of breath, or wheezing  Cardiovascular ROS: no chest pain or dyspnea on exertion  Gastrointestinal ROS: positive for - diarrhea    Objective Data       Comments  Pleasant lady lying in bed in no distress  She looks more somnolent ?         Patient Vitals for the past 24 hrs:   BP   03/05/23 0820 (!) 170/93   03/05/23 0700 (!) 176/96   03/05/23 0600 (!) 163/90   03/05/23 0500 (!) 166/79   03/05/23 0400 (!) 160/91   03/05/23 0300 (!) 171/83   03/05/23 0200 (!) 160/89   03/05/23 0000 (!) 163/92   03/04/23 2000 (!) 165/92   03/04/23 1517 (!) 168/86        Patient Vitals for the past 24 hrs:   Pulse   03/05/23 1200 95   03/05/23 1000 98   03/05/23 0820 100   03/05/23 0700 82   03/05/23 0600 80   03/05/23 0500 82   03/05/23 0400 87   03/05/23 0300 91   03/05/23 0200 92   03/05/23 0000 85   03/04/23 2200 90   03/04/23 2000 96   03/04/23 1800 87   03/04/23 1517 94   03/04/23 1400 86        Patient Vitals for the past 24 hrs:   Resp   03/05/23 0820 18   03/04/23 1517 18        Patient Vitals for the past 24 hrs:   Temp   03/05/23 0820 98.4 °F (36.9 °C)   03/04/23 1517 97.8 °F (36.6 °C)          SpO2 Readings from Last 6 Encounters:   03/05/23 94%   02/22/23 96%   02/14/23 97%   01/27/23 97%   09/14/22 97%   09/01/22 96%       O2 Flow Rate (L/min): 1 l/min  O2 Device: None (Room air) Body mass index is 41.1 kg/m². -  Wt Readings from Last 10 Encounters:   03/05/23 115.5 kg (254 lb 10.1 oz)   02/20/23 109 kg (240 lb 4.8 oz)   02/14/23 101.6 kg (223 lb 15.8 oz)   01/27/23 101.6 kg (224 lb)   09/14/22 99.5 kg (219 lb 4.8 oz)   08/08/22 101.6 kg (224 lb)   08/10/22 100.8 kg (222 lb 3.6 oz)   07/19/22 101.2 kg (223 lb 1.6 oz)   05/09/22 101.6 kg (224 lb)   04/19/22 102.1 kg (225 lb)        Intake/Output Summary (Last 24 hours) at 3/5/2023 1322  Last data filed at 3/5/2023 0731  Gross per 24 hour   Intake --   Output 2350 ml   Net -2350 ml           Physical Exam:             General:  Pt appears somnolent but awakens easily   She recognized me from my visit yesterday   well noursished,   well developed,   appears stated age    Ears/Eyes:  Hearing intact  Sclera anicteric. Pupils equal   Mouth/Throat:  Mucous membranes normal pink and moist  Oral pharynx clear    Neck:  Supple   Lungs:  Chest excursion symmetrical   Auscultation B/L Symmetrical with   Vesicular breath sounds     No Crepitations noted          CVS:  Regular rate and rhythm   no  murmur,   No click, rub or gallop  S1 normal   S2 normal      Abdomen:  Obese   Soft, non-tender  Bowel sounds normal     Extremities:  No cyanosis, jaundice  No edema noted   No sign of DVT/cord like lesion on palpation  No sign of acute trauma .     Skin:    Skin color, texture, turgor normal. no acute rash or lesions    Lymph nodes:     Musculoskeletal Muscle bulk B/L symmetrical   Neuro Cranial nerves are intact,   motor movement b/l symmetrical,   Sensory evaluation b/l symmetrical    Psych:  Alert and oriented,   normal mood & affect          Medications reviewed     Current Facility-Administered Medications   Medication Dose Route Frequency    peritoneal dialysis DEXTROSE 2.5% (2.5 mEq/L low calcium) solution 6,000 mL  6,000 mL IntraPERitoneal DAILY    peritoneal dialysis DEXTROSE 2.5% (2.5 mEq/L low calcium) solution 6,000 mL  6,000 mL IntraPERitoneal DAILY    LORazepam (ATIVAN) 2 mg/mL injection 1 mg  1 mg IntraVENous ONCE    LORazepam (ATIVAN) tablet 0.5 mg  0.5 mg Oral BID    peritoneal dialysis DEXTROSE 2.5% (2.5 mEq/L low calcium) solution 6,000 mL  6,000 mL IntraPERitoneal DAILY    melatonin tablet 3 mg  3 mg Oral QHS PRN    guaiFENesin ER (MUCINEX) tablet 600 mg  600 mg Oral Q12H    hydrALAZINE (APRESOLINE) tablet 25 mg  25 mg Oral TID    metOLazone (ZAROXOLYN) tablet 5 mg  5 mg Oral DAILY    gentamicin (GARAMYCIN) 0.1 % cream   Topical DIALYSIS PRN    amLODIPine (NORVASC) tablet 10 mg  10 mg Oral DAILY    carvediloL (COREG) tablet 6.25 mg  6.25 mg Oral BID WITH MEALS    albuterol-ipratropium (DUO-NEB) 2.5 MG-0.5 MG/3 ML  3 mL Nebulization Q6H PRN    cinacalcet (SENSIPAR) tablet 60 mg  60 mg Oral DAILY WITH BREAKFAST    sodium chloride (NS) flush 5-40 mL  5-40 mL IntraVENous Q8H    sodium chloride (NS) flush 5-40 mL  5-40 mL IntraVENous PRN    acetaminophen (TYLENOL) tablet 650 mg  650 mg Oral Q6H PRN    Or    acetaminophen (TYLENOL) suppository 650 mg  650 mg Rectal Q6H PRN    polyethylene glycol (MIRALAX) packet 17 g  17 g Oral DAILY PRN    ondansetron (ZOFRAN ODT) tablet 4 mg  4 mg Oral Q8H PRN    Or    ondansetron (ZOFRAN) injection 4 mg  4 mg IntraVENous Q6H PRN       Relevant other informations:      Other medical conditions listed in [facility] problem list section; all of these and other pertinent data were taken into consideration when treatment plan is developed and customized to this patient's unique overall circumstances and needs. We have reviewed available old medical records within the constraints of this admission process. Data Review:   Recent Days:  All Micro Results       Procedure Component Value Units Date/Time    COVID-19 RAPID TEST [[id number]] Collected: 03/01/23 1050    Order Status: Completed Specimen: Nasopharyngeal Updated: 03/01/23 1128     Specimen source Nasopharyngeal        COVID-19 rapid test Not detected        Comment: Rapid Abbott ID Now       Rapid NAAT:  The specimen is NEGATIVE for SARS-CoV-2, the novel coronavirus associated with COVID-19. Negative results should be treated as presumptive and, if inconsistent with clinical signs and symptoms or necessary for patient management, should be tested with an alternative molecular assay. Negative results do not preclude SARS-CoV-2 infection and should not be used as the sole basis for patient management decisions. This test has been authorized by the FDA under an Emergency Use Authorization (EUA) for use by authorized laboratories.    Fact sheet for Healthcare Providers:  http://www.megan-john.kelsi/  Fact sheet for Patients: http://www.megan-john.biz/       Methodology: Isothermal Nucleic Acid Amplification                  Recent Labs     03/03/23  0942   WBC 10.6   HGB 8.3*   HCT 29.1*          Recent Labs     03/04/23  0646 03/03/23  0942   * 132*   K 4.5 4.4   CL 94* 95*   CO2 26 28   GLU 78 91   BUN 58* 60*   CREA 6.84* 6.86*   CA 7.9* 9.2   PHOS 8.2*  --    ALB 2.3* 2.5*   TBILI  --  0.4   ALT  --  9*   INR  --  1.0        Lab Results   Component Value Date/Time    TSH 2.750 07/06/2021 12:00 AM            Care Plan discussed with:Patient/Family and Nurse   Other medical conditions are listed in the active hospital problem list section; these and other pertinent data were taken into consideration when the treatment plan was developed and customized to this patient's unique overall circumstances and needs. High complexity decision making was performed for this patient who is at high risk for decompensation with multiple organ involvement. Today total floor/unit time was 25 minutes while caring for this patient and greater than 50% of that time was spent with patient (and/or family) coordinating patients clinical issues; this includes time spent during multidisciplinary rounds.         Mohit White MD MPH FACP [facility]  3/5/2023

## 2023-03-05 NOTE — DIALYSIS
Peritoneal Dialysis Disconnection / [phone]         Metrics   I-Drain: 16 ml   Total UF: 1234 ml   Last Fill: 0 ml   Last Manual Drain: 0 ml   Net UF:         1250 ml   Avg Dwell Time: 1 hour 14 minutes   Lost Dwell Time: 4 minutes   Alarms: N/a   Effluent: Clear/yellow          Access   Type & Location: RLQ abd pd cath   Comments: Prior to disconnection, one-minute Alcavis scrub performed. Sterile mini cap applied and secured to abdomen. Dsg clean, dry and intact. Dsg date: 03/04/23                                              Safety:   Primary Nurse Rpt Pre: JASON Verma RN   Primary Nurse Rpt Post: AJSON Verma RN      Comments:   Nury Harden RN at bedside to disconnect pt from [facility]CP[facility] tx. Orders, consent, pt, and code status confirmed. Tx completed. Used cassette and bags discarded. Education and pre/post report given to primary RN.

## 2023-03-05 NOTE — PROGRESS NOTES
+                                                                                                                                                                                     Critical Care Documentation    Name: Yomaira Jean  YOB: 1960  MRN: [id number]  Admission Date: 3/1/2023  9:07 AM    Date of service: 3/5/2023    Active Diagnoses:    Hospital Problems  Date Reviewed: 8/9/2022            Codes Class Noted POA    Volume overload ICD-10-CM: E87.70  ICD-9-CM: 276.69  3/2/2023 Yes        ESRD on peritoneal dialysis [facility]) ICD-10-CM: N18.6, Z99.2  ICD-9-CM: 585.6, V45.11  3/2/2023 Yes        * (Principal) Acute respiratory failure with hypoxia [facility]) ICD-10-CM: J96.01  ICD-9-CM: 518.81  3/1/2023 Yes        Secondary hyperparathyroidism ([facility] [address] ICD-10-CM: N25.81  ICD-9-CM: 588.81  6/14/2020 Yes        Anemia due to chronic kidney disease ICD-10-CM: N18.9, D63.1  ICD-9-CM: 285.21  6/15/2015 Yes        HTN (hypertension) ICD-10-CM: I10  ICD-9-CM: 401.9  2/9/2015 Yes        Chronic left-sided lumbar radiculopathy ICD-10-CM: M54.16  ICD-9-CM: 724.4  2/9/2015 Yes           Chief Complaint:  RRT due to sob, tachypanea        Clinical Presentation:  Pt is on PD on going  Noticed tachypnea,  Sao2 98% on 4l now, was on RA earlier   Pt looks confused: she denied having breathing issue, she wants to talk to her family     Physical Exam:   Bp 160/70 HR 88,   Chest decrease bs both lung, no wheezing. Regular,   Abd soft   Ext no edema     Data Reviewed: All diagnostic labs and studies have been reviewed. Assessment and Plan:  Acute resp failure   Give bumex 2mg iv stat  Stat ABG reviewed Alec 30 7/28, PCO62,   Will start bipap  Sitter, continue PD.    D/w family sister   Started bumex 4mg iv bid and metolazone 5mg daily       Medications Administered:   Sedation: [ ] yes [ ] no   Anxiolytics: [ ] yes [ ] no   Antiarrhythmics: [ ] yes [ ] no   Antihypertensives: [ ] yes [ ] no   Pressors: [ ] yes [ ] no IVF's: [ ] yes [ ] no       Critical Care Attestation: This patient is unstable and critically ill. Due to a high probability of clinically significant, life threatening deterioration, the patient required my highest level of preparedness to intervene emergently and I personally spent this critical care time directly and personally managing the patient. This critical care time included obtaining a history; examining the patient; pulse oximetry; ordering and review of studies; arranging urgent treatment with development of a management plan; evaluation of patient's response to treatment; frequent reassessment; and, discussions with other providers and/or family. This critical care time was performed to assess and manage the high probability of imminent, life-threatening deterioration that could result in multi-organ failure and death. It was exclusive of separately billable procedures, treating other patients, and teaching time. Time Spent:     I personally spent 45  minutes in providing critical care time.     Carlos Manuel Barr MD  3/5/2023  5:49 PM

## 2023-03-05 NOTE — PROGRESS NOTES
Bedside and Verbal shift change report given to HUGO Grant (oncoming nurse) by Pa Unger LPN (offgoing nurse). Report included the following information SBAR, ED Summary, MAR, Cardiac Rhythm SR, and Quality Measures. 26 Pt has thick white discharge present in left eye, no redness or pain reported. Provider notified, will continue to monitor. 2147 Pt reports difficulty falling asleep and requested melatonin for sleep.  Provider notified

## 2023-03-05 NOTE — PROGRESS NOTES
[facility]    Renal Progress Note    NAME:  Rocío Mora   :   1960   MRN:   [id number]     Date/Time:  3/5/2023 10:25 AM      Assessment:     Resp Failure  Fluid Overload  ESRD --> s/p PD catheter () --> on low volume PD exchanges in PD clinic  Anemia in CKD  Hyperparathyroidism       Plan:     CCPD with 2 litre / low volume exchanges ( 6 exchanges --> 10 hour prescription). Will use 2-- 2.5% Dianeal.  Bumex (4 MG IV BID) + metolazone (5 mg once daily). S/p EPO x1- 3/2  Sensipar  2 gram sodium / renal diet. Daily weights. Follow lytes daily. Is and Os  Ativan for anxiety         Subjective:         Still feels SOB, on 2 lit O2. Sat 96%. Had chest pain this morning, has cough. Bps better this am.    3-4-23 remains dyspneic and with non productive cough, no significant UF on current PD Rx. Also c/o of being cold    3-5 able to UF 1234 cc with dialysis adjustment. Very anxious, BP remains elevated and O2 sat 92% on RA (has O2 off). No N/V, mild abd discomfort, able to eat. . Will give small IV dose ativan then oral. DW dialysis RN      Medications reviewed:  Current Facility-Administered Medications   Medication Dose Route Frequency    peritoneal dialysis DEXTROSE 2.5% (2.5 mEq/L low calcium) solution 6,000 mL  6,000 mL IntraPERitoneal DAILY    peritoneal dialysis DEXTROSE 2.5% (2.5 mEq/L low calcium) solution 6,000 mL  6,000 mL IntraPERitoneal DAILY    peritoneal dialysis DEXTROSE 2.5% (2.5 mEq/L low calcium) solution 6,000 mL  6,000 mL IntraPERitoneal DAILY    melatonin tablet 3 mg  3 mg Oral QHS PRN    guaiFENesin ER (MUCINEX) tablet 600 mg  600 mg Oral Q12H    hydrALAZINE (APRESOLINE) tablet 25 mg  25 mg Oral TID    metOLazone (ZAROXOLYN) tablet 5 mg  5 mg Oral DAILY    gentamicin (GARAMYCIN) 0.1 % cream   Topical DIALYSIS PRN    amLODIPine (NORVASC) tablet 10 mg  10 mg Oral DAILY    carvediloL (COREG) tablet 6.25 mg  6.25 mg Oral BID WITH MEALS    albuterol-ipratropium (DUO-NEB) 2.5 MG-0.5 MG/3 ML  3 mL Nebulization Q6H PRN    cinacalcet (SENSIPAR) tablet 60 mg  60 mg Oral DAILY WITH BREAKFAST    sodium chloride (NS) flush 5-40 mL  5-40 mL IntraVENous Q8H    sodium chloride (NS) flush 5-40 mL  5-40 mL IntraVENous PRN    acetaminophen (TYLENOL) tablet 650 mg  650 mg Oral Q6H PRN    Or    acetaminophen (TYLENOL) suppository 650 mg  650 mg Rectal Q6H PRN    polyethylene glycol (MIRALAX) packet 17 g  17 g Oral DAILY PRN    ondansetron (ZOFRAN ODT) tablet 4 mg  4 mg Oral Q8H PRN    Or    ondansetron (ZOFRAN) injection 4 mg  4 mg IntraVENous Q6H PRN        Objective:   Vitals:  Visit Vitals  BP (!) 170/93   Pulse 95   Temp 98.4 °F (36.9 °C)   Resp 18   Ht 5' 6\" (1.676 m)   Wt 115.5 kg (254 lb 10.1 oz)   SpO2 94%   BMI 41.10 kg/m²     Temp (24hrs), Av.1 °F (36.7 °C), Min:97.8 °F (36.6 °C), Max:98.4 °F (36.9 °C)    O2 Flow Rate (L/min): 1 l/min O2 Device: None (Room air)    Last 24hr Input/Output:    Intake/Output Summary (Last 24 hours) at 3/5/2023 1310  Last data filed at 3/5/2023 0731  Gross per 24 hour   Intake --   Output 2350 ml   Net -2350 ml          PHYSICAL EXAM:      Seen in Room 422. General:    Alert, cooperative, no distress, appears stated age. Anxious    Head:   Normocephalic. Eyes:   No icterus. Lungs:   Scattered basilar rales, decreased occasional  wheezes. Heart:   RRR, No S3  gallop, No pericardial rub. Abdomen:   Not distended, pd catheter --> right lower quadrant. Extremities: Leg edema +    Psych:  anxious. Neurologic: Alert and oriented . []        Telemetry Reviewed     []        NSR []        PAC/PVCs   []        Afib  []        Paced   []        NSVT   []        Zavala []        NGT  []        Intubated on vent    Lab Data Reviewed:    No results found for this or any previous visit (from the past 24 hour(s)).         Total time spent with patient:  []        15   []        25   []        35   []         __ minutes    []        Critical Care Provided    Care Plan discussed with:          [x]        Patient   []        Family    []        Care Manager   []        Consultant/Specialist :      []          >50% of visit spent in counseling and coordination of care   (Discussed []        CODE status,  []        Care Plan, []        D/C Planning)    ___________________________________________________    Attending Physician: Geovani Lennon MD

## 2023-03-05 NOTE — PROGRESS NOTES
Bedside and Verbal shift change report given to Sanna Hashimoto, RN (oncoming nurse) by Constance Lara RN (offgoing nurse). Report included the following information SBAR, Kardex, Procedure Summary, MAR, Accordion, and Recent Results.

## 2023-03-05 NOTE — PROGRESS NOTES
RENAL    Got a call from patient's friend who is at the bedside. Apparently she became dyspneic and perhaps confused (may ne related to Ativan give earlier). ABG showed maintained oxygenation but had CO2 retention. BiPAP ordered but she was refusing. Dr. Jeferson Mendez ordered IV Bumex, CXR pending. BP up (170/90). I have ordered 1inch NTP. PD has been started with higher conc dextrose to augment fluid removal. She is now agreeable to trying BiPaP. Will check CXR once done. D/C further Ativan.  D/W RN, Anders Vazquez MD

## 2023-03-06 ENCOUNTER — APPOINTMENT (OUTPATIENT)
Dept: NON INVASIVE DIAGNOSTICS | Age: 63
DRG: 640 | End: 2023-03-06
Attending: NURSE PRACTITIONER
Payer: MEDICARE

## 2023-03-06 ENCOUNTER — APPOINTMENT (OUTPATIENT)
Dept: NUCLEAR MEDICINE | Age: 63
DRG: 640 | End: 2023-03-06
Attending: NURSE PRACTITIONER
Payer: MEDICARE

## 2023-03-06 LAB
ANION GAP SERPL CALC-SCNC: 10 MMOL/L (ref 5–15)
BASOPHILS # BLD: 0 K/UL (ref 0–0.1)
BASOPHILS NFR BLD: 0 % (ref 0–1)
BUN SERPL-MCNC: 46 MG/DL (ref 6–20)
BUN/CREAT SERPL: 7 (ref 12–20)
CALCIUM SERPL-MCNC: 8.3 MG/DL (ref 8.5–10.1)
CHLORIDE SERPL-SCNC: 94 MMOL/L (ref 97–108)
CO2 SERPL-SCNC: 28 MMOL/L (ref 21–32)
CREAT SERPL-MCNC: 6.42 MG/DL (ref 0.55–1.02)
DIFFERENTIAL METHOD BLD: ABNORMAL
EOSINOPHIL # BLD: 0.1 K/UL (ref 0–0.4)
EOSINOPHIL NFR BLD: 1 % (ref 0–7)
ERYTHROCYTE [DISTWIDTH] IN BLOOD BY AUTOMATED COUNT: 16 % (ref 11.5–14.5)
GLUCOSE SERPL-MCNC: 84 MG/DL (ref 65–100)
HCT VFR BLD AUTO: 28.7 % (ref 35–47)
HGB BLD-MCNC: 8.1 G/DL (ref 11.5–16)
IMM GRANULOCYTES # BLD AUTO: 0.1 K/UL (ref 0–0.04)
IMM GRANULOCYTES NFR BLD AUTO: 1 % (ref 0–0.5)
LYMPHOCYTES # BLD: 0.8 K/UL (ref 0.8–3.5)
LYMPHOCYTES NFR BLD: 8 % (ref 12–49)
MAGNESIUM SERPL-MCNC: 1.6 MG/DL (ref 1.6–2.4)
MCH RBC QN AUTO: 27.4 PG (ref 26–34)
MCHC RBC AUTO-ENTMCNC: 28.2 G/DL (ref 30–36.5)
MCV RBC AUTO: 97 FL (ref 80–99)
MONOCYTES # BLD: 0.8 K/UL (ref 0–1)
MONOCYTES NFR BLD: 8 % (ref 5–13)
NEUTS SEG # BLD: 8.4 K/UL (ref 1.8–8)
NEUTS SEG NFR BLD: 82 % (ref 32–75)
NRBC # BLD: 0.04 K/UL (ref 0–0.01)
NRBC BLD-RTO: 0.4 PER 100 WBC
PLATELET # BLD AUTO: 337 K/UL (ref 150–400)
PMV BLD AUTO: 9.8 FL (ref 8.9–12.9)
POTASSIUM SERPL-SCNC: 3.8 MMOL/L (ref 3.5–5.1)
RBC # BLD AUTO: 2.96 M/UL (ref 3.8–5.2)
RBC MORPH BLD: ABNORMAL
SODIUM SERPL-SCNC: 132 MMOL/L (ref 136–145)
STRESS BASELINE DIAS BP: 79 MMHG
STRESS BASELINE HR: 87 BPM
STRESS BASELINE SYS BP: 128 MMHG
STRESS ESTIMATED WORKLOAD: 1 METS
STRESS EXERCISE DUR MIN: 3 MIN
STRESS EXERCISE DUR SEC: 0 SEC
STRESS PEAK DIAS BP: 79 MMHG
STRESS PEAK SYS BP: 128 MMHG
STRESS PERCENT HR ACHIEVED: 66 %
STRESS POST PEAK HR: 102 BPM
STRESS RATE PRESSURE PRODUCT: NORMAL BPM*MMHG
STRESS STAGE 1 BP: NORMAL MMHG
STRESS STAGE 1 DURATION: 1 MIN:SEC
STRESS STAGE 1 HR: 87 BPM
STRESS STAGE 2 DURATION: 1 MIN:SEC
STRESS STAGE 2 HR: 102 BPM
STRESS STAGE 3 DURATION: 1 MIN:SEC
STRESS STAGE 3 HR: 98 BPM
STRESS STAGE RECOVERY 1 BP: NORMAL MMHG
STRESS STAGE RECOVERY 1 DURATION: 1 MIN:SEC
STRESS STAGE RECOVERY 1 HR: 100 BPM
STRESS STAGE RECOVERY 2 DURATION: 1 MIN:SEC
STRESS STAGE RECOVERY 2 HR: 98 BPM
STRESS STAGE RECOVERY 3 BP: NORMAL MMHG
STRESS STAGE RECOVERY 3 DURATION: 1 MIN:SEC
STRESS STAGE RECOVERY 3 HR: 96 BPM
STRESS TARGET HR: 155 BPM
WBC # BLD AUTO: 10.2 K/UL (ref 3.6–11)

## 2023-03-06 PROCEDURE — 99232 SBSQ HOSP IP/OBS MODERATE 35: CPT | Performed by: INTERNAL MEDICINE

## 2023-03-06 PROCEDURE — 97116 GAIT TRAINING THERAPY: CPT

## 2023-03-06 PROCEDURE — 94660 CPAP INITIATION&MGMT: CPT

## 2023-03-06 PROCEDURE — 74011250637 HC RX REV CODE- 250/637: Performed by: HOSPITALIST

## 2023-03-06 PROCEDURE — 74011250637 HC RX REV CODE- 250/637: Performed by: INTERNAL MEDICINE

## 2023-03-06 PROCEDURE — 77010033678 HC OXYGEN DAILY

## 2023-03-06 PROCEDURE — 90945 DIALYSIS ONE EVALUATION: CPT

## 2023-03-06 PROCEDURE — 74011000250 HC RX REV CODE- 250: Performed by: INTERNAL MEDICINE

## 2023-03-06 PROCEDURE — 85025 COMPLETE CBC W/AUTO DIFF WBC: CPT

## 2023-03-06 PROCEDURE — 74011000250 HC RX REV CODE- 250: Performed by: HOSPITALIST

## 2023-03-06 PROCEDURE — A4726 DIALYS SOL FLD VOL > 5999CC: HCPCS | Performed by: INTERNAL MEDICINE

## 2023-03-06 PROCEDURE — 99231 SBSQ HOSP IP/OBS SF/LOW 25: CPT | Performed by: INTERNAL MEDICINE

## 2023-03-06 PROCEDURE — 78452 HT MUSCLE IMAGE SPECT MULT: CPT

## 2023-03-06 PROCEDURE — 36415 COLL VENOUS BLD VENIPUNCTURE: CPT

## 2023-03-06 PROCEDURE — 83735 ASSAY OF MAGNESIUM: CPT

## 2023-03-06 PROCEDURE — 80048 BASIC METABOLIC PNL TOTAL CA: CPT

## 2023-03-06 PROCEDURE — A9500 TC99M SESTAMIBI: HCPCS

## 2023-03-06 PROCEDURE — 65660000001 HC RM ICU INTERMED STEPDOWN

## 2023-03-06 PROCEDURE — 97530 THERAPEUTIC ACTIVITIES: CPT

## 2023-03-06 PROCEDURE — 74011250636 HC RX REV CODE- 250/636: Performed by: INTERNAL MEDICINE

## 2023-03-06 PROCEDURE — 94762 N-INVAS EAR/PLS OXIMTRY CONT: CPT

## 2023-03-06 PROCEDURE — 74011250636 HC RX REV CODE- 250/636: Performed by: SPECIALIST

## 2023-03-06 RX ORDER — BUMETANIDE 0.25 MG/ML
2 INJECTION INTRAMUSCULAR; INTRAVENOUS 2 TIMES DAILY
Status: DISCONTINUED | OUTPATIENT
Start: 2023-03-06 | End: 2023-03-08

## 2023-03-06 RX ORDER — TETRAKIS(2-METHOXYISOBUTYLISOCYANIDE)COPPER(I) TETRAFLUOROBORATE 1 MG/ML
32.1 INJECTION, POWDER, LYOPHILIZED, FOR SOLUTION INTRAVENOUS
Status: COMPLETED | OUTPATIENT
Start: 2023-03-06 | End: 2023-03-06

## 2023-03-06 RX ORDER — SODIUM CHLORIDE 0.9 % (FLUSH) 0.9 %
20 SYRINGE (ML) INJECTION AS NEEDED
Status: DISCONTINUED | OUTPATIENT
Start: 2023-03-06 | End: 2023-03-09 | Stop reason: HOSPADM

## 2023-03-06 RX ORDER — TETRAKIS(2-METHOXYISOBUTYLISOCYANIDE)COPPER(I) TETRAFLUOROBORATE 1 MG/ML
10.4 INJECTION, POWDER, LYOPHILIZED, FOR SOLUTION INTRAVENOUS
Status: COMPLETED | OUTPATIENT
Start: 2023-03-06 | End: 2023-03-06

## 2023-03-06 RX ADMIN — SODIUM CHLORIDE, SODIUM LACTATE, CALCIUM CHLORIDE, MAGNESIUM CHLORIDE AND DEXTROSE 6000 ML: 2.5; 538; 448; 18.3; 5.08 INJECTION, SOLUTION INTRAPERITONEAL at 17:23

## 2023-03-06 RX ADMIN — AMLODIPINE BESYLATE 10 MG: 5 TABLET ORAL at 12:19

## 2023-03-06 RX ADMIN — GENTAMICIN SULFATE: 1 CREAM TOPICAL at 17:24

## 2023-03-06 RX ADMIN — TETRAKIS(2-METHOXYISOBUTYLISOCYANIDE)COPPER(I) TETRAFLUOROBORATE 32.1 MILLICURIE: 1 INJECTION, POWDER, LYOPHILIZED, FOR SOLUTION INTRAVENOUS at 10:30

## 2023-03-06 RX ADMIN — CINACALCET HYDROCHLORIDE 60 MG: 30 TABLET, FILM COATED ORAL at 12:19

## 2023-03-06 RX ADMIN — METOLAZONE 5 MG: 5 TABLET ORAL at 12:19

## 2023-03-06 RX ADMIN — GUAIFENESIN 600 MG: 600 TABLET, EXTENDED RELEASE ORAL at 12:19

## 2023-03-06 RX ADMIN — CARVEDILOL 6.25 MG: 6.25 TABLET, FILM COATED ORAL at 12:19

## 2023-03-06 RX ADMIN — HYDRALAZINE HYDROCHLORIDE 25 MG: 25 TABLET, FILM COATED ORAL at 12:19

## 2023-03-06 RX ADMIN — SODIUM CHLORIDE, PRESERVATIVE FREE 10 ML: 5 INJECTION INTRAVENOUS at 21:26

## 2023-03-06 RX ADMIN — GUAIFENESIN 600 MG: 600 TABLET, EXTENDED RELEASE ORAL at 21:26

## 2023-03-06 RX ADMIN — CARVEDILOL 6.25 MG: 6.25 TABLET, FILM COATED ORAL at 16:47

## 2023-03-06 RX ADMIN — SODIUM CHLORIDE, PRESERVATIVE FREE 10 ML: 5 INJECTION INTRAVENOUS at 16:47

## 2023-03-06 RX ADMIN — TETRAKIS(2-METHOXYISOBUTYLISOCYANIDE)COPPER(I) TETRAFLUOROBORATE 10.4 MILLICURIE: 1 INJECTION, POWDER, LYOPHILIZED, FOR SOLUTION INTRAVENOUS at 07:45

## 2023-03-06 RX ADMIN — REGADENOSON 0.4 MG: 0.08 INJECTION, SOLUTION INTRAVENOUS at 10:43

## 2023-03-06 RX ADMIN — SODIUM CHLORIDE, PRESERVATIVE FREE 20 ML: 5 INJECTION INTRAVENOUS at 10:44

## 2023-03-06 RX ADMIN — HYDRALAZINE HYDROCHLORIDE 25 MG: 25 TABLET, FILM COATED ORAL at 16:47

## 2023-03-06 RX ADMIN — BUMETANIDE 4 MG: 0.25 INJECTION INTRAMUSCULAR; INTRAVENOUS at 12:19

## 2023-03-06 RX ADMIN — BUMETANIDE 2 MG: 0.25 INJECTION INTRAMUSCULAR; INTRAVENOUS at 18:50

## 2023-03-06 RX ADMIN — SODIUM CHLORIDE, PRESERVATIVE FREE 10 ML: 5 INJECTION INTRAVENOUS at 05:40

## 2023-03-06 NOTE — PROGRESS NOTES
Chart checked, pt has just left the unit for nuclear medicine. PT will defer, follow, and see as able and appropriate.  Thank you, Brandon Gil, PT

## 2023-03-06 NOTE — PROGRESS NOTES
Occupational Therapy    Chart reviewed, patient off the floor to [facility]. Will follow up as able.      Sydnie Moore MS, OTR/L

## 2023-03-06 NOTE — DISCHARGE INSTRUCTIONS
Patient Discharge Instructions    Rodrigo Davalos / [id number] : 1960    Admitted 3/1/2023 Discharged: 3/8/2023     Patient Active Problem List   Diagnosis Code    HTN (hypertension) I10    Gout M10.9    Chronic left-sided lumbar radiculopathy M54.16    Osteoarthritis M19.90    Obesity E66.9    Dyslipidemia E78.5    Pigmented skin lesion L81.9    Family history of diabetes mellitus Z83.3    Anemia due to chronic kidney disease N18.9, D63.1    Chronic renal failure syndrome, stage 4 (severe) ([facility]) N18.4    Reflux esophagitis K21.00    Secondary hyperparathyroidism ([facility] [address] N25.81    Severe obesity (BMI 35.0-39. 9) with comorbidity ([facility] [address] E66.01    Avascular necrosis of left femur ([facility]) M87.052    Hypercalcemia E83.52    Arthritis of left hip M16.12    Chronic midline low back pain without sciatica M54.50, G89.29    Avascular necrosis of bone of right hip ([facility]) M87.051    History of total hip replacement, right Z96.641    Anemia D64.9    Tachycardia R00.0    Sepsis ([facility]) A41.9    Acute respiratory failure with hypoxia ([facility]) J96.01    Volume overload E87.70    ESRD on peritoneal dialysis ([facility]) N18.6, Z99.2     You have mild aortic stenosis. You can follow up with your PCP- recommend repeat echocardiogram every few years to monitor aortic stenosis. Take Home Medications     Current Discharge Medication List        START taking these medications    Details   albuterol-ipratropium (DUO-NEB) 2.5 mg-0.5 mg/3 ml nebu 3 mL by Nebulization route every six (6) hours as needed for Wheezing. Qty: 90 Each, Refills: 3      benzonatate (TESSALON) 100 mg capsule Take 1 Capsule by mouth nightly as needed for Cough. Qty: 60 Capsule, Refills: 1      cinacalcet 60 mg tab Take 1 Tablet by mouth daily (with breakfast) for 90 days. Qty: 30 Tablet, Refills: 2      gentamicin (GARAMYCIN) 0.1 % topical cream Apply bid prn  Qty: 15 g, Refills: 11      metOLazone (ZAROXOLYN) 5 mg tablet Take 1 Tablet by mouth daily.  And 2 tablets on Saturday and Sunday  Qty: 40 Tablet, Refills: 3      ondansetron (ZOFRAN ODT) 4 mg disintegrating tablet Take 1 Tablet by mouth every eight (8) hours as needed for Nausea or Vomiting. Qty: 30 Tablet, Refills: 1      potassium chloride SR (K-TAB) 20 mEq tablet Take 1 Tablet by mouth  daily  Qty: 60 Tablet, Refills: 3           CONTINUE these medications which have CHANGED    Details   bumetanide (BUMEX) 2 mg tablet Take 1 Tablet by mouth two (2) times a day. Qty: 60 Tablet, Refills: 3      hydrALAZINE (APRESOLINE) 25 mg tablet Take 1 Tablet by mouth three (3) times daily. Qty: 90 Tablet, Refills: 5           CONTINUE these medications which have NOT CHANGED    Details   allopurinoL (ZYLOPRIM) 100 mg tablet TAKE TWO TABLETS BY MOUTH DAILY  Qty: 60 Tablet, Refills: 11    Associated Diagnoses: Gout, unspecified cause, unspecified chronicity, unspecified site      amLODIPine (NORVASC) 10 mg tablet TAKE ONE TABLET BY MOUTH DAILY  Qty: 30 Tablet, Refills: 11      senna-docusate (PERICOLACE) 8.6-50 mg per tablet Take 1 Tablet by mouth two (2) times a day. Qty: 30 Tablet, Refills: 0      carvediloL (COREG) 6.25 mg tablet Take 2 Tablets by mouth two (2) times daily (with meals). Qty: 120 Tablet, Refills: 11           STOP taking these medications       cephALEXin (KEFLEX) 500 mg capsule Comments:   Reason for Stopping:         sevelamer carbonate (RENVELA) 0.8 gram pwpk oral powder Comments:   Reason for Stopping:         sodium bicarbonate 650 mg tablet Comments:   Reason for Stopping:         tolterodine ER (DETROL LA) 4 mg ER capsule Comments:   Reason for Stopping:              Nasal oxygen a 2 l  It is important that you take the medication exactly as they are prescribed. Keep your medication in the bottles provided by the pharmacist and keep a list of the medication names, dosages, and times to be taken in your wallet. Do not take other medications without consulting your doctor.      You have mild aortic stenosis. You can follow up with your PCP- recommend repeat echocardiogram every few years to monitor aortic stenosis. What to do at Home    Recommended diet: Cardiac Diet and Renal Diet,   Recommended activity: Activity as tolerated,     Follow-up with ARJUN NIEVES MD in 5 day    I personally saw the patient today and spent less than or equal to 30 minutes  on counseling and coordination of care in discharging this patient. [id number]     Information obtained by :  I understand that if any problems occur once I am at home I am to contact my physician. I understand and acknowledge receipt of the instructions indicated above.                                                                                                                                            Physician's or R.N.'s Signature                                                                  Date/Time                                                                                                                                              Patient or Representative Signature                                                          Date/Time

## 2023-03-06 NOTE — RT PROTOCOL NOTE
Patient alert and oriented. Removed patient from bipap and placed patient  on 3L NC. Patient traveling off floor for a procedure.

## 2023-03-06 NOTE — PROGRESS NOTES
[facility]    Renal Progress Note    NAME:  Lisandro Bella   :   1960   MRN:   [id number]     Date/Time:  3/6/2023 10:25 AM      Assessment:     Resp Failure-improved now on RA  Fluid Overload-improving  ESRD --> s/p PD catheter () --> on low volume PD exchanges in PD clinic  Anemia in CKD  Hyperparathyroidism       Plan:     CCPD with 2 litre / low volume exchanges ( 6 exchanges --> 10 hour prescription). C/w 2.5% Dianeal.  Bumex (4 MG IV BID) + metolazone (5 mg once daily). S/p EPO x1- 3/2  Sensipar  2 gram sodium / renal diet. Daily weights. Follow lytes daily. Is and Os  Ativan for anxiety         Subjective:         Still feels SOB, on 2 lit O2. Sat 96%. Had chest pain this morning, has cough. Bps better this am.    3-4-23 remains dyspneic and with non productive cough, no significant UF on current PD Rx. Also c/o of being cold    3-5 able to UF 1234 cc with dialysis adjustment. Very anxious, BP remains elevated and O2 sat 92% on RA (has O2 off). No N/V, mild abd discomfort, able to eat. . Will give small IV dose ativan then oral. DW dialysis RN    3/6 feels better, not SOB on RA. Had stress test this morning which was negative. No issues with CCPD, had 1900 ml UF last night.  Bps have improved      Medications reviewed:  Current Facility-Administered Medications   Medication Dose Route Frequency    saline peripheral flush soln 20 mL  20 mL InterCATHeter PRN    saline peripheral flush soln 20 mL  20 mL InterCATHeter PRN    peritoneal dialysis DEXTROSE 2.5% (2.5 mEq/L low calcium) solution 6,000 mL  6,000 mL IntraPERitoneal DAILY    peritoneal dialysis DEXTROSE 2.5% (2.5 mEq/L low calcium) solution 6,000 mL  6,000 mL IntraPERitoneal DAILY    hydrALAZINE (APRESOLINE) 20 mg/mL injection 20 mg  20 mg IntraVENous Q6H PRN    nitroglycerin (NITROBID) 2 % ointment 1 Inch  1 Inch Topical BID    bumetanide (BUMEX) injection 4 mg  4 mg IntraVENous BID    peritoneal dialysis DEXTROSE 2.5% (2.5 mEq/L low calcium) solution 6,000 mL  6,000 mL IntraPERitoneal DAILY    melatonin tablet 3 mg  3 mg Oral QHS PRN    guaiFENesin ER (MUCINEX) tablet 600 mg  600 mg Oral Q12H    hydrALAZINE (APRESOLINE) tablet 25 mg  25 mg Oral TID    metOLazone (ZAROXOLYN) tablet 5 mg  5 mg Oral DAILY    gentamicin (GARAMYCIN) 0.1 % cream   Topical DIALYSIS PRN    amLODIPine (NORVASC) tablet 10 mg  10 mg Oral DAILY    carvediloL (COREG) tablet 6.25 mg  6.25 mg Oral BID WITH MEALS    albuterol-ipratropium (DUO-NEB) 2.5 MG-0.5 MG/3 ML  3 mL Nebulization Q6H PRN    cinacalcet (SENSIPAR) tablet 60 mg  60 mg Oral DAILY WITH BREAKFAST    sodium chloride (NS) flush 5-40 mL  5-40 mL IntraVENous Q8H    sodium chloride (NS) flush 5-40 mL  5-40 mL IntraVENous PRN    acetaminophen (TYLENOL) tablet 650 mg  650 mg Oral Q6H PRN    Or    acetaminophen (TYLENOL) suppository 650 mg  650 mg Rectal Q6H PRN    polyethylene glycol (MIRALAX) packet 17 g  17 g Oral DAILY PRN    ondansetron (ZOFRAN ODT) tablet 4 mg  4 mg Oral Q8H PRN    Or    ondansetron (ZOFRAN) injection 4 mg  4 mg IntraVENous Q6H PRN        Objective:   Vitals:  Visit Vitals  BP (!) 113/58 (BP 1 Location: Left upper arm, BP Patient Position: Sitting)   Pulse 82   Temp 98.2 °F (36.8 °C)   Resp 13   Ht 5' 6\" (1.676 m)   Wt 108.4 kg (239 lb)   SpO2 93%   BMI 38.58 kg/m²     Temp (24hrs), Av.8 °F (36.6 °C), Min:97.6 °F (36.4 °C), Max:98.2 °F (36.8 °C)    O2 Flow Rate (L/min): 3 l/min O2 Device: (S) Nasal cannula    Last 24hr Input/Output:    Intake/Output Summary (Last 24 hours) at 3/6/2023 1643  Last data filed at 3/6/2023 0306  Gross per 24 hour   Intake --   Output 2168 ml   Net -2168 ml          PHYSICAL EXAM:      Seen in Room 422. General:    Alert, cooperative, no distress, appears stated age. Anxious    Head:   Normocephalic. Eyes:   No icterus. Lungs:   CTA    Heart:   RRR, No S3  gallop, No pericardial rub.     Abdomen:   Not distended, pd catheter --> right lower quadrant. Extremities: Leg edema trace    Psych:  anxious. Neurologic: Alert and oriented . []        Telemetry Reviewed     []        NSR []        PAC/PVCs   []        Afib  []        Paced   []        NSVT   []        Zavala []        NGT  []        Intubated on vent    Lab Data Reviewed:    Recent Results (from the past 24 hour(s))   GLUCOSE, POC    Collection Time: 03/05/23  5:46 PM   Result Value Ref Range    Glucose (POC) 140 (H) 65 - 117 mg/dL    Performed by David Rosario    BLOOD GAS,CHEM8,LACTIC ACID POC    Collection Time: 03/05/23  5:52 PM   Result Value Ref Range    pH (POC) 7.28 (L) 7.35 - 7.45      pCO2 (POC) 62.2 (H) 35.0 - 45.0 MMHG    pO2 (POC) 90 80 - 100 MMHG    BICARBONATE 29 mmol/L    Base excess (POC) 1.5 mmol/L    O2 SAT 96 %    Sodium,  (L) 136 - 145 MMOL/L    Potassium, POC 3.7 3.5 - 5.5 MMOL/L    Chloride, POC 92 (L) 100 - 108 MMOL/L    CO2, POC 29 (H) 19 - 24 MMOL/L    Anion gap, POC 6 (L) 10 - 20      Glucose,  (H) 74 - 106 MG/DL    Creatinine, POC 6.3 (H) 0.6 - 1.3 MG/DL    eGFR (POC) 7 (L) >60 ml/min/1.73m2    Calcium, ionized (POC) 1.08 (L) 1.12 - 1.32 mmol/L    Lactic Acid (POC) 0.90 0.40 - 2.00 mmol/L    Sample source ARTERIAL     CBC WITH AUTOMATED DIFF    Collection Time: 03/06/23  2:58 AM   Result Value Ref Range    WBC 10.2 3.6 - 11.0 K/uL    RBC 2.96 (L) 3.80 - 5.20 M/uL    HGB 8.1 (L) 11.5 - 16.0 g/dL    HCT 28.7 (L) 35.0 - 47.0 %    MCV 97.0 80.0 - 99.0 FL    MCH 27.4 26.0 - 34.0 PG    MCHC 28.2 (L) 30.0 - 36.5 g/dL    RDW 16.0 (H) 11.5 - 14.5 %    PLATELET 700 621 - 751 K/uL    MPV 9.8 8.9 - 12.9 FL    NRBC 0.4 (H) 0  WBC    ABSOLUTE NRBC 0.04 (H) 0.00 - 0.01 K/uL    NEUTROPHILS 82 (H) 32 - 75 %    LYMPHOCYTES 8 (L) 12 - 49 %    MONOCYTES 8 5 - 13 %    EOSINOPHILS 1 0 - 7 %    BASOPHILS 0 0 - 1 %    IMMATURE GRANULOCYTES 1 (H) 0.0 - 0.5 %    ABS. NEUTROPHILS 8.4 (H) 1.8 - 8.0 K/UL    ABS. LYMPHOCYTES 0.8 0.8 - 3.5 K/UL    ABS. MONOCYTES 0.8 0.0 - 1.0 K/UL    ABS. EOSINOPHILS 0.1 0.0 - 0.4 K/UL    ABS. BASOPHILS 0.0 0.0 - 0.1 K/UL    ABS. IMM.  GRANS. 0.1 (H) 0.00 - 0.04 K/UL    DF SMEAR SCANNED      RBC COMMENTS ANISOCYTOSIS  1+        RBC COMMENTS POLYCHROMASIA  PRESENT        RBC COMMENTS MACROCYTOSIS  1+        RBC COMMENTS HYPOCHROMIA  1+       METABOLIC PANEL, BASIC    Collection Time: 03/06/23  2:58 AM   Result Value Ref Range    Sodium 132 (L) 136 - 145 mmol/L    Potassium 3.8 3.5 - 5.1 mmol/L    Chloride 94 (L) 97 - 108 mmol/L    CO2 28 21 - 32 mmol/L    Anion gap 10 5 - 15 mmol/L    Glucose 84 65 - 100 mg/dL    BUN 46 (H) 6 - 20 MG/DL    Creatinine 6.42 (H) 0.55 - 1.02 MG/DL    BUN/Creatinine ratio 7 (L) 12 - 20      eGFR 7 (L) >60 ml/min/1.73m2    Calcium 8.3 (L) 8.5 - 10.1 MG/DL   MAGNESIUM    Collection Time: 03/06/23  2:58 AM   Result Value Ref Range    Magnesium 1.6 1.6 - 2.4 mg/dL   NUCLEAR CARDIAC STRESS TEST    Collection Time: 03/06/23 11:44 AM   Result Value Ref Range    Stress Target  bpm    Exercise Duration Time 3 min    Exercuse Duration Seconds 0 sec    Stress Systolic  mmHg    Stress Diastolic BP 79 mmHg    Stress Peak  BPM    Baseline HR 87 BPM    Stress Estimated Workload 1.0 METS    Stress Rate Pressure Product 13,056 BPM*mmHg    Stress Percent HR Achieved 66 %    Baseline Systolic  mmHg    Baseline Diastolic BP 79 mmHg    Stress Stage 1 Duration 1 min:sec    Stress Stage 1 HR 87 bpm    Stress Stage 1 /72 mmHg    Stress Stage 2 Duration 1 min:sec    Stress Stage 2  bpm    Stress Stage 3 Duration 1 min:sec    Stress Stage 3 HR 98 bpm    Recovery Stage 1 Duration 1 min:sec    Recovery Stage 1  bpm    Recovery Stage 1 /75 mmHg    Recovery Stage 2 Duration 1 min:sec    Recovery Stage 2 HR 98 bpm    Recovery Stage 3 Duration 1 min:sec    Recovery Stage 3 HR 96 bpm    Recovery Stage 3 /70 mmHg           Total time spent with patient:  []        15   []        25 []        35   []         __ minutes    []        Critical Care Provided    Care Plan discussed with:          [x]        Patient   []        Family    []        Care Manager   []        Consultant/Specialist :      []          >50% of visit spent in counseling and coordination of care   (Discussed []        CODE status,  []        Care Plan, []        D/C Planning)    ___________________________________________________    Attending Physician: Alejandro Brumfield MD

## 2023-03-06 NOTE — PROGRESS NOTES
1740 RRT called  1745 RT came and increased O2 to 6 L  1746 Dr. Bela Martin ordered ABG and CXR  1800 Dr. Bela Martin ordered BiPAP with PRN sitter and changed BP meds to IV  1825 Patient refused to keep BiPAP on     The friend at bedside called to Dr. Jayce Dao   RN updated to Dr. Jayce Dao the situation and pt's contrition   RN discussed with Dr. Jayce Dao  and confirmed care plan and orders    [phone] Patient was encourage to using BiPAP from Dr. Jayce Dao, [facility], and friend  Patient fell a sleep with BiPAP on and O2 Sats and BP started to improve. 1930 BP remained soft and  held Bumex and Nitro    2030 Bedside and Verbal shift change report given to Thi grover RN (oncoming nurse) by Lulu Wright RN (offgoing nurse).  Report included the following information SBAR, Kardex, Recent Results, and Cardiac Rhythm ST .  used

## 2023-03-06 NOTE — DIALYSIS
Peritoneal Dialysis Disconnection / [phone]         Metrics   I-Drain:  41 ml   Total UF:  1852ml   Last Fill: 0 ml   Last Manual Drain: 0 ml   Net UF:          1893 ml   Avg Dwell Time:  1 hour  13 minutes   Lost Dwell Time:  11 minutes   Alarms: N/a   Effluent: Clear/yellow          Access   Type & Location: RLQ abd pd cath   Comments: Prior to disconnection, one-minute Alcavis scrub performed. Sterile mini cap applied and secured to abdomen. Dsg clean, dry and intact. Dsg date: 03/05/23                                              Safety:   Primary Nurse Rpt Pre: Yaneli Sanchez RN   Primary Nurse Rpt Post: Yaneli Sanchez RN      Comments:   Rakan Hickman RN at bedside to disconnect pt from CCPD tx. Orders, consent, pt, and code status confirmed. Tx completed. Used cassette and bags discarded. Education and pre/post report given to primary RN.

## 2023-03-06 NOTE — PROGRESS NOTES
[facility]                    Cardiology Care Note     []Initial Encounter     [x]Follow-up    Patient Name: Reba Lyons - [place] - OMV:[id number]  Primary Cardiologist: none  Consulting Cardiologist: [facility] Cardiology Physicians: Marilee Closs MD     Reason for encounter: chest pain    HPI:   Reba Lynos is a 70 y.o. female with PMH significant for HTN, ESRD on PD, Morbid obesity, gout, s/p right total hip replacement. She has no prior history of cardiac disease. Had a Lexiscan stress test in 3/2022 which was normal (done for kidney transplant workup). EF normal by echo 2022, trivial MR. She presented to ER on 3/1/23 with worsened SOB and hypoxia. She reports SOB started last week and progressively worsened. She was found to have volume overload, acute hypoxic respiratory insufficiency. Renal following and she was started on IV diuretics and metolazone as well as peritoneal dialysis. She reports SOB has not improved significantly. Today, RRT was called as pt stood with PT and developed sudden onset of midsternal chest pain which radiated to back. She is not sure if pain was worse with deep breathing. States pain lasted few minutes and then resolved. She has never had this pain before. HS troponin is trending up from presentation (64-->>84), her BP was also elevated ('s)   Reports that prior to last week, she was able to walk around house without sob. She denies any history of ARLENE. She does falls asleep frequently during interview. Reports some history of PND. SH: Former smoker (quit 30 years ago), no ETOH. FH: no FH of early CAD    Subjective:  Reba Lyons denies any chest pain and denies SOB this afternoon. She is on nasalcannula. Review of tele shows 2 episodes of NSVT, 9 beat runs each. Again no chest pain. No palpitations. She is now on 3L NC, using bipap here overnight. Assessment and Plan      Chest pain:  Has not recurred. HS troponin mildly increased. Unclear etiology of chest pain but nuclear stress test reviewed by Dr. Regine Eubanks indicates low risk stress test.  Echo  showed normal EF, NWMA. LVH, mild concentric hypertrophy. . No further cardiac testing needed  HTN: bp controlled. Getting HD for volume overload; defer adjustments of BP meds/diuretics to renal given CKD. Volume overload: diuretic per nephrology given CKD. EF is normal.   Acute hypoxic respiratory failure:  echo pending. On 2 L NC, sats 100%  Morbid obesity. Body mass index is 38.58 kg/m². Suspected possible ARLENE: consider outpatient sleep study. NSVT: 2 9 beat runs. Asymptomatic.  K=3.8, Will check magnesium  Aortic stenosis:  mild by echo 3/3/23. Can be monitored by PCP as outpatient- recommend repeat echocardiogram every few years. No further cardiac testing needed. Will sign off. She can followup with her PCP after discharge- recommend echocardiogram once every few  years. .  Followup with cardiology prn. Saw and evaluated pt and agree with above assessment and plan. Had atypical chest pain and mild troponin elevation. Compensate on exam. Reviewed stress tet and overall this was low risk without a significant reversible defect. No additional cardiac evaluation indicated at this time. Will sign off  Alea Triplett MD       ____________________________________________________________    Cardiac testing  03/01/23    ECHO ADULT COMPLETE 03/03/2023 3/3/2023    Interpretation Summary    Left Ventricle: Normal left ventricular systolic function with a visually estimated EF of 55 - 60%. Left ventricle size is normal. Increased wall thickness. Findings consistent with mild concentric hypertrophy. Normal wall motion. Aortic Valve: Mild stenosis of the aortic valve. Mean gradient of 14 mmhg.     Signed by: Alea Triplett MD on 3/3/2023  2:10 PM            TTE 4/7/22: [facility]  Interpretation Summary   Technically suboptimal study quality may adversely affect interpretation. There is concentric left ventricular remodeling. Left ventricular systolic function is normal.   LV ejection fraction = 65-70%. No significant valvular dysfunction is noted    Nuclear stress test/lexiscan 3/2022: negative for ischemia ([facility])          Most recent HS troponins:  Recent Labs     23  0646   TROPHS 55*       ECG: NSR, nonspecific ST abnormality  ECG 3/1/23: NSR (NOT afib)    Review of Systems:    [x]All other systems reviewed and all negative except as written in HPI    [] Patient unable to provide secondary to condition    Past Medical History:   Diagnosis Date    Arthritis     L knee    Chronic kidney disease     secondary to hypertensive nephrosclerosis, CKD STG 5, ON ACTIVE KIDNEY TRANSPLANT LIST AT [facility]- DR. SANCHEZ    Gastritis     HISTORY OF 10/2020     Gout     History of claustrophobia     History of total hip replacement, right 2022    Hyperparathyroidism ([facility] [address]     Hypertension      Past Surgical History:   Procedure Laterality Date    HX  SECTION      HX COLONOSCOPY      HX GYN      ECTOPIC PREGNANCY SURGERY     HX HIP REPLACEMENT Left     HX LAP CHOLECYSTECTOMY      HX LUMBAR LAMINECTOMY  2020    [id number]    HX WISDOM TEETH EXTRACTION      IR INSERT NON TUNL CVC OVER 5 YRS  2023     Social Hx:  reports that she quit smoking about 22 years ago. Her smoking use included cigarettes. She has a 6.25 pack-year smoking history. She has never used smokeless tobacco. She reports that she does not drink alcohol and does not use drugs. Family Hx: family history includes Breast Cancer in her paternal grandmother; Diabetes in her sister; Hypertension in her mother; Liver Disease in her father. Allergies   Allergen Reactions    Morphine Nausea and Vomiting and Cough     Blood pressure and 158-96 heart rate up to 156.   Patient does not feel that this is a true reaction and denies    [facility] Swelling PATIENT DENIES THIS ALLERGY           OBJECTIVE:  Wt Readings from Last 3 Encounters:   03/06/23 108.4 kg (239 lb)   02/20/23 109 kg (240 lb 4.8 oz)   02/14/23 101.6 kg (223 lb 15.8 oz)       Intake/Output Summary (Last 24 hours) at 3/6/2023 1522  Last data filed at 3/6/2023 0306  Gross per 24 hour   Intake --   Output 2168 ml   Net -2168 ml       Physical Exam:    Vitals:   Vitals:    03/06/23 0930 03/06/23 1021 03/06/23 1116 03/06/23 1400   BP:  128/79 128/79    Pulse:   83 91   Resp:   18    Temp:   97.7 °F (36.5 °C)    SpO2: 97%      Weight:  108.4 kg (239 lb)     Height:  5' 6\" (1.676 m)       Telemetry: NSR    Gen: Well-developed, obese female with NAD  Neck: Supple, No JVD,  Resp: Respirs nonlabored. Clear to auscultation bilaterally. Card: Regular Rate,Rythm, Normal S1, T7,IV/BC systolic murmur best at RUSB. No rubs or gallop. No thrills. Abd:   Soft, non-tender, non-distended, BS+   MSK: No cyanosis  Skin: No rashes    Neuro: Moving all four extremities, follows commands appropriately. Psych: alert, cooperative  LE: tr edema    Data Review:     Radiology:   XR Results (most recent):  Results from Hospital Encounter encounter on 03/01/23    XR CHEST PORT    Narrative  EXAM:  XR CHEST PORT    INDICATION: Shortness of breath    COMPARISON: 3/1/2023    TECHNIQUE: portable chest AP view at 1826 hours    FINDINGS: The cardiac silhouette is within normal limits. The pulmonary  vasculature is within normal limits. There is a small right pleural effusion that has developed. Low lungs volumes. The visualized bones and upper abdomen are age-appropriate.     Impression  Small right pleural effusion      Recent Labs     03/06/23  0258 03/04/23  0646   * 132*   K 3.8 4.5   CL 94* 94*   CO2 28 26   BUN 46* 58*   CREA 6.42* 6.84*   GLU 84 78   PHOS  --  8.2*   CA 8.3* 7.9*     Recent Labs     03/06/23  0258   WBC 10.2   HGB 8.1*   HCT 28.7*        No results for input(s): PTP, INR, AP, INREXT, INREXT in the last 72 hours. No lab exists for component: PTTP, GPT, SGOT  No results for input(s): CHOL, LDLC in the last 72 hours.     No lab exists for component: TGL, HDLC,  HBA1C      Current meds:    Current Facility-Administered Medications:     saline peripheral flush soln 20 mL, 20 mL, InterCATHeter, PRN, Susu Yi MD    saline peripheral flush soln 20 mL, 20 mL, InterCATHeter, PRN, Lilia Michelle MD, 20 mL at 03/06/23 1044    peritoneal dialysis DEXTROSE 2.5% (2.5 mEq/L low calcium) solution 6,000 mL, 6,000 mL, IntraPERitoneal, DAILY, Efraín Bermeo MD, 6,000 mL at 03/05/23 1520    peritoneal dialysis DEXTROSE 2.5% (2.5 mEq/L low calcium) solution 6,000 mL, 6,000 mL, IntraPERitoneal, DAILY, Efraín Bermeo MD, 6,000 mL at 03/05/23 1520    hydrALAZINE (APRESOLINE) 20 mg/mL injection 20 mg, 20 mg, IntraVENous, Q6H PRN, Marj Figueredo MD    nitroglycerin (NITROBID) 2 % ointment 1 Inch, 1 Inch, Topical, BID, Vern WILD MD    bumetanide Jay Spittle) injection 4 mg, 4 mg, IntraVENous, BID, Marj Figueredo MD, 4 mg at 03/06/23 1219    peritoneal dialysis DEXTROSE 2.5% (2.5 mEq/L low calcium) solution 6,000 mL, 6,000 mL, IntraPERitoneal, DAILY, Efraín Bermeo MD, 6,000 mL at 03/05/23 1519    melatonin tablet 3 mg, 3 mg, Oral, QHS PRN, Shanda Schultz NP, 3 mg at 03/04/23 2312    guaiFENesin ER (MUCINEX) tablet 600 mg, 600 mg, Oral, Q12H, [place] MD, 600 mg at 03/06/23 1219    hydrALAZINE (APRESOLINE) tablet 25 mg, 25 mg, Oral, TID, Michelle De La Cruz MD, 25 mg at 03/06/23 1219    metOLazone (ZAROXOLYN) tablet 5 mg, 5 mg, Oral, DAILY, Yanelis Fernandez MD, 5 mg at 03/06/23 1219    gentamicin (GARAMYCIN) 0.1 % cream, , Topical, DIALYSIS PRN, Yanelis Fernandez MD, Given at 03/05/23 1520    amLODIPine (NORVASC) tablet 10 mg, 10 mg, Oral, DAILY, Armin Franz MD, 10 mg at 03/06/23 1219    carvediloL (COREG) tablet 6.25 mg, 6.25 mg, Oral, BID WITH MEALS, Armin Franz MD, 6.25 mg at 03/06/23 1219    albuterol-ipratropium (DUO-NEB) 2.5 MG-0.5 MG/3 ML, 3 mL, Nebulization, Q6H PRN, Phoebe Leon MD    cinacalcet (SENSIPAR) tablet 60 mg, 60 mg, Oral, DAILY WITH BREAKFAST, Phoebe Leon MD, 60 mg at 03/06/23 1219    sodium chloride (NS) flush 5-40 mL, 5-40 mL, IntraVENous, Q8H, Paul Gregory MD, 10 mL at 03/06/23 0540    sodium chloride (NS) flush 5-40 mL, 5-40 mL, IntraVENous, PRN, Phoebe Leon MD    acetaminophen (TYLENOL) tablet 650 mg, 650 mg, Oral, Q6H PRN, 650 mg at 03/03/23 0330 **OR** acetaminophen (TYLENOL) suppository 650 mg, 650 mg, Rectal, Q6H PRN, Paul Carrion MD    polyethylene glycol (MIRALAX) packet 17 g, 17 g, Oral, DAILY PRN, Paul Carrion MD    ondansetron (ZOFRAN ODT) tablet 4 mg, 4 mg, Oral, Q8H PRN **OR** ondansetron (ZOFRAN) injection 4 mg, 4 mg, IntraVENous, Q6H PRN, MD Sera Kinney. GALILEO Alberto    [facility] Cardiology  Call center: Z) [phone] (T) [phone]      CC: Miladys Soliman MD

## 2023-03-06 NOTE — PROGRESS NOTES
Hospital Progress Note    NAME:  Farhad Kwong   :   1960   MRN:  [id number]     Date/Time:  3/6/2023 7:59 AM    Plan:     PD today  Encourage activity as tolerated  Echo  Titrate O2  Risk of Deterioration: Low  []           Moderate  []           High  []                 Assessment:     Principal Problem:    Acute respiratory failure with hypoxia (HCC) (3/1/2023)     Volume overload      Titrate O2       RRT (3/5/23)        Nasal O2 4 liters        Hypercarbic          BIBAP added         Added bumex 4 mg bis and metolazone         CXR - sm r pleural effusion    Active Problems:    HTN (hypertension) (2015)     Titrate home meds      Chronic left-sided lumbar radiculopathy (2015)     Analgesics PRN      Anemia due to chronic kidney disease (6/15/2015)      EPO/renal      Secondary hyperparathyroidism ([facility] [address] (2020)      Volume overload (3/2/2023)      ESRD on peritoneal dialysis [facility]) (3/2/2023)       [de-identified] notes:63 y.o. female who presents with shortness of breath. Patient was recently discharged from [facility] on -after being d/cd  for leg pain and swelling. Pt has  PMHx of Stage V CKD, gout, morbid obesity, s/p R total hip replacement, and HTN who underwent laparoscopic PD catheter insertion and lysis of adhesions on 23 with Vascular Surgeon, Dr. Khadar Adame. Patient follows with nephrology Dr. Juan Carlos Kinsey 8 PD catheter placement was done on  not on dialysis yet patient now came with shortness of breath fluid overload and uremia.       The patient denies any headache, blurry vision, sore throat, trouble swallowing, trouble with speech, chest pain, SOB, cough, fever, chills, N/V/D, abd pain, urinary symptoms, constipation, recent travels, sick contacts, focal or generalized neurological symptoms, falls, injuries, rashes, contact with COVID-19 diagnosed patients, hematemesis, melena, hemoptysis, hematuria, rashes, denies starting any new medications and denies any other concerns or problems besides as mentioned above.       Subjective:   No further cp - breathing comfortable  11 Point Review of Systems:   Negative except no cp    []            Unable to obtain ROS due to:       []            mental status change []            sedated []            intubated     Social History     Tobacco Use    Smoking status: Former     Packs/day: 0.25     Years: 25.00     Pack years: 6.25     Types: Cigarettes     Quit date: 2000     Years since quittin.6    Smokeless tobacco: Never   Substance Use Topics    Alcohol use: No     Medications reviewed:  Current Facility-Administered Medications   Medication Dose Route Frequency    regadenoson (LEXISCAN) injection 0.4 mg  0.4 mg IntraVENous RAD ONCE    saline peripheral flush soln 20 mL  20 mL InterCATHeter PRN    peritoneal dialysis DEXTROSE 2.5% (2.5 mEq/L low calcium) solution 6,000 mL  6,000 mL IntraPERitoneal DAILY    peritoneal dialysis DEXTROSE 2.5% (2.5 mEq/L low calcium) solution 6,000 mL  6,000 mL IntraPERitoneal DAILY    hydrALAZINE (APRESOLINE) 20 mg/mL injection 20 mg  20 mg IntraVENous Q6H PRN    nitroglycerin (NITROBID) 2 % ointment 1 Inch  1 Inch Topical BID    bumetanide (BUMEX) injection 4 mg  4 mg IntraVENous BID    peritoneal dialysis DEXTROSE 2.5% (2.5 mEq/L low calcium) solution 6,000 mL  6,000 mL IntraPERitoneal DAILY    melatonin tablet 3 mg  3 mg Oral QHS PRN    guaiFENesin ER (MUCINEX) tablet 600 mg  600 mg Oral Q12H    hydrALAZINE (APRESOLINE) tablet 25 mg  25 mg Oral TID    metOLazone (ZAROXOLYN) tablet 5 mg  5 mg Oral DAILY    gentamicin (GARAMYCIN) 0.1 % cream   Topical DIALYSIS PRN    amLODIPine (NORVASC) tablet 10 mg  10 mg Oral DAILY    carvediloL (COREG) tablet 6.25 mg  6.25 mg Oral BID WITH MEALS    albuterol-ipratropium (DUO-NEB) 2.5 MG-0.5 MG/3 ML  3 mL Nebulization Q6H PRN    cinacalcet (SENSIPAR) tablet 60 mg  60 mg Oral DAILY WITH BREAKFAST    sodium chloride (NS) flush 5-40 mL  5-40 mL IntraVENous Q8H sodium chloride (NS) flush 5-40 mL  5-40 mL IntraVENous PRN    acetaminophen (TYLENOL) tablet 650 mg  650 mg Oral Q6H PRN    Or    acetaminophen (TYLENOL) suppository 650 mg  650 mg Rectal Q6H PRN    polyethylene glycol (MIRALAX) packet 17 g  17 g Oral DAILY PRN    ondansetron (ZOFRAN ODT) tablet 4 mg  4 mg Oral Q8H PRN    Or    ondansetron (ZOFRAN) injection 4 mg  4 mg IntraVENous Q6H PRN        Objective:   Vitals:  Visit Vitals  /73 (BP 1 Location: Left upper arm, BP Patient Position: Semi fowlers)   Pulse 83   Temp 97.7 °F (36.5 °C)   Resp 18   Ht 5' 6\" (1.676 m)   Wt 239 lb 13.8 oz (108.8 kg)   SpO2 99%   BMI 38.71 kg/m²     Temp (24hrs), Av.7 °F (36.5 °C), Min:97.6 °F (36.4 °C), Max:97.7 °F (36.5 °C)    O2 Flow Rate (L/min): 1 l/min O2 Device: BIPAP    Last 24hr Input/Output:    Intake/Output Summary (Last 24 hours) at 3/6/2023 4824  Last data filed at 3/6/2023 2133  Gross per 24 hour   Intake --   Output 2168 ml   Net -2168 ml          PHYSICAL EXAM:  General:    Alert, cooperative, no distress, appears stated age. Head:   Normocephalic, without obvious abnormality, atraumatic. Eyes:   Conjunctivae/corneas clear. PERRLA  Nose:  Nares normal. No drainage or sinus tenderness. Throat:    Lips, mucosa, and tongue normal.  No Thrush  Neck:  Supple, symmetrical,  no adenopathy, thyroid: non tender    no carotid bruit and no JVD. Back:    Symmetric,  No CVA tenderness. Lungs:   Decreased BS with rhomchi  Heart:   Regular rate and rhythm,  no murmur, rub or gallop. Abdomen:   Soft, non-tender. Not distended.   Bowel sounds normal. No masses        Lab Data Reviewed:    Recent Labs     23  0258 23  0942   WBC 10.2 10.6   HGB 8.1* 8.3*   HCT 28.7* 29.1*    266       Recent Labs     23  0258 23  0646 23  0942   * 132* 132*   K 3.8 4.5 4.4   CL 94* 94* 95*   CO2 28 26 28   GLU 84 78 91   BUN 46* 58* 60*   CREA 6.42* 6.84* 6.86*   CA 8.3* 7.9* 9.2   PHOS  -- 8.2*  --    ALB  --  2.3* 2.5*   TBILI  --   --  0.4   ALT  --   --  9*   INR  --   --  1.0       Lab Results   Component Value Date/Time    Glucose (POC) 140 (H) 03/05/2023 05:46 PM    Glucose (POC) 82 02/14/2023 12:04 PM    Glucose (POC) 78 08/15/2022 08:21 AM    Glucose (POC) 90 11/09/2020 08:36 AM    Glucose,  (H) 03/05/2023 05:52 PM     Recent Labs     03/05/23  1752   HCO3 29     Recent Labs     03/03/23  0942   INR 1.0     ___________________________________________________  ___________________________________________________    Attending Physician: Levi Kamara MD

## 2023-03-06 NOTE — PROGRESS NOTES
Problem: Mobility Impaired (Adult and Pediatric)  Goal: *Acute Goals and Plan of Care (Insert Text)  Description: FUNCTIONAL STATUS PRIOR TO ADMISSION: Patient was modified independent using a rolling walker for functional mobility since very recent hospitalization. She reports that HHPT has just started and that baseline is room air. HOME SUPPORT PRIOR TO ADMISSION: The patient lived with her  and sons but did not require assist for mobility using adaptive equipment. Physical Therapy Goals  Initiated 3/3/2023  1. Patient will move from supine to sit and sit to supine , scoot up and down, and roll side to side in bed with modified independence within 7 day(s). 2.  Patient will transfer from bed to chair and chair to bed with modified independence using the least restrictive device within 7 day(s). 3.  Patient will perform sit to stand with modified independence within 7 day(s). 4.  Patient will ambulate with modified independence for 50 feet with the least restrictive device within 7 day(s). Outcome: Progressing Towards Goal   PHYSICAL THERAPY TREATMENT  Patient: Marga Pichardo (79 y.o. female)  Date: 3/6/2023  Diagnosis: Respiratory failure (HCC) [J96.90] Acute respiratory failure with hypoxia (HCC)      Precautions: Fall, bed/chair alarm  Chart, physical therapy assessment, plan of care and goals were reviewed. ASSESSMENT  Patient continues with skilled PT services and is progressing towards goals. Pt able to make progress today with some amb and remained up to the chair post session in NAD. Note some slight balance impairment, but overall gait was stable with a rolling walker for support (pt reports using one prior to admission). She was on room air during session and briefly her Sp02 dipped to 88% and then promptly gaby into the 90s. Did not have to utilize any supplemental 02 during our session. She remains on track for discharge to home. .     Of note, given that pt has essentially the smallest room on the unit and has been using BIPAP and peritoneal, I high recommend a room change as soon as possible. Current Level of Function Impacting Discharge (mobility/balance): brief mod assist to stand, suspect that with some repetition and increased opportunities to mobilize this will quickly improve. Other factors to consider for discharge: recent hospitalization, CKD and new to peritoneal dialysis         PLAN :  Patient continues to benefit from skilled intervention to address the above impairments. Continue treatment per established plan of care. to address goals. Recommendation for discharge: (in order for the patient to meet his/her long term goals)  Physical therapy at least 2 days/week in the home AND ensure assist and/or supervision for safety with mobility    This discharge recommendation:  A follow-up discussion with the attending provider and/or case management is planned    IF patient discharges home will need the following DME: patient owns DME required for discharge       SUBJECTIVE:   Patient stated Are you the physical therapist?    OBJECTIVE DATA SUMMARY:   Chart checked, pt cleared by nursing  Critical Behavior:  Neurologic State: Alert, Eyes open spontaneously  Orientation Level: Oriented X4  Cognition: Follows commands, Appropriate for age attention/concentration  Safety/Judgement: Insight into deficits  Functional Mobility Training:  Bed Mobility:  Rolling: Supervision  Supine to Sit: Stand-by assistance              Transfers:  Sit to Stand: Moderate assistance;Assist x1  Stand to Sit: Contact guard assistance;Assist x1                             Balance:  Sitting: Intact; Without support  Standing: Impaired  Standing - Static: Constant support;Good  Standing - Dynamic : Constant support; Fair  Ambulation/Gait Training:  Distance (ft): 25 Feet (ft)  Assistive Device: Gait belt;Walker, rolling  Ambulation - Level of Assistance: Contact guard assistance Gait Abnormalities: Decreased step clearance        Base of Support: Widened     Speed/Adriana: Slow;Pace decreased (<100 feet/min)  Step Length: Left shortened;Right shortened                    Stairs: Therapeutic Exercises:   Pursed lip breathing  Pain Rating:  None rated/mentioned    Activity Tolerance:   Good, SpO2 stable on RA, and see assessment    After treatment patient left in no apparent distress:   Sitting in chair, Call bell within reach, and Bed / chair alarm activated    COMMUNICATION/COLLABORATION:   The patients plan of care was discussed with: Occupational therapist and Registered nurse.      Zehra Knott   Time Calculation: 26 mins

## 2023-03-06 NOTE — PROGRESS NOTES
Problem: Patient Education: Go to Patient Education Activity  Goal: Patient/Family Education  Outcome: Progressing Towards Goal     Problem: Hypertension  Goal: *Blood pressure within specified parameters  Outcome: Progressing Towards Goal  Goal: *Fluid volume balance  Outcome: Progressing Towards Goal     Problem: Falls - Risk of  Goal: *Absence of Falls  Description: Document Rosalind Fall Risk and appropriate interventions in the flowsheet.   Outcome: Progressing Towards Goal  Note: Fall Risk Interventions:  Mobility Interventions: Bed/chair exit alarm, Communicate number of staff needed for ambulation/transfer, Patient to call before getting OOB, Utilize walker, cane, or other assistive device         Medication Interventions: Bed/chair exit alarm, Evaluate medications/consider consulting pharmacy, Patient to call before getting OOB    Elimination Interventions: Bed/chair exit alarm, Call light in reach, Toileting schedule/hourly rounds, Stay With Me (per policy)              Problem: Patient Education: Go to Patient Education Activity  Goal: Patient/Family Education  Outcome: Progressing Towards Goal     Problem: Patient Education: Go to Patient Education Activity  Goal: Patient/Family Education  Outcome: Progressing Towards Goal

## 2023-03-06 NOTE — PROGRESS NOTES
Problem: Falls - Risk of  Goal: *Absence of Falls  Description: Document Tracey Sneed Fall Risk and appropriate interventions in the flowsheet.   Outcome: Progressing Towards Goal  Note: Fall Risk Interventions:  Mobility Interventions: Bed/chair exit alarm, Communicate number of staff needed for ambulation/transfer, Patient to call before getting OOB, Utilize walker, cane, or other assistive device         Medication Interventions: Bed/chair exit alarm, Evaluate medications/consider consulting pharmacy, Patient to call before getting OOB    Elimination Interventions: Bed/chair exit alarm, Call light in reach, Toileting schedule/hourly rounds, Stay With Me (per policy)              Problem: Patient Education: Go to Patient Education Activity  Goal: Patient/Family Education  Outcome: Progressing Towards Goal     Problem: Hypertension  Goal: *Blood pressure within specified parameters  Outcome: Progressing Towards Goal  Goal: *Fluid volume balance  Outcome: Progressing Towards Goal     Problem: Patient Education: Go to Patient Education Activity  Goal: Patient/Family Education  Outcome: Progressing Towards Goal     Problem: Patient Education: Go to Patient Education Activity  Goal: Patient/Family Education  Outcome: Progressing Towards Goal

## 2023-03-06 NOTE — PROGRESS NOTES
Transition of Care: hopefully home with ZANE for home health with [address] [place] [address] (they have accepted her back) and resuming her OP peritoneal dialysis at [facility]: if patient goes home with home health then CM still needs to  get ZANE order for home health SN/PT/OT-[facility]     Transport Plan: possibly in car with family     RUR: 24%     1st IM medicare letter signed on 3/2/23     Main contact is - [facility]- [phone]     Discharge pending:  -pending inpatient dialysis  -pending echo (to be done on 3/6)  -pending weaning of O2; patient does not use home O2          CM following  Basim Vallecillo RN     NOTE:  patient at bedside; she is alert and oriented x 4; patient lives at stated address with her ; trilevel home with 5 steps; has a cane, BSC, walker; fairly independent in her ADLs; she just started NEW OP peritoneal dialysis at the [facility] PD clinic- [address] [place] [address] [phone] on 2/23/23; patient was also followed by [address] [place] [address] prior to this readmission; patient states she does not use any home O2; has supportive ; patient stated she would like to resume with [facility]; referral sent via [facility]       CM following  Basim Vallecillo RN             Revision History                                                    Revision History

## 2023-03-07 LAB
ALBUMIN SERPL-MCNC: 2.3 G/DL (ref 3.5–5)
ANION GAP SERPL CALC-SCNC: 9 MMOL/L (ref 5–15)
BUN SERPL-MCNC: 42 MG/DL (ref 6–20)
BUN/CREAT SERPL: 6 (ref 12–20)
CALCIUM SERPL-MCNC: 8.1 MG/DL (ref 8.5–10.1)
CHLORIDE SERPL-SCNC: 96 MMOL/L (ref 97–108)
CO2 SERPL-SCNC: 28 MMOL/L (ref 21–32)
COMMENT, HOLDF: NORMAL
CREAT SERPL-MCNC: 6.77 MG/DL (ref 0.55–1.02)
GLUCOSE SERPL-MCNC: 110 MG/DL (ref 65–100)
MAGNESIUM SERPL-MCNC: 1.7 MG/DL (ref 1.6–2.4)
PHOSPHATE SERPL-MCNC: 7.6 MG/DL (ref 2.6–4.7)
POTASSIUM SERPL-SCNC: 3.3 MMOL/L (ref 3.5–5.1)
SAMPLES BEING HELD,HOLD: NORMAL
SODIUM SERPL-SCNC: 133 MMOL/L (ref 136–145)

## 2023-03-07 PROCEDURE — 97535 SELF CARE MNGMENT TRAINING: CPT

## 2023-03-07 PROCEDURE — A4726 DIALYS SOL FLD VOL > 5999CC: HCPCS | Performed by: INTERNAL MEDICINE

## 2023-03-07 PROCEDURE — 65660000001 HC RM ICU INTERMED STEPDOWN

## 2023-03-07 PROCEDURE — 97530 THERAPEUTIC ACTIVITIES: CPT

## 2023-03-07 PROCEDURE — 83735 ASSAY OF MAGNESIUM: CPT

## 2023-03-07 PROCEDURE — 74011250636 HC RX REV CODE- 250/636: Performed by: INTERNAL MEDICINE

## 2023-03-07 PROCEDURE — 74011250637 HC RX REV CODE- 250/637: Performed by: INTERNAL MEDICINE

## 2023-03-07 PROCEDURE — 90945 DIALYSIS ONE EVALUATION: CPT

## 2023-03-07 PROCEDURE — 80069 RENAL FUNCTION PANEL: CPT

## 2023-03-07 PROCEDURE — 97116 GAIT TRAINING THERAPY: CPT

## 2023-03-07 PROCEDURE — 36415 COLL VENOUS BLD VENIPUNCTURE: CPT

## 2023-03-07 PROCEDURE — 99231 SBSQ HOSP IP/OBS SF/LOW 25: CPT | Performed by: INTERNAL MEDICINE

## 2023-03-07 PROCEDURE — 74011250637 HC RX REV CODE- 250/637: Performed by: HOSPITALIST

## 2023-03-07 PROCEDURE — 74011000250 HC RX REV CODE- 250: Performed by: HOSPITALIST

## 2023-03-07 PROCEDURE — 74011000250 HC RX REV CODE- 250: Performed by: INTERNAL MEDICINE

## 2023-03-07 RX ORDER — LANOLIN ALCOHOL/MO/W.PET/CERES
400 CREAM (GRAM) TOPICAL DAILY
Status: COMPLETED | OUTPATIENT
Start: 2023-03-07 | End: 2023-03-09

## 2023-03-07 RX ORDER — POTASSIUM CHLORIDE 750 MG/1
20 TABLET, FILM COATED, EXTENDED RELEASE ORAL
Status: COMPLETED | OUTPATIENT
Start: 2023-03-07 | End: 2023-03-07

## 2023-03-07 RX ORDER — BENZONATATE 100 MG/1
100 CAPSULE ORAL
Status: DISCONTINUED | OUTPATIENT
Start: 2023-03-07 | End: 2023-03-09 | Stop reason: HOSPADM

## 2023-03-07 RX ORDER — LANOLIN ALCOHOL/MO/W.PET/CERES
6 CREAM (GRAM) TOPICAL
Status: DISCONTINUED | OUTPATIENT
Start: 2023-03-07 | End: 2023-03-09 | Stop reason: HOSPADM

## 2023-03-07 RX ADMIN — SODIUM CHLORIDE, SODIUM LACTATE, CALCIUM CHLORIDE, MAGNESIUM CHLORIDE AND DEXTROSE 6000 ML: 2.5; 538; 448; 18.3; 5.08 INJECTION, SOLUTION INTRAPERITONEAL at 09:00

## 2023-03-07 RX ADMIN — SODIUM CHLORIDE, PRESERVATIVE FREE 10 ML: 5 INJECTION INTRAVENOUS at 05:03

## 2023-03-07 RX ADMIN — MAGNESIUM OXIDE 400 MG (241.3 MG MAGNESIUM) TABLET 400 MG: TABLET at 13:16

## 2023-03-07 RX ADMIN — SODIUM CHLORIDE, PRESERVATIVE FREE 10 ML: 5 INJECTION INTRAVENOUS at 21:49

## 2023-03-07 RX ADMIN — GUAIFENESIN 600 MG: 600 TABLET, EXTENDED RELEASE ORAL at 08:40

## 2023-03-07 RX ADMIN — CINACALCET HYDROCHLORIDE 60 MG: 30 TABLET, FILM COATED ORAL at 08:39

## 2023-03-07 RX ADMIN — Medication 6 MG: at 20:04

## 2023-03-07 RX ADMIN — BUMETANIDE 2 MG: 0.25 INJECTION INTRAMUSCULAR; INTRAVENOUS at 08:39

## 2023-03-07 RX ADMIN — POTASSIUM CHLORIDE 20 MEQ: 750 TABLET, FILM COATED, EXTENDED RELEASE ORAL at 13:16

## 2023-03-07 RX ADMIN — SODIUM CHLORIDE, PRESERVATIVE FREE 10 ML: 5 INJECTION INTRAVENOUS at 16:48

## 2023-03-07 RX ADMIN — BENZONATATE 100 MG: 100 CAPSULE ORAL at 20:07

## 2023-03-07 RX ADMIN — HYDRALAZINE HYDROCHLORIDE 25 MG: 25 TABLET, FILM COATED ORAL at 16:42

## 2023-03-07 RX ADMIN — HYDRALAZINE HYDROCHLORIDE 25 MG: 25 TABLET, FILM COATED ORAL at 21:48

## 2023-03-07 RX ADMIN — GENTAMICIN SULFATE: 1 CREAM TOPICAL at 09:00

## 2023-03-07 RX ADMIN — BUMETANIDE 2 MG: 0.25 INJECTION INTRAMUSCULAR; INTRAVENOUS at 18:03

## 2023-03-07 RX ADMIN — CARVEDILOL 6.25 MG: 6.25 TABLET, FILM COATED ORAL at 16:42

## 2023-03-07 NOTE — PROGRESS NOTES
Problem: Falls - Risk of  Goal: *Absence of Falls  Description: Document Cristiano Madsen Fall Risk and appropriate interventions in the flowsheet. Outcome: Progressing Towards Goal  Note: Fall Risk Interventions:  Mobility Interventions: Bed/chair exit alarm, Communicate number of staff needed for ambulation/transfer, Patient to call before getting OOB, Utilize walker, cane, or other assistive device         Medication Interventions: Bed/chair exit alarm, Evaluate medications/consider consulting pharmacy, Patient to call before getting OOB    Elimination Interventions: Bed/chair exit alarm, Call light in reach, Toileting schedule/hourly rounds, Stay With Me (per policy)              Problem: Patient Education: Go to Patient Education Activity  Goal: Patient/Family Education  Outcome: Progressing Towards Goal     Problem: Hypertension  Goal: *Blood pressure within specified parameters  Outcome: Progressing Towards Goal  Goal: *Fluid volume balance  Outcome: Progressing Towards Goal     Problem: Patient Education: Go to Patient Education Activity  Goal: Patient/Family Education  Outcome: Progressing Towards Goal     Problem: Patient Education: Go to Patient Education Activity  Goal: Patient/Family Education  Outcome: Progressing Towards Goal     Problem: Pressure Injury - Risk of  Goal: *Prevention of pressure injury  Description: Document Jose Scale and appropriate interventions in the flowsheet.   Outcome: Progressing Towards Goal  Note: Pressure Injury Interventions:  Sensory Interventions: Assess changes in LOC    Moisture Interventions: Absorbent underpads    Activity Interventions: Increase time out of bed, PT/OT evaluation    Mobility Interventions: PT/OT evaluation, HOB 30 degrees or less    Nutrition Interventions: Document food/fluid/supplement intake                     Problem: Patient Education: Go to Patient Education Activity  Goal: Patient/Family Education  Outcome: Progressing Towards Goal

## 2023-03-07 NOTE — DIALYSIS
Peritoneal Dialysis Disconnection / [phone]     Metrics   I-Drain: 48   Total UF: 2088   Last Fill: 0   Last Manual Drain: 0   Net UF:         2136   Avg Dwell Time: 1:14   Lost Dwell Time: 0:04   Alarms: No alarms recorded   Effluent: Clear;yellow visualized in toilet     Access   Type & Location: Mid abdomen,    Comments: Prior to disconnection, one-minute Alcavis scrub performed, followed by one minute soak per policy. Sterile mini cap applied and secured to abdomen. Dsg clean, dry and intact. Dsg date: 03/06/23                                           Comments:   Treatment completed without incident.

## 2023-03-07 NOTE — DIALYSIS
Peritoneal Dialysis Initiation / [phone]     Orders   Therapy Time:  10 hours   Cycles: 6   Fill Volume: 2000 ml   Last Fill Volume: 0   Total Volume: 56783   Solution: Dextrose Dianeal bags 2.5% x 3      Access   Type & Location: Right lower quadrant    Comments: Prior to connection, one-minute Alcavis scrub performed. Followed by one minute soak. Dsg change date:            03/06/23 dried blood noted on old dressing, tunnel not adhering around catheter and skin integrity on underneath  in question. Communicated with Dr. Brook Ramsay who will evaluate in AM                        Labs   HBsAg (Antigen) / date:                 02/17/23 negative                              HBsAb (Antibody) / date: 02/17/23 susceptible   Source: epic     Safety:   Time Out Done:   (Time) 1745   Consent obtained/signed: Chronic consent applies   Education: Pd catheter care   Primary Nurse Rpt Pre: Navi Judge RN   Primary Nurse Rpt Post: BENI Fam RN     Comments:   Pt orders, notes, labs, code status reviewed. Start time: 1750  Estimated End Time: 7392    Remained present during initial drain followed by initiation of first fill. Prior to departure, bed in lowest position, call bell and personal belongings within reach.

## 2023-03-07 NOTE — PROGRESS NOTES
Problem: Mobility Impaired (Adult and Pediatric)  Goal: *Acute Goals and Plan of Care (Insert Text)  Description: FUNCTIONAL STATUS PRIOR TO ADMISSION: Patient was modified independent using a rolling walker for functional mobility since very recent hospitalization. She reports that HHPT has just started and that baseline is room air. HOME SUPPORT PRIOR TO ADMISSION: The patient lived with her  and sons but did not require assist for mobility using adaptive equipment. Physical Therapy Goals  Initiated 3/3/2023  1. Patient will move from supine to sit and sit to supine , scoot up and down, and roll side to side in bed with modified independence within 7 day(s). 2.  Patient will transfer from bed to chair and chair to bed with modified independence using the least restrictive device within 7 day(s). 3.  Patient will perform sit to stand with modified independence within 7 day(s). 4.  Patient will ambulate with modified independence for 50 feet with the least restrictive device within 7 day(s). Outcome: Progressing Towards Goal   PHYSICAL THERAPY TREATMENT  Patient: Devon Zhao (72 y.o. female)  Date: 3/7/2023  Diagnosis: Respiratory failure ([facility] [address] [J96.90] Acute respiratory failure with hypoxia (HCC)      Precautions: Fall, Bed Alarm  Chart, physical therapy assessment, plan of care and goals were reviewed. ASSESSMENT  Patient continues with skilled PT services and is progressing towards goals. Pt continues to make gains and remains on track for discharge to home. Today she did report left knee pain while just resting in the bed but then reported that is went to a 0/10 during session. She was able to amb increased distance and remain up to the chair post session. She was on room air during our session and her Sp02 remained stable 94-98%. Discussed with her a goal of working towards meals up to the chair (getting up with the assist of staff).      Current Level of Function Impacting Discharge (mobility/balance): at most: min assist to stand from the bed and mod assist to stand from a chair, otherwise contact guard to stand by assist    Other factors to consider for discharge: recent hospitalization, CKD and new to peritoneal dialysis         PLAN :  Patient continues to benefit from skilled intervention to address the above impairments. Continue treatment per established plan of care. to address goals. Recommendation for discharge: (in order for the patient to meet his/her long term goals)  Physical therapy at least 2 days/week in the home AND ensure assist and/or supervision for safety with mobility prn    This discharge recommendation:  A follow-up discussion with the attending provider and/or case management is planned    IF patient discharges home will need the following DME: patient owns DME required for discharge       SUBJECTIVE:   Patient stated my knee is hurting, referring to her left knee    OBJECTIVE DATA SUMMARY:   Chart checked, pt cleared by nursing  Critical Behavior:  Neurologic State: Alert, Eyes open spontaneously  Orientation Level: Oriented X4  Cognition: Follows commands, Appropriate for age attention/concentration  Safety/Judgement: Insight into deficits  Functional Mobility Training:  Bed Mobility:  Rolling: Modified independent  Supine to Sit: Supervision;Bed Modified; Adaptive equipment              Transfers:  Sit to Stand: Minimum assistance; Moderate assistance (min from bed and mod from chair)  Stand to Sit: Contact guard assistance                             Balance:  Sitting: Intact; Without support  Standing: Impaired  Standing - Static: Constant support;Good  Standing - Dynamic : Constant support;Good  Ambulation/Gait Training:  Distance (ft): 60 Feet (ft)  Assistive Device: Gait belt;Walker, rolling  Ambulation - Level of Assistance: Contact guard assistance        Gait Abnormalities: Decreased step clearance        Base of Support: Widened Speed/Adriana: Slow  Step Length: Left shortened;Right shortened                    Stairs: Therapeutic Exercises:   Marching in place as a warm up ex  Pain Rating:  Left knee: 2-3/10 at rest, 7/10 with movement and then to 0/10 (no pain) during amb    Activity Tolerance:   Good, SpO2 stable on RA, and see assessment    After treatment patient left in no apparent distress:   Sitting in chair, Call bell within reach, and Bed / chair alarm activated    COMMUNICATION/COLLABORATION:   The patients plan of care was discussed with: Occupational therapist and Registered nurse.      Carlos Adonis   Time Calculation: 41 mins

## 2023-03-07 NOTE — PROGRESS NOTES
0800: Bedside and Verbal shift change report given to [address] [place] [address] (oncoming nurse) by Jyoti Chen RN (offgoing nurse). Report included the following information SBAR and Kardex. 1500: Pt expressed concerns for lack of sleep and coughing at nighttime, Dr. Mj Medina paged. Orders received for tessalon capsules and increased dose of melatonin from 3 mg to 6 mg.

## 2023-03-07 NOTE — PROGRESS NOTES
[facility]    Renal Progress Note    NAME:  Isela Elena   :   1960   MRN:   [id number]     Date/Time:  3/7/2023 10:25 AM      Assessment:     Resp Failure-improved now on RA, requires O2 at night  Fluid Overload-improved  ESRD --> s/p PD catheter ()   Anemia in CKD  Hyperparathyroidism  hypokalemia       Plan:     CCPD with 2 litre / low volume exchanges ( 6 exchanges --> 10 hour prescription). C/w 2.5% Dianeal.  Decrease Bumex 2 MG IV BID+ c/w metolazone (5 mg once daily). Will change to po bumex on DC  Replete K/mg  S/p EPO x1- 3/  Sensipar  2 gram sodium / renal diet. Daily weights. Follow lytes daily. Is and Os  Ativan for anxiety         Subjective:         Still feels SOB, on 2 lit O2. Sat 96%. Had chest pain this morning, has cough. Bps better this am.    3-4-23 remains dyspneic and with non productive cough, no significant UF on current PD Rx. Also c/o of being cold    3-5 able to UF 1234 cc with dialysis adjustment. Very anxious, BP remains elevated and O2 sat 92% on RA (has O2 off). No N/V, mild abd discomfort, able to eat. . Will give small IV dose ativan then oral. DW dialysis RN    3/6 feels better, not SOB on RA. Had stress test this morning which was negative. No issues with CCPD, had 1900 ml UF last night. Bps have improved    3/. Feels well, required O2 overnight. Now sat 93% on RA. Had Bms. PD cath site examined with PD nurse. Not healed well yet.        Medications reviewed:  Current Facility-Administered Medications   Medication Dose Route Frequency    saline peripheral flush soln 20 mL  20 mL InterCATHeter PRN    saline peripheral flush soln 20 mL  20 mL InterCATHeter PRN    bumetanide (BUMEX) injection 2 mg  2 mg IntraVENous BID    peritoneal dialysis DEXTROSE 2.5% (2.5 mEq/L low calcium) solution 6,000 mL  6,000 mL IntraPERitoneal DAILY    peritoneal dialysis DEXTROSE 2.5% (2.5 mEq/L low calcium) solution 6,000 mL  6,000 mL IntraPERitoneal DAILY    hydrALAZINE (APRESOLINE) 20 mg/mL injection 20 mg  20 mg IntraVENous Q6H PRN    nitroglycerin (NITROBID) 2 % ointment 1 Inch  1 Inch Topical BID    peritoneal dialysis DEXTROSE 2.5% (2.5 mEq/L low calcium) solution 6,000 mL  6,000 mL IntraPERitoneal DAILY    melatonin tablet 3 mg  3 mg Oral QHS PRN    guaiFENesin ER (MUCINEX) tablet 600 mg  600 mg Oral Q12H    hydrALAZINE (APRESOLINE) tablet 25 mg  25 mg Oral TID    metOLazone (ZAROXOLYN) tablet 5 mg  5 mg Oral DAILY    gentamicin (GARAMYCIN) 0.1 % cream   Topical DIALYSIS PRN    amLODIPine (NORVASC) tablet 10 mg  10 mg Oral DAILY    carvediloL (COREG) tablet 6.25 mg  6.25 mg Oral BID WITH MEALS    albuterol-ipratropium (DUO-NEB) 2.5 MG-0.5 MG/3 ML  3 mL Nebulization Q6H PRN    cinacalcet (SENSIPAR) tablet 60 mg  60 mg Oral DAILY WITH BREAKFAST    sodium chloride (NS) flush 5-40 mL  5-40 mL IntraVENous Q8H    sodium chloride (NS) flush 5-40 mL  5-40 mL IntraVENous PRN    acetaminophen (TYLENOL) tablet 650 mg  650 mg Oral Q6H PRN    Or    acetaminophen (TYLENOL) suppository 650 mg  650 mg Rectal Q6H PRN    polyethylene glycol (MIRALAX) packet 17 g  17 g Oral DAILY PRN    ondansetron (ZOFRAN ODT) tablet 4 mg  4 mg Oral Q8H PRN    Or    ondansetron (ZOFRAN) injection 4 mg  4 mg IntraVENous Q6H PRN        Objective:   Vitals:  Visit Vitals  /69 (BP 1 Location: Right upper arm, BP Patient Position: Lying)   Pulse 88   Temp 98.3 °F (36.8 °C)   Resp 19   Ht 5' 6\" (1.676 m)   Wt 110.4 kg (243 lb 6.2 oz)   SpO2 97%   BMI 39.28 kg/m²     Temp (24hrs), Av °F (36.7 °C), Min:97.7 °F (36.5 °C), Max:98.3 °F (36.8 °C)    O2 Flow Rate (L/min): 2 l/min O2 Device: Nasal cannula    Last 24hr Input/Output:    Intake/Output Summary (Last 24 hours) at 3/7/2023 0908  Last data filed at 3/7/2023 0630  Gross per 24 hour   Intake --   Output 2286 ml   Net -2286 ml          PHYSICAL EXAM:      Seen in Room 422. General:    Alert, cooperative, no distress, appears stated age. Anxious    Head:   Normocephalic. Eyes:   No icterus. Lungs:   CTA    Heart:   RRR, No S3  gallop, No pericardial rub. Abdomen:   Not distended, pd catheter --> right lower quadrant. The exit site open still no granulation tissue, no drainage      Extremities: Leg edema trace    Psych:  anxious. Neurologic: Alert and oriented .         []        Telemetry Reviewed     []        NSR []        PAC/PVCs   []        Afib  []        Paced   []        NSVT   []        Zavala []        NGT  []        Intubated on vent    Lab Data Reviewed:    Recent Results (from the past 24 hour(s))   NUCLEAR CARDIAC STRESS TEST    Collection Time: 03/06/23 11:44 AM   Result Value Ref Range    Stress Target  bpm    Exercise Duration Time 3 min    Exercuse Duration Seconds 0 sec    Stress Systolic  mmHg    Stress Diastolic BP 79 mmHg    Stress Peak  BPM    Baseline HR 87 BPM    Stress Estimated Workload 1.0 METS    Stress Rate Pressure Product 13,056 BPM*mmHg    Stress Percent HR Achieved 66 %    Baseline Systolic  mmHg    Baseline Diastolic BP 79 mmHg    Stress Stage 1 Duration 1 min:sec    Stress Stage 1 HR 87 bpm    Stress Stage 1 /72 mmHg    Stress Stage 2 Duration 1 min:sec    Stress Stage 2  bpm    Stress Stage 3 Duration 1 min:sec    Stress Stage 3 HR 98 bpm    Recovery Stage 1 Duration 1 min:sec    Recovery Stage 1  bpm    Recovery Stage 1 /75 mmHg    Recovery Stage 2 Duration 1 min:sec    Recovery Stage 2 HR 98 bpm    Recovery Stage 3 Duration 1 min:sec    Recovery Stage 3 HR 96 bpm    Recovery Stage 3 /70 mmHg   RENAL FUNCTION PANEL    Collection Time: 03/07/23  3:29 AM   Result Value Ref Range    Sodium 133 (L) 136 - 145 mmol/L    Potassium 3.3 (L) 3.5 - 5.1 mmol/L    Chloride 96 (L) 97 - 108 mmol/L    CO2 28 21 - 32 mmol/L    Anion gap 9 5 - 15 mmol/L    Glucose 110 (H) 65 - 100 mg/dL    BUN 42 (H) 6 - 20 MG/DL    Creatinine 6.77 (H) 0.55 - 1.02 MG/DL BUN/Creatinine ratio 6 (L) 12 - 20      eGFR 6 (L) >60 ml/min/1.73m2    Calcium 8.1 (L) 8.5 - 10.1 MG/DL    Phosphorus 7.6 (H) 2.6 - 4.7 MG/DL    Albumin 2.3 (L) 3.5 - 5.0 g/dL   MAGNESIUM    Collection Time: 03/07/23  3:29 AM   Result Value Ref Range    Magnesium 1.7 1.6 - 2.4 mg/dL   SAMPLES BEING HELD    Collection Time: 03/07/23  3:29 AM   Result Value Ref Range    SAMPLES BEING HELD 1lav     COMMENT        Add-on orders for these samples will be processed based on acceptable specimen integrity and analyte stability, which may vary by analyte.            Total time spent with patient:  []        15   []        25   []        35   []         __ minutes    []        Critical Care Provided    Care Plan discussed with: PD nurse Ekta Wright          [x]        Patient   []        Family    []        Care Manager   []        Consultant/Specialist :      []          >50% of visit spent in counseling and coordination of care   (Discussed []        CODE status,  []        Care Plan, []        D/C Planning)    ___________________________________________________    Attending Physician: Tyrone Viveros MD

## 2023-03-07 NOTE — DIALYSIS
In with Dr. Cheryl Berger, for change of PD catheter dressing and evaluation of tunnel, dry tract, dried blood on dressing, changed per policy and procedure using gentamicin cream and no further orders at this time continue to monitor

## 2023-03-07 NOTE — PROGRESS NOTES
Problem: Self Care Deficits Care Plan (Adult)  Goal: *Acute Goals and Plan of Care (Insert Text)  Description:   FUNCTIONAL STATUS PRIOR TO ADMISSION: Patient was modified independent using a rolling walker for functional mobility. [facility] therapy had just been set up, would like to resume upon discharge. Has family assist PRN for ADL like bathing (unable to go up to 2nd floor full bathroom and is staying on 1st floor) and IADL    HOME SUPPORT: The patient lived with /sons, required some assist for ADL/IADL    Occupational Therapy Goals  Initiated 3/3/2023  1. Patient will perform grooming with supervision/set-up within 7 day(s). 2.  Patient will perform upper body dressing with minimal assistance/contact guard assist within 7 day(s). 3.  Patient will perform lower body dressing with moderate assistance  within 7 day(s). 4.  Patient will perform all aspects of toileting with minimal assistance/contact guard assist within 7 day(s). 5.  Patient will utilize energy conservation techniques during functional activities with verbal cues within 7 day(s). Outcome: Progressing Towards Goal   OCCUPATIONAL THERAPY TREATMENT  Patient: Ana M Del Cid (40 y.o. female)  Date: 3/7/2023  Diagnosis: Respiratory failure ([facility] [address] [J96.90] Acute respiratory failure with hypoxia (HCC)      Precautions: Fall, Bed Alarm  Chart, occupational therapy assessment, plan of care, and goals were reviewed. ASSESSMENT  Patient continues with skilled OT services and is progressing towards goals. Patient received OOB in chair, amenable to session. Completed seated grooming with supervision/set up. Educated on energy conservation techniques to maximize ADL independence and safety at home, patient verbalized understanding and demo'd good carryover during session. Completed transfers with Min/Mod A, benefits from general safety cueing. Left reclined in bed, all needs in reach, NAD.  Continue to anticipate home with [facility] pending progress Current Level of Function Impacting Discharge (ADLs): x1 assist ADL/mobility    Other factors to consider for discharge: below baseline,x1 assist         PLAN :  Patient continues to benefit from skilled intervention to address the above impairments. Continue treatment per established plan of care to address goals. Recommend with staff: OOB 3x daily for meals, functional mobility to bathroom    Recommend next OT session: OOB ADL, standing grooming    Recommendation for discharge: (in order for the patient to meet his/her long term goals)  Occupational therapy at least 2 days/week in the home AND ensure assist and/or supervision for safety with ADL/iADL    This discharge recommendation:  Has been made in collaboration with the attending provider and/or case management    IF patient discharges home will need the following DME: TBD pending progress       SUBJECTIVE:   Patient stated I am feeling a little better.     OBJECTIVE DATA SUMMARY:   Cognitive/Behavioral Status:  Neurologic State: Alert  Orientation Level: Oriented X4  Cognition: Appropriate decision making; Appropriate safety awareness; Follows commands  Perception: Appears intact  Perseveration: No perseveration noted  Safety/Judgement: Awareness of environment; Fall prevention; Insight into deficits    Functional Mobility and Transfers for ADLs:  Bed Mobility:  Rolling: Modified independent  Supine to Sit: Supervision;Bed Modified; Adaptive equipment  Sit to Supine: Stand-by assistance    Transfers:  Sit to Stand: Minimum assistance; Moderate assistance (min from bed and mod from chair)     Bed to Chair: Minimum assistance    Balance:  Sitting: Intact; Without support  Standing: Impaired  Standing - Static: Constant support;Good  Standing - Dynamic : Constant support;Good    ADL Intervention:       Grooming  Position Performed: Seated in chair  Brushing Teeth: Supervision  Cues: Verbal cues provided                        Lower Body Dressing Assistance  Socks: Maximum assistance  Leg Crossed Method Used: Yes  Position Performed: Seated in chair  Cues: Verbal cues provided;Physical assistance         Cognitive Retraining  Safety/Judgement: Awareness of environment; Fall prevention; Insight into deficits      Pain:  None reported    Activity Tolerance:   Fair and requires rest breaks    After treatment patient left in no apparent distress:   Supine in bed, Heels elevated for pressure relief, Call bell within reach, and Side rails x 3    COMMUNICATION/COLLABORATION:   The patients plan of care was discussed with: Physical therapist and Registered nurse.      Riddhi Barton OT  Time Calculation: 30 mins

## 2023-03-07 NOTE — DIALYSIS
Peritoneal Dialysis Initiation / [phone]     Orders   Therapy Time:  10 hours   Cycles: 6   Fill Volume: 2000 ml   Last Fill Volume: 0   Total Volume: 97900   Solution: Dextrose Dianeal bags 2.5% x 3      Access   Type & Location: Right lower quadrant    Comments: Prior to connection, one-minute Alcavis scrub performed. Followed by one minute soak. Dsg change date:            AM 03/07/23 with Dr. Odin Diego present to evaluate tunnel. Labs   HBsAg (Antigen) / date:                 02/17/23 negative                              HBsAb (Antibody) / date: 02/17/23 susceptible   Source: epic     Safety:   Time Out Done:   (Time) 0333   Consent obtained/signed: Chronic consent applies   Education: Pd catheter care   Primary Nurse Rpt Pre: Herb Lilly RN    Primary Nurse Rpt Post: Herb Lilly RN     Comments:   Pt orders, notes, labs, code status reviewed. Start time: 1800  Estimated End Time: 0400    Remained present during initial drain followed by initiation of first fill. Prior to departure, bed in lowest position, call bell and personal belongings within reach.

## 2023-03-07 NOTE — PROGRESS NOTES
Spiritual Care Partner Volunteer visited patient at [facility] in [facility] [place] [facility] IMCU 2 on 3/7/2023   Documented by:   Chaplain Pierce MDiv, MS, [facility]

## 2023-03-08 LAB
ALBUMIN SERPL-MCNC: 2.2 G/DL (ref 3.5–5)
ANION GAP SERPL CALC-SCNC: 12 MMOL/L (ref 5–15)
ANION GAP SERPL CALC-SCNC: 6 MMOL/L (ref 5–15)
BASOPHILS # BLD: 0 K/UL (ref 0–0.1)
BASOPHILS NFR BLD: 0 % (ref 0–1)
BUN SERPL-MCNC: 39 MG/DL (ref 6–20)
BUN SERPL-MCNC: 41 MG/DL (ref 6–20)
BUN/CREAT SERPL: 6 (ref 12–20)
BUN/CREAT SERPL: 6 (ref 12–20)
CALCIUM SERPL-MCNC: 7.8 MG/DL (ref 8.5–10.1)
CALCIUM SERPL-MCNC: 8.1 MG/DL (ref 8.5–10.1)
CHLORIDE SERPL-SCNC: 96 MMOL/L (ref 97–108)
CHLORIDE SERPL-SCNC: 97 MMOL/L (ref 97–108)
CO2 SERPL-SCNC: 25 MMOL/L (ref 21–32)
CO2 SERPL-SCNC: 29 MMOL/L (ref 21–32)
CREAT SERPL-MCNC: 6.42 MG/DL (ref 0.55–1.02)
CREAT SERPL-MCNC: 6.52 MG/DL (ref 0.55–1.02)
DIFFERENTIAL METHOD BLD: ABNORMAL
EOSINOPHIL # BLD: 0.1 K/UL (ref 0–0.4)
EOSINOPHIL NFR BLD: 1 % (ref 0–7)
ERYTHROCYTE [DISTWIDTH] IN BLOOD BY AUTOMATED COUNT: 16.1 % (ref 11.5–14.5)
GLUCOSE SERPL-MCNC: 100 MG/DL (ref 65–100)
GLUCOSE SERPL-MCNC: 93 MG/DL (ref 65–100)
HCT VFR BLD AUTO: 27.5 % (ref 35–47)
HGB BLD-MCNC: 7.9 G/DL (ref 11.5–16)
IMM GRANULOCYTES # BLD AUTO: 0.1 K/UL (ref 0–0.04)
IMM GRANULOCYTES NFR BLD AUTO: 1 % (ref 0–0.5)
LYMPHOCYTES # BLD: 1 K/UL (ref 0.8–3.5)
LYMPHOCYTES NFR BLD: 9 % (ref 12–49)
MCH RBC QN AUTO: 27.6 PG (ref 26–34)
MCHC RBC AUTO-ENTMCNC: 28.7 G/DL (ref 30–36.5)
MCV RBC AUTO: 96.2 FL (ref 80–99)
MONOCYTES # BLD: 1 K/UL (ref 0–1)
MONOCYTES NFR BLD: 9 % (ref 5–13)
NEUTS SEG # BLD: 9.4 K/UL (ref 1.8–8)
NEUTS SEG NFR BLD: 80 % (ref 32–75)
NRBC # BLD: 0 K/UL (ref 0–0.01)
NRBC BLD-RTO: 0 PER 100 WBC
PHOSPHATE SERPL-MCNC: 5.6 MG/DL (ref 2.6–4.7)
PLATELET # BLD AUTO: 352 K/UL (ref 150–400)
PMV BLD AUTO: 9.9 FL (ref 8.9–12.9)
POTASSIUM SERPL-SCNC: 3.2 MMOL/L (ref 3.5–5.1)
POTASSIUM SERPL-SCNC: 3.5 MMOL/L (ref 3.5–5.1)
RBC # BLD AUTO: 2.86 M/UL (ref 3.8–5.2)
RBC MORPH BLD: ABNORMAL
SODIUM SERPL-SCNC: 131 MMOL/L (ref 136–145)
SODIUM SERPL-SCNC: 134 MMOL/L (ref 136–145)
WBC # BLD AUTO: 11.6 K/UL (ref 3.6–11)

## 2023-03-08 PROCEDURE — 74011250637 HC RX REV CODE- 250/637: Performed by: INTERNAL MEDICINE

## 2023-03-08 PROCEDURE — 74011250636 HC RX REV CODE- 250/636: Performed by: INTERNAL MEDICINE

## 2023-03-08 PROCEDURE — 36415 COLL VENOUS BLD VENIPUNCTURE: CPT

## 2023-03-08 PROCEDURE — 90945 DIALYSIS ONE EVALUATION: CPT

## 2023-03-08 PROCEDURE — 80069 RENAL FUNCTION PANEL: CPT

## 2023-03-08 PROCEDURE — 65660000001 HC RM ICU INTERMED STEPDOWN

## 2023-03-08 PROCEDURE — 99231 SBSQ HOSP IP/OBS SF/LOW 25: CPT | Performed by: INTERNAL MEDICINE

## 2023-03-08 PROCEDURE — 97530 THERAPEUTIC ACTIVITIES: CPT | Performed by: PHYSICAL THERAPIST

## 2023-03-08 PROCEDURE — 80048 BASIC METABOLIC PNL TOTAL CA: CPT

## 2023-03-08 PROCEDURE — 74011000250 HC RX REV CODE- 250: Performed by: HOSPITALIST

## 2023-03-08 PROCEDURE — 97116 GAIT TRAINING THERAPY: CPT | Performed by: PHYSICAL THERAPIST

## 2023-03-08 PROCEDURE — 74011000250 HC RX REV CODE- 250: Performed by: INTERNAL MEDICINE

## 2023-03-08 PROCEDURE — 74011250637 HC RX REV CODE- 250/637: Performed by: HOSPITALIST

## 2023-03-08 PROCEDURE — A4726 DIALYS SOL FLD VOL > 5999CC: HCPCS | Performed by: INTERNAL MEDICINE

## 2023-03-08 PROCEDURE — 85025 COMPLETE CBC W/AUTO DIFF WBC: CPT

## 2023-03-08 PROCEDURE — 97535 SELF CARE MNGMENT TRAINING: CPT

## 2023-03-08 RX ORDER — POTASSIUM CHLORIDE 1500 MG/1
20 TABLET, FILM COATED, EXTENDED RELEASE ORAL 2 TIMES DAILY
Qty: 60 TABLET | Refills: 3 | Status: SHIPPED | OUTPATIENT
Start: 2023-03-09 | End: 2023-03-09 | Stop reason: SDUPTHER

## 2023-03-08 RX ORDER — GENTAMICIN SULFATE 1 MG/G
CREAM TOPICAL
Qty: 15 G | Refills: 11 | Status: SHIPPED | OUTPATIENT
Start: 2023-03-08

## 2023-03-08 RX ORDER — BUMETANIDE 1 MG/1
2 TABLET ORAL 2 TIMES DAILY
Status: DISCONTINUED | OUTPATIENT
Start: 2023-03-08 | End: 2023-03-09 | Stop reason: HOSPADM

## 2023-03-08 RX ORDER — POTASSIUM CHLORIDE 750 MG/1
20 TABLET, FILM COATED, EXTENDED RELEASE ORAL 2 TIMES DAILY
Status: DISCONTINUED | OUTPATIENT
Start: 2023-03-08 | End: 2023-03-09

## 2023-03-08 RX ORDER — METOLAZONE 5 MG/1
5 TABLET ORAL DAILY
Qty: 40 TABLET | Refills: 3 | Status: SHIPPED | OUTPATIENT
Start: 2023-03-09

## 2023-03-08 RX ORDER — ONDANSETRON 4 MG/1
4 TABLET, ORALLY DISINTEGRATING ORAL
Qty: 30 TABLET | Refills: 1 | Status: SHIPPED | OUTPATIENT
Start: 2023-03-08

## 2023-03-08 RX ORDER — TRAZODONE HYDROCHLORIDE 50 MG/1
50 TABLET ORAL
Status: COMPLETED | OUTPATIENT
Start: 2023-03-08 | End: 2023-03-09

## 2023-03-08 RX ORDER — BUMETANIDE 1 MG/1
2 TABLET ORAL 2 TIMES DAILY
Status: DISCONTINUED | OUTPATIENT
Start: 2023-03-08 | End: 2023-03-08

## 2023-03-08 RX ORDER — BUMETANIDE 2 MG/1
2 TABLET ORAL 2 TIMES DAILY
Qty: 60 TABLET | Refills: 3 | Status: SHIPPED | OUTPATIENT
Start: 2023-03-09

## 2023-03-08 RX ORDER — IPRATROPIUM BROMIDE AND ALBUTEROL SULFATE 2.5; .5 MG/3ML; MG/3ML
3 SOLUTION RESPIRATORY (INHALATION)
Qty: 90 EACH | Refills: 3 | Status: SHIPPED | OUTPATIENT
Start: 2023-03-08

## 2023-03-08 RX ORDER — HYDRALAZINE HYDROCHLORIDE 25 MG/1
25 TABLET, FILM COATED ORAL 3 TIMES DAILY
Qty: 90 TABLET | Refills: 5 | Status: SHIPPED | OUTPATIENT
Start: 2023-03-08

## 2023-03-08 RX ORDER — BENZONATATE 100 MG/1
100 CAPSULE ORAL
Qty: 60 CAPSULE | Refills: 1 | Status: SHIPPED | OUTPATIENT
Start: 2023-03-08

## 2023-03-08 RX ORDER — CINACALCET 60 MG/1
60 TABLET, FILM COATED ORAL
Qty: 30 TABLET | Refills: 2 | Status: SHIPPED | OUTPATIENT
Start: 2023-03-09 | End: 2023-06-07

## 2023-03-08 RX ADMIN — POTASSIUM CHLORIDE 20 MEQ: 750 TABLET, FILM COATED, EXTENDED RELEASE ORAL at 18:18

## 2023-03-08 RX ADMIN — HYDRALAZINE HYDROCHLORIDE 25 MG: 25 TABLET, FILM COATED ORAL at 21:46

## 2023-03-08 RX ADMIN — CARVEDILOL 6.25 MG: 6.25 TABLET, FILM COATED ORAL at 09:38

## 2023-03-08 RX ADMIN — BUMETANIDE 2 MG: 1 TABLET ORAL at 13:26

## 2023-03-08 RX ADMIN — HYDRALAZINE HYDROCHLORIDE 25 MG: 25 TABLET, FILM COATED ORAL at 09:37

## 2023-03-08 RX ADMIN — METOLAZONE 5 MG: 5 TABLET ORAL at 09:37

## 2023-03-08 RX ADMIN — GUAIFENESIN 600 MG: 600 TABLET, EXTENDED RELEASE ORAL at 21:46

## 2023-03-08 RX ADMIN — MAGNESIUM OXIDE 400 MG (241.3 MG MAGNESIUM) TABLET 400 MG: TABLET at 09:37

## 2023-03-08 RX ADMIN — SODIUM CHLORIDE, PRESERVATIVE FREE 10 ML: 5 INJECTION INTRAVENOUS at 05:13

## 2023-03-08 RX ADMIN — AMLODIPINE BESYLATE 10 MG: 5 TABLET ORAL at 09:37

## 2023-03-08 RX ADMIN — SODIUM CHLORIDE, SODIUM LACTATE, CALCIUM CHLORIDE, MAGNESIUM CHLORIDE AND DEXTROSE 6000 ML: 2.5; 538; 448; 18.3; 5.08 INJECTION, SOLUTION INTRAPERITONEAL at 16:46

## 2023-03-08 RX ADMIN — CARVEDILOL 6.25 MG: 6.25 TABLET, FILM COATED ORAL at 18:18

## 2023-03-08 RX ADMIN — HYDRALAZINE HYDROCHLORIDE 25 MG: 25 TABLET, FILM COATED ORAL at 18:18

## 2023-03-08 RX ADMIN — ACETAMINOPHEN 650 MG: 325 TABLET ORAL at 22:07

## 2023-03-08 RX ADMIN — GENTAMICIN SULFATE: 1 CREAM TOPICAL at 16:46

## 2023-03-08 RX ADMIN — POTASSIUM CHLORIDE 20 MEQ: 750 TABLET, FILM COATED, EXTENDED RELEASE ORAL at 13:26

## 2023-03-08 RX ADMIN — CINACALCET HYDROCHLORIDE 60 MG: 30 TABLET, FILM COATED ORAL at 09:37

## 2023-03-08 RX ADMIN — GUAIFENESIN 600 MG: 600 TABLET, EXTENDED RELEASE ORAL at 09:37

## 2023-03-08 RX ADMIN — BUMETANIDE 2 MG: 1 TABLET ORAL at 18:18

## 2023-03-08 NOTE — PROGRESS NOTES
Problem: Mobility Impaired (Adult and Pediatric)  Goal: *Acute Goals and Plan of Care (Insert Text)  Description: FUNCTIONAL STATUS PRIOR TO ADMISSION: Patient was modified independent using a rolling walker for functional mobility since very recent hospitalization. She reports that HHPT has just started and that baseline is room air. HOME SUPPORT PRIOR TO ADMISSION: The patient lived with her  and sons but did not require assist for mobility using adaptive equipment. Physical Therapy Goals  Initiated 3/3/2023  1. Patient will move from supine to sit and sit to supine , scoot up and down, and roll side to side in bed with modified independence within 7 day(s). 2.  Patient will transfer from bed to chair and chair to bed with modified independence using the least restrictive device within 7 day(s). 3.  Patient will perform sit to stand with modified independence within 7 day(s). 4.  Patient will ambulate with modified independence for 50 feet with the least restrictive device within 7 day(s). 3/8/2023 1402 by Christy Burton, PT  Outcome: Progressing Towards Goal  3/8/2023 1125 by Christy Burton, PT  Outcome: Progressing Towards Goal  PHYSICAL THERAPY TREATMENT  Patient: Reba Lyons (50 y.o. female)  Date: 3/8/2023  Diagnosis: Respiratory failure (HCC) [J96.90] Acute respiratory failure with hypoxia (HCC)      Precautions: Fall, Bed Alarm  Chart, physical therapy assessment, plan of care and goals were reviewed. ASSESSMENT  Patient continues with skilled PT services and is progressing towards goals. The patient is sitting EOB eating and feeling better than this morning. She is able to ambulate 40 feet slowly with a RW. She has a wide based gait and is slow but with no balance loss. She states she is going home today and will be able to walk straight into the home. She is ok for disch per PT and is to have [facility].      Current Level of Function Impacting Discharge (mobility/balance): none    Other factors to consider for discharge: None         PLAN :  Patient continues to benefit from skilled intervention to address the above impairments. Continue treatment per established plan of care. to address goals. Recommendation for discharge: (in order for the patient to meet his/her long term goals)  Physical therapy at least 2 days/week in the home     This discharge recommendation:  Has been made in collaboration with the attending provider and/or case management    IF patient discharges home will need the following DME: patient owns DME required for discharge       SUBJECTIVE:   Patient stated I'm so weak! [employer]    OBJECTIVE DATA SUMMARY:   Critical Behavior:  Neurologic State: Alert  Orientation Level: Oriented X4  Cognition: Appropriate for age attention/concentration  Safety/Judgement: Awareness of environment, Fall prevention, Insight into deficits  Functional Mobility Training:  Bed Mobility:  Rolling: Modified independent  Supine to Sit: Supervision  Sit to Supine: Supervision           Transfers:  Sit to Stand: Independent  Stand to Sit: Independent                             Balance:  Sitting: Intact  Standing: Intact; With support  Standing - Static: Constant support  Standing - Dynamic : Constant support  Ambulation/Gait Training:  Distance (ft): 40 Feet (ft)  Assistive Device: Walker, rolling;Gait belt  Ambulation - Level of Assistance: Supervision        Gait Abnormalities: Decreased step clearance        Base of Support: Widened     Speed/Adriana: Pace decreased (<100 feet/min)  Step Length: Right shortened;Left shortened                   Activity Tolerance:   requires rest breaks    After treatment patient left in no apparent distress:   Sitting EOB eating lunch    COMMUNICATION/COLLABORATION:   The patients plan of care was discussed with: Registered nurse.      Miguel Iyer, PT   Time Calculation: 15 mins

## 2023-03-08 NOTE — PROGRESS NOTES
Problem: Falls - Risk of  Goal: *Absence of Falls  Description: Document Joaquina Bo Fall Risk and appropriate interventions in the flowsheet. Outcome: Progressing Towards Goal  Note: Fall Risk Interventions:  Mobility Interventions: Bed/chair exit alarm, Communicate number of staff needed for ambulation/transfer, Patient to call before getting OOB, Utilize walker, cane, or other assistive device         Medication Interventions: Bed/chair exit alarm, Evaluate medications/consider consulting pharmacy, Patient to call before getting OOB    Elimination Interventions: Bed/chair exit alarm, Call light in reach, Toileting schedule/hourly rounds, Stay With Me (per policy)              Problem: Patient Education: Go to Patient Education Activity  Goal: Patient/Family Education  Outcome: Progressing Towards Goal     Problem: Hypertension  Goal: *Blood pressure within specified parameters  Outcome: Progressing Towards Goal  Goal: *Fluid volume balance  Outcome: Progressing Towards Goal     Problem: Patient Education: Go to Patient Education Activity  Goal: Patient/Family Education  Outcome: Progressing Towards Goal     Problem: Patient Education: Go to Patient Education Activity  Goal: Patient/Family Education  Outcome: Progressing Towards Goal     Problem: Pressure Injury - Risk of  Goal: *Prevention of pressure injury  Description: Document Jose Scale and appropriate interventions in the flowsheet.   Outcome: Progressing Towards Goal  Note: Pressure Injury Interventions:  Sensory Interventions: Assess changes in LOC    Moisture Interventions: Absorbent underpads, Minimize layers, Limit adult briefs    Activity Interventions: PT/OT evaluation    Mobility Interventions: PT/OT evaluation    Nutrition Interventions: Offer support with meals,snacks and hydration                     Problem: Patient Education: Go to Patient Education Activity  Goal: Patient/Family Education  Outcome: Progressing Towards Goal

## 2023-03-08 NOTE — DIALYSIS
Peritoneal Dialysis Initiation / [phone]     Orders   Therapy Time:  10 hours   Cycles: 6   Fill Volume: 2000 ml   Last Fill Volume: 0   Total Volume: 73467   Solution: Dextrose Dianeal bags 2.5% x 3      Access   Type & Location: Right lower quadrant    Comments: Prior to connection, one-minute Alcavis scrub performed. Followed by one minute soak. Dsg change date:      3/8/23 less dried rust drainage on 2x2 tunnel remains dry, cleansed with ExSept and gentamicin cream applied     Labs   HBsAg (Antigen) / date:                 02/17/23 negative                              HBsAb (Antibody) / date: 02/17/23 susceptible   Source: epic     Safety:   Time Out Done:   (Time) 1700   Consent obtained/signed: Chronic consent applies   Education: Procedural    Primary Nurse Rpt Pre: Emaline Manner, RN    Primary Nurse Rpt Post: Emaline Manner, RN      Comments:   Pt orders, notes, labs, code status reviewed. Start time: 1710  Estimated End Time: 0310    Remained present during initial drain followed by initiation of first fill. Prior to departure, bed in lowest position, call bell and personal belongings within reach.

## 2023-03-08 NOTE — PROGRESS NOTES
Problem: Mobility Impaired (Adult and Pediatric)  Goal: *Acute Goals and Plan of Care (Insert Text)  Description: FUNCTIONAL STATUS PRIOR TO ADMISSION: Patient was modified independent using a rolling walker for functional mobility since very recent hospitalization. She reports that HHPT has just started and that baseline is room air. HOME SUPPORT PRIOR TO ADMISSION: The patient lived with her  and sons but did not require assist for mobility using adaptive equipment. Physical Therapy Goals  Initiated 3/3/2023  1. Patient will move from supine to sit and sit to supine , scoot up and down, and roll side to side in bed with modified independence within 7 day(s). 2.  Patient will transfer from bed to chair and chair to bed with modified independence using the least restrictive device within 7 day(s). 3.  Patient will perform sit to stand with modified independence within 7 day(s). 4.  Patient will ambulate with modified independence for 50 feet with the least restrictive device within 7 day(s). Outcome: Progressing Towards Goal  PHYSICAL THERAPY TREATMENT  Patient: Kiran Baker (1 y.o. female)  Date: 3/8/2023  Diagnosis: Respiratory failure (HCC) [J96.90] Acute respiratory failure with hypoxia (HCC)      Precautions: Fall, Bed Alarm  Chart, physical therapy assessment, plan of care and goals were reviewed. ASSESSMENT  Patient continues with skilled PT services and is progressing towards goals. The patient is able to come to sitting EOB with supervision. Once up she has good sitting balance. She sat for about 10 minutes but did not feel she was able to ambulate this morning due to being too fatigued. She was able to scoot to the [facility] and returned to supine to rest.  She is cooperative and motivated but feels she needs to sleep this morning. Current Level of Function Impacting Discharge (mobility/balance): Decreased endurance.      Other factors to consider for discharge: None PLAN :  Patient continues to benefit from skilled intervention to address the above impairments. Continue treatment per established plan of care. to address goals. Recommendation for discharge: (in order for the patient to meet his/her long term goals)  Physical therapy at least 2 days/week in the home     This discharge recommendation:  Has been made in collaboration with the attending provider and/or case management    IF patient discharges home will need the following DME: patient owns DME required for discharge       SUBJECTIVE:   Patient stated Do we have to do it this morning? Can you come back this afternoon.     OBJECTIVE DATA SUMMARY:   Critical Behavior:  Neurologic State: Alert  Orientation Level: Oriented X4  Cognition: Appropriate for age attention/concentration  Safety/Judgement: Awareness of environment, Fall prevention, Insight into deficits  Functional Mobility Training:  Bed Mobility:  Rolling: Modified independent  Supine to Sit: Supervision  Sit to Supine: Supervision           Transfers:                                   Balance:  Sitting: Intact  Ambulation/Gait Training:                                                      Patient did not feel well enough to ambulate this morning. Therapeutic Exercises:   She did a few LAQ while sitting EOB. Activity Tolerance:   Feels tired and fatigued this morning    After treatment patient left in no apparent distress:   Supine in bed    COMMUNICATION/COLLABORATION:   The patients plan of care was discussed with: Registered nurse, patient care technician.      Tarsha Coe, AKILA   Time Calculation: 15 mins

## 2023-03-08 NOTE — PROGRESS NOTES
Bedside and Verbal shift change report given to Melo Garcia (oncoming nurse) by Ryne Carrasco (offgoing nurse). Report included the following information SBAR and Kardex.

## 2023-03-08 NOTE — PROGRESS NOTES
Transition of Care:home with [facility] for home health SN/PT/OT with [address] [place] [address] (they have accepted her back) and resuming her OP peritoneal dialysis at [facility]: possibly in car with family     RUR: 24%     1st IM medicare letter signed on 3/2/23     Main contact is Virgen Christianson [phone]     Discharge pending:  -pending inpatient dialysis PD  -pending medical progress and other care recommendations       [phone]: this CM noted [facility] HH order; uploaded order to [facility] for [address] [place] [address] and notified them of order    [phone]: this CM called patients [facility] PD clinic- [phone]; they confirmed that the patient is a patient there and this CM faxed to them the last PD notes, nephro notes, and attending note    CM following  Juventino Ram RN     NOTE:  patient at bedside; she is alert and oriented x 4; patient lives at stated address with her ; trilevel home with 5 steps; has a cane, BSC, walker; fairly independent in her ADLs; she just started NEW OP peritoneal dialysis at the [facility] PD clinic- [facility] [employer] [address] on 2/23/23; patient was also followed by [address] [place] [address] prior to this readmission; patient states she does not use any home O2; has supportive ; patient stated she would like to resume with [facility] home Care; referral sent via [facility]        CM following  Juventino Ram RN             Revision History                                                      Revision History

## 2023-03-08 NOTE — PROGRESS NOTES
Hospital Progress Note  Answering service [phone]  Office [phone]  TaishaSercrystal    NAME:  Mo Zhu   :   1960   MRN:  [id number]     Date/Time:  3/8/2023 7:59 AM    Plan:     PD today  Encourage activity as tolerated  Echo reviewed  Off O2  Home soon/renal CM consult  Risk of Deterioration: Low  []           Moderate  [x]           High  []                 Assessment:     Principal Problem:    Acute respiratory failure with hypoxia (HCC) (3/1/2023)     Volume overload      Titrate O2       RRT (3/5/23)        Nasal O2 4 liters        Hypercarbic          BIBAP added         Added bumex 4 mg bis and metolazone         CXR - sm r pleural effusion        Resolving     Active Problems:    HTN (hypertension) (2015)     Titrate home meds      Chronic left-sided lumbar radiculopathy (2015)     Analgesics PRN      Anemia due to chronic kidney disease (6/15/2015)      EPO/renal      Secondary hyperparathyroidism ([facility] [address] (2020)      Volume overload (3/2/2023)     resolving      ESRD on peritoneal dialysis [facility]) (3/2/2023)       [de-identified] notes:63 y.o. female who presents with shortness of breath. Patient was recently discharged from [facility] on -after being d/cd  for leg pain and swelling. Pt has  PMHx of Stage V CKD, gout, morbid obesity, s/p R total hip replacement, and HTN who underwent laparoscopic PD catheter insertion and lysis of adhesions on 23 with Vascular Surgeon, Dr. Delmis Falcon. Patient follows with nephrology Dr. Bonifacio Geller 8 PD catheter placement was done on  not on dialysis yet patient now came with shortness of breath fluid overload and uremia.       The patient denies any headache, blurry vision, sore throat, trouble swallowing, trouble with speech, chest pain, SOB, cough, fever, chills, N/V/D, abd pain, urinary symptoms, constipation, recent travels, sick contacts, focal or generalized neurological symptoms, falls, injuries, rashes, contact with COVID-19 diagnosed patients, hematemesis, melena, hemoptysis, hematuria, rashes, denies starting any new medications and denies any other concerns or problems besides as mentioned above.       Subjective:   No further cp - breathing comfortable  11 Point Review of Systems:   Negative except no cp    []            Unable to obtain ROS due to:       []            mental status change []            sedated []            intubated     Social History     Tobacco Use    Smoking status: Former     Packs/day: 0.25     Years: 25.00     Pack years: 6.25     Types: Cigarettes     Quit date: 2000     Years since quittin.6    Smokeless tobacco: Never   Substance Use Topics    Alcohol use: No     Medications reviewed:  Current Facility-Administered Medications   Medication Dose Route Frequency    magnesium oxide (MAG-OX) tablet 400 mg  400 mg Oral DAILY    benzonatate (TESSALON) capsule 100 mg  100 mg Oral QHS PRN    melatonin tablet 6 mg  6 mg Oral QHS    saline peripheral flush soln 20 mL  20 mL InterCATHeter PRN    saline peripheral flush soln 20 mL  20 mL InterCATHeter PRN    bumetanide (BUMEX) injection 2 mg  2 mg IntraVENous BID    peritoneal dialysis DEXTROSE 2.5% (2.5 mEq/L low calcium) solution 6,000 mL  6,000 mL IntraPERitoneal DAILY    peritoneal dialysis DEXTROSE 2.5% (2.5 mEq/L low calcium) solution 6,000 mL  6,000 mL IntraPERitoneal DAILY    hydrALAZINE (APRESOLINE) 20 mg/mL injection 20 mg  20 mg IntraVENous Q6H PRN    nitroglycerin (NITROBID) 2 % ointment 1 Inch  1 Inch Topical BID    peritoneal dialysis DEXTROSE 2.5% (2.5 mEq/L low calcium) solution 6,000 mL  6,000 mL IntraPERitoneal DAILY    guaiFENesin ER (MUCINEX) tablet 600 mg  600 mg Oral Q12H    hydrALAZINE (APRESOLINE) tablet 25 mg  25 mg Oral TID    metOLazone (ZAROXOLYN) tablet 5 mg  5 mg Oral DAILY    gentamicin (GARAMYCIN) 0.1 % cream   Topical DIALYSIS PRN    amLODIPine (NORVASC) tablet 10 mg  10 mg Oral DAILY    carvediloL (COREG) tablet 6.25 mg  6.25 mg Oral BID WITH MEALS    albuterol-ipratropium (DUO-NEB) 2.5 MG-0.5 MG/3 ML  3 mL Nebulization Q6H PRN    cinacalcet (SENSIPAR) tablet 60 mg  60 mg Oral DAILY WITH BREAKFAST    sodium chloride (NS) flush 5-40 mL  5-40 mL IntraVENous Q8H    sodium chloride (NS) flush 5-40 mL  5-40 mL IntraVENous PRN    acetaminophen (TYLENOL) tablet 650 mg  650 mg Oral Q6H PRN    Or    acetaminophen (TYLENOL) suppository 650 mg  650 mg Rectal Q6H PRN    polyethylene glycol (MIRALAX) packet 17 g  17 g Oral DAILY PRN    ondansetron (ZOFRAN ODT) tablet 4 mg  4 mg Oral Q8H PRN    Or    ondansetron (ZOFRAN) injection 4 mg  4 mg IntraVENous Q6H PRN        Objective:   Vitals:  Visit Vitals  BP (!) 140/69 (BP 1 Location: Right lower arm, BP Patient Position: Semi fowlers)   Pulse (!) 103   Temp 97.7 °F (36.5 °C)   Resp (!) 31   Ht 5' 6\" (1.676 m)   Wt 243 lb 9.7 oz (110.5 kg)   SpO2 96%   BMI 39.32 kg/m²     Temp (24hrs), Av °F (36.7 °C), Min:97.7 °F (36.5 °C), Max:98.3 °F (36.8 °C)    O2 Flow Rate (L/min): 2 l/min O2 Device: None (Room air)    Last 24hr Input/Output:    Intake/Output Summary (Last 24 hours) at 3/8/2023 0703  Last data filed at 3/8/2023 0345  Gross per 24 hour   Intake --   Output 575 ml   Net -575 ml          PHYSICAL EXAM:  General:    Alert, cooperative, no distress, appears stated age. Head:   Normocephalic, without obvious abnormality, atraumatic. Eyes:   Conjunctivae/corneas clear. PERRLA  Nose:  Nares normal. No drainage or sinus tenderness. Throat:    Lips, mucosa, and tongue normal.  No Thrush  Neck:  Supple, symmetrical,  no adenopathy, thyroid: non tender    no carotid bruit and no JVD. Back:    Symmetric,  No CVA tenderness. Lungs:   Decreased BS with rhomchi  Heart:   Regular rate and rhythm,  no murmur, rub or gallop. Abdomen:   Soft, non-tender. Not distended.   Bowel sounds normal. No masses        Lab Data Reviewed:    Recent Labs     23  0217 23  0258   WBC 11.6* 10. 2   HGB 7.9* 8.1*   HCT 27.5* 28.7*    337       Recent Labs     03/08/23  0217 03/07/23  0329 03/06/23  0258   * 133* 132*   K 3.2* 3.3* 3.8   CL 96* 96* 94*   CO2 29 28 28    110* 84   BUN 41* 42* 46*   CREA 6.42* 6.77* 6.42*   CA 7.8* 8.1* 8.3*   MG  --  1.7 1.6   PHOS  --  7.6*  --    ALB  --  2.3*  --        Lab Results   Component Value Date/Time    Glucose (POC) 140 (H) 03/05/2023 05:46 PM    Glucose (POC) 82 02/14/2023 12:04 PM    Glucose (POC) 78 08/15/2022 08:21 AM    Glucose (POC) 90 11/09/2020 08:36 AM    Glucose,  (H) 03/05/2023 05:52 PM     Recent Labs     03/05/23  1752   HCO3 29       No results for input(s): INR, INREXT, INREXT in the last 72 hours.     ___________________________________________________  ___________________________________________________    Attending Physician: Carlos Gu MD

## 2023-03-08 NOTE — PROGRESS NOTES
Hospital Progress Note  Answering service [phone]  Office [phone]  [place]      NAME:  Ana M Del Cid   :   1960   MRN:  [id number]     Date/Time:  3/7/2023 7:59 AM    Plan:     PD today  Encourage activity as tolerated  Echo  Titrate O2  Risk of Deterioration: Low  []           Moderate  []           High  []                 Assessment:     Principal Problem:    Acute respiratory failure with hypoxia (HCC) (3/1/2023)     Volume overload      Titrate O2       RRT (3/5/23)        Nasal O2 4 liters        Hypercarbic          BIBAP added         Added bumex 4 mg bis and metolazone         CXR - sm r pleural effusion    Active Problems:    HTN (hypertension) (2015)     Titrate home meds      Chronic left-sided lumbar radiculopathy (2015)     Analgesics PRN      Anemia due to chronic kidney disease (6/15/2015)      EPO/renal      Secondary hyperparathyroidism ([address] (2020)      Volume overload (3/2/2023)      ESRD on peritoneal dialysis [facility]) (3/2/2023)       Admitting notes:63 y.o. female who presents with shortness of breath. Patient was recently discharged from [facility] on -after being d/cd  for leg pain and swelling. Pt has  PMHx of Stage V CKD, gout, morbid obesity, s/p R total hip replacement, and HTN who underwent laparoscopic PD catheter insertion and lysis of adhesions on 23 with Vascular Surgeon, Dr. Larry Flood. Patient follows with nephrology Dr. Cornell Palmer 8 PD catheter placement was done on  not on dialysis yet patient now came with shortness of breath fluid overload and uremia.       The patient denies any headache, blurry vision, sore throat, trouble swallowing, trouble with speech, chest pain, SOB, cough, fever, chills, N/V/D, abd pain, urinary symptoms, constipation, recent travels, sick contacts, focal or generalized neurological symptoms, falls, injuries, rashes, contact with COVID-19 diagnosed patients, hematemesis, melena, hemoptysis, hematuria, rashes, denies starting any new medications and denies any other concerns or problems besides as mentioned above.       Subjective:   No further cp - breathing is more comfortable  11 Point Review of Systems:   Negative except no cp    []            Unable to obtain ROS due to:       []            mental status change []            sedated []            intubated     Social History     Tobacco Use    Smoking status: Former     Packs/day: 0.25     Years: 25.00     Pack years: 6.25     Types: Cigarettes     Quit date: 2000     Years since quittin.6    Smokeless tobacco: Never   Substance Use Topics    Alcohol use: No     Medications reviewed:  Current Facility-Administered Medications   Medication Dose Route Frequency    magnesium oxide (MAG-OX) tablet 400 mg  400 mg Oral DAILY    benzonatate (TESSALON) capsule 100 mg  100 mg Oral QHS PRN    melatonin tablet 6 mg  6 mg Oral QHS    saline peripheral flush soln 20 mL  20 mL InterCATHeter PRN    saline peripheral flush soln 20 mL  20 mL InterCATHeter PRN    bumetanide (BUMEX) injection 2 mg  2 mg IntraVENous BID    peritoneal dialysis DEXTROSE 2.5% (2.5 mEq/L low calcium) solution 6,000 mL  6,000 mL IntraPERitoneal DAILY    peritoneal dialysis DEXTROSE 2.5% (2.5 mEq/L low calcium) solution 6,000 mL  6,000 mL IntraPERitoneal DAILY    hydrALAZINE (APRESOLINE) 20 mg/mL injection 20 mg  20 mg IntraVENous Q6H PRN    nitroglycerin (NITROBID) 2 % ointment 1 Inch  1 Inch Topical BID    peritoneal dialysis DEXTROSE 2.5% (2.5 mEq/L low calcium) solution 6,000 mL  6,000 mL IntraPERitoneal DAILY    guaiFENesin ER (MUCINEX) tablet 600 mg  600 mg Oral Q12H    hydrALAZINE (APRESOLINE) tablet 25 mg  25 mg Oral TID    metOLazone (ZAROXOLYN) tablet 5 mg  5 mg Oral DAILY    gentamicin (GARAMYCIN) 0.1 % cream   Topical DIALYSIS PRN    amLODIPine (NORVASC) tablet 10 mg  10 mg Oral DAILY    carvediloL (COREG) tablet 6.25 mg  6.25 mg Oral BID WITH MEALS albuterol-ipratropium (DUO-NEB) 2.5 MG-0.5 MG/3 ML  3 mL Nebulization Q6H PRN    cinacalcet (SENSIPAR) tablet 60 mg  60 mg Oral DAILY WITH BREAKFAST    sodium chloride (NS) flush 5-40 mL  5-40 mL IntraVENous Q8H    sodium chloride (NS) flush 5-40 mL  5-40 mL IntraVENous PRN    acetaminophen (TYLENOL) tablet 650 mg  650 mg Oral Q6H PRN    Or    acetaminophen (TYLENOL) suppository 650 mg  650 mg Rectal Q6H PRN    polyethylene glycol (MIRALAX) packet 17 g  17 g Oral DAILY PRN    ondansetron (ZOFRAN ODT) tablet 4 mg  4 mg Oral Q8H PRN    Or    ondansetron (ZOFRAN) injection 4 mg  4 mg IntraVENous Q6H PRN        Objective:   Vitals:  Visit Vitals  BP (!) 140/69 (BP 1 Location: Right lower arm, BP Patient Position: Semi fowlers)   Pulse (!) 103   Temp 97.7 °F (36.5 °C)   Resp (!) 31   Ht 5' 6\" (1.676 m)   Wt 243 lb 9.7 oz (110.5 kg)   SpO2 96%   BMI 39.32 kg/m²     Temp (24hrs), Av °F (36.7 °C), Min:97.7 °F (36.5 °C), Max:98.3 °F (36.8 °C)    O2 Flow Rate (L/min): 2 l/min O2 Device: None (Room air)    Last 24hr Input/Output:    Intake/Output Summary (Last 24 hours) at 3/8/2023 0735  Last data filed at 3/8/2023 0345  Gross per 24 hour   Intake --   Output 575 ml   Net -575 ml          PHYSICAL EXAM:  General:    Alert, cooperative, no distress, appears stated age. Head:   Normocephalic, without obvious abnormality, atraumatic. Eyes:   Conjunctivae/corneas clear. PERRLA  Nose:  Nares normal. No drainage or sinus tenderness. Lungs:   Decreased BS with rhomchi  Heart:   Regular rate and rhythm,  no murmur, rub or gallop. Abdomen:   Soft, non-tender. Not distended.   Bowel sounds normal. No masses        Lab Data Reviewed:    Recent Labs     23   WBC 11.6* 10.2   HGB 7.9* 8.1*   HCT 27.5* 28.7*    337       Recent Labs     239 23   * 133* 132*   K 3.2* 3.3* 3.8   CL 96* 96* 94*   CO2 29 28 28    110* 84   BUN 41* 42* 46*   CREA 6.42* 6.77* 6.42*   CA 7.8* 8.1* 8.3*   MG  --  1.7 1.6   PHOS  --  7.6*  --    ALB  --  2.3*  --        Lab Results   Component Value Date/Time    Glucose (POC) 140 (H) 03/05/2023 05:46 PM    Glucose (POC) 82 02/14/2023 12:04 PM    Glucose (POC) 78 08/15/2022 08:21 AM    Glucose (POC) 90 11/09/2020 08:36 AM    Glucose,  (H) 03/05/2023 05:52 PM     Recent Labs     03/05/23  1752   HCO3 29       No results for input(s): INR, INREXT, INREXT in the last 72 hours.     ___________________________________________________  ___________________________________________________    Attending Physician: Alireza Goldsmith MD

## 2023-03-08 NOTE — PROGRESS NOTES
Problem: Self Care Deficits Care Plan (Adult)  Goal: *Acute Goals and Plan of Care (Insert Text)  Description:   FUNCTIONAL STATUS PRIOR TO ADMISSION: Patient was modified independent using a rolling walker for functional mobility. [place] therapy had just been set up, would like to resume upon discharge. Has family assist PRN for ADL like bathing (unable to go up to 2nd floor full bathroom and is staying on 1st floor) and IADL    HOME SUPPORT: The patient lived with /sons, required some assist for ADL/IADL    Occupational Therapy Goals  Initiated 3/3/2023  1. Patient will perform grooming with supervision/set-up within 7 day(s). 2.  Patient will perform upper body dressing with minimal assistance/contact guard assist within 7 day(s). 3.  Patient will perform lower body dressing with moderate assistance  within 7 day(s). 4.  Patient will perform all aspects of toileting with minimal assistance/contact guard assist within 7 day(s). 5.  Patient will utilize energy conservation techniques during functional activities with verbal cues within 7 day(s). Outcome: Progressing Towards Goal   OCCUPATIONAL THERAPY TREATMENT  Patient: Dorcas Schmitt (23 y.o. female)  Date: 3/8/2023  Diagnosis: Respiratory failure (HCC) [J96.90] Acute respiratory failure with hypoxia (HCC)      Precautions: Fall, Bed Alarm  Chart, occupational therapy assessment, plan of care, and goals were reviewed. ASSESSMENT  Patient continues with skilled OT services and is progressing towards goals. Patient received reclined in bed, amenable to session. Transferred to EOB to complete grooming tasks. Deferred further OOB on this date d/t poor sleep quality night prior. Returned to bedlevel at end of session, all needs in reach, NAD. Continue to anticipate patient would benefit from [place] pending progress.      Current Level of Function Impacting Discharge (ADLs):  x1 assist ADL/mobility    Other factors to consider for discharge: below baseline         PLAN :  Patient continues to benefit from skilled intervention to address the above impairments. Continue treatment per established plan of care to address goals. Recommend with staff: OOB 3x daily for meals, functional mobility to bathroom    Recommend next OT session: OOB ADL    Recommendation for discharge: (in order for the patient to meet his/her long term goals)  Occupational therapy at least 2 days/week in the home AND ensure assist and/or supervision for safety with ADL/IADL    This discharge recommendation:  Has not yet been discussed the attending provider and/or case management    IF patient discharges home will need the following DME: TBD pending progress       SUBJECTIVE:   Patient stated Sergio Madsen got no sleep at all last night.     OBJECTIVE DATA SUMMARY:   Cognitive/Behavioral Status:  Neurologic State: Alert  Orientation Level: Oriented X4  Cognition: Appropriate decision making; Appropriate safety awareness; Follows commands  Perception: Appears intact  Perseveration: No perseveration noted  Safety/Judgement: Awareness of environment; Fall prevention; Insight into deficits    Functional Mobility and Transfers for ADLs:  Bed Mobility:  Rolling: Modified independent  Supine to Sit: Supervision  Sit to Supine: Supervision    Transfers:  Sit to Stand: Independent          Balance:  Sitting: Intact  Standing: Intact; With support  Standing - Static: Constant support  Standing - Dynamic : Constant support    ADL Intervention:       Grooming  Grooming Assistance: Contact guard assistance  Position Performed: Seated edge of bed  Washing Face: Supervision  Brushing Teeth: Contact guard assistance  Cues: Verbal cues provided         Type of Bath: Chlorhexidine (CHG); Bath Pack; Full                        Cognitive Retraining  Safety/Judgement: Awareness of environment; Fall prevention; Insight into deficits    Pain:  None reported    Activity Tolerance:   Fair and requires rest breaks    After treatment patient left in no apparent distress:   Supine in bed, Heels elevated for pressure relief, Call bell within reach, and Caregiver / family present    COMMUNICATION/COLLABORATION:   The patients plan of care was discussed with: Physical therapist and Registered nurse.      Jose F Wheeler OT  Time Calculation: 29 mins

## 2023-03-08 NOTE — DIALYSIS
Peritoneal Dialysis Disconnection / [phone]     Metrics   I-Drain: 61   Total UF: 1959   Last Fill: 0   Last Manual Drain: 0   Net UF:         2022   Avg Dwell Time: 1:09   Lost Dwell Time: 0:35   Alarms: No alarms recorded   Effluent: Clear;yellow visualized in toilet     Access   Type & Location: Mid abdomen,    Comments: Prior to disconnection, one-minute Alcavis scrub performed, followed by one minute soak per policy. Sterile mini cap applied and secured to abdomen. Dsg clean, dry and intact. Dsg date: 03/07/23                                           Comments:   Treatment completed without incident.

## 2023-03-08 NOTE — PROGRESS NOTES
1200- pt lost IV access this morning, 2 RNs attempted peripheral access, unsuccessful, Dr. Heavenly Gardner notified, orders received for PO bumex, no more IV medications, pt plan to discharge tomorrow    Bedside shift change report given to Iwona Wang, HUGO (oncoming nurse) by Raina Kessler RN (offgoing nurse). Report included the following information SBAR, Kardex, ED Summary, Intake/Output, MAR, Accordion, Recent Results, and Cardiac Rhythm NSR .

## 2023-03-09 VITALS
TEMPERATURE: 98.2 F | HEART RATE: 90 BPM | HEIGHT: 66 IN | WEIGHT: 243.61 LBS | DIASTOLIC BLOOD PRESSURE: 68 MMHG | OXYGEN SATURATION: 92 % | RESPIRATION RATE: 26 BRPM | BODY MASS INDEX: 39.15 KG/M2 | SYSTOLIC BLOOD PRESSURE: 116 MMHG

## 2023-03-09 LAB
ALBUMIN SERPL-MCNC: 2.2 G/DL (ref 3.5–5)
ALBUMIN/GLOB SERPL: 0.7 (ref 1.1–2.2)
ALP SERPL-CCNC: 62 U/L (ref 45–117)
ALT SERPL-CCNC: <6 U/L (ref 12–78)
ANION GAP SERPL CALC-SCNC: 5 MMOL/L (ref 5–15)
AST SERPL-CCNC: 7 U/L (ref 15–37)
BASOPHILS # BLD: 0 K/UL (ref 0–0.1)
BASOPHILS NFR BLD: 0 % (ref 0–1)
BILIRUB SERPL-MCNC: 0.6 MG/DL (ref 0.2–1)
BUN SERPL-MCNC: 37 MG/DL (ref 6–20)
BUN/CREAT SERPL: 6 (ref 12–20)
CALCIUM SERPL-MCNC: 8.1 MG/DL (ref 8.5–10.1)
CHLORIDE SERPL-SCNC: 97 MMOL/L (ref 97–108)
CO2 SERPL-SCNC: 31 MMOL/L (ref 21–32)
CREAT SERPL-MCNC: 6.25 MG/DL (ref 0.55–1.02)
DIFFERENTIAL METHOD BLD: ABNORMAL
EOSINOPHIL # BLD: 0.1 K/UL (ref 0–0.4)
EOSINOPHIL NFR BLD: 1 % (ref 0–7)
ERYTHROCYTE [DISTWIDTH] IN BLOOD BY AUTOMATED COUNT: 16.3 % (ref 11.5–14.5)
GLOBULIN SER CALC-MCNC: 3.2 G/DL (ref 2–4)
GLUCOSE SERPL-MCNC: 79 MG/DL (ref 65–100)
HCT VFR BLD AUTO: 27.3 % (ref 35–47)
HGB BLD-MCNC: 7.8 G/DL (ref 11.5–16)
IMM GRANULOCYTES # BLD AUTO: 0.1 K/UL (ref 0–0.04)
IMM GRANULOCYTES NFR BLD AUTO: 1 % (ref 0–0.5)
LYMPHOCYTES # BLD: 0.9 K/UL (ref 0.8–3.5)
LYMPHOCYTES NFR BLD: 9 % (ref 12–49)
MAGNESIUM SERPL-MCNC: 1.5 MG/DL (ref 1.6–2.4)
MCH RBC QN AUTO: 27.7 PG (ref 26–34)
MCHC RBC AUTO-ENTMCNC: 28.6 G/DL (ref 30–36.5)
MCV RBC AUTO: 96.8 FL (ref 80–99)
MONOCYTES # BLD: 0.9 K/UL (ref 0–1)
MONOCYTES NFR BLD: 9 % (ref 5–13)
NEUTS SEG # BLD: 7.8 K/UL (ref 1.8–8)
NEUTS SEG NFR BLD: 80 % (ref 32–75)
NRBC # BLD: 0 K/UL (ref 0–0.01)
NRBC BLD-RTO: 0 PER 100 WBC
PHOSPHATE SERPL-MCNC: 6 MG/DL (ref 2.6–4.7)
PLATELET # BLD AUTO: 345 K/UL (ref 150–400)
PMV BLD AUTO: 10 FL (ref 8.9–12.9)
POTASSIUM SERPL-SCNC: 3.8 MMOL/L (ref 3.5–5.1)
PROT SERPL-MCNC: 5.4 G/DL (ref 6.4–8.2)
RBC # BLD AUTO: 2.82 M/UL (ref 3.8–5.2)
RBC MORPH BLD: ABNORMAL
SODIUM SERPL-SCNC: 133 MMOL/L (ref 136–145)
WBC # BLD AUTO: 9.8 K/UL (ref 3.6–11)

## 2023-03-09 PROCEDURE — 83735 ASSAY OF MAGNESIUM: CPT

## 2023-03-09 PROCEDURE — 74011250637 HC RX REV CODE- 250/637: Performed by: INTERNAL MEDICINE

## 2023-03-09 PROCEDURE — 80053 COMPREHEN METABOLIC PANEL: CPT

## 2023-03-09 PROCEDURE — 84100 ASSAY OF PHOSPHORUS: CPT

## 2023-03-09 PROCEDURE — 97116 GAIT TRAINING THERAPY: CPT

## 2023-03-09 PROCEDURE — 97530 THERAPEUTIC ACTIVITIES: CPT

## 2023-03-09 PROCEDURE — 85025 COMPLETE CBC W/AUTO DIFF WBC: CPT

## 2023-03-09 PROCEDURE — 97535 SELF CARE MNGMENT TRAINING: CPT

## 2023-03-09 PROCEDURE — 36415 COLL VENOUS BLD VENIPUNCTURE: CPT

## 2023-03-09 PROCEDURE — 99231 SBSQ HOSP IP/OBS SF/LOW 25: CPT | Performed by: INTERNAL MEDICINE

## 2023-03-09 PROCEDURE — 74011250637 HC RX REV CODE- 250/637: Performed by: HOSPITALIST

## 2023-03-09 RX ORDER — POTASSIUM CHLORIDE 1500 MG/1
20 TABLET, FILM COATED, EXTENDED RELEASE ORAL DAILY
Qty: 30 TABLET | Refills: 3 | Status: SHIPPED | OUTPATIENT
Start: 2023-03-09

## 2023-03-09 RX ORDER — POTASSIUM CHLORIDE 750 MG/1
20 TABLET, FILM COATED, EXTENDED RELEASE ORAL DAILY
Status: DISCONTINUED | OUTPATIENT
Start: 2023-03-09 | End: 2023-03-09 | Stop reason: HOSPADM

## 2023-03-09 RX ADMIN — POTASSIUM CHLORIDE 20 MEQ: 750 TABLET, FILM COATED, EXTENDED RELEASE ORAL at 09:38

## 2023-03-09 RX ADMIN — CINACALCET HYDROCHLORIDE 60 MG: 30 TABLET, FILM COATED ORAL at 09:38

## 2023-03-09 RX ADMIN — METOLAZONE 5 MG: 5 TABLET ORAL at 09:38

## 2023-03-09 RX ADMIN — CARVEDILOL 6.25 MG: 6.25 TABLET, FILM COATED ORAL at 09:38

## 2023-03-09 RX ADMIN — MAGNESIUM OXIDE 400 MG (241.3 MG MAGNESIUM) TABLET 400 MG: TABLET at 09:38

## 2023-03-09 RX ADMIN — BUMETANIDE 2 MG: 1 TABLET ORAL at 09:39

## 2023-03-09 RX ADMIN — TRAZODONE HYDROCHLORIDE 50 MG: 50 TABLET ORAL at 00:48

## 2023-03-09 RX ADMIN — GUAIFENESIN 600 MG: 600 TABLET, EXTENDED RELEASE ORAL at 09:44

## 2023-03-09 NOTE — PROGRESS NOTES
Patient required oxygen all night. O2 level dropped to 82% during sleep when she was on RA. Pt required as much as 3L of oxygen while she was asleep. Patient slept well after she received the trazadone for sleep.

## 2023-03-09 NOTE — PROGRESS NOTES
Problem: Mobility Impaired (Adult and Pediatric)  Goal: *Acute Goals and Plan of Care (Insert Text)  Description: FUNCTIONAL STATUS PRIOR TO ADMISSION: Patient was modified independent using a rolling walker for functional mobility since very recent hospitalization. She reports that HHPT has just started and that baseline is room air. HOME SUPPORT PRIOR TO ADMISSION: The patient lived with her  and sons but did not require assist for mobility using adaptive equipment. Physical Therapy Goals  Initiated 3/3/2023  1. Patient will move from supine to sit and sit to supine , scoot up and down, and roll side to side in bed with modified independence within 7 day(s). 2.  Patient will transfer from bed to chair and chair to bed with modified independence using the least restrictive device within 7 day(s). 3.  Patient will perform sit to stand with modified independence within 7 day(s). 4.  Patient will ambulate with modified independence for 50 feet with the least restrictive device within 7 day(s). Outcome: Progressing Towards Goal   PHYSICAL THERAPY TREATMENT  Patient: Diann Landaverde (20 y.o. female)  Date: 3/9/2023  Diagnosis: Respiratory failure (HCC) [J96.90] Acute respiratory failure with hypoxia (HCC)      Precautions: Fall, Bed Alarm  Chart, physical therapy assessment, plan of care and goals were reviewed. ASSESSMENT  Patient continues with skilled PT services and is progressing towards goals. Pt seen for O2 challenge. Received in semi-reclined position on RA, SpO2 92%, p89-92 bpm.  Pt moved supine to sit with supervision, BP stable with position change, SpO2 94% while sitting EOB. Pt notes arthritic pain L knee toward end range flexion; required min A to reach stand this session. Pt amb ~40' with RW, decreased gait speed, supervision, SpO2 89% very briefly, otherwise 90-92%, p 101 bpm. Pt returned to bed with HOB elevated at end of session.  Will benefit from family support and [place] [facility] at d/c. Current Level of Function Impacting Discharge (mobility/balance): bed mob supervision, sit to stand min A, amb with RW supervision    Other factors to consider for discharge: family support available at home         PLAN :  Patient continues to benefit from skilled intervention to address the above impairments. Continue treatment per established plan of care. to address goals. Recommendation for discharge: (in order for the patient to meet his/her long term goals)  Physical therapy at least 2 days/week in the home     This discharge recommendation:  Has been made in collaboration with the attending provider and/or case management    IF patient discharges home will need the following DME: patient owns DME required for discharge       SUBJECTIVE:   Patient stated Am I going home today?     OBJECTIVE DATA SUMMARY:   Critical Behavior:  Neurologic State: Alert, Appropriate for age  Orientation Level: Oriented X4  Cognition: Follows commands, Appropriate for age attention/concentration  Safety/Judgement: Awareness of environment, Fall prevention, Insight into deficits  Functional Mobility Training:  Bed Mobility:     Supine to Sit: Supervision  Sit to Supine: Supervision           Transfers:  Sit to Stand: Minimum assistance (light assist to achieve lift off)  Stand to Sit: Independent             Balance:  Sitting: Intact  Standing: Intact; With support  Standing - Static: Good;Constant support  Standing - Dynamic : Fair;Good;Constant support  Ambulation/Gait Training:  Distance (ft): 40 Feet (ft)  Assistive Device: Walker, rolling  Ambulation - Level of Assistance: Supervision        Gait Abnormalities: Decreased step clearance        Base of Support: Widened     Speed/Adriana: Pace decreased (<100 feet/min)  Step Length: Left shortened;Right shortened       Therapeutic Exercises:   Reviewed heel slides to tolerance and quad sets to address c/o arthritic pain L knee; pt familiar with ex due to h/o MEL  Pain Rating:  Pt notes L pain toward end range flexion    Activity Tolerance:   Good and SpO2 stable on RA    After treatment patient left in no apparent distress:   Supine in bed, Call bell within reach, Side rails x 3, and HOB elevated    COMMUNICATION/COLLABORATION:   The patients plan of care was discussed with: Registered nurse.      Dasha Alex, PT   Time Calculation: 24 mins

## 2023-03-09 NOTE — PROGRESS NOTES
03/05/23 0820   Rosalind Fall Risk ([place] Only)   Mobility 1.0   Mobility Interventions Bed/chair exit alarm;Communicate number of staff needed for ambulation/transfer;Patient to call before getting OOB;Utilize walker, cane, or other assistive device   Mentation 0   Medication 1   Medication Interventions Bed/chair exit alarm;Evaluate medications/consider consulting pharmacy; Patient to call before getting OOB   Elimination 1.0   Elimination Interventions Bed/chair exit alarm;Call light in reach; Toileting schedule/hourly rounds;Stay With Me (per policy)   Prior Fall History 0   Total Score 3   Standard Fall Precautions Yes   High Fall Risk Yes     Patient is high fall risk, please coordinate with PT for six minute walk test.

## 2023-03-09 NOTE — PROGRESS NOTES
1145 - pt oxygenation at 87 - 92% while ambulating on room air in the room.   Pt oxygenation at 93% on room air while at rest.

## 2023-03-09 NOTE — PROGRESS NOTES
[facility]    Renal Progress Note    NAME:  Vamsi Coe   :   1960   MRN:   [id number]     Date/Time:  3/9/2023 10:25 AM      Assessment:     Resp Failure-improved now on RA, requires O2 at night  Fluid Overload-improved  ESRD --> s/p PD catheter ()   Anemia in CKD  Hyperparathyroidism  hypokalemia       Plan:     CCPD with 2 litre / low volume exchanges ( 6 exchanges --> 10 hour prescription). C/w 2.5% Dianeal.  F/up  with PD clinic  C/w kcl 20 mq daily  Needs to continue daily PD with PD nurse as outpatient after discharge M-F. After DC , For the weekend will increase metolazone to 10 mg daily and bumex po 4 mg BID to avoid hypervolemia till patient's teaching completes  S/p EPO x1- 3/  Sensipar  2 gram sodium / renal diet. Daily weights. Follow lytes daily. Is and Os  Ativan for anxiety         Subjective:         Still feels SOB, on 2 lit O2. Sat 96%. Had chest pain this morning, has cough. Bps better this am.    3-4-23 remains dyspneic and with non productive cough, no significant UF on current PD Rx. Also c/o of being cold    3- able to UF 1234 cc with dialysis adjustment. Very anxious, BP remains elevated and O2 sat 92% on RA (has O2 off). No N/V, mild abd discomfort, able to eat. . Will give small IV dose ativan then oral. DW dialysis RN    3/6 feels better, not SOB on RA. Had stress test this morning which was negative. No issues with CCPD, had 1900 ml UF last night. Bps have improved    3/7. Feels well, required O2 overnight. Now sat 93% on RA. Had Bms. PD cath site examined with PD nurse. Not healed well yet. 3/8-did not sleep last night, tired. Not SOB, good UF with CCPD. K low    3/9-no SOB, feels well, on RA.  Had 2 lit UF with PD      Medications reviewed:  Current Facility-Administered Medications   Medication Dose Route Frequency    potassium chloride SR (KLOR-CON 10) tablet 20 mEq  20 mEq Oral DAILY    bumetanide (BUMEX) tablet 2 mg  2 mg Oral BID    magnesium oxide (MAG-OX) tablet 400 mg  400 mg Oral DAILY    benzonatate (TESSALON) capsule 100 mg  100 mg Oral QHS PRN    melatonin tablet 6 mg  6 mg Oral QHS    saline peripheral flush soln 20 mL  20 mL InterCATHeter PRN    saline peripheral flush soln 20 mL  20 mL InterCATHeter PRN    peritoneal dialysis DEXTROSE 2.5% (2.5 mEq/L low calcium) solution 6,000 mL  6,000 mL IntraPERitoneal DAILY    peritoneal dialysis DEXTROSE 2.5% (2.5 mEq/L low calcium) solution 6,000 mL  6,000 mL IntraPERitoneal DAILY    hydrALAZINE (APRESOLINE) 20 mg/mL injection 20 mg  20 mg IntraVENous Q6H PRN    nitroglycerin (NITROBID) 2 % ointment 1 Inch  1 Inch Topical BID    peritoneal dialysis DEXTROSE 2.5% (2.5 mEq/L low calcium) solution 6,000 mL  6,000 mL IntraPERitoneal DAILY    guaiFENesin ER (MUCINEX) tablet 600 mg  600 mg Oral Q12H    hydrALAZINE (APRESOLINE) tablet 25 mg  25 mg Oral TID    metOLazone (ZAROXOLYN) tablet 5 mg  5 mg Oral DAILY    gentamicin (GARAMYCIN) 0.1 % cream   Topical DIALYSIS PRN    amLODIPine (NORVASC) tablet 10 mg  10 mg Oral DAILY    carvediloL (COREG) tablet 6.25 mg  6.25 mg Oral BID WITH MEALS    albuterol-ipratropium (DUO-NEB) 2.5 MG-0.5 MG/3 ML  3 mL Nebulization Q6H PRN    cinacalcet (SENSIPAR) tablet 60 mg  60 mg Oral DAILY WITH BREAKFAST    sodium chloride (NS) flush 5-40 mL  5-40 mL IntraVENous PRN    acetaminophen (TYLENOL) tablet 650 mg  650 mg Oral Q6H PRN    Or    acetaminophen (TYLENOL) suppository 650 mg  650 mg Rectal Q6H PRN    polyethylene glycol (MIRALAX) packet 17 g  17 g Oral DAILY PRN    ondansetron (ZOFRAN ODT) tablet 4 mg  4 mg Oral Q8H PRN    Or    ondansetron (ZOFRAN) injection 4 mg  4 mg IntraVENous Q6H PRN        Objective:   Vitals:  Visit Vitals  /64   Pulse 93   Temp 98.3 °F (36.8 °C)   Resp 19   Ht 5' 6\" (1.676 m)   Wt 110.5 kg (243 lb 9.7 oz)   SpO2 98%   BMI 39.32 kg/m²     Temp (24hrs), Av.1 °F (36.7 °C), Min:97.5 °F (36.4 °C), Max:98.5 °F (36.9 °C)    O2 Flow Rate (L/min): 2 l/min O2 Device: Nasal cannula    Last 24hr Input/Output:    Intake/Output Summary (Last 24 hours) at 3/9/2023 0925  Last data filed at 3/9/2023 0700  Gross per 24 hour   Intake --   Output 2155 ml   Net -2155 ml          PHYSICAL EXAM:      Seen in Room 422. General:    Alert, cooperative, no distress, appears stated age. Anxious    Head:   Normocephalic. Eyes:   No icterus. Lungs:   CTA    Heart:   RRR, No S3  gallop, No pericardial rub. Abdomen:   Not distended, pd catheter --> right lower quadrant. The exit site open still no granulation tissue, no drainage      Extremities: No Leg edema       Neurologic: Alert and oriented . []        Telemetry Reviewed     []        NSR []        PAC/PVCs   []        Afib  []        Paced   []        NSVT   []        Zavala []        NGT  []        Intubated on vent    Lab Data Reviewed:    Recent Results (from the past 24 hour(s))   CBC WITH AUTOMATED DIFF    Collection Time: 03/09/23  3:08 AM   Result Value Ref Range    WBC 9.8 3.6 - 11.0 K/uL    RBC 2.82 (L) 3.80 - 5.20 M/uL    HGB 7.8 (L) 11.5 - 16.0 g/dL    HCT 27.3 (L) 35.0 - 47.0 %    MCV 96.8 80.0 - 99.0 FL    MCH 27.7 26.0 - 34.0 PG    MCHC 28.6 (L) 30.0 - 36.5 g/dL    RDW 16.3 (H) 11.5 - 14.5 %    PLATELET 722 858 - 286 K/uL    MPV 10.0 8.9 - 12.9 FL    NRBC 0.0 0  WBC    ABSOLUTE NRBC 0.00 0.00 - 0.01 K/uL    NEUTROPHILS 80 (H) 32 - 75 %    LYMPHOCYTES 9 (L) 12 - 49 %    MONOCYTES 9 5 - 13 %    EOSINOPHILS 1 0 - 7 %    BASOPHILS 0 0 - 1 %    IMMATURE GRANULOCYTES 1 (H) 0.0 - 0.5 %    ABS. NEUTROPHILS 7.8 1.8 - 8.0 K/UL    ABS. LYMPHOCYTES 0.9 0.8 - 3.5 K/UL    ABS. MONOCYTES 0.9 0.0 - 1.0 K/UL    ABS. EOSINOPHILS 0.1 0.0 - 0.4 K/UL    ABS. BASOPHILS 0.0 0.0 - 0.1 K/UL    ABS. IMM.  GRANS. 0.1 (H) 0.00 - 0.04 K/UL    DF SMEAR SCANNED      RBC COMMENTS ANISOCYTOSIS  1+        RBC COMMENTS MACROCYTOSIS  1+        RBC COMMENTS SCHISTOCYTES  1+       METABOLIC PANEL, COMPREHENSIVE Collection Time: 03/09/23  3:08 AM   Result Value Ref Range    Sodium 133 (L) 136 - 145 mmol/L    Potassium 3.8 3.5 - 5.1 mmol/L    Chloride 97 97 - 108 mmol/L    CO2 31 21 - 32 mmol/L    Anion gap 5 5 - 15 mmol/L    Glucose 79 65 - 100 mg/dL    BUN 37 (H) 6 - 20 MG/DL    Creatinine 6.25 (H) 0.55 - 1.02 MG/DL    BUN/Creatinine ratio 6 (L) 12 - 20      eGFR 7 (L) >60 ml/min/1.73m2    Calcium 8.1 (L) 8.5 - 10.1 MG/DL    Bilirubin, total 0.6 0.2 - 1.0 MG/DL    ALT (SGPT) <6 (L) 12 - 78 U/L    AST (SGOT) 7 (L) 15 - 37 U/L    Alk.  phosphatase 62 45 - 117 U/L    Protein, total 5.4 (L) 6.4 - 8.2 g/dL    Albumin 2.2 (L) 3.5 - 5.0 g/dL    Globulin 3.2 2.0 - 4.0 g/dL    A-G Ratio 0.7 (L) 1.1 - 2.2     MAGNESIUM    Collection Time: 03/09/23  3:08 AM   Result Value Ref Range    Magnesium 1.5 (L) 1.6 - 2.4 mg/dL   PHOSPHORUS    Collection Time: 03/09/23  3:08 AM   Result Value Ref Range    Phosphorus 6.0 (H) 2.6 - 4.7 MG/DL           Total time spent with patient:  []        15   []        25   []        35   []         __ minutes    []        Critical Care Provided    Care Plan discussed with: PD nurse Jones Garcia          [x]        Patient   []        Family    []        Care Manager   []        Consultant/Specialist :      []          >50% of visit spent in counseling and coordination of care   (Discussed []        CODE status,  []        Care Plan, []        D/C Planning)    ___________________________________________________    Attending Physician: Jh Madsen MD

## 2023-03-09 NOTE — PROGRESS NOTES
Problem: Self Care Deficits Care Plan (Adult)  Goal: *Acute Goals and Plan of Care (Insert Text)  Description:   FUNCTIONAL STATUS PRIOR TO ADMISSION: Patient was modified independent using a rolling walker for functional mobility. [facility] therapy had just been set up, would like to resume upon discharge. Has family assist PRN for ADL like bathing (unable to go up to 2nd floor full bathroom and is staying on 1st floor) and IADL    HOME SUPPORT: The patient lived with /sons, required some assist for ADL/IADL    Occupational Therapy Goals  Initiated 3/3/2023  1. Patient will perform grooming with supervision/set-up within 7 day(s). 2.  Patient will perform upper body dressing with minimal assistance/contact guard assist within 7 day(s). 3.  Patient will perform lower body dressing with moderate assistance  within 7 day(s). 4.  Patient will perform all aspects of toileting with minimal assistance/contact guard assist within 7 day(s). 5.  Patient will utilize energy conservation techniques during functional activities with verbal cues within 7 day(s). Outcome: Progressing Towards Goal   OCCUPATIONAL THERAPY TREATMENT  Patient: Louis Monroe (54 y.o. female)  Date: 3/9/2023  Diagnosis: Respiratory failure (HCC) [J96.90] Acute respiratory failure with hypoxia (HCC)      Precautions: Fall, Bed Alarm  Chart, occupational therapy assessment, plan of care, and goals were reviewed. ASSESSMENT  Patient continues with skilled OT services and is progressing towards goals. Patient received reclined in bed, amenable to session. Reviewed  energy conservation techniques to utilize at home, patient verbalized understanding and demo'd good carryover during session. SpO2 stable throughout session, benefits from brief rest breaks during activity to maintain functional activity tolerance. Left as received, all needs in reach, NAD.      Current Level of Function Impacting Discharge (ADLs): x1 assist ADL/mobility    Other factors to consider for discharge: below baseline, fall risk         PLAN :  Patient continues to benefit from skilled intervention to address the above impairments. Continue treatment per established plan of care to address goals. Recommend with staff: OOB 3x daily for meals, functional mobility to bathroom      Recommend next OT session: OOB ADL    Recommendation for discharge: (in order for the patient to meet his/her long term goals)  Occupational therapy at least 2 days/week in the home AND ensure assist and/or supervision for safety with ADL/IADL    This discharge recommendation:  Has not yet been discussed the attending provider and/or case management    IF patient discharges home will need the following DME: TBD pending progress       SUBJECTIVE:   Patient stated I think i'm leaving today.     OBJECTIVE DATA SUMMARY:   Cognitive/Behavioral Status:  Neurologic State: Alert; Appropriate for age  Orientation Level: Oriented X4  Cognition: Follows commands; Appropriate for age attention/concentration  Perception: Appears intact  Perseveration: No perseveration noted  Safety/Judgement: Awareness of environment    Functional Mobility and Transfers for ADLs:  Bed Mobility:  Supine to Sit: Supervision  Sit to Supine: Supervision    Transfers:  Sit to Stand: Minimum assistance (light assist to achieve lift off)          Balance:  Sitting: Intact  Standing: Intact; With support  Standing - Static: Good;Constant support  Standing - Dynamic : Fair;Good;Constant support    ADL Intervention:       Grooming  Grooming Assistance: Contact guard assistance  Position Performed: Seated in chair  Washing Face: Supervision  Brushing Teeth: Supervision  Cues: Verbal cues provided                                  Cognitive Retraining  Safety/Judgement: Awareness of environment        Pain:  None reported    Activity Tolerance:   Fair and requires rest breaks    After treatment patient left in no apparent distress:   Supine in bed, Call bell within reach, and Side rails x 3    COMMUNICATION/COLLABORATION:   The patients plan of care was discussed with: Physical therapist and Registered nurse.      Andre Oliver OT  Time Calculation: 25 mins

## 2023-03-09 NOTE — DIALYSIS
Peritoneal Dialysis Disconnection / [phone]     Metrics   I-Drain: 151   Total UF: 2004   Last Fill: 0   Last Manual Drain: 0   Net UF:         2155   Avg Dwell Time: 1:12   Lost Dwell Time: 0:16   Alarms: No alarms recorded   Effluent: Clear;yellow visualized in toilet     Access   Type & Location: Mid abdomen,    Comments: Prior to disconnection, one-minute Alcavis scrub performed, followed by one minute soak per policy. Sterile mini cap applied and secured to abdomen. Dsg clean, dry and intact. Dsg date: 03/08/23    Transfer set secured to abdomen under abd pad to promote healing by limiting tugging. Comments:   Treatment completed without incident.

## 2023-03-09 NOTE — PROGRESS NOTES
Transition of Care:home with ZANE for home health SN/PT/OT with [facility] (they have accepted her back) and resuming her OP peritoneal dialysis at [address] and NEW home oxygen with [address] home O2 delivered to patients room on 3/9     Transport Plan:in car; patient stated she is arranging her own ride home     RUR: 25%     1st IM medicare letter signed on 3/2/23  2nd IM medicare letter signed on 3/9/23     Main contact is - Tonya Bird- [phone]       1200: this CM noted home O2 order; met with patient at bedside to discuss home O2; she agrees; order and clinicals sent to [address] accepted; [facility], contact is here to deliver the home O2    1358: this CM faxed to [facility] PD clinic todays nephro note, discharge summary, last PD session to [phone]: this CM met again with patient to review the discharge plan today home with home health, resume her PD at [facility] and new home O2; she verbalized understanding; patient states she is arranging her own ride home today    Medicare pt has received, reviewed, and signed 2nd IM letter informing them of their right to appeal the discharge. Signed copy has been placed on pt bedside chart.       CM following  Alejandra Root RN     NOTE:  patient at bedside; she is alert and oriented x 4; patient lives at stated address with her ; trilevel home with 5 steps; has a cane, BSC, walker; fairly independent in her ADLs; she just started NEW OP peritoneal dialysis at the [facility] PD clinic- Tom Perez [address] on 2/23/23; patient was also followed by [facility] prior to this readmission; patient states she does not use any home O2; has supportive ; patient stated she would like to resume with [facility] home Care; referral sent via [facility]        CM following  Alejandra Root RN             Revision History                                                      Revision History Revision History

## 2023-03-09 NOTE — DISCHARGE SUMMARY
[facility] SUMMARY    Name:  Fabrizio Caraballo  MR#:  [id number]  :  1960  ACCOUNT #:  [de-identified]  ADMIT DATE:  2023  DISCHARGE DATE:  2023      PRIMARY CARE PHYSICIAN:  Jose Lee MD    HISTORY OF PRESENT ILLNESS:  A 68-year-old female presents with shortness of breath. She recently discharged from [facility] on  following leg pain and swelling. She has a history of stage V chronic kidney disease, gout, morbid obesity, status post right total hip replacement, hypertension, who underwent laparoscopic PD catheter insertion and lysis of adhesions on 2023 with vascular surgeon, Dr. Khadar Adame. The patient follows with Nephrology, Dr. Tamara Elizabeth. PD catheter was placed on , not on dialysis yet, the patient now came with shortness of breath, fluid overload, and uremia. Past medical history, review of systems, and physical examination, as noted in the HPI. CONSULTATIONS:  Truman Pallas, Nephrology, 2023. Discussion and Plan:  Is on low volume PD exchanges as part of the initial phase of her training on peritoneal dialysis. We will resume dialysis with cycler this evening. We will use exchange volumes of 1000 mL with the 1.5% Dianeal solution. We will plan to place her on cycler for approximately 8 hours per night. Additionally, we gave 4 mg IV twice a day to assist in her volume status. A dose of metolazone will be ordered as well. Medication should be dosed for GFR. Follow electrolytes and CBC. Daily weights. On , Najma Luz MD.  Assessment:  Chest pain:  Some typical features but none further. HS, mildly elevated at 84. EKG, normal sinus rhythm. No ischemic changes. No evidence of atrial fibrillation on prior EKG. Unclear etiology chest pain. We will check HS troponin a.m., mildly elevated troponin level could be due uncontrolled BP earlier.   Nevertheless, continue trend HS troponin in a.m. and to eventually plan nuclear stress test Monday if no further chest pain and HS troponin does not rise significantly. No anticoagulation at this time. Hypertension is improving, highs in the 200s overnight, but now improved. Now SBP 150s, on amlodipine, clonidine and hydralazine. We will defer to Renal for management. Volume overload, likely renal failure bout, but check echo. Acute hypoxemic respiratory failure. Echo pending, on 2 liters. Morbid obesity. Suspect obstructive sleep apnea. Consider outpatient sleep study. RESULTS REVIEW:  At the time of discharge, hemoglobin 7.9, hematocrit 27.5, WBC 11.6, platelets [phone]. Sodium 134, potassium 3.5, chloride 97, CO2 25, glucose 93, BUN 39, creatinine 6.52, calcium 8.1, phosphorus 5.6, albumin 2.2. PO2 96, bicarb 28, pH 7.28, CO2 62.2, O2 90. INR 1.0.  COVID-19 rapid test negative. IMAGING STUDIES:  Chest on admission, low volumes. Repeat view was better inspiratory effort recommended. On 03/05, small right pleural effusion. HOSPITAL COURSE:  Acute respiratory failure with hypoxemia:  Related to volume overload. She was placed on oxygen. She had an episode of rapid response. On 03/05, O2 was increased to 4 L. She was hypercarbic. Bicarb was added. Bumex 4 mg b.i.d. and metolazone were added. On the chest x-ray as noted above revealed right pleural effusion. As diuretics and dialysis continued, the signs and symptoms of acute respiratory failure with hypoxemia resolved.   She continued to perhaps need oxygen at night and overnight oximetry is being performed on the night prior to discharge and it was determine she needs home O2 @ 2 l    End-stage renal disease, on peritoneal dialysis:  Orchestrated by Nephrology and initially on IV Bumex throughout the hospital stay and metolazone, which at the time of this discharge was changed to p.o. with the recommendation from nephrologist to increase the metolazone to 10 mg on Saturdays and Sundays and 4 mg b.i.d. on all days to avoid hypervolemia over the weekend until the patient's teaching for peritoneal dialysis is completed. Recommended was also EPO x1 on 03/02.   2 g sodium/renal diet. Hypertension:  The patient's home medicines were titrated. Hydralazine was increased as dialysis with fluid overload continued to resolve as expected blood pressure became more reasonable and antihypertensives were titrated in a deescalating fashion. Anemia due to chronic renal disease: She was given EPO per Renal and continue to follow as an outpatient. Secondary hyperparathyroidism:  Managed by Renal.    Volume overload:  Resolved at the time of discharge. All medical issues have now been addressed. She had some bronchospasm during the period of fluid overload. She will be given jet nebs with DuoNeb only as needed at home. All medical issues have been addressed. The patient is planning to continue teaching on her peritoneal dialysis as an outpatient during the week at the dialysis center. The patient's medical status is stable. She is being discharged home ambulatory, in satisfactory condition, improved. FINAL DIAGNOSES:  1. Acute hypoxemic respiratory failure. 2.  Volume overload. 3.  End-stage renal disease, on peritoneal dialysis. 4.  Accelerated hypertension. 5.  Anemia of chronic kidney disease. 6.  Chronic left-sided lumbar radiculopathy. 7.  Secondary hyperparathyroidism. DISPOSITION:  Discharge to home ambulatory. Up as tolerated. DISCHARGE MEDICATIONS:  1.  DuoNeb four times a day p.r.n.. 2.  Tessalon Perles 100 mg t.i.d. p.r.n.  3.  Cinacalcet 60 mg daily. 4.  Gentamicin topical cream p.r.n.  5.  Zaroxolyn 5 mg daily, 10 mg on Saturday and Sunday. 6.  Zofran ODT 4 mg q.8 h. p.r.n.  7.  Potassium chloride 20 mEq daily  8. Bumex 2 mg b.i.d.  9.  Hydralazine 25 mg t.i.d.  10.  Allopurinol 200 mg daily. 12.  Norvasc 10 mg daily.   13.  Colace b.i.d.  14. Carvedilol 6.25 mg b.i.d.  15.  Nasal O2 at 2l    DISCHARGE INSTRUCTIONS:  We will follow up at the dialysis center on Friday and then Monday through Friday. Followup with nephrologist in one week. Followup with her primary care physician, Dr. Marybeth Solis in 5-7 days. She is on a heart healthy renal diet. May be up as tolerated. I personally saw the patient today and spent less than or equal to 30 minutes on counseling, coordination of care, and discharging this patient.       Francisco J Levine MD      [id number]  D:  03/08/2023 21:17  T:  03/09/2023 3:26  JOB #:  [id number]

## 2023-03-09 NOTE — PROGRESS NOTES
Hospital Progress Note  Answering service [phone]  Office [phone]  Perfect[employer]    NAME:  Dorcas Schmitt   :   1960   MRN:  [id number]     Date/Time:  3/9/2023 7:59 AM    Plan:     PD today  Encourage activity as tolerated  Echo reviewed  Requires Oxygen and seems more SOB this AM - will discuss with renal  Home soon/renal CM consult  Risk of Deterioration: Low  []           Moderate  [x]           High  []                 Assessment:     Principal Problem:    Acute respiratory failure with hypoxia (HCC) (3/1/2023)     Volume overload      Titrate O2       RRT (3/5/23)        Nasal O2 4 liters        Hypercarbic          BIBAP added         Added bumex 4 mg bis and metolazone         CXR - sm r pleural effusion        Resolving     Active Problems:    HTN (hypertension) (2015)     Titrate home meds      Chronic left-sided lumbar radiculopathy (2015)     Analgesics PRN      Anemia due to chronic kidney disease (6/15/2015)      EPO/renal      Secondary hyperparathyroidism ([facility] [address] (2020)      Volume overload (3/2/2023)     resolving      ESRD on peritoneal dialysis [facility]) (3/2/2023)       [de-identified] notes:63 y.o. female who presents with shortness of breath. Patient was recently discharged from [facility] on -after being d/cd  for leg pain and swelling. Pt has  PMHx of Stage V CKD, gout, morbid obesity, s/p R total hip replacement, and HTN who underwent laparoscopic PD catheter insertion and lysis of adhesions on 23 with Vascular Surgeon, Dr. Anali Keller. Patient follows with nephrology Dr. Wayne Torres 8 PD catheter placement was done on  not on dialysis yet patient now came with shortness of breath fluid overload and uremia.       The patient denies any headache, blurry vision, sore throat, trouble swallowing, trouble with speech, chest pain, SOB, cough, fever, chills, N/V/D, abd pain, urinary symptoms, constipation, recent travels, sick contacts, focal or generalized neurological symptoms, falls, injuries, rashes, contact with COVID-19 diagnosed patients, hematemesis, melena, hemoptysis, hematuria, rashes, denies starting any new medications and denies any other concerns or problems besides as mentioned above.       Subjective:   No further cp - some SOB with conservation this am  11 Point Review of Systems:   Negative except no cp    []            Unable to obtain ROS due to:       []            mental status change []            sedated []            intubated     Social History     Tobacco Use    Smoking status: Former     Packs/day: 0.25     Years: 25.00     Pack years: 6.25     Types: Cigarettes     Quit date: 2000     Years since quittin.6    Smokeless tobacco: Never   Substance Use Topics    Alcohol use: No     Medications reviewed:  Current Facility-Administered Medications   Medication Dose Route Frequency    potassium chloride SR (KLOR-CON 10) tablet 20 mEq  20 mEq Oral BID    bumetanide (BUMEX) tablet 2 mg  2 mg Oral BID    magnesium oxide (MAG-OX) tablet 400 mg  400 mg Oral DAILY    benzonatate (TESSALON) capsule 100 mg  100 mg Oral QHS PRN    melatonin tablet 6 mg  6 mg Oral QHS    saline peripheral flush soln 20 mL  20 mL InterCATHeter PRN    saline peripheral flush soln 20 mL  20 mL InterCATHeter PRN    peritoneal dialysis DEXTROSE 2.5% (2.5 mEq/L low calcium) solution 6,000 mL  6,000 mL IntraPERitoneal DAILY    peritoneal dialysis DEXTROSE 2.5% (2.5 mEq/L low calcium) solution 6,000 mL  6,000 mL IntraPERitoneal DAILY    hydrALAZINE (APRESOLINE) 20 mg/mL injection 20 mg  20 mg IntraVENous Q6H PRN    nitroglycerin (NITROBID) 2 % ointment 1 Inch  1 Inch Topical BID    peritoneal dialysis DEXTROSE 2.5% (2.5 mEq/L low calcium) solution 6,000 mL  6,000 mL IntraPERitoneal DAILY    guaiFENesin ER (MUCINEX) tablet 600 mg  600 mg Oral Q12H    hydrALAZINE (APRESOLINE) tablet 25 mg  25 mg Oral TID    metOLazone (ZAROXOLYN) tablet 5 mg  5 mg Oral DAILY    gentamicin (GARAMYCIN) 0.1 % cream   Topical DIALYSIS PRN    amLODIPine (NORVASC) tablet 10 mg  10 mg Oral DAILY    carvediloL (COREG) tablet 6.25 mg  6.25 mg Oral BID WITH MEALS    albuterol-ipratropium (DUO-NEB) 2.5 MG-0.5 MG/3 ML  3 mL Nebulization Q6H PRN    cinacalcet (SENSIPAR) tablet 60 mg  60 mg Oral DAILY WITH BREAKFAST    sodium chloride (NS) flush 5-40 mL  5-40 mL IntraVENous PRN    acetaminophen (TYLENOL) tablet 650 mg  650 mg Oral Q6H PRN    Or    acetaminophen (TYLENOL) suppository 650 mg  650 mg Rectal Q6H PRN    polyethylene glycol (MIRALAX) packet 17 g  17 g Oral DAILY PRN    ondansetron (ZOFRAN ODT) tablet 4 mg  4 mg Oral Q8H PRN    Or    ondansetron (ZOFRAN) injection 4 mg  4 mg IntraVENous Q6H PRN        Objective:   Vitals:  Visit Vitals  /70 (BP 1 Location: Right upper arm, BP Patient Position: Semi fowlers; At rest)   Pulse 93   Temp 98.3 °F (36.8 °C)   Resp 26   Ht 5' 6\" (1.676 m)   Wt 243 lb 9.7 oz (110.5 kg)   SpO2 95%   BMI 39.32 kg/m²     Temp (24hrs), Av.1 °F (36.7 °C), Min:97.5 °F (36.4 °C), Max:98.5 °F (36.9 °C)    O2 Flow Rate (L/min): 2 l/min O2 Device: Nasal cannula    Last 24hr Input/Output:    Intake/Output Summary (Last 24 hours) at 3/9/2023 0802  Last data filed at 3/9/2023 0700  Gross per 24 hour   Intake --   Output 2155 ml   Net -2155 ml          PHYSICAL EXAM:  General:    Alert, cooperative, no distress, appears stated age. Head:   Normocephalic, without obvious abnormality, atraumatic. Eyes:   Conjunctivae/corneas clear. PERRLA  Nose:  Nares normal. No drainage or sinus tenderness. Throat:    Lips, mucosa, and tongue normal.  No Thrush  Neck:  Supple, symmetrical,  no adenopathy, thyroid: non tender    no carotid bruit and no JVD. Back:    Symmetric,  No CVA tenderness. Lungs:   Decreased BS with rhomchi  Heart:   Regular rate and rhythm,  no murmur, rub or gallop. Abdomen:   Soft, non-tender. Not distended.   Bowel sounds normal. No masses        Lab Data Reviewed:    Recent Labs     03/09/23 0308 03/08/23 0217   WBC 9.8 11.6*   HGB 7.8* 7.9*   HCT 27.3* 27.5*    352       Recent Labs     03/09/23 0308 03/08/23 0217 03/07/23 0329   * 134*  131* 133*   K 3.8 3.5  3.2* 3.3*   CL 97 97  96* 96*   CO2 31 25  29 28   GLU 79 93  100 110*   BUN 37* 39*  41* 42*   CREA 6.25* 6.52*  6.42* 6.77*   CA 8.1* 8.1*  7.8* 8.1*   MG 1.5*  --  1.7   PHOS 6.0* 5.6* 7.6*   ALB 2.2* 2.2* 2.3*   TBILI 0.6  --   --    ALT <6*  --   --        Lab Results   Component Value Date/Time    Glucose (POC) 140 (H) 03/05/2023 05:46 PM    Glucose (POC) 82 02/14/2023 12:04 PM    Glucose (POC) 78 08/15/2022 08:21 AM    Glucose (POC) 90 11/09/2020 08:36 AM    Glucose,  (H) 03/05/2023 05:52 PM     No results for input(s): PH, PCO2, PO2, HCO3, FIO2 in the last 72 hours. No results for input(s): INR, INREXT, INREXT in the last 72 hours.     ___________________________________________________  ___________________________________________________    Attending Physician: Renée Brothers MD

## 2023-03-09 NOTE — PROGRESS NOTES
Physical Therapy    Pulse oximetry assessment   92% at rest on room air   89-92% while ambulating on room air    Silva Mirian, PT, MPT

## 2023-03-09 NOTE — PROGRESS NOTES
Physician Progress Note      Kari Post  [facility] #:                  [id number]  :                       1960  ADMIT DATE:       3/1/2023 9:07 AM  DISCH DATE:  RESPONDING  PROVIDER #:        Awa Stewart MD          QUERY TEXT:    Pt admitted with respiratory failure . Pt noted to have  fluid overload. If possible, please document in progress notes and discharge summary the relationship, if any, between respiratory failure and   fluid overload. The medical record reflects the following:  Risk Factors: resp failure  Clinical Indicators:  3/2 PN  Principal Problem:  Acute respiratory failure with hypoxia (HCC) (3/1/2023)  Volume overload  Titrate O2    3/1 nephrology  End-stage renal disease on PD. Fluid overload. Treatment: nephrology consult, dialysis    Thank you,  David Montoya RN, CCDS, [facility]  Certified Clinical Documentation   [phone]  you can also reach me by perfect serve. Options provided:  -- respiratory failure due to   fluid overload POA  -- respiratory failure unrelated to   fluid overload  -- Other - I will add my own diagnosis  -- Disagree - Not applicable / Not valid  -- Disagree - Clinically unable to determine / Unknown  -- Refer to Clinical Documentation Reviewer    PROVIDER RESPONSE TEXT:    This patient has respiratory failure due to fluid overload POA.     Query created by: Elizabeth Morton on 3/9/2023 7:52 AM      Electronically signed by:  Awa Stewart MD 3/9/2023 3:04 PM

## 2023-03-10 ENCOUNTER — PATIENT OUTREACH (OUTPATIENT)
Dept: CASE MANAGEMENT | Age: 63
End: 2023-03-10

## 2023-03-10 NOTE — PROGRESS NOTES
Care Transitions Outreach Attempt    Call within 2 business days of discharge: Yes   Attempted to reach patient for transitions of care follow up. Unable to reach patient. Dormzy message sent. Patient: Rocío Mora Patient : 1960 MRN: [id number]    Last Discharge [address]       Date Complaint Diagnosis Description Type Department Provider    3/1/23 Shortness of Breath ESRD (end stage renal disease) ([facility] [address] . .. ED to Hosp-Admission (Discharged) (ADMIT) Danyel Ramirez MD; Db Gtz. .. Was this an external facility discharge?  No Discharge Facility: n/a    Noted following upcoming appointments from discharge chart review:   [facility]):   Future Appointments   Date Time Provider Margarito Arora   3/24/2023  3:00 PM G1 [facility]   2023 11:30 AM H2 [facility]     Non-[facility] follow up appointment(s): none listed    Home Health/Outpatient orders at discharge: home health care  [address]: [facility]  Date of initial visit: [address] ordered at discharge:  home [facility]

## 2023-03-10 NOTE — ADT AUTH CERT NOTES
Comment          Patient Demographics    Patient Name   Shanelle Howell   [id number] Legal Sex   Female    1960 Address   [facility]    [place] [facility] [address] Phone   [phone] (Home)   [phone] (Mobile) *Preferred*     Patient Demographics    Patient Name   Shanelle Howell   [id number] Legal Sex   Female    1960 Address   [facility]    [place] [facility] [address] Phone   [phone] (Home)   [phone] (Mobile) *Preferred*     CSN:   [id number]     Admit Date: Admit Time Room Bed   Mar 1, 2023  9:07 AM 9416 5007 [11914] 01 [[id number]]     Attending Providers    Provider Pager From To   Amos Diop MD  23   Araseli Li MD  23   Caro Ann MD  23   MD [email or web address] 23   Caro Ann MD  23     Patient Contacts    Name Yohana Ybarra Spouse [phone]   Ermalinda Code   [phone]     Utilization Reviews       Renal Failure, Chronic - Care Day 7 (3/8/2023) by Nilda Downs       Review Entered Review Status   3/8/2023 1108 Completed      Criteria Review      Care Day: 7 Care Date: 3/8/2023 Level of Care: Inpatient Floor    Guideline Day 2    Level Of Care    (X) Floor    Clinical Status    (X) * Hemodynamic stability    3/8/2023 11:06:26 EST by Nilda Downs      T 98, /77, P 98, R 28, 02 SAT 92% RA    (X) * Pulmonary edema absent or improved    3/8/2023 11:08:18 EST by Nilda Downs      improved    (X) * Nausea and vomiting absent or improved    ( ) * Electrolytes at baseline or improved    (X) * Acid-base status at baseline or improved    3/8/2023 11:06:26 EST by Nilda Downs      C02 25    Activity    (X) Activity as tolerated    Routes    (X) Parenteral or oral hydration    (X) Parenteral or oral medications    3/8/2023 11:06:26 EST by Thad Burciaga 2MG IV BID, COREG 6.25MG PO BID, SENSIPAR 60MG PO QD, MUCINEX 600MG PO Q 12 HR, HYDRALAZINE 25MG PO TID, MAG OX 400MG PO QD, ZAROXOLYN 5MG PO QD,    (X) Oral diet as tolerated    Interventions    (X) Possible dialysis    3/8/2023 11:06:26 EST by Melchor Meyers      PD    (X) Monitor electrolytes, glucose, acid-base, and volume status    3/8/2023 11:06:26 EST by Melchor Meyers      WBC 11.6, RBC 2.86, HBG 7.9, HCT 27.5, , K 3.3, CL 96, GLUC 110, BUN 42, CREAT 6.77, CA 8.1, PHOS 7.6, EGFR 6    Medications    (X) Possible erythropoiesis-stimulating agent, calcium supplement, phosphate binder, bicarbonate, vitamin D, antihypertensive medication    * Milestone   Additional Notes   Hospital Progress Note       No further cp - breathing comfortable       EXAM:  General:          Alert, cooperative, no distress, appears stated age. Head:              Normocephalic, without obvious abnormality, atraumatic. Eyes:               Conjunctivae/corneas clear. PERRLA   Nose:               Nares normal. No drainage or sinus tenderness. Throat:             Lips, mucosa, and tongue normal.  No Thrush   Neck:               Supple, symmetrical, no adenopathy, thyroid: non tender                           no carotid bruit and no JVD. Back:              Symmetric,  No CVA tenderness. Lungs:             Decreased BS with rhomchi   Heart:             Regular rate and rhythm,  no murmur, rub or gallop. Abdomen:        Soft, non-tender. Not distended.   Bowel sounds normal. No masses            Assessment:       Principal Problem:     Acute respiratory failure with hypoxia (HCC) (3/1/2023)      Volume overload       Titrate O2        RRT (3/5/23)         Nasal O2 4 liters         Hypercarbic           BIBAP added          Added bumex 4 mg bis and metolazone          CXR - sm r pleural effusion         Resolving       Active Problems:     HTN (hypertension) (2/9/2015)      Titrate home meds         Chronic left-sided lumbar radiculopathy (2/9/2015)      Analgesics PRN         Anemia due to chronic kidney disease (6/15/2015)       EPO/renal         Secondary hyperparathyroidism ([address] (6/14/2020)         Volume overload (3/2/2023)      resolving         ESRD on peritoneal dialysis [facility]) (3/2/2023)       Plan:       PD today   Encourage activity as tolerated   Echo reviewed   Off O2   Home soon/renal CM consult               Renal Progress Note       Assessment:       Resp Failure-improved now on RA, requires O2 at night   Fluid Overload-improved   ESRD --> s/p PD catheter (2/14)    Anemia in CKD   Hyperparathyroidism   hypokalemia           Plan:       CCPD with 2 litre / low volume exchanges ( 6 exchanges --> 10 hour prescription). C/w 2.5% Dianeal.   Decreased Bumex 2 MG IV BID+ c/w metolazone (5 mg once daily). Will change to po bumex on DC   Replete K-start KCL 20 meq bid   Needs to continue daily PD with PD nurse as outpatient after discharge M-F. After DC , For the weekend will increase metolazone to 10 mg daily and bumex po 4 mg BID to avoid hypervolemia till patient's teaching completes   S/p EPO x1- 3/2   Sensipar   2 gram sodium / renal diet. Daily weights. Follow lytes daily.    Is and Os   Ativan for anxiety           Renal Failure, Chronic - Care Day 4 (3/5/2023) by Kyung Garcia       Review Entered Review Status   3/6/2023 1233 Completed      Criteria Review      Care Day: 4 Care Date: 3/5/2023 Level of Care: Inpatient Floor    Guideline Day 2    Level Of Care    (X) Floor    Clinical Status    (X) * Hemodynamic stability    3/6/2023 12:33:20 EST by Kyung Garcia      T 98.4, /93, P , R 18-36, 02 SAT 96% RA,  100% BIPAP    ( ) * Pulmonary edema absent or improved    3/6/2023 12:33:20 EST by Kyung Garcia      CXR:  Small right pleural effusion    (X) * Nausea and vomiting absent or improved    (X) * Electrolytes at baseline or improved    (X) * Acid-base status at baseline or improved    3/6/2023 12:33:20 EST by Hayden Solis      LAST C02 ON 3/4=26    Activity    (X) Activity as tolerated    Routes    (X) Parenteral or oral hydration    (X) Parenteral or oral medications    3/6/2023 12:33:20 EST by Hayden Solis      NORVASC 10MG PO QD, COREG 6.25MG PO BID, MUCINEX 600MG PO Q 12 HR, HYDRALAZINE 25MG PO TID, ZAROXOLYN 5MG PO QD,    (X) Oral diet as tolerated    Interventions    (X) Possible dialysis    3/6/2023 12:33:20 EST by Hayden Solis      PD X2 TODAY    (X) Monitor electrolytes, glucose, acid-base, and volume status    3/6/2023 12:33:20 EST by Hayden Solis      POC LABS:  FAST GLUC 140, , CL 92, C02 29, ANIO  GAP 6, CREAT 6.3, IONIZED CA 1.08, EGFR 7    POC BG:  PH 7.28, PC02 62.2    Medications    (X) Possible erythropoiesis-stimulating agent, calcium supplement, phosphate binder, bicarbonate, vitamin D, antihypertensive medication    * Milestone   Additional Notes   Progress Note        \"I am tired \"Pleasant lady lying in bed in no distress   She looks more somnolent ? EXAM: General:   Pt appears somnolent but awakens easily    She recognized me from my visit yesterday   well noursished,    well developed,    appears stated age    Ears/Eyes:    Hearing intact   Sclera anicteric. Pupils equal   Mouth/Throat:    Mucous membranes normal pink and moist   Oral pharynx clear    Neck:    Supple   Lungs:    Chest excursion symmetrical    Auscultation B/L Symmetrical with    Vesicular breath sounds     No Crepitations noted           CVS:    Regular rate and rhythm    no  murmur,    No click, rub or gallop   S1 normal    S2 normal        Abdomen:    Obese    Soft, non-tender   Bowel sounds normal       Extremities:    No cyanosis, jaundice   No edema noted    No sign of DVT/cord like lesion on palpation   No sign of acute trauma .     Skin:        Skin color, texture, turgor normal. no acute rash or lesions    Lymph nodes:        Musculoskeletal   Muscle bulk B/L symmetrical   Neuro   Cranial nerves are intact,    motor movement b/l symmetrical,    Sensory evaluation b/l symmetrical    Psych:    Alert and oriented,    normal mood & affect        Assessment and plan:           Acute respiratory failure with hypoxia due to    Volume overload    ESRD on HD -appreciate help from nephrology service    Continue Oxygen supplementation as is - at this time SpO2 94% in RA !!   HD per Dr. Mallika Song recommendation            Chest pain -appreciate guidance from Dr. Yvan Becker for stress test is noted, agreed. HTN -some fluctuation    Non change in current Rx        Chronic left sided radiculopathy -per PCP note    PRN pain medications as tolerated        Anemia of chronic disease and    Anemia of chronic kidney disease -   No change in current Rx. Diarrhea started this morning. Stool sent for C diff infection test       Body mass index is 41.1 kg/m². -Class III obesity    Will benefit from OP weight management counseling. Renal Progress Note       Assessment:       Resp Failure   Fluid Overload   ESRD --> s/p PD catheter (2/14) --> on low volume PD exchanges in PD clinic   Anemia in CKD   Hyperparathyroidism           Plan:       CCPD with 2 litre / low volume exchanges ( 6 exchanges --> 10 hour prescription). Will use 2-- 2.5% Dianeal.   Bumex (4 MG IV BID) + metolazone (5 mg once daily). S/p EPO x1- 3/2   Sensipar   2 gram sodium / renal diet. Daily weights. Follow lytes daily. Is and Os   Ativan for anxiety           Cardiology Care Note       Assessment and Plan    Chest pain:  Has not recurred. HS troponin mildly increased. Unclear etiology of chest pain. Plan nuclear stress test tomorrow. [id number] Laila Kimball for [facility] dc if this is normal.   HTN: bp improving. Getting HD for volume overload; defer adjustments of BP meds/diuretics to renal given CKD. Will review echo when completed. Volume overload: diuretic per nephrology given CKD   Acute hypoxic respiratory failure:  echo pending. On 2 L NC, sats 100%   Morbid obesity. Body mass index is 41.1 kg/m². Suspected possible ARLENE: consider outpatient sleep study. NEPHROLOGY ADDENDUM 1832:      Got a call from patient's friend who is at the bedside. Apparently she became dyspneic and perhaps confused (may ne related to Ativan give earlier). ABG showed maintained oxygenation but had CO2 retention. BiPAP ordered but she was refusing. Dr. Jaleel Osman ordered IV Bumex, CXR pending. BP up (170/90). I have ordered 1inch NTP. PD has been started with higher conc dextrose to augment fluid removal. She is now agreeable to trying BiPaP. Will check CXR once done. D/C further Ativan.

## 2023-03-13 ENCOUNTER — PATIENT OUTREACH (OUTPATIENT)
Dept: CASE MANAGEMENT | Age: 63
End: 2023-03-13

## 2023-03-13 NOTE — PROGRESS NOTES
Care Transitions Initial Call    Call within 2 business days of discharge: Yes     Patient Current Location: [place]    Patient: Sharmin Amezquita Patient : 1960 MRN: [id number]    Last Discharge  Street       Date Complaint Diagnosis Description Type Department Provider    3/1/23 Shortness of Breath ESRD (end stage renal disease) ([facility] [address] . .. ED to Hosp-Admission (Discharged) (ADMIT) Kassy Vásquez MD; David Pulliam. .. Patient is currently at dialysis center for PD education. Attempted to reach out twice but will still completing PD training. Patient agrees to outreach on 3/15.

## 2023-03-15 ENCOUNTER — PATIENT OUTREACH (OUTPATIENT)
Dept: CASE MANAGEMENT | Age: 63
End: 2023-03-15

## 2023-03-15 NOTE — Clinical Note
This is the patient I was telling you needs follow up next week.  She request call in the am on Thursday (3/23) or afternoon after her appt with Dr. Shraddha Ny at 115pm.  Abbi Liz

## 2023-03-16 NOTE — PROGRESS NOTES
Care Transitions Follow Up Call    Patient Current Location: [facility] Transition Nurse (CTN) contacted the patient by telephone to follow up after admission on 3/1-3/9. Verified name and  with patient as identifiers. Follow up appointment completed? yes. Was follow up appointment scheduled within 7 days of discharge? Patient participating in PD education M-F. Follow up with nephro at HD center . Patient will see PCP- Dr. Wilfredo Fenton on [email or web address]    CTN reviewed discharge instructions, medical action plan and red flags with patient and discussed any barriers to care and/or understanding of plan of care after discharge. Discussed appropriate site of care based on symptoms and resources available to patient including: PCP and Specialist. The patient agrees to contact the PCP office for questions related to their healthcare. [facility] follow up appointment(s):   Future Appointments   Date Time Provider Margarito Arora   3/23/2023  1:15 PM Abrahan Sales MD [facility] MAIN BS AMB   3/24/2023  3:[facility]   2023 11:30 AM [facility]       CTN provided contact information for future needs. Plan for follow-up call in 7-10 days based on severity of symptoms and risk factors. Patient is agreeable to outreach on Thursday, 3/23 in am (has appt with PCP at 115pm)     Plan for next call: self management-PD status, daily weight, home oxygen, fluid status       Goals Addressed                   This Visit's Progress     Prevent complications post hospitalization. 23  Patient will follow up with PCP as directed. Patient will monitor BP and discuss with PCP at follow up.    23  Attempted to contact patient multiple times but she has a full schedule working with HD center arranging/educating on home PD. Patient has been participating in PD education M-F of this week.  She is agreeable to follow up next week on Thursday in am or after PCP in after (appt time 115pm). She denies any concerns today. Reports no questions about discharge instruction or medication changes. She is not able to review information today.

## 2023-03-23 ENCOUNTER — OFFICE VISIT (OUTPATIENT)
Dept: INTERNAL MEDICINE CLINIC | Age: 63
End: 2023-03-23

## 2023-03-23 ENCOUNTER — PATIENT OUTREACH (OUTPATIENT)
Dept: CASE MANAGEMENT | Age: 63
End: 2023-03-23

## 2023-03-23 ENCOUNTER — TELEPHONE (OUTPATIENT)
Dept: INFUSION THERAPY | Age: 63
End: 2023-03-23

## 2023-03-23 VITALS
RESPIRATION RATE: 18 BRPM | BODY MASS INDEX: 39.32 KG/M2 | OXYGEN SATURATION: 96 % | HEART RATE: 98 BPM | DIASTOLIC BLOOD PRESSURE: 87 MMHG | TEMPERATURE: 97.1 F | HEIGHT: 66 IN | SYSTOLIC BLOOD PRESSURE: 146 MMHG

## 2023-03-23 DIAGNOSIS — E66.01 SEVERE OBESITY (BMI 35.0-39.9) WITH COMORBIDITY (HCC): ICD-10-CM

## 2023-03-23 DIAGNOSIS — Z99.2 ESRD ON PERITONEAL DIALYSIS (HCC): ICD-10-CM

## 2023-03-23 DIAGNOSIS — Z00.00 WELLNESS EXAMINATION: ICD-10-CM

## 2023-03-23 DIAGNOSIS — I10 PRIMARY HYPERTENSION: ICD-10-CM

## 2023-03-23 DIAGNOSIS — M75.01 ADHESIVE CAPSULITIS OF RIGHT SHOULDER: ICD-10-CM

## 2023-03-23 DIAGNOSIS — N18.6 ESRD ON PERITONEAL DIALYSIS (HCC): ICD-10-CM

## 2023-03-23 DIAGNOSIS — Z13.31 SCREENING FOR DEPRESSION: ICD-10-CM

## 2023-03-23 DIAGNOSIS — M10.9 GOUT, UNSPECIFIED CAUSE, UNSPECIFIED CHRONICITY, UNSPECIFIED SITE: ICD-10-CM

## 2023-03-23 DIAGNOSIS — N25.81 SECONDARY HYPERPARATHYROIDISM (HCC): ICD-10-CM

## 2023-03-23 DIAGNOSIS — R53.81 PHYSICAL DECONDITIONING: ICD-10-CM

## 2023-03-23 DIAGNOSIS — M25.552 LEFT HIP PAIN: Primary | ICD-10-CM

## 2023-03-23 NOTE — PROGRESS NOTES
Tolu Tang is a 70 y.o. female  Chief Complaint   Patient presents with    Hospital Follow Up     Patient here for hospital follow up Acute Respiratory Failure with Hypoxia. YES Answers must have Comments  1. \"Have you been to the ER, urgent care clinic since your last visit? Hospitalized since your last visit? \"    [x] YES When 03/01/2023, Where [facility], and Reason for visit Acute Respiratory Failure with Hypoxia. [] NO       2. Have you seen or consulted any other health care providers outside of [address] [place] [address] since your last visit?     [] YES   [x] NO       3. For patients aged 39-70: Have you had a colorectal cancer screening such as a colonoscopy/FIT/Cologuard? Nurse/CMA to request records if not in chart   [x] YES When 04/05/2022, Where [address], and Type? Colonoscopy  [] NO   [] NA, based on age    If the patient is female:      4. For female patients aged 41-77: Ruma Alex you had a mammogram in the last two years?  Nurse/CMA to request records if not in chart   [x] YES When 04/13/2022 and Where [address]  [] NO   [] NA, based on age    11. For female patients aged 21-65: Ruma Alex you had a pap smear?   Nurse/CMA to request records if not in chart   [x] YES When 04/22/2022 and Where [address]  [] NO  [] NA, based on age

## 2023-03-23 NOTE — PROGRESS NOTES
Patient will be receiving retacrit at Hemodialysis from now on, per Navid Levin RN ok to cancel all appts.

## 2023-03-23 NOTE — PROGRESS NOTES
Contacted the patient for a follow up. Patient answered requested return call in ~15 minutes  CTN contacted the patient again, Helen Delaney. Will follow up next week.

## 2023-03-24 ENCOUNTER — HOSPITAL ENCOUNTER (OUTPATIENT)
Dept: INFUSION THERAPY | Age: 63
End: 2023-03-24

## 2023-03-25 PROBLEM — M75.01 ADHESIVE CAPSULITIS OF RIGHT SHOULDER: Status: ACTIVE | Noted: 2023-03-25

## 2023-03-25 PROBLEM — M25.552 LEFT HIP PAIN: Status: ACTIVE | Noted: 2023-03-25

## 2023-03-25 PROBLEM — R53.81 PHYSICAL DECONDITIONING: Status: ACTIVE | Noted: 2023-03-25

## 2023-03-25 RX ORDER — PREDNISONE 20 MG/1
20 TABLET ORAL
Qty: 21 TABLET | Refills: 0 | Status: SHIPPED | OUTPATIENT
Start: 2023-03-25

## 2023-03-25 NOTE — PATIENT INSTRUCTIONS
Medicare Wellness Visit, Female     The best way to live healthy is to have a lifestyle where you eat a well-balanced diet, exercise regularly, limit alcohol use, and quit all forms of tobacco/nicotine, if applicable. Regular preventive services are another way to keep healthy. Preventive services (vaccines, screening tests, monitoring & exams) can help personalize your care plan, which helps you manage your own care. Screening tests can find health problems at the earliest stages, when they are easiest to treat. [facility] follows the current, evidence-based guidelines published by the [facility] ([facility]) when recommending preventive services for our patients. Because we follow these guidelines, sometimes recommendations change over time as research supports it. (For example, mammograms used to be recommended annually. Even though Medicare will still pay for an annual mammogram, the newer guidelines recommend a mammogram every two years for women of average risk). Of course, you and your doctor may decide to screen more often for some diseases, based on your risk and your co-morbidities (chronic disease you are already diagnosed with). Preventive services for you include:  - Medicare offers their members a free annual wellness visit, which is time for you and your primary care provider to discuss and plan for your preventive service needs.  Take advantage of this benefit every year!    -Over the age of 72 should receive the recommended pneumonia vaccines.    -All adults should have a flu vaccine yearly.  -All adults should have a tetanus vaccine every 10 years.   -Over the age 48 should receive the shingles vaccines.        -All adults should be screened once for Hepatitis C.  -All adults age 38-68 who are overweight should have a diabetes screening test once every three years.   -Other screening tests and preventive services for persons with diabetes include: an eye exam to screen for diabetic retinopathy, a kidney function test, a foot exam, and stricter control over your cholesterol.   -Cardiovascular screening for adults with routine risk involves an electrocardiogram (ECG) at intervals determined by your doctor.     -Colorectal cancer screenings should be done for adults age 39-70 with no increased risk factors for colorectal cancer. There are a number of acceptable methods of screening for this type of cancer. Each test has its own benefits and drawbacks. Discuss with your doctor what is most appropriate for you during your annual wellness visit. The different tests include: colonoscopy (considered the best screening method), a fecal occult blood test, a fecal DNA test, and sigmoidoscopy.    -Lung cancer screening is recommended annually with a low dose CT scan for adults between age 54 and 68, who have smoked at least 30 pack years (equivalent of 1 pack per day for 30 days), and who is a current smoker or quit less than 15 years ago.    -A bone mass density test is recommended when a woman turns 65 to screen for osteoporosis. This test is only recommended one time, as a screening. Some providers will use this same test as a disease monitoring tool if you already have osteoporosis. -Breast cancer screenings are recommended every other year for women of normal risk, age 54-69.    -Cervical cancer screenings for women over age 72 are only recommended with certain risk factors.      Here is a list of your current Health Maintenance items (your personalized list of preventive services) with a due date:  Health Maintenance Due   Topic Date Due    COVID-19 Vaccine (1) Never done    Pneumococcal Vaccine (1 - PCV) Never done    Shingles Vaccine (1 of 2) Never done    Yearly Flu Vaccine (1) Never done

## 2023-03-25 NOTE — PROGRESS NOTES
[address] [facility]  [facility]  [address] [place] [address]  Phone:  [phone]  Fax: [phone]       Chief Complaint   Patient presents with    Hospital Follow Up     Patient here for hospital follow up Acute Respiratory Failure with Hypoxia. Also Annual Wellness Exam.     Annual Wellness Visit   . SUBJECTIVE:    Antonia Landry is a 70 y.o. female  Dictation on: 03/25/2023  2:28 PM by: Adeline Penn [[id number]]          Current Outpatient Medications   Medication Sig Dispense Refill    potassium chloride SR (K-TAB) 20 mEq tablet Take 1 Tablet by mouth daily. 30 Tablet 3    bumetanide (BUMEX) 2 mg tablet Take 1 Tablet by mouth two (2) times a day. 60 Tablet 3    hydrALAZINE (APRESOLINE) 25 mg tablet Take 1 Tablet by mouth three (3) times daily. 90 Tablet 5    albuterol-ipratropium (DUO-NEB) 2.5 mg-0.5 mg/3 ml nebu 3 mL by Nebulization route every six (6) hours as needed for Wheezing. 90 Each 3    benzonatate (TESSALON) 100 mg capsule Take 1 Capsule by mouth nightly as needed for Cough. 60 Capsule 1    cinacalcet 60 mg tab Take 1 Tablet by mouth daily (with breakfast) for 90 days. 30 Tablet 2    gentamicin (GARAMYCIN) 0.1 % topical cream Apply bid prn 15 g 11    metOLazone (ZAROXOLYN) 5 mg tablet Take 1 Tablet by mouth daily. And 2 tablets on Saturday and Sunday 40 Tablet 3    ondansetron (ZOFRAN ODT) 4 mg disintegrating tablet Take 1 Tablet by mouth every eight (8) hours as needed for Nausea or Vomiting. 30 Tablet 1    allopurinoL (ZYLOPRIM) 100 mg tablet TAKE TWO TABLETS BY MOUTH DAILY 60 Tablet 11    amLODIPine (NORVASC) 10 mg tablet TAKE ONE TABLET BY MOUTH DAILY 30 Tablet 11    senna-docusate (PERICOLACE) 8.6-50 mg per tablet Take 1 Tablet by mouth two (2) times a day. 30 Tablet 0    carvediloL (COREG) 6.25 mg tablet Take 2 Tablets by mouth two (2) times daily (with meals).  120 Tablet 11     Past Medical History:   Diagnosis Date    Arthritis     L knee    Chronic kidney disease     secondary to hypertensive nephrosclerosis, CKD STG 5, ON ACTIVE KIDNEY TRANSPLANT LIST AT [facility]- DR. SANCHEZ    Gastritis     HISTORY OF 10/2020     Gout     History of claustrophobia     History of total hip replacement, right 2022    Hyperparathyroidism ([address]     Hypertension      Past Surgical History:   Procedure Laterality Date    HX  SECTION      HX COLONOSCOPY      HX GYN      ECTOPIC PREGNANCY SURGERY     HX HIP REPLACEMENT Left     HX LAP CHOLECYSTECTOMY      HX LUMBAR LAMINECTOMY  2020    L3L4L5    HX WISDOM TEETH EXTRACTION      IR INSERT NON TUNL CVC OVER 5 YRS  2023     Allergies   Allergen Reactions    Morphine Nausea and Vomiting and Cough     Blood pressure and 158-96 heart rate up to 156.   Patient does not feel that this is a true reaction and denies    [facility] Swelling     PATIENT DENIES THIS ALLERGY          REVIEW OF SYSTEMS:  General: negative for - chills or fever  ENT: negative for - headaches, nasal congestion or tinnitus  Respiratory: negative for - cough, hemoptysis, shortness of breath or wheezing  Cardiovascular : negative for - chest pain, edema, palpitations or shortness of breath  Gastrointestinal: negative for - abdominal pain, blood in stools, heartburn or nausea/vomiting  Genito-Urinary: no dysuria, trouble voiding, or hematuria  Musculoskeletal: negative for - gait disturbance, joint pain, joint stiffness or joint swelling  Neurological: no TIA or stroke symptoms  Hematologic: no bruises, no bleeding, no swollen glands  Integument: no lumps, mole changes, nail changes or rash  Endocrine: no malaise/lethargy or unexpected weight changes      Social History     Socioeconomic History    Marital status:     Number of children: 2    Years of education: 16+    Highest education level: Master's degree (e.g., MA, MS, Schuyler, MEd, MSW, RENATA)   Occupational History    Occupation: teacher   Tobacco Use    Smoking status: Former     Packs/day: 0.25     Years: 25.00     Pack years: 6.25     Types: Cigarettes     Quit date: 2000     Years since quittin.7    Smokeless tobacco: Never   Vaping Use    Vaping Use: Never used   Substance and Sexual Activity    Alcohol use: No    Drug use: No    Sexual activity: Not Currently     Family History   Problem Relation Age of Onset    Hypertension Mother     Liver Disease Father     Diabetes Sister     Breast Cancer Paternal Grandmother     Anesth Problems Neg Hx        OBJECTIVE:    Visit Vitals  BP (!) 146/87   Pulse 98   Temp 97.1 °F (36.2 °C) (Oral)   Resp 18   Ht 5' 6\" (1.676 m)   SpO2 96%   BMI 39.32 kg/m²     CONSTITUTIONAL: well , well nourished, appears age appropriate  EYES: perrla, eom intact  ENMT:moist mucous membranes, pharynx clear  NECK: supple. Thyroid normal  RESPIRATORY: Chest: clear to ascultation and percussion   CARDIOVASCULAR: Heart: regular rate and rhythm  GASTROINTESTINAL: Abdomen: soft, bowel sounds active  HEMATOLOGIC: no pathological lymph nodes palpated  MUSCULOSKELETAL: Extremities: no edema, pulse 1+   INTEGUMENT: No unusual rashes or suspicious skin lesions noted. Nails appear normal.  NEUROLOGIC: non-focal exam   MENTAL STATUS: alert and oriented, appropriate affect      ASSESSMENT:  1. Left hip pain    2. Adhesive capsulitis of right shoulder    3. Physical deconditioning    4. ESRD on peritoneal dialysis ([address]    5. Severe obesity (BMI 35.0-39. 9) with comorbidity ([address]    6. Secondary hyperparathyroidism ([address]    7. Primary hypertension    8. Gout, unspecified cause, unspecified chronicity, unspecified site        PLAN:   Dictation on: 2023  2:48 PM by: Ramon Bautista MD   This is the Subsequent Medicare Annual Wellness Exam, performed 12 months or more after the Initial AWV or the last Subsequent AWV    I have reviewed the patient's medical history in detail and updated the computerized patient record.        Assessment/Plan Education and counseling provided:  Are appropriate based on today's review and evaluation    1. Left hip pain  2. Adhesive capsulitis of right shoulder  3. Physical deconditioning  4. ESRD on peritoneal dialysis ([facility]  5. Severe obesity (BMI 35.0-39. 9) with comorbidity ([facility]  6. Secondary hyperparathyroidism ([facility]  7. Primary hypertension  8. Gout, unspecified cause, unspecified chronicity, unspecified site       Depression Risk Factor Screening     3 most recent PHQ Screens 3/23/2023   PHQ Not Done -   Little interest or pleasure in doing things Not at all   Feeling down, depressed, irritable, or hopeless Not at all   Total Score PHQ 2 0   Trouble falling or staying asleep, or sleeping too much Not at all   Feeling tired or having little energy Not at all   Poor appetite, weight loss, or overeating Not at all   Feeling bad about yourself - or that you are a failure or have let yourself or your family down Not at all   Trouble concentrating on things such as school, work, reading, or watching TV Not at all   Moving or speaking so slowly that other people could have noticed; or the opposite being so fidgety that others notice Not at all   Thoughts of being better off dead, or hurting yourself in some way Not at all   PHQ 9 Score 0   How difficult have these problems made it for you to do your work, take care of your home and get along with others Not difficult at all       Alcohol & Drug Abuse Risk Screen    Do you average more than 1 drink per night or more than 7 drinks a week:  No    On any one occasion in the past three months have you have had more than 3 drinks containing alcohol:  No          Functional Ability and Level of Safety    Hearing: Hearing is good. Activities of Daily Living: The home contains: no safety equipment. Patient does total self care      Ambulation: with no difficulty     Fall Risk:  Fall Risk Assessment, last 12 mths 3/23/2023   Able to walk?  No      Abuse Screen:  Patient is not abused       Cognitive Screening    Has your family/caregiver stated any concerns about your memory: no     Cognitive Screening: Not applicable. Health Maintenance Due     Health Maintenance Due   Topic Date Due    COVID-19 Vaccine (1) Never done    Pneumococcal 65+ years (1 - PCV) Never done    Shingles Vaccine (1 of 2) Never done    Flu Vaccine (1) Never done       Patient Care Team   Patient Care Team:  Live Winchester MD as PCP - General (Internal Medicine Physician)  Live Winchester MD as PCP - [facility]  Zoë Berry MD (Orthopedic Surgery)  Lisa Alvarez MD (General Surgery)  Jarod Clayton RN as Care Transitions Nurse    History     Patient Active Problem List   Diagnosis Code    HTN (hypertension) I10    Gout M10.9    Chronic left-sided lumbar radiculopathy M54.16    Osteoarthritis M19.90    Obesity E66.9    Dyslipidemia E78.5    Pigmented skin lesion L81.9    Family history of diabetes mellitus Z83.3    Anemia due to chronic kidney disease N18.9, D63.1    Chronic renal failure syndrome, stage 4 (severe) ([facility]) N18.4    Reflux esophagitis K21.00    Secondary hyperparathyroidism ([facility] N25.81    Severe obesity (BMI 35.0-39. 9) with comorbidity ([address] E66.01    Avascular necrosis of left femur ([facility]) M87.052    Hypercalcemia E83.52    Arthritis of left hip M16.12    Chronic midline low back pain without sciatica M54.50, G89.29    Avascular necrosis of bone of right hip ([facility]) M87.051    History of total hip replacement, right Z96.641    Anemia D64.9    Tachycardia R00.0    Sepsis ([facility]) A41.9    Acute respiratory failure with hypoxia ([facility]) J96.01    Volume overload E87.70    ESRD on peritoneal dialysis ([facility]) N18.6, Z99.2    Physical deconditioning R53.81    Adhesive capsulitis of right shoulder M75.01    Left hip pain M25.552     Past Medical History:   Diagnosis Date    Arthritis     L knee    Chronic kidney disease     secondary to hypertensive nephrosclerosis, CKD STG 5, ON ACTIVE KIDNEY TRANSPLANT LIST AT [facility]- DR. SANCHEZ    Gastritis     HISTORY OF 10/2020     Gout     History of claustrophobia     History of total hip replacement, right 2022    Hyperparathyroidism ([address]     Hypertension       Past Surgical History:   Procedure Laterality Date    HX  SECTION      HX COLONOSCOPY      HX GYN      ECTOPIC PREGNANCY SURGERY     HX HIP REPLACEMENT Left     HX LAP CHOLECYSTECTOMY      HX LUMBAR LAMINECTOMY  2020    L3L4L5    HX WISDOM TEETH EXTRACTION      IR INSERT NON TUNL CVC OVER 5 YRS  2023     Current Outpatient Medications   Medication Sig Dispense Refill    predniSONE (DELTASONE) 20 mg tablet Take 1 Tablet by mouth three (3) times daily (after meals). 21 Tablet 0    potassium chloride SR (K-TAB) 20 mEq tablet Take 1 Tablet by mouth daily. 30 Tablet 3    bumetanide (BUMEX) 2 mg tablet Take 1 Tablet by mouth two (2) times a day. 60 Tablet 3    hydrALAZINE (APRESOLINE) 25 mg tablet Take 1 Tablet by mouth three (3) times daily. 90 Tablet 5    albuterol-ipratropium (DUO-NEB) 2.5 mg-0.5 mg/3 ml nebu 3 mL by Nebulization route every six (6) hours as needed for Wheezing. 90 Each 3    benzonatate (TESSALON) 100 mg capsule Take 1 Capsule by mouth nightly as needed for Cough. 60 Capsule 1    cinacalcet 60 mg tab Take 1 Tablet by mouth daily (with breakfast) for 90 days. 30 Tablet 2    gentamicin (GARAMYCIN) 0.1 % topical cream Apply bid prn 15 g 11    metOLazone (ZAROXOLYN) 5 mg tablet Take 1 Tablet by mouth daily. And 2 tablets on Saturday and  40 Tablet 3    ondansetron (ZOFRAN ODT) 4 mg disintegrating tablet Take 1 Tablet by mouth every eight (8) hours as needed for Nausea or Vomiting.  30 Tablet 1    allopurinoL (ZYLOPRIM) 100 mg tablet TAKE TWO TABLETS BY MOUTH DAILY 60 Tablet 11    amLODIPine (NORVASC) 10 mg tablet TAKE ONE TABLET BY MOUTH DAILY 30 Tablet 11    senna-docusate (PERICOLACE) 8.6-50 mg per tablet Take 1 Tablet by mouth two (2) times a day. 30 Tablet 0    carvediloL (COREG) 6.25 mg tablet Take 2 Tablets by mouth two (2) times daily (with meals). 120 Tablet 11     Allergies   Allergen Reactions    Morphine Nausea and Vomiting and Cough     Blood pressure and 158-96 heart rate up to 156.   Patient does not feel that this is a true reaction and denies    [facility] Swelling     PATIENT DENIES THIS ALLERGY        Family History   Problem Relation Age of Onset    Hypertension Mother     Liver Disease Father     Diabetes Sister     Breast Cancer Paternal Grandmother     Anesth Problems Neg Hx      Social History     Tobacco Use    Smoking status: Former     Packs/day: 0.25     Years: 25.00     Pack years: 6.25     Types: Cigarettes     Quit date: 2000     Years since quittin.7    Smokeless tobacco: Never   Substance Use Topics    Alcohol use: No         Florida Kehr, MD

## 2023-03-26 NOTE — PROGRESS NOTES
1. The patient has severe left hip pain. Her Jamar Moellers test is markedly positive. She has an artificial hip in place. There is a remote possibility that this might be gout, although other things obviously would be in play. Theoretically, she needs to follow up with an orthopedic doctor. I will, however, empirically give her a seven-day course of prednisone to see if this will make a difference. 2. She has a pericapsulitis with frozen shoulder involving her right shoulder. This needs to be addressed by Orthopedics also. 3. She is physically deconditioned. This is related to her physical inactivity over the last several weeks. 4. She is on maintenance hemodialysis temporarily until her PD is in place. 5. She needs to lose weight, as we have discussed previously. Unfortunately, this is not quite the right time to pursue this because of her existing comorbidities. 6. She has hyperparathyroidism secondary to her end-stage renal disease. 7. Blood pressure is reasonable. 8. She had not been taking her allopurinol on a regular basis, but she will resume this.

## 2023-03-30 ENCOUNTER — PATIENT OUTREACH (OUTPATIENT)
Dept: CASE MANAGEMENT | Age: 63
End: 2023-03-30

## 2023-04-05 ENCOUNTER — PATIENT OUTREACH (OUTPATIENT)
Dept: CASE MANAGEMENT | Age: 63
End: 2023-04-05

## 2023-04-05 NOTE — PROGRESS NOTES
Care Transitions Follow Up Call    Attempted to reach patient for follow up today. Unable to reach patient. D/t inability to reach patient for follow up calls will close episode at this time. Patient: Melissa Ashton Patient : 1960 MRN: [id number]    Last Discharge  [address]       Date Complaint Diagnosis Description Type Department Provider    3/1/23 Shortness of Breath ESRD (end stage [facility]) [facility] . .. ED to Hosp-Admission (Discharged) (ADMIT) Edgardo Durand MD; Phu Tesfaye. ..           Noted following upcoming appointments from discharge chart review:   [facility] follow up appointment(s):   Future Appointments   Date Time Provider Margarito Arora   2023 10:00 AM MD PEGGY [place] AMB   2023 12:45 PM María Alex MD [facility] MAIN BS AMB

## 2023-04-21 ENCOUNTER — APPOINTMENT (OUTPATIENT)
Dept: INFUSION THERAPY | Age: 63
End: 2023-04-21

## 2023-04-25 ENCOUNTER — OFFICE VISIT (OUTPATIENT)
Dept: INTERNAL MEDICINE CLINIC | Age: 63
End: 2023-04-25
Payer: MEDICARE

## 2023-04-25 VITALS
BODY MASS INDEX: 32.13 KG/M2 | SYSTOLIC BLOOD PRESSURE: 126 MMHG | HEART RATE: 98 BPM | DIASTOLIC BLOOD PRESSURE: 85 MMHG | RESPIRATION RATE: 18 BRPM | WEIGHT: 199.9 LBS | HEIGHT: 66 IN | OXYGEN SATURATION: 98 % | TEMPERATURE: 97.4 F

## 2023-04-25 DIAGNOSIS — R53.81 PHYSICAL DECONDITIONING: ICD-10-CM

## 2023-04-25 DIAGNOSIS — M54.16 CHRONIC LEFT-SIDED LUMBAR RADICULOPATHY: ICD-10-CM

## 2023-04-25 DIAGNOSIS — N18.6 ESRD ON PERITONEAL DIALYSIS (HCC): ICD-10-CM

## 2023-04-25 DIAGNOSIS — E66.9 CLASS 1 OBESITY WITH SERIOUS COMORBIDITY AND BODY MASS INDEX (BMI) OF 32.0 TO 32.9 IN ADULT, UNSPECIFIED OBESITY TYPE: ICD-10-CM

## 2023-04-25 DIAGNOSIS — I10 PRIMARY HYPERTENSION: Primary | ICD-10-CM

## 2023-04-25 DIAGNOSIS — E21.3 HYPERPARATHYROIDISM (HCC): ICD-10-CM

## 2023-04-25 DIAGNOSIS — M75.01 ADHESIVE CAPSULITIS OF RIGHT SHOULDER: ICD-10-CM

## 2023-04-25 DIAGNOSIS — Z99.2 ESRD ON PERITONEAL DIALYSIS (HCC): ICD-10-CM

## 2023-04-25 PROCEDURE — G8536 NO DOC ELDER MAL SCRN: HCPCS | Performed by: INTERNAL MEDICINE

## 2023-04-25 PROCEDURE — 1123F ACP DISCUSS/DSCN MKR DOCD: CPT | Performed by: INTERNAL MEDICINE

## 2023-04-25 PROCEDURE — G8427 DOCREV CUR MEDS BY ELIG CLIN: HCPCS | Performed by: INTERNAL MEDICINE

## 2023-04-25 PROCEDURE — G9899 SCRN MAM PERF RSLTS DOC: HCPCS | Performed by: INTERNAL MEDICINE

## 2023-04-25 PROCEDURE — 3074F SYST BP LT 130 MM HG: CPT | Performed by: INTERNAL MEDICINE

## 2023-04-25 PROCEDURE — 99215 OFFICE O/P EST HI 40 MIN: CPT | Performed by: INTERNAL MEDICINE

## 2023-04-25 PROCEDURE — 1101F PT FALLS ASSESS-DOCD LE1/YR: CPT | Performed by: INTERNAL MEDICINE

## 2023-04-25 PROCEDURE — 3017F COLORECTAL CA SCREEN DOC REV: CPT | Performed by: INTERNAL MEDICINE

## 2023-04-25 PROCEDURE — G8510 SCR DEP NEG, NO PLAN REQD: HCPCS | Performed by: INTERNAL MEDICINE

## 2023-04-25 PROCEDURE — G8399 PT W/DXA RESULTS DOCUMENT: HCPCS | Performed by: INTERNAL MEDICINE

## 2023-04-25 PROCEDURE — 3078F DIAST BP <80 MM HG: CPT | Performed by: INTERNAL MEDICINE

## 2023-04-25 PROCEDURE — G8417 CALC BMI ABV UP PARAM F/U: HCPCS | Performed by: INTERNAL MEDICINE

## 2023-04-25 PROCEDURE — 1090F PRES/ABSN URINE INCON ASSESS: CPT | Performed by: INTERNAL MEDICINE

## 2023-05-02 PROBLEM — E21.3 HYPERPARATHYROIDISM (HCC): Status: ACTIVE | Noted: 2020-06-14

## 2023-05-09 ENCOUNTER — TELEPHONE (OUTPATIENT)
Facility: HOSPITAL | Age: 63
End: 2023-05-09

## 2023-05-09 ENCOUNTER — HOSPITAL ENCOUNTER (OUTPATIENT)
Facility: HOSPITAL | Age: 63
Discharge: HOME | End: 2023-05-12

## 2023-05-09 DIAGNOSIS — M48.061 SPINAL STENOSIS, LUMBAR REGION, WITHOUT NEUROGENIC CLAUDICATION: ICD-10-CM

## 2023-05-10 ENCOUNTER — PATIENT MESSAGE (OUTPATIENT)
Dept: INTERNAL MEDICINE CLINIC | Age: 63
End: 2023-05-10

## 2023-05-18 DIAGNOSIS — R00.0 TACHYCARDIA: Primary | ICD-10-CM

## 2023-05-18 NOTE — PROGRESS NOTES
1. Have you been to the ER, urgent care clinic since your last visit? Hospitalized since your last visit? No 
 
2. Have you seen or consulted any other health care providers outside of the [address] [place] [address] since your last visit? Include any pap smears or colon screening.  No 
 
 Addended by: Danya Spaulding on: 5/18/2023 11:49 AM     Modules accepted: Orders

## 2023-05-19 RX ORDER — DIAZEPAM 10 MG/1
TABLET ORAL
Qty: 3 TABLET | Refills: 0 | Status: SHIPPED | OUTPATIENT
Start: 2023-05-19 | End: 2023-06-18

## 2023-05-25 ENCOUNTER — TELEPHONE (OUTPATIENT)
Age: 63
End: 2023-05-25

## 2023-05-25 ENCOUNTER — OFFICE VISIT (OUTPATIENT)
Age: 63
End: 2023-05-25
Payer: MEDICARE

## 2023-05-25 VITALS
DIASTOLIC BLOOD PRESSURE: 57 MMHG | HEIGHT: 66 IN | RESPIRATION RATE: 24 BRPM | HEART RATE: 100 BPM | BODY MASS INDEX: 31.5 KG/M2 | OXYGEN SATURATION: 93 % | WEIGHT: 196 LBS | SYSTOLIC BLOOD PRESSURE: 84 MMHG | TEMPERATURE: 97.5 F

## 2023-05-25 DIAGNOSIS — E21.3 HYPERPARATHYROIDISM (HCC): Primary | ICD-10-CM

## 2023-05-25 DIAGNOSIS — I50.22 CHRONIC SYSTOLIC (CONGESTIVE) HEART FAILURE (HCC): ICD-10-CM

## 2023-05-25 PROCEDURE — 99214 OFFICE O/P EST MOD 30 MIN: CPT | Performed by: SURGERY

## 2023-05-25 PROCEDURE — 3017F COLORECTAL CA SCREEN DOC REV: CPT | Performed by: SURGERY

## 2023-05-25 PROCEDURE — G8428 CUR MEDS NOT DOCUMENT: HCPCS | Performed by: SURGERY

## 2023-05-25 PROCEDURE — 3074F SYST BP LT 130 MM HG: CPT | Performed by: SURGERY

## 2023-05-25 PROCEDURE — 1090F PRES/ABSN URINE INCON ASSESS: CPT | Performed by: SURGERY

## 2023-05-25 PROCEDURE — G8399 PT W/DXA RESULTS DOCUMENT: HCPCS | Performed by: SURGERY

## 2023-05-25 PROCEDURE — 4004F PT TOBACCO SCREEN RCVD TLK: CPT | Performed by: SURGERY

## 2023-05-25 PROCEDURE — 3078F DIAST BP <80 MM HG: CPT | Performed by: SURGERY

## 2023-05-25 PROCEDURE — 1123F ACP DISCUSS/DSCN MKR DOCD: CPT | Performed by: SURGERY

## 2023-05-25 PROCEDURE — G8417 CALC BMI ABV UP PARAM F/U: HCPCS | Performed by: SURGERY

## 2023-05-25 ASSESSMENT — PATIENT HEALTH QUESTIONNAIRE - PHQ9
SUM OF ALL RESPONSES TO PHQ QUESTIONS 1-9: 2
SUM OF ALL RESPONSES TO PHQ QUESTIONS 1-9: 2
2. FEELING DOWN, DEPRESSED OR HOPELESS: 1
SUM OF ALL RESPONSES TO PHQ QUESTIONS 1-9: 2
SUM OF ALL RESPONSES TO PHQ QUESTIONS 1-9: 2
SUM OF ALL RESPONSES TO PHQ9 QUESTIONS 1 & 2: 2
1. LITTLE INTEREST OR PLEASURE IN DOING THINGS: 1

## 2023-05-25 ASSESSMENT — ANXIETY QUESTIONNAIRES
1. FEELING NERVOUS, ANXIOUS, OR ON EDGE: 1
GAD7 TOTAL SCORE: 7
4. TROUBLE RELAXING: 1
3. WORRYING TOO MUCH ABOUT DIFFERENT THINGS: 1
6. BECOMING EASILY ANNOYED OR IRRITABLE: 1
5. BEING SO RESTLESS THAT IT IS HARD TO SIT STILL: 1
2. NOT BEING ABLE TO STOP OR CONTROL WORRYING: 1
7. FEELING AFRAID AS IF SOMETHING AWFUL MIGHT HAPPEN: 1
IF YOU CHECKED OFF ANY PROBLEMS ON THIS QUESTIONNAIRE, HOW DIFFICULT HAVE THESE PROBLEMS MADE IT FOR YOU TO DO YOUR WORK, TAKE CARE OF THINGS AT HOME, OR GET ALONG WITH OTHER PEOPLE: SOMEWHAT DIFFICULT

## 2023-05-25 NOTE — TELEPHONE ENCOUNTER
Pt called into office to let us know that by the time she reached the ground floor she was feeling better, EMS retook her b/p  and it was in the normal range. Pt states she thinks she did not feel well due to she had not eaten much that day and she had a long walk to get to the office. Pt given information for US and lab work. Pt states she will get it done tomorrow.
Hypothyroid

## 2023-05-25 NOTE — PROGRESS NOTES
Was notified by Dr. Lenora Bell Pt was feeling dizzy and not feeling well, requested apple juice. We did not have apple juice but chest crackers, Pt declined stated she still didn't feel well, dizzy and her head was dropping. I requested Zaynab Jackson LPN come into watch Pt while I called 911 for EMS 2:52pm, received call back number [phone] and printed ou demographics. B/p was retaken it was 84/57 and water given, Pt seemed to feel better.   EMS arrived at 2:53pm

## 2023-05-25 NOTE — PROGRESS NOTES
Identified pt with two pt identifiers(name and ). Reviewed record in preparation for visit and have obtained necessary documentation. All patient medications has been reviewed. Chief Complaint   Patient presents with    Thyroid Problem     Seen at the request of self eval parathyroid       Health Maintenance Due   Topic    COVID-19 Vaccine (1)    Pneumococcal 65+ years Vaccine (1 - PCV)    HIV screen     Hepatitis C screen     DTaP/Tdap/Td vaccine (1 - Tdap)    Shingles vaccine (1 of 2)    Hepatitis B vaccine (1 of 3 - Risk Dialysis 4-dose series)    Cervical cancer screen        [unfilled]    4. Have you been to the ER, urgent care clinic since your last visit? Hospitalized since your last visit? yes    5. Have you seen or consulted any other health care providers outside of the [address] [place] [address] since your last visit? Include any pap smears or colon screening. no      Patient is accompanied by self I have received verbal consent from Yvonne Huertas to discuss any/all medical information while they are present in the room.

## 2023-05-25 NOTE — PROGRESS NOTES
Subjective:       Anand Washington is a 70 y.o. female who presents for evaluation of parathyroid. Dr. Tab Balderas is her nephrologist. She saw Dr. Aurea Solo in  because Dr. Ekaterina Coffman for hyperparathyroidism. She came and spoke to Dr. Aurea Solo. She was scheduled for surgery but cancelled until she saw a nephrologist. She saw Dr. Tab Balderas - now ESRD on peritoneal dialysis. She reports chronic fatigue. 3/9/23:  Calcium: 8.1 (normal 8.5-10.1)  Phos: 6.0    20: Parathyroid scan:  IMPRESSION: 2 small parathyroid adenomas at the base of the right thyroid gland. Normal sonographic appearance of the thyroid gland. Past Medical History:   Diagnosis Date    Arthritis     L knee    Chronic kidney disease     secondary to hypertensive nephrosclerosis, CKD STG 5, ON ACTIVE KIDNEY TRANSPLANT LIST AT [facility]- DR. MORENO    Gastritis     HISTORY OF 10/2020     Gout     History of claustrophobia     History of total hip replacement, right 2022    Hyperparathyroidism ([facility] [address]     Hypertension      Past Surgical History:   Procedure Laterality Date     SECTION      CHOLECYSTECTOMY, LAPAROSCOPIC      COLONOSCOPY      GYN      ECTOPIC PREGNANCY SURGERY     IR NONTUNNELED VASCULAR CATHETER  2023    IR NONTUNNELED VASCULAR CATHETER 2023 [facility] RAD ANGIO IR    IR NONTUNNELED VASCULAR CATHETER  2023    LUMBAR LAMINECTOMY  2020    L3L4L5    TOTAL HIP ARTHROPLASTY Left     WISDOM TOOTH EXTRACTION       Social History     Socioeconomic History    Marital status:      Spouse name: None    Number of children: None    Years of education: 16+    Highest education level: None   Tobacco Use    Smoking status: Former     Packs/day: 0.25     Types: Cigarettes     Quit date: 2000     Years since quittin.8    Smokeless tobacco: Never   Vaping Use    Vaping Use: Never used   Substance and Sexual Activity    Alcohol use: No    Drug use: No     Current Outpatient Medications   Medication Sig Dispense

## 2023-05-31 ENCOUNTER — HOSPITAL ENCOUNTER (OUTPATIENT)
Facility: HOSPITAL | Age: 63
Discharge: HOME | End: 2023-05-31
Payer: MEDICARE

## 2023-05-31 VITALS
HEART RATE: 112 BPM | DIASTOLIC BLOOD PRESSURE: 85 MMHG | RESPIRATION RATE: 16 BRPM | SYSTOLIC BLOOD PRESSURE: 138 MMHG | TEMPERATURE: 98.6 F

## 2023-05-31 DIAGNOSIS — E83.52 HYPERCALCEMIA: ICD-10-CM

## 2023-05-31 DIAGNOSIS — E83.59 CALCIPHYLAXIS: Primary | ICD-10-CM

## 2023-05-31 DIAGNOSIS — E21.3 HYPERPARATHYROIDISM (HCC): ICD-10-CM

## 2023-05-31 PROBLEM — N18.4 CHRONIC RENAL FAILURE SYNDROME, STAGE 4 (SEVERE) (HCC): Status: ACTIVE | Noted: 2017-11-13

## 2023-05-31 PROCEDURE — 99203 OFFICE O/P NEW LOW 30 MIN: CPT | Performed by: SURGERY

## 2023-05-31 PROCEDURE — 99203 OFFICE O/P NEW LOW 30 MIN: CPT

## 2023-05-31 RX ORDER — LIDOCAINE HYDROCHLORIDE 20 MG/ML
JELLY TOPICAL ONCE
OUTPATIENT
Start: 2023-05-31 | End: 2023-05-31

## 2023-05-31 RX ORDER — BACITRACIN ZINC AND POLYMYXIN B SULFATE 500; 1000 [USP'U]/G; [USP'U]/G
OINTMENT TOPICAL ONCE
OUTPATIENT
Start: 2023-05-31 | End: 2023-05-31

## 2023-05-31 RX ORDER — SODIUM CHLOR/HYPOCHLOROUS ACID 0.033 %
SOLUTION, IRRIGATION IRRIGATION ONCE
Status: CANCELLED | OUTPATIENT
Start: 2023-05-31 | End: 2023-05-31

## 2023-05-31 RX ORDER — GINSENG 100 MG
CAPSULE ORAL ONCE
Status: CANCELLED | OUTPATIENT
Start: 2023-05-31 | End: 2023-05-31

## 2023-05-31 RX ORDER — BACITRACIN, NEOMYCIN, POLYMYXIN B 400; 3.5; 5 [USP'U]/G; MG/G; [USP'U]/G
OINTMENT TOPICAL ONCE
OUTPATIENT
Start: 2023-05-31 | End: 2023-05-31

## 2023-05-31 RX ORDER — BETAMETHASONE DIPROPIONATE 0.05 %
OINTMENT (GRAM) TOPICAL ONCE
OUTPATIENT
Start: 2023-05-31 | End: 2023-05-31

## 2023-05-31 RX ORDER — LIDOCAINE HYDROCHLORIDE 20 MG/ML
JELLY TOPICAL ONCE
Status: CANCELLED | OUTPATIENT
Start: 2023-05-31 | End: 2023-05-31

## 2023-05-31 RX ORDER — GENTAMICIN SULFATE 1 MG/G
OINTMENT TOPICAL ONCE
Status: CANCELLED | OUTPATIENT
Start: 2023-05-31 | End: 2023-05-31

## 2023-05-31 RX ORDER — CLOBETASOL PROPIONATE 0.5 MG/G
OINTMENT TOPICAL ONCE
Status: CANCELLED | OUTPATIENT
Start: 2023-05-31 | End: 2023-05-31

## 2023-05-31 RX ORDER — LIDOCAINE 50 MG/G
OINTMENT TOPICAL ONCE
OUTPATIENT
Start: 2023-05-31 | End: 2023-05-31

## 2023-05-31 RX ORDER — LIDOCAINE 50 MG/G
OINTMENT TOPICAL ONCE
Status: CANCELLED | OUTPATIENT
Start: 2023-05-31 | End: 2023-05-31

## 2023-05-31 RX ORDER — GENTAMICIN SULFATE 1 MG/G
OINTMENT TOPICAL ONCE
OUTPATIENT
Start: 2023-05-31 | End: 2023-05-31

## 2023-05-31 RX ORDER — BETAMETHASONE DIPROPIONATE 0.05 %
OINTMENT (GRAM) TOPICAL ONCE
Status: CANCELLED | OUTPATIENT
Start: 2023-05-31 | End: 2023-05-31

## 2023-05-31 RX ORDER — LIDOCAINE HYDROCHLORIDE 40 MG/ML
SOLUTION TOPICAL ONCE
OUTPATIENT
Start: 2023-05-31 | End: 2023-05-31

## 2023-05-31 RX ORDER — BACITRACIN ZINC AND POLYMYXIN B SULFATE 500; 1000 [USP'U]/G; [USP'U]/G
OINTMENT TOPICAL ONCE
Status: CANCELLED | OUTPATIENT
Start: 2023-05-31 | End: 2023-05-31

## 2023-05-31 RX ORDER — CLOBETASOL PROPIONATE 0.5 MG/G
OINTMENT TOPICAL ONCE
OUTPATIENT
Start: 2023-05-31 | End: 2023-05-31

## 2023-05-31 RX ORDER — BACITRACIN, NEOMYCIN, POLYMYXIN B 400; 3.5; 5 [USP'U]/G; MG/G; [USP'U]/G
OINTMENT TOPICAL ONCE
Status: CANCELLED | OUTPATIENT
Start: 2023-05-31 | End: 2023-05-31

## 2023-05-31 RX ORDER — GINSENG 100 MG
CAPSULE ORAL ONCE
OUTPATIENT
Start: 2023-05-31 | End: 2023-05-31

## 2023-05-31 RX ORDER — LIDOCAINE 40 MG/G
CREAM TOPICAL ONCE
Status: CANCELLED | OUTPATIENT
Start: 2023-05-31 | End: 2023-05-31

## 2023-05-31 RX ORDER — LIDOCAINE 40 MG/G
CREAM TOPICAL ONCE
OUTPATIENT
Start: 2023-05-31 | End: 2023-05-31

## 2023-05-31 RX ORDER — LIDOCAINE HYDROCHLORIDE 40 MG/ML
SOLUTION TOPICAL ONCE
Status: CANCELLED | OUTPATIENT
Start: 2023-05-31 | End: 2023-05-31

## 2023-05-31 RX ORDER — SODIUM CHLOR/HYPOCHLOROUS ACID 0.033 %
SOLUTION, IRRIGATION IRRIGATION ONCE
OUTPATIENT
Start: 2023-05-31 | End: 2023-05-31

## 2023-05-31 NOTE — DISCHARGE INSTRUCTIONS
Discharge Instructions/Wound Orders  [place] [address] [place] [address], [address]  Telephone: [phone]    NAME:  [address] [place] [address] Street:  1960  MEDICAL RECORD NUMBER:  [id number]  DATE:  May 31, 2023  Wound Care Orders:  Left lower leg wound: Try to keep leg elevated to minimize swelling. Try to treat the underlying cause of calcium and phosphorus. Need to see nephrologist that we are concerned about calcyphylaxis. Need to check labs to see where your calcium and phosphorus levels are at this time. Try to keep area protected with a foam border (silicone dressing) that may be ordered off of [address] [place] [address] or ordered at the drug store. Today in clinic- apply foam border. If area begins to open up or starts weeping- please make appointment sooner    Activity:  [x] Activity as tolerated:     [] Remain off Work:       [] May return to full duty work:                                     [] Return to work with restrictions:  Dietary:  [] Diet as tolerated      [x] Diabetic Diet            [] Increase Protein: examples (Meat, cheese, eggs, greek yogurt, fish, nuts)          [] [address]  [] Other:  [] Dial a Dietician : Call [employer] at [phone] enter code [phone] when prompted. M-F 9am-5pm EST. Return Appointment:  [x] Return Appointment: With Dr Breonna Ochoa in [facility])  [] Ordered tests:    Electronically signed Nancy Pal RN on 5/31/2023 at 1:40 PM     [address] [place] [address] Information: Should you experience any significant changes in your wound(s) or have questions about your wound care, please contact the [address] [place] [address] at [address] [place] [address] 8:00 am - 4:30. If you need help with your wound outside these hours and cannot wait until we are again available, contact your PCP or go to the hospital emergency room.      PLEASE NOTE: IF YOU ARE UNABLE TO OBTAIN WOUND SUPPLIES, CONTINUE TO USE THE

## 2023-05-31 NOTE — FLOWSHEET NOTE
05/31/23 1344   Wound 05/31/23 Tibial Left;Posterior   Date First Assessed/Time First Assessed: 05/31/23 1343   Present on Hospital Admission: Yes  Wound Approximate Age at First Assessment (Weeks): 2 weeks  Primary Wound Type: (c) Other (comment)  Location: Tibial  Wound Location Orientation: Left;Posterior   Wound Image    Wound Etiology Other  (Calciphylaxis)   Offloading for Diabetic Foot Ulcers Offloading not required   Wound Length (cm) 0.7 cm   Wound Width (cm) 0.5 cm   Wound Depth (cm) 0.1 cm   Wound Surface Area (cm^2) 0.35 cm^2   Wound Volume (cm^3) 0.035 cm^3   Wound Assessment Epithelialization   Drainage Amount None   Odor None   Charlene-wound Assessment Hyperpigmented   Margins Undefined edges     /85   Pulse (!) 112   Temp 98.6 °F (37 °C) (Temporal)   Resp 16

## 2023-05-31 NOTE — CONSULTS
ischemia minimal edema left leg, approximately 2 cm x 1 cm area of hyperpigmentation and subcutaneous firmness with a more focal subcentimeter area of increasing firmness but no evidence of drainage or infection. This likely represents some area of fat necrosis or calciphylaxis. 05/31/23 1344   Wound 05/31/23 Tibial Left;Posterior   Date First Assessed/Time First Assessed: 05/31/23 1343   Present on Hospital Admission: Yes  Wound Approximate Age at First Assessment (Weeks): 2 weeks  Primary Wound Type: (c) Other (comment)  Location: Tibial  Wound Location Orientation: Left;Posterior   Wound Image    Wound Etiology Other  (Calciphylaxis)   Offloading for Diabetic Foot Ulcers Offloading not required   Wound Length (cm) 0.7 cm   Wound Width (cm) 0.5 cm   Wound Depth (cm) 0.1 cm   Wound Surface Area (cm^2) 0.35 cm^2   Wound Volume (cm^3) 0.035 cm^3   Wound Assessment Epithelialization   Drainage Amount None   Odor None   Charlene-wound Assessment Hyperpigmented   Margins Undefined edges      /85   Pulse (!) 112   Temp 98.6 °F (37 °C) (Temporal)   Resp 16                  Labs: No results found for this or any previous visit (from the past 24 hour(s)). XR Results (maximum last 3):  @BSHSILASTIMGCATIO(TWH6522:3)@        Assessment:     Active Problems:    Obesity    Chronic renal failure syndrome, stage 4 (severe) (HCC)    Hyperparathyroidism (HCC)    Calciphylaxis  Resolved Problems:    * No resolved hospital problems. *      Plan/Recommendations/Medical Decision Making: The debridement with calciphylaxis are even if this is soft tissue contusion, would be that if it was infected or a large burden of necrotic tissue. In the absence of that it symptomatic treatment and treatment of the underlying etiology.   I recommended patient talk with her nephrologist tomorrow making sure calcium and phosphorus under control and otherwise it would be symptomatic control of the calciphylaxis wounds is any

## 2023-06-03 ENCOUNTER — HOSPITAL ENCOUNTER (OUTPATIENT)
Facility: HOSPITAL | Age: 63
End: 2023-06-03
Attending: ORTHOPAEDIC SURGERY
Payer: MEDICARE

## 2023-06-03 VITALS — HEIGHT: 66 IN | WEIGHT: 196 LBS | BODY MASS INDEX: 31.5 KG/M2

## 2023-06-03 PROCEDURE — 72148 MRI LUMBAR SPINE W/O DYE: CPT

## 2023-06-08 ENCOUNTER — HOSPITAL ENCOUNTER (OUTPATIENT)
Facility: HOSPITAL | Age: 63
End: 2023-06-08
Attending: SURGERY
Payer: MEDICARE

## 2023-06-08 ENCOUNTER — HOSPITAL ENCOUNTER (OUTPATIENT)
Facility: HOSPITAL | Age: 63
Discharge: HOME | End: 2023-06-08
Attending: SURGERY
Payer: MEDICARE

## 2023-06-08 DIAGNOSIS — E21.3 HYPERPARATHYROIDISM (HCC): ICD-10-CM

## 2023-06-08 PROCEDURE — A9500 TC99M SESTAMIBI: HCPCS | Performed by: SURGERY

## 2023-06-08 PROCEDURE — 78070 PARATHYROID PLANAR IMAGING: CPT

## 2023-06-08 PROCEDURE — 76536 US EXAM OF HEAD AND NECK: CPT

## 2023-06-08 PROCEDURE — 3430000000 HC RX DIAGNOSTIC RADIOPHARMACEUTICAL: Performed by: SURGERY

## 2023-06-08 RX ORDER — TETRAKIS(2-METHOXYISOBUTYLISOCYANIDE)COPPER(I) TETRAFLUOROBORATE 1 MG/ML
20 INJECTION, POWDER, LYOPHILIZED, FOR SOLUTION INTRAVENOUS
Status: COMPLETED | OUTPATIENT
Start: 2023-06-08 | End: 2023-06-08

## 2023-06-08 RX ADMIN — TETRAKIS(2-METHOXYISOBUTYLISOCYANIDE)COPPER(I) TETRAFLUOROBORATE 20 MILLICURIE: 1 INJECTION, POWDER, LYOPHILIZED, FOR SOLUTION INTRAVENOUS at 09:59

## 2023-07-25 ENCOUNTER — OFFICE VISIT (OUTPATIENT)
Facility: CLINIC | Age: 63
End: 2023-07-25
Payer: MEDICARE

## 2023-07-25 VITALS
DIASTOLIC BLOOD PRESSURE: 69 MMHG | HEIGHT: 66 IN | SYSTOLIC BLOOD PRESSURE: 100 MMHG | OXYGEN SATURATION: 98 % | TEMPERATURE: 98.2 F | BODY MASS INDEX: 30.86 KG/M2 | HEART RATE: 103 BPM | WEIGHT: 192 LBS | RESPIRATION RATE: 16 BRPM

## 2023-07-25 DIAGNOSIS — M19.011 PRIMARY OSTEOARTHRITIS OF RIGHT SHOULDER: ICD-10-CM

## 2023-07-25 DIAGNOSIS — E21.2 TERTIARY HYPERPARATHYROIDISM (HCC): ICD-10-CM

## 2023-07-25 DIAGNOSIS — N18.6 ESRD ON PERITONEAL DIALYSIS (HCC): ICD-10-CM

## 2023-07-25 DIAGNOSIS — M10.9 GOUT, UNSPECIFIED CAUSE, UNSPECIFIED CHRONICITY, UNSPECIFIED SITE: ICD-10-CM

## 2023-07-25 DIAGNOSIS — M54.16 CHRONIC LEFT-SIDED LUMBAR RADICULOPATHY: Primary | ICD-10-CM

## 2023-07-25 DIAGNOSIS — Z99.2 ESRD ON PERITONEAL DIALYSIS (HCC): ICD-10-CM

## 2023-07-25 PROCEDURE — 1123F ACP DISCUSS/DSCN MKR DOCD: CPT | Performed by: INTERNAL MEDICINE

## 2023-07-25 PROCEDURE — 99214 OFFICE O/P EST MOD 30 MIN: CPT | Performed by: INTERNAL MEDICINE

## 2023-07-25 PROCEDURE — 3078F DIAST BP <80 MM HG: CPT | Performed by: INTERNAL MEDICINE

## 2023-07-25 PROCEDURE — 3074F SYST BP LT 130 MM HG: CPT | Performed by: INTERNAL MEDICINE

## 2023-07-25 RX ORDER — DIAZEPAM 10 MG/1
TABLET ORAL
Qty: 6 TABLET | Refills: 0 | Status: SHIPPED | OUTPATIENT
Start: 2023-07-25 | End: 2023-07-28

## 2023-07-25 SDOH — ECONOMIC STABILITY: HOUSING INSECURITY: IN THE LAST 12 MONTHS, HOW MANY PLACES HAVE YOU LIVED?: 1

## 2023-07-25 SDOH — HEALTH STABILITY: PHYSICAL HEALTH: ON AVERAGE, HOW MANY DAYS PER WEEK DO YOU ENGAGE IN MODERATE TO STRENUOUS EXERCISE (LIKE A BRISK WALK)?: 2 DAYS

## 2023-07-25 SDOH — ECONOMIC STABILITY: FOOD INSECURITY: WITHIN THE PAST 12 MONTHS, YOU WORRIED THAT YOUR FOOD WOULD RUN OUT BEFORE YOU GOT MONEY TO BUY MORE.: NEVER TRUE

## 2023-07-25 SDOH — ECONOMIC STABILITY: FOOD INSECURITY: WITHIN THE PAST 12 MONTHS, THE FOOD YOU BOUGHT JUST DIDN'T LAST AND YOU DIDN'T HAVE MONEY TO GET MORE.: NEVER TRUE

## 2023-07-25 SDOH — HEALTH STABILITY: PHYSICAL HEALTH: ON AVERAGE, HOW MANY MINUTES DO YOU ENGAGE IN EXERCISE AT THIS LEVEL?: 30 MIN

## 2023-07-25 SDOH — ECONOMIC STABILITY: TRANSPORTATION INSECURITY
IN THE PAST 12 MONTHS, HAS THE LACK OF TRANSPORTATION KEPT YOU FROM MEDICAL APPOINTMENTS OR FROM GETTING MEDICATIONS?: NO

## 2023-07-25 SDOH — ECONOMIC STABILITY: INCOME INSECURITY: IN THE LAST 12 MONTHS, WAS THERE A TIME WHEN YOU WERE NOT ABLE TO PAY THE MORTGAGE OR RENT ON TIME?: NO

## 2023-07-25 SDOH — ECONOMIC STABILITY: TRANSPORTATION INSECURITY
IN THE PAST 12 MONTHS, HAS LACK OF TRANSPORTATION KEPT YOU FROM MEETINGS, WORK, OR FROM GETTING THINGS NEEDED FOR DAILY LIVING?: NO

## 2023-07-25 SDOH — ECONOMIC STABILITY: HOUSING INSECURITY
IN THE LAST 12 MONTHS, WAS THERE A TIME WHEN YOU DID NOT HAVE A STEADY PLACE TO SLEEP OR SLEPT IN A SHELTER (INCLUDING NOW)?: NO

## 2023-07-25 ASSESSMENT — ANXIETY QUESTIONNAIRES
6. BECOMING EASILY ANNOYED OR IRRITABLE: 0
7. FEELING AFRAID AS IF SOMETHING AWFUL MIGHT HAPPEN: 0
IF YOU CHECKED OFF ANY PROBLEMS ON THIS QUESTIONNAIRE, HOW DIFFICULT HAVE THESE PROBLEMS MADE IT FOR YOU TO DO YOUR WORK, TAKE CARE OF THINGS AT HOME, OR GET ALONG WITH OTHER PEOPLE: NOT DIFFICULT AT ALL
3. WORRYING TOO MUCH ABOUT DIFFERENT THINGS: 0
GAD7 TOTAL SCORE: 0
5. BEING SO RESTLESS THAT IT IS HARD TO SIT STILL: 0
2. NOT BEING ABLE TO STOP OR CONTROL WORRYING: 0
IF YOU CHECKED OFF ANY PROBLEMS ON THIS QUESTIONNAIRE, HOW DIFFICULT HAVE THESE PROBLEMS MADE IT FOR YOU TO DO YOUR WORK, TAKE CARE OF THINGS AT HOME, OR GET ALONG WITH OTHER PEOPLE: NOT DIFFICULT AT ALL
4. TROUBLE RELAXING: 0
7. FEELING AFRAID AS IF SOMETHING AWFUL MIGHT HAPPEN: 0
2. NOT BEING ABLE TO STOP OR CONTROL WORRYING: 0
1. FEELING NERVOUS, ANXIOUS, OR ON EDGE: 0
6. BECOMING EASILY ANNOYED OR IRRITABLE: 0
GAD7 TOTAL SCORE: 0
3. WORRYING TOO MUCH ABOUT DIFFERENT THINGS: 0
5. BEING SO RESTLESS THAT IT IS HARD TO SIT STILL: 0
1. FEELING NERVOUS, ANXIOUS, OR ON EDGE: 0
4. TROUBLE RELAXING: 0

## 2023-07-25 ASSESSMENT — SOCIAL DETERMINANTS OF HEALTH (SDOH)
DO YOU BELONG TO ANY CLUBS OR ORGANIZATIONS SUCH AS CHURCH GROUPS UNIONS, FRATERNAL OR ATHLETIC GROUPS, OR SCHOOL GROUPS?: NO
HOW HARD IS IT FOR YOU TO PAY FOR THE VERY BASICS LIKE FOOD, HOUSING, MEDICAL CARE, AND HEATING?: NOT HARD AT ALL
HOW OFTEN DO YOU GET TOGETHER WITH FRIENDS OR RELATIVES?: ONCE A WEEK
HOW OFTEN DO YOU ATTEND CHURCH OR RELIGIOUS SERVICES?: MORE THAN 4 TIMES PER YEAR
HOW OFTEN DO YOU ATTENT MEETINGS OF THE CLUB OR ORGANIZATION YOU BELONG TO?: NEVER
WITHIN THE LAST YEAR, HAVE YOU BEEN KICKED, HIT, SLAPPED, OR OTHERWISE PHYSICALLY HURT BY YOUR PARTNER OR EX-PARTNER?: NO
IN A TYPICAL WEEK, HOW MANY TIMES DO YOU TALK ON THE PHONE WITH FAMILY, FRIENDS, OR NEIGHBORS?: MORE THAN THREE TIMES A WEEK
WITHIN THE LAST YEAR, HAVE YOU BEEN HUMILIATED OR EMOTIONALLY ABUSED IN OTHER WAYS BY YOUR PARTNER OR EX-PARTNER?: NO
WITHIN THE LAST YEAR, HAVE YOU BEEN AFRAID OF YOUR PARTNER OR EX-PARTNER?: NO
WITHIN THE LAST YEAR, HAVE TO BEEN RAPED OR FORCED TO HAVE ANY KIND OF SEXUAL ACTIVITY BY YOUR PARTNER OR EX-PARTNER?: NO

## 2023-07-25 ASSESSMENT — PATIENT HEALTH QUESTIONNAIRE - PHQ9
SUM OF ALL RESPONSES TO PHQ QUESTIONS 1-9: 0
1. LITTLE INTEREST OR PLEASURE IN DOING THINGS: 0
1. LITTLE INTEREST OR PLEASURE IN DOING THINGS: 0
SUM OF ALL RESPONSES TO PHQ QUESTIONS 1-9: 0
2. FEELING DOWN, DEPRESSED OR HOPELESS: 0
SUM OF ALL RESPONSES TO PHQ9 QUESTIONS 1 & 2: 0
SUM OF ALL RESPONSES TO PHQ QUESTIONS 1-9: 0
2. FEELING DOWN, DEPRESSED OR HOPELESS: 0
SUM OF ALL RESPONSES TO PHQ9 QUESTIONS 1 & 2: 0
SUM OF ALL RESPONSES TO PHQ QUESTIONS 1-9: 0

## 2023-07-25 ASSESSMENT — LIFESTYLE VARIABLES
HOW MANY STANDARD DRINKS CONTAINING ALCOHOL DO YOU HAVE ON A TYPICAL DAY: PATIENT DOES NOT DRINK
HOW OFTEN DO YOU HAVE A DRINK CONTAINING ALCOHOL: NEVER

## 2023-07-26 ENCOUNTER — HOSPITAL ENCOUNTER (OUTPATIENT)
Facility: HOSPITAL | Age: 63
Discharge: HOME | End: 2023-07-29
Attending: ORTHOPAEDIC SURGERY
Payer: MEDICARE

## 2023-07-26 DIAGNOSIS — M54.12 RADICULOPATHY, CERVICAL REGION: ICD-10-CM

## 2023-07-26 DIAGNOSIS — M25.512 ACUTE PAIN OF BOTH SHOULDERS: ICD-10-CM

## 2023-07-26 DIAGNOSIS — M47.22 OSTEOARTHRITIS OF SPINE WITH RADICULOPATHY, CERVICAL REGION: ICD-10-CM

## 2023-07-26 DIAGNOSIS — M25.511 ACUTE PAIN OF BOTH SHOULDERS: ICD-10-CM

## 2023-07-26 PROCEDURE — 72141 MRI NECK SPINE W/O DYE: CPT

## 2023-07-26 NOTE — PERIOP NOTE
Spoke with Radha Joseph RN at [facility]. She will reach out to patient for pre-op instructions regarding PD dialysis the morning of surgery. She also stated that no special instructions were needed from her for the patient's 23 hours stay. Once admitted, Inpatient Deborra Sons can be consulted by Dr. Mike Jackson to handle dialysis needs. Reviewed chart with Infection Control. They suggested a nasal swab for MRSA be repeated on DOS. Dr. Nancy Cardenas office notified and asked if she would like to add Vancomycin to pre-op antibiotics. Reviewed chart with Bryon Lora NP.  EKG, Echo, Stress Test, and Dr. Jose Pennington notes reviewed from admission in March 2023. Copies on paper chart. No further orders at this time provided patient remain symptom free. During PAT phone call, patient states that she has been feeling well since admission with no further symptoms including no chest pain, syncope, or shortness of breath.

## 2023-07-31 ENCOUNTER — TELEPHONE (OUTPATIENT)
Age: 63
End: 2023-07-31

## 2023-07-31 ENCOUNTER — HOSPITAL ENCOUNTER (OUTPATIENT)
Facility: HOSPITAL | Age: 63
Setting detail: OBSERVATION
Discharge: HOME | End: 2023-08-01
Attending: SURGERY | Admitting: SURGERY
Payer: MEDICARE

## 2023-07-31 ENCOUNTER — ANESTHESIA (OUTPATIENT)
Facility: HOSPITAL | Age: 63
End: 2023-07-31
Payer: MEDICARE

## 2023-07-31 ENCOUNTER — ANESTHESIA EVENT (OUTPATIENT)
Facility: HOSPITAL | Age: 63
End: 2023-07-31
Payer: MEDICARE

## 2023-07-31 DIAGNOSIS — E89.2 S/P PARATHYROIDECTOMY (HCC): Primary | ICD-10-CM

## 2023-07-31 PROBLEM — Z90.89 S/P PARATHYROIDECTOMY: Status: ACTIVE | Noted: 2023-07-31

## 2023-07-31 PROBLEM — E21.2 TERTIARY HYPERPARATHYROIDISM (HCC): Status: ACTIVE | Noted: 2020-06-14

## 2023-07-31 PROBLEM — A49.02 MRSA (METHICILLIN RESISTANT STAPHYLOCOCCUS AUREUS): Status: ACTIVE | Noted: 2023-07-31

## 2023-07-31 PROBLEM — Z98.890 S/P PARATHYROIDECTOMY: Status: ACTIVE | Noted: 2023-07-31

## 2023-07-31 LAB
ANION GAP BLD CALC-SCNC: 13 MMOL/L (ref 10–20)
ANION GAP SERPL CALC-SCNC: 10 MMOL/L (ref 5–15)
BUN SERPL-MCNC: 30 MG/DL (ref 6–20)
BUN/CREAT SERPL: 5 (ref 12–20)
CA-I BLD-MCNC: 1.45 MMOL/L (ref 1.12–1.32)
CALCIUM SERPL-MCNC: 10.2 MG/DL (ref 8.5–10.1)
CALCIUM SERPL-MCNC: 10.8 MG/DL (ref 8.5–10.1)
CHLORIDE BLD-SCNC: 98 MMOL/L (ref 98–107)
CHLORIDE SERPL-SCNC: 103 MMOL/L (ref 97–108)
CO2 BLD-SCNC: 29.9 MMOL/L (ref 21–32)
CO2 SERPL-SCNC: 27 MMOL/L (ref 21–32)
COMMENT:: NORMAL
COMMENT:: NORMAL
CREAT BLD-MCNC: 6.63 MG/DL (ref 0.6–1.3)
CREAT SERPL-MCNC: 5.97 MG/DL (ref 0.55–1.02)
GLUCOSE BLD-MCNC: 82 MG/DL (ref 65–100)
GLUCOSE SERPL-MCNC: 126 MG/DL (ref 65–100)
MAGNESIUM SERPL-MCNC: 1.7 MG/DL (ref 1.6–2.4)
MAGNESIUM SERPL-MCNC: 2.2 MG/DL (ref 1.6–2.4)
POTASSIUM BLD-SCNC: 3 MMOL/L (ref 3.5–5.1)
POTASSIUM SERPL-SCNC: 2.8 MMOL/L (ref 3.5–5.1)
SERVICE CMNT-IMP: ABNORMAL
SODIUM BLD-SCNC: 140 MMOL/L (ref 136–145)
SODIUM SERPL-SCNC: 140 MMOL/L (ref 136–145)
SPECIMEN HOLD: NORMAL
SPECIMEN HOLD: NORMAL

## 2023-07-31 PROCEDURE — 2720000010 HC SURG SUPPLY STERILE: Performed by: SURGERY

## 2023-07-31 PROCEDURE — 3600000004 HC SURGERY LEVEL 4 BASE: Performed by: SURGERY

## 2023-07-31 PROCEDURE — 6360000002 HC RX W HCPCS: Performed by: SURGERY

## 2023-07-31 PROCEDURE — 6360000002 HC RX W HCPCS: Performed by: STUDENT IN AN ORGANIZED HEALTH CARE EDUCATION/TRAINING PROGRAM

## 2023-07-31 PROCEDURE — 88331 PATH CONSLTJ SURG 1 BLK 1SPC: CPT

## 2023-07-31 PROCEDURE — 6370000000 HC RX 637 (ALT 250 FOR IP): Performed by: SURGERY

## 2023-07-31 PROCEDURE — 2500000003 HC RX 250 WO HCPCS: Performed by: SURGERY

## 2023-07-31 PROCEDURE — 2500000003 HC RX 250 WO HCPCS: Performed by: REGISTERED NURSE

## 2023-07-31 PROCEDURE — 2709999900 HC NON-CHARGEABLE SUPPLY: Performed by: SURGERY

## 2023-07-31 PROCEDURE — 2580000003 HC RX 258: Performed by: REGISTERED NURSE

## 2023-07-31 PROCEDURE — 80048 BASIC METABOLIC PNL TOTAL CA: CPT

## 2023-07-31 PROCEDURE — 2580000003 HC RX 258: Performed by: SURGERY

## 2023-07-31 PROCEDURE — 60500 EXPLORE PARATHYROID GLANDS: CPT | Performed by: SURGERY

## 2023-07-31 PROCEDURE — 83735 ASSAY OF MAGNESIUM: CPT

## 2023-07-31 PROCEDURE — 6360000002 HC RX W HCPCS: Performed by: REGISTERED NURSE

## 2023-07-31 PROCEDURE — 7100000001 HC PACU RECOVERY - ADDTL 15 MIN: Performed by: SURGERY

## 2023-07-31 PROCEDURE — 3700000001 HC ADD 15 MINUTES (ANESTHESIA): Performed by: SURGERY

## 2023-07-31 PROCEDURE — 80047 BASIC METABLC PNL IONIZED CA: CPT

## 2023-07-31 PROCEDURE — 82310 ASSAY OF CALCIUM: CPT

## 2023-07-31 PROCEDURE — 7100000000 HC PACU RECOVERY - FIRST 15 MIN: Performed by: SURGERY

## 2023-07-31 PROCEDURE — G0378 HOSPITAL OBSERVATION PER HR: HCPCS

## 2023-07-31 PROCEDURE — 2580000003 HC RX 258: Performed by: ANESTHESIOLOGY

## 2023-07-31 PROCEDURE — 3600000014 HC SURGERY LEVEL 4 ADDTL 15MIN: Performed by: SURGERY

## 2023-07-31 PROCEDURE — 36415 COLL VENOUS BLD VENIPUNCTURE: CPT

## 2023-07-31 PROCEDURE — 88305 TISSUE EXAM BY PATHOLOGIST: CPT

## 2023-07-31 PROCEDURE — 3700000000 HC ANESTHESIA ATTENDED CARE: Performed by: SURGERY

## 2023-07-31 PROCEDURE — 60512 AUTOTRANSPLANT PARATHYROID: CPT | Performed by: SURGERY

## 2023-07-31 RX ORDER — FENTANYL CITRATE 50 UG/ML
INJECTION, SOLUTION INTRAMUSCULAR; INTRAVENOUS PRN
Status: DISCONTINUED | OUTPATIENT
Start: 2023-07-31 | End: 2023-07-31 | Stop reason: SDUPTHER

## 2023-07-31 RX ORDER — HYDROMORPHONE HYDROCHLORIDE 1 MG/ML
0.5 INJECTION, SOLUTION INTRAMUSCULAR; INTRAVENOUS; SUBCUTANEOUS
Status: DISCONTINUED | OUTPATIENT
Start: 2023-07-31 | End: 2023-08-01 | Stop reason: HOSPADM

## 2023-07-31 RX ORDER — EPHEDRINE SULFATE/0.9% NACL/PF 50 MG/5 ML
SYRINGE (ML) INTRAVENOUS PRN
Status: DISCONTINUED | OUTPATIENT
Start: 2023-07-31 | End: 2023-07-31 | Stop reason: SDUPTHER

## 2023-07-31 RX ORDER — LIDOCAINE HYDROCHLORIDE 20 MG/ML
INJECTION, SOLUTION EPIDURAL; INFILTRATION; INTRACAUDAL; PERINEURAL PRN
Status: DISCONTINUED | OUTPATIENT
Start: 2023-07-31 | End: 2023-07-31 | Stop reason: SDUPTHER

## 2023-07-31 RX ORDER — MAGNESIUM SULFATE IN WATER 40 MG/ML
2000 INJECTION, SOLUTION INTRAVENOUS ONCE
Status: COMPLETED | OUTPATIENT
Start: 2023-07-31 | End: 2023-08-01

## 2023-07-31 RX ORDER — MIDAZOLAM HYDROCHLORIDE 1 MG/ML
INJECTION INTRAMUSCULAR; INTRAVENOUS PRN
Status: DISCONTINUED | OUTPATIENT
Start: 2023-07-31 | End: 2023-07-31 | Stop reason: SDUPTHER

## 2023-07-31 RX ORDER — HYDROMORPHONE HYDROCHLORIDE 1 MG/ML
0.5 INJECTION, SOLUTION INTRAMUSCULAR; INTRAVENOUS; SUBCUTANEOUS EVERY 5 MIN PRN
Status: DISCONTINUED | OUTPATIENT
Start: 2023-07-31 | End: 2023-07-31 | Stop reason: HOSPADM

## 2023-07-31 RX ORDER — FENTANYL CITRATE 50 UG/ML
25 INJECTION, SOLUTION INTRAMUSCULAR; INTRAVENOUS EVERY 5 MIN PRN
Status: DISCONTINUED | OUTPATIENT
Start: 2023-07-31 | End: 2023-07-31 | Stop reason: HOSPADM

## 2023-07-31 RX ORDER — LANOLIN ALCOHOL/MO/W.PET/CERES
800 CREAM (GRAM) TOPICAL 2 TIMES DAILY
Status: DISCONTINUED | OUTPATIENT
Start: 2023-07-31 | End: 2023-08-01 | Stop reason: HOSPADM

## 2023-07-31 RX ORDER — SODIUM CHLORIDE 0.9 % (FLUSH) 0.9 %
5-40 SYRINGE (ML) INJECTION EVERY 12 HOURS SCHEDULED
Status: DISCONTINUED | OUTPATIENT
Start: 2023-07-31 | End: 2023-08-01 | Stop reason: HOSPADM

## 2023-07-31 RX ORDER — OXYCODONE HYDROCHLORIDE 5 MG/1
5 TABLET ORAL EVERY 4 HOURS PRN
Status: DISCONTINUED | OUTPATIENT
Start: 2023-07-31 | End: 2023-08-01 | Stop reason: HOSPADM

## 2023-07-31 RX ORDER — PHENYLEPHRINE HCL IN 0.9% NACL 0.4MG/10ML
SYRINGE (ML) INTRAVENOUS PRN
Status: DISCONTINUED | OUTPATIENT
Start: 2023-07-31 | End: 2023-07-31 | Stop reason: SDUPTHER

## 2023-07-31 RX ORDER — SODIUM CHLORIDE 9 MG/ML
INJECTION, SOLUTION INTRAVENOUS PRN
Status: DISCONTINUED | OUTPATIENT
Start: 2023-07-31 | End: 2023-08-01 | Stop reason: HOSPADM

## 2023-07-31 RX ORDER — ACETAMINOPHEN 325 MG/1
650 TABLET ORAL EVERY 4 HOURS PRN
Status: DISCONTINUED | OUTPATIENT
Start: 2023-07-31 | End: 2023-08-01 | Stop reason: HOSPADM

## 2023-07-31 RX ORDER — BUPIVACAINE HYDROCHLORIDE AND EPINEPHRINE 5; .0091 MG/ML; MG/ML
INJECTION, SOLUTION DENTAL; INFILTRATION PRN
Status: DISCONTINUED | OUTPATIENT
Start: 2023-07-31 | End: 2023-07-31 | Stop reason: ALTCHOICE

## 2023-07-31 RX ORDER — SENNA AND DOCUSATE SODIUM 50; 8.6 MG/1; MG/1
1 TABLET, FILM COATED ORAL 2 TIMES DAILY
Status: DISCONTINUED | OUTPATIENT
Start: 2023-07-31 | End: 2023-08-01 | Stop reason: HOSPADM

## 2023-07-31 RX ORDER — MAGNESIUM OXIDE 400 MG/1
800 TABLET ORAL 2 TIMES DAILY
Status: DISCONTINUED | OUTPATIENT
Start: 2023-07-31 | End: 2023-07-31

## 2023-07-31 RX ORDER — VANCOMYCIN HYDROCHLORIDE 1.5 G/30ML
INJECTION, POWDER, LYOPHILIZED, FOR SOLUTION INTRAVENOUS PRN
Status: DISCONTINUED | OUTPATIENT
Start: 2023-07-31 | End: 2023-07-31 | Stop reason: SDUPTHER

## 2023-07-31 RX ORDER — SUCCINYLCHOLINE/SOD CL,ISO/PF 200MG/10ML
SYRINGE (ML) INTRAVENOUS PRN
Status: DISCONTINUED | OUTPATIENT
Start: 2023-07-31 | End: 2023-07-31 | Stop reason: SDUPTHER

## 2023-07-31 RX ORDER — POTASSIUM CHLORIDE 7.45 MG/ML
10 INJECTION INTRAVENOUS
Status: COMPLETED | OUTPATIENT
Start: 2023-07-31 | End: 2023-08-01

## 2023-07-31 RX ORDER — POLYETHYLENE GLYCOL 3350 17 G/17G
17 POWDER, FOR SOLUTION ORAL DAILY
Status: DISCONTINUED | OUTPATIENT
Start: 2023-07-31 | End: 2023-08-01 | Stop reason: HOSPADM

## 2023-07-31 RX ORDER — BUMETANIDE 1 MG/1
2 TABLET ORAL 2 TIMES DAILY
Status: DISCONTINUED | OUTPATIENT
Start: 2023-07-31 | End: 2023-08-01 | Stop reason: HOSPADM

## 2023-07-31 RX ORDER — ONDANSETRON 2 MG/ML
INJECTION INTRAMUSCULAR; INTRAVENOUS PRN
Status: DISCONTINUED | OUTPATIENT
Start: 2023-07-31 | End: 2023-07-31 | Stop reason: SDUPTHER

## 2023-07-31 RX ORDER — ONDANSETRON 4 MG/1
4 TABLET, ORALLY DISINTEGRATING ORAL EVERY 8 HOURS PRN
Status: DISCONTINUED | OUTPATIENT
Start: 2023-07-31 | End: 2023-08-01 | Stop reason: HOSPADM

## 2023-07-31 RX ORDER — HYDROMORPHONE HYDROCHLORIDE 2 MG/ML
INJECTION, SOLUTION INTRAMUSCULAR; INTRAVENOUS; SUBCUTANEOUS PRN
Status: DISCONTINUED | OUTPATIENT
Start: 2023-07-31 | End: 2023-07-31 | Stop reason: SDUPTHER

## 2023-07-31 RX ORDER — ONDANSETRON 2 MG/ML
4 INJECTION INTRAMUSCULAR; INTRAVENOUS
Status: DISCONTINUED | OUTPATIENT
Start: 2023-07-31 | End: 2023-07-31 | Stop reason: HOSPADM

## 2023-07-31 RX ORDER — SODIUM CHLORIDE 0.9 % (FLUSH) 0.9 %
5-40 SYRINGE (ML) INJECTION EVERY 12 HOURS SCHEDULED
Status: DISCONTINUED | OUTPATIENT
Start: 2023-07-31 | End: 2023-07-31 | Stop reason: HOSPADM

## 2023-07-31 RX ORDER — CALCIUM CARBONATE 500 MG/1
500 TABLET, CHEWABLE ORAL 3 TIMES DAILY
Status: DISCONTINUED | OUTPATIENT
Start: 2023-07-31 | End: 2023-08-01 | Stop reason: HOSPADM

## 2023-07-31 RX ORDER — POTASSIUM CHLORIDE 20 MEQ/1
20 TABLET, EXTENDED RELEASE ORAL DAILY
Status: DISCONTINUED | OUTPATIENT
Start: 2023-07-31 | End: 2023-08-01 | Stop reason: HOSPADM

## 2023-07-31 RX ORDER — SODIUM CHLORIDE 9 MG/ML
INJECTION, SOLUTION INTRAVENOUS ONCE
Status: COMPLETED | OUTPATIENT
Start: 2023-07-31 | End: 2023-08-01

## 2023-07-31 RX ORDER — SODIUM CHLORIDE 0.9 % (FLUSH) 0.9 %
5-40 SYRINGE (ML) INJECTION PRN
Status: DISCONTINUED | OUTPATIENT
Start: 2023-07-31 | End: 2023-08-01 | Stop reason: HOSPADM

## 2023-07-31 RX ORDER — SODIUM CHLORIDE 9 MG/ML
INJECTION, SOLUTION INTRAVENOUS CONTINUOUS PRN
Status: DISCONTINUED | OUTPATIENT
Start: 2023-07-31 | End: 2023-07-31 | Stop reason: SDUPTHER

## 2023-07-31 RX ORDER — ALLOPURINOL 100 MG/1
200 TABLET ORAL DAILY
Status: DISCONTINUED | OUTPATIENT
Start: 2023-07-31 | End: 2023-08-01 | Stop reason: HOSPADM

## 2023-07-31 RX ORDER — SODIUM CHLORIDE 0.9 % (FLUSH) 0.9 %
5-40 SYRINGE (ML) INJECTION PRN
Status: DISCONTINUED | OUTPATIENT
Start: 2023-07-31 | End: 2023-07-31 | Stop reason: HOSPADM

## 2023-07-31 RX ORDER — ONDANSETRON 2 MG/ML
4 INJECTION INTRAMUSCULAR; INTRAVENOUS EVERY 6 HOURS PRN
Status: DISCONTINUED | OUTPATIENT
Start: 2023-07-31 | End: 2023-08-01 | Stop reason: HOSPADM

## 2023-07-31 RX ORDER — PROPOFOL 10 MG/ML
INJECTION, EMULSION INTRAVENOUS PRN
Status: DISCONTINUED | OUTPATIENT
Start: 2023-07-31 | End: 2023-07-31 | Stop reason: SDUPTHER

## 2023-07-31 RX ORDER — ONDANSETRON 2 MG/ML
INJECTION INTRAMUSCULAR; INTRAVENOUS PRN
Status: DISCONTINUED | OUTPATIENT
Start: 2023-07-31 | End: 2023-07-31

## 2023-07-31 RX ORDER — OXYCODONE HYDROCHLORIDE 5 MG/1
5 TABLET ORAL
Status: DISCONTINUED | OUTPATIENT
Start: 2023-07-31 | End: 2023-07-31 | Stop reason: HOSPADM

## 2023-07-31 RX ADMIN — Medication 80 MCG: at 10:37

## 2023-07-31 RX ADMIN — POTASSIUM CHLORIDE 10 MEQ: 7.46 INJECTION, SOLUTION INTRAVENOUS at 21:08

## 2023-07-31 RX ADMIN — POTASSIUM CHLORIDE 10 MEQ: 7.46 INJECTION, SOLUTION INTRAVENOUS at 23:30

## 2023-07-31 RX ADMIN — SODIUM CHLORIDE, PRESERVATIVE FREE 10 ML: 5 INJECTION INTRAVENOUS at 21:10

## 2023-07-31 RX ADMIN — Medication 5 MG: at 10:58

## 2023-07-31 RX ADMIN — Medication 120 MCG: at 09:35

## 2023-07-31 RX ADMIN — Medication 120 MCG: at 09:20

## 2023-07-31 RX ADMIN — FENTANYL CITRATE 25 MCG: 50 INJECTION, SOLUTION INTRAMUSCULAR; INTRAVENOUS at 13:39

## 2023-07-31 RX ADMIN — FENTANYL CITRATE 25 MCG: 50 INJECTION, SOLUTION INTRAMUSCULAR; INTRAVENOUS at 10:25

## 2023-07-31 RX ADMIN — LIDOCAINE HYDROCHLORIDE 40 MG: 20 INJECTION, SOLUTION EPIDURAL; INFILTRATION; INTRACAUDAL; PERINEURAL at 09:13

## 2023-07-31 RX ADMIN — VANCOMYCIN HYDROCHLORIDE 1500 MG: 1.5 INJECTION, POWDER, LYOPHILIZED, FOR SOLUTION INTRAVENOUS at 08:27

## 2023-07-31 RX ADMIN — POTASSIUM CHLORIDE 10 MEQ: 7.46 INJECTION, SOLUTION INTRAVENOUS at 18:47

## 2023-07-31 RX ADMIN — Medication 1 LOZENGE: at 15:33

## 2023-07-31 RX ADMIN — Medication 160 MCG: at 09:38

## 2023-07-31 RX ADMIN — CALCIUM CARBONATE (ANTACID) CHEW TAB 500 MG 500 MG: 500 CHEW TAB at 21:09

## 2023-07-31 RX ADMIN — SODIUM CHLORIDE: 9 INJECTION, SOLUTION INTRAVENOUS at 09:05

## 2023-07-31 RX ADMIN — Medication 800 MG: at 21:09

## 2023-07-31 RX ADMIN — VANCOMYCIN HYDROCHLORIDE 1.5 G: 1.5 INJECTION, POWDER, LYOPHILIZED, FOR SOLUTION INTRAVENOUS at 09:06

## 2023-07-31 RX ADMIN — FENTANYL CITRATE 25 MCG: 50 INJECTION, SOLUTION INTRAMUSCULAR; INTRAVENOUS at 13:28

## 2023-07-31 RX ADMIN — Medication 120 MCG: at 09:39

## 2023-07-31 RX ADMIN — MAGNESIUM SULFATE HEPTAHYDRATE 2000 MG: 40 INJECTION, SOLUTION INTRAVENOUS at 15:36

## 2023-07-31 RX ADMIN — Medication 40 MCG: at 11:42

## 2023-07-31 RX ADMIN — SODIUM CHLORIDE: 9 INJECTION, SOLUTION INTRAVENOUS at 08:28

## 2023-07-31 RX ADMIN — FENTANYL CITRATE 25 MCG: 50 INJECTION, SOLUTION INTRAMUSCULAR; INTRAVENOUS at 14:38

## 2023-07-31 RX ADMIN — PROPOFOL 10 MG: 10 INJECTION, EMULSION INTRAVENOUS at 12:14

## 2023-07-31 RX ADMIN — OXYCODONE HYDROCHLORIDE 5 MG: 5 TABLET ORAL at 17:31

## 2023-07-31 RX ADMIN — LIDOCAINE HYDROCHLORIDE 60 MG: 20 INJECTION, SOLUTION EPIDURAL; INFILTRATION; INTRACAUDAL; PERINEURAL at 12:08

## 2023-07-31 RX ADMIN — VASOPRESSIN 1 UNITS/MIN: 20 INJECTION INTRAVENOUS at 10:39

## 2023-07-31 RX ADMIN — ONDANSETRON HYDROCHLORIDE 4 MG: 2 INJECTION, SOLUTION INTRAMUSCULAR; INTRAVENOUS at 12:02

## 2023-07-31 RX ADMIN — MIDAZOLAM 1 MG: 1 INJECTION INTRAMUSCULAR; INTRAVENOUS at 09:05

## 2023-07-31 RX ADMIN — Medication 80 MCG: at 10:39

## 2023-07-31 RX ADMIN — Medication 100 MG: at 09:14

## 2023-07-31 RX ADMIN — MIDAZOLAM 1 MG: 1 INJECTION INTRAMUSCULAR; INTRAVENOUS at 09:10

## 2023-07-31 RX ADMIN — ALLOPURINOL 200 MG: 100 TABLET ORAL at 21:08

## 2023-07-31 RX ADMIN — HYDROMORPHONE HYDROCHLORIDE 0.2 MG: 2 INJECTION INTRAMUSCULAR; INTRAVENOUS; SUBCUTANEOUS at 11:33

## 2023-07-31 RX ADMIN — FENTANYL CITRATE 25 MCG: 50 INJECTION, SOLUTION INTRAMUSCULAR; INTRAVENOUS at 09:43

## 2023-07-31 RX ADMIN — Medication 160 MCG: at 10:58

## 2023-07-31 RX ADMIN — PROPOFOL 10 MG: 10 INJECTION, EMULSION INTRAVENOUS at 12:17

## 2023-07-31 RX ADMIN — FENTANYL CITRATE 50 MCG: 50 INJECTION, SOLUTION INTRAMUSCULAR; INTRAVENOUS at 09:13

## 2023-07-31 RX ADMIN — POTASSIUM CHLORIDE 20 MEQ: 1500 TABLET, EXTENDED RELEASE ORAL at 21:09

## 2023-07-31 RX ADMIN — Medication 40 MCG: at 10:49

## 2023-07-31 RX ADMIN — PHENYLEPHRINE HYDROCHLORIDE 40 MCG/MIN: 10 INJECTION INTRAVENOUS at 11:02

## 2023-07-31 RX ADMIN — Medication 10 MG: at 09:40

## 2023-07-31 RX ADMIN — PROPOFOL 100 MG: 10 INJECTION, EMULSION INTRAVENOUS at 09:13

## 2023-07-31 RX ADMIN — BUMETANIDE 2 MG: 1 TABLET ORAL at 21:08

## 2023-07-31 RX ADMIN — Medication 40 MCG: at 12:00

## 2023-07-31 RX ADMIN — HYDROMORPHONE HYDROCHLORIDE 0.2 MG: 2 INJECTION INTRAMUSCULAR; INTRAVENOUS; SUBCUTANEOUS at 12:04

## 2023-07-31 RX ADMIN — POTASSIUM CHLORIDE 10 MEQ: 7.46 INJECTION, SOLUTION INTRAVENOUS at 15:49

## 2023-07-31 RX ADMIN — Medication 80 MCG: at 10:53

## 2023-07-31 ASSESSMENT — PAIN SCALES - GENERAL
PAINLEVEL_OUTOF10: 5
PAINLEVEL_OUTOF10: 6
PAINLEVEL_OUTOF10: 0
PAINLEVEL_OUTOF10: 0
PAINLEVEL_OUTOF10: 6
PAINLEVEL_OUTOF10: 5

## 2023-07-31 ASSESSMENT — PAIN DESCRIPTION - LOCATION
LOCATION: THROAT;CHEST
LOCATION: NECK;THROAT
LOCATION: THROAT
LOCATION: THROAT

## 2023-07-31 ASSESSMENT — PAIN DESCRIPTION - ORIENTATION: ORIENTATION: ANTERIOR;MID

## 2023-07-31 ASSESSMENT — PAIN DESCRIPTION - DESCRIPTORS
DESCRIPTORS: SORE

## 2023-07-31 NOTE — PROGRESS NOTES
ATSP for ESRD on PD  S/p parathyroidectomy  Will do CCPD tonight  D/W PD nurse on call    Mary Harley MD  [facility]

## 2023-07-31 NOTE — OP NOTE
copiously irrigated and hemostasis was achieved. The strap muscles and platysma were reapproximated with interrupted 3-0 Vicryl sutures. The skin was reapproximated with a running 4-0 Monocryl subcuticular suture. Surgical glue was placed over the incision. A debriefing checklist was completed to share information critical to postoperative care of the patient. The patient tolerated the procedure well and was extubated and taken to the postanesthesia care unit in stable condition.     Electronically signed by Aga Garcia MD on 7/31/2023 at 3:47 PM

## 2023-07-31 NOTE — PROGRESS NOTES
Attempted to see  pt for OT services. Pt was off the floor for surgery. Will defer but continue to follow.

## 2023-07-31 NOTE — PERIOP NOTE
1320 post op BMP, Calcium, Mg labs sent , results pending      1337room assignment pending, Nuvia Lan (friend)  received  post op update. 1440 room assignment pending, pt friend Franceen Bamberger able to visit with patient in pacu. [facility] consulted Dr. Kati Gage to arrange P/D dialysis for patient.

## 2023-07-31 NOTE — H&P
Subjective:   23: Patient presents for surgery. 6/15/23: Patient presents to discuss surgery. Note received from nephrology requesting parathyroidectomy. 23:   Sherri Alvarado is a 70 y.o. female who presents for evaluation of parathyroid. Dr. Teo Moreno is her nephrologist. She saw Dr. Devante Kapadia in  because Dr. Otilio Zuluaga for hyperparathyroidism. She came and spoke to Dr. Devante Kapadia. She was scheduled for surgery but cancelled until she saw a nephrologist. She saw Dr. Teo Moreno - now ESRD on peritoneal dialysis. She reports chronic fatigue. 3/9/23:  Calcium: 8.1 (normal 8.5-10.1)  Phos: 6.0    20: Parathyroid scan:  IMPRESSION: 2 small parathyroid adenomas at the base of the right thyroid gland. Normal sonographic appearance of the thyroid gland. Past Medical History:   Diagnosis Date    Arthritis     L knee, legs, shoulders, lower back    At risk for sleep apnea     as of 2023, working with Dr. Otilio Zuluaga to be tested    Chronic kidney disease     secondary to hypertensive nephrosclerosis, CKD STG 5, ON ACTIVE KIDNEY TRANSPLANT LIST AT [facility]- DR. MORENO    Gastritis     HISTORY OF 10/2020     Gout     History of claustrophobia     History of total hip replacement, right 2022    Hyperparathyroidism (HCC)     Hypertension     MRSA (methicillin resistant Staphylococcus aureus)     nares positive  with negative in , repeat      Past Surgical History:   Procedure Laterality Date     SECTION      CHOLECYSTECTOMY, LAPAROSCOPIC      COLONOSCOPY      GYN      ECTOPIC PREGNANCY SURGERY     IR NONTUNNELED VASCULAR CATHETER  2023    IR NONTUNNELED VASCULAR CATHETER 2023 [facility] RAD ANGIO IR    IR NONTUNNELED VASCULAR CATHETER  2023    LUMBAR LAMINECTOMY  2020    L3L4L5    TOTAL HIP ARTHROPLASTY Left 2020    TOTAL HIP ARTHROPLASTY Right     WISDOM TOOTH EXTRACTION       Social History     Socioeconomic History    Marital status:      Spouse name:

## 2023-07-31 NOTE — TELEPHONE ENCOUNTER
LM for patient regarding rescheduling their appt on 08/10/2023, due to MD being out of the office. Called 1x.

## 2023-08-01 VITALS
TEMPERATURE: 97.7 F | SYSTOLIC BLOOD PRESSURE: 98 MMHG | HEIGHT: 66 IN | RESPIRATION RATE: 21 BRPM | OXYGEN SATURATION: 95 % | BODY MASS INDEX: 29.51 KG/M2 | HEART RATE: 102 BPM | DIASTOLIC BLOOD PRESSURE: 70 MMHG | WEIGHT: 183.64 LBS

## 2023-08-01 LAB
ANION GAP SERPL CALC-SCNC: 7 MMOL/L (ref 5–15)
BACTERIA SPEC CULT: NORMAL
BACTERIA SPEC CULT: NORMAL
BUN SERPL-MCNC: 32 MG/DL (ref 6–20)
BUN/CREAT SERPL: 5 (ref 12–20)
CALCIUM SERPL-MCNC: 9.3 MG/DL (ref 8.5–10.1)
CHLORIDE SERPL-SCNC: 102 MMOL/L (ref 97–108)
CO2 SERPL-SCNC: 29 MMOL/L (ref 21–32)
CREAT SERPL-MCNC: 6.47 MG/DL (ref 0.55–1.02)
GLUCOSE SERPL-MCNC: 133 MG/DL (ref 65–100)
MAGNESIUM SERPL-MCNC: 2.2 MG/DL (ref 1.6–2.4)
POTASSIUM SERPL-SCNC: 3.2 MMOL/L (ref 3.5–5.1)
SERVICE CMNT-IMP: NORMAL
SODIUM SERPL-SCNC: 138 MMOL/L (ref 136–145)

## 2023-08-01 PROCEDURE — 6360000002 HC RX W HCPCS: Performed by: INTERNAL MEDICINE

## 2023-08-01 PROCEDURE — 86706 HEP B SURFACE ANTIBODY: CPT

## 2023-08-01 PROCEDURE — 99024 POSTOP FOLLOW-UP VISIT: CPT | Performed by: NURSE PRACTITIONER

## 2023-08-01 PROCEDURE — 2500000003 HC RX 250 WO HCPCS: Performed by: SURGERY

## 2023-08-01 PROCEDURE — 80048 BASIC METABOLIC PNL TOTAL CA: CPT

## 2023-08-01 PROCEDURE — 90945 DIALYSIS ONE EVALUATION: CPT

## 2023-08-01 PROCEDURE — A4722 DIALYS SOL FLD VOL > 1999CC: HCPCS | Performed by: INTERNAL MEDICINE

## 2023-08-01 PROCEDURE — 83735 ASSAY OF MAGNESIUM: CPT

## 2023-08-01 PROCEDURE — G0378 HOSPITAL OBSERVATION PER HR: HCPCS

## 2023-08-01 PROCEDURE — 36415 COLL VENOUS BLD VENIPUNCTURE: CPT

## 2023-08-01 PROCEDURE — 87340 HEPATITIS B SURFACE AG IA: CPT

## 2023-08-01 PROCEDURE — 6370000000 HC RX 637 (ALT 250 FOR IP): Performed by: INTERNAL MEDICINE

## 2023-08-01 PROCEDURE — 2580000003 HC RX 258: Performed by: SURGERY

## 2023-08-01 PROCEDURE — 96374 THER/PROPH/DIAG INJ IV PUSH: CPT

## 2023-08-01 PROCEDURE — 2580000003 HC RX 258: Performed by: INTERNAL MEDICINE

## 2023-08-01 PROCEDURE — 6370000000 HC RX 637 (ALT 250 FOR IP): Performed by: SURGERY

## 2023-08-01 RX ORDER — CALCIUM CARBONATE 500 MG/1
500 TABLET, CHEWABLE ORAL 3 TIMES DAILY
Qty: 180 TABLET | Refills: 0 | Status: SHIPPED | OUTPATIENT
Start: 2023-08-01 | End: 2023-08-31

## 2023-08-01 RX ORDER — GENTAMICIN SULFATE 1 MG/G
CREAM TOPICAL AS NEEDED
Status: DISCONTINUED | OUTPATIENT
Start: 2023-08-01 | End: 2023-08-01 | Stop reason: HOSPADM

## 2023-08-01 RX ORDER — SODIUM CHLORIDE, SODIUM LACTATE, CALCIUM CHLORIDE, MAGNESIUM CHLORIDE AND DEXTROSE 1.5; 538; 448; 18.3; 5.08 G/100ML; MG/100ML; MG/100ML; MG/100ML; MG/100ML
6000 INJECTION, SOLUTION INTRAPERITONEAL CONTINUOUS
Status: DISCONTINUED | OUTPATIENT
Start: 2023-08-01 | End: 2023-08-01 | Stop reason: HOSPADM

## 2023-08-01 RX ORDER — POLYETHYLENE GLYCOL 3350 17 G/17G
17 POWDER, FOR SOLUTION ORAL 2 TIMES DAILY
Qty: 507 G | Refills: 0 | Status: SHIPPED | OUTPATIENT
Start: 2023-08-01 | End: 2023-08-16

## 2023-08-01 RX ORDER — SODIUM CHLORIDE, SODIUM LACTATE, CALCIUM CHLORIDE, MAGNESIUM CHLORIDE AND DEXTROSE 2.5; 538; 448; 18.3; 5.08 G/100ML; MG/100ML; MG/100ML; MG/100ML; MG/100ML
6000 INJECTION, SOLUTION INTRAPERITONEAL CONTINUOUS
Status: DISCONTINUED | OUTPATIENT
Start: 2023-08-01 | End: 2023-08-01 | Stop reason: HOSPADM

## 2023-08-01 RX ORDER — OXYCODONE HYDROCHLORIDE 5 MG/1
5 TABLET ORAL EVERY 6 HOURS PRN
Qty: 5 TABLET | Refills: 0 | Status: SHIPPED | OUTPATIENT
Start: 2023-08-01 | End: 2023-08-04

## 2023-08-01 RX ADMIN — OXYCODONE HYDROCHLORIDE 5 MG: 5 TABLET ORAL at 10:57

## 2023-08-01 RX ADMIN — GENTAMICIN SULFATE: 1 CREAM TOPICAL at 00:57

## 2023-08-01 RX ADMIN — CALCIUM CARBONATE (ANTACID) CHEW TAB 500 MG 500 MG: 500 CHEW TAB at 09:08

## 2023-08-01 RX ADMIN — SODIUM CHLORIDE, PRESERVATIVE FREE 10 ML: 5 INJECTION INTRAVENOUS at 09:19

## 2023-08-01 RX ADMIN — POTASSIUM CHLORIDE 20 MEQ: 1500 TABLET, EXTENDED RELEASE ORAL at 09:07

## 2023-08-01 RX ADMIN — SODIUM CHLORIDE, SODIUM LACTATE, CALCIUM CHLORIDE, MAGNESIUM CHLORIDE AND DEXTROSE 6000 ML: 1.5; 538; 448; 18.3; 5.08 INJECTION, SOLUTION INTRAPERITONEAL at 00:59

## 2023-08-01 RX ADMIN — Medication 800 MG: at 09:07

## 2023-08-01 RX ADMIN — SODIUM CHLORIDE, SODIUM LACTATE, CALCIUM CHLORIDE, MAGNESIUM CHLORIDE AND DEXTROSE 6000 ML: 2.5; 538; 448; 18.3; 5.08 INJECTION, SOLUTION INTRAPERITONEAL at 00:58

## 2023-08-01 RX ADMIN — BUMETANIDE 2 MG: 1 TABLET ORAL at 09:07

## 2023-08-01 RX ADMIN — ALLOPURINOL 200 MG: 100 TABLET ORAL at 09:07

## 2023-08-01 RX ADMIN — HYDROMORPHONE HYDROCHLORIDE 0.5 MG: 1 INJECTION, SOLUTION INTRAMUSCULAR; INTRAVENOUS; SUBCUTANEOUS at 01:26

## 2023-08-01 ASSESSMENT — PAIN - FUNCTIONAL ASSESSMENT
PAIN_FUNCTIONAL_ASSESSMENT: ACTIVITIES ARE NOT PREVENTED

## 2023-08-01 ASSESSMENT — PAIN DESCRIPTION - LOCATION
LOCATION: NECK

## 2023-08-01 ASSESSMENT — PAIN DESCRIPTION - DESCRIPTORS
DESCRIPTORS: ACHING;TENDER;SORE
DESCRIPTORS: ACHING;SORE
DESCRIPTORS: SORE

## 2023-08-01 ASSESSMENT — PAIN DESCRIPTION - PAIN TYPE
TYPE: SURGICAL PAIN

## 2023-08-01 ASSESSMENT — PAIN DESCRIPTION - FREQUENCY
FREQUENCY: CONTINUOUS
FREQUENCY: INTERMITTENT
FREQUENCY: INTERMITTENT

## 2023-08-01 ASSESSMENT — PAIN DESCRIPTION - ORIENTATION
ORIENTATION: ANTERIOR

## 2023-08-01 ASSESSMENT — PAIN SCALES - GENERAL
PAINLEVEL_OUTOF10: 2
PAINLEVEL_OUTOF10: 6
PAINLEVEL_OUTOF10: 3
PAINLEVEL_OUTOF10: 7

## 2023-08-01 ASSESSMENT — PAIN DESCRIPTION - ONSET
ONSET: ON-GOING
ONSET: GRADUAL
ONSET: ON-GOING

## 2023-08-01 ASSESSMENT — PAIN DESCRIPTION - DIRECTION: RADIATING_TOWARDS: NONRADIATING

## 2023-08-01 NOTE — DIALYSIS
Spoke with Night Primary RN about treatment difficulties overnight. Spoke with patient about difficulties with treatment overnight, current RN assessment (no edema, low BP, etc), and further possible interventions for today. Spoke with Nephrology as well about all above. MD states he spoke with patient and discussed what she should do if she is discharged. Patient stated understanding. If she I not discharged,  and settings for the evening will be reviewed and confirmed and shared with patient to increase comfort with procedure and understanding.

## 2023-08-01 NOTE — FLOWSHEET NOTE
End of Shift Note    Bedside shift change report given to Elizabet ZHOU  (oncoming nurse) by Arthur Cantu RN (offgoing nurse). Report included the following information SBAR, Kardex, Intake/Output, MAR, Recent Results, and Alarm Parameters     Shift worked:  4925-2317       Shift summary and any significant changes:     Pt has had a restful night. Pt has had no breathing or respiratory problems this shift and is actually on room air. Incision remains c/d/I and ot a with surgical glue. No reports of s/s of low calcium and lab levels stable. Pt had PD setup by [facility] RN whom programmed the cycler incorrectly , thus trouble shooting down after bedside report to clear alarms. [facility] at [facility] was able to walk thru and help resolve the issue and pt was able to complete therapy . Concerns for physician to address:  Discharge status. the patient wanting to know if she will be able to go home tonight      Zone phone for oncoming shift:   [phone]                      Length of Stay:  Expected LOS: 1  Actual LOS: 0      Arthur Cantu RN

## 2023-08-01 NOTE — PROGRESS NOTES
End of Shift Note    Bedside shift change report given to Arnie Goodell  (oncoming nurse) by Connie Grimm RN (offgoing nurse). Report included the following information SBAR, Kardex, Intake/Output, and MAR    Shift worked:  7am-7pm     Shift summary and any significant changes:     1835 - RN welcomed pt to unit and made them comfortable.      Concerns for physician to address:       Zone phone for oncoming shift:   [phone]

## 2023-08-01 NOTE — ANESTHESIA POSTPROCEDURE EVALUATION
Department of Anesthesiology  Postprocedure Note    Patient: Sushila Toledo  MRN: [id number]  YOB: 1960  Date of evaluation: 8/1/2023      Procedure Summary     Date: 07/31/23 Room / Location: [facility] MA[facility] OR M1 / [facility] MA[facility] OR    Anesthesia Start: 7437 Anesthesia Stop: 0801    Procedure: PARATHYROIDECTOMY WITH NIM AND AUTO IMPLANTATION (Neck) Diagnosis:       Hyperparathyroidism, unspecified ([address])      (Hyperparathyroidism, unspecified ([address]) [E21.3])    Providers: Davi Robertson MD Responsible Provider: Maral Johnson MD    Anesthesia Type: General ASA Status: 3          Anesthesia Type: General    Michele Phase I: Michele Score: 8    Michele Phase II:        Anesthesia Post Evaluation    Patient location during evaluation: PACU  Patient participation: complete - patient participated  Level of consciousness: sleepy but conscious  Airway patency: patent  Nausea & Vomiting: no nausea and no vomiting  Complications: no  Cardiovascular status: hemodynamically stable  Respiratory status: acceptable and nasal cannula  Hydration status: stable  Multimodal analgesia pain management approach

## 2023-08-01 NOTE — FLOWSHEET NOTE
Spoke with Renee Lundberg at the [facility] center for support to assist with troubleshooting for peritoneal dialysis machine with communication message of \" Check lines and bags\" Machine would not continue any processes. Charge Nurse Filippo Gil had looked atit earlier but still no avail. HD Nurse Carley Fleischer left this writer an incorrect contact number for Kam Mercado. After discovering number was incorrect Renee Lundberg at [facility] was called and it was discovered by him that the Machine had been programmed incorrectly. He stated that it should have been programmed for \"Dextrose Same\"and Not  \"Dextrose Different\". Pt was manually drained per machine 612ml prior to end of treatment. Pt cannot do 4 cycles with only ! Bag of solution. Pt was knowledgeable in her own PD care and was ble to Disconnect self maintaining sterile technique and replaced PD Cap. Machine ended therapy and was powered off.

## 2023-08-01 NOTE — PROGRESS NOTES
1107 - CM on floor delivered MOON to patient. See scanned documents. Yasmin Bermudez Care Management Assistant    Attempted to deliver and verbally explain the MOON with patient. Patient was with clinical staff.  Yasmin Bermudez Care Management Assistant

## 2023-08-01 NOTE — FLOWSHEET NOTE
Dr. Bhupinder Herndon called here and informed that evening labs were sent for calcium and magnesium. Also notified her of pt status and that pt was educated on s/s of possible low calcium levels.

## 2023-08-01 NOTE — DISCHARGE INSTRUCTIONS
Dr. Jhon Richter Discharge Instructions for:  Reji Sutton    MRN: [id number] : 1960    Admitted: 2023  Discharged: 2023     Take Home Medications   Resume you home medications as instructed. Start taking Calcium (Tums) one tablet 3 times a day. Increase your dose to two tablets 3 times a day if you have any symptoms of low calcium (below). Low blood calcium is dangerous. The Symptoms of LOW calcium are:   Tingling around your mouth, lips or finger tips. (Increase to 2 tablets 3 times a day)   Cramps in your hands or feet. (Increase to 3 tablets 3 times a day)   Feeling poor, confused, and \"in a fog\". If you have symptoms of low calcium:     - Take 2 extra calcium pills right away   - After two hours, if you still have symptoms, take 2 more   - If you still have symptoms, take two pills every two hour until you have taken 10 extra pills. - If you are still having symptoms, please call or go to to emergency room and have them check your calcium. You may need IV calcium. What to do at Home    Recommended diet: Resume your normal diet, try adding foods rich in Calcium    Recommended activity: activity as tolerated    Follow-up with Dr. Brian Egan in 2-3 weeks. Call [phone] for an appointment. Parathyroidectomy: What to Expect at [address] [place] [address]  Parathyroidectomy is the removal of one or more of the four parathyroid glands in the neck. These small glands, located on the thyroid gland, help control the amount of calcium in the body. When they are too active, these glands cause high levels of calcium. This is called hyperparathyroidism (say \"hy-per-pair-xg-BVG-hfbm-iz-um\"). The glands also are removed if they contain cancer. You may leave the hospital with a bandage over the cut the doctor made (incision). Your doctor will tell you if you need to come back to have this removed. You may still have a tube called a drain in your neck.  Your doctor will probably take this out a few days after your surgery. You may have some trouble chewing and swallowing after you go home. Your voice probably will be hoarse, and you may have trouble talking. For most people, these problems get better within 3 to 4 months, but it can take as long as a year. In some cases, this surgery causes permanent problems with chewing, speaking, or swallowing. This care sheet gives you a general idea about how long it will take for you to recover. But each person recovers at a different pace. Follow the steps below to get better as quickly as possible. How can you care for yourself at home? Activity  Rest when you feel tired. Getting enough sleep will help you recover. When you lie down, put two or three pillows under your head to keep it raised. Try to walk each day. Start by walking a little more than you did the day before. Bit by bit, increase the amount you walk. Walking boosts blood flow and helps prevent pneumonia and constipation. Ask your doctor when you can drive again. You may take a shower, unless you still have a drain. Pat the incision dry. If you have a drain coming out of your incision, follow your doctor's instructions to care for it. Diet  If swallowing is painful, start out with cold drinks, Popsicles, and ice cream. Next, try soft foods like pudding, yogurt, canned or cooked fruit, scrambled eggs, and mashed potatoes. Avoid eating hard or scratchy foods like chips or raw vegetables. Avoid orange or tomato juice and other acidic foods that can sting the throat. If you cough right after drinking, try drinking thicker liquids, such as a smoothie. You may notice that your bowel movements are not regular right after your surgery. This is common. Try to avoid constipation and straining with bowel movements. You may want to take a fiber supplement every day. If you have not had a bowel movement after a couple of days, ask your doctor about taking a mild laxative.   Try adding foods

## 2023-08-01 NOTE — PROGRESS NOTES
DISCHARGE NOTE FROM [facility] NURSE    Patient determined to be stable for discharge by attending provider. I have reviewed the discharge instructions and follow-up appointments with the Patient. They verbalized understanding and all questions were answered to their satisfaction. No complaints or further questions were expressed. Medications sent to pharmacy Appropriate educational materials and medication side effect teaching were provided. PIV were removed prior to discharge. Patient did not discharge with any line, alexandre, or drain. All personal items collected during admission were returned to the patient prior to discharge.     Post-op patient: Steffi Cha RN

## 2023-08-01 NOTE — PROGRESS NOTES
Physical Therapy  Consult received and chart reviewed. Patient discussed in IDRs. Surgical NP stating patient has no acute care PT needs and does not require evaluation prior to discharge. Will sign off.   Perla Velazquez, PT

## 2023-08-01 NOTE — CARE COORDINATION
Care Management Initial Assessment       RUR: N/A  Readmission? No  1st IM letter given? N/A (Observation)  1st  letter given: N/A      Once PCP f/u apt is confirmed, pt will be clear for d/c from CM standpoint. Initial Assessment: CM spoke with pt at bedside to discuss role and discharge planning. Pt alert and oriented and able to confirm PCP, pharmacy, and demographics. She lives with kinza Genao [phone]) and stated she is independent with ADL's. She currently drives however, she will have a friend named Boby Cheng) pick her up for discharge. She states there is no concerns for her to return home. CM will continue to follow. Discharge Summary: CM acknowledged d/c. Chart reviewed, IDR completed. Pt will d/c home with follow up apts,(pending); no post-acute therapy needs identified for d/c. Pt's friend Boby Cheng will pick her up for d/c.  2 IM let not needed - [facility] letter given 8/1/23. CM will remain accessible if any needs arise prior to discharge.             08/01/23 1112   Service Assessment   Patient Orientation Alert and Oriented;Person;Place;Situation;Self   Cognition Alert   History Provided By Patient   Primary Caregiver [address] [place] [address]  (Kinza Genao [phone]))   Patient's Healthcare Decision Maker is: Legal Next of Kin  (Kinza Genao [phone]))   PCP Verified by CM Yes   Last Visit to PCP Within last 3 months   Prior Functional Level Independent in ADLs/IADLs   Current Functional Level Independent in ADLs/IADLs   Can patient return to prior living arrangement Yes   Financial Resources Medicare   Social/Functional History   Lives With Kinza Genao [phone]))   Type of [address] standard   Active  Yes   Discharge Planning   Type of Residence House   Current Services Prior To Admission None   Patient expects to be discharged to: [facility]   Transition of Care

## 2023-08-01 NOTE — PLAN OF CARE
Problem: Safety - Adult  Goal: Free from fall injury  Outcome: Progressing     Problem: Pain  Goal: Verbalizes/displays adequate comfort level or baseline comfort level  Outcome: Progressing     Problem: Chronic Conditions and Co-morbidities  Goal: Patient's chronic conditions and co-morbidity symptoms are monitored and maintained or improved  Outcome: Progressing  Flowsheets (Taken 7/31/2023 2200)  Care Plan - Patient's Chronic Conditions and Co-Morbidity Symptoms are Monitored and Maintained or Improved: Monitor and assess patient's chronic conditions and comorbid symptoms for stability, deterioration, or improvement     Problem: Discharge Planning  Goal: Discharge to home or other facility with appropriate resources  Outcome: Progressing     Problem: ABCDS Injury Assessment  Goal: Absence of physical injury  Outcome: Progressing

## 2023-08-01 NOTE — FLOWSHEET NOTE
Spoke with lab about missing calcium and magnesium results from this evenings draw, and Renay stated that the specimens were placed on hold by another  , but that she would release them and run the for results.

## 2023-08-01 NOTE — CONSULTS
Consultation Note    NAME: Mary Elmore   :  1960   MRN:  [id number]     Date/Time:  2023 10:49 AM       Assessment :    Plan:  ESRD-PD-Dr. Brandon Garcia She had PD last night but the machine alarmed and not all the fluid was used. She says that she is to go home today. I told her that if she goes home, she should do a manual bag and then resume her routine CCPD. Subjective:   CHIEF COMPLAINT:  \"I'm going home today. \"    HISTORY OF PRESENT ILLNESS:     Felix Bravo is a 70 y.o.   female who has a history of ESRD on PD withj Dr. Brandon Garcia admitted for a parathyroidectomy. She says that she feels fine this am.    Past Medical History:   Diagnosis Date    Arthritis     L knee, legs, shoulders, lower back    At risk for sleep apnea     as of 2023, working with Dr. Art Mancia to be tested    Chronic kidney disease     secondary to hypertensive nephrosclerosis, CKD STG 5, ON ACTIVE KIDNEY TRANSPLANT LIST AT [facility]- DR. MORENO    Gastritis     HISTORY OF 10/2020     Gout     History of claustrophobia     History of total hip replacement, right 2022    Hyperparathyroidism (HCC)     Hypertension     MRSA (methicillin resistant Staphylococcus aureus)     nares positive  with negative in , repeat       Past Surgical History:   Procedure Laterality Date     SECTION      CHOLECYSTECTOMY, LAPAROSCOPIC      COLONOSCOPY      GYN      ECTOPIC PREGNANCY SURGERY     IR NONTUNNELED VASCULAR CATHETER  2023    IR NONTUNNELED VASCULAR CATHETER 2023 [facility] RAD ANGIO IR    IR NONTUNNELED VASCULAR CATHETER  2023    LUMBAR LAMINECTOMY  2020    [id number]    PARATHYROID GLAND SURGERY N/A 2023    PARATHYROIDECTOMY WITH NIM AND AUTO IMPLANTATION performed by Daina Thomas MD at [facility] MAIN OR    TOTAL HIP ARTHROPLASTY Left     TOTAL HIP ARTHROPLASTY Right     WISDOM TOOTH EXTRACTION       Social History     Tobacco Use    Smoking status: Former     Packs/day: 0.25     Years: creation of Plan.    ________________________________________________________________________       ___________________________________________________  Consulting Physician: Priyank Mask, MD

## 2023-08-02 LAB
HBV SURFACE AB SER QL: NONREACTIVE
HBV SURFACE AB SER-ACNC: 6.01 MIU/ML
HBV SURFACE AG SER QL: <0.1 INDEX
HBV SURFACE AG SER QL: NEGATIVE

## 2023-08-06 PROBLEM — M19.011 PRIMARY OSTEOARTHRITIS OF RIGHT SHOULDER: Status: ACTIVE | Noted: 2023-08-06

## 2023-08-10 ENCOUNTER — OFFICE VISIT (OUTPATIENT)
Age: 63
End: 2023-08-10

## 2023-08-10 ENCOUNTER — TELEPHONE (OUTPATIENT)
Age: 63
End: 2023-08-10

## 2023-08-10 VITALS
RESPIRATION RATE: 20 BRPM | DIASTOLIC BLOOD PRESSURE: 72 MMHG | TEMPERATURE: 97.9 F | BODY MASS INDEX: 31.6 KG/M2 | WEIGHT: 196.6 LBS | SYSTOLIC BLOOD PRESSURE: 126 MMHG | HEIGHT: 66 IN | OXYGEN SATURATION: 95 % | HEART RATE: 100 BPM

## 2023-08-10 DIAGNOSIS — E21.2 TERTIARY HYPERPARATHYROIDISM (HCC): Primary | ICD-10-CM

## 2023-08-10 PROCEDURE — 99024 POSTOP FOLLOW-UP VISIT: CPT | Performed by: SURGERY

## 2023-08-10 NOTE — PROGRESS NOTES
SUBJECTIVE: Sherri Alvarado is a 70 y.o. female is seen for a routine postop check s/p parathyroidectomy with autoimplantation. Reports no problems with the wound or other issues. Activity, diet and bowels are normal. No pain. No fevers. No perioral numbness or tingling. Calcium from dialysis is 8.3.    1. Right lower neck mass; excision:        Hypercellular parathyroid tissue (<1.0 g). 2. Right superior neck mass; excision:        Hypercellular parathyroid tissue (5.1 g). 3. Left superior neck mass; excision:        Hypercellular parathyroid tissue (0.162 g). 4. Left inferior neck mass; excision:        Hypercellular parathyroid tissue (2.33 g). 5. Right lower parathyroid; excision:        Hypercellular parathyroid tissue (0.446 g). OBJECTIVE:   There were no vitals filed for this visit. General: Appears well. Incision: healing well, no drainage, no erythema, no seroma, incision well approximated, no swelling    ASSESSMENT: normal postoperative course, doing well.     PLAN:   Wound care discussed  No heavy lifting   Continue tums - start weaning - informed her signs of hypocalcemia  Follow up PRN

## 2023-09-25 ENCOUNTER — TELEPHONE (OUTPATIENT)
Facility: CLINIC | Age: 63
End: 2023-09-25

## 2024-01-09 ENCOUNTER — OFFICE VISIT (OUTPATIENT)
Facility: CLINIC | Age: 64
End: 2024-01-09
Payer: MEDICARE

## 2024-01-09 VITALS
WEIGHT: 167.8 LBS | BODY MASS INDEX: 27.96 KG/M2 | HEIGHT: 65 IN | SYSTOLIC BLOOD PRESSURE: 124 MMHG | TEMPERATURE: 97.8 F | RESPIRATION RATE: 18 BRPM | OXYGEN SATURATION: 100 % | DIASTOLIC BLOOD PRESSURE: 80 MMHG | HEART RATE: 77 BPM

## 2024-01-09 DIAGNOSIS — Z23 NEED FOR VACCINATION: Primary | ICD-10-CM

## 2024-01-09 PROCEDURE — 90694 VACC AIIV4 NO PRSRV 0.5ML IM: CPT | Performed by: INTERNAL MEDICINE

## 2024-01-09 PROCEDURE — 3079F DIAST BP 80-89 MM HG: CPT | Performed by: INTERNAL MEDICINE

## 2024-01-09 PROCEDURE — 3074F SYST BP LT 130 MM HG: CPT | Performed by: INTERNAL MEDICINE

## 2024-01-09 PROCEDURE — G0439 PPPS, SUBSEQ VISIT: HCPCS | Performed by: INTERNAL MEDICINE

## 2024-01-09 PROCEDURE — 1123F ACP DISCUSS/DSCN MKR DOCD: CPT | Performed by: INTERNAL MEDICINE

## 2024-01-09 PROCEDURE — G0008 ADMIN INFLUENZA VIRUS VAC: HCPCS | Performed by: INTERNAL MEDICINE

## 2024-01-09 RX ORDER — MYCOPHENOLATE MOFETIL 250 MG/1
CAPSULE ORAL
COMMUNITY
Start: 2024-01-02

## 2024-01-09 RX ORDER — FAMOTIDINE 20 MG/1
TABLET, FILM COATED ORAL
COMMUNITY
Start: 2024-01-02

## 2024-01-09 RX ORDER — PREDNISONE 5 MG/1
TABLET ORAL
COMMUNITY
Start: 2024-01-02

## 2024-01-09 RX ORDER — FUROSEMIDE 40 MG/1
40 TABLET ORAL 2 TIMES DAILY
Qty: 60 TABLET | Refills: 1 | COMMUNITY
Start: 2024-01-02 | End: 2024-02-03

## 2024-01-09 RX ORDER — SULFAMETHOXAZOLE AND TRIMETHOPRIM 400; 80 MG/1; MG/1
TABLET ORAL
COMMUNITY
Start: 2024-01-02

## 2024-01-09 RX ORDER — TACROLIMUS 1 MG/1
CAPSULE ORAL
COMMUNITY
Start: 2024-01-04

## 2024-01-09 RX ORDER — CALCITRIOL 0.5 UG/1
CAPSULE, LIQUID FILLED ORAL
COMMUNITY
Start: 2024-01-02

## 2024-01-09 RX ORDER — AMIODARONE HYDROCHLORIDE 200 MG/1
TABLET ORAL
COMMUNITY
Start: 2024-01-02

## 2024-01-09 RX ORDER — SULFAMETHOXAZOLE AND TRIMETHOPRIM 400; 80 MG/1; MG/1
1 TABLET ORAL
COMMUNITY
Start: 2024-01-03 | End: 2024-07-01

## 2024-01-09 RX ORDER — CARVEDILOL 3.12 MG/1
TABLET ORAL
COMMUNITY
Start: 2024-01-02

## 2024-01-09 SDOH — ECONOMIC STABILITY: FOOD INSECURITY: WITHIN THE PAST 12 MONTHS, YOU WORRIED THAT YOUR FOOD WOULD RUN OUT BEFORE YOU GOT MONEY TO BUY MORE.: NEVER TRUE

## 2024-01-09 SDOH — ECONOMIC STABILITY: INCOME INSECURITY: HOW HARD IS IT FOR YOU TO PAY FOR THE VERY BASICS LIKE FOOD, HOUSING, MEDICAL CARE, AND HEATING?: NOT HARD AT ALL

## 2024-01-09 SDOH — ECONOMIC STABILITY: FOOD INSECURITY: WITHIN THE PAST 12 MONTHS, THE FOOD YOU BOUGHT JUST DIDN'T LAST AND YOU DIDN'T HAVE MONEY TO GET MORE.: NEVER TRUE

## 2024-01-09 ASSESSMENT — ANXIETY QUESTIONNAIRES
6. BECOMING EASILY ANNOYED OR IRRITABLE: 0
5. BEING SO RESTLESS THAT IT IS HARD TO SIT STILL: 0
3. WORRYING TOO MUCH ABOUT DIFFERENT THINGS: 0
1. FEELING NERVOUS, ANXIOUS, OR ON EDGE: 0
7. FEELING AFRAID AS IF SOMETHING AWFUL MIGHT HAPPEN: 0
2. NOT BEING ABLE TO STOP OR CONTROL WORRYING: 0
IF YOU CHECKED OFF ANY PROBLEMS ON THIS QUESTIONNAIRE, HOW DIFFICULT HAVE THESE PROBLEMS MADE IT FOR YOU TO DO YOUR WORK, TAKE CARE OF THINGS AT HOME, OR GET ALONG WITH OTHER PEOPLE: NOT DIFFICULT AT ALL
GAD7 TOTAL SCORE: 0
4. TROUBLE RELAXING: 0

## 2024-01-09 ASSESSMENT — PATIENT HEALTH QUESTIONNAIRE - PHQ9
1. LITTLE INTEREST OR PLEASURE IN DOING THINGS: 0
SUM OF ALL RESPONSES TO PHQ QUESTIONS 1-9: 0
SUM OF ALL RESPONSES TO PHQ9 QUESTIONS 1 & 2: 0
1. LITTLE INTEREST OR PLEASURE IN DOING THINGS: 0
SUM OF ALL RESPONSES TO PHQ9 QUESTIONS 1 & 2: 0
SUM OF ALL RESPONSES TO PHQ QUESTIONS 1-9: 0
SUM OF ALL RESPONSES TO PHQ QUESTIONS 1-9: 0
2. FEELING DOWN, DEPRESSED OR HOPELESS: 0
SUM OF ALL RESPONSES TO PHQ QUESTIONS 1-9: 0
SUM OF ALL RESPONSES TO PHQ QUESTIONS 1-9: 0
2. FEELING DOWN, DEPRESSED OR HOPELESS: 0
SUM OF ALL RESPONSES TO PHQ QUESTIONS 1-9: 0

## 2024-01-09 NOTE — PROGRESS NOTES
Chief Complaint   Patient presents with    Medicare AWV     \"Have you been to the ER, urgent care clinic since your last visit?  Hospitalized since your last visit?\"    YES - When: approximately 3 months ago.  Where and Why: [facility].    “Have you seen or consulted any other health care providers outside of [facility] since your last visit?”    NO        “Have you had a pap smear?”    NO

## 2024-05-09 ENCOUNTER — OFFICE VISIT (OUTPATIENT)
Facility: CLINIC | Age: 64
End: 2024-05-09
Payer: MEDICARE

## 2024-05-09 VITALS
WEIGHT: 173.2 LBS | HEART RATE: 83 BPM | TEMPERATURE: 98.3 F | HEIGHT: 66 IN | BODY MASS INDEX: 27.83 KG/M2 | OXYGEN SATURATION: 97 % | DIASTOLIC BLOOD PRESSURE: 68 MMHG | SYSTOLIC BLOOD PRESSURE: 104 MMHG | RESPIRATION RATE: 16 BRPM

## 2024-05-09 DIAGNOSIS — I10 PRIMARY HYPERTENSION: ICD-10-CM

## 2024-05-09 DIAGNOSIS — I50.22 CHRONIC SYSTOLIC (CONGESTIVE) HEART FAILURE (HCC): ICD-10-CM

## 2024-05-09 DIAGNOSIS — Z94.0 RENAL TRANSPLANT RECIPIENT: Primary | ICD-10-CM

## 2024-05-09 DIAGNOSIS — M54.50 CHRONIC MIDLINE LOW BACK PAIN WITHOUT SCIATICA: ICD-10-CM

## 2024-05-09 DIAGNOSIS — Z90.89 S/P PARATHYROIDECTOMY: ICD-10-CM

## 2024-05-09 DIAGNOSIS — E66.01 SEVERE OBESITY (BMI 35.0-39.9) WITH COMORBIDITY (HCC): ICD-10-CM

## 2024-05-09 DIAGNOSIS — M10.9 GOUT, UNSPECIFIED CAUSE, UNSPECIFIED CHRONICITY, UNSPECIFIED SITE: ICD-10-CM

## 2024-05-09 DIAGNOSIS — G89.29 CHRONIC MIDLINE LOW BACK PAIN WITHOUT SCIATICA: ICD-10-CM

## 2024-05-09 DIAGNOSIS — Z98.890 S/P PARATHYROIDECTOMY: ICD-10-CM

## 2024-05-09 PROCEDURE — 1123F ACP DISCUSS/DSCN MKR DOCD: CPT | Performed by: INTERNAL MEDICINE

## 2024-05-09 PROCEDURE — 99214 OFFICE O/P EST MOD 30 MIN: CPT | Performed by: INTERNAL MEDICINE

## 2024-05-09 PROCEDURE — 3078F DIAST BP <80 MM HG: CPT | Performed by: INTERNAL MEDICINE

## 2024-05-09 PROCEDURE — 3074F SYST BP LT 130 MM HG: CPT | Performed by: INTERNAL MEDICINE

## 2024-05-09 RX ORDER — AMLODIPINE BESYLATE 10 MG/1
10 TABLET ORAL DAILY
COMMUNITY
Start: 2024-04-02

## 2024-05-09 SDOH — ECONOMIC STABILITY: FOOD INSECURITY: WITHIN THE PAST 12 MONTHS, YOU WORRIED THAT YOUR FOOD WOULD RUN OUT BEFORE YOU GOT MONEY TO BUY MORE.: NEVER TRUE

## 2024-05-09 SDOH — ECONOMIC STABILITY: FOOD INSECURITY: WITHIN THE PAST 12 MONTHS, THE FOOD YOU BOUGHT JUST DIDN'T LAST AND YOU DIDN'T HAVE MONEY TO GET MORE.: NEVER TRUE

## 2024-05-09 SDOH — ECONOMIC STABILITY: INCOME INSECURITY: HOW HARD IS IT FOR YOU TO PAY FOR THE VERY BASICS LIKE FOOD, HOUSING, MEDICAL CARE, AND HEATING?: NOT HARD AT ALL

## 2024-05-09 ASSESSMENT — ANXIETY QUESTIONNAIRES
3. WORRYING TOO MUCH ABOUT DIFFERENT THINGS: NOT AT ALL
2. NOT BEING ABLE TO STOP OR CONTROL WORRYING: NOT AT ALL
5. BEING SO RESTLESS THAT IT IS HARD TO SIT STILL: NOT AT ALL
1. FEELING NERVOUS, ANXIOUS, OR ON EDGE: NOT AT ALL
4. TROUBLE RELAXING: NOT AT ALL
GAD7 TOTAL SCORE: 0
IF YOU CHECKED OFF ANY PROBLEMS ON THIS QUESTIONNAIRE, HOW DIFFICULT HAVE THESE PROBLEMS MADE IT FOR YOU TO DO YOUR WORK, TAKE CARE OF THINGS AT HOME, OR GET ALONG WITH OTHER PEOPLE: NOT DIFFICULT AT ALL
7. FEELING AFRAID AS IF SOMETHING AWFUL MIGHT HAPPEN: NOT AT ALL
6. BECOMING EASILY ANNOYED OR IRRITABLE: NOT AT ALL

## 2024-05-09 ASSESSMENT — PATIENT HEALTH QUESTIONNAIRE - PHQ9
SUM OF ALL RESPONSES TO PHQ QUESTIONS 1-9: 0
SUM OF ALL RESPONSES TO PHQ QUESTIONS 1-9: 0
SUM OF ALL RESPONSES TO PHQ9 QUESTIONS 1 & 2: 0
SUM OF ALL RESPONSES TO PHQ QUESTIONS 1-9: 0
1. LITTLE INTEREST OR PLEASURE IN DOING THINGS: NOT AT ALL
2. FEELING DOWN, DEPRESSED OR HOPELESS: NOT AT ALL
SUM OF ALL RESPONSES TO PHQ QUESTIONS 1-9: 0

## 2024-05-10 PROBLEM — N18.6 ESRD ON PERITONEAL DIALYSIS (HCC): Status: RESOLVED | Noted: 2023-03-02 | Resolved: 2024-05-10

## 2024-05-10 PROBLEM — Z99.2 ESRD ON PERITONEAL DIALYSIS (HCC): Status: RESOLVED | Noted: 2023-03-02 | Resolved: 2024-05-10

## 2024-05-10 PROBLEM — J96.01 ACUTE RESPIRATORY FAILURE WITH HYPOXIA (HCC): Status: RESOLVED | Noted: 2023-03-01 | Resolved: 2024-05-10

## 2024-05-11 NOTE — PROGRESS NOTES
1. The patient's transplant hopefully is continuing to do well.  Biopsy is planned for Thursday and hopefully it can indeed be corrected if there is an element rejection apparently.  2. She had a history of congestive heart failure, but this was when she was on dialysis.  This has not been the case since she no longer has kidney failure.  Her volume status remains quite good.  3. She is status post parathyroidectomy for tertiary hyperparathyroidism.  She no longer has problems with this.  4. She definitely needs to minimize further weight gain.  She is actually doing a great job.  Further weight reduction would be in order. This can be accomplished by eating meals, eliminating snacks, and avoiding the consumption of processed carbohydrates.  5. She has chronic low back pain, but it does not interfere with her functional status significantly.  6. She has a history of gout, but has not had a gouty attack in years.  7. Blood pressure is slightly elevated, but this is being now addressed by Nephrology.    
Chief Complaint   Patient presents with    Follow-up    Hypertension     \"Have you been to the ER, urgent care clinic since your last visit?  Hospitalized since your last visit?\"    NO    “Have you seen or consulted any other health care providers outside of [facility] since your last visit?”    NO    Have you had a mammogram?”   NO    Date of last Mammogram: 4/13/2022             Click Here for Release of Records Request  
comes in for return visit stating that she has done reasonably well.  She has had a transplant and was apparently doing well up until most recently when the creatinine started to rise a bit.  She plans to have a biopsy on Thursday to see if rejection is a problem and if so appropriate treatment can be instituted to prevent this from progressing.    She had significant tertiary hyperparathyroidism, but this has completely resolved via parathyroidectomy.    She has had a past history of gout, primary hypertension, as well as dyslipidemia, obesity and lumbar spinal stenosis which was surgically corrected.    
gait disturbance, joint pain, joint stiffness or joint swelling  Neurological: no TIA or stroke symptoms  Hematologic: no bruises, no bleeding, no swollen glands  Integument: no lumps, mole changes, nail changes or rash  Endocrine: no malaise/lethargy or unexpected weight changes      Social History     Socioeconomic History    Marital status:      Spouse name: Capo Barnhart    Number of children: None    Years of education: 16+    Highest education level: Master's degree (e.g., MA, MS, Anjali, MEd, MSW, MOON)   Tobacco Use    Smoking status: Former     Current packs/day: 0.00     Average packs/day: 0.3 packs/day for 15.0 years (3.8 ttl pk-yrs)     Types: Cigarettes     Start date: 1985     Quit date: 2000     Years since quittin.8    Smokeless tobacco: Never   Vaping Use    Vaping Use: Never used   Substance and Sexual Activity    Alcohol use: No    Drug use: No    Sexual activity: Not Currently   Social History Narrative    Loves playing cards.      Social Determinants of Health     Financial Resource Strain: Low Risk  (2024)    Overall Financial Resource Strain (CARDIA)     Difficulty of Paying Living Expenses: Not hard at all   Food Insecurity: No Food Insecurity (2024)    Hunger Vital Sign     Worried About Running Out of Food in the Last Year: Never true     Ran Out of Food in the Last Year: Never true   Transportation Needs: No Transportation Needs (2024)    PRAPARE - Transportation     Lack of Transportation (Medical): No     Lack of Transportation (Non-Medical): No   Physical Activity: Inactive (2024)    Exercise Vital Sign     Days of Exercise per Week: 0 days     Minutes of Exercise per Session: 0 min   Stress: No Stress Concern Present (2023)    [employer] - Occupational Stress Questionnaire     Feeling of Stress : Only a little   Social Connections: Moderately Integrated (2023)    Social Connection and Isolation Panel [NHANES]

## 2024-09-09 ENCOUNTER — OFFICE VISIT (OUTPATIENT)
Facility: CLINIC | Age: 64
End: 2024-09-09

## 2024-09-09 VITALS
RESPIRATION RATE: 16 BRPM | HEIGHT: 65 IN | SYSTOLIC BLOOD PRESSURE: 105 MMHG | HEART RATE: 67 BPM | WEIGHT: 184.4 LBS | BODY MASS INDEX: 30.72 KG/M2 | OXYGEN SATURATION: 100 % | DIASTOLIC BLOOD PRESSURE: 61 MMHG | TEMPERATURE: 97.7 F

## 2024-09-09 DIAGNOSIS — E21.2 TERTIARY HYPERPARATHYROIDISM (HCC): ICD-10-CM

## 2024-09-09 DIAGNOSIS — Z90.89 S/P PARATHYROIDECTOMY: ICD-10-CM

## 2024-09-09 DIAGNOSIS — I35.0 NONRHEUMATIC AORTIC VALVE STENOSIS: ICD-10-CM

## 2024-09-09 DIAGNOSIS — Z94.0 HISTORY OF KIDNEY TRANSPLANT: Primary | ICD-10-CM

## 2024-09-09 DIAGNOSIS — I48.0 PAROXYSMAL ATRIAL FIBRILLATION (HCC): ICD-10-CM

## 2024-09-09 DIAGNOSIS — Z98.890 S/P PARATHYROIDECTOMY: ICD-10-CM

## 2024-09-09 DIAGNOSIS — I10 PRIMARY HYPERTENSION: ICD-10-CM

## 2024-09-09 DIAGNOSIS — E66.09 CLASS 1 OBESITY DUE TO EXCESS CALORIES WITH SERIOUS COMORBIDITY AND BODY MASS INDEX (BMI) OF 30.0 TO 30.9 IN ADULT: ICD-10-CM

## 2024-09-09 DIAGNOSIS — I50.30 HEART FAILURE WITH PRESERVED EJECTION FRACTION, UNSPECIFIED HF CHRONICITY (HCC): ICD-10-CM

## 2024-09-09 DIAGNOSIS — E78.5 DYSLIPIDEMIA: ICD-10-CM

## 2024-09-09 SDOH — ECONOMIC STABILITY: INCOME INSECURITY: HOW HARD IS IT FOR YOU TO PAY FOR THE VERY BASICS LIKE FOOD, HOUSING, MEDICAL CARE, AND HEATING?: NOT HARD AT ALL

## 2024-09-09 SDOH — ECONOMIC STABILITY: FOOD INSECURITY: WITHIN THE PAST 12 MONTHS, YOU WORRIED THAT YOUR FOOD WOULD RUN OUT BEFORE YOU GOT MONEY TO BUY MORE.: NEVER TRUE

## 2024-09-09 SDOH — ECONOMIC STABILITY: FOOD INSECURITY: WITHIN THE PAST 12 MONTHS, THE FOOD YOU BOUGHT JUST DIDN'T LAST AND YOU DIDN'T HAVE MONEY TO GET MORE.: NEVER TRUE

## 2024-09-09 ASSESSMENT — ANXIETY QUESTIONNAIRES
2. NOT BEING ABLE TO STOP OR CONTROL WORRYING: NOT AT ALL
6. BECOMING EASILY ANNOYED OR IRRITABLE: NOT AT ALL
1. FEELING NERVOUS, ANXIOUS, OR ON EDGE: NOT AT ALL
5. BEING SO RESTLESS THAT IT IS HARD TO SIT STILL: NOT AT ALL
4. TROUBLE RELAXING: NOT AT ALL
3. WORRYING TOO MUCH ABOUT DIFFERENT THINGS: NOT AT ALL
7. FEELING AFRAID AS IF SOMETHING AWFUL MIGHT HAPPEN: NOT AT ALL
IF YOU CHECKED OFF ANY PROBLEMS ON THIS QUESTIONNAIRE, HOW DIFFICULT HAVE THESE PROBLEMS MADE IT FOR YOU TO DO YOUR WORK, TAKE CARE OF THINGS AT HOME, OR GET ALONG WITH OTHER PEOPLE: NOT DIFFICULT AT ALL
GAD7 TOTAL SCORE: 0

## 2024-09-09 ASSESSMENT — PATIENT HEALTH QUESTIONNAIRE - PHQ9
1. LITTLE INTEREST OR PLEASURE IN DOING THINGS: NOT AT ALL
SUM OF ALL RESPONSES TO PHQ QUESTIONS 1-9: 0
2. FEELING DOWN, DEPRESSED OR HOPELESS: NOT AT ALL
SUM OF ALL RESPONSES TO PHQ QUESTIONS 1-9: 0
SUM OF ALL RESPONSES TO PHQ9 QUESTIONS 1 & 2: 0

## 2024-09-14 PROBLEM — I48.0 PAROXYSMAL ATRIAL FIBRILLATION (HCC): Status: ACTIVE | Noted: 2024-09-14

## 2024-09-14 PROBLEM — Z94.0 HISTORY OF KIDNEY TRANSPLANT: Status: ACTIVE | Noted: 2024-09-14

## 2024-09-14 PROBLEM — I50.30 HEART FAILURE WITH PRESERVED EJECTION FRACTION (HCC): Status: ACTIVE | Noted: 2024-09-14

## 2024-11-15 LAB — MAMMOGRAPHY, EXTERNAL: NORMAL

## 2025-01-27 NOTE — PROGRESS NOTES
Bedside and Verbal shift change report given to Gustavo Grier RN (oncoming nurse) by Jaime Wall RN (offgoing nurse). Report included the following information SBAR, Kardex and MAR. Refill denied.  Patient needs to schedule an appointment with PCP or PA prior to further refills. Please inform patient and assist with scheduling. Visit can be virtual or in person.

## 2025-04-22 SDOH — ECONOMIC STABILITY: INCOME INSECURITY: IN THE LAST 12 MONTHS, WAS THERE A TIME WHEN YOU WERE NOT ABLE TO PAY THE MORTGAGE OR RENT ON TIME?: NO

## 2025-04-22 SDOH — ECONOMIC STABILITY: FOOD INSECURITY: WITHIN THE PAST 12 MONTHS, THE FOOD YOU BOUGHT JUST DIDN'T LAST AND YOU DIDN'T HAVE MONEY TO GET MORE.: NEVER TRUE

## 2025-04-22 SDOH — ECONOMIC STABILITY: FOOD INSECURITY: WITHIN THE PAST 12 MONTHS, YOU WORRIED THAT YOUR FOOD WOULD RUN OUT BEFORE YOU GOT MONEY TO BUY MORE.: NEVER TRUE

## 2025-04-23 ENCOUNTER — OFFICE VISIT (OUTPATIENT)
Facility: CLINIC | Age: 65
End: 2025-04-23
Payer: MEDICARE

## 2025-04-23 VITALS
WEIGHT: 189 LBS | OXYGEN SATURATION: 98 % | HEIGHT: 65 IN | DIASTOLIC BLOOD PRESSURE: 60 MMHG | TEMPERATURE: 98 F | RESPIRATION RATE: 16 BRPM | HEART RATE: 65 BPM | SYSTOLIC BLOOD PRESSURE: 92 MMHG | BODY MASS INDEX: 31.49 KG/M2

## 2025-04-23 DIAGNOSIS — I48.0 PAROXYSMAL ATRIAL FIBRILLATION (HCC): ICD-10-CM

## 2025-04-23 DIAGNOSIS — Z00.00 MEDICARE ANNUAL WELLNESS VISIT, SUBSEQUENT: ICD-10-CM

## 2025-04-23 DIAGNOSIS — Z94.0 HISTORY OF KIDNEY TRANSPLANT: ICD-10-CM

## 2025-04-23 DIAGNOSIS — T14.8XXA BRUISE: Primary | ICD-10-CM

## 2025-04-23 DIAGNOSIS — I50.30 HEART FAILURE WITH PRESERVED EJECTION FRACTION, UNSPECIFIED HF CHRONICITY (HCC): ICD-10-CM

## 2025-04-23 DIAGNOSIS — E78.5 DYSLIPIDEMIA: ICD-10-CM

## 2025-04-23 DIAGNOSIS — I35.0 NONRHEUMATIC AORTIC VALVE STENOSIS: ICD-10-CM

## 2025-04-23 PROCEDURE — 1123F ACP DISCUSS/DSCN MKR DOCD: CPT | Performed by: INTERNAL MEDICINE

## 2025-04-23 PROCEDURE — 3074F SYST BP LT 130 MM HG: CPT | Performed by: INTERNAL MEDICINE

## 2025-04-23 PROCEDURE — 3078F DIAST BP <80 MM HG: CPT | Performed by: INTERNAL MEDICINE

## 2025-04-23 PROCEDURE — 1126F AMNT PAIN NOTED NONE PRSNT: CPT | Performed by: INTERNAL MEDICINE

## 2025-04-23 PROCEDURE — 1159F MED LIST DOCD IN RCRD: CPT | Performed by: INTERNAL MEDICINE

## 2025-04-23 PROCEDURE — G0439 PPPS, SUBSEQ VISIT: HCPCS | Performed by: INTERNAL MEDICINE

## 2025-04-23 PROCEDURE — 99212 OFFICE O/P EST SF 10 MIN: CPT | Performed by: INTERNAL MEDICINE

## 2025-04-23 RX ORDER — ASPIRIN 81 MG/1
81 TABLET ORAL DAILY
COMMUNITY
Start: 2024-10-03 | End: 2025-10-03

## 2025-04-23 RX ORDER — CLOPIDOGREL BISULFATE 75 MG/1
75 TABLET ORAL DAILY
COMMUNITY
Start: 2024-11-20 | End: 2026-03-20

## 2025-04-23 ASSESSMENT — ANXIETY QUESTIONNAIRES
4. TROUBLE RELAXING: NOT AT ALL
3. WORRYING TOO MUCH ABOUT DIFFERENT THINGS: NOT AT ALL
7. FEELING AFRAID AS IF SOMETHING AWFUL MIGHT HAPPEN: NOT AT ALL
5. BEING SO RESTLESS THAT IT IS HARD TO SIT STILL: NOT AT ALL
1. FEELING NERVOUS, ANXIOUS, OR ON EDGE: NOT AT ALL
6. BECOMING EASILY ANNOYED OR IRRITABLE: NOT AT ALL
GAD7 TOTAL SCORE: 0
IF YOU CHECKED OFF ANY PROBLEMS ON THIS QUESTIONNAIRE, HOW DIFFICULT HAVE THESE PROBLEMS MADE IT FOR YOU TO DO YOUR WORK, TAKE CARE OF THINGS AT HOME, OR GET ALONG WITH OTHER PEOPLE: NOT DIFFICULT AT ALL
2. NOT BEING ABLE TO STOP OR CONTROL WORRYING: NOT AT ALL

## 2025-04-23 ASSESSMENT — PATIENT HEALTH QUESTIONNAIRE - PHQ9
SUM OF ALL RESPONSES TO PHQ QUESTIONS 1-9: 0
SUM OF ALL RESPONSES TO PHQ QUESTIONS 1-9: 0
2. FEELING DOWN, DEPRESSED OR HOPELESS: NOT AT ALL
1. LITTLE INTEREST OR PLEASURE IN DOING THINGS: NOT AT ALL
SUM OF ALL RESPONSES TO PHQ QUESTIONS 1-9: 0
2. FEELING DOWN, DEPRESSED OR HOPELESS: NOT AT ALL
SUM OF ALL RESPONSES TO PHQ QUESTIONS 1-9: 0
1. LITTLE INTEREST OR PLEASURE IN DOING THINGS: NOT AT ALL

## 2025-04-24 LAB
APTT PPP: 24.8 SEC (ref 22.1–31)
INR PPP: 1.1 (ref 0.9–1.1)
PROTHROMBIN TIME: 11.2 SEC (ref 9.2–11.2)
THERAPEUTIC RANGE: NORMAL SECS (ref 58–77)

## 2025-04-27 PROBLEM — I35.0 NONRHEUMATIC AORTIC VALVE STENOSIS: Status: ACTIVE | Noted: 2025-04-27

## 2025-04-28 NOTE — PROGRESS NOTES
Chief Complaint   Patient presents with    Medicare AWV     Patient states she has bruising all over her body.    Have you been to the ER, urgent care clinic since your last visit?  Hospitalized since your last visit?   NO    Have you seen or consulted any other health care providers outside our system since your last visit?   NO    Have you had a mammogram?   NO    Date of last Mammogram: 4/13/2022       Have you had a colorectal cancer screening such as a colonoscopy/FIT/Cologuard?    NO    Date of last Colonoscopy: 4/5/2022  No cologuard on file  No FIT/FOBT on file   No flexible sigmoidoscopy on file           
Automatic Reconciliation, Ar   bumetanide (BUMEX) 2 MG tablet Take 1 tablet by mouth 2 times daily  Automatic Reconciliation, Ar   potassium chloride (KLOR-CON M) 20 MEQ extended release tablet Take 1 tablet by mouth daily  Patient not taking: Reported on 4/23/2025  Automatic Reconciliation, Ar       CareTeam (Including outside providers/suppliers regularly involved in providing care):   Patient Care Team:  Carlo De Los Santos MD as PCP - General  Carlo De Los Santos MD as PCP - Empaneled Provider  Carol Wilson RN as Ambulatory Care Manager  aCpo Serna MD (Nephrology)     Recommendations for Preventive Services Due: see orders and patient instructions/AVS.  Recommended screening schedule for the next 5-10 years is provided to the patient in written form: see Patient Instructions/AVS.     Reviewed and updated this visit:  Tobacco  Allergies  Meds  Problems  Med Hx  Surg Hx  Fam Hx  Sexual   Hx                  The patient (or guardian, if applicable) and other individuals in attendance with the patient were advised that Artificial Intelligence will be utilized during this visit to record and process the conversation to generate a clinical note. The patient (or guardian, if applicable) and other individuals in attendance at the appointment consented to the use of AI, including the recording.

## 2025-05-15 ENCOUNTER — RESULTS FOLLOW-UP (OUTPATIENT)
Facility: CLINIC | Age: 65
End: 2025-05-15

## 2025-05-16 ENCOUNTER — CLINICAL SUPPORT (OUTPATIENT)
Facility: CLINIC | Age: 65
End: 2025-05-16

## 2025-05-16 DIAGNOSIS — D64.9 ANEMIA, UNSPECIFIED TYPE: Primary | ICD-10-CM

## 2025-05-16 LAB
BASOPHILS # BLD: 0.05 K/UL (ref 0–0.1)
BASOPHILS NFR BLD: 1 % (ref 0–1)
DIFFERENTIAL METHOD BLD: ABNORMAL
EOSINOPHIL # BLD: 0.05 K/UL (ref 0–0.4)
EOSINOPHIL NFR BLD: 1 % (ref 0–7)
ERYTHROCYTE [DISTWIDTH] IN BLOOD BY AUTOMATED COUNT: 14.2 % (ref 11.5–14.5)
HCT VFR BLD AUTO: 35.3 % (ref 35–47)
HGB BLD-MCNC: 10.4 G/DL (ref 11.5–16)
IMM GRANULOCYTES # BLD AUTO: 0 K/UL
IMM GRANULOCYTES NFR BLD AUTO: 0 %
LYMPHOCYTES # BLD: 1.77 K/UL (ref 0.8–3.5)
LYMPHOCYTES NFR BLD: 34 % (ref 12–49)
MCH RBC QN AUTO: 27.5 PG (ref 26–34)
MCHC RBC AUTO-ENTMCNC: 29.5 G/DL (ref 30–36.5)
MCV RBC AUTO: 93.4 FL (ref 80–99)
METAMYELOCYTES NFR BLD MANUAL: 2 %
MONOCYTES # BLD: 0.52 K/UL (ref 0–1)
MONOCYTES NFR BLD: 10 % (ref 5–13)
NEUTS SEG # BLD: 2.7 K/UL (ref 1.8–8)
NEUTS SEG NFR BLD: 52 % (ref 32–75)
NRBC # BLD: 0 K/UL (ref 0–0.01)
NRBC BLD-RTO: 0 PER 100 WBC
PLATELET # BLD AUTO: 146 K/UL (ref 150–400)
PMV BLD AUTO: 10.2 FL (ref 8.9–12.9)
RBC # BLD AUTO: 3.78 M/UL (ref 3.8–5.2)
RBC MORPH BLD: ABNORMAL
WBC # BLD AUTO: 5.2 K/UL (ref 3.6–11)

## 2025-05-18 ENCOUNTER — RESULTS FOLLOW-UP (OUTPATIENT)
Facility: CLINIC | Age: 65
End: 2025-05-18

## (undated) DEVICE — SUTURE VCRL SZ 2-0 L18IN ABSRB UD L26MM CP-2 1/2 CIR REV J762D

## (undated) DEVICE — DRESSING HYDROCOLLOID BORDER 35X10 IN ALUM PRIMASEAL

## (undated) DEVICE — SOLUTION IRRIG 1000ML 0.9% SOD CHL USP POUR PLAS BTL

## (undated) DEVICE — SOLUTION IRRIG 3000ML 0.9% SOD CHL FLX CONT 0797208] ICU MEDICAL INC]

## (undated) DEVICE — SOLUTION IRRIG 1000ML STRL H2O USP PLAS POUR BTL

## (undated) DEVICE — SUTURE MCRYL SZ 4 0 L18IN ABSRB UD PC 5 L19MM 3 8 CIR SGL Y823G

## (undated) DEVICE — 6619 2 PTNT ISO SYS INCISE AREA&LT;(&GT;&&LT;)&GT;P: Brand: STERI-DRAPE™ IOBAN™ 2

## (undated) DEVICE — PADDING CAST SPEC 6INX4YD COT --

## (undated) DEVICE — SYR LR LCK 1ML GRAD NSAF 30ML --

## (undated) DEVICE — GARMENT,MEDLINE,DVT,INT,CALF,MED, GEN2: Brand: MEDLINE

## (undated) DEVICE — SUTURE MCRYL SZ 4-0 L27IN ABSRB UD L19MM PS-2 1/2 CIR PRIM Y426H

## (undated) DEVICE — BLADE ELECTRODE: Brand: EDGE

## (undated) DEVICE — DRAPE XR C ARM 41X74IN LF --

## (undated) DEVICE — AGENT HEMSTAT W4XL8IN OXIDIZED REGENERATED CELOS ABSRB

## (undated) DEVICE — INFECTION CONTROL KIT SYS

## (undated) DEVICE — C-ARM: Brand: UNBRANDED

## (undated) DEVICE — TOTAL JOINT - SMH: Brand: MEDLINE INDUSTRIES, INC.

## (undated) DEVICE — SCISSORS ENDOSCP DIA5MM CRV MPLR CAUT W/ RATCH HNDL

## (undated) DEVICE — DISSECTOR LAP DIA5MM BLNT TIP ENDOPATH

## (undated) DEVICE — CLIP LIG M BLU TI HRT SHP WIRE HORZ 600 PER BX

## (undated) DEVICE — STERILE POLYISOPRENE POWDER-FREE SURGICAL GLOVES WITH EMOLLIENT COATING: Brand: PROTEXIS

## (undated) DEVICE — SHEET,T,THYROID,STERILE: Brand: MEDLINE

## (undated) DEVICE — SCRUB DRY SURG EZ SCRUB BRUSH PREOPERATIVE GRN

## (undated) DEVICE — TROCAR: Brand: KII® SLEEVE

## (undated) DEVICE — MASTISOL ADHESIVE LIQ 2/3ML

## (undated) DEVICE — GENERAL LAPAROSCOPY - SMH: Brand: MEDLINE INDUSTRIES, INC.

## (undated) DEVICE — SOLUTION IRRIG 1000ML H2O STRL BLT

## (undated) DEVICE — HANDLE LT SNAP ON ULT DURABLE LENS FOR TRUMPF ALC DISPOSABLE

## (undated) DEVICE — YANKAUER,FLEXIBLE HANDLE,REGLR CAPACITY: Brand: MEDLINE INDUSTRIES, INC.

## (undated) DEVICE — DRAPE MICSCP W46XL120IN POLY DRAWSTRAP W STEREO OBS TB AND

## (undated) DEVICE — TOOL 14BA50 LEGEND 14CM 5MM BA: Brand: MIDAS REX ™

## (undated) DEVICE — TUBING, SUCTION, 1/4" X 10', STRAIGHT: Brand: MEDLINE

## (undated) DEVICE — Device

## (undated) DEVICE — SHEARS ENDOSCP L9CM CRV HARM FOCS +

## (undated) DEVICE — COVER,MAYO STAND,STERILE: Brand: MEDLINE

## (undated) DEVICE — DRAPE,LAP,CHOLE,W/TROUGHS,STERILE: Brand: MEDLINE

## (undated) DEVICE — SUTURE VCRL SZ 3-0 L18IN ABSRB VLT SUTUPAK PRECUT W/O NDL J104T

## (undated) DEVICE — STRIP,CLOSURE,WOUND,MEDI-STRIP,1/2X4: Brand: MEDLINE

## (undated) DEVICE — SUTURE VCRL SZ 2-0 L36IN ABSRB VLT L36MM CT-1 1/2 CIR J345H

## (undated) DEVICE — GLOVE ORANGE PI 7 1/2   MSG9075

## (undated) DEVICE — BLADE,CARBON-STEEL,15,STRL,DISPOSABLE,TB: Brand: MEDLINE

## (undated) DEVICE — BONE WAX WHITE: Brand: BONE WAX WHITE

## (undated) DEVICE — GLOVE SURG SZ 75 L12IN FNGR THK79MIL GRN LTX FREE

## (undated) DEVICE — MAJOR LAPAROTOMY-MRMC: Brand: MEDLINE INDUSTRIES, INC.

## (undated) DEVICE — TROCAR: Brand: KII FIOS FIRST ENTRY

## (undated) DEVICE — NEEDLE SPNL 22GA L3.5IN BLK HUB S STL REG WALL FIT STYL W/

## (undated) DEVICE — SOLUTION IRRIG 3000ML 0.9% SOD CHL USP UROMATIC PLAS CONT

## (undated) DEVICE — SUTURE PERMAHAND SZ 2-0 L30IN NONABSORBABLE BLK SILK W/O A305H

## (undated) DEVICE — HANDPIECE SET WITH BONE CLEANING TIP AND SUCTION TUBE: Brand: INTERPULSE

## (undated) DEVICE — HYPODERMIC SAFETY NEEDLE: Brand: MONOJECT

## (undated) DEVICE — SUTURE STRATAFIX SYMMETRIC PDS + SZ 1 L18IN ABSRB VLT L48MM SXPP1A400

## (undated) DEVICE — GLOVE ORTHO 7   MSG9470

## (undated) DEVICE — 4-PORT MANIFOLD: Brand: NEPTUNE 2

## (undated) DEVICE — STERILE POLYISOPRENE POWDER-FREE SURGICAL GLOVES: Brand: PROTEXIS

## (undated) DEVICE — Z DUP USE 2275493 DRESSING ALGINATE POST OPERATIVE 10X3.5 IN RECT PRIMASEAL

## (undated) DEVICE — GLOVE SURG SZ 65 THK91MIL LTX FREE SYN POLYISOPRENE

## (undated) DEVICE — LAPAROSCOPIC TROCAR SLEEVE/SINGLE USE: Brand: KII® OPTICAL ACCESS SYSTEM

## (undated) DEVICE — REM POLYHESIVE ADULT PATIENT RETURN ELECTRODE: Brand: VALLEYLAB

## (undated) DEVICE — SUTURE VCRL SZ 2 L54IN ABSRB UD L65MM TP-1 1/2 CIR J880T

## (undated) DEVICE — PENCIL SMK EVAC 10 FT BLADE ELECTRD ROCKER FOR TELSCP

## (undated) DEVICE — HYPODERMIC SAFETY NEEDLE: Brand: MAGELLAN

## (undated) DEVICE — NEEDLE HYPO 22GA L1.5IN BLK S STL HUB POLYPR SHLD REG BVL

## (undated) DEVICE — THIS ADAPTER IS A DOUBLE SEALING FEMALE LUER LOCK ADAPTER WITH A 2-PIECE, COMBINATION COMPRESSION FIT/BARBED CATHETER CONNECTOR. THE ADAPTER IS USED TO CONNECT THE PD CATHETER TO A SOLUTION TRANSFER SET WITH LOCKING CONNECTOR.: Brand: LOCKING TITANIUM ADAPTER FOR PERITONEAL DIALYSIS CATHETER

## (undated) DEVICE — SUTURE VCRL SZ 3-0 L27IN ABSRB UD L26MM SH 1/2 CIR J416H

## (undated) DEVICE — STRAP,POSITIONING,KNEE/BODY,FOAM,4X60": Brand: MEDLINE

## (undated) DEVICE — LAMINECTOMY RICHMOND-LF: Brand: MEDLINE INDUSTRIES, INC.

## (undated) DEVICE — 2108 SERIES SAGITTAL BLADE (18.6 X 0.8 X 73.8MM)

## (undated) DEVICE — GLOVE SURG SZ 65 L12IN FNGR THK94MIL STD WHT LTX FREE

## (undated) DEVICE — GLOVE ORANGE PI 7   MSG9070

## (undated) DEVICE — SWAB CULT DBL W/O CHAR RAYON TIP AMIES GEL CLMN FOR COLL

## (undated) DEVICE — BANDAGE ADH W2XL4IN NITRL FAB STRP CURAD

## (undated) DEVICE — PROBE 8225825 3PK INCREMT STD PRASS ROHS

## (undated) DEVICE — TRAY,CATHETER,URETHRAL,VINYL,14FR: Brand: MEDLINE

## (undated) DEVICE — DERMABOND SKIN ADH 0.7ML -- DERMABOND ADVANCED 12/BX

## (undated) DEVICE — GLOVE SURG SZ 7 L12IN FNGR THK79MIL GRN LTX FREE

## (undated) DEVICE — BIPOLAR FORCEPS CORD: Brand: VALLEYLAB

## (undated) DEVICE — POSITIONER HD REST FOAM CMFRT TCH

## (undated) DEVICE — TOTAL TRAY, DB, 100% SILI FOLEY, 16FR 10: Brand: MEDLINE

## (undated) DEVICE — DRAPE SURG W41XL74IN CLR FULL SZ C ARM 3 ADH POLY STRP E

## (undated) DEVICE — INTENDED FOR TISSUE SEPARATION, AND OTHER PROCEDURES THAT REQUIRE A SHARP SURGICAL BLADE TO PUNCTURE OR CUT.: Brand: BARD-PARKER ® CARBON RIB-BACK BLADES

## (undated) DEVICE — SUTURE VCRL SZ 2-0 L36IN ABSRB UD L40MM CT 1/2 CIR J957H

## (undated) DEVICE — PREP SKN CHLRAPRP APL 26ML STR --

## (undated) DEVICE — SUTURE VCRL SZ 0 L45CM ABSRB VLT OS-6 L36.4MM 1/2 CIR REV J711T

## (undated) DEVICE — SUTURE MCRYL SZ 3-0 L27IN ABSRB UD L19MM PS-2 3/8 CIR PRIM Y427H

## (undated) DEVICE — SOLUTION SURG PREP 26 CC PURPREP

## (undated) DEVICE — SOLUTION IV 1000ML 0.9% SOD CHL

## (undated) DEVICE — MAGNETIC INSTR DRAPE 20X16: Brand: MEDLINE INDUSTRIES, INC.

## (undated) DEVICE — FLOSEAL HEMOSTATIC MATRIX, 5 ML: Brand: FLOSEAL

## (undated) DEVICE — SUTURE STRATAFIX SPRL MCRYL + SZ 3-0 L24IN ABSRB UD PS-2 SXMP1B108

## (undated) DEVICE — SUTURE VCRL 0 L27IN ABSRB VLT CT L40MM 1/2 CIR TAPERPOINT J352H

## (undated) DEVICE — SHEET PT TRANSFER 80X40 IN LAT AIR NYL GRY COMFORT GLIDE

## (undated) DEVICE — GOWN,SIRUS,NONRNF,SETINSLV,XL,20/CS: Brand: MEDLINE

## (undated) DEVICE — SYRINGE MED 10ML SLIP TIP BLNT FILL AND LUERLOCK DISP

## (undated) DEVICE — BLADE,CARBON-STEEL,10,STRL,DISPOSABLE,TB: Brand: MEDLINE

## (undated) DEVICE — KIT,1200CC CANISTER,3/16"X6' TUBING: Brand: MEDLINE INDUSTRIES, INC.

## (undated) DEVICE — SPONGE: SPECIALTY PEANUT XR 100/CS: Brand: MEDICAL ACTION INDUSTRIES